# Patient Record
Sex: MALE | Race: ASIAN | NOT HISPANIC OR LATINO | URBAN - METROPOLITAN AREA
[De-identification: names, ages, dates, MRNs, and addresses within clinical notes are randomized per-mention and may not be internally consistent; named-entity substitution may affect disease eponyms.]

---

## 2018-09-12 ENCOUNTER — EMERGENCY (EMERGENCY)
Facility: HOSPITAL | Age: 83
LOS: 1 days | Discharge: HOME | End: 2018-09-12
Attending: EMERGENCY MEDICINE | Admitting: EMERGENCY MEDICINE
Payer: MEDICARE

## 2018-09-12 VITALS
DIASTOLIC BLOOD PRESSURE: 67 MMHG | HEART RATE: 70 BPM | RESPIRATION RATE: 17 BRPM | TEMPERATURE: 99 F | SYSTOLIC BLOOD PRESSURE: 123 MMHG | OXYGEN SATURATION: 99 %

## 2018-09-12 VITALS
RESPIRATION RATE: 16 BRPM | OXYGEN SATURATION: 97 % | WEIGHT: 169.98 LBS | HEART RATE: 76 BPM | DIASTOLIC BLOOD PRESSURE: 60 MMHG | SYSTOLIC BLOOD PRESSURE: 96 MMHG | HEIGHT: 68 IN | TEMPERATURE: 98 F

## 2018-09-12 LAB
ALBUMIN SERPL ELPH-MCNC: 3.1 G/DL — LOW (ref 3.3–5)
ALP SERPL-CCNC: 54 U/L — SIGNIFICANT CHANGE UP (ref 30–120)
ALT FLD-CCNC: 30 U/L DA — SIGNIFICANT CHANGE UP (ref 10–60)
ANION GAP SERPL CALC-SCNC: 6 MMOL/L — SIGNIFICANT CHANGE UP (ref 5–17)
APTT BLD: 43.6 SEC — HIGH (ref 27.5–37.4)
AST SERPL-CCNC: 33 U/L — SIGNIFICANT CHANGE UP (ref 10–40)
BASOPHILS # BLD AUTO: 0.02 K/UL — SIGNIFICANT CHANGE UP (ref 0–0.2)
BASOPHILS NFR BLD AUTO: 0.4 % — SIGNIFICANT CHANGE UP (ref 0–2)
BILIRUB SERPL-MCNC: 1.1 MG/DL — SIGNIFICANT CHANGE UP (ref 0.2–1.2)
BUN SERPL-MCNC: 30 MG/DL — HIGH (ref 7–23)
CALCIUM SERPL-MCNC: 8.8 MG/DL — SIGNIFICANT CHANGE UP (ref 8.4–10.5)
CHLORIDE SERPL-SCNC: 100 MMOL/L — SIGNIFICANT CHANGE UP (ref 96–108)
CO2 SERPL-SCNC: 25 MMOL/L — SIGNIFICANT CHANGE UP (ref 22–31)
CREAT SERPL-MCNC: 1.87 MG/DL — HIGH (ref 0.5–1.3)
EOSINOPHIL # BLD AUTO: 0.09 K/UL — SIGNIFICANT CHANGE UP (ref 0–0.5)
EOSINOPHIL NFR BLD AUTO: 1.9 % — SIGNIFICANT CHANGE UP (ref 0–6)
GLUCOSE SERPL-MCNC: 228 MG/DL — HIGH (ref 70–99)
HCT VFR BLD CALC: 32 % — LOW (ref 39–50)
HGB BLD-MCNC: 11.3 G/DL — LOW (ref 13–17)
IMM GRANULOCYTES NFR BLD AUTO: 0.2 % — SIGNIFICANT CHANGE UP (ref 0–1.5)
INR BLD: 1.31 RATIO — HIGH (ref 0.88–1.16)
LYMPHOCYTES # BLD AUTO: 1.16 K/UL — SIGNIFICANT CHANGE UP (ref 1–3.3)
LYMPHOCYTES # BLD AUTO: 23.9 % — SIGNIFICANT CHANGE UP (ref 13–44)
MCHC RBC-ENTMCNC: 34 PG — SIGNIFICANT CHANGE UP (ref 27–34)
MCHC RBC-ENTMCNC: 35.3 GM/DL — SIGNIFICANT CHANGE UP (ref 32–36)
MCV RBC AUTO: 96.4 FL — SIGNIFICANT CHANGE UP (ref 80–100)
MONOCYTES # BLD AUTO: 0.4 K/UL — SIGNIFICANT CHANGE UP (ref 0–0.9)
MONOCYTES NFR BLD AUTO: 8.2 % — SIGNIFICANT CHANGE UP (ref 2–14)
NEUTROPHILS # BLD AUTO: 3.18 K/UL — SIGNIFICANT CHANGE UP (ref 1.8–7.4)
NEUTROPHILS NFR BLD AUTO: 65.4 % — SIGNIFICANT CHANGE UP (ref 43–77)
PLATELET # BLD AUTO: 119 K/UL — LOW (ref 150–400)
POTASSIUM SERPL-MCNC: 5.3 MMOL/L — SIGNIFICANT CHANGE UP (ref 3.5–5.3)
POTASSIUM SERPL-SCNC: 5.3 MMOL/L — SIGNIFICANT CHANGE UP (ref 3.5–5.3)
PROT SERPL-MCNC: 6.2 G/DL — SIGNIFICANT CHANGE UP (ref 6–8.3)
PROTHROM AB SERPL-ACNC: 14.4 SEC — HIGH (ref 9.8–12.7)
RBC # BLD: 3.32 M/UL — LOW (ref 4.2–5.8)
RBC # FLD: 13.6 % — SIGNIFICANT CHANGE UP (ref 10.3–14.5)
SODIUM SERPL-SCNC: 131 MMOL/L — LOW (ref 135–145)
WBC # BLD: 4.86 K/UL — SIGNIFICANT CHANGE UP (ref 3.8–10.5)
WBC # FLD AUTO: 4.86 K/UL — SIGNIFICANT CHANGE UP (ref 3.8–10.5)

## 2018-09-12 PROCEDURE — 85027 COMPLETE CBC AUTOMATED: CPT

## 2018-09-12 PROCEDURE — 99284 EMERGENCY DEPT VISIT MOD MDM: CPT | Mod: 25

## 2018-09-12 PROCEDURE — 85730 THROMBOPLASTIN TIME PARTIAL: CPT

## 2018-09-12 PROCEDURE — 93005 ELECTROCARDIOGRAM TRACING: CPT

## 2018-09-12 PROCEDURE — 96374 THER/PROPH/DIAG INJ IV PUSH: CPT

## 2018-09-12 PROCEDURE — 99285 EMERGENCY DEPT VISIT HI MDM: CPT

## 2018-09-12 PROCEDURE — 36415 COLL VENOUS BLD VENIPUNCTURE: CPT

## 2018-09-12 PROCEDURE — 85610 PROTHROMBIN TIME: CPT

## 2018-09-12 PROCEDURE — 93010 ELECTROCARDIOGRAM REPORT: CPT

## 2018-09-12 PROCEDURE — 80053 COMPREHEN METABOLIC PANEL: CPT

## 2018-09-12 RX ORDER — SODIUM CHLORIDE 9 MG/ML
500 INJECTION INTRAMUSCULAR; INTRAVENOUS; SUBCUTANEOUS ONCE
Qty: 0 | Refills: 0 | Status: COMPLETED | OUTPATIENT
Start: 2018-09-12 | End: 2018-09-12

## 2018-09-12 RX ORDER — SODIUM CHLORIDE 9 MG/ML
3 INJECTION INTRAMUSCULAR; INTRAVENOUS; SUBCUTANEOUS ONCE
Qty: 0 | Refills: 0 | Status: COMPLETED | OUTPATIENT
Start: 2018-09-12 | End: 2018-09-12

## 2018-09-12 RX ORDER — SACUBITRIL AND VALSARTAN 24; 26 MG/1; MG/1
1 TABLET, FILM COATED ORAL
Qty: 0 | Refills: 0 | COMMUNITY

## 2018-09-12 RX ORDER — ONDANSETRON 8 MG/1
4 TABLET, FILM COATED ORAL ONCE
Qty: 0 | Refills: 0 | Status: COMPLETED | OUTPATIENT
Start: 2018-09-12 | End: 2018-09-12

## 2018-09-12 RX ADMIN — SODIUM CHLORIDE 3 MILLILITER(S): 9 INJECTION INTRAMUSCULAR; INTRAVENOUS; SUBCUTANEOUS at 11:27

## 2018-09-12 RX ADMIN — ONDANSETRON 4 MILLIGRAM(S): 8 TABLET, FILM COATED ORAL at 11:25

## 2018-09-12 RX ADMIN — SODIUM CHLORIDE 500 MILLILITER(S): 9 INJECTION INTRAMUSCULAR; INTRAVENOUS; SUBCUTANEOUS at 11:26

## 2018-09-12 NOTE — ED PROVIDER NOTE - EYES, MLM
Clear bilaterally, pupils equal, round and reactive to light. Clear bilaterally, pupils equal, round and reactive to light. EOMI, no nystagmus

## 2018-09-12 NOTE — ED PROVIDER NOTE - OBJECTIVE STATEMENT
81 y/o M with hx of CAD with stents on plavix, HTN, DM, BPH presents with c/o epistaxis x 2 hours. Pt is Mandarin speaking and translation via daughter, Cleopatra Styles as per pt request. As per daughter, pt started having spontaneous nose bleed from B nostrils around 9am this morning. States that pt has had mild dizziness and nausea since. Pt had mild headache earlier which has resolved. Pt has hx of nose bleeds, usually from L nostril almost every 3-5 days, last nose bleed was a week ago, usually resolves after taking a "herbal medication" which has helped control his nose bleed today as well as per family. Pt has seen ENT in the past, was told to have a cyst in his L nostril which causes the bleed, had cauterization by ENT 2 years ago. Denies numbness, vision changes, vomiting, CP, SOB, trauma, fever or other symptoms. States that the R nostril stopped bleeding PTA and they packed the L nostril with tissue which has controlled the bleeding at this time.   PMD: Dr. Ruel Lui  ENT:

## 2018-09-12 NOTE — ED PROVIDER NOTE - ATTENDING CONTRIBUTION TO CARE
Translation by eliana at bedside per pt request. Pt Mandarin speaking.   82y M hx of CAD with 2 stents, PPM, HTN, DM, nasal "cyst" here with epistaxis. Pt reports bleeding from BL nares for ~2 hours today, described as steady drip. STates that during episode he became nauseated and dizzy. NO vomiting. No CP, palp, sob, abd pain. States that dizziness is mostly positional, ie going from sit to stand. No dizziness with head mvmt. Pt is on Plavix.  Gen: WNWD NAD  HEENT: NCAT PERRL no nystagmus EOMI R nare with boggy turbinate L nare w septal excoriation no active bleeding normal pharynx  Neck: supple no jvd  CV: RRR, no murmur  Lung: CTA BL  Abd: +BS soft NTND  Ext: wwp, palp pulses, FROMx4, no cce  Neuro: Awake alert CN grossly intact, sensation intact, motor 5/5 throughout  AP: Translation by eliana at bedside per pt request. Pt Mandarin speaking.   82y M hx of CAD with 2 stents, PPM, HTN, DM, nasal "cyst" here with epistaxis. Pt reports bleeding from BL nares for ~2 hours today, described as steady drip. STates that during episode he became nauseated and dizzy. NO vomiting. No CP, palp, sob, abd pain. States that dizziness is mostly positional, ie going from sit to stand. No dizziness with head mvmt. Pt is on Plavix.  Gen: WNWD NAD  HEENT: NCAT PERRL no nystagmus EOMI R nare with boggy turbinate L nare w septal excoriation no active bleeding normal pharynx  Neck: supple no jvd  CV: RRR, no murmur  Lung: CTA BL  Abd: +BS soft NTND  Ext: wwp, palp pulses, FROMx4, no cce  Neuro: Awake alert CN grossly intact, sensation intact, motor 5/5 throughout  AP: 82y M hx of CAD with 2 stents, PPM, HTN, DM, nasal "cyst" here with epistaxis, nausea, dizziness. Hypotensive on arrival, likely volume depletion vs med related vs vasovagal. Will give gentle IV hydration, check orthostatics and re-eval. No active bleeding at present. Will check CBC to r/o anemia as cause.

## 2018-09-12 NOTE — ED PROVIDER NOTE - MEDICAL DECISION MAKING DETAILS
83 y/o M with hx of cad with plavix, htn, dm c/o recurrent epistaxis, today with epistaxis from B nostrils since 9am, mild dizziness and nausea since, no trauma, no cp; hx of cauterization 2 years ago by ent, no active bleeding at this time B, will get ekg, labs, IVF, zofran, re-assess

## 2018-09-12 NOTE — ED ADULT NURSE NOTE - NSIMPLEMENTINTERV_GEN_ALL_ED
Implemented All Universal Safety Interventions:  [place] to call system. Call bell, personal items and telephone within reach. Instruct patient to call for assistance. Room bathroom lighting operational. Non-slip footwear when patient is off stretcher. Physically safe environment: no spills, clutter or unnecessary equipment. Stretcher in lowest position, wheels locked, appropriate side rails in place.

## 2018-09-12 NOTE — ED PROVIDER NOTE - NOSE FINDINGS
large clot noted in L nostril - removed and no active bleeding noted in B nostrils at this time,/EPISTAXIS large clot noted in L nostril - removed and no active bleeding noted in B nostrils at this time, boggy turbinate R, +mild blood noted to anterior septum L, no active bleeding noted, no septal hematoma or cysts B noted/CLOTTED NASAL BLOOD/EPISTAXIS

## 2018-09-12 NOTE — ED PROVIDER NOTE - PROGRESS NOTE DETAILS
Adeola GONZALEZ for Dr. Yanez: 81 y/o male with PMHx of CAD with pacemaker s/p 3 stents on Plavix, HTN, DM, epistaxis due to cyst in left nostril presents to the ED c/o epistaxis from B/L nostril this AM at around 8:40 bleeding until 10:40. Pt states the epistaxis was a continuous drip that was also going into his throat. +Nausea but no emesis. Nausea has now relieved. Pt is also c/o dizziness that began after epistaxis today. Dizziness exacerbated by motion. +LE edema. Denies SOB, abd pain, or CP today. Dr. Yanez: Pt states feeling better. No longer dizzy or hypotensive after IV fluids. Suspect likely vasovagal vs volume depletion. Advised to FU with cardio and ENT as OP. Adeola GONZALEZ for Dr. Yanez: Spoke to Dr. Nunez cardio who stated pt has hx of ischemic cardiomyopathy with ICD, EKG unchanged from baseline. Hx of low BP in the past. The last time pt was seen Dr. Nunez discontinued digoxin and metoprolol was decreased to once daily. Advised to f/u with him or PCP for further meds management. Creatinine is at baseline. Reevaluated patient at bedside.  Patient feeling much improved.  Discussed the results of all diagnostic testing in ED and copies of all reports given.   An opportunity to ask questions was given.  Discussed the importance of prompt, close medical follow-up.  Patient will return with any changes, concerns or persistent / worsening symptoms.  Understanding of all instructions verbalized. Advised pt and family if nose bleed recurs and is uncontrollable to return to ED and if not f/u ent. Advised to f/u with his PMD/cardiolgist as well.

## 2018-12-12 ENCOUNTER — INPATIENT (INPATIENT)
Facility: HOSPITAL | Age: 83
LOS: 8 days | Discharge: NURSING, REHAB OR LONG-TERM CARE | DRG: 871 | End: 2018-12-21
Attending: FAMILY MEDICINE | Admitting: FAMILY MEDICINE
Payer: MEDICARE

## 2018-12-12 VITALS
RESPIRATION RATE: 18 BRPM | WEIGHT: 149.91 LBS | TEMPERATURE: 98 F | SYSTOLIC BLOOD PRESSURE: 102 MMHG | OXYGEN SATURATION: 96 % | HEIGHT: 67 IN | DIASTOLIC BLOOD PRESSURE: 61 MMHG | HEART RATE: 69 BPM

## 2018-12-12 DIAGNOSIS — Z95.810 PRESENCE OF AUTOMATIC (IMPLANTABLE) CARDIAC DEFIBRILLATOR: Chronic | ICD-10-CM

## 2018-12-12 DIAGNOSIS — Z95.0 PRESENCE OF CARDIAC PACEMAKER: Chronic | ICD-10-CM

## 2018-12-12 DIAGNOSIS — A41.9 SEPSIS, UNSPECIFIED ORGANISM: ICD-10-CM

## 2018-12-12 PROBLEM — E11.9 TYPE 2 DIABETES MELLITUS WITHOUT COMPLICATIONS: Chronic | Status: ACTIVE | Noted: 2018-09-12

## 2018-12-12 PROBLEM — I10 ESSENTIAL (PRIMARY) HYPERTENSION: Chronic | Status: ACTIVE | Noted: 2018-09-12

## 2018-12-12 PROBLEM — I25.10 ATHEROSCLEROTIC HEART DISEASE OF NATIVE CORONARY ARTERY WITHOUT ANGINA PECTORIS: Chronic | Status: ACTIVE | Noted: 2018-09-12

## 2018-12-12 LAB
ALBUMIN SERPL ELPH-MCNC: 2.5 G/DL — LOW (ref 3.3–5)
ALP SERPL-CCNC: 103 U/L — SIGNIFICANT CHANGE UP (ref 30–120)
ALT FLD-CCNC: 66 U/L DA — HIGH (ref 10–60)
AMMONIA BLD-MCNC: 39 UMOL/L — HIGH (ref 11–32)
ANION GAP SERPL CALC-SCNC: 10 MMOL/L — SIGNIFICANT CHANGE UP (ref 5–17)
ANION GAP SERPL CALC-SCNC: 10 MMOL/L — SIGNIFICANT CHANGE UP (ref 5–17)
ANION GAP SERPL CALC-SCNC: 9 MMOL/L — SIGNIFICANT CHANGE UP (ref 5–17)
APPEARANCE UR: CLEAR — SIGNIFICANT CHANGE UP
APTT BLD: 34.7 SEC — SIGNIFICANT CHANGE UP (ref 28.5–37)
AST SERPL-CCNC: 81 U/L — HIGH (ref 10–40)
BACTERIA # UR AUTO: ABNORMAL
BASE EXCESS BLDV CALC-SCNC: -6.3 MMOL/L — LOW (ref -2–2)
BASOPHILS # BLD AUTO: 0 K/UL — SIGNIFICANT CHANGE UP (ref 0–0.2)
BASOPHILS NFR BLD AUTO: 0 % — SIGNIFICANT CHANGE UP (ref 0–2)
BILIRUB SERPL-MCNC: 2.4 MG/DL — HIGH (ref 0.2–1.2)
BILIRUB UR-MCNC: NEGATIVE — SIGNIFICANT CHANGE UP
BUN SERPL-MCNC: 109 MG/DL — HIGH (ref 7–23)
BUN SERPL-MCNC: 111 MG/DL — HIGH (ref 7–23)
BUN SERPL-MCNC: 119 MG/DL — HIGH (ref 7–23)
CALCIUM SERPL-MCNC: 7.3 MG/DL — LOW (ref 8.4–10.5)
CALCIUM SERPL-MCNC: 7.5 MG/DL — LOW (ref 8.4–10.5)
CALCIUM SERPL-MCNC: 8 MG/DL — LOW (ref 8.4–10.5)
CHLORIDE SERPL-SCNC: 87 MMOL/L — LOW (ref 96–108)
CHLORIDE SERPL-SCNC: 92 MMOL/L — LOW (ref 96–108)
CHLORIDE SERPL-SCNC: 94 MMOL/L — LOW (ref 96–108)
CO2 SERPL-SCNC: 20 MMOL/L — LOW (ref 22–31)
CO2 SERPL-SCNC: 21 MMOL/L — LOW (ref 22–31)
CO2 SERPL-SCNC: 21 MMOL/L — LOW (ref 22–31)
COLOR SPEC: YELLOW — SIGNIFICANT CHANGE UP
CREAT SERPL-MCNC: 2.9 MG/DL — HIGH (ref 0.5–1.3)
CREAT SERPL-MCNC: 2.97 MG/DL — HIGH (ref 0.5–1.3)
CREAT SERPL-MCNC: 3.19 MG/DL — HIGH (ref 0.5–1.3)
CRP SERPL-MCNC: 5.67 MG/DL — HIGH (ref 0–0.4)
DIFF PNL FLD: ABNORMAL
EOSINOPHIL # BLD AUTO: 0 K/UL — SIGNIFICANT CHANGE UP (ref 0–0.5)
EOSINOPHIL NFR BLD AUTO: 0 % — SIGNIFICANT CHANGE UP (ref 0–6)
EPI CELLS # UR: SIGNIFICANT CHANGE UP
GAS PNL BLDV: SIGNIFICANT CHANGE UP
GLUCOSE BLDC GLUCOMTR-MCNC: 180 MG/DL — HIGH (ref 70–99)
GLUCOSE SERPL-MCNC: 145 MG/DL — HIGH (ref 70–99)
GLUCOSE SERPL-MCNC: 229 MG/DL — HIGH (ref 70–99)
GLUCOSE SERPL-MCNC: 93 MG/DL — SIGNIFICANT CHANGE UP (ref 70–99)
GLUCOSE UR QL: 50 MG/DL
HCO3 BLDV-SCNC: 19 MMOL/L — LOW (ref 21–29)
HCT VFR BLD CALC: 31.3 % — LOW (ref 39–50)
HGB BLD-MCNC: 11.6 G/DL — LOW (ref 13–17)
HOROWITZ INDEX BLDV+IHG-RTO: 21 — SIGNIFICANT CHANGE UP
INR BLD: 1.39 RATIO — HIGH (ref 0.88–1.16)
KETONES UR-MCNC: NEGATIVE — SIGNIFICANT CHANGE UP
LACTATE SERPL-SCNC: 1.4 MMOL/L — SIGNIFICANT CHANGE UP (ref 0.7–2)
LACTATE SERPL-SCNC: 2.4 MMOL/L — HIGH (ref 0.7–2)
LDH SERPL L TO P-CCNC: 506 U/L — HIGH (ref 50–242)
LEUKOCYTE ESTERASE UR-ACNC: NEGATIVE — SIGNIFICANT CHANGE UP
LYMPHOCYTES # BLD AUTO: 1.1 K/UL — SIGNIFICANT CHANGE UP (ref 1–3.3)
LYMPHOCYTES # BLD AUTO: 5 % — LOW (ref 13–44)
MANUAL SMEAR VERIFICATION: SIGNIFICANT CHANGE UP
MCHC RBC-ENTMCNC: 32.8 PG — SIGNIFICANT CHANGE UP (ref 27–34)
MCHC RBC-ENTMCNC: 37.1 GM/DL — HIGH (ref 32–36)
MCV RBC AUTO: 88.4 FL — SIGNIFICANT CHANGE UP (ref 80–100)
MONOCYTES # BLD AUTO: 0.88 K/UL — SIGNIFICANT CHANGE UP (ref 0–0.9)
MONOCYTES NFR BLD AUTO: 4 % — SIGNIFICANT CHANGE UP (ref 2–14)
NEUTROPHILS # BLD AUTO: 19.96 K/UL — HIGH (ref 1.8–7.4)
NEUTROPHILS NFR BLD AUTO: 85 % — HIGH (ref 43–77)
NEUTS BAND # BLD: 6 % — SIGNIFICANT CHANGE UP (ref 0–8)
NITRITE UR-MCNC: NEGATIVE — SIGNIFICANT CHANGE UP
NRBC # BLD: 0 — SIGNIFICANT CHANGE UP
NRBC # BLD: SIGNIFICANT CHANGE UP /100 WBCS (ref 0–0)
NT-PROBNP SERPL-SCNC: 425 PG/ML — SIGNIFICANT CHANGE UP (ref 0–450)
PCO2 BLDV: 32 MMHG — LOW (ref 35–50)
PH BLDV: 7.37 — SIGNIFICANT CHANGE UP (ref 7.35–7.45)
PH UR: 5 — SIGNIFICANT CHANGE UP (ref 5–8)
PLAT MORPH BLD: NORMAL — SIGNIFICANT CHANGE UP
PLATELET # BLD AUTO: 82 K/UL — LOW (ref 150–400)
PO2 BLDV: 38 MMHG — SIGNIFICANT CHANGE UP (ref 25–45)
POTASSIUM SERPL-MCNC: 4.2 MMOL/L — SIGNIFICANT CHANGE UP (ref 3.5–5.3)
POTASSIUM SERPL-MCNC: 4.5 MMOL/L — SIGNIFICANT CHANGE UP (ref 3.5–5.3)
POTASSIUM SERPL-MCNC: 4.7 MMOL/L — SIGNIFICANT CHANGE UP (ref 3.5–5.3)
POTASSIUM SERPL-SCNC: 4.2 MMOL/L — SIGNIFICANT CHANGE UP (ref 3.5–5.3)
POTASSIUM SERPL-SCNC: 4.5 MMOL/L — SIGNIFICANT CHANGE UP (ref 3.5–5.3)
POTASSIUM SERPL-SCNC: 4.7 MMOL/L — SIGNIFICANT CHANGE UP (ref 3.5–5.3)
PROT SERPL-MCNC: 6.2 G/DL — SIGNIFICANT CHANGE UP (ref 6–8.3)
PROT UR-MCNC: NEGATIVE MG/DL — SIGNIFICANT CHANGE UP
PROTHROM AB SERPL-ACNC: 15.3 SEC — HIGH (ref 10–12.9)
RAPID RVP RESULT: SIGNIFICANT CHANGE UP
RBC # BLD: 3.54 M/UL — LOW (ref 4.2–5.8)
RBC # FLD: 12.5 % — SIGNIFICANT CHANGE UP (ref 10.3–14.5)
RBC BLD AUTO: NORMAL — SIGNIFICANT CHANGE UP
RBC CASTS # UR COMP ASSIST: ABNORMAL /HPF (ref 0–4)
SAO2 % BLDV: 66 % — LOW (ref 67–88)
SODIUM SERPL-SCNC: 118 MMOL/L — CRITICAL LOW (ref 135–145)
SODIUM SERPL-SCNC: 122 MMOL/L — LOW (ref 135–145)
SODIUM SERPL-SCNC: 124 MMOL/L — LOW (ref 135–145)
SP GR SPEC: 1.01 — SIGNIFICANT CHANGE UP (ref 1.01–1.02)
TOXIC GRANULES BLD QL SMEAR: PRESENT — SIGNIFICANT CHANGE UP
UROBILINOGEN FLD QL: NEGATIVE MG/DL — SIGNIFICANT CHANGE UP
WBC # BLD: 21.93 K/UL — HIGH (ref 3.8–10.5)
WBC # FLD AUTO: 21.93 K/UL — HIGH (ref 3.8–10.5)
WBC UR QL: SIGNIFICANT CHANGE UP

## 2018-12-12 PROCEDURE — 71250 CT THORAX DX C-: CPT | Mod: 26

## 2018-12-12 PROCEDURE — 99223 1ST HOSP IP/OBS HIGH 75: CPT | Mod: AI

## 2018-12-12 PROCEDURE — 99285 EMERGENCY DEPT VISIT HI MDM: CPT

## 2018-12-12 PROCEDURE — 93010 ELECTROCARDIOGRAM REPORT: CPT

## 2018-12-12 PROCEDURE — 71046 X-RAY EXAM CHEST 2 VIEWS: CPT | Mod: 26

## 2018-12-12 PROCEDURE — 72110 X-RAY EXAM L-2 SPINE 4/>VWS: CPT | Mod: 26

## 2018-12-12 PROCEDURE — 76700 US EXAM ABDOM COMPLETE: CPT | Mod: 26

## 2018-12-12 PROCEDURE — 70450 CT HEAD/BRAIN W/O DYE: CPT | Mod: 26

## 2018-12-12 PROCEDURE — 74176 CT ABD & PELVIS W/O CONTRAST: CPT | Mod: 26

## 2018-12-12 RX ORDER — VANCOMYCIN HCL 1 G
1000 VIAL (EA) INTRAVENOUS ONCE
Qty: 0 | Refills: 0 | Status: COMPLETED | OUTPATIENT
Start: 2018-12-12 | End: 2018-12-12

## 2018-12-12 RX ORDER — LEVOTHYROXINE SODIUM 125 MCG
88 TABLET ORAL DAILY
Qty: 0 | Refills: 0 | Status: DISCONTINUED | OUTPATIENT
Start: 2018-12-12 | End: 2018-12-21

## 2018-12-12 RX ORDER — TAMSULOSIN HYDROCHLORIDE 0.4 MG/1
0.4 CAPSULE ORAL AT BEDTIME
Qty: 0 | Refills: 0 | Status: DISCONTINUED | OUTPATIENT
Start: 2018-12-12 | End: 2018-12-21

## 2018-12-12 RX ORDER — SODIUM CHLORIDE 9 MG/ML
1000 INJECTION INTRAMUSCULAR; INTRAVENOUS; SUBCUTANEOUS ONCE
Qty: 0 | Refills: 0 | Status: COMPLETED | OUTPATIENT
Start: 2018-12-12 | End: 2018-12-12

## 2018-12-12 RX ORDER — METOPROLOL TARTRATE 50 MG
25 TABLET ORAL DAILY
Qty: 0 | Refills: 0 | Status: DISCONTINUED | OUTPATIENT
Start: 2018-12-12 | End: 2018-12-21

## 2018-12-12 RX ORDER — INSULIN LISPRO 100/ML
VIAL (ML) SUBCUTANEOUS EVERY 6 HOURS
Qty: 0 | Refills: 0 | Status: DISCONTINUED | OUTPATIENT
Start: 2018-12-12 | End: 2018-12-14

## 2018-12-12 RX ORDER — LACTULOSE 10 G/15ML
10 SOLUTION ORAL EVERY 6 HOURS
Qty: 0 | Refills: 0 | Status: DISCONTINUED | OUTPATIENT
Start: 2018-12-12 | End: 2018-12-13

## 2018-12-12 RX ORDER — SODIUM CHLORIDE 9 MG/ML
1000 INJECTION, SOLUTION INTRAVENOUS
Qty: 0 | Refills: 0 | Status: DISCONTINUED | OUTPATIENT
Start: 2018-12-12 | End: 2018-12-21

## 2018-12-12 RX ORDER — SODIUM CHLORIDE 9 MG/ML
1000 INJECTION INTRAMUSCULAR; INTRAVENOUS; SUBCUTANEOUS
Qty: 0 | Refills: 0 | Status: DISCONTINUED | OUTPATIENT
Start: 2018-12-12 | End: 2018-12-15

## 2018-12-12 RX ORDER — DEXTROSE 50 % IN WATER 50 %
12.5 SYRINGE (ML) INTRAVENOUS ONCE
Qty: 0 | Refills: 0 | Status: DISCONTINUED | OUTPATIENT
Start: 2018-12-12 | End: 2018-12-21

## 2018-12-12 RX ORDER — PIPERACILLIN AND TAZOBACTAM 4; .5 G/20ML; G/20ML
3.38 INJECTION, POWDER, LYOPHILIZED, FOR SOLUTION INTRAVENOUS ONCE
Qty: 0 | Refills: 0 | Status: COMPLETED | OUTPATIENT
Start: 2018-12-12 | End: 2018-12-12

## 2018-12-12 RX ORDER — PIPERACILLIN AND TAZOBACTAM 4; .5 G/20ML; G/20ML
3.38 INJECTION, POWDER, LYOPHILIZED, FOR SOLUTION INTRAVENOUS EVERY 12 HOURS
Qty: 0 | Refills: 0 | Status: DISCONTINUED | OUTPATIENT
Start: 2018-12-13 | End: 2018-12-17

## 2018-12-12 RX ORDER — DEXTROSE 50 % IN WATER 50 %
15 SYRINGE (ML) INTRAVENOUS ONCE
Qty: 0 | Refills: 0 | Status: DISCONTINUED | OUTPATIENT
Start: 2018-12-12 | End: 2018-12-21

## 2018-12-12 RX ORDER — HEPARIN SODIUM 5000 [USP'U]/ML
5000 INJECTION INTRAVENOUS; SUBCUTANEOUS EVERY 12 HOURS
Qty: 0 | Refills: 0 | Status: DISCONTINUED | OUTPATIENT
Start: 2018-12-12 | End: 2018-12-12

## 2018-12-12 RX ORDER — CLOPIDOGREL BISULFATE 75 MG/1
75 TABLET, FILM COATED ORAL DAILY
Qty: 0 | Refills: 0 | Status: DISCONTINUED | OUTPATIENT
Start: 2018-12-12 | End: 2018-12-13

## 2018-12-12 RX ORDER — GLUCAGON INJECTION, SOLUTION 0.5 MG/.1ML
1 INJECTION, SOLUTION SUBCUTANEOUS ONCE
Qty: 0 | Refills: 0 | Status: DISCONTINUED | OUTPATIENT
Start: 2018-12-12 | End: 2018-12-21

## 2018-12-12 RX ADMIN — TAMSULOSIN HYDROCHLORIDE 0.4 MILLIGRAM(S): 0.4 CAPSULE ORAL at 22:04

## 2018-12-12 RX ADMIN — Medication 250 MILLIGRAM(S): at 16:22

## 2018-12-12 RX ADMIN — CLOPIDOGREL BISULFATE 75 MILLIGRAM(S): 75 TABLET, FILM COATED ORAL at 19:15

## 2018-12-12 RX ADMIN — SODIUM CHLORIDE 1000 MILLILITER(S): 9 INJECTION INTRAMUSCULAR; INTRAVENOUS; SUBCUTANEOUS at 14:02

## 2018-12-12 RX ADMIN — Medication 88 MICROGRAM(S): at 19:16

## 2018-12-12 RX ADMIN — LACTULOSE 10 GRAM(S): 10 SOLUTION ORAL at 19:55

## 2018-12-12 RX ADMIN — PIPERACILLIN AND TAZOBACTAM 3.38 GRAM(S): 4; .5 INJECTION, POWDER, LYOPHILIZED, FOR SOLUTION INTRAVENOUS at 16:15

## 2018-12-12 RX ADMIN — PIPERACILLIN AND TAZOBACTAM 200 GRAM(S): 4; .5 INJECTION, POWDER, LYOPHILIZED, FOR SOLUTION INTRAVENOUS at 15:46

## 2018-12-12 RX ADMIN — SODIUM CHLORIDE 1000 MILLILITER(S): 9 INJECTION INTRAMUSCULAR; INTRAVENOUS; SUBCUTANEOUS at 12:24

## 2018-12-12 RX ADMIN — Medication 2: at 19:11

## 2018-12-12 RX ADMIN — SODIUM CHLORIDE 1000 MILLILITER(S): 9 INJECTION INTRAMUSCULAR; INTRAVENOUS; SUBCUTANEOUS at 13:05

## 2018-12-12 RX ADMIN — SODIUM CHLORIDE 1000 MILLILITER(S): 9 INJECTION INTRAMUSCULAR; INTRAVENOUS; SUBCUTANEOUS at 13:00

## 2018-12-12 NOTE — H&P ADULT - GASTROINTESTINAL DETAILS
no guarding/no bruit/no rebound tenderness/no masses palpable/bowel sounds normal/+ distended diffuse tenderness

## 2018-12-12 NOTE — H&P ADULT - NEGATIVE NEUROLOGICAL SYMPTOMS
no vertigo/no generalized seizures/no loss of sensation/no tremors/no transient paralysis/no headache

## 2018-12-12 NOTE — ED ADULT NURSE NOTE - NSIMPLEMENTINTERV_GEN_ALL_ED
Implemented All Fall with Harm Risk Interventions:  [place] to call system. Call bell, personal items and telephone within reach. Instruct patient to call for assistance. Room bathroom lighting operational. Non-slip footwear when patient is off stretcher. Physically safe environment: no spills, clutter or unnecessary equipment. Stretcher in lowest position, wheels locked, appropriate side rails in place. Provide visual cue, wrist band, yellow gown, etc. Monitor gait and stability. Monitor for mental status changes and reorient to person, place, and time. Review medications for side effects contributing to fall risk. Reinforce activity limits and safety measures with patient and family. Provide visual clues: red socks.

## 2018-12-12 NOTE — ED ADULT NURSE REASSESSMENT NOTE - NS ED NURSE REASSESS COMMENT FT1
placed pt on hospital bed, safety/ comfort maintained. pt refused Lu insertion, [place] cath placed, will monitor.
pt refused Lu insertion.
Report received at change of shift, pt AAOx3, X ray and CT head done. pt evaluated by ICU PA at bedside. Family at bedside, aware and agreeable with plan of care.
12/12/18 1710- CT abdomen, chest done and resulted, with + findings pt to be evaluated by critical care MD for admission to ICU. family and pt updated in plan of care verbalizes understanding. MARILYN nicholson
12/12/18- Lab work done and resulted pt with elevated ammonia level will received lactulose po as ordered, IVF running kept NPO as ordered, pt  lethargic @ times easily to arouse wife @ bedside MARILYN nicholson
12/12/18 1553- Pt. started on IV abx. as ordered, will be admitted awaiting for admission orders, turned and positioned,  no distress  lyn, MARILYN
12/12/18 1356- Pt. lethargic arousal IVF bolus infusing as ordered, awaiting for cbc result no distress daughter and pt updated in plan of care. lyn RN

## 2018-12-12 NOTE — H&P ADULT - ASSESSMENT
Patient is 84 yo male with hx of CAD S/P stent, S/P PPM, HTN, BPH, CKD, Hepatitis, and Dm2 presenting with     1. Fever.  Unclear Etiology.  ??Abdm source.  abdm/Chest/pelvic CT scan noticed.  Blood culture pending.  Received one dose of vanco in the ED.  Continue with zosyn pending ID consult  -Questionable Gallbladder disease VS.  SBP.  Will obtain Abdm US, and GI consult  -Critical care to follow up       2.  Abnormal LFT.  Hx of Hepatitis.  Will obtain Hepatitis panel, and Abdm US  -Monitor LFT  -GI consult    3. ANDREEA on CKD with hyponatremia.  ??Baseline renal function.  Seems to be secondary dehydration.  Lu cath.  Continue with IVF, and Monitor Renal function  -Renal US to r/o acute process  -Nephrology consult    4. HTN/CAD.  Continue with home medications (Plavix, and metoprolol).      5.  AMS.  Seems to be secondary to metabolic encephalopathy due to infection, and Renal failure.  Will obtain Head CT scan to r/o acute process.  continue with neuro check.    -Obtain ammonia level    6.  HTN.  Continue with metoprolol with holding parameter.  Monitor BP      7.  DM2.  Hold lantus. ISS, and Monitor POC    8.  Diet.  NPO until further evaluation by speech therapy    9.  Back pain.  Will obtain X-ray of the lumbar spine.      Plan of care was discussed with patient, and family in great details, All questions were answered to their satisfaction  Seems to understand, and in agreement  Prognosis guarded

## 2018-12-12 NOTE — ED ADULT NURSE NOTE - OBJECTIVE STATEMENT
Pt complaining of neck mid and lower back pain foe the past 2 weeks Retorted and distention and fever on and off for the past 3 weeks. Daughter stated hasn't seen the MD due to being on vacation and pt was unable to get pout the bed. Has been taking motrin mobic and tylenol for pain pt afebrile at this time

## 2018-12-12 NOTE — ED PROVIDER NOTE - MEDICAL DECISION MAKING DETAILS
82 y/o M pt presents to the ED c/o fever of 102 F, generalized weakness, malaise, neck pain and back pain for 2 weeks. Will do labs to r/o signs of infection.

## 2018-12-12 NOTE — H&P ADULT - RS GEN PE MLT RESP DETAILS PC
rhonchi/breath sounds equal/respirations non-labored/normal/airway patent/clear to auscultation bilaterally/good air movement

## 2018-12-12 NOTE — CONSULT NOTE ADULT - PROBLEM SELECTOR RECOMMENDATION 9
sepsis, juan antonio, dehydration, juan antonio, ckd, heart disease - cad with stents, severe Arthritis as per family  cvs regimen, monitor HD, I and O, cvs regimen, has AICD  ct scan noted, discussed with Rads - report pending  poss abd process - eval acute danielle/ SBP, will check Hepatitis Panel, will check TFT, Lyme disease serology, Biomarkers for sepsis,   admit to SPCU, monitor vs and HD and Sat, emp ABX - Broad Spectr - Zosyn  will need rehydration - serial lytes, Na, would infuse Normal Saline gently - Na levels every 8 hours around the clock,   pain assessment and management  work up under way  VBG pending  discussed plan of care with family and patient at the bedside, prognosis guarded  will follow

## 2018-12-12 NOTE — H&P ADULT - HISTORY OF PRESENT ILLNESS
Patient is 84 yo male with hx of CAD S/P stent, S/P PPM, HTN, CKD, and Hepatitis presenting with fever that has been going on for the past several days, and progressively worsen.  + generalized weakness, cough, and decrease po intake X 2 weeks.  Daughter noticed abdominal distention with increase abdominal pain.  Denies any headache, dizziness, blurry vision, chest pain, SOB, N/V/D, numbness, or weakness.      + Back pain

## 2018-12-12 NOTE — H&P ADULT - PMH
CAD (coronary artery disease)    CKD (chronic kidney disease)    DM (diabetes mellitus)    Hepatitis    HTN (hypertension)

## 2018-12-12 NOTE — ED ADULT NURSE NOTE - ED STAT RN HANDOFF DETAILS 2
Patient admitted to telemetry A&OX 3 mandarin speaking assigned to room 106 on [address]. Report given to MARILYN Gallego, patient to be transported via bed with cardiac monitor by RN and PCa with wife at side. Patient stable in no acute distress safety maintained.

## 2018-12-12 NOTE — ED PROVIDER NOTE - SECONDARY DIAGNOSIS.
Hyponatremia Acute renal failure, unspecified acute renal failure type Pneumonia due to infectious organism, unspecified laterality, unspecified part of lung

## 2018-12-12 NOTE — ED PROVIDER NOTE - OBJECTIVE STATEMENT
84 y/o M pt with PMHx of DM, MI, CAD, cardiac stents x3 more than 10 years ago, pacemaker presents to the ED c/o fever of 102 F, generalized weakness, malaise, neck pain and back pain for 2 weeks. Per daughter, he has been in bed for 2 weeks, states the pt normally walks around. Confirms cough and decreased eating/drinking. He has been given Tylenol and Mobic, receives insulin injections for DM. No further complaints at this time.   PMD: Matthew Lui ([place])

## 2018-12-12 NOTE — ED PROVIDER NOTE - CARE PLAN
Principal Discharge DX:	Sepsis Principal Discharge DX:	Sepsis  Secondary Diagnosis:	Hyponatremia  Secondary Diagnosis:	Acute renal failure, unspecified acute renal failure type  Secondary Diagnosis:	Pneumonia due to infectious organism, unspecified laterality, unspecified part of lung

## 2018-12-13 ENCOUNTER — RESULT REVIEW (OUTPATIENT)
Age: 83
End: 2018-12-13

## 2018-12-13 LAB
ALBUMIN SERPL ELPH-MCNC: 2.2 G/DL — LOW (ref 3.3–5)
ALP SERPL-CCNC: 73 U/L — SIGNIFICANT CHANGE UP (ref 30–120)
ALT FLD-CCNC: 58 U/L DA — SIGNIFICANT CHANGE UP (ref 10–60)
AMMONIA BLD-MCNC: 56 UMOL/L — HIGH (ref 11–32)
ANION GAP SERPL CALC-SCNC: 13 MMOL/L — SIGNIFICANT CHANGE UP (ref 5–17)
AST SERPL-CCNC: 73 U/L — HIGH (ref 10–40)
B BURGDOR C6 AB SER-ACNC: POSITIVE
B BURGDOR IGG+IGM SER-ACNC: 3.36 INDEX — HIGH (ref 0.01–0.89)
BILIRUB SERPL-MCNC: 2.4 MG/DL — HIGH (ref 0.2–1.2)
BUN SERPL-MCNC: 101 MG/DL — HIGH (ref 7–23)
CALCIUM SERPL-MCNC: 7.5 MG/DL — LOW (ref 8.4–10.5)
CHLORIDE SERPL-SCNC: 95 MMOL/L — LOW (ref 96–108)
CO2 SERPL-SCNC: 18 MMOL/L — LOW (ref 22–31)
CREAT SERPL-MCNC: 2.72 MG/DL — HIGH (ref 0.5–1.3)
CULTURE RESULTS: NO GROWTH — SIGNIFICANT CHANGE UP
GLUCOSE SERPL-MCNC: 79 MG/DL — SIGNIFICANT CHANGE UP (ref 70–99)
HAV IGM SER-ACNC: SIGNIFICANT CHANGE UP
HBA1C BLD-MCNC: 8.4 % — HIGH (ref 4–5.6)
HBV CORE IGM SER-ACNC: SIGNIFICANT CHANGE UP
HBV SURFACE AG SER-ACNC: SIGNIFICANT CHANGE UP
HCT VFR BLD CALC: 28.7 % — LOW (ref 39–50)
HCV AB S/CO SERPL IA: 0.21 S/CO — SIGNIFICANT CHANGE UP
HCV AB SERPL-IMP: SIGNIFICANT CHANGE UP
HGB BLD-MCNC: 10.4 G/DL — LOW (ref 13–17)
LACTATE SERPL-SCNC: 1.2 MMOL/L — SIGNIFICANT CHANGE UP (ref 0.7–2)
MCHC RBC-ENTMCNC: 32.8 PG — SIGNIFICANT CHANGE UP (ref 27–34)
MCHC RBC-ENTMCNC: 36.2 GM/DL — HIGH (ref 32–36)
MCV RBC AUTO: 90.5 FL — SIGNIFICANT CHANGE UP (ref 80–100)
NRBC # BLD: 0 /100 WBCS — SIGNIFICANT CHANGE UP (ref 0–0)
PLATELET # BLD AUTO: 95 K/UL — LOW (ref 150–400)
POTASSIUM SERPL-MCNC: 4.4 MMOL/L — SIGNIFICANT CHANGE UP (ref 3.5–5.3)
POTASSIUM SERPL-SCNC: 4.4 MMOL/L — SIGNIFICANT CHANGE UP (ref 3.5–5.3)
PROCALCITONIN SERPL-MCNC: 1.87 NG/ML — HIGH (ref 0.02–0.1)
PROCALCITONIN SERPL-MCNC: 2.2 NG/ML — HIGH (ref 0.02–0.1)
PROT SERPL-MCNC: 5.6 G/DL — LOW (ref 6–8.3)
RBC # BLD: 3.17 M/UL — LOW (ref 4.2–5.8)
RBC # FLD: 12.6 % — SIGNIFICANT CHANGE UP (ref 10.3–14.5)
SODIUM SERPL-SCNC: 126 MMOL/L — LOW (ref 135–145)
SPECIMEN SOURCE: SIGNIFICANT CHANGE UP
T3 SERPL-MCNC: 62 NG/DL — LOW (ref 80–200)
T4 AB SER-ACNC: 4.7 UG/DL — SIGNIFICANT CHANGE UP (ref 4.6–12)
TSH SERPL-MCNC: 2.11 UIU/ML — SIGNIFICANT CHANGE UP (ref 0.27–4.2)
WBC # BLD: 18.75 K/UL — HIGH (ref 3.8–10.5)
WBC # FLD AUTO: 18.75 K/UL — HIGH (ref 3.8–10.5)

## 2018-12-13 PROCEDURE — 99233 SBSQ HOSP IP/OBS HIGH 50: CPT

## 2018-12-13 PROCEDURE — 49083 ABD PARACENTESIS W/IMAGING: CPT

## 2018-12-13 PROCEDURE — 88305 TISSUE EXAM BY PATHOLOGIST: CPT | Mod: 26

## 2018-12-13 PROCEDURE — 88108 CYTOPATH CONCENTRATE TECH: CPT | Mod: 26

## 2018-12-13 RX ORDER — LACTULOSE 10 G/15ML
10 SOLUTION ORAL
Qty: 0 | Refills: 0 | Status: COMPLETED | OUTPATIENT
Start: 2018-12-13 | End: 2018-12-13

## 2018-12-13 RX ADMIN — LACTULOSE 10 GRAM(S): 10 SOLUTION ORAL at 15:56

## 2018-12-13 RX ADMIN — PIPERACILLIN AND TAZOBACTAM 25 GRAM(S): 4; .5 INJECTION, POWDER, LYOPHILIZED, FOR SOLUTION INTRAVENOUS at 04:16

## 2018-12-13 RX ADMIN — LACTULOSE 10 GRAM(S): 10 SOLUTION ORAL at 06:08

## 2018-12-13 RX ADMIN — LACTULOSE 10 GRAM(S): 10 SOLUTION ORAL at 12:24

## 2018-12-13 RX ADMIN — LACTULOSE 10 GRAM(S): 10 SOLUTION ORAL at 14:15

## 2018-12-13 RX ADMIN — LACTULOSE 10 GRAM(S): 10 SOLUTION ORAL at 00:00

## 2018-12-13 RX ADMIN — LACTULOSE 10 GRAM(S): 10 SOLUTION ORAL at 22:51

## 2018-12-13 RX ADMIN — PIPERACILLIN AND TAZOBACTAM 25 GRAM(S): 4; .5 INJECTION, POWDER, LYOPHILIZED, FOR SOLUTION INTRAVENOUS at 15:50

## 2018-12-13 RX ADMIN — Medication 88 MICROGRAM(S): at 06:07

## 2018-12-13 RX ADMIN — SODIUM CHLORIDE 60 MILLILITER(S): 9 INJECTION INTRAMUSCULAR; INTRAVENOUS; SUBCUTANEOUS at 17:15

## 2018-12-13 RX ADMIN — LACTULOSE 10 GRAM(S): 10 SOLUTION ORAL at 10:49

## 2018-12-13 RX ADMIN — Medication 25 MILLIGRAM(S): at 06:07

## 2018-12-13 RX ADMIN — LACTULOSE 10 GRAM(S): 10 SOLUTION ORAL at 17:15

## 2018-12-13 RX ADMIN — Medication 1: at 18:06

## 2018-12-13 RX ADMIN — Medication 1: at 23:22

## 2018-12-13 RX ADMIN — TAMSULOSIN HYDROCHLORIDE 0.4 MILLIGRAM(S): 0.4 CAPSULE ORAL at 22:51

## 2018-12-13 NOTE — PROGRESS NOTE ADULT - SUBJECTIVE AND OBJECTIVE BOX
Date/Time Patient Seen:  		  Referring MD:   Data Reviewed	       Patient is a 83y old  Male who presents with a chief complaint of fever (12 Dec 2018 18:15)  vs and meds reviewed      Subjective/HPI     PAST MEDICAL & SURGICAL HISTORY:  CKD (chronic kidney disease)  Hepatitis  CAD (coronary artery disease)  HTN (hypertension)  DM (diabetes mellitus)  Artificial cardiac pacemaker  AICD (automatic cardioverter/defibrillator) present  No significant past surgical history        Medication list         MEDICATIONS  (STANDING):  clopidogrel Tablet 75 milliGRAM(s) Oral daily  dextrose 5%. 1000 milliLiter(s) (50 mL/Hr) IV Continuous <Continuous>  dextrose 50% Injectable 12.5 Gram(s) IV Push once  insulin lispro (HumaLOG) corrective regimen sliding scale   SubCutaneous every 6 hours  lactulose Syrup 10 Gram(s) Oral every 6 hours  levothyroxine 88 MICROGram(s) Oral daily  metoprolol succinate ER 25 milliGRAM(s) Oral daily  piperacillin/tazobactam IVPB. 3.375 Gram(s) IV Intermittent every 12 hours  sodium chloride 0.9%. 1000 milliLiter(s) (80 mL/Hr) IV Continuous <Continuous>  tamsulosin 0.4 milliGRAM(s) Oral at bedtime    MEDICATIONS  (PRN):  dextrose 40% Gel 15 Gram(s) Oral once PRN Blood Glucose LESS THAN 70 milliGRAM(s)/deciliter  glucagon  Injectable 1 milliGRAM(s) IntraMuscular once PRN Glucose LESS THAN 70 milligrams/deciliter         Vitals log        ICU Vital Signs Last 24 Hrs  T(C): 36.3 (13 Dec 2018 06:14), Max: 37.1 (12 Dec 2018 17:00)  T(F): 97.4 (13 Dec 2018 06:14), Max: 98.7 (12 Dec 2018 17:00)  HR: 68 (13 Dec 2018 06:14) (60 - 78)  BP: 102/62 (13 Dec 2018 06:14) (89/52 - 133/90)  BP(mean): 52 (13 Dec 2018 01:03) (52 - 87)  ABP: --  ABP(mean): --  RR: 15 (13 Dec 2018 06:14) (12 - 18)  SpO2: 97% (13 Dec 2018 06:14) (95% - 100%)           Input and Output:  I&O's Detail    12 Dec 2018 07:01  -  13 Dec 2018 07:00  --------------------------------------------------------  IN:    IV PiggyBack: 350 mL    Sodium Chloride 0.9% IV Bolus: 2000 mL  Total IN: 2350 mL    OUT:    Voided: 965 mL  Total OUT: 965 mL    Total NET: 1385 mL          Lab Data                        10.4   18.75 )-----------( 95       ( 13 Dec 2018 06:04 )             28.7     12-13    126<L>  |  95<L>  |  101<H>  ----------------------------<  79  4.4   |  18<L>  |  2.72<H>    Ca    7.5<L>      13 Dec 2018 06:04    TPro  5.6<L>  /  Alb  2.2<L>  /  TBili  2.4<H>  /  DBili  x   /  AST  73<H>  /  ALT  58  /  AlkPhos  73  12-13            Review of Systems	      Objective     Physical Examination    heart s1s2  lung dec BS  abd dec BS    Pertinent Lab findings & Imaging      Aly:  NO   Adequate UO     I&O's Detail    12 Dec 2018 07:01  -  13 Dec 2018 07:00  --------------------------------------------------------  IN:    IV PiggyBack: 350 mL    Sodium Chloride 0.9% IV Bolus: 2000 mL  Total IN: 2350 mL    OUT:    Voided: 965 mL  Total OUT: 965 mL    Total NET: 1385 mL               Discussed with:     Cultures:	        Radiology

## 2018-12-13 NOTE — PROGRESS NOTE ADULT - SUBJECTIVE AND OBJECTIVE BOX
CC.  Fever  HPI.  Patient reports abdm pain is improving.  afebrile overnight.  feels hungry.  Offers no other complaints                  Constitutional: No fever, fatigue or weight loss.  Skin: No rash.  Eyes: No recent vision problems or eye pain.  ENT: No congestion, ear pain, or sore throat.  Endocrine: No thyroid problems.  Cardiovascular: No chest pain or palpation.  Respiratory: No cough, shortness of breath, congestion, or wheezing.  Gastrointestinal: No abdominal pain, nausea, vomiting, or diarrhea.  Genitourinary: No dysuria.  Musculoskeletal: No joint swelling.  Neurologic: No headache.      Vital Signs Last 24 Hrs  T(C): 36.6 (13 Dec 2018 11:52), Max: 37.1 (12 Dec 2018 17:00)  T(F): 97.9 (13 Dec 2018 11:52), Max: 98.7 (12 Dec 2018 17:00)  HR: 61 (13 Dec 2018 11:52) (59 - 78)  BP: 110/53 (13 Dec 2018 11:52) (89/52 - 133/90)  BP(mean): 68 (13 Dec 2018 11:52) (52 - 87)  RR: 22 (13 Dec 2018 11:52) (12 - 22)  SpO2: 99% (13 Dec 2018 11:52) (95% - 100%)        PHYSICAL EXAM-  GENERAL: Chronic ill appearing male   HEAD:  Atraumatic, Normocephalic  EYES: EOMI, PERRLA, conjunctiva and sclera clear  NECK: Supple, No JVD, Normal thyroid  NERVOUS SYSTEM:  Alert & Oriented X3, Moving all extremities.  Generalized weakness  CHEST/LUNG: Min rhonchi sound with good air entry.  No retractions or accessory muscle   HEART: Regular rate and rhythm; No murmurs, rubs, or gallops  ABDOMEN: Soft, Nontender, Nondistended; Bowel sounds present  EXTREMITIES:  No clubbing, cyanosis, or edema  SKIN: No rashes or lesions                              10.4   18.75 )-----------( 95       ( 13 Dec 2018 06:04 )             28.7     12-13    126<L>  |  95<L>  |  101<H>  ----------------------------<  79  4.4   |  18<L>  |  2.72<H>    Ca    7.5<L>      13 Dec 2018 06:04    TPro  5.6<L>  /  Alb  2.2<L>  /  TBili  2.4<H>  /  DBili  x   /  AST  73<H>  /  ALT  58  /  AlkPhos  73  12-13          Urinalysis Basic - ( 12 Dec 2018 14:07 )    Color: Yellow / Appearance: Clear / S.015 / pH: x  Gluc: x / Ketone: Negative  / Bili: Negative / Urobili: Negative mg/dL   Blood: x / Protein: Negative mg/dL / Nitrite: Negative   Leuk Esterase: Negative / RBC: 3-5 /HPF / WBC 0-2   Sq Epi: x / Non Sq Epi: Few / Bacteria: Moderate      PT/INR - ( 12 Dec 2018 12:24 )   PT: 15.3 sec;   INR: 1.39 ratio         PTT - ( 12 Dec 2018 12:24 )  PTT:34.7 sec  MEDICATIONS  (STANDING):  dextrose 5%. 1000 milliLiter(s) (50 mL/Hr) IV Continuous <Continuous>  dextrose 50% Injectable 12.5 Gram(s) IV Push once  insulin lispro (HumaLOG) corrective regimen sliding scale   SubCutaneous every 6 hours  lactulose Syrup 10 Gram(s) Oral every 2 hours  levothyroxine 88 MICROGram(s) Oral daily  metoprolol succinate ER 25 milliGRAM(s) Oral daily  piperacillin/tazobactam IVPB. 3.375 Gram(s) IV Intermittent every 12 hours  sodium chloride 0.9%. 1000 milliLiter(s) (80 mL/Hr) IV Continuous <Continuous>  tamsulosin 0.4 milliGRAM(s) Oral at bedtime    MEDICATIONS  (PRN):  dextrose 40% Gel 15 Gram(s) Oral once PRN Blood Glucose LESS THAN 70 milliGRAM(s)/deciliter  glucagon  Injectable 1 milliGRAM(s) IntraMuscular once PRN Glucose LESS THAN 70 milligrams/deciliter        Imaging Personally Reviewed:     [x ] YES  [ ] NO    Consultant(s) Notes Reviewed:  [x ] YES  [ ] NO    Care Discussed with Consultants/Other Providers [x ] YES  [ ] No medical contraindication for discharge

## 2018-12-13 NOTE — BRIEF OPERATIVE NOTE - PROCEDURE
<<-----Click on this checkbox to enter Procedure Paracentesis with ultrasound guidance by physician  12/13/2018  diagnostic  Active  WFORMAN

## 2018-12-13 NOTE — CONSULT NOTE ADULT - ASSESSMENT
1.	ANDREEA: Prerenal azotemia, ATN ( creatinine 1.8 09/2018)  2.	Hyponatremia  3.	Ascites  4.	Diabetes  5.	Hypertension    Improving renal function trend on IV hydration. Maintain PO fluid restriction. Check urine sodium, urine osm and serum uric acid level.   Avoid hypotonic fluids. Monitor BP closely. Check thyroid function tests if not recently checked. Check serial sodium levels.   Check repeat labs and monitor renal function trend. Labs as ordered. Get renal sonogram. Avoid nephrotoxic meds as possible.   Avoid ACEI, ARB and NSAIDS. Monitor input and output. Monitor blood sugar levels. Insulin coverage as needed. Dietary restriction.   For diagnostic paracentesis. Monitor BP trend. Titrate BP meds as needed. D/w family at bedside.  Further recommendations pending clinical course. Thank you for the courtesy of this referral.

## 2018-12-13 NOTE — SWALLOW BEDSIDE ASSESSMENT ADULT - SWALLOW EVAL: RECOMMENDED FEEDING/EATING TECHNIQUES
crush medication (when feasible)/maintain upright posture during/after eating for 30 mins/position upright (90 degrees)

## 2018-12-13 NOTE — CONSULT NOTE ADULT - ASSESSMENT
83 m w/ above hx a/w fevers, back pain and distended abdomen  Hepatitis A history 50 yr ago.    lft elevated, ascites and abnormal appearing gb on ct/sono   HIDA scan today to evaluate further   hold plavix, may need paracentesis to evaluate further   empiric abx per medicine   followup blood cx   d/w family today 83 m w/ above hx a/w fevers, back pain and distended abdomen  Hepatitis A history 50 yr ago.    lft elevated, ascites and abnormal appearing gb on ct/sono   HIDA scan today to evaluate further   hold plavix and effient, may need paracentesis to evaluate further   empiric abx per medicine   followup blood cx   d/w family today

## 2018-12-13 NOTE — SWALLOW BEDSIDE ASSESSMENT ADULT - COMMENTS
The patient is an 83 year old male, A&Ox3, and currently on clear liquids. The patients wife expressed that he tolerates regular textures at home. The patient presents with adequate dentition. Lingual and labial ROM and strength are WFL. The patient trialed puree and mechanical soft textures with thin liquids, he presents with adequate oral prep and latent A-P transport with extended mastication of mechanical soft textures, timely swallow trigger and hyolaryngeal excursion are noted. +Throat clear post swallow with mech soft textures. No overt s/s penetration/aspiration noted with puree textures and thin liquids.

## 2018-12-13 NOTE — PROGRESS NOTE ADULT - ASSESSMENT
Patient is 84 yo male with hx of CAD S/P stent, S/P PPM, HTN, BPH, CKD, Hepatitis, and Dm2 presenting with     1. Fever.  Unclear Etiology.  ??Abdm source.  abdm/Chest/pelvic CT scan noticed.  Blood culture pending.  Received one dose of vanco in the ED.    -Continue with zosyn pending ID consult  -Questionable Gallbladder disease VS.  SBP.    -Abdm US shows Cholelithiasis with thickened gallbladder wall. See discussion above. No biliary dilatation. Echodense liver consistent with steatosis/hepatocellular disease.  Medical renal disease. Moderate ascites  -Discussed case with IR for diagnostic paracentesis  -HIDA scan to r/o gallbladder disease  -Monitor clinical Pic  -GI and Critical care to follow up       2.  Abnormal LFT.  Hx of Hepatitis.  Hepatitis panel negative.  Avoid nephrotoxin  -Monitor LFT  -GI to follow up     3. ANDREEA on CKD with hyponatremia.  Seems to be secondary dehydration.  Lu cath.  Continue with IVF, and Monitor Renal function  -Nephrology consult pending    4. HTN/CAD.  Continue with metoprolol.  Hold plavix in anticipation for paracentesis.       5.  AMS.  Seems to be secondary to metabolic encephalopathy due to infection, hepatic encephalopathy and Renal failure.  Head CT scan shows no acute process.   continue with neuro check.    -Ammonia level elevated.  Continue with lactulose, and monitor ammonia level    6.  HTN.  Continue with metoprolol with holding parameter.  Monitor BP      7.  DM2.  Hold Lantus ISS, and Monitor POC    8.  Diet.  NPO until further evaluation by speech therapy    9.  Back pain.  X-ray of the lumbar spine shows degenerative disease.  continue with pain management.  PT consult      Plan of care was discussed with patient, and family in great details, All questions were answered to their satisfaction  Seems to understand, and in agreement  Prognosis guarded

## 2018-12-14 ENCOUNTER — TRANSCRIPTION ENCOUNTER (OUTPATIENT)
Age: 83
End: 2018-12-14

## 2018-12-14 LAB
ALBUMIN FLD-MCNC: <0.2 G/DL — SIGNIFICANT CHANGE UP
ALBUMIN SERPL ELPH-MCNC: 2 G/DL — LOW (ref 3.3–5)
ALP SERPL-CCNC: 61 U/L — SIGNIFICANT CHANGE UP (ref 30–120)
ALT FLD-CCNC: 52 U/L DA — SIGNIFICANT CHANGE UP (ref 10–60)
AMMONIA BLD-MCNC: 21 UMOL/L — SIGNIFICANT CHANGE UP (ref 11–32)
ANION GAP SERPL CALC-SCNC: 10 MMOL/L — SIGNIFICANT CHANGE UP (ref 5–17)
AST SERPL-CCNC: 59 U/L — HIGH (ref 10–40)
B PERT IGG+IGM PNL SER: ABNORMAL
BILIRUB SERPL-MCNC: 2.3 MG/DL — HIGH (ref 0.2–1.2)
BUN SERPL-MCNC: 88 MG/DL — HIGH (ref 7–23)
CALCIUM SERPL-MCNC: 7.6 MG/DL — LOW (ref 8.4–10.5)
CHLORIDE SERPL-SCNC: 99 MMOL/L — SIGNIFICANT CHANGE UP (ref 96–108)
CO2 SERPL-SCNC: 18 MMOL/L — LOW (ref 22–31)
COLOR FLD: YELLOW — SIGNIFICANT CHANGE UP
CREAT SERPL-MCNC: 2.56 MG/DL — HIGH (ref 0.5–1.3)
FLUID INTAKE SUBSTANCE CLASS: SIGNIFICANT CHANGE UP
FLUID SEGMENTED GRANULOCYTES: 28 % — SIGNIFICANT CHANGE UP
GLUCOSE FLD-MCNC: 175 MG/DL — SIGNIFICANT CHANGE UP
GLUCOSE SERPL-MCNC: 138 MG/DL — HIGH (ref 70–99)
HCT VFR BLD CALC: 27.5 % — LOW (ref 39–50)
HGB BLD-MCNC: 9.9 G/DL — LOW (ref 13–17)
LDH SERPL L TO P-CCNC: 66 U/L — SIGNIFICANT CHANGE UP
LYMPHOCYTES # FLD: 16 % — SIGNIFICANT CHANGE UP
MCHC RBC-ENTMCNC: 32.8 PG — SIGNIFICANT CHANGE UP (ref 27–34)
MCHC RBC-ENTMCNC: 36 GM/DL — SIGNIFICANT CHANGE UP (ref 32–36)
MCV RBC AUTO: 91.1 FL — SIGNIFICANT CHANGE UP (ref 80–100)
MESOTHL CELL # FLD: 1 % — SIGNIFICANT CHANGE UP
MONOS+MACROS # FLD: 55 % — SIGNIFICANT CHANGE UP
NIGHT BLUE STAIN TISS: SIGNIFICANT CHANGE UP
NRBC # BLD: 0 /100 WBCS — SIGNIFICANT CHANGE UP (ref 0–0)
OSMOLALITY UR: 482 MOS/KG — SIGNIFICANT CHANGE UP (ref 50–1200)
PLATELET # BLD AUTO: 105 K/UL — LOW (ref 150–400)
POTASSIUM SERPL-MCNC: 4.6 MMOL/L — SIGNIFICANT CHANGE UP (ref 3.5–5.3)
POTASSIUM SERPL-SCNC: 4.6 MMOL/L — SIGNIFICANT CHANGE UP (ref 3.5–5.3)
PROT FLD-MCNC: <1 G/DL — SIGNIFICANT CHANGE UP
PROT SERPL-MCNC: 5.4 G/DL — LOW (ref 6–8.3)
RBC # BLD: 3.02 M/UL — LOW (ref 4.2–5.8)
RBC # FLD: 13.1 % — SIGNIFICANT CHANGE UP (ref 10.3–14.5)
RCV VOL RI: 480 /UL — HIGH (ref 0–5)
SODIUM SERPL-SCNC: 127 MMOL/L — LOW (ref 135–145)
SODIUM UR-SCNC: 20 MMOL/L — SIGNIFICANT CHANGE UP
SPECIMEN SOURCE: SIGNIFICANT CHANGE UP
TOTAL NUCLEATED CELL COUNT, BODY FLUID: 321 /UL — HIGH (ref 0–5)
TUBE TYPE: SIGNIFICANT CHANGE UP
URATE SERPL-MCNC: 3.4 MG/DL — SIGNIFICANT CHANGE UP (ref 3.4–8.8)
WBC # BLD: 11.94 K/UL — HIGH (ref 3.8–10.5)
WBC # FLD AUTO: 11.94 K/UL — HIGH (ref 3.8–10.5)

## 2018-12-14 PROCEDURE — 78226 HEPATOBILIARY SYSTEM IMAGING: CPT | Mod: 26

## 2018-12-14 PROCEDURE — 99233 SBSQ HOSP IP/OBS HIGH 50: CPT

## 2018-12-14 RX ORDER — ALBUTEROL 90 UG/1
2.5 AEROSOL, METERED ORAL EVERY 6 HOURS
Qty: 0 | Refills: 0 | Status: DISCONTINUED | OUTPATIENT
Start: 2018-12-14 | End: 2018-12-21

## 2018-12-14 RX ORDER — INSULIN LISPRO 100/ML
VIAL (ML) SUBCUTANEOUS
Qty: 0 | Refills: 0 | Status: DISCONTINUED | OUTPATIENT
Start: 2018-12-14 | End: 2018-12-17

## 2018-12-14 RX ADMIN — TAMSULOSIN HYDROCHLORIDE 0.4 MILLIGRAM(S): 0.4 CAPSULE ORAL at 21:21

## 2018-12-14 RX ADMIN — Medication 1: at 06:19

## 2018-12-14 RX ADMIN — Medication 4: at 16:38

## 2018-12-14 RX ADMIN — ALBUTEROL 2.5 MILLIGRAM(S): 90 AEROSOL, METERED ORAL at 19:49

## 2018-12-14 RX ADMIN — Medication 88 MICROGRAM(S): at 06:15

## 2018-12-14 RX ADMIN — PIPERACILLIN AND TAZOBACTAM 25 GRAM(S): 4; .5 INJECTION, POWDER, LYOPHILIZED, FOR SOLUTION INTRAVENOUS at 16:09

## 2018-12-14 RX ADMIN — SODIUM CHLORIDE 60 MILLILITER(S): 9 INJECTION INTRAMUSCULAR; INTRAVENOUS; SUBCUTANEOUS at 21:21

## 2018-12-14 RX ADMIN — SODIUM CHLORIDE 60 MILLILITER(S): 9 INJECTION INTRAMUSCULAR; INTRAVENOUS; SUBCUTANEOUS at 03:29

## 2018-12-14 RX ADMIN — PIPERACILLIN AND TAZOBACTAM 25 GRAM(S): 4; .5 INJECTION, POWDER, LYOPHILIZED, FOR SOLUTION INTRAVENOUS at 03:30

## 2018-12-14 NOTE — DISCHARGE NOTE ADULT - MEDICATION SUMMARY - MEDICATIONS TO TAKE
I will START or STAY ON the medications listed below when I get home from the hospital:    Aldactone 25 mg oral tablet  -- 1 tab(s) by mouth once a day  -- Indication: For Cirrhosis    tamsulosin 0.4 mg oral capsule  -- 1 cap(s) by mouth once a day  -- Indication: For bph    nitroglycerin 0.4 mg sublingual spray  -- 1 tab(s) under tongue , As Needed  -- Indication: For Cp    Uloric 40 mg oral tablet  -- 1 tab(s) by mouth once a day  -- Indication: For gout    clopidogrel 75 mg oral tablet  -- 1 tab(s) by mouth once a day  -- Indication: For Cad    prasugrel 10 mg oral tablet  -- 1 tab(s) by mouth once a day  -- Indication: For Cad    metoprolol succinate 25 mg oral tablet, extended release  -- 1 tab(s) by mouth once a day  -- Indication: For Cad    ketoconazole 2% topical cream  -- 1 application on skin once a day (at bedtime)  -- Indication: For rash    dicyclomine 10 mg oral capsule  -- 1 cap(s) by mouth 3 times a day (before meals)  -- Indication: For Abdomnial cramping    midodrine 5 mg oral tablet  -- 1 tab(s) by mouth every 8 hours  -- Indication: For Hypotension    levothyroxine 88 mcg (0.088 mg) oral tablet  -- 1 tab(s) by mouth once a day  -- Indication: For Hypothyroid I will START or STAY ON the medications listed below when I get home from the hospital:    spironolactone 25 mg oral tablet  -- 2 tab(s) by mouth once a day  -- Indication: For Cirrhosis    tamsulosin 0.4 mg oral capsule  -- 1 cap(s) by mouth once a day  -- Indication: For bph    nitroglycerin 0.4 mg sublingual spray  -- 1 tab(s) under tongue , As Needed  -- Indication: For Cp    insulin glargine  -- 15 unit(s) subcutaneous once a day (at bedtime)  -- Indication: For DM (diabetes mellitus)    insulin lispro  -- 5 unit(s) subcutaneous 3 times a day  -- Indication: For premeal insulin    Uloric 40 mg oral tablet  -- 1 tab(s) by mouth once a day  -- Indication: For gout    clopidogrel 75 mg oral tablet  -- 1 tab(s) by mouth once a day  -- Indication: For Cad    prasugrel 10 mg oral tablet  -- 1 tab(s) by mouth once a day  -- Indication: For Cad    metoprolol succinate 25 mg oral tablet, extended release  -- 1 tab(s) by mouth once a day  -- Indication: For Cad    ketoconazole 2% topical cream  -- 1 application on skin once a day (at bedtime)  -- Indication: For rash    dicyclomine 10 mg oral capsule  -- 1 cap(s) by mouth 3 times a day (before meals)  -- Indication: For Abdomnial cramping    midodrine 5 mg oral tablet  -- 1 tab(s) by mouth every 8 hours  -- Indication: For Hypotension    pantoprazole 40 mg oral delayed release tablet  -- 1 tab(s) by mouth once a day (before a meal)  -- Indication: For gi protection    levothyroxine 88 mcg (0.088 mg) oral tablet  -- 1 tab(s) by mouth once a day  -- Indication: For Hypothyroid

## 2018-12-14 NOTE — PROGRESS NOTE ADULT - SUBJECTIVE AND OBJECTIVE BOX
CC.  Fever  HPI.  Patient reports abdm pain is improving.  feels hungry  afebrile overnight.   Offers no other complaints                  Constitutional: No fever, fatigue or weight loss.  Skin: No rash.  Eyes: No recent vision problems or eye pain.  ENT: No congestion, ear pain, or sore throat.  Endocrine: No thyroid problems.  Cardiovascular: No chest pain or palpation.  Respiratory: No cough, shortness of breath, congestion, or wheezing.  Gastrointestinal: No abdominal pain, nausea, vomiting, or diarrhea.  Genitourinary: No dysuria.  Musculoskeletal: No joint swelling.  Neurologic: No headache.      Vital Signs Last 24 Hrs  T(C): 36.7 (14 Dec 2018 09:55), Max: 36.7 (14 Dec 2018 09:55)  T(F): 98 (14 Dec 2018 09:55), Max: 98 (14 Dec 2018 09:55)  HR: 76 (14 Dec 2018 09:55) (67 - 76)  BP: 103/59 (14 Dec 2018 09:55) (102/56 - 127/71)  BP(mean): --  RR: 20 (14 Dec 2018 09:55) (14 - 20)  SpO2: 99% (14 Dec 2018 09:55) (98% - 100%)        PHYSICAL EXAM-  GENERAL: Chronic ill appearing male   HEAD:  Atraumatic, Normocephalic  EYES: EOMI, PERRLA, conjunctiva and sclera clear  NECK: Supple, No JVD, Normal thyroid  NERVOUS SYSTEM:  Alert & Oriented X3, Moving all extremities.  Generalized weakness  CHEST/LUNG: Min rhonchi sound with good air entry.  No retractions or accessory muscle   HEART: Regular rate and rhythm; No murmurs, rubs, or gallops  ABDOMEN: Soft, Nontender, Nondistended; Bowel sounds present  EXTREMITIES:  No clubbing, cyanosis, or edema  SKIN: No rashes or lesions                            9.9    11.94 )-----------( 105      ( 14 Dec 2018 08:49 )             27.5     12-14    127<L>  |  99  |  88<H>  ----------------------------<  138<H>  4.6   |  18<L>  |  2.56<H>    Ca    7.6<L>      14 Dec 2018 08:49    TPro  5.4<L>  /  Alb  2.0<L>  /  TBili  2.3<H>  /  DBili  x   /  AST  59<H>  /  ALT  52  /  AlkPhos  61  12-14            MEDICATIONS  (STANDING):  dextrose 5%. 1000 milliLiter(s) (50 mL/Hr) IV Continuous <Continuous>  dextrose 50% Injectable 12.5 Gram(s) IV Push once  insulin lispro (HumaLOG) corrective regimen sliding scale   SubCutaneous every 6 hours  levothyroxine 88 MICROGram(s) Oral daily  metoprolol succinate ER 25 milliGRAM(s) Oral daily  piperacillin/tazobactam IVPB. 3.375 Gram(s) IV Intermittent every 12 hours  sodium chloride 0.9%. 1000 milliLiter(s) (60 mL/Hr) IV Continuous <Continuous>  tamsulosin 0.4 milliGRAM(s) Oral at bedtime    MEDICATIONS  (PRN):  dextrose 40% Gel 15 Gram(s) Oral once PRN Blood Glucose LESS THAN 70 milliGRAM(s)/deciliter  glucagon  Injectable 1 milliGRAM(s) IntraMuscular once PRN Glucose LESS THAN 70 milligrams/deciliter        Imaging Personally Reviewed:     [x ] YES  [ ] NO    Consultant(s) Notes Reviewed:  [x ] YES  [ ] NO    Care Discussed with Consultants/Other Providers [x ] YES  [ ] No medical contraindication for discharge

## 2018-12-14 NOTE — DISCHARGE NOTE ADULT - MEDICATION SUMMARY - MEDICATIONS TO STOP TAKING
I will STOP taking the medications listed below when I get home from the hospital:    Entresto 24 mg-26 mg oral tablet  -- 1 tab(s) by mouth once a day

## 2018-12-14 NOTE — DISCHARGE NOTE ADULT - CARE PROVIDER_API CALL
Perlman, Daryl A (DO), Internal Medicine  [facility] [place] [address]  Phone: [phone]  Fax: [phone]

## 2018-12-14 NOTE — PROGRESS NOTE ADULT - ASSESSMENT
· Assessment		  83 m w/ above hx a/w fevers, back pain and distended abdomen  Hepatitis A history 50 yr ago.    lft elevated, ascites and abnormal appearing gb on ct/sono  -s/p Paracentesis 60cc turbid fluid removed. low total protein c/w cirrhosis (and low plt). Does not meet criteria for SBP      would cont w/ abx      fibroscan as outpatient to evaluate fibrosis-scarring in liver.       Hep B/C serologies negative.    HIDA scan today to evaluate further    empiric abx per medicine   followup blood cx  iv zosyn

## 2018-12-14 NOTE — DISCHARGE NOTE ADULT - HOSPITAL COURSE
82 yo male with hx of CAD S/P stent, S/P PPM, HTN, BPH, CKD, Hepatitis, and Dm2 presenting with     1. Fever.  Unclear Etiology.  ??Abdm source.  abdm/Chest/pelvic CT scan noticed.   -UC and Blood culture are negative  -Ascite fluid culture negative    will d/c zosyn  -Abdm US shows Cholelithiasis with thickened gallbladder wall. See discussion above. No biliary dilatation. Echodense liver consistent with steatosis/hepatocellular disease.  Medical renal disease. Moderate ascites  -HIDA scan shows no acute process.   diagnostic paracenteses ruled out SPB  etiology of liver cirrhosis not clear  hepatitis panel negative  would need liver biospy as outpt  repeat ct abdominal, moderate ascites+, discussed with  IR , the asciting fluid is mild and cant be drained  will continue o nmedical managment   d/c planning would need outpt liver biospy or fibroscan  discussed with al[facility], pt will be d/c to [facility]     2.  Abnormal LFT.  Hx of Hepatitis.  Hepatitis panel negative.  Avoid nephrotoxin  -Monitor LFT  -GI to follow up     3. ANDREEA on CKD with hyponatremia.  Seems to be secondary dehydration.  Lu cath.  Continue with IVF, and Monitor Renal function  -Improving  -Nephrology to follow up     4. Hyperkalemia  likely secondary to medication induced  has resolved      5. HTN/CAD.  Continue with metoprolol.  resume plavix/pasgurel    6.  AMS.  Seems to be secondary to metabolic encephalopathy due to infection, hepatic encephalopathy and Renal failure.  Head CT scan shows no acute process.   continue with neuro check.    -Improving.  -Continue with lactulose, and Monitor ammonia level       7.  HTN.  Continue with metoprolol with holding parameter.  Monitor BP      8.  DM2.  Hold Lantus ISS, and Monitor POC    8.  Diet.  Continue with current diet as per speech therapy            PE  nad  chest s1, s2  lungs decrease air entry at the bases  adb:BS+, distended

## 2018-12-14 NOTE — PHYSICAL THERAPY INITIAL EVALUATION ADULT - ADDITIONAL COMMENTS
Lives in dtr's house with dtr and spouse. 5-6 stairs to bedroom.  Has aide 5 hours x 7 days.  Pt. uses cane to go up stairs but then stays upstairs.  Pt. also has motorized w/c and uses that mostly.  Pt. also owns walker.  Pt. speaks Mandarin

## 2018-12-14 NOTE — PROGRESS NOTE ADULT - SUBJECTIVE AND OBJECTIVE BOX
Subjective: no dyspnea.       MEDICATIONS  (STANDING):  dextrose 5%. 1000 milliLiter(s) (50 mL/Hr) IV Continuous <Continuous>  dextrose 50% Injectable 12.5 Gram(s) IV Push once  insulin lispro (HumaLOG) corrective regimen sliding scale   SubCutaneous every 6 hours  levothyroxine 88 MICROGram(s) Oral daily  metoprolol succinate ER 25 milliGRAM(s) Oral daily  piperacillin/tazobactam IVPB. 3.375 Gram(s) IV Intermittent every 12 hours  sodium chloride 0.9%. 1000 milliLiter(s) (60 mL/Hr) IV Continuous <Continuous>  tamsulosin 0.4 milliGRAM(s) Oral at bedtime    MEDICATIONS  (PRN):  dextrose 40% Gel 15 Gram(s) Oral once PRN Blood Glucose LESS THAN 70 milliGRAM(s)/deciliter  glucagon  Injectable 1 milliGRAM(s) IntraMuscular once PRN Glucose LESS THAN 70 milligrams/deciliter          T(C): 36.3 (18 @ 04:55), Max: 36.6 (18 @ 11:52)  HR: 71 (18 @ 04:55) (61 - 76)  BP: 102/56 (18 @ 04:55) (102/56 - 127/71)  RR: 15 (18 @ 04:55) (14 - 22)  SpO2: 100% (18 @ 04:55) (98% - 100%)  Wt(kg): --        I&O's Detail    13 Dec 2018 07:01  -  14 Dec 2018 07:00  --------------------------------------------------------  IN:    Oral Fluid: 480 mL    sodium chloride 0.9%.: 640 mL  Total IN: 1120 mL    OUT:    Indwelling Catheter - Urethral: 550 mL    Voided: 851 mL  Total OUT: 1401 mL    Total NET: -281 mL               PHYSICAL EXAM:    GENERAL: no distress.   EYES: EOMI, PERRLA, conjunctiva and sclera clear  NECK: Supple, no inc in JVP  CHEST/LUNG: Clear  HEART: S1S2  ABDOMEN: distended, mildly tender. No r/g  EXTREMITIES:  no ankle edema  NEURO: no asterixis      LABS:  CBC Full  -  ( 14 Dec 2018 08:49 )  WBC Count : 11.94 K/uL  Hemoglobin : 9.9 g/dL  Hematocrit : 27.5 %  Platelet Count - Automated : 105 K/uL  Mean Cell Volume : 91.1 fl  Mean Cell Hemoglobin : 32.8 pg  Mean Cell Hemoglobin Concentration : 36.0 gm/dL  Auto Neutrophil # : x  Auto Lymphocyte # : x  Auto Monocyte # : x  Auto Eosinophil # : x  Auto Basophil # : x  Auto Neutrophil % : x  Auto Lymphocyte % : x  Auto Monocyte % : x  Auto Eosinophil % : x  Auto Basophil % : x    12    127<L>  |  99  |  88<H>  ----------------------------<  138<H>  4.6   |  18<L>  |  2.56<H>    Ca    7.6<L>      14 Dec 2018 08:49    TPro  5.4<L>  /  Alb  2.0<L>  /  TBili  2.3<H>  /  DBili  x   /  AST  59<H>  /  ALT  52  /  AlkPhos  61  12-14    PT/INR - ( 12 Dec 2018 12:24 )   PT: 15.3 sec;   INR: 1.39 ratio         PTT - ( 12 Dec 2018 12:24 )  PTT:34.7 sec  Urinalysis Basic - ( 12 Dec 2018 14:07 )    Color: Yellow / Appearance: Clear / S.015 / pH: x  Gluc: x / Ketone: Negative  / Bili: Negative / Urobili: Negative mg/dL   Blood: x / Protein: Negative mg/dL / Nitrite: Negative   Leuk Esterase: Negative / RBC: 3-5 /HPF / WBC 0-2   Sq Epi: x / Non Sq Epi: Few / Bacteria: Moderate      Culture Results:   No growth ( @ 21:41)  Culture Results:   No growth to date. ( @ 15:32)  Culture Results:   No growth to date. ( @ 15:32)        Impression:  * ANDREEA -- pre-renal vs HRS. Resolving on isotonic saline  * HypoNa -- hypovolemic. Improving at an appropriate rate  * Transaminitis  * Ascites  * Fever    Recommendations:   * Trend Bun/Cr, serum Na on NS  * BMP daily

## 2018-12-14 NOTE — DISCHARGE NOTE ADULT - PATIENT PORTAL LINK FT
You can access the [email or web address] Patient Portal, offered by [facility], by registering with the following website: [email or web address]

## 2018-12-14 NOTE — PROGRESS NOTE ADULT - SUBJECTIVE AND OBJECTIVE BOX
Chief Complaint:        INTERVAL HPI/OVERNIGHT EVENTS:  no fever documented   pt c/o being hungry      MEDICATIONS  (STANDING):  dextrose 5%. 1000 milliLiter(s) (50 mL/Hr) IV Continuous <Continuous>  dextrose 50% Injectable 12.5 Gram(s) IV Push once  insulin lispro (HumaLOG) corrective regimen sliding scale   SubCutaneous every 6 hours  levothyroxine 88 MICROGram(s) Oral daily  metoprolol succinate ER 25 milliGRAM(s) Oral daily  piperacillin/tazobactam IVPB. 3.375 Gram(s) IV Intermittent every 12 hours  sodium chloride 0.9%. 1000 milliLiter(s) (60 mL/Hr) IV Continuous <Continuous>  tamsulosin 0.4 milliGRAM(s) Oral at bedtime    MEDICATIONS  (PRN):  dextrose 40% Gel 15 Gram(s) Oral once PRN Blood Glucose LESS THAN 70 milliGRAM(s)/deciliter  glucagon  Injectable 1 milliGRAM(s) IntraMuscular once PRN Glucose LESS THAN 70 milligrams/deciliter            Vital Signs Last 24 Hrs  T(C): 36.3 (14 Dec 2018 04:55), Max: 36.6 (13 Dec 2018 11:52)  T(F): 97.3 (14 Dec 2018 04:55), Max: 97.9 (13 Dec 2018 11:52)  HR: 71 (14 Dec 2018 04:55) (61 - 76)  BP: 102/56 (14 Dec 2018 04:55) (102/56 - 127/71)  BP(mean): 68 (13 Dec 2018 11:52) (68 - 68)  RR: 15 (14 Dec 2018 04:55) (14 - 22)  SpO2: 100% (14 Dec 2018 04:55) (98% - 100%)    Physical exam:    abd soft,mild tender ruq. no rebound  cv s1s2  chest air entry  ext no edema        LABS:                        9.9    11.94 )-----------( 105      ( 14 Dec 2018 08:49 )             27.5     12-14    127<L>  |  99  |  88<H>  ----------------------------<  138<H>  4.6   |  18<L>  |  2.56<H>    Ca    7.6<L>      14 Dec 2018 08:49    TPro  5.4<L>  /  Alb  2.0<L>  /  TBili  2.3<H>  /  DBili  x   /  AST  59<H>  /  ALT  52  /  AlkPhos  61  12-14    PT/INR - ( 12 Dec 2018 12:24 )   PT: 15.3 sec;   INR: 1.39 ratio         PTT - ( 12 Dec 2018 12:24 )  PTT:34.7 sec      RADIOLOGY & ADDITIONAL TESTS:

## 2018-12-14 NOTE — PROGRESS NOTE ADULT - SUBJECTIVE AND OBJECTIVE BOX
Date/Time Patient Seen:  		  Referring MD:   Data Reviewed	       Patient is a 83y old  Male who presents with a chief complaint of fever (13 Dec 2018 14:23)    vs and meds reviewed    Subjective/HPI     PAST MEDICAL & SURGICAL HISTORY:  CKD (chronic kidney disease)  Hepatitis  CAD (coronary artery disease)  HTN (hypertension)  DM (diabetes mellitus)  Artificial cardiac pacemaker  AICD (automatic cardioverter/defibrillator) present  No significant past surgical history        Medication list         MEDICATIONS  (STANDING):  dextrose 5%. 1000 milliLiter(s) (50 mL/Hr) IV Continuous <Continuous>  dextrose 50% Injectable 12.5 Gram(s) IV Push once  insulin lispro (HumaLOG) corrective regimen sliding scale   SubCutaneous every 6 hours  levothyroxine 88 MICROGram(s) Oral daily  metoprolol succinate ER 25 milliGRAM(s) Oral daily  piperacillin/tazobactam IVPB. 3.375 Gram(s) IV Intermittent every 12 hours  sodium chloride 0.9%. 1000 milliLiter(s) (60 mL/Hr) IV Continuous <Continuous>  tamsulosin 0.4 milliGRAM(s) Oral at bedtime    MEDICATIONS  (PRN):  dextrose 40% Gel 15 Gram(s) Oral once PRN Blood Glucose LESS THAN 70 milliGRAM(s)/deciliter  glucagon  Injectable 1 milliGRAM(s) IntraMuscular once PRN Glucose LESS THAN 70 milligrams/deciliter         Vitals log        ICU Vital Signs Last 24 Hrs  T(C): 36.3 (14 Dec 2018 04:55), Max: 36.6 (13 Dec 2018 11:52)  T(F): 97.3 (14 Dec 2018 04:55), Max: 97.9 (13 Dec 2018 11:52)  HR: 71 (14 Dec 2018 04:55) (59 - 76)  BP: 102/56 (14 Dec 2018 04:55) (102/56 - 127/71)  BP(mean): 68 (13 Dec 2018 11:52) (68 - 68)  ABP: --  ABP(mean): --  RR: 15 (14 Dec 2018 04:55) (14 - 22)  SpO2: 100% (14 Dec 2018 04:55) (98% - 100%)           Input and Output:  I&O's Detail    13 Dec 2018 07:01  -  14 Dec 2018 07:00  --------------------------------------------------------  IN:    Oral Fluid: 480 mL    sodium chloride 0.9%.: 640 mL  Total IN: 1120 mL    OUT:    Indwelling Catheter - Urethral: 550 mL    Voided: 851 mL  Total OUT: 1401 mL    Total NET: -281 mL          Lab Data                        10.4   18.75 )-----------( 95       ( 13 Dec 2018 06:04 )             28.7     12-13    126<L>  |  95<L>  |  101<H>  ----------------------------<  79  4.4   |  18<L>  |  2.72<H>    Ca    7.5<L>      13 Dec 2018 06:04    TPro  5.6<L>  /  Alb  2.2<L>  /  TBili  2.4<H>  /  DBili  x   /  AST  73<H>  /  ALT  58  /  AlkPhos  73  12-13            Review of Systems	      Objective     Physical Examination    heart s1s2  lung dec BS  abd dec BS      Pertinent Lab findings & Imaging      Aly:  NO   Adequate UO     I&O's Detail    13 Dec 2018 07:01  -  14 Dec 2018 07:00  --------------------------------------------------------  IN:    Oral Fluid: 480 mL    sodium chloride 0.9%.: 640 mL  Total IN: 1120 mL    OUT:    Indwelling Catheter - Urethral: 550 mL    Voided: 851 mL  Total OUT: 1401 mL    Total NET: -281 mL               Discussed with:     Cultures:	        Radiology

## 2018-12-14 NOTE — DISCHARGE NOTE ADULT - COMMUNITY RESOURCES
PCA services thru [facility] agency 5H X 7 days being managed by [facility]  Miss Bonilla [phone]. Community MD is DR Lui, Min @ [address] [place] [address] [address], [place] [address]; phone [phone]. [facility] on [facility], [place] [phone] PCA services thru [facility] agency 5H X 7 days being managed by [facility]  Miss Bonilla [phone]; fax [phone]. Community MD is DR Lui, Min @ [address] [place] [address] [address], [place] [address]; phone [phone]. [facility] on [facility], [place] [phone] [place]

## 2018-12-14 NOTE — PHYSICAL THERAPY INITIAL EVALUATION ADULT - PERTINENT HX OF CURRENT PROBLEM, REHAB EVAL
Pt. admitted with fever that has been going on for the past several days, and progressively worsen.  + generalized weakness, cough, and decrease po intake X 2 weeks.  Daughter noticed abdominal distention with increase abdominal pain. Pt. s/p paracentesis with 60mL fluid withdrawn.  Bladder scan negative

## 2018-12-14 NOTE — DISCHARGE NOTE ADULT - SECONDARY DIAGNOSIS.
Liver cirrhosis DM (diabetes mellitus) Hyponatremia Acute renal failure, unspecified acute renal failure type

## 2018-12-14 NOTE — DISCHARGE NOTE ADULT - PLAN OF CARE
etiology of it not known completed zosyn therapy hepatitis panel negative, no history of etoh, likely fatty liver would need outpt liver biospy  contiue on aldactone resume home meds secondary to liver cirrhosis, resolved on CKDIII, resolved .

## 2018-12-14 NOTE — PROGRESS NOTE ADULT - SUBJECTIVE AND OBJECTIVE BOX
SENAIT JHAVERI is a yMale , patient examined and chart reviewed.     INTERVAL HPI/ OVERNIGHT EVENTS:   Feeling better. No documented fevers in the hospital.  Sp paracentesis.    PAST MEDICAL & SURGICAL HISTORY:  CKD (chronic kidney disease)  Hepatitis  CAD (coronary artery disease)  HTN (hypertension)  DM (diabetes mellitus)  Artificial cardiac pacemaker  AICD (automatic cardioverter/defibrillator) present      For details regarding the patient's social history, family history, and other miscellaneous elements, please refer the initial infectious diseases consultation and/or the admitting history and physical examination for this admission.    ROS:  CONSTITUTIONAL:  Negative fever or chills  EYES:  Negative  blurry vision or double vision  CARDIOVASCULAR:  Negative for chest pain or palpitations  RESPIRATORY:  Negative for cough, wheezing, or SOB   GASTROINTESTINAL:  Negative for nausea, vomiting, diarrhea, constipation, or abdominal pain  GENITOURINARY:  Negative frequency, urgency or dysuria  NEUROLOGIC:  No headache, confusion, dizziness, lightheadedness  All other systems were reviewed and are negative       Current inpatient medications :    ANTIBIOTICS/RELEVANT:  piperacillin/tazobactam IVPB. 3.375 Gram(s) IV Intermittent every 12 hours      ALBUTerol    0.083% 2.5 milliGRAM(s) Nebulizer every 6 hours  dextrose 40% Gel 15 Gram(s) Oral once PRN  dextrose 5%. 1000 milliLiter(s) IV Continuous <Continuous>  dextrose 50% Injectable 12.5 Gram(s) IV Push once  glucagon  Injectable 1 milliGRAM(s) IntraMuscular once PRN  insulin lispro (HumaLOG) corrective regimen sliding scale   SubCutaneous three times a day before meals  levothyroxine 88 MICROGram(s) Oral daily  metoprolol succinate ER 25 milliGRAM(s) Oral daily  sodium chloride 0.9%. 1000 milliLiter(s) IV Continuous <Continuous>  tamsulosin 0.4 milliGRAM(s) Oral at bedtime      Objective:    12-13 @ 07:01 - 12-14 @ 07:00  --------------------------------------------------------  IN: 1120 mL / OUT: 1401 mL / NET: -281 mL    12-14 @ 07:01 - 12-14 @ 23:22  --------------------------------------------------------  IN: 490 mL / OUT: 600 mL / NET: -110 mL      T(C): 36.4 (12-14-18 @ 17:25), Max: 36.7 (12-14-18 @ 09:55)  HR: 75 (12-14-18 @ 19:59) (69 - 77)  BP: 110/65 (12-14-18 @ 17:25) (100/60 - 111/65)  RR: 28 (12-14-18 @ 17:25) (14 - 28)  SpO2: 100% (12-14-18 @ 19:59) (98% - 100%)  Wt(kg): --      Physical Exam:  General:  no acute distress  Eyes: sclera anicteric, pupils equal and reactive to light  ENMT: buccal mucosa moist, pharynx not injected  Neck: supple, trachea midline  Lungs: clear, no wheeze/rhonchi  Cardiovascular: regular rate and rhythm, S1 S2  Abdomen: soft, nontender, distended, bowel sounds normal  Neurological: alert and oriented x3, Cranial Nerves II-XII grossly intact  Skin: no increased ecchymosis/petechiae/purpura  Lymph Nodes: no palpable cervical/supraclavicular lymph nodes enlargements  Extremities: no cyanosis/clubbing/edema      LABS:                          9.9    11.94 )-----------( 105      ( 14 Dec 2018 08:49 )             27.5       12-14    127<L>  |  99  |  88<H>  ----------------------------<  138<H>  4.6   |  18<L>  |  2.56<H>    Ca    7.6<L>      14 Dec 2018 08:49    TPro  5.4<L>  /  Alb  2.0<L>  /  TBili  2.3<H>  /  DBili  x   /  AST  59<H>  /  ALT  52  /  AlkPhos  61  12-14      MICROBIOLOGY:    Culture - Acid Fast - Body Fluid w/Smear (collected 13 Dec 2018 21:14)  Source: Ascites Fl Ascites Fluid    Culture - Urine (collected 12 Dec 2018 21:41)  Source: .Urine Clean Catch (Midstream)  Final Report (13 Dec 2018 22:16):    No growth    Culture - Blood (collected 12 Dec 2018 15:32)  Source: .Blood Blood-Peripheral  Preliminary Report (13 Dec 2018 16:01):    No growth to date.    Culture - Blood (collected 12 Dec 2018 15:32)  Source: .Blood Blood-Peripheral  Preliminary Report (13 Dec 2018 16:01):    No growth to date.    RADIOLOGY & ADDITIONAL STUDIES:  EXAM:  US ABDOMEN COMPLETE                          PROCEDURE DATE:  12/12/2018      INTERPRETATION:  History: Abdominal pain acute renal failure.    Abdominal ultrasound.    Gallstones and gallbladder sludge noted. There is diffusely thickened   gallbladder wall. Wall thickening is nonspecific finding in and of itself   may be related to ascites/hypoalbuminemia or adjacent liver disease.   Cholecystitis not excluded in the appropriate clinical setting. If this   is of clinical concern then a HIDA scan can be obtained. No biliary   dilatation. Common duct 0.6 cm normal for age. Echodense liver consistent   with steatosis/hepatocellular disease. Spleen not enlarged. Pancreas   unremarkable.  Right kidney 10.2, the left 10.7 cm. Increased bilateral renal cortical   echogenicity consistent with chronic medical renal disease. No   hydronephrosis.  Moderate abdominal ascites.    Impression:  Cholelithiasis with thickened gallbladder wall. See discussion above. No   biliary dilatation.  Echodense liver consistent with steatosis/hepatocellular disease.  Medical renal disease.  Moderate ascites      EXAM:  CT CHEST                        PROCEDURE DATE:  12/12/2018          INTERPRETATION:  Clinical information: Cough, fever    No prior studies present for comparison.    Noncontrast exam, neither intravenous or oral contrast materialwas   administered limiting evaluation. Additionally the patient was unable to   cooperate, motion artifact present on multiple images.    Axial images obtained, coronal and sagittal images computer reformatted.        Chest: Cardiac device present subcutaneous soft tissues anterior left   upper thorax. No evidence of pericardial effusion or thoracic aortic   aneurysm. Coronary artery calcifications present.  A small left pleural effusion is identified with associated compressive   atelectasis. No mediastinal or hilar lesions identified, evaluation   limited due to lack of IV contrast. No pneumothorax or vascular   congestion.  No acute appearing osseous abnormalities. Central airway intact. Thyroid   gland not enlarged.      Abdomen-pelvis: Ascites present in the abdomen, adjacent to the liver,   and in the pelvis. Unenhanced hepatic parenchyma and splenic parenchyma   homogeneous. Unenhanced pancreatic parenchyma unremarkable. No adrenal   lesions evident. The gallbladder contains stones. No hydronephrotic   changes or renal masses identified. Hypodensity posterior mid polar   region right kidney likely a cyst. No aortic aneurysm or retroperitoneal   lymphadenopathy. No biliary ductal dilatation. No bowel obstruction.   Urinary bladder contains small quantity of urine, no stones evident.   Prostate is mildly enlarged. Inguinal regions intact. No acute appearing   osseous abnormalities in the abdomen or pelvis. Disc degenerative disease   and posterior spurring L5-S1.      IMPRESSION: See above report.    EXAM:  NM HEPATOBILIARY IMG                              PROCEDURE DATE:  12/14/2018          INTERPRETATION:  RADIOPHARMACEUTICAL: 3.0 mCi Tc-99m-Mebrofenin, I.V.    CLINICAL INFORMATION: 83 year old male with cholelithiasis, abdominal   pain,fever, elevated LFTs, and diffusely thickened gallbladder wall on   ultrasound; referred to evaluate for acute cholecystitis.     TECHNIQUE:  Static images of the abdomen were obtained at approximately 5   minutes and 1 hour after radiopharmaceuticaladministration.     COMPARISON: No previous hepatobiliary scans were available for comparison.    FINDINGS: There is homogeneous uptake of radiopharmaceutical by the   hepatocytes. Gallbladder and bowel are visualized on the 1 hour images.     IMPRESSION: Normal hepatobiliary scan. No radionuclide evidence of acute   cholecystitis.      Assessment :   82 yo Chinese male with hx of CAD S/P stent, S/P PPM, HTN, CKD, and Hepatitis A presented to   the hospital with CC of fever at home and abdominal distention found to have abn LFTs  No documented fever in the hospital  ANDREEA vs CKD  Liver cirrhosis  sp paracentesis  Doubt SBP  Thrombocytopenia sec liver cirrhosis  Of note Lymes titers positive likely false positive - symptoms and presentation not c/w lymes- unclear   why it was ordered in the first place    Plan :   Cont Zosyn  Fu cultures  Trend wbc and lfts  GI following  Stable from ID standpoint      Continue with present regiment.  Appropriate use of antibiotics and adverse effects reviewed.      I have discussed the above plan of care with patient/ family in detail. They expressed understanding of the  treatment plan . Risks, benefits and alternatives discussed in detail. I have asked if they have any questions or concerns and appropriately addressed them to the best of my ability .    > 35 minutes were spent in direct patient care reviewing notes, medications ,labs data/ imaging , discussion with multidisciplinary team.    Thank you for allowing me to participate in care of your patient .    Swetha Lagos MD  Infectious Disease  [phone]

## 2018-12-14 NOTE — PROGRESS NOTE ADULT - ASSESSMENT
Patient is 82 yo male with hx of CAD S/P stent, S/P PPM, HTN, BPH, CKD, Hepatitis, and Dm2 presenting with     1. Fever.  Unclear Etiology.  ??Abdm source.  abdm/Chest/pelvic CT scan noticed.   -UC and Blood culture are negative  -Ascite fluid culture pending.    -Continue with zosyn pending ID consult  -Abdm US shows Cholelithiasis with thickened gallbladder wall. See discussion above. No biliary dilatation. Echodense liver consistent with steatosis/hepatocellular disease.  Medical renal disease. Moderate ascites  -HIDA scan shows no acute process.   -S/P paracentesis.  Discussed case with IR for diagnostic paracentesis  -Monitor clinical Pic  -GI and Critical care to follow up       2.  Abnormal LFT.  Hx of Hepatitis.  Hepatitis panel negative.  Avoid nephrotoxin  -Monitor LFT  -GI to follow up     3. ANDREEA on CKD with hyponatremia.  Seems to be secondary dehydration.  Lu cath.  Continue with IVF, and Monitor Renal function  -Improving  -Nephrology to follow up     4. HTN/CAD.  Continue with metoprolol.  Hold plavix in anticipation for any procedure.       5.  AMS.  Seems to be secondary to metabolic encephalopathy due to infection, hepatic encephalopathy and Renal failure.  Head CT scan shows no acute process.   continue with neuro check.    -Improving.  -Continue with lactulose, and Monitor ammonia level       6.  HTN.  Continue with metoprolol with holding parameter.  Monitor BP      7.  DM2.  Hold Lantus ISS, and Monitor POC    8.  Diet.  Continue with current diet as per speech therapy    9.  Back pain.  X-ray of the lumbar spine shows degenerative disease.  continue with pain management.  PT consult      Plan of care was discussed with patient, and family in great details, All questions were answered to their satisfaction  Seems to understand, and in agreement  Prognosis guarded  DVT proph compression stocking in the setting of thrombocytopenia

## 2018-12-14 NOTE — DISCHARGE NOTE ADULT - CARE PLAN
Principal Discharge DX:	Sepsis  Goal:	etiology of it not known  Assessment and plan of treatment:	completed zosyn therapy  Secondary Diagnosis:	Liver cirrhosis  Goal:	hepatitis panel negative, no history of etoh, likely fatty liver  Assessment and plan of treatment:	would need outpt liver biospy  contiue on aldactone  Secondary Diagnosis:	DM (diabetes mellitus)  Goal:	resume home meds  Secondary Diagnosis:	Hyponatremia  Goal:	secondary to liver cirrhosis, resolved  Secondary Diagnosis:	Acute renal failure, unspecified acute renal failure type  Goal:	on CKDIII, resolved Principal Discharge DX:	Sepsis  Goal:	etiology of it not known  Assessment and plan of treatment:	completed zosyn therapy  Secondary Diagnosis:	Liver cirrhosis  Goal:	hepatitis panel negative, no history of etoh, likely fatty liver  Assessment and plan of treatment:	would need outpt liver biospy  contiue on aldactone  Secondary Diagnosis:	DM (diabetes mellitus)  Goal:	resume home meds  Assessment and plan of treatment:	.  Secondary Diagnosis:	Hyponatremia  Goal:	secondary to liver cirrhosis, resolved  Secondary Diagnosis:	Acute renal failure, unspecified acute renal failure type  Goal:	on CKDIII, resolved

## 2018-12-15 LAB
ALBUMIN SERPL ELPH-MCNC: 2 G/DL — LOW (ref 3.3–5)
ALP SERPL-CCNC: 64 U/L — SIGNIFICANT CHANGE UP (ref 30–120)
ALT FLD-CCNC: 51 U/L DA — SIGNIFICANT CHANGE UP (ref 10–60)
AMMONIA BLD-MCNC: 40 UMOL/L — HIGH (ref 11–32)
ANION GAP SERPL CALC-SCNC: 9 MMOL/L — SIGNIFICANT CHANGE UP (ref 5–17)
AST SERPL-CCNC: 55 U/L — HIGH (ref 10–40)
BILIRUB SERPL-MCNC: 2.2 MG/DL — HIGH (ref 0.2–1.2)
BUN SERPL-MCNC: 80 MG/DL — HIGH (ref 7–23)
CALCIUM SERPL-MCNC: 7.9 MG/DL — LOW (ref 8.4–10.5)
CHLORIDE SERPL-SCNC: 102 MMOL/L — SIGNIFICANT CHANGE UP (ref 96–108)
CO2 SERPL-SCNC: 19 MMOL/L — LOW (ref 22–31)
CREAT SERPL-MCNC: 2.42 MG/DL — HIGH (ref 0.5–1.3)
GLUCOSE SERPL-MCNC: 188 MG/DL — HIGH (ref 70–99)
HCT VFR BLD CALC: 27.1 % — LOW (ref 39–50)
HGB BLD-MCNC: 9.7 G/DL — LOW (ref 13–17)
LIDOCAIN IGE QN: 149 U/L — SIGNIFICANT CHANGE UP (ref 73–393)
MAGNESIUM SERPL-MCNC: 2.2 MG/DL — SIGNIFICANT CHANGE UP (ref 1.6–2.6)
MCHC RBC-ENTMCNC: 32.9 PG — SIGNIFICANT CHANGE UP (ref 27–34)
MCHC RBC-ENTMCNC: 35.8 GM/DL — SIGNIFICANT CHANGE UP (ref 32–36)
MCV RBC AUTO: 91.9 FL — SIGNIFICANT CHANGE UP (ref 80–100)
NRBC # BLD: 0 /100 WBCS — SIGNIFICANT CHANGE UP (ref 0–0)
PLATELET # BLD AUTO: 116 K/UL — LOW (ref 150–400)
POTASSIUM SERPL-MCNC: 5.2 MMOL/L — SIGNIFICANT CHANGE UP (ref 3.5–5.3)
POTASSIUM SERPL-SCNC: 5.2 MMOL/L — SIGNIFICANT CHANGE UP (ref 3.5–5.3)
PROT SERPL-MCNC: 5.5 G/DL — LOW (ref 6–8.3)
RBC # BLD: 2.95 M/UL — LOW (ref 4.2–5.8)
RBC # FLD: 13.3 % — SIGNIFICANT CHANGE UP (ref 10.3–14.5)
SODIUM SERPL-SCNC: 130 MMOL/L — LOW (ref 135–145)
WBC # BLD: 12.4 K/UL — HIGH (ref 3.8–10.5)
WBC # FLD AUTO: 12.4 K/UL — HIGH (ref 3.8–10.5)

## 2018-12-15 PROCEDURE — 12345: CPT | Mod: NC

## 2018-12-15 PROCEDURE — 99233 SBSQ HOSP IP/OBS HIGH 50: CPT

## 2018-12-15 RX ORDER — MORPHINE SULFATE 50 MG/1
0.5 CAPSULE, EXTENDED RELEASE ORAL ONCE
Qty: 0 | Refills: 0 | Status: DISCONTINUED | OUTPATIENT
Start: 2018-12-15 | End: 2018-12-15

## 2018-12-15 RX ADMIN — PIPERACILLIN AND TAZOBACTAM 25 GRAM(S): 4; .5 INJECTION, POWDER, LYOPHILIZED, FOR SOLUTION INTRAVENOUS at 16:45

## 2018-12-15 RX ADMIN — ALBUTEROL 2.5 MILLIGRAM(S): 90 AEROSOL, METERED ORAL at 07:27

## 2018-12-15 RX ADMIN — SODIUM CHLORIDE 60 MILLILITER(S): 9 INJECTION INTRAMUSCULAR; INTRAVENOUS; SUBCUTANEOUS at 08:19

## 2018-12-15 RX ADMIN — ALBUTEROL 2.5 MILLIGRAM(S): 90 AEROSOL, METERED ORAL at 01:12

## 2018-12-15 RX ADMIN — Medication 25 MILLIGRAM(S): at 05:36

## 2018-12-15 RX ADMIN — TAMSULOSIN HYDROCHLORIDE 0.4 MILLIGRAM(S): 0.4 CAPSULE ORAL at 22:18

## 2018-12-15 RX ADMIN — Medication 1: at 12:47

## 2018-12-15 RX ADMIN — MORPHINE SULFATE 0.5 MILLIGRAM(S): 50 CAPSULE, EXTENDED RELEASE ORAL at 03:49

## 2018-12-15 RX ADMIN — PIPERACILLIN AND TAZOBACTAM 25 GRAM(S): 4; .5 INJECTION, POWDER, LYOPHILIZED, FOR SOLUTION INTRAVENOUS at 04:36

## 2018-12-15 RX ADMIN — Medication 1: at 08:19

## 2018-12-15 RX ADMIN — ALBUTEROL 2.5 MILLIGRAM(S): 90 AEROSOL, METERED ORAL at 12:45

## 2018-12-15 RX ADMIN — Medication 2: at 16:48

## 2018-12-15 RX ADMIN — ALBUTEROL 2.5 MILLIGRAM(S): 90 AEROSOL, METERED ORAL at 18:21

## 2018-12-15 RX ADMIN — MORPHINE SULFATE 0.5 MILLIGRAM(S): 50 CAPSULE, EXTENDED RELEASE ORAL at 02:55

## 2018-12-15 RX ADMIN — Medication 88 MICROGRAM(S): at 05:36

## 2018-12-15 NOTE — PROGRESS NOTE ADULT - SUBJECTIVE AND OBJECTIVE BOX
Patient seen in follow up for ANDREEA on CKD. Remains on IVF. Oral intake is poor.    CKD (chronic kidney disease)  Hepatitis  CAD (coronary artery disease)  HTN (hypertension)  DM (diabetes mellitus)    MEDICATIONS  (STANDING):  ALBUTerol    0.083% 2.5 milliGRAM(s) Nebulizer every 6 hours  dextrose 5%. 1000 milliLiter(s) (50 mL/Hr) IV Continuous <Continuous>  dextrose 50% Injectable 12.5 Gram(s) IV Push once  insulin lispro (HumaLOG) corrective regimen sliding scale   SubCutaneous three times a day before meals  levothyroxine 88 MICROGram(s) Oral daily  metoprolol succinate ER 25 milliGRAM(s) Oral daily  piperacillin/tazobactam IVPB. 3.375 Gram(s) IV Intermittent every 12 hours  sodium chloride 0.9%. 1000 milliLiter(s) (60 mL/Hr) IV Continuous <Continuous>  tamsulosin 0.4 milliGRAM(s) Oral at bedtime    MEDICATIONS  (PRN):  dextrose 40% Gel 15 Gram(s) Oral once PRN Blood Glucose LESS THAN 70 milliGRAM(s)/deciliter  glucagon  Injectable 1 milliGRAM(s) IntraMuscular once PRN Glucose LESS THAN 70 milligrams/deciliter    T(C): 36.7 (12-15-18 @ 16:39), Max: 37.2 (12-15-18 @ 00:23)  HR: 71 (12-15-18 @ 16:39) (68 - 95)  BP: 126/70 (12-15-18 @ 16:39) (100/60 - 133/60)  RR: 20 (12-15-18 @ 16:39) (18 - 28)  SpO2: 99% (12-15-18 @ 16:39) (98% - 100%)  Wt(kg): --  I&O's Detail    14 Dec 2018 07:01  -  15 Dec 2018 07:00  --------------------------------------------------------  IN:    IV PiggyBack: 100 mL    Oral Fluid: 250 mL    sodium chloride 0.9%.: 660 mL  Total IN: 1010 mL    OUT:    Indwelling Catheter - Urethral: 1500 mL  Total OUT: 1500 mL    Total NET: -490 mL      15 Dec 2018 07:01  -  15 Dec 2018 18:33  --------------------------------------------------------  IN:    sodium chloride 0.9%.: 660 mL  Total IN: 660 mL    OUT:  Total OUT: 0 mL    Total NET: 660 mL      PHYSICAL EXAM:  General: NAD  Respiratory: b/l air entry, clear anteriorly  Cardiovascular: S1 S2 reg  Gastrointestinal: soft, large ascites  Extremities:  moderate anasarca    CBC Full  -  ( 15 Dec 2018 07:05 )  WBC Count : 12.40 K/uL  Hemoglobin : 9.7 g/dL  Hematocrit : 27.1 %  Platelet Count - Automated : 116 K/uL  Mean Cell Volume : 91.9 fl  Mean Cell Hemoglobin : 32.9 pg  Mean Cell Hemoglobin Concentration : 35.8 gm/dL  Auto Neutrophil # : x  Auto Lymphocyte # : x  Auto Monocyte # : x  Auto Eosinophil # : x  Auto Basophil # : x  Auto Neutrophil % : x  Auto Lymphocyte % : x  Auto Monocyte % : x  Auto Eosinophil % : x  Auto Basophil % : x    12-15    130<L>  |  102  |  80<H>  ----------------------------<  188<H>  5.2   |  19<L>  |  2.42<H>    Ca    7.9<L>      15 Dec 2018 07:03  Mg     2.2     12-15    TPro  5.5<L>  /  Alb  2.0<L>  /  TBili  2.2<H>  /  DBili  x   /  AST  55<H>  /  ALT  51  /  AlkPhos  64  12-15        Sodium, Serum: 130 (12-15 @ 07:03)  Sodium, Serum: 127 (12-14 @ 08:49)  Sodium, Serum: 126 (12-13 @ 06:04)  Sodium, Serum: 124 (12-12 @ 22:03)    Creatinine, Serum: 2.42 (12-15 @ 07:03)  Creatinine, Serum: 2.56 (12-14 @ 08:49)  Creatinine, Serum: 2.72 (12-13 @ 06:04)  Creatinine, Serum: 2.97 (12-12 @ 22:03)    Potassium, Serum: 5.2 (12-15 @ 07:03)  Potassium, Serum: 4.6 (12-14 @ 08:49)  Potassium, Serum: 4.4 (12-13 @ 06:04)  Potassium, Serum: 4.5 (12-12 @ 22:03)    Hemoglobin: 9.7 (12-15 @ 07:05)  Hemoglobin: 9.9 (12-14 @ 08:49)  Hemoglobin: 10.4 (12-13 @ 06:04)

## 2018-12-15 NOTE — PROGRESS NOTE ADULT - SUBJECTIVE AND OBJECTIVE BOX
Date/Time Patient Seen:  		  Referring MD:   Data Reviewed	       Patient is a 83y old  Male who presents with a chief complaint of ascites (15 Dec 2018 06:39)    vs and meds reviewed    Subjective/HPI     PAST MEDICAL & SURGICAL HISTORY:  CKD (chronic kidney disease)  Hepatitis  CAD (coronary artery disease)  HTN (hypertension)  DM (diabetes mellitus)  Artificial cardiac pacemaker  AICD (automatic cardioverter/defibrillator) present  No significant past surgical history        Medication list         MEDICATIONS  (STANDING):  ALBUTerol    0.083% 2.5 milliGRAM(s) Nebulizer every 6 hours  dextrose 5%. 1000 milliLiter(s) (50 mL/Hr) IV Continuous <Continuous>  dextrose 50% Injectable 12.5 Gram(s) IV Push once  insulin lispro (HumaLOG) corrective regimen sliding scale   SubCutaneous three times a day before meals  levothyroxine 88 MICROGram(s) Oral daily  metoprolol succinate ER 25 milliGRAM(s) Oral daily  piperacillin/tazobactam IVPB. 3.375 Gram(s) IV Intermittent every 12 hours  sodium chloride 0.9%. 1000 milliLiter(s) (60 mL/Hr) IV Continuous <Continuous>  tamsulosin 0.4 milliGRAM(s) Oral at bedtime    MEDICATIONS  (PRN):  dextrose 40% Gel 15 Gram(s) Oral once PRN Blood Glucose LESS THAN 70 milliGRAM(s)/deciliter  glucagon  Injectable 1 milliGRAM(s) IntraMuscular once PRN Glucose LESS THAN 70 milligrams/deciliter         Vitals log        ICU Vital Signs Last 24 Hrs  T(C): 36.9 (15 Dec 2018 05:00), Max: 37.2 (15 Dec 2018 00:23)  T(F): 98.4 (15 Dec 2018 05:00), Max: 99 (15 Dec 2018 00:23)  HR: 80 (15 Dec 2018 07:29) (70 - 95)  BP: 110/71 (15 Dec 2018 05:00) (100/60 - 133/60)  BP(mean): --  ABP: --  ABP(mean): --  RR: 18 (15 Dec 2018 05:00) (18 - 28)  SpO2: 98% (15 Dec 2018 07:29) (98% - 100%)           Input and Output:  I&O's Detail    14 Dec 2018 07:01  -  15 Dec 2018 07:00  --------------------------------------------------------  IN:    IV PiggyBack: 100 mL    Oral Fluid: 250 mL    sodium chloride 0.9%.: 660 mL  Total IN: 1010 mL    OUT:    Indwelling Catheter - Urethral: 1500 mL  Total OUT: 1500 mL    Total NET: -490 mL          Lab Data                        9.7    12.40 )-----------( 116      ( 15 Dec 2018 07:05 )             27.1     12-15    130<L>  |  102  |  80<H>  ----------------------------<  188<H>  5.2   |  19<L>  |  2.42<H>    Ca    7.9<L>      15 Dec 2018 07:03  Mg     2.2     12-15    TPro  5.5<L>  /  Alb  2.0<L>  /  TBili  2.2<H>  /  DBili  x   /  AST  55<H>  /  ALT  51  /  AlkPhos  64  12-15            Review of Systems	      Objective     Physical Examination    heart s1s2  lung dec BS  abd dec bS      Pertinent Lab findings & Imaging      Aly:  NO   Adequate UO     I&O's Detail    14 Dec 2018 07:01  -  15 Dec 2018 07:00  --------------------------------------------------------  IN:    IV PiggyBack: 100 mL    Oral Fluid: 250 mL    sodium chloride 0.9%.: 660 mL  Total IN: 1010 mL    OUT:    Indwelling Catheter - Urethral: 1500 mL  Total OUT: 1500 mL    Total NET: -490 mL               Discussed with:     Cultures:	        Radiology

## 2018-12-15 NOTE — PROGRESS NOTE ADULT - SUBJECTIVE AND OBJECTIVE BOX
SENAIT JHAVERI is a yMale , patient examined and chart reviewed.     INTERVAL HPI/ OVERNIGHT EVENTS:   Feeling better.    PAST MEDICAL & SURGICAL HISTORY:  CKD (chronic kidney disease)  Hepatitis  CAD (coronary artery disease)  HTN (hypertension)  DM (diabetes mellitus)  Artificial cardiac pacemaker  AICD (automatic cardioverter/defibrillator) present      For details regarding the patient's social history, family history, and other miscellaneous elements, please refer the initial infectious diseases consultation and/or the admitting history and physical examination for this admission.    ROS:  CONSTITUTIONAL:  Negative fever or chills  EYES:  Negative  blurry vision or double vision  CARDIOVASCULAR:  Negative for chest pain or palpitations  RESPIRATORY:  Negative for cough, wheezing, or SOB   GASTROINTESTINAL:  Negative for nausea, vomiting, diarrhea, constipation, or abdominal pain  GENITOURINARY:  Negative frequency, urgency or dysuria  NEUROLOGIC:  No headache, confusion, dizziness, lightheadedness  All other systems were reviewed and are negative       Current inpatient medications :    ANTIBIOTICS/RELEVANT:  piperacillin/tazobactam IVPB. 3.375 Gram(s) IV Intermittent every 12 hours      ALBUTerol    0.083% 2.5 milliGRAM(s) Nebulizer every 6 hours  dextrose 40% Gel 15 Gram(s) Oral once PRN  dextrose 5%. 1000 milliLiter(s) IV Continuous <Continuous>  dextrose 50% Injectable 12.5 Gram(s) IV Push once  glucagon  Injectable 1 milliGRAM(s) IntraMuscular once PRN  insulin lispro (HumaLOG) corrective regimen sliding scale   SubCutaneous three times a day before meals  levothyroxine 88 MICROGram(s) Oral daily  metoprolol succinate ER 25 milliGRAM(s) Oral daily  sodium chloride 0.9%. 1000 milliLiter(s) IV Continuous <Continuous>  tamsulosin 0.4 milliGRAM(s) Oral at bedtime      Objective:    12-13 @ 07:01 - 12-14 @ 07:00  --------------------------------------------------------  IN: 1120 mL / OUT: 1401 mL / NET: -281 mL    12-14 @ 07:01 - 12-14 @ 23:22  --------------------------------------------------------  IN: 490 mL / OUT: 600 mL / NET: -110 mL      T(C): 36.4 (12-14-18 @ 17:25), Max: 36.7 (12-14-18 @ 09:55)  HR: 75 (12-14-18 @ 19:59) (69 - 77)  BP: 110/65 (12-14-18 @ 17:25) (100/60 - 111/65)  RR: 28 (12-14-18 @ 17:25) (14 - 28)  SpO2: 100% (12-14-18 @ 19:59) (98% - 100%)  Wt(kg): --      Physical Exam:  General:  no acute distress  Eyes: sclera anicteric, pupils equal and reactive to light  ENMT: buccal mucosa moist, pharynx not injected  Neck: supple, trachea midline  Lungs: clear, no wheeze/rhonchi  Cardiovascular: regular rate and rhythm, S1 S2  Abdomen: soft, nontender, distended, bowel sounds normal  Neurological: alert and oriented x3, Cranial Nerves II-XII grossly intact  Skin: no increased ecchymosis/petechiae/purpura  Lymph Nodes: no palpable cervical/supraclavicular lymph nodes enlargements  Extremities: no cyanosis/clubbing/edema      LABS:                          9.9    11.94 )-----------( 105      ( 14 Dec 2018 08:49 )             27.5       12-14    127<L>  |  99  |  88<H>  ----------------------------<  138<H>  4.6   |  18<L>  |  2.56<H>    Ca    7.6<L>      14 Dec 2018 08:49    TPro  5.4<L>  /  Alb  2.0<L>  /  TBili  2.3<H>  /  DBili  x   /  AST  59<H>  /  ALT  52  /  AlkPhos  61  12-14      MICROBIOLOGY:    Culture - Acid Fast - Body Fluid w/Smear (collected 13 Dec 2018 21:14)  Source: Ascites Fl Ascites Fluid    Culture - Urine (collected 12 Dec 2018 21:41)  Source: .Urine Clean Catch (Midstream)  Final Report (13 Dec 2018 22:16):    No growth    Culture - Blood (collected 12 Dec 2018 15:32)  Source: .Blood Blood-Peripheral  Preliminary Report (13 Dec 2018 16:01):    No growth to date.    Culture - Blood (collected 12 Dec 2018 15:32)  Source: .Blood Blood-Peripheral  Preliminary Report (13 Dec 2018 16:01):    No growth to date.    RADIOLOGY & ADDITIONAL STUDIES:  EXAM:  US ABDOMEN COMPLETE                          PROCEDURE DATE:  12/12/2018      INTERPRETATION:  History: Abdominal pain acute renal failure.    Abdominal ultrasound.    Gallstones and gallbladder sludge noted. There is diffusely thickened   gallbladder wall. Wall thickening is nonspecific finding in and of itself   may be related to ascites/hypoalbuminemia or adjacent liver disease.   Cholecystitis not excluded in the appropriate clinical setting. If this   is of clinical concern then a HIDA scan can be obtained. No biliary   dilatation. Common duct 0.6 cm normal for age. Echodense liver consistent   with steatosis/hepatocellular disease. Spleen not enlarged. Pancreas   unremarkable.  Right kidney 10.2, the left 10.7 cm. Increased bilateral renal cortical   echogenicity consistent with chronic medical renal disease. No   hydronephrosis.  Moderate abdominal ascites.    Impression:  Cholelithiasis with thickened gallbladder wall. See discussion above. No   biliary dilatation.  Echodense liver consistent with steatosis/hepatocellular disease.  Medical renal disease.  Moderate ascites      EXAM:  CT CHEST                        PROCEDURE DATE:  12/12/2018          INTERPRETATION:  Clinical information: Cough, fever    No prior studies present for comparison.    Noncontrast exam, neither intravenous or oral contrast materialwas   administered limiting evaluation. Additionally the patient was unable to   cooperate, motion artifact present on multiple images.    Axial images obtained, coronal and sagittal images computer reformatted.        Chest: Cardiac device present subcutaneous soft tissues anterior left   upper thorax. No evidence of pericardial effusion or thoracic aortic   aneurysm. Coronary artery calcifications present.  A small left pleural effusion is identified with associated compressive   atelectasis. No mediastinal or hilar lesions identified, evaluation   limited due to lack of IV contrast. No pneumothorax or vascular   congestion.  No acute appearing osseous abnormalities. Central airway intact. Thyroid   gland not enlarged.      Abdomen-pelvis: Ascites present in the abdomen, adjacent to the liver,   and in the pelvis. Unenhanced hepatic parenchyma and splenic parenchyma   homogeneous. Unenhanced pancreatic parenchyma unremarkable. No adrenal   lesions evident. The gallbladder contains stones. No hydronephrotic   changes or renal masses identified. Hypodensity posterior mid polar   region right kidney likely a cyst. No aortic aneurysm or retroperitoneal   lymphadenopathy. No biliary ductal dilatation. No bowel obstruction.   Urinary bladder contains small quantity of urine, no stones evident.   Prostate is mildly enlarged. Inguinal regions intact. No acute appearing   osseous abnormalities in the abdomen or pelvis. Disc degenerative disease   and posterior spurring L5-S1.      IMPRESSION: See above report.    EXAM:  NM HEPATOBILIARY IMG                              PROCEDURE DATE:  12/14/2018          INTERPRETATION:  RADIOPHARMACEUTICAL: 3.0 mCi Tc-99m-Mebrofenin, I.V.    CLINICAL INFORMATION: 83 year old male with cholelithiasis, abdominal   pain,fever, elevated LFTs, and diffusely thickened gallbladder wall on   ultrasound; referred to evaluate for acute cholecystitis.     TECHNIQUE:  Static images of the abdomen were obtained at approximately 5   minutes and 1 hour after radiopharmaceuticaladministration.     COMPARISON: No previous hepatobiliary scans were available for comparison.    FINDINGS: There is homogeneous uptake of radiopharmaceutical by the   hepatocytes. Gallbladder and bowel are visualized on the 1 hour images.     IMPRESSION: Normal hepatobiliary scan. No radionuclide evidence of acute   cholecystitis.      Assessment :   82 yo Chinese male with hx of CAD S/P stent, S/P PPM, HTN, CKD, and Hepatitis A presented to   the hospital with CC of fever at home and abdominal distention found to have abn LFTs  No documented fever in the hospital  ANDREEA vs CKD  Liver cirrhosis  sp paracentesis unfortunately no cultures sent  However doubt SBP  Thrombocytopenia sec liver cirrhosis  Of note Lymes titers positive likely false positive - symptoms and presentation not c/w lymes- unclear   why it was ordered in the first place    Plan :   Cont Zosyn  Fu path of ascitic fluid  Trend wbc and lfts  Will dc Zosyn if remains stable as all cultures neg  GI following  Stable from ID standpoint      Continue with present regiment.  Appropriate use of antibiotics and adverse effects reviewed.      I have discussed the above plan of care with patient/ family in detail. They expressed understanding of the  treatment plan . Risks, benefits and alternatives discussed in detail. I have asked if they have any questions or concerns and appropriately addressed them to the best of my ability .    > 35 minutes were spent in direct patient care reviewing notes, medications ,labs data/ imaging , discussion with multidisciplinary team.    Thank you for allowing me to participate in care of your patient .    Swetha Lagos MD  Infectious Disease  [phone]

## 2018-12-15 NOTE — PROGRESS NOTE ADULT - SUBJECTIVE AND OBJECTIVE BOX
Patient is a 83y old  Male who presents with a chief complaint of fever (15 Dec 2018 08:57)        HPI:  Patient is 82 yo male with hx of CAD S/P stent, S/P PPM, HTN, CKD, and Hepatitis presenting with fever that has been going on for the past several days, and progressively worsen.  + generalized weakness, cough, and decrease po intake X 2 weeks.  Daughter noticed abdominal distention with increase abdominal pain.  Denies any headache, dizziness, blurry vision, chest pain, SOB, N/V/D, numbness, or weakness.      + Back pain (12 Dec 2018 18:15)      SUBJECTIVE & OBJECTIVE: Pt seen and examined at bedside, increased abdominal girth    PHYSICAL EXAM:  T(C): 36.6 (12-15-18 @ 09:17), Max: 37.2 (12-15-18 @ 00:23)  HR: 68 (12-15-18 @ 09:17) (68 - 95)  BP: 114/73 (12-15-18 @ 09:17) (100/60 - 133/60)  RR: 20 (12-15-18 @ 09:17) (18 - 28)  SpO2: 100% (12-15-18 @ 09:17) (98% - 100%)  Wt(kg): -- Height (cm): 170 (12-15 @ 02:46)  GENERAL: NAD, well-groomed, well-developed  HEAD:  Atraumatic, Normocephalic  NECK: Supple, No JVD  NERVOUS SYSTEM:  Alert & Oriented X3,  CHEST/LUNG: decrease air entry at the bases   HEART: Regular rate and rhythm; No murmurs, rubs, or gallops  ABDOMEN: increased in abdominal girth  EXTREMITIES:  2+ Peripheral Pulses, No clubbing, cyanosis, or edema        MEDICATIONS  (STANDING):  ALBUTerol    0.083% 2.5 milliGRAM(s) Nebulizer every 6 hours  dextrose 5%. 1000 milliLiter(s) (50 mL/Hr) IV Continuous <Continuous>  dextrose 50% Injectable 12.5 Gram(s) IV Push once  insulin lispro (HumaLOG) corrective regimen sliding scale   SubCutaneous three times a day before meals  levothyroxine 88 MICROGram(s) Oral daily  metoprolol succinate ER 25 milliGRAM(s) Oral daily  piperacillin/tazobactam IVPB. 3.375 Gram(s) IV Intermittent every 12 hours  sodium chloride 0.9%. 1000 milliLiter(s) (60 mL/Hr) IV Continuous <Continuous>  tamsulosin 0.4 milliGRAM(s) Oral at bedtime    MEDICATIONS  (PRN):  dextrose 40% Gel 15 Gram(s) Oral once PRN Blood Glucose LESS THAN 70 milliGRAM(s)/deciliter  glucagon  Injectable 1 milliGRAM(s) IntraMuscular once PRN Glucose LESS THAN 70 milligrams/deciliter      LABS:                        9.7    12.40 )-----------( 116      ( 15 Dec 2018 07:05 )             27.1     12-15    130<L>  |  102  |  80<H>  ----------------------------<  188<H>  5.2   |  19<L>  |  2.42<H>    Ca    7.9<L>      15 Dec 2018 07:03  Mg     2.2     12-15    TPro  5.5<L>  /  Alb  2.0<L>  /  TBili  2.2<H>  /  DBili  x   /  AST  55<H>  /  ALT  51  /  AlkPhos  64  12-15        Magnesium, Serum: 2.2 mg/dL (12-15 @ 07:03)    CAPILLARY BLOOD GLUCOSE      POCT Blood Glucose.: 184 mg/dL (15 Dec 2018 11:58)  POCT Blood Glucose.: 172 mg/dL (15 Dec 2018 07:40)  POCT Blood Glucose.: 255 mg/dL (14 Dec 2018 22:45)  POCT Blood Glucose.: 310 mg/dL (14 Dec 2018 16:14)      CAPILLARY BLOOD GLUCOSE      POCT Blood Glucose.: 184 mg/dL (15 Dec 2018 11:58)  POCT Blood Glucose.: 172 mg/dL (15 Dec 2018 07:40)  POCT Blood Glucose.: 255 mg/dL (14 Dec 2018 22:45)  POCT Blood Glucose.: 310 mg/dL (14 Dec 2018 16:14)    CAPILLARY BLOOD GLUCOSE      POCT Blood Glucose.: 184 mg/dL (15 Dec 2018 11:58)            RECENT CULTURES:      RADIOLOGY & ADDITIONAL TESTS:                        DVT/GI ppx  Discussed with pt @ bedside

## 2018-12-15 NOTE — PROGRESS NOTE ADULT - SUBJECTIVE AND OBJECTIVE BOX
INTERVAL HPI/OVERNIGHT EVENTS:  no fever documented  complains of stomach pain but found sleeping  No acute events over night      MEDICATIONS  (STANDING):  dextrose 5%. 1000 milliLiter(s) (50 mL/Hr) IV Continuous <Continuous>  dextrose 50% Injectable 12.5 Gram(s) IV Push once  insulin lispro (HumaLOG) corrective regimen sliding scale   SubCutaneous every 6 hours  levothyroxine 88 MICROGram(s) Oral daily  metoprolol succinate ER 25 milliGRAM(s) Oral daily  piperacillin/tazobactam IVPB. 3.375 Gram(s) IV Intermittent every 12 hours  sodium chloride 0.9%. 1000 milliLiter(s) (60 mL/Hr) IV Continuous <Continuous>  tamsulosin 0.4 milliGRAM(s) Oral at bedtime    MEDICATIONS  (PRN):  dextrose 40% Gel 15 Gram(s) Oral once PRN Blood Glucose LESS THAN 70 milliGRAM(s)/deciliter  glucagon  Injectable 1 milliGRAM(s) IntraMuscular once PRN Glucose LESS THAN 70 milligrams/deciliter            T, P, R, SpO2, BP    Temperature  Temp (F): 98.4 Degrees F  Temp (C) Temp (C): 36.9 Degrees C  Temp site Temp Site: oral    Heart Rate  Heart Rate Heart Rate (beats/min): 78 /min  Heart Rate Method: noninvasive blood pressure monitor    Noninvasive Blood Pressure  BP Systolic Systolic: 110 mm Hg  BP Diastolic Diastolic (mm Hg): 71 mm Hg  Blood Pressure - Site Site: left upper arm  Blood Pressure - Method Method: electronic    Respiratory/Pulse Oximetry  Respiration Rate (breaths/min) Respiration Rate (breaths/min): 18 /min  SpO2 (%) SpO2 (%): 100 %  O2 delivery Patient On: supplemental O2        Physical exam:  HEENT awake alert NAD pos icteric   abd softly distended gen mild tender ruq. no rebound no guarding   cv s1s2 rrr  chest CTA b/l   ext no edema        LABS: pending 12/15                        9.9    11.94 )-----------( 105      ( 14 Dec 2018 08:49 )             27.5     12-14    127<L>  |  99  |  88<H>  ----------------------------<  138<H>  4.6   |  18<L>  |  2.56<H>    Ca    7.6<L>      14 Dec 2018 08:49    TPro  5.4<L>  /  Alb  2.0<L>  /  TBili  2.3<H>  /  DBili  x   /  AST  59<H>  /  ALT  52  /  AlkPhos  61  12-14    PT/INR - ( 12 Dec 2018 12:24 )   PT: 15.3 sec;   INR: 1.39 ratio         PTT - ( 12 Dec 2018 12:24 )  PTT:34.7 sec      RADIOLOGY & ADDITIONAL TESTS:      Assessment and Plan:   · Assessment		  		  83 m w/ above hx a/w fevers, back pain and distended abdomen  Hepatitis A history 50 yr ago.    lft elevated, ascites and abnormal appearing gb on ct/sono  -s/p Paracentesis 60cc turbid fluid removed. low total protein c/w cirrhosis (and low plt). Does not meet criteria for SBP   consider therapeutic paracentesis unless cleared by renal to start diuretics    consider liver biopsy vs fibroscan as outpatient to evaluate fibrosis-scarring in liver.     Hep B/C serologies negative.    HIDA scan negative   complete chronic liver disease work up   check afp   2 gram sodium diet

## 2018-12-15 NOTE — PROGRESS NOTE ADULT - ASSESSMENT
Patient is 82 yo male with hx of CAD S/P stent, S/P PPM, HTN, BPH, CKD, Hepatitis, and Dm2 presenting with     1. Fever.  Unclear Etiology.  ??Abdm source.  abdm/Chest/pelvic CT scan noticed.   -UC and Blood culture are negative  -Ascite fluid culture pending.    -Continue with zosyn pending ID consult  -Abdm US shows Cholelithiasis with thickened gallbladder wall. See discussion above. No biliary dilatation. Echodense liver consistent with steatosis/hepatocellular disease.  Medical renal disease. Moderate ascites  -HIDA scan shows no acute process.   diagnostic paracenteses ruled out SPB  etiology of liver cirrhosis not clear  hepatitis panel negative  would need liver biospy  pt has increased abdomial girth, would need therautpic paracenties        2.  Abnormal LFT.  Hx of Hepatitis.  Hepatitis panel negative.  Avoid nephrotoxin  -Monitor LFT  -GI to follow up     3. ANDREEA on CKD with hyponatremia.  Seems to be secondary dehydration.  Lu cath.  Continue with IVF, and Monitor Renal function  -Improving  -Nephrology to follow up     4. HTN/CAD.  Continue with metoprolol.  Hold plavix in anticipation for any procedure.       5.  AMS.  Seems to be secondary to metabolic encephalopathy due to infection, hepatic encephalopathy and Renal failure.  Head CT scan shows no acute process.   continue with neuro check.    -Improving.  -Continue with lactulose, and Monitor ammonia level       6.  HTN.  Continue with metoprolol with holding parameter.  Monitor BP      7.  DM2.  Hold Lantus ISS, and Monitor POC    8.  Diet.  Continue with current diet as per speech therapy    9.  Back pain.  X-ray of the lumbar spine shows degenerative disease.  continue with pain management.  PT consult      Plan of care was discussed with patient, and family in great details, All questions were answered to their satisfaction  Seems to understand, and in agreement  Prognosis guarded  DVT proph compression stocking in the setting of thrombocytopenia

## 2018-12-15 NOTE — PROGRESS NOTE ADULT - ASSESSMENT
CKD, resolving ANDREEA.  Cirrhosis, ascites, edema.  Improved cirrhotic hyponatremia.  Will d/c IVF.  Suggest large volume paracentesis with SPA 12.5 gram for each litter drained.

## 2018-12-16 LAB
ALBUMIN SERPL ELPH-MCNC: 1.7 G/DL — LOW (ref 3.3–5)
ALP SERPL-CCNC: 63 U/L — SIGNIFICANT CHANGE UP (ref 30–120)
ALT FLD-CCNC: 46 U/L DA — SIGNIFICANT CHANGE UP (ref 10–60)
ANION GAP SERPL CALC-SCNC: 10 MMOL/L — SIGNIFICANT CHANGE UP (ref 5–17)
AST SERPL-CCNC: 60 U/L — HIGH (ref 10–40)
BILIRUB SERPL-MCNC: 2.5 MG/DL — HIGH (ref 0.2–1.2)
BUN SERPL-MCNC: 67 MG/DL — HIGH (ref 7–23)
CALCIUM SERPL-MCNC: 8.3 MG/DL — LOW (ref 8.4–10.5)
CHLORIDE SERPL-SCNC: 105 MMOL/L — SIGNIFICANT CHANGE UP (ref 96–108)
CO2 SERPL-SCNC: 15 MMOL/L — LOW (ref 22–31)
CREAT SERPL-MCNC: 1.91 MG/DL — HIGH (ref 0.5–1.3)
GLUCOSE SERPL-MCNC: 185 MG/DL — HIGH (ref 70–99)
HCT VFR BLD CALC: 27.9 % — LOW (ref 39–50)
HGB BLD-MCNC: 9.7 G/DL — LOW (ref 13–17)
MCHC RBC-ENTMCNC: 32.4 PG — SIGNIFICANT CHANGE UP (ref 27–34)
MCHC RBC-ENTMCNC: 34.8 GM/DL — SIGNIFICANT CHANGE UP (ref 32–36)
MCV RBC AUTO: 93.3 FL — SIGNIFICANT CHANGE UP (ref 80–100)
NRBC # BLD: 0 /100 WBCS — SIGNIFICANT CHANGE UP (ref 0–0)
PLATELET # BLD AUTO: 125 K/UL — LOW (ref 150–400)
POTASSIUM SERPL-MCNC: 5.8 MMOL/L — HIGH (ref 3.5–5.3)
POTASSIUM SERPL-MCNC: 6.1 MMOL/L — HIGH (ref 3.5–5.3)
POTASSIUM SERPL-MCNC: 6.4 MMOL/L — CRITICAL HIGH (ref 3.5–5.3)
POTASSIUM SERPL-SCNC: 5.8 MMOL/L — HIGH (ref 3.5–5.3)
POTASSIUM SERPL-SCNC: 6.1 MMOL/L — HIGH (ref 3.5–5.3)
POTASSIUM SERPL-SCNC: 6.4 MMOL/L — CRITICAL HIGH (ref 3.5–5.3)
PROT SERPL-MCNC: 5.5 G/DL — LOW (ref 6–8.3)
RBC # BLD: 2.99 M/UL — LOW (ref 4.2–5.8)
RBC # FLD: 13.5 % — SIGNIFICANT CHANGE UP (ref 10.3–14.5)
SODIUM SERPL-SCNC: 130 MMOL/L — LOW (ref 135–145)
WBC # BLD: 12.51 K/UL — HIGH (ref 3.8–10.5)
WBC # FLD AUTO: 12.51 K/UL — HIGH (ref 3.8–10.5)

## 2018-12-16 PROCEDURE — 99232 SBSQ HOSP IP/OBS MODERATE 35: CPT

## 2018-12-16 PROCEDURE — 12345: CPT | Mod: NC

## 2018-12-16 RX ORDER — SODIUM POLYSTYRENE SULFONATE 4.1 MEQ/G
30 POWDER, FOR SUSPENSION ORAL ONCE
Qty: 0 | Refills: 0 | Status: COMPLETED | OUTPATIENT
Start: 2018-12-16 | End: 2018-12-16

## 2018-12-16 RX ORDER — KETOCONAZOLE 20 MG/G
1 AEROSOL, FOAM TOPICAL AT BEDTIME
Qty: 0 | Refills: 0 | Status: DISCONTINUED | OUTPATIENT
Start: 2018-12-16 | End: 2018-12-21

## 2018-12-16 RX ORDER — ALBUMIN HUMAN 25 %
50 VIAL (ML) INTRAVENOUS EVERY 6 HOURS
Qty: 0 | Refills: 0 | Status: COMPLETED | OUTPATIENT
Start: 2018-12-16 | End: 2018-12-19

## 2018-12-16 RX ORDER — INSULIN LISPRO 100/ML
2 VIAL (ML) SUBCUTANEOUS ONCE
Qty: 0 | Refills: 0 | Status: COMPLETED | OUTPATIENT
Start: 2018-12-16 | End: 2018-12-16

## 2018-12-16 RX ORDER — MIDODRINE HYDROCHLORIDE 2.5 MG/1
5 TABLET ORAL EVERY 8 HOURS
Qty: 0 | Refills: 0 | Status: DISCONTINUED | OUTPATIENT
Start: 2018-12-16 | End: 2018-12-21

## 2018-12-16 RX ADMIN — ALBUTEROL 2.5 MILLIGRAM(S): 90 AEROSOL, METERED ORAL at 01:28

## 2018-12-16 RX ADMIN — Medication 4: at 12:16

## 2018-12-16 RX ADMIN — PIPERACILLIN AND TAZOBACTAM 25 GRAM(S): 4; .5 INJECTION, POWDER, LYOPHILIZED, FOR SOLUTION INTRAVENOUS at 15:13

## 2018-12-16 RX ADMIN — Medication 2 UNIT(S): at 21:59

## 2018-12-16 RX ADMIN — Medication 1: at 08:10

## 2018-12-16 RX ADMIN — ALBUTEROL 2.5 MILLIGRAM(S): 90 AEROSOL, METERED ORAL at 18:49

## 2018-12-16 RX ADMIN — Medication 50 MILLILITER(S): at 16:26

## 2018-12-16 RX ADMIN — Medication 88 MICROGRAM(S): at 05:48

## 2018-12-16 RX ADMIN — MIDODRINE HYDROCHLORIDE 5 MILLIGRAM(S): 2.5 TABLET ORAL at 21:41

## 2018-12-16 RX ADMIN — Medication 25 MILLIGRAM(S): at 05:48

## 2018-12-16 RX ADMIN — ALBUTEROL 2.5 MILLIGRAM(S): 90 AEROSOL, METERED ORAL at 07:09

## 2018-12-16 RX ADMIN — TAMSULOSIN HYDROCHLORIDE 0.4 MILLIGRAM(S): 0.4 CAPSULE ORAL at 21:41

## 2018-12-16 RX ADMIN — Medication 50 MILLILITER(S): at 23:21

## 2018-12-16 RX ADMIN — KETOCONAZOLE 1 APPLICATION(S): 20 AEROSOL, FOAM TOPICAL at 21:41

## 2018-12-16 RX ADMIN — Medication 4: at 17:00

## 2018-12-16 RX ADMIN — SODIUM POLYSTYRENE SULFONATE 30 GRAM(S): 4.1 POWDER, FOR SUSPENSION ORAL at 16:21

## 2018-12-16 RX ADMIN — ALBUTEROL 2.5 MILLIGRAM(S): 90 AEROSOL, METERED ORAL at 12:30

## 2018-12-16 RX ADMIN — PIPERACILLIN AND TAZOBACTAM 25 GRAM(S): 4; .5 INJECTION, POWDER, LYOPHILIZED, FOR SOLUTION INTRAVENOUS at 03:26

## 2018-12-16 NOTE — PROGRESS NOTE ADULT - ASSESSMENT
CKD, nonproteinuric, ANDREEA better but urine output is down off IVF. Marginal BP's.  Cirrhosis (no ETOH, Hepatitis panel negative).  Portal HTN, ascites, edema.  At risk for ATN and HRS.  Will add SPA's 12.5 gram every 6 hours 12 doses and Midodrine 5 mg T.I.D.  Repeat FeNa, repeat Potassium (was hemolyzed this AM), serum Uric acid.  Monitor urine output and labs closely. Check Ammonia level.  Would not recommend large volume paracentesis unless urine output improves and renal function is stable as it will place the patient at risk for hypovolemia and further renal insult.  I explained to the family what is known at this time and the seriousness of liver cirrhosis diagnosis.  They need to speak with HOUSTNO.

## 2018-12-16 NOTE — PROGRESS NOTE ADULT - SUBJECTIVE AND OBJECTIVE BOX
Patient is a 83y old  Male who presents with a chief complaint of fever (16 Dec 2018 08:22)        HPI:  Patient is 84 yo male with hx of CAD S/P stent, S/P PPM, HTN, CKD, and Hepatitis presenting with fever that has been going on for the past several days, and progressively worsen.  + generalized weakness, cough, and decrease po intake X 2 weeks.  Daughter noticed abdominal distention with increase abdominal pain.  Denies any headache, dizziness, blurry vision, chest pain, SOB, N/V/D, numbness, or weakness.      + Back pain (12 Dec 2018 18:15)      SUBJECTIVE & OBJECTIVE: Pt seen and examined at bedside, resting comfortable     PHYSICAL EXAM:  T(C): 36.6 (12-16-18 @ 09:15), Max: 37 (12-15-18 @ 13:02)  HR: 65 (12-16-18 @ 09:15) (65 - 80)  BP: 96/57 (12-16-18 @ 09:15) (96/57 - 129/69)  RR: 18 (12-16-18 @ 09:15) (18 - 20)  SpO2: 100% (12-16-18 @ 09:15) (98% - 100%)  Wt(kg): --   GENERAL: NAD, well-groomed, well-developed  HEAD:  Atraumatic, Normocephalic  EYES: EOMI, PERRLA, conjunctiva and sclera clear  ENMT: Moist mucous membranes  NECK: Supple, No JVD  NERVOUS SYSTEM:  Alert & Oriented X3,   CHEST/LUNG: Clear to auscultation bilaterally; No rales, rhonchi, wheezing, or rubs  HEART: Regular rate and rhythm; No murmurs, rubs, or gallops  ABDOMEN: Sincreased in abdominal gitrh  EXTREMITIES:  2+ Peripheral Pulses, No clubbing, cyanosis, or edema        MEDICATIONS  (STANDING):  ALBUTerol    0.083% 2.5 milliGRAM(s) Nebulizer every 6 hours  dextrose 5%. 1000 milliLiter(s) (50 mL/Hr) IV Continuous <Continuous>  dextrose 50% Injectable 12.5 Gram(s) IV Push once  insulin lispro (HumaLOG) corrective regimen sliding scale   SubCutaneous three times a day before meals  levothyroxine 88 MICROGram(s) Oral daily  metoprolol succinate ER 25 milliGRAM(s) Oral daily  piperacillin/tazobactam IVPB. 3.375 Gram(s) IV Intermittent every 12 hours  tamsulosin 0.4 milliGRAM(s) Oral at bedtime    MEDICATIONS  (PRN):  dextrose 40% Gel 15 Gram(s) Oral once PRN Blood Glucose LESS THAN 70 milliGRAM(s)/deciliter  glucagon  Injectable 1 milliGRAM(s) IntraMuscular once PRN Glucose LESS THAN 70 milligrams/deciliter      LABS:                        9.7    12.51 )-----------( 125      ( 16 Dec 2018 07:54 )             27.9     12-16    130<L>  |  105  |  67<H>  ----------------------------<  185<H>  6.4<HH>   |  15<L>  |  1.91<H>    Ca    8.3<L>      16 Dec 2018 07:54  Mg     2.2     12-15    TPro  5.5<L>  /  Alb  1.7<L>  /  TBili  2.5<H>  /  DBili  x   /  AST  60<H>  /  ALT  46  /  AlkPhos  63  12-16          CAPILLARY BLOOD GLUCOSE      POCT Blood Glucose.: 186 mg/dL (16 Dec 2018 07:51)  POCT Blood Glucose.: 209 mg/dL (15 Dec 2018 21:22)  POCT Blood Glucose.: 205 mg/dL (15 Dec 2018 16:36)  POCT Blood Glucose.: 184 mg/dL (15 Dec 2018 11:58)      CAPILLARY BLOOD GLUCOSE      POCT Blood Glucose.: 186 mg/dL (16 Dec 2018 07:51)  POCT Blood Glucose.: 209 mg/dL (15 Dec 2018 21:22)  POCT Blood Glucose.: 205 mg/dL (15 Dec 2018 16:36)  POCT Blood Glucose.: 184 mg/dL (15 Dec 2018 11:58)    CAPILLARY BLOOD GLUCOSE      POCT Blood Glucose.: 186 mg/dL (16 Dec 2018 07:51)            RECENT CULTURES:      RADIOLOGY & ADDITIONAL TESTS:                        DVT/GI ppx  Discussed with pt @ bedside

## 2018-12-16 NOTE — CHART NOTE - NSCHARTNOTEFT_GEN_A_CORE
Upon Nutritional Assessment by the Registered Dietitian your patient was determined to meet criteria / has evidence of the following diagnosis/diagnoses:          [ ]  Mild Protein Calorie Malnutrition        [x ]  Moderate Protein Calorie Malnutrition        [ ] Severe Protein Calorie Malnutrition        [ ] Unspecified Protein Calorie Malnutrition        [ ] Underweight / BMI <19        [ ] Morbid Obesity / BMI > 40      Findings as based on:  •  Comprehensive nutrition assessment and consultation  •  Calorie counts (nutrient intake analysis)  •  Food acceptance and intake status from observations by staff  •  Follow up  •  Patient education  •  Intervention secondary to interdisciplinary rounds  •   concerns    Low Na, consistent Carbohydrate puree withj nepro BID  Treatment:    The following diet has been recommended:       PROVIDER Section:     By signing this assessment you are acknowledging and agree with the diagnosis/diagnoses assigned by the Registered Dietitian    Comments:          Pt assessed for HgbA1C >7.5: 82 yo male with hx of CAD S/P stent, S/P PPM, HTN, BPH, CKD, Hepatitis, and Dm2 presenting with fever, lft elevated, ascites and abnormal appearing gb on ct/sono. Pt found to have  Cholelithiasis with thickened gallbladder wall.  Pt s/p Paracentesis  Per GI MD, consider liver biopsy vs fibroscan as outpatient to evaluate fibrosis-scarring in liver. Admit weight 149# However, pt c  generalized edema/ascites.  Spouse reports pt ate today without problem. RN confirms this. Noted elevated BG: Per RN pt family bringing him food/drink from home. Advised spouse to not give any outside food especially juice as it contain concentrated sugar. Also noted: elevated K: Spouse apparently gave pt orange juice. Called  dtr Cleopatra (who speaks/understand English better as per RN) who stated that pt lost 20# over 2 months and reports pt only ate a few bites at meals 2 weeks PTA.  Pt having generalized edema, weight loss and <75% nutrition needs consumed  for > 7 days meets criteria for moderate malnutrition in context of acute illness/injury. Exact weight loss difficult to ascertain 2/2 fluid . PO intake appears to have improved from PTA. Reinforced therapeutic diet with dtr. Pt may benefit from No added na diet with Nepro carb with carb steady BID given that he likes to drink juice which is not a good option at this time given hyperglycemia.

## 2018-12-16 NOTE — PROGRESS NOTE ADULT - ASSESSMENT
Patient is 84 yo male with hx of CAD S/P stent, S/P PPM, HTN, BPH, CKD, Hepatitis, and Dm2 presenting with     1. Fever.  Unclear Etiology.  ??Abdm source.  abdm/Chest/pelvic CT scan noticed.   -UC and Blood culture are negative  -Ascite fluid culture negative    will d/c zosyn  -Abdm US shows Cholelithiasis with thickened gallbladder wall. See discussion above. No biliary dilatation. Echodense liver consistent with steatosis/hepatocellular disease.  Medical renal disease. Moderate ascites  -HIDA scan shows no acute process.   diagnostic paracenteses ruled out SPB  etiology of liver cirrhosis not clear  hepatitis panel negative  would need liver biospy  pt has increased abdomial girth, would need therautpic paracenties        2.  Abnormal LFT.  Hx of Hepatitis.  Hepatitis panel negative.  Avoid nephrotoxin  -Monitor LFT  -GI to follow up     3. ANDREEA on CKD with hyponatremia.  Seems to be secondary dehydration.  Lu cath.  Continue with IVF, and Monitor Renal function  -Improving  -Nephrology to follow up     4. HTN/CAD.  Continue with metoprolol.  Hold plavix in anticipation for any procedure.       5.  AMS.  Seems to be secondary to metabolic encephalopathy due to infection, hepatic encephalopathy and Renal failure.  Head CT scan shows no acute process.   continue with neuro check.    -Improving.  -Continue with lactulose, and Monitor ammonia level       6.  HTN.  Continue with metoprolol with holding parameter.  Monitor BP      7.  DM2.  Hold Lantus ISS, and Monitor POC    8.  Diet.  Continue with current diet as per speech therapy    9.  Back pain.  X-ray of the lumbar spine shows degenerative disease.  continue with pain management.  PT consult      Plan of care was discussed with patient, and family in great details, All questions were answered to their satisfaction  Seems to understand, and in agreement  Prognosis guarded  DVT proph compression stocking in the setting of thrombocytopenia

## 2018-12-16 NOTE — DIETITIAN INITIAL EVALUATION ADULT. - SIGNS/SYMPTOMS
as evidenced by abnormal SLP eval as evidenced by abnormal HgbA1C as evidenced by generalized edema and <75% of nutrition needs consumed x >7 days

## 2018-12-16 NOTE — PROGRESS NOTE ADULT - SUBJECTIVE AND OBJECTIVE BOX
Patient seen in follow up for ANDREEA/CKD. Urine output is less off IVF. BP's are marginal.    CKD (chronic kidney disease)  Hepatitis  CAD (coronary artery disease)  HTN (hypertension)  DM (diabetes mellitus)    MEDICATIONS  (STANDING):  albumin human 25% IVPB 50 milliLiter(s) IV Intermittent every 6 hours  ALBUTerol    0.083% 2.5 milliGRAM(s) Nebulizer every 6 hours  dextrose 5%. 1000 milliLiter(s) (50 mL/Hr) IV Continuous <Continuous>  dextrose 50% Injectable 12.5 Gram(s) IV Push once  insulin lispro (HumaLOG) corrective regimen sliding scale   SubCutaneous three times a day before meals  ketoconazole 2% Cream 1 Application(s) Topical at bedtime  levothyroxine 88 MICROGram(s) Oral daily  metoprolol succinate ER 25 milliGRAM(s) Oral daily  midodrine 5 milliGRAM(s) Oral every 8 hours  piperacillin/tazobactam IVPB. 3.375 Gram(s) IV Intermittent every 12 hours  tamsulosin 0.4 milliGRAM(s) Oral at bedtime    MEDICATIONS  (PRN):  dextrose 40% Gel 15 Gram(s) Oral once PRN Blood Glucose LESS THAN 70 milliGRAM(s)/deciliter  glucagon  Injectable 1 milliGRAM(s) IntraMuscular once PRN Glucose LESS THAN 70 milligrams/deciliter    T(C): 36.6 (12-16-18 @ 14:30), Max: 37 (12-15-18 @ 13:02)  HR: 70 (12-16-18 @ 14:30) (65 - 82)  BP: 95/58 (12-16-18 @ 14:30) (95/58 - 129/69)  RR: 18 (12-16-18 @ 14:30) (18 - 20)  SpO2: 100% (12-16-18 @ 14:30) (98% - 100%)  Wt(kg): --  I&O's Detail    15 Dec 2018 07:01  -  16 Dec 2018 07:00  --------------------------------------------------------  IN:    sodium chloride 0.9%: 660 mL  Total IN: 660 mL    OUT:    Indwelling Catheter - Urethral: 1900 mL  Total OUT: 1900 mL    Total NET: -1240 mL      16 Dec 2018 07:01  -  16 Dec 2018 15:24  --------------------------------------------------------  IN:    Oral Fluid: 240 mL  Total IN: 240 mL    OUT:  Total OUT: 0 mL    Total NET: 240 mL      PHYSICAL EXAM:  General: NAD, somnolent, supine  No JVD  Respiratory: b/l air entry, clear anteriorly  Cardiovascular: S1 S2 reg  Gastrointestinal: soft, large, distended, tense ascites  Extremities:  moderate legs edema  Tinea left axilla    CBC Full  -  ( 16 Dec 2018 07:54 )  WBC Count : 12.51 K/uL  Hemoglobin : 9.7 g/dL  Hematocrit : 27.9 %  Platelet Count - Automated : 125 K/uL  Mean Cell Volume : 93.3 fl  Mean Cell Hemoglobin : 32.4 pg  Mean Cell Hemoglobin Concentration : 34.8 gm/dL  Auto Neutrophil # : x  Auto Lymphocyte # : x  Auto Monocyte # : x  Auto Eosinophil # : x  Auto Basophil # : x  Auto Neutrophil % : x  Auto Lymphocyte % : x  Auto Monocyte % : x  Auto Eosinophil % : x  Auto Basophil % : x    12-16    130<L>  |  105  |  67<H>  ----------------------------<  185<H>  6.4<HH>   |  15<L>  |  1.91<H>    Ca    8.3<L>      16 Dec 2018 07:54  Mg     2.2     12-15    TPro  5.5<L>  /  Alb  1.7<L>  /  TBili  2.5<H>  /  DBili  x   /  AST  60<H>  /  ALT  46  /  AlkPhos  63  12-16        Sodium, Serum: 130 (12-16 @ 07:54)  Sodium, Serum: 130 (12-15 @ 07:03)  Sodium, Serum: 127 (12-14 @ 08:49)    Creatinine, Serum: 1.91 (12-16 @ 07:54)  Creatinine, Serum: 2.42 (12-15 @ 07:03)  Creatinine, Serum: 2.56 (12-14 @ 08:49)    Potassium, Serum: 6.4 (12-16 @ 07:54)  Potassium, Serum: 5.2 (12-15 @ 07:03)  Potassium, Serum: 4.6 (12-14 @ 08:49)    Hemoglobin: 9.7 (12-16 @ 07:54)  Hemoglobin: 9.7 (12-15 @ 07:05)  Hemoglobin: 9.9 (12-14 @ 08:49)

## 2018-12-16 NOTE — DIETITIAN INITIAL EVALUATION ADULT. - OTHER INFO
Pt assessed for HgbA1C >7.5: 84 yo male with hx of CAD S/P stent, S/P PPM, HTN, BPH, CKD, Hepatitis, and Dm2 presenting with fever, lft elevated, ascites and abnormal appearing gb on ct/sono. Pt found to have  Cholelithiasis with thickened gallbladder wall.  Pt s/p Paracentesis  Per GI MD, consider liver biopsy vs fibroscan as outpatient to evaluate fibrosis-scarring in liver. Pt assessed for HgbA1C >7.5: 84 yo male with hx of CAD S/P stent, S/P PPM, HTN, BPH, CKD, Hepatitis, and Dm2 presenting with fever, lft elevated, ascites and abnormal appearing gb on ct/sono. Pt found to have  Cholelithiasis with thickened gallbladder wall.  Pt s/p Paracentesis  Per GI MD, consider liver biopsy vs fibroscan as outpatient to evaluate fibrosis-scarring in liver. Admit weight 149# However, pt c  generalized edema/ascites.  Spouse reports pt ate today without problem. RN confirms this. Noted elevated BG: Per RN pt family bringing him food/drink from home. Advised spouse to not give any outside food especially juice as it contain concentrated sugar. Also noted: elevated K: Spouse apparently gave pt orange juice. Will try and reach dtr (who speaks/understand English better as per RN) and reinforce therapeutic diet. Pt assessed for HgbA1C >7.5: 82 yo male with hx of CAD S/P stent, S/P PPM, HTN, BPH, CKD, Hepatitis, and Dm2 presenting with fever, lft elevated, ascites and abnormal appearing gb on ct/sono. Pt found to have  Cholelithiasis with thickened gallbladder wall.  Pt s/p Paracentesis  Per GI MD, consider liver biopsy vs fibroscan as outpatient to evaluate fibrosis-scarring in liver. Admit weight 149# However, pt c  generalized edema/ascites.  Spouse reports pt ate today without problem. RN confirms this. Noted elevated BG: Per RN pt family bringing him food/drink from home. Advised spouse to not give any outside food especially juice as it contain concentrated sugar. Also noted: elevated K: Spouse apparently gave pt orange juice. Called  dtr Cleopatra (who speaks/understand English better as per RN) who stated that pt lost 20# over 2 months and reports pt only ate a few bites at meals 2 weeks PTA.  Pt having generalized edema, weight loss and <75% nutrition needs consumed  for > 7 days meets criteria for moderate malnutrition in context of acute illness/injury. Exact weight loss difficult to ascertain 2/2 fluid . PO intake appears to have improved from PTA. Reinforced therapeutic diet with dtr. Pt may benefit from No added na diet with Nepro carb with carb steady BID given that he likes to drink juice which is not a good option at this time given hyperglycemia. Pt assessed for HgbA1C >7.5: 84 yo male with hx of CAD S/P stent, S/P PPM, HTN, BPH, CKD, Hepatitis, and Dm2 presenting with fever, lft elevated, ascites and abnormal appearing gb on ct/sono. Pt found to have  Cholelithiasis with thickened gallbladder wall.  Pt s/p Paracentesis  Per GI MD, consider liver biopsy vs fibroscan as outpatient to evaluate fibrosis-scarring in liver. Admit weight 149# However, pt c  generalized edema/ascites.  Spouse reports pt ate today without problem. RN confirms this. Noted elevated BG: Per RN pt family bringing him food/drink from home. Advised spouse to not give any outside food especially juice as it contain concentrated sugar. Also noted: elevated K: Spouse  gave pt juice but as per RN lab value hemolyzed.  Called  dtr Cleopatra (who speaks/understand English better as per RN) who stated that pt lost 20# over 2 months and reports pt only ate a few bites at meals 2 weeks PTA.  Pt having generalized edema, weight loss and <75% nutrition needs consumed  for > 7 days meets criteria for moderate malnutrition in context of acute illness/injury. Exact weight loss difficult to ascertain 2/2 fluid . PO intake appears to have improved from PTA. Reinforced therapeutic diet with dtr. Pt may benefit from No added na diet with Nepro carb with carb steady BID given that he likes to drink juice which is not a good option at this time given hyperglycemia. Pt assessed for HgbA1C >7.5: 84 yo male with hx of CAD S/P stent, S/P PPM, HTN, BPH, CKD, Hepatitis, and Dm2 presenting with fever, lft elevated, ascites and abnormal appearing gb on ct/sono. Pt found to have  Cholelithiasis with thickened gallbladder wall.  Pt s/p Paracentesis  Per GI MD, consider liver biopsy vs fibroscan as outpatient to evaluate fibrosis-scarring in liver. Admit weight 149# However, pt c  generalized edema/ascites.  Spouse reports pt ate today without problem. RN confirms this. Noted elevated BG: Per RN pt family bringing him food/drink from home. Advised spouse to not give any outside food especially juice as it contain concentrated sugar. Also noted: elevated K:  D/W Nephrologist who is  aware and will add no concentrated K to diet.  Called  dtr Cleopatra (who speaks/understand English better as per RN) who stated that pt lost 20# over 2 months and reports pt only ate a few bites at meals 2 weeks PTA.  Pt having generalized edema, weight loss and <75% nutrition needs consumed  for > 7 days meets criteria for moderate malnutrition in context of acute illness/injury. Exact weight loss difficult to ascertain 2/2 fluid . PO intake appears to have improved from PTA. Reinforced therapeutic diet with dtr. Pt may benefit from No added na diet with Nepro carb with carb steady BID given that he likes to drink juice which is not a good option at this time given hyperglycemia.

## 2018-12-16 NOTE — PROGRESS NOTE ADULT - SUBJECTIVE AND OBJECTIVE BOX
No events. Family at bedside.    MEDICATIONS  (STANDING):  ALBUTerol    0.083% 2.5 milliGRAM(s) Nebulizer every 6 hours  dextrose 5%. 1000 milliLiter(s) (50 mL/Hr) IV Continuous <Continuous>  dextrose 50% Injectable 12.5 Gram(s) IV Push once  insulin lispro (HumaLOG) corrective regimen sliding scale   SubCutaneous three times a day before meals  levothyroxine 88 MICROGram(s) Oral daily  metoprolol succinate ER 25 milliGRAM(s) Oral daily  piperacillin/tazobactam IVPB. 3.375 Gram(s) IV Intermittent every 12 hours  tamsulosin 0.4 milliGRAM(s) Oral at bedtime    MEDICATIONS  (PRN):  dextrose 40% Gel 15 Gram(s) Oral once PRN Blood Glucose LESS THAN 70 milliGRAM(s)/deciliter  glucagon  Injectable 1 milliGRAM(s) IntraMuscular once PRN Glucose LESS THAN 70 milligrams/deciliter    Vital Signs Last 24 Hrs  T(C): 36.6 (16 Dec 2018 09:15), Max: 36.8 (15 Dec 2018 21:26)  T(F): 97.9 (16 Dec 2018 09:15), Max: 98.3 (15 Dec 2018 21:26)  HR: 68 (16 Dec 2018 12:56) (65 - 80)  BP: 96/57 (16 Dec 2018 09:15) (96/57 - 126/70)  BP(mean): --  RR: 18 (16 Dec 2018 09:15) (18 - 20)  SpO2: 98% (16 Dec 2018 12:56) (98% - 100%)    GENERAL: Comfortable, NAD  HEAD:  Atraumatic, Normocephalic  EYES: Anicteric sclera  ENMT: Moist mucous membranes, Good dentition, No thrush  ABDOMEN: Soft, Non-tender, Non-distended; Normoactive bowel sounds  EXTREMITIES:  No edema  SKIN: No jaundice  NERVOUS SYSTEM:  Alert, No asterixis    LABS:                        9.7    12.51 )-----------( 125      ( 16 Dec 2018 07:54 )             27.9     12-16    130<L>  |  105  |  67<H>  ----------------------------<  185<H>  6.4<HH>   |  15<L>  |  1.91<H>    Ca    8.3<L>      16 Dec 2018 07:54  Mg     2.2     12-15    TPro  5.5<L>  /  Alb  1.7<L>  /  TBili  2.5<H>  /  DBili  x   /  AST  60<H>  /  ALT  46  /  AlkPhos  63  12-16    Impressions:  Cirrhosis with ascites  s/p paracentesis  HIDA negative    Recommendations:  Chronic liver disease work-up pending  Diuretics  Low Na diet  Needs vaccination for HAV/HBV  EGD for variceal screening as outpatient

## 2018-12-16 NOTE — PROGRESS NOTE ADULT - SUBJECTIVE AND OBJECTIVE BOX
Date/Time Patient Seen:  		  Referring MD:   Data Reviewed	       Patient is a 83y old  Male who presents with a chief complaint of fever (15 Dec 2018 18:32)    vs and meds reviewed  on ABX  on NEBS as per PMD    Subjective/HPI     PAST MEDICAL & SURGICAL HISTORY:  CKD (chronic kidney disease)  Hepatitis  CAD (coronary artery disease)  HTN (hypertension)  DM (diabetes mellitus)  Artificial cardiac pacemaker  AICD (automatic cardioverter/defibrillator) present  No significant past surgical history        Medication list         MEDICATIONS  (STANDING):  ALBUTerol    0.083% 2.5 milliGRAM(s) Nebulizer every 6 hours  dextrose 5%. 1000 milliLiter(s) (50 mL/Hr) IV Continuous <Continuous>  dextrose 50% Injectable 12.5 Gram(s) IV Push once  insulin lispro (HumaLOG) corrective regimen sliding scale   SubCutaneous three times a day before meals  levothyroxine 88 MICROGram(s) Oral daily  metoprolol succinate ER 25 milliGRAM(s) Oral daily  piperacillin/tazobactam IVPB. 3.375 Gram(s) IV Intermittent every 12 hours  tamsulosin 0.4 milliGRAM(s) Oral at bedtime    MEDICATIONS  (PRN):  dextrose 40% Gel 15 Gram(s) Oral once PRN Blood Glucose LESS THAN 70 milliGRAM(s)/deciliter  glucagon  Injectable 1 milliGRAM(s) IntraMuscular once PRN Glucose LESS THAN 70 milligrams/deciliter         Vitals log        ICU Vital Signs Last 24 Hrs  T(C): 36.8 (16 Dec 2018 05:05), Max: 37 (15 Dec 2018 13:02)  T(F): 98.3 (16 Dec 2018 05:05), Max: 98.6 (15 Dec 2018 13:02)  HR: 74 (16 Dec 2018 07:11) (68 - 80)  BP: 110/62 (16 Dec 2018 05:05) (110/62 - 129/69)  BP(mean): --  ABP: --  ABP(mean): --  RR: 18 (16 Dec 2018 05:05) (18 - 20)  SpO2: 98% (16 Dec 2018 07:11) (98% - 100%)           Input and Output:  I&O's Detail    15 Dec 2018 07:01  -  16 Dec 2018 07:00  --------------------------------------------------------  IN:    sodium chloride 0.9%: 660 mL  Total IN: 660 mL    OUT:    Indwelling Catheter - Urethral: 1900 mL  Total OUT: 1900 mL    Total NET: -1240 mL          Lab Data                        9.7    12.51 )-----------( 125      ( 16 Dec 2018 07:54 )             27.9     12-15    130<L>  |  102  |  80<H>  ----------------------------<  188<H>  5.2   |  19<L>  |  2.42<H>    Ca    7.9<L>      15 Dec 2018 07:03  Mg     2.2     12-15    TPro  5.5<L>  /  Alb  2.0<L>  /  TBili  2.2<H>  /  DBili  x   /  AST  55<H>  /  ALT  51  /  AlkPhos  64  12-15            Review of Systems	      Objective     Physical Examination    heart s1s2  lung dec BS  abd soft      Pertinent Lab findings & Imaging      Aly:  NO   Adequate UO     I&O's Detail    15 Dec 2018 07:01  -  16 Dec 2018 07:00  --------------------------------------------------------  IN:    sodium chloride 0.9%: 660 mL  Total IN: 660 mL    OUT:    Indwelling Catheter - Urethral: 1900 mL  Total OUT: 1900 mL    Total NET: -1240 mL               Discussed with:     Cultures:	        Radiology

## 2018-12-17 LAB
ALBUMIN SERPL ELPH-MCNC: 2.5 G/DL — LOW (ref 3.3–5)
ALP SERPL-CCNC: 58 U/L — SIGNIFICANT CHANGE UP (ref 30–120)
ALT FLD-CCNC: 39 U/L DA — SIGNIFICANT CHANGE UP (ref 10–60)
AMMONIA BLD-MCNC: 50 UMOL/L — HIGH (ref 11–32)
ANION GAP SERPL CALC-SCNC: 10 MMOL/L — SIGNIFICANT CHANGE UP (ref 5–17)
AST SERPL-CCNC: 38 U/L — SIGNIFICANT CHANGE UP (ref 10–40)
BILIRUB SERPL-MCNC: 2.4 MG/DL — HIGH (ref 0.2–1.2)
BUN SERPL-MCNC: 63 MG/DL — HIGH (ref 7–23)
CALCIUM SERPL-MCNC: 8.9 MG/DL — SIGNIFICANT CHANGE UP (ref 8.4–10.5)
CHLORIDE SERPL-SCNC: 106 MMOL/L — SIGNIFICANT CHANGE UP (ref 96–108)
CO2 SERPL-SCNC: 20 MMOL/L — LOW (ref 22–31)
CREAT ?TM UR-MCNC: 67 MG/DL — SIGNIFICANT CHANGE UP
CREAT SERPL-MCNC: 1.9 MG/DL — HIGH (ref 0.5–1.3)
CULTURE RESULTS: SIGNIFICANT CHANGE UP
CULTURE RESULTS: SIGNIFICANT CHANGE UP
GLUCOSE SERPL-MCNC: 271 MG/DL — HIGH (ref 70–99)
HCT VFR BLD CALC: 25.9 % — LOW (ref 39–50)
HGB BLD-MCNC: 9.1 G/DL — LOW (ref 13–17)
MAGNESIUM SERPL-MCNC: 2.3 MG/DL — SIGNIFICANT CHANGE UP (ref 1.6–2.6)
MCHC RBC-ENTMCNC: 33.3 PG — SIGNIFICANT CHANGE UP (ref 27–34)
MCHC RBC-ENTMCNC: 35.1 GM/DL — SIGNIFICANT CHANGE UP (ref 32–36)
MCV RBC AUTO: 94.9 FL — SIGNIFICANT CHANGE UP (ref 80–100)
NRBC # BLD: 0 /100 WBCS — SIGNIFICANT CHANGE UP (ref 0–0)
PHOSPHATE SERPL-MCNC: 3.4 MG/DL — SIGNIFICANT CHANGE UP (ref 2.5–4.5)
PLATELET # BLD AUTO: 131 K/UL — LOW (ref 150–400)
POTASSIUM SERPL-MCNC: 5.4 MMOL/L — HIGH (ref 3.5–5.3)
POTASSIUM SERPL-SCNC: 5.4 MMOL/L — HIGH (ref 3.5–5.3)
PROT SERPL-MCNC: 5.9 G/DL — LOW (ref 6–8.3)
RBC # BLD: 2.73 M/UL — LOW (ref 4.2–5.8)
RBC # FLD: 13.6 % — SIGNIFICANT CHANGE UP (ref 10.3–14.5)
SODIUM SERPL-SCNC: 136 MMOL/L — SIGNIFICANT CHANGE UP (ref 135–145)
SODIUM UR-SCNC: 25 MMOL/L — SIGNIFICANT CHANGE UP
SPECIMEN SOURCE: SIGNIFICANT CHANGE UP
SPECIMEN SOURCE: SIGNIFICANT CHANGE UP
URATE SERPL-MCNC: 4.5 MG/DL — SIGNIFICANT CHANGE UP (ref 3.4–8.8)
WBC # BLD: 10.74 K/UL — HIGH (ref 3.8–10.5)
WBC # FLD AUTO: 10.74 K/UL — HIGH (ref 3.8–10.5)

## 2018-12-17 PROCEDURE — 74176 CT ABD & PELVIS W/O CONTRAST: CPT | Mod: 26

## 2018-12-17 PROCEDURE — 99232 SBSQ HOSP IP/OBS MODERATE 35: CPT

## 2018-12-17 PROCEDURE — 12345: CPT | Mod: NC

## 2018-12-17 RX ORDER — INSULIN GLARGINE 100 [IU]/ML
10 INJECTION, SOLUTION SUBCUTANEOUS AT BEDTIME
Qty: 0 | Refills: 0 | Status: DISCONTINUED | OUTPATIENT
Start: 2018-12-17 | End: 2018-12-21

## 2018-12-17 RX ORDER — INSULIN LISPRO 100/ML
VIAL (ML) SUBCUTANEOUS
Qty: 0 | Refills: 0 | Status: DISCONTINUED | OUTPATIENT
Start: 2018-12-17 | End: 2018-12-21

## 2018-12-17 RX ORDER — SPIRONOLACTONE 25 MG/1
25 TABLET, FILM COATED ORAL DAILY
Qty: 0 | Refills: 0 | Status: DISCONTINUED | OUTPATIENT
Start: 2018-12-17 | End: 2018-12-20

## 2018-12-17 RX ADMIN — ALBUTEROL 2.5 MILLIGRAM(S): 90 AEROSOL, METERED ORAL at 01:12

## 2018-12-17 RX ADMIN — Medication 2: at 12:54

## 2018-12-17 RX ADMIN — Medication 88 MICROGRAM(S): at 05:51

## 2018-12-17 RX ADMIN — ALBUTEROL 2.5 MILLIGRAM(S): 90 AEROSOL, METERED ORAL at 19:08

## 2018-12-17 RX ADMIN — Medication 50 MILLILITER(S): at 23:22

## 2018-12-17 RX ADMIN — INSULIN GLARGINE 10 UNIT(S): 100 INJECTION, SOLUTION SUBCUTANEOUS at 22:01

## 2018-12-17 RX ADMIN — PIPERACILLIN AND TAZOBACTAM 25 GRAM(S): 4; .5 INJECTION, POWDER, LYOPHILIZED, FOR SOLUTION INTRAVENOUS at 04:15

## 2018-12-17 RX ADMIN — Medication 4: at 17:19

## 2018-12-17 RX ADMIN — Medication 50 MILLILITER(S): at 05:51

## 2018-12-17 RX ADMIN — Medication 50 MILLILITER(S): at 12:59

## 2018-12-17 RX ADMIN — Medication 25 MILLIGRAM(S): at 05:51

## 2018-12-17 RX ADMIN — KETOCONAZOLE 1 APPLICATION(S): 20 AEROSOL, FOAM TOPICAL at 21:33

## 2018-12-17 RX ADMIN — MIDODRINE HYDROCHLORIDE 5 MILLIGRAM(S): 2.5 TABLET ORAL at 05:52

## 2018-12-17 RX ADMIN — ALBUTEROL 2.5 MILLIGRAM(S): 90 AEROSOL, METERED ORAL at 07:48

## 2018-12-17 RX ADMIN — Medication 50 MILLILITER(S): at 18:39

## 2018-12-17 RX ADMIN — MIDODRINE HYDROCHLORIDE 5 MILLIGRAM(S): 2.5 TABLET ORAL at 21:32

## 2018-12-17 RX ADMIN — TAMSULOSIN HYDROCHLORIDE 0.4 MILLIGRAM(S): 0.4 CAPSULE ORAL at 21:32

## 2018-12-17 RX ADMIN — Medication 3: at 08:35

## 2018-12-17 RX ADMIN — MIDODRINE HYDROCHLORIDE 5 MILLIGRAM(S): 2.5 TABLET ORAL at 13:03

## 2018-12-17 RX ADMIN — ALBUTEROL 2.5 MILLIGRAM(S): 90 AEROSOL, METERED ORAL at 14:12

## 2018-12-17 NOTE — PROGRESS NOTE ADULT - SUBJECTIVE AND OBJECTIVE BOX
Patient is a 83y old  Male who presents with a chief complaint of fever (17 Dec 2018 13:19)        HPI:  Patient is 82 yo male with hx of CAD S/P stent, S/P PPM, HTN, CKD, and Hepatitis presenting with fever that has been going on for the past several days, and progressively worsen.  + generalized weakness, cough, and decrease po intake X 2 weeks.  Daughter noticed abdominal distention with increase abdominal pain.  Denies any headache, dizziness, blurry vision, chest pain, SOB, N/V/D, numbness, or weakness.      + Back pain (12 Dec 2018 18:15)      SUBJECTIVE & OBJECTIVE: Pt seen and examined at bedside,      PHYSICAL EXAM:  T(C): 36.9 (12-17-18 @ 09:50), Max: 37.1 (12-17-18 @ 00:06)  HR: 94 (12-17-18 @ 09:50) (70 - 94)  BP: 132/68 (12-17-18 @ 09:50) (95/58 - 134/75)  RR: 18 (12-17-18 @ 09:50) (17 - 18)  SpO2: 100% (12-17-18 @ 09:50) (99% - 100%)  Wt(kg): --   GENERAL: NAD, well-groomed, well-developed  HEAD:  Atraumatic, Normocephalic  EYES: EOMI, PERRLA, conjunctiva and sclera clear  ENMT: Moist mucous membranes  NECK: Supple, No JVD  NERVOUS SYSTEM:  Alert & Oriented X3, Motor Strength 5/5 B/L upper and lower extremities; DTRs 2+ intact and symmetric  CHEST/LUNG: Clear to auscultation bilaterally; No rales, rhonchi, wheezing, or rubs  HEART: Regular rate and rhythm; No murmurs, rubs, or gallops  ABDOMEN: Soft, Nontender, Nondistended; Bowel sounds present  EXTREMITIES:  2+ Peripheral Pulses, No clubbing, cyanosis, or edema        MEDICATIONS  (STANDING):  albumin human 25% IVPB 50 milliLiter(s) IV Intermittent every 6 hours  ALBUTerol    0.083% 2.5 milliGRAM(s) Nebulizer every 6 hours  dextrose 5%. 1000 milliLiter(s) (50 mL/Hr) IV Continuous <Continuous>  dextrose 50% Injectable 12.5 Gram(s) IV Push once  insulin glargine Injectable (LANTUS) 10 Unit(s) SubCutaneous at bedtime  insulin lispro (HumaLOG) corrective regimen sliding scale   SubCutaneous three times a day before meals  ketoconazole 2% Cream 1 Application(s) Topical at bedtime  levothyroxine 88 MICROGram(s) Oral daily  metoprolol succinate ER 25 milliGRAM(s) Oral daily  midodrine 5 milliGRAM(s) Oral every 8 hours  spironolactone 25 milliGRAM(s) Oral daily  tamsulosin 0.4 milliGRAM(s) Oral at bedtime    MEDICATIONS  (PRN):  dextrose 40% Gel 15 Gram(s) Oral once PRN Blood Glucose LESS THAN 70 milliGRAM(s)/deciliter  glucagon  Injectable 1 milliGRAM(s) IntraMuscular once PRN Glucose LESS THAN 70 milligrams/deciliter      LABS:                        9.1    10.74 )-----------( 131      ( 17 Dec 2018 07:22 )             25.9     12-17    136  |  106  |  63<H>  ----------------------------<  271<H>  5.4<H>   |  20<L>  |  1.90<H>    Ca    8.9      17 Dec 2018 07:22  Phos  3.4     12-17  Mg     2.3     12-17    TPro  5.9<L>  /  Alb  2.5<L>  /  TBili  2.4<H>  /  DBili  x   /  AST  38  /  ALT  39  /  AlkPhos  58  12-17        Magnesium, Serum: 2.3 mg/dL (12-17 @ 07:22)    CAPILLARY BLOOD GLUCOSE      POCT Blood Glucose.: 244 mg/dL (17 Dec 2018 12:25)  POCT Blood Glucose.: 267 mg/dL (17 Dec 2018 08:26)  POCT Blood Glucose.: 303 mg/dL (16 Dec 2018 21:37)  POCT Blood Glucose.: 303 mg/dL (16 Dec 2018 16:56)      CAPILLARY BLOOD GLUCOSE      POCT Blood Glucose.: 244 mg/dL (17 Dec 2018 12:25)  POCT Blood Glucose.: 267 mg/dL (17 Dec 2018 08:26)  POCT Blood Glucose.: 303 mg/dL (16 Dec 2018 21:37)  POCT Blood Glucose.: 303 mg/dL (16 Dec 2018 16:56)    CAPILLARY BLOOD GLUCOSE      POCT Blood Glucose.: 244 mg/dL (17 Dec 2018 12:25)            RECENT CULTURES:      RADIOLOGY & ADDITIONAL TESTS:                        DVT/GI ppx  Discussed with pt @ bedside Patient is a 83y old  Male who presents with a chief complaint of fever (17 Dec 2018 13:19)        HPI:  Patient is 84 yo male with hx of CAD S/P stent, S/P PPM, HTN, CKD, and Hepatitis presenting with fever that has been going on for the past several days, and progressively worsen.  + generalized weakness, cough, and decrease po intake X 2 weeks.  Daughter noticed abdominal distention with increase abdominal pain.  Denies any headache, dizziness, blurry vision, chest pain, SOB, N/V/D, numbness, or weakness.      + Back pain (12 Dec 2018 18:15)      SUBJECTIVE & OBJECTIVE: Pt seen and examined at bedside, no acute complaints      PHYSICAL EXAM:  T(C): 36.9 (12-17-18 @ 09:50), Max: 37.1 (12-17-18 @ 00:06)  HR: 94 (12-17-18 @ 09:50) (70 - 94)  BP: 132/68 (12-17-18 @ 09:50) (95/58 - 134/75)  RR: 18 (12-17-18 @ 09:50) (17 - 18)  SpO2: 100% (12-17-18 @ 09:50) (99% - 100%)  Wt(kg): --   GENERAL: NAD, well-groomed, well-developed  HEAD:  Atraumatic, Normocephalic  EYES: EOMI, PERRLA, conjunctiva and sclera clear  ENMT: Moist mucous membranes  NECK: Supple, No JVD  NERVOUS SYSTEM:  Alert & Oriented X3, Motor Strength 5/5 B/L upper and lower extremities; DTRs 2+ intact and symmetric  CHEST/LUNG: Clear to auscultation bilaterally; No rales, rhonchi, wheezing, or rubs  HEART: Regular rate and rhythm; No murmurs, rubs, or gallops  ABDOMEN: distneded, non-tender   EXTREMITIES:  2+ Peripheral Pulses, No clubbing, cyanosis, or edema        MEDICATIONS  (STANDING):  albumin human 25% IVPB 50 milliLiter(s) IV Intermittent every 6 hours  ALBUTerol    0.083% 2.5 milliGRAM(s) Nebulizer every 6 hours  dextrose 5%. 1000 milliLiter(s) (50 mL/Hr) IV Continuous <Continuous>  dextrose 50% Injectable 12.5 Gram(s) IV Push once  insulin glargine Injectable (LANTUS) 10 Unit(s) SubCutaneous at bedtime  insulin lispro (HumaLOG) corrective regimen sliding scale   SubCutaneous three times a day before meals  ketoconazole 2% Cream 1 Application(s) Topical at bedtime  levothyroxine 88 MICROGram(s) Oral daily  metoprolol succinate ER 25 milliGRAM(s) Oral daily  midodrine 5 milliGRAM(s) Oral every 8 hours  spironolactone 25 milliGRAM(s) Oral daily  tamsulosin 0.4 milliGRAM(s) Oral at bedtime    MEDICATIONS  (PRN):  dextrose 40% Gel 15 Gram(s) Oral once PRN Blood Glucose LESS THAN 70 milliGRAM(s)/deciliter  glucagon  Injectable 1 milliGRAM(s) IntraMuscular once PRN Glucose LESS THAN 70 milligrams/deciliter      LABS:                        9.1    10.74 )-----------( 131      ( 17 Dec 2018 07:22 )             25.9     12-17    136  |  106  |  63<H>  ----------------------------<  271<H>  5.4<H>   |  20<L>  |  1.90<H>    Ca    8.9      17 Dec 2018 07:22  Phos  3.4     12-17  Mg     2.3     12-17    TPro  5.9<L>  /  Alb  2.5<L>  /  TBili  2.4<H>  /  DBili  x   /  AST  38  /  ALT  39  /  AlkPhos  58  12-17        Magnesium, Serum: 2.3 mg/dL (12-17 @ 07:22)    CAPILLARY BLOOD GLUCOSE      POCT Blood Glucose.: 244 mg/dL (17 Dec 2018 12:25)  POCT Blood Glucose.: 267 mg/dL (17 Dec 2018 08:26)  POCT Blood Glucose.: 303 mg/dL (16 Dec 2018 21:37)  POCT Blood Glucose.: 303 mg/dL (16 Dec 2018 16:56)      CAPILLARY BLOOD GLUCOSE      POCT Blood Glucose.: 244 mg/dL (17 Dec 2018 12:25)  POCT Blood Glucose.: 267 mg/dL (17 Dec 2018 08:26)  POCT Blood Glucose.: 303 mg/dL (16 Dec 2018 21:37)  POCT Blood Glucose.: 303 mg/dL (16 Dec 2018 16:56)    CAPILLARY BLOOD GLUCOSE      POCT Blood Glucose.: 244 mg/dL (17 Dec 2018 12:25)            RECENT CULTURES:      RADIOLOGY & ADDITIONAL TESTS:                        DVT/GI ppx  Discussed with pt @ bedside

## 2018-12-17 NOTE — ADVANCED PRACTICE NURSE CONSULT - REASON FOR CONSULT
HB A1c8.4%,  history of hypo /hyperglycemia self administer insulin and  self monitor  blood glucose at home

## 2018-12-17 NOTE — PROGRESS NOTE ADULT - SUBJECTIVE AND OBJECTIVE BOX
Date/Time Patient Seen:  		  Referring MD:   Data Reviewed	       Patient is a 83y old  Male who presents with a chief complaint of fever (17 Dec 2018 08:51)  vs and meds reviewed  on ABX  renal follow up noted        Subjective/HPI     PAST MEDICAL & SURGICAL HISTORY:  CKD (chronic kidney disease)  Hepatitis  CAD (coronary artery disease)  HTN (hypertension)  DM (diabetes mellitus)  Artificial cardiac pacemaker  AICD (automatic cardioverter/defibrillator) present  No significant past surgical history        Medication list         MEDICATIONS  (STANDING):  albumin human 25% IVPB 50 milliLiter(s) IV Intermittent every 6 hours  ALBUTerol    0.083% 2.5 milliGRAM(s) Nebulizer every 6 hours  dextrose 5%. 1000 milliLiter(s) (50 mL/Hr) IV Continuous <Continuous>  dextrose 50% Injectable 12.5 Gram(s) IV Push once  insulin lispro (HumaLOG) corrective regimen sliding scale   SubCutaneous three times a day before meals  ketoconazole 2% Cream 1 Application(s) Topical at bedtime  levothyroxine 88 MICROGram(s) Oral daily  metoprolol succinate ER 25 milliGRAM(s) Oral daily  midodrine 5 milliGRAM(s) Oral every 8 hours  piperacillin/tazobactam IVPB. 3.375 Gram(s) IV Intermittent every 12 hours  spironolactone 25 milliGRAM(s) Oral daily  tamsulosin 0.4 milliGRAM(s) Oral at bedtime    MEDICATIONS  (PRN):  dextrose 40% Gel 15 Gram(s) Oral once PRN Blood Glucose LESS THAN 70 milliGRAM(s)/deciliter  glucagon  Injectable 1 milliGRAM(s) IntraMuscular once PRN Glucose LESS THAN 70 milligrams/deciliter         Vitals log        ICU Vital Signs Last 24 Hrs  T(C): 36.9 (17 Dec 2018 04:53), Max: 37.1 (17 Dec 2018 00:06)  T(F): 98.5 (17 Dec 2018 04:53), Max: 98.8 (17 Dec 2018 00:06)  HR: 74 (17 Dec 2018 07:48) (65 - 91)  BP: 134/75 (17 Dec 2018 04:53) (95/58 - 134/75)  BP(mean): --  ABP: --  ABP(mean): --  RR: 17 (17 Dec 2018 04:53) (17 - 18)  SpO2: 100% (17 Dec 2018 07:48) (98% - 100%)           Input and Output:  I&O's Detail    16 Dec 2018 07:01  -  17 Dec 2018 07:00  --------------------------------------------------------  IN:    IV PiggyBack: 125 mL    Oral Fluid: 660 mL    Solution: 50 mL  Total IN: 835 mL    OUT:    Indwelling Catheter - Urethral: 1780 mL  Total OUT: 1780 mL    Total NET: -945 mL          Lab Data                        9.1    10.74 )-----------( 131      ( 17 Dec 2018 07:22 )             25.9     12-17    136  |  106  |  63<H>  ----------------------------<  271<H>  5.4<H>   |  20<L>  |  1.90<H>    Ca    8.9      17 Dec 2018 07:22  Phos  3.4     12-17  Mg     2.3     12-17    TPro  5.9<L>  /  Alb  2.5<L>  /  TBili  2.4<H>  /  DBili  x   /  AST  38  /  ALT  39  /  AlkPhos  58  12-17            Review of Systems	      Objective     Physical Examination    heart s1s2  lung dec BS  abd soft  cn grossly int      Pertinent Lab findings & Imaging      Aly:  NO   Adequate UO     I&O's Detail    16 Dec 2018 07:01  -  17 Dec 2018 07:00  --------------------------------------------------------  IN:    IV PiggyBack: 125 mL    Oral Fluid: 660 mL    Solution: 50 mL  Total IN: 835 mL    OUT:    Indwelling Catheter - Urethral: 1780 mL  Total OUT: 1780 mL    Total NET: -945 mL               Discussed with:     Cultures:	        Radiology

## 2018-12-17 NOTE — PROGRESS NOTE ADULT - ASSESSMENT
Impressions:  Cirrhosis with ascites  s/p paracentesis  HIDA negative  abdominal discomfort    add aldactone 50mg po daily   low sodium diet  outpt EGD to be arranged   acid suppression

## 2018-12-17 NOTE — PROGRESS NOTE ADULT - SUBJECTIVE AND OBJECTIVE BOX
Chief Complaint:        INTERVAL HPI/OVERNIGHT EVENTS:    no significant events overnight reported      MEDICATIONS  (STANDING):  albumin human 25% IVPB 50 milliLiter(s) IV Intermittent every 6 hours  ALBUTerol    0.083% 2.5 milliGRAM(s) Nebulizer every 6 hours  dextrose 5%. 1000 milliLiter(s) (50 mL/Hr) IV Continuous <Continuous>  dextrose 50% Injectable 12.5 Gram(s) IV Push once  insulin lispro (HumaLOG) corrective regimen sliding scale   SubCutaneous three times a day before meals  ketoconazole 2% Cream 1 Application(s) Topical at bedtime  levothyroxine 88 MICROGram(s) Oral daily  metoprolol succinate ER 25 milliGRAM(s) Oral daily  midodrine 5 milliGRAM(s) Oral every 8 hours  piperacillin/tazobactam IVPB. 3.375 Gram(s) IV Intermittent every 12 hours  tamsulosin 0.4 milliGRAM(s) Oral at bedtime    MEDICATIONS  (PRN):  dextrose 40% Gel 15 Gram(s) Oral once PRN Blood Glucose LESS THAN 70 milliGRAM(s)/deciliter  glucagon  Injectable 1 milliGRAM(s) IntraMuscular once PRN Glucose LESS THAN 70 milligrams/deciliter            Vital Signs Last 24 Hrs  T(C): 36.9 (17 Dec 2018 04:53), Max: 37.1 (17 Dec 2018 00:06)  T(F): 98.5 (17 Dec 2018 04:53), Max: 98.8 (17 Dec 2018 00:06)  HR: 74 (17 Dec 2018 07:48) (65 - 91)  BP: 134/75 (17 Dec 2018 04:53) (95/58 - 134/75)  BP(mean): --  RR: 17 (17 Dec 2018 04:53) (17 - 18)  SpO2: 100% (17 Dec 2018 07:48) (98% - 100%)    Physical exam:    abd soft, non tender.   cv s1s2  chest air entry        LABS:                        9.1    10.74 )-----------( 131      ( 17 Dec 2018 07:22 )             25.9     12-17    136  |  106  |  63<H>  ----------------------------<  271<H>  5.4<H>   |  20<L>  |  1.90<H>    Ca    8.9      17 Dec 2018 07:22  Phos  3.4     12-17  Mg     2.3     12-17    TPro  5.9<L>  /  Alb  2.5<L>  /  TBili  2.4<H>  /  DBili  x   /  AST  38  /  ALT  39  /  AlkPhos  58  12-17          RADIOLOGY & ADDITIONAL TESTS:

## 2018-12-17 NOTE — PROGRESS NOTE ADULT - SUBJECTIVE AND OBJECTIVE BOX
SENAIT JHAVERI is a yMale , patient examined and chart reviewed.     INTERVAL HPI/ OVERNIGHT EVENTS:   Afebrile. No events.    PAST MEDICAL & SURGICAL HISTORY:  CKD (chronic kidney disease)  Hepatitis  CAD (coronary artery disease)  HTN (hypertension)  DM (diabetes mellitus)  Artificial cardiac pacemaker  AICD (automatic cardioverter/defibrillator) present      For details regarding the patient's social history, family history, and other miscellaneous elements, please refer the initial infectious diseases consultation and/or the admitting history and physical examination for this admission.    ROS:  CONSTITUTIONAL:  Negative fever or chills  EYES:  Negative  blurry vision or double vision  CARDIOVASCULAR:  Negative for chest pain or palpitations  RESPIRATORY:  Negative for cough, wheezing, or SOB   GASTROINTESTINAL:  Negative for nausea, vomiting, diarrhea, constipation, or abdominal pain  GENITOURINARY:  Negative frequency, urgency or dysuria  NEUROLOGIC:  No headache, confusion, dizziness, lightheadedness  All other systems were reviewed and are negative       Current inpatient medications :    ANTIBIOTICS/RELEVANT:  piperacillin/tazobactam IVPB. 3.375 Gram(s) IV Intermittent every 12 hours    MEDICATIONS  (STANDING):  albumin human 25% IVPB 50 milliLiter(s) IV Intermittent every 6 hours  ALBUTerol    0.083% 2.5 milliGRAM(s) Nebulizer every 6 hours  dextrose 5%. 1000 milliLiter(s) (50 mL/Hr) IV Continuous <Continuous>  dextrose 50% Injectable 12.5 Gram(s) IV Push once  insulin lispro (HumaLOG) corrective regimen sliding scale   SubCutaneous three times a day before meals  ketoconazole 2% Cream 1 Application(s) Topical at bedtime  levothyroxine 88 MICROGram(s) Oral daily  metoprolol succinate ER 25 milliGRAM(s) Oral daily  midodrine 5 milliGRAM(s) Oral every 8 hours    spironolactone 25 milliGRAM(s) Oral daily  tamsulosin 0.4 milliGRAM(s) Oral at bedtime    MEDICATIONS  (PRN):  dextrose 40% Gel 15 Gram(s) Oral once PRN Blood Glucose LESS THAN 70 milliGRAM(s)/deciliter  glucagon  Injectable 1 milliGRAM(s) IntraMuscular once PRN Glucose LESS THAN 70 milligrams/deciliter      Objective:  Vital Signs Last 24 Hrs  T(C): 36.9 (17 Dec 2018 09:50), Max: 37.1 (17 Dec 2018 00:06)  T(F): 98.4 (17 Dec 2018 09:50), Max: 98.8 (17 Dec 2018 00:06)  HR: 94 (17 Dec 2018 09:50) (70 - 94)  BP: 132/68 (17 Dec 2018 09:50) (95/58 - 134/75)  RR: 18 (17 Dec 2018 09:50) (17 - 18)  SpO2: 100% (17 Dec 2018 09:50) (99% - 100%)    Physical Exam:  General:  no acute distress  Eyes: sclera anicteric, pupils equal and reactive to light  ENMT: buccal mucosa moist, pharynx not injected  Neck: supple, trachea midline  Lungs: clear, no wheeze/rhonchi  Cardiovascular: regular rate and rhythm, S1 S2  Abdomen: soft, nontender, distended, bowel sounds normal  Neurological: alert and oriented x3, Cranial Nerves II-XII grossly intact  Skin: no increased ecchymosis/petechiae/purpura  Lymph Nodes: no palpable cervical/supraclavicular lymph nodes enlargements  Extremities: no cyanosis/clubbing/edema      LABS:                        9.1    10.74 )-----------( 131      ( 17 Dec 2018 07:22 )             25.9   12-17    136  |  106  |  63<H>  ----------------------------<  271<H>  5.4<H>   |  20<L>  |  1.90<H>    Ca    8.9      17 Dec 2018 07:22  Phos  3.4     12-17  Mg     2.3     12-17    TPro  5.9<L>  /  Alb  2.5<L>  /  TBili  2.4<H>  /  DBili  x   /  AST  38  /  ALT  39  /  AlkPhos  58  12-17      MICROBIOLOGY:    Culture - Acid Fast - Body Fluid w/Smear (collected 13 Dec 2018 21:14)  Source: Ascites Fl Ascites Fluid    Culture - Urine (collected 12 Dec 2018 21:41)  Source: .Urine Clean Catch (Midstream)  Final Report (13 Dec 2018 22:16):    No growth    Culture - Blood (collected 12 Dec 2018 15:32)  Source: .Blood Blood-Peripheral  Preliminary Report (13 Dec 2018 16:01):    No growth to date.    Culture - Blood (collected 12 Dec 2018 15:32)  Source: .Blood Blood-Peripheral  Preliminary Report (13 Dec 2018 16:01):    No growth to date.    RADIOLOGY & ADDITIONAL STUDIES:  EXAM:  US ABDOMEN COMPLETE                          PROCEDURE DATE:  12/12/2018      INTERPRETATION:  History: Abdominal pain acute renal failure.    Abdominal ultrasound.    Gallstones and gallbladder sludge noted. There is diffusely thickened   gallbladder wall. Wall thickening is nonspecific finding in and of itself   may be related to ascites/hypoalbuminemia or adjacent liver disease.   Cholecystitis not excluded in the appropriate clinical setting. If this   is of clinical concern then a HIDA scan can be obtained. No biliary   dilatation. Common duct 0.6 cm normal for age. Echodense liver consistent   with steatosis/hepatocellular disease. Spleen not enlarged. Pancreas   unremarkable.  Right kidney 10.2, the left 10.7 cm. Increased bilateral renal cortical   echogenicity consistent with chronic medical renal disease. No   hydronephrosis.  Moderate abdominal ascites.    Impression:  Cholelithiasis with thickened gallbladder wall. See discussion above. No   biliary dilatation.  Echodense liver consistent with steatosis/hepatocellular disease.  Medical renal disease.  Moderate ascites      EXAM:  CT CHEST                        PROCEDURE DATE:  12/12/2018          INTERPRETATION:  Clinical information: Cough, fever    No prior studies present for comparison.    Noncontrast exam, neither intravenous or oral contrast materialwas   administered limiting evaluation. Additionally the patient was unable to   cooperate, motion artifact present on multiple images.    Axial images obtained, coronal and sagittal images computer reformatted.        Chest: Cardiac device present subcutaneous soft tissues anterior left   upper thorax. No evidence of pericardial effusion or thoracic aortic   aneurysm. Coronary artery calcifications present.  A small left pleural effusion is identified with associated compressive   atelectasis. No mediastinal or hilar lesions identified, evaluation   limited due to lack of IV contrast. No pneumothorax or vascular   congestion.  No acute appearing osseous abnormalities. Central airway intact. Thyroid   gland not enlarged.      Abdomen-pelvis: Ascites present in the abdomen, adjacent to the liver,   and in the pelvis. Unenhanced hepatic parenchyma and splenic parenchyma   homogeneous. Unenhanced pancreatic parenchyma unremarkable. No adrenal   lesions evident. The gallbladder contains stones. No hydronephrotic   changes or renal masses identified. Hypodensity posterior mid polar   region right kidney likely a cyst. No aortic aneurysm or retroperitoneal   lymphadenopathy. No biliary ductal dilatation. No bowel obstruction.   Urinary bladder contains small quantity of urine, no stones evident.   Prostate is mildly enlarged. Inguinal regions intact. No acute appearing   osseous abnormalities in the abdomen or pelvis. Disc degenerative disease   and posterior spurring L5-S1.      IMPRESSION: See above report.    EXAM:  NM HEPATOBILIARY IMG                              PROCEDURE DATE:  12/14/2018          INTERPRETATION:  RADIOPHARMACEUTICAL: 3.0 mCi Tc-99m-Mebrofenin, I.V.    CLINICAL INFORMATION: 83 year old male with cholelithiasis, abdominal   pain,fever, elevated LFTs, and diffusely thickened gallbladder wall on   ultrasound; referred to evaluate for acute cholecystitis.     TECHNIQUE:  Static images of the abdomen were obtained at approximately 5   minutes and 1 hour after radiopharmaceuticaladministration.     COMPARISON: No previous hepatobiliary scans were available for comparison.    FINDINGS: There is homogeneous uptake of radiopharmaceutical by the   hepatocytes. Gallbladder and bowel are visualized on the 1 hour images.     IMPRESSION: Normal hepatobiliary scan. No radionuclide evidence of acute   cholecystitis.      Assessment :   84 yo Chinese male with hx of CAD S/P stent, S/P PPM, HTN, CKD, and Hepatitis A presented to   the hospital with CC of fever at home and abdominal distention found to have abn LFTs  No documented fever in the hospital  ANDREEA vs CKD  Liver cirrhosis  sp paracentesis unfortunately no cultures sent  However doubt SBP  Thrombocytopenia sec liver cirrhosis  Of note Lymes titers positive likely false positive - symptoms and presentation not c/w lymes- unclear   why it was ordered in the first place  All cultures neg  Overall stable    Plan :   DC Zosyn  Trend wbc and lfts  GI following  Stable from ID standpoint    D/w DR Mason    Continue with present regiment.  Appropriate use of antibiotics and adverse effects reviewed.      I have discussed the above plan of care with patient/ family in detail. They expressed understanding of the  treatment plan . Risks, benefits and alternatives discussed in detail. I have asked if they have any questions or concerns and appropriately addressed them to the best of my ability .    > 35 minutes were spent in direct patient care reviewing notes, medications ,labs data/ imaging , discussion with multidisciplinary team.    Thank you for allowing me to participate in care of your patient .    Swetha Lagos MD  Infectious Disease  [phone]

## 2018-12-17 NOTE — PROGRESS NOTE ADULT - SUBJECTIVE AND OBJECTIVE BOX
Subjective: persistent abd pain.       MEDICATIONS  (STANDING):  albumin human 25% IVPB 50 milliLiter(s) IV Intermittent every 6 hours  ALBUTerol    0.083% 2.5 milliGRAM(s) Nebulizer every 6 hours  dextrose 5%. 1000 milliLiter(s) (50 mL/Hr) IV Continuous <Continuous>  dextrose 50% Injectable 12.5 Gram(s) IV Push once  insulin lispro (HumaLOG) corrective regimen sliding scale   SubCutaneous three times a day before meals  ketoconazole 2% Cream 1 Application(s) Topical at bedtime  levothyroxine 88 MICROGram(s) Oral daily  metoprolol succinate ER 25 milliGRAM(s) Oral daily  midodrine 5 milliGRAM(s) Oral every 8 hours  piperacillin/tazobactam IVPB. 3.375 Gram(s) IV Intermittent every 12 hours  tamsulosin 0.4 milliGRAM(s) Oral at bedtime    MEDICATIONS  (PRN):  dextrose 40% Gel 15 Gram(s) Oral once PRN Blood Glucose LESS THAN 70 milliGRAM(s)/deciliter  glucagon  Injectable 1 milliGRAM(s) IntraMuscular once PRN Glucose LESS THAN 70 milligrams/deciliter          T(C): 36.9 (12-17-18 @ 04:53), Max: 37.1 (12-17-18 @ 00:06)  HR: 74 (12-17-18 @ 07:48) (65 - 91)  BP: 134/75 (12-17-18 @ 04:53) (95/58 - 134/75)  RR: 17 (12-17-18 @ 04:53) (17 - 18)  SpO2: 100% (12-17-18 @ 07:48) (98% - 100%)  Wt(kg): --        I&O's Detail    16 Dec 2018 07:01  -  17 Dec 2018 07:00  --------------------------------------------------------  IN:    IV PiggyBack: 125 mL    Oral Fluid: 660 mL    Solution: 50 mL  Total IN: 835 mL    OUT:    Indwelling Catheter - Urethral: 1780 mL  Total OUT: 1780 mL    Total NET: -945 mL               PHYSICAL EXAM:    GENERAL: no distress.   EYES: EOMI, PERRLA, conjunctiva and sclera clear  NECK: Supple, no inc in JVP  CHEST/LUNG: Clear  HEART: S1S2  ABDOMEN: distended, mildly tender. No r/g  EXTREMITIES:  no ankle edema  NEURO: no asterixis        LABS:  CBC Full  -  ( 17 Dec 2018 07:22 )  WBC Count : 10.74 K/uL  Hemoglobin : 9.1 g/dL  Hematocrit : 25.9 %  Platelet Count - Automated : 131 K/uL  Mean Cell Volume : 94.9 fl  Mean Cell Hemoglobin : 33.3 pg  Mean Cell Hemoglobin Concentration : 35.1 gm/dL  Auto Neutrophil # : x  Auto Lymphocyte # : x  Auto Monocyte # : x  Auto Eosinophil # : x  Auto Basophil # : x  Auto Neutrophil % : x  Auto Lymphocyte % : x  Auto Monocyte % : x  Auto Eosinophil % : x  Auto Basophil % : x    12-17    136  |  106  |  63<H>  ----------------------------<  271<H>  5.4<H>   |  20<L>  |  1.90<H>    Ca    8.9      17 Dec 2018 07:22  Phos  3.4     12-17  Mg     2.3     12-17    TPro  5.9<L>  /  Alb  2.5<L>  /  TBili  2.4<H>  /  DBili  x   /  AST  38  /  ALT  39  /  AlkPhos  58  12-17          Impression:  * ANDREEA -- pre-renal vs HRS. Improved on IVFs  * CKD -- Cr 1.9 at baseline  * HypoNa -- hypovolemic. Resolved  * Cirrhosis  * Ascites  * Fever  * HyperK -- better    Recommendations:   * Trend Bun/Cr, serum Na on NS  * Low K diet if not NPO  * BMP daily

## 2018-12-17 NOTE — PROGRESS NOTE ADULT - ASSESSMENT
Patient is 82 yo male with hx of CAD S/P stent, S/P PPM, HTN, BPH, CKD, Hepatitis, and Dm2 presenting with     1. Fever.  Unclear Etiology.  ??Abdm source.  abdm/Chest/pelvic CT scan noticed.   -UC and Blood culture are negative  -Ascite fluid culture negative    will d/c zosyn  -Abdm US shows Cholelithiasis with thickened gallbladder wall. See discussion above. No biliary dilatation. Echodense liver consistent with steatosis/hepatocellular disease.  Medical renal disease. Moderate ascites  -HIDA scan shows no acute process.   diagnostic paracenteses ruled out SPB  etiology of liver cirrhosis not clear  hepatitis panel negative  would need liver biospy as outpt  repeat ct abdominal, moderate ascites+, discussed with Dr.Forman [id number] , the asciting fluid is mild and cant be drained  will continue o nmedical managment   d/c planning     2.  Abnormal LFT.  Hx of Hepatitis.  Hepatitis panel negative.  Avoid nephrotoxin  -Monitor LFT  -GI to follow up     3. ANDREEA on CKD with hyponatremia.  Seems to be secondary dehydration.  Lu cath.  Continue with IVF, and Monitor Renal function  -Improving  -Nephrology to follow up     4. HTN/CAD.  Continue with metoprolol.  Hold plavix in anticipation for any procedure.       5.  AMS.  Seems to be secondary to metabolic encephalopathy due to infection, hepatic encephalopathy and Renal failure.  Head CT scan shows no acute process.   continue with neuro check.    -Improving.  -Continue with lactulose, and Monitor ammonia level       6.  HTN.  Continue with metoprolol with holding parameter.  Monitor BP      7.  DM2.  Hold Lantus ISS, and Monitor POC    8.  Diet.  Continue with current diet as per speech therapy    9.  Back pain.  X-ray of the lumbar spine shows degenerative disease.  continue with pain management.  PT consult      Plan of care was discussed with patient, and family in great details, All questions were answered to their satisfaction  Seems to understand, and in agreement  Prognosis guarded  DVT proph compression stocking in the setting of thrombocytopenia Patient is 82 yo male with hx of CAD S/P stent, S/P PPM, HTN, BPH, CKD, Hepatitis, and Dm2 presenting with     1. Fever.  Unclear Etiology.  ??Abdm source.  abdm/Chest/pelvic CT scan noticed.   -UC and Blood culture are negative  -Ascite fluid culture negative    will d/c zosyn  -Abdm US shows Cholelithiasis with thickened gallbladder wall. See discussion above. No biliary dilatation. Echodense liver consistent with steatosis/hepatocellular disease.  Medical renal disease. Moderate ascites  -HIDA scan shows no acute process.   diagnostic paracenteses ruled out SPB  etiology of liver cirrhosis not clear  hepatitis panel negative  would need liver biospy as outpt  repeat ct abdominal, moderate ascites+, discussed with  IR , the asciting fluid is mild and cant be drained  will continue o nmedical managment   d/c planning     2.  Abnormal LFT.  Hx of Hepatitis.  Hepatitis panel negative.  Avoid nephrotoxin  -Monitor LFT  -GI to follow up     3. ANDREEA on CKD with hyponatremia.  Seems to be secondary dehydration.  Lu cath.  Continue with IVF, and Monitor Renal function  -Improving  -Nephrology to follow up     4. Hyperkalemia  likely secondary to medication induced  has resolved  K 5.4    5. HTN/CAD.  Continue with metoprolol.  Hold plavix in anticipation for any procedure.       6.  AMS.  Seems to be secondary to metabolic encephalopathy due to infection, hepatic encephalopathy and Renal failure.  Head CT scan shows no acute process.   continue with neuro check.    -Improving.  -Continue with lactulose, and Monitor ammonia level       7.  HTN.  Continue with metoprolol with holding parameter.  Monitor BP      8.  DM2.  Hold Lantus ISS, and Monitor POC    8.  Diet.  Continue with current diet as per speech therapy    9.  Back pain.  X-ray of the lumbar spine shows degenerative disease.  continue with pain management.  PT consult      Plan of care was discussed with patient, and family in great details, All questions were answered to their satisfaction  Seems to understand, and in agreement  Prognosis guarded  DVT proph compression stocking in the setting of thrombocytopenia

## 2018-12-17 NOTE — ADVANCED PRACTICE NURSE CONSULT - ASSESSMENT
Patient is a 83y old  Male who presents with a chief complaint of fever (17 Dec 2018 09:01)      ROS:  Cardiovascular: No chest Pain, palpitations  Respiratory No SOB, No cough  GI: No nausea,Vomiting,abdominal pain  Endocrine: no polyuria,polydipsia    Daily     Daily Weight in k.1 (16 Dec 2018 12:20)    Vital Signs Last 24 Hrs  T(C): 36.9 (17 Dec 2018 09:50), Max: 37.1 (17 Dec 2018 00:06)  T(F): 98.4 (17 Dec 2018 09:50), Max: 98.8 (17 Dec 2018 00:06)  HR: 94 (17 Dec 2018 09:50) (68 - 94)  BP: 132/68 (17 Dec 2018 09:50) (95/58 - 134/75)  BP(mean): --  RR: 18 (17 Dec 2018 09:50) (17 - 18)  SpO2: 100% (17 Dec 2018 09:50) (98% - 100%)      Physical Exam:  General: NAD, weak frail  HEENT: normocephalic,   Cardiovascular Regular rate and rhythm  Psych: Alert and oriented x3 normal affect normal mood    PAST MEDICAL & SURGICAL HISTORY:  CKD (chronic kidney disease)  Hepatitis  CAD (coronary artery disease)  HTN (hypertension)  DM Type 2 (diabetes mellitus)  Artificial cardiac pacemaker  AICD (automatic cardioverter/defibrillator) present    No Known Allergies      DM MEDICATIONS  (STANDING):  dextrose 5%. 1000 milliLiter(s) (50 mL/Hr) IV Continuous <Continuous>  dextrose 50% Injectable 12.5 Gram(s) IV Push once  insulin lispro (HumaLOG) corrective regimen sliding scale   SubCutaneous three times a day before meals      DM Home Medications:  Toujeo SoloStar 300 units/mL subcutaneous solution: 100 -120 unit(s) subcutaneous daily  as per Dr Lui office  Humalog 8 units sq ac breakfast  4 units ac lunch    Diet: Dysphagia                                        9.1    10.74 )-----------( 131      ( 17 Dec 2018 07:22 )             25.9       12-17    136  |  106  |  63<H>  ----------------------------<  271<H>  5.4<H>   |  20<L>  |  1.90<H>    POCT Blood Glucose.: 267 mg/dL (17 Dec 2018 08:26)  POCT Blood Glucose.: 303 mg/dL (16 Dec 2018 21:37)  POCT Blood Glucose.: 303 mg/dL (16 Dec 2018 16:56)  POCT Blood Glucose.: 304 mg/dL (16 Dec 2018 12:10)  POCT Blood Glucose.: 186 mg/dL (16 Dec 2018 07:51)  POCT Blood Glucose.: 209 mg/dL (15 Dec 2018 21:22)  POCT Blood Glucose.: 205 mg/dL (15 Dec 2018 16:36)  POCT Blood Glucose.: 184 mg/dL (15 Dec 2018 11:58)

## 2018-12-18 DIAGNOSIS — N17.9 ACUTE KIDNEY FAILURE, UNSPECIFIED: ICD-10-CM

## 2018-12-18 LAB
ANION GAP SERPL CALC-SCNC: 9 MMOL/L — SIGNIFICANT CHANGE UP (ref 5–17)
BUN SERPL-MCNC: 49 MG/DL — HIGH (ref 7–23)
CALCIUM SERPL-MCNC: 9.1 MG/DL — SIGNIFICANT CHANGE UP (ref 8.4–10.5)
CHLORIDE SERPL-SCNC: 109 MMOL/L — HIGH (ref 96–108)
CO2 SERPL-SCNC: 21 MMOL/L — LOW (ref 22–31)
CREAT SERPL-MCNC: 1.5 MG/DL — HIGH (ref 0.5–1.3)
GLUCOSE SERPL-MCNC: 257 MG/DL — HIGH (ref 70–99)
POTASSIUM SERPL-MCNC: 5.3 MMOL/L — SIGNIFICANT CHANGE UP (ref 3.5–5.3)
POTASSIUM SERPL-SCNC: 5.3 MMOL/L — SIGNIFICANT CHANGE UP (ref 3.5–5.3)
SODIUM SERPL-SCNC: 139 MMOL/L — SIGNIFICANT CHANGE UP (ref 135–145)

## 2018-12-18 PROCEDURE — 99232 SBSQ HOSP IP/OBS MODERATE 35: CPT

## 2018-12-18 RX ADMIN — INSULIN GLARGINE 10 UNIT(S): 100 INJECTION, SOLUTION SUBCUTANEOUS at 21:51

## 2018-12-18 RX ADMIN — ALBUTEROL 2.5 MILLIGRAM(S): 90 AEROSOL, METERED ORAL at 07:46

## 2018-12-18 RX ADMIN — Medication 4: at 08:28

## 2018-12-18 RX ADMIN — Medication 88 MICROGRAM(S): at 06:15

## 2018-12-18 RX ADMIN — ALBUTEROL 2.5 MILLIGRAM(S): 90 AEROSOL, METERED ORAL at 19:24

## 2018-12-18 RX ADMIN — Medication 10: at 17:15

## 2018-12-18 RX ADMIN — Medication 50 MILLILITER(S): at 05:16

## 2018-12-18 RX ADMIN — MIDODRINE HYDROCHLORIDE 5 MILLIGRAM(S): 2.5 TABLET ORAL at 21:51

## 2018-12-18 RX ADMIN — MIDODRINE HYDROCHLORIDE 5 MILLIGRAM(S): 2.5 TABLET ORAL at 13:39

## 2018-12-18 RX ADMIN — Medication 50 MILLILITER(S): at 17:15

## 2018-12-18 RX ADMIN — MIDODRINE HYDROCHLORIDE 5 MILLIGRAM(S): 2.5 TABLET ORAL at 06:15

## 2018-12-18 RX ADMIN — Medication 25 MILLIGRAM(S): at 06:15

## 2018-12-18 RX ADMIN — Medication 8: at 12:30

## 2018-12-18 RX ADMIN — TAMSULOSIN HYDROCHLORIDE 0.4 MILLIGRAM(S): 0.4 CAPSULE ORAL at 21:51

## 2018-12-18 RX ADMIN — KETOCONAZOLE 1 APPLICATION(S): 20 AEROSOL, FOAM TOPICAL at 21:50

## 2018-12-18 RX ADMIN — ALBUTEROL 2.5 MILLIGRAM(S): 90 AEROSOL, METERED ORAL at 13:50

## 2018-12-18 RX ADMIN — ALBUTEROL 2.5 MILLIGRAM(S): 90 AEROSOL, METERED ORAL at 00:34

## 2018-12-18 RX ADMIN — SPIRONOLACTONE 25 MILLIGRAM(S): 25 TABLET, FILM COATED ORAL at 06:15

## 2018-12-18 RX ADMIN — Medication 50 MILLILITER(S): at 12:33

## 2018-12-18 NOTE — PROGRESS NOTE ADULT - SUBJECTIVE AND OBJECTIVE BOX
Patient is a 83y old  Male who presents with a chief complaint of fever (18 Dec 2018 08:36)      Patient seen in follow up for ANDREEA.     PAST MEDICAL HISTORY:  CKD (chronic kidney disease)  Hepatitis  CAD (coronary artery disease)  HTN (hypertension)  DM (diabetes mellitus)    MEDICATIONS  (STANDING):  albumin human 25% IVPB 50 milliLiter(s) IV Intermittent every 6 hours  ALBUTerol    0.083% 2.5 milliGRAM(s) Nebulizer every 6 hours  dextrose 5%. 1000 milliLiter(s) (50 mL/Hr) IV Continuous <Continuous>  dextrose 50% Injectable 12.5 Gram(s) IV Push once  insulin glargine Injectable (LANTUS) 10 Unit(s) SubCutaneous at bedtime  insulin lispro (HumaLOG) corrective regimen sliding scale   SubCutaneous three times a day before meals  ketoconazole 2% Cream 1 Application(s) Topical at bedtime  levothyroxine 88 MICROGram(s) Oral daily  metoprolol succinate ER 25 milliGRAM(s) Oral daily  midodrine 5 milliGRAM(s) Oral every 8 hours  spironolactone 25 milliGRAM(s) Oral daily  tamsulosin 0.4 milliGRAM(s) Oral at bedtime    MEDICATIONS  (PRN):  dextrose 40% Gel 15 Gram(s) Oral once PRN Blood Glucose LESS THAN 70 milliGRAM(s)/deciliter  glucagon  Injectable 1 milliGRAM(s) IntraMuscular once PRN Glucose LESS THAN 70 milligrams/deciliter    T(C): 37.1 (12-18-18 @ 09:05), Max: 37.1 (12-17-18 @ 00:06)  HR: 74 (12-18-18 @ 09:05) (72 - 94)  BP: 105/62 (12-18-18 @ 09:05) (105/62 - 145/62)  RR: 20 (12-18-18 @ 09:05) (17 - 20)  SpO2: 98% (12-18-18 @ 09:05) (95% - 100%)  Wt(kg): --  I&O's Detail    17 Dec 2018 07:01  -  18 Dec 2018 07:00  --------------------------------------------------------  IN:  Total IN: 0 mL    OUT:    Indwelling Catheter - Urethral: 1700 mL  Total OUT: 1700 mL    Total NET: -1700 mL      18 Dec 2018 07:01  -  18 Dec 2018 10:17  --------------------------------------------------------  IN:    Oral Fluid: 350 mL  Total IN: 350 mL    OUT:  Total OUT: 0 mL    Total NET: 350 mL          PHYSICAL EXAM:  General: NAD  Respiratory: b/l air entry  Cardiovascular: S1 S2  Gastrointestinal: distended  Extremities:  no edema                          9.1    10.74 )-----------( 131      ( 17 Dec 2018 07:22 )             25.9     12-18    139  |  109<H>  |  49<H>  ----------------------------<  257<H>  5.3   |  21<L>  |  1.50<H>    Ca    9.1      18 Dec 2018 08:41  Phos  3.4     12-17  Mg     2.3     12-17    TPro  5.9<L>  /  Alb  2.5<L>  /  TBili  2.4<H>  /  DBili  x   /  AST  38  /  ALT  39  /  AlkPhos  58  12-17        LIVER FUNCTIONS - ( 17 Dec 2018 07:22 )  Alb: 2.5 g/dL / Pro: 5.9 g/dL / ALK PHOS: 58 U/L / ALT: 39 U/L DA / AST: 38 U/L / GGT: x               Sodium, Serum: 139 (12-18 @ 08:41)  Sodium, Serum: 136 (12-17 @ 07:22)  Sodium, Serum: 130 (12-16 @ 07:54)  Sodium, Serum: 130 (12-15 @ 07:03)    Creatinine, Serum: 1.50 (12-18 @ 08:41)  Creatinine, Serum: 1.90 (12-17 @ 07:22)  Creatinine, Serum: 1.91 (12-16 @ 07:54)  Creatinine, Serum: 2.42 (12-15 @ 07:03)    Potassium, Serum: 5.3 (12-18 @ 08:41)  Potassium, Serum: 5.4 (12-17 @ 07:22)  Potassium, Serum: 5.8 (12-16 @ 20:42)  Potassium, Serum: 6.1 (12-16 @ 15:30)    Hemoglobin: 9.1 (12-17 @ 07:22)  Hemoglobin: 9.7 (12-16 @ 07:54)  Hemoglobin: 9.7 (12-15 @ 07:05)

## 2018-12-18 NOTE — PROGRESS NOTE ADULT - ASSESSMENT
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 cont w/ medical diuresis.   fibroscan as outpt.  d/w family today

## 2018-12-18 NOTE — PROGRESS NOTE ADULT - SUBJECTIVE AND OBJECTIVE BOX
Date/Time Patient Seen:  		  Referring MD:   Data Reviewed	       Patient is a 83y old  Male who presents with a chief complaint of fever (17 Dec 2018 14:05)  vs and meds reviewed        Subjective/HPI     PAST MEDICAL & SURGICAL HISTORY:  CKD (chronic kidney disease)  Hepatitis  CAD (coronary artery disease)  HTN (hypertension)  DM (diabetes mellitus)  Artificial cardiac pacemaker  AICD (automatic cardioverter/defibrillator) present  No significant past surgical history        Medication list         MEDICATIONS  (STANDING):  albumin human 25% IVPB 50 milliLiter(s) IV Intermittent every 6 hours  ALBUTerol    0.083% 2.5 milliGRAM(s) Nebulizer every 6 hours  dextrose 5%. 1000 milliLiter(s) (50 mL/Hr) IV Continuous <Continuous>  dextrose 50% Injectable 12.5 Gram(s) IV Push once  insulin glargine Injectable (LANTUS) 10 Unit(s) SubCutaneous at bedtime  insulin lispro (HumaLOG) corrective regimen sliding scale   SubCutaneous three times a day before meals  ketoconazole 2% Cream 1 Application(s) Topical at bedtime  levothyroxine 88 MICROGram(s) Oral daily  metoprolol succinate ER 25 milliGRAM(s) Oral daily  midodrine 5 milliGRAM(s) Oral every 8 hours  spironolactone 25 milliGRAM(s) Oral daily  tamsulosin 0.4 milliGRAM(s) Oral at bedtime    MEDICATIONS  (PRN):  dextrose 40% Gel 15 Gram(s) Oral once PRN Blood Glucose LESS THAN 70 milliGRAM(s)/deciliter  glucagon  Injectable 1 milliGRAM(s) IntraMuscular once PRN Glucose LESS THAN 70 milligrams/deciliter         Vitals log        ICU Vital Signs Last 24 Hrs  T(C): 36.7 (18 Dec 2018 05:05), Max: 36.9 (17 Dec 2018 09:50)  T(F): 98.1 (18 Dec 2018 05:05), Max: 98.5 (17 Dec 2018 17:21)  HR: 77 (18 Dec 2018 07:46) (72 - 94)  BP: 111/60 (18 Dec 2018 05:05) (107/58 - 145/62)  BP(mean): --  ABP: --  ABP(mean): --  RR: 18 (18 Dec 2018 05:05) (17 - 18)  SpO2: 98% (18 Dec 2018 07:46) (95% - 100%)           Input and Output:  I&O's Detail    17 Dec 2018 07:01  -  18 Dec 2018 07:00  --------------------------------------------------------  IN:  Total IN: 0 mL    OUT:    Indwelling Catheter - Urethral: 1700 mL  Total OUT: 1700 mL    Total NET: -1700 mL          Lab Data                        9.1    10.74 )-----------( 131      ( 17 Dec 2018 07:22 )             25.9     12-17    136  |  106  |  63<H>  ----------------------------<  271<H>  5.4<H>   |  20<L>  |  1.90<H>    Ca    8.9      17 Dec 2018 07:22  Phos  3.4     12-17  Mg     2.3     12-17    TPro  5.9<L>  /  Alb  2.5<L>  /  TBili  2.4<H>  /  DBili  x   /  AST  38  /  ALT  39  /  AlkPhos  58  12-17            Review of Systems	      Objective     Physical Examination    heart s1s2  lung dec BS      Pertinent Lab findings & Imaging      Aly:  NO   Adequate UO     I&O's Detail    17 Dec 2018 07:01  -  18 Dec 2018 07:00  --------------------------------------------------------  IN:  Total IN: 0 mL    OUT:    Indwelling Catheter - Urethral: 1700 mL  Total OUT: 1700 mL    Total NET: -1700 mL               Discussed with:     Cultures:	        Radiology

## 2018-12-18 NOTE — PROGRESS NOTE ADULT - SUBJECTIVE AND OBJECTIVE BOX
SENAIT JHAVERI is a yMale , patient examined and chart reviewed.     INTERVAL HPI/ OVERNIGHT EVENTS:   Afebrile. No events.    PAST MEDICAL & SURGICAL HISTORY:  CKD (chronic kidney disease)  Hepatitis  CAD (coronary artery disease)  HTN (hypertension)  DM (diabetes mellitus)  Artificial cardiac pacemaker  AICD (automatic cardioverter/defibrillator) present      For details regarding the patient's social history, family history, and other miscellaneous elements, please refer the initial infectious diseases consultation and/or the admitting history and physical examination for this admission.    ROS:  CONSTITUTIONAL:  Negative fever or chills  EYES:  Negative  blurry vision or double vision  CARDIOVASCULAR:  Negative for chest pain or palpitations  RESPIRATORY:  Negative for cough, wheezing, or SOB   GASTROINTESTINAL:  Negative for nausea, vomiting, diarrhea, constipation, or abdominal pain  GENITOURINARY:  Negative frequency, urgency or dysuria  NEUROLOGIC:  No headache, confusion, dizziness, lightheadedness  All other systems were reviewed and are negative       Current inpatient medications :    ANTIBIOTICS/RELEVANT:    MEDICATIONS  (STANDING):  albumin human 25% IVPB 50 milliLiter(s) IV Intermittent every 6 hours  ALBUTerol    0.083% 2.5 milliGRAM(s) Nebulizer every 6 hours  dextrose 5%. 1000 milliLiter(s) (50 mL/Hr) IV Continuous <Continuous>  dextrose 50% Injectable 12.5 Gram(s) IV Push once  insulin glargine Injectable (LANTUS) 10 Unit(s) SubCutaneous at bedtime  insulin lispro (HumaLOG) corrective regimen sliding scale   SubCutaneous three times a day before meals  ketoconazole 2% Cream 1 Application(s) Topical at bedtime  levothyroxine 88 MICROGram(s) Oral daily  metoprolol succinate ER 25 milliGRAM(s) Oral daily  midodrine 5 milliGRAM(s) Oral every 8 hours  spironolactone 25 milliGRAM(s) Oral daily  tamsulosin 0.4 milliGRAM(s) Oral at bedtime    MEDICATIONS  (PRN):  dextrose 40% Gel 15 Gram(s) Oral once PRN Blood Glucose LESS THAN 70 milliGRAM(s)/deciliter  glucagon  Injectable 1 milliGRAM(s) IntraMuscular once PRN Glucose LESS THAN 70 milligrams/deciliter      Objective:  Vital Signs Last 24 Hrs  T(C): 36.8 (18 Dec 2018 18:06), Max: 37.1 (18 Dec 2018 09:05)  T(F): 98.2 (18 Dec 2018 18:06), Max: 98.7 (18 Dec 2018 09:05)  HR: 72 (18 Dec 2018 19:29) (71 - 83)  BP: 106/58 (18 Dec 2018 18:06) (105/62 - 145/62)  RR: 19 (18 Dec 2018 18:06) (17 - 20)  SpO2: 98% (18 Dec 2018 19:29) (95% - 100%)    Physical Exam:  General:  no acute distress  Eyes: sclera anicteric, pupils equal and reactive to light  ENMT: buccal mucosa moist, pharynx not injected  Neck: supple, trachea midline  Lungs: clear, no wheeze/rhonchi  Cardiovascular: regular rate and rhythm, S1 S2  Abdomen: soft, nontender, distended, bowel sounds normal  Neurological: alert and oriented x3, Cranial Nerves II-XII grossly intact  Skin: no increased ecchymosis/petechiae/purpura  Lymph Nodes: no palpable cervical/supraclavicular lymph nodes enlargements  Extremities: no cyanosis/clubbing/edema      LABS:                                   9.1    10.74 )-----------( 131      ( 17 Dec 2018 07:22 )             25.9   12-18    139  |  109<H>  |  49<H>  ----------------------------<  257<H>  5.3   |  21<L>  |  1.50<H>    Ca    9.1      18 Dec 2018 08:41  Phos  3.4     12-17  Mg     2.3     12-17    TPro  5.9<L>  /  Alb  2.5<L>  /  TBili  2.4<H>  /  DBili  x   /  AST  38  /  ALT  39  /  AlkPhos  58  12-17      MICROBIOLOGY:    Culture - Acid Fast - Body Fluid w/Smear (collected 13 Dec 2018 21:14)  Source: Ascites Fl Ascites Fluid    Culture - Urine (collected 12 Dec 2018 21:41)  Source: .Urine Clean Catch (Midstream)  Final Report (13 Dec 2018 22:16):    No growth    Culture - Blood (collected 12 Dec 2018 15:32)  Source: .Blood Blood-Peripheral  Preliminary Report (13 Dec 2018 16:01):    No growth to date.    Culture - Blood (collected 12 Dec 2018 15:32)  Source: .Blood Blood-Peripheral  Preliminary Report (13 Dec 2018 16:01):    No growth to date.    RADIOLOGY & ADDITIONAL STUDIES:  EXAM:  US ABDOMEN COMPLETE                          PROCEDURE DATE:  12/12/2018      INTERPRETATION:  History: Abdominal pain acute renal failure.    Abdominal ultrasound.    Gallstones and gallbladder sludge noted. There is diffusely thickened   gallbladder wall. Wall thickening is nonspecific finding in and of itself   may be related to ascites/hypoalbuminemia or adjacent liver disease.   Cholecystitis not excluded in the appropriate clinical setting. If this   is of clinical concern then a HIDA scan can be obtained. No biliary   dilatation. Common duct 0.6 cm normal for age. Echodense liver consistent   with steatosis/hepatocellular disease. Spleen not enlarged. Pancreas   unremarkable.  Right kidney 10.2, the left 10.7 cm. Increased bilateral renal cortical   echogenicity consistent with chronic medical renal disease. No   hydronephrosis.  Moderate abdominal ascites.    Impression:  Cholelithiasis with thickened gallbladder wall. See discussion above. No   biliary dilatation.  Echodense liver consistent with steatosis/hepatocellular disease.  Medical renal disease.  Moderate ascites      EXAM:  CT CHEST                        PROCEDURE DATE:  12/12/2018          INTERPRETATION:  Clinical information: Cough, fever    No prior studies present for comparison.    Noncontrast exam, neither intravenous or oral contrast materialwas   administered limiting evaluation. Additionally the patient was unable to   cooperate, motion artifact present on multiple images.    Axial images obtained, coronal and sagittal images computer reformatted.        Chest: Cardiac device present subcutaneous soft tissues anterior left   upper thorax. No evidence of pericardial effusion or thoracic aortic   aneurysm. Coronary artery calcifications present.  A small left pleural effusion is identified with associated compressive   atelectasis. No mediastinal or hilar lesions identified, evaluation   limited due to lack of IV contrast. No pneumothorax or vascular   congestion.  No acute appearing osseous abnormalities. Central airway intact. Thyroid   gland not enlarged.      Abdomen-pelvis: Ascites present in the abdomen, adjacent to the liver,   and in the pelvis. Unenhanced hepatic parenchyma and splenic parenchyma   homogeneous. Unenhanced pancreatic parenchyma unremarkable. No adrenal   lesions evident. The gallbladder contains stones. No hydronephrotic   changes or renal masses identified. Hypodensity posterior mid polar   region right kidney likely a cyst. No aortic aneurysm or retroperitoneal   lymphadenopathy. No biliary ductal dilatation. No bowel obstruction.   Urinary bladder contains small quantity of urine, no stones evident.   Prostate is mildly enlarged. Inguinal regions intact. No acute appearing   osseous abnormalities in the abdomen or pelvis. Disc degenerative disease   and posterior spurring L5-S1.      IMPRESSION: See above report.    EXAM:  NM HEPATOBILIARY IMG                              PROCEDURE DATE:  12/14/2018          INTERPRETATION:  RADIOPHARMACEUTICAL: 3.0 mCi Tc-99m-Mebrofenin, I.V.    CLINICAL INFORMATION: 83 year old male with cholelithiasis, abdominal   pain,fever, elevated LFTs, and diffusely thickened gallbladder wall on   ultrasound; referred to evaluate for acute cholecystitis.     TECHNIQUE:  Static images of the abdomen were obtained at approximately 5   minutes and 1 hour after radiopharmaceuticaladministration.     COMPARISON: No previous hepatobiliary scans were available for comparison.    FINDINGS: There is homogeneous uptake of radiopharmaceutical by the   hepatocytes. Gallbladder and bowel are visualized on the 1 hour images.     IMPRESSION: Normal hepatobiliary scan. No radionuclide evidence of acute   cholecystitis.      Assessment :   84 yo [place] male with hx of CAD S/P stent, S/P PPM, HTN, CKD, and Hepatitis A presented to   the hospital with CC of fever at home and abdominal distention found to have abn LFTs  No documented fever in the hospital  ANDREEA vs CKD  Liver cirrhosis poss BLANTON  sp paracentesis unfortunately no cultures sent  However doubt SBP  Thrombocytopenia sec liver cirrhosis  Of note Lymes titers positive likely false positive - symptoms and presentation not c/w lymes- unclear   why it was ordered in the first place  All cultures neg  Overall stable    Plan :   Monktor off antibiotics  Trend wbc and lfts  GI following  Stable from ID standpoint    D/w DR Mason    Continue with present regiment.  Appropriate use of antibiotics and adverse effects reviewed.      I have discussed the above plan of care with patient/ family in detail. They expressed understanding of the  treatment plan . Risks, benefits and alternatives discussed in detail. I have asked if they have any questions or concerns and appropriately addressed them to the best of my ability .    > 25 minutes were spent in direct patient care reviewing notes, medications ,labs data/ imaging , discussion with multidisciplinary team.    Thank you for allowing me to participate in care of your patient .    Swetha Lagos MD  Infectious Disease  [phone]

## 2018-12-18 NOTE — PROGRESS NOTE ADULT - SUBJECTIVE AND OBJECTIVE BOX
Chief Complaint:        INTERVAL HPI/OVERNIGHT EVENTS:  no significant events overnight reported        MEDICATIONS  (STANDING):  albumin human 25% IVPB 50 milliLiter(s) IV Intermittent every 6 hours  ALBUTerol    0.083% 2.5 milliGRAM(s) Nebulizer every 6 hours  dextrose 5%. 1000 milliLiter(s) (50 mL/Hr) IV Continuous <Continuous>  dextrose 50% Injectable 12.5 Gram(s) IV Push once  insulin glargine Injectable (LANTUS) 10 Unit(s) SubCutaneous at bedtime  insulin lispro (HumaLOG) corrective regimen sliding scale   SubCutaneous three times a day before meals  ketoconazole 2% Cream 1 Application(s) Topical at bedtime  levothyroxine 88 MICROGram(s) Oral daily  metoprolol succinate ER 25 milliGRAM(s) Oral daily  midodrine 5 milliGRAM(s) Oral every 8 hours  spironolactone 25 milliGRAM(s) Oral daily  tamsulosin 0.4 milliGRAM(s) Oral at bedtime    MEDICATIONS  (PRN):  dextrose 40% Gel 15 Gram(s) Oral once PRN Blood Glucose LESS THAN 70 milliGRAM(s)/deciliter  glucagon  Injectable 1 milliGRAM(s) IntraMuscular once PRN Glucose LESS THAN 70 milligrams/deciliter            Vital Signs Last 24 Hrs  T(C): 36.7 (18 Dec 2018 14:37), Max: 37.1 (18 Dec 2018 09:05)  T(F): 98 (18 Dec 2018 14:37), Max: 98.7 (18 Dec 2018 09:05)  HR: 78 (18 Dec 2018 14:37) (72 - 83)  BP: 109/74 (18 Dec 2018 14:37) (105/62 - 145/62)  BP(mean): --  RR: 18 (18 Dec 2018 14:37) (17 - 20)  SpO2: 97% (18 Dec 2018 14:37) (95% - 100%)    Physical exam:  abd soft, non tender    mild distension  cv s1s2  chest air entry          LABS:                        9.1    10.74 )-----------( 131      ( 17 Dec 2018 07:22 )             25.9     12-18    139  |  109<H>  |  49<H>  ----------------------------<  257<H>  5.3   |  21<L>  |  1.50<H>    Ca    9.1      18 Dec 2018 08:41  Phos  3.4     12-17  Mg     2.3     12-17    TPro  5.9<L>  /  Alb  2.5<L>  /  TBili  2.4<H>  /  DBili  x   /  AST  38  /  ALT  39  /  AlkPhos  58  12-17          RADIOLOGY & ADDITIONAL TESTS:

## 2018-12-18 NOTE — PROGRESS NOTE ADULT - ASSESSMENT
Patient is 84 yo male with hx of CAD S/P stent, S/P PPM, HTN, BPH, CKD, Hepatitis, and Dm2 presenting with     1. Fever.  Unclear Etiology.  ??Abdm source.  abdm/Chest/pelvic CT scan noticed.   -UC and Blood culture are negative  -Ascite fluid culture negative    will d/c zosyn  -Abdm US shows Cholelithiasis with thickened gallbladder wall. See discussion above. No biliary dilatation. Echodense liver consistent with steatosis/hepatocellular disease.  Medical renal disease. Moderate ascites  -HIDA scan shows no acute process.   diagnostic paracenteses ruled out SPB  etiology of liver cirrhosis not clear  hepatitis panel negative  would need liver biospy as outpt  repeat ct abdominal, moderate ascites+, discussed with Dr.Forman [id number] , the asciting fluid is mild and cant be drained  will continue o nmedical managment   d/c planning, discussd with damien bolanos to take him home and wants discuss gi    2.  Abnormal LFT.  Hx of Hepatitis.  Hepatitis panel negative.  Avoid nephrotoxin  -Monitor LFT  -GI to follow up     3. ANDREEA on CKD with hyponatremia.  Seems to be secondary dehydration.  Lu cath.  Continue with IVF, and Monitor Renal function  -Improving  -Nephrology to follow up     4. Hyperkalemia  likely secondary to medication induced  has resolved      5. HTN/CAD.  Continue with metoprolol.  Hold plavix in anticipation for any procedure.       6.  AMS.  Seems to be secondary to metabolic encephalopathy due to infection, hepatic encephalopathy and Renal failure.  Head CT scan shows no acute process.   continue with neuro check.    -Improving.  -Continue with lactulose, and Monitor ammonia level       7.  HTN.  Continue with metoprolol with holding parameter.  Monitor BP      8.  DM2.  Hold Lantus ISS, and Monitor POC    8.  Diet.  Continue with current diet as per speech therapy    9.  Back pain.  X-ray of the lumbar spine shows degenerative disease.  continue with pain management.  PT consult      Plan of care was discussed with patient, and family in great details, All questions were answered to their satisfaction  Seems to understand, and in agreement  Prognosis guarded  DVT proph compression stocking in the setting of thrombocytopenia

## 2018-12-18 NOTE — PROGRESS NOTE ADULT - SUBJECTIVE AND OBJECTIVE BOX
Patient is a 83y old  Male who presents with a chief complaint of fever (18 Dec 2018 10:17)        HPI:  Patient is 84 yo male with hx of CAD S/P stent, S/P PPM, HTN, CKD, and Hepatitis presenting with fever that has been going on for the past several days, and progressively worsen.  + generalized weakness, cough, and decrease po intake X 2 weeks.  Daughter noticed abdominal distention with increase abdominal pain.  Denies any headache, dizziness, blurry vision, chest pain, SOB, N/V/D, numbness, or weakness.      + Back pain (12 Dec 2018 18:15)      SUBJECTIVE & OBJECTIVE: Pt seen and examined at bedside, no acute complaints      PHYSICAL EXAM:  T(C): 37.1 (12-18-18 @ 09:05), Max: 37.1 (12-18-18 @ 09:05)  HR: 74 (12-18-18 @ 13:50) (72 - 83)  BP: 105/62 (12-18-18 @ 09:05) (105/62 - 145/62)  RR: 20 (12-18-18 @ 09:05) (17 - 20)  SpO2: 98% (12-18-18 @ 09:05) (95% - 100%)  Wt(kg): --   GENERAL: NAD, well-groomed, well-developed  HEAD:  Atraumatic, Normocephalic  EYES: EOMI, PERRLA, conjunctiva and sclera clear  ENMT: Moist mucous membranes  NECK: Supple, No JVD  NERVOUS SYSTEM:  Alert & Oriented X3, Motor Strength 5/5 B/L upper and lower extremities; DTRs 2+ intact and symmetric  CHEST/LUNG: Clear to auscultation bilaterally; No rales, rhonchi, wheezing, or rubs  HEART: Regular rate and rhythm; No murmurs, rubs, or gallops  ABDOMEN: Soft, Nontender, Nondistended; Bowel sounds present  EXTREMITIES:  2+ Peripheral Pulses, No clubbing, cyanosis, or edema        MEDICATIONS  (STANDING):  albumin human 25% IVPB 50 milliLiter(s) IV Intermittent every 6 hours  ALBUTerol    0.083% 2.5 milliGRAM(s) Nebulizer every 6 hours  dextrose 5%. 1000 milliLiter(s) (50 mL/Hr) IV Continuous <Continuous>  dextrose 50% Injectable 12.5 Gram(s) IV Push once  insulin glargine Injectable (LANTUS) 10 Unit(s) SubCutaneous at bedtime  insulin lispro (HumaLOG) corrective regimen sliding scale   SubCutaneous three times a day before meals  ketoconazole 2% Cream 1 Application(s) Topical at bedtime  levothyroxine 88 MICROGram(s) Oral daily  metoprolol succinate ER 25 milliGRAM(s) Oral daily  midodrine 5 milliGRAM(s) Oral every 8 hours  spironolactone 25 milliGRAM(s) Oral daily  tamsulosin 0.4 milliGRAM(s) Oral at bedtime    MEDICATIONS  (PRN):  dextrose 40% Gel 15 Gram(s) Oral once PRN Blood Glucose LESS THAN 70 milliGRAM(s)/deciliter  glucagon  Injectable 1 milliGRAM(s) IntraMuscular once PRN Glucose LESS THAN 70 milligrams/deciliter      LABS:                        9.1    10.74 )-----------( 131      ( 17 Dec 2018 07:22 )             25.9     12-18    139  |  109<H>  |  49<H>  ----------------------------<  257<H>  5.3   |  21<L>  |  1.50<H>    Ca    9.1      18 Dec 2018 08:41  Phos  3.4     12-17  Mg     2.3     12-17    TPro  5.9<L>  /  Alb  2.5<L>  /  TBili  2.4<H>  /  DBili  x   /  AST  38  /  ALT  39  /  AlkPhos  58  12-17          CAPILLARY BLOOD GLUCOSE      POCT Blood Glucose.: 335 mg/dL (18 Dec 2018 12:02)  POCT Blood Glucose.: 233 mg/dL (18 Dec 2018 08:14)  POCT Blood Glucose.: 263 mg/dL (17 Dec 2018 21:57)  POCT Blood Glucose.: 228 mg/dL (17 Dec 2018 16:55)      CAPILLARY BLOOD GLUCOSE      POCT Blood Glucose.: 335 mg/dL (18 Dec 2018 12:02)  POCT Blood Glucose.: 233 mg/dL (18 Dec 2018 08:14)  POCT Blood Glucose.: 263 mg/dL (17 Dec 2018 21:57)  POCT Blood Glucose.: 228 mg/dL (17 Dec 2018 16:55)    CAPILLARY BLOOD GLUCOSE      POCT Blood Glucose.: 335 mg/dL (18 Dec 2018 12:02)            RECENT CULTURES:      RADIOLOGY & ADDITIONAL TESTS:                        DVT/GI ppx  Discussed with pt @ bedside

## 2018-12-18 NOTE — PROGRESS NOTE ADULT - ASSESSMENT
1.	ANDREEA: Prerenal azotemia, ATN ( creatinine 1.8 09/2018)  2.	Hyponatremia  3.	Cirrhosis, Ascites  4.	Diabetes  5.	Hypertension    Improved renal function. Will d/c reyes catheter and monitor for voiding. GI follow up.   Monitor blood sugar levels. Insulin coverage as needed. Dietary restriction. Sodium levels better.   Low potassium diet. On aldactone. D/w family at bedside.

## 2018-12-19 LAB
ANION GAP SERPL CALC-SCNC: 6 MMOL/L — SIGNIFICANT CHANGE UP (ref 5–17)
ANION GAP SERPL CALC-SCNC: 8 MMOL/L — SIGNIFICANT CHANGE UP (ref 5–17)
BUN SERPL-MCNC: 52 MG/DL — HIGH (ref 7–23)
BUN SERPL-MCNC: 54 MG/DL — HIGH (ref 7–23)
CALCIUM SERPL-MCNC: 8.8 MG/DL — SIGNIFICANT CHANGE UP (ref 8.4–10.5)
CALCIUM SERPL-MCNC: 9 MG/DL — SIGNIFICANT CHANGE UP (ref 8.4–10.5)
CHLORIDE SERPL-SCNC: 105 MMOL/L — SIGNIFICANT CHANGE UP (ref 96–108)
CHLORIDE SERPL-SCNC: 105 MMOL/L — SIGNIFICANT CHANGE UP (ref 96–108)
CO2 SERPL-SCNC: 20 MMOL/L — LOW (ref 22–31)
CO2 SERPL-SCNC: 23 MMOL/L — SIGNIFICANT CHANGE UP (ref 22–31)
CREAT SERPL-MCNC: 1.56 MG/DL — HIGH (ref 0.5–1.3)
CREAT SERPL-MCNC: 1.57 MG/DL — HIGH (ref 0.5–1.3)
GLUCOSE SERPL-MCNC: 286 MG/DL — HIGH (ref 70–99)
GLUCOSE SERPL-MCNC: 300 MG/DL — HIGH (ref 70–99)
HCT VFR BLD CALC: 21.1 % — LOW (ref 39–50)
HCT VFR BLD CALC: 21.9 % — LOW (ref 39–50)
HGB BLD-MCNC: 7.4 G/DL — LOW (ref 13–17)
HGB BLD-MCNC: 7.5 G/DL — LOW (ref 13–17)
MCHC RBC-ENTMCNC: 33.3 PG — SIGNIFICANT CHANGE UP (ref 27–34)
MCHC RBC-ENTMCNC: 33.8 PG — SIGNIFICANT CHANGE UP (ref 27–34)
MCHC RBC-ENTMCNC: 34.2 GM/DL — SIGNIFICANT CHANGE UP (ref 32–36)
MCHC RBC-ENTMCNC: 35.1 GM/DL — SIGNIFICANT CHANGE UP (ref 32–36)
MCV RBC AUTO: 96.3 FL — SIGNIFICANT CHANGE UP (ref 80–100)
MCV RBC AUTO: 97.3 FL — SIGNIFICANT CHANGE UP (ref 80–100)
NRBC # BLD: 0 /100 WBCS — SIGNIFICANT CHANGE UP (ref 0–0)
NRBC # BLD: 0 /100 WBCS — SIGNIFICANT CHANGE UP (ref 0–0)
PLATELET # BLD AUTO: 106 K/UL — LOW (ref 150–400)
PLATELET # BLD AUTO: 96 K/UL — LOW (ref 150–400)
POTASSIUM SERPL-MCNC: 5.6 MMOL/L — HIGH (ref 3.5–5.3)
POTASSIUM SERPL-MCNC: 5.6 MMOL/L — HIGH (ref 3.5–5.3)
POTASSIUM SERPL-SCNC: 5.6 MMOL/L — HIGH (ref 3.5–5.3)
POTASSIUM SERPL-SCNC: 5.6 MMOL/L — HIGH (ref 3.5–5.3)
RBC # BLD: 2.19 M/UL — LOW (ref 4.2–5.8)
RBC # BLD: 2.25 M/UL — LOW (ref 4.2–5.8)
RBC # FLD: 13.6 % — SIGNIFICANT CHANGE UP (ref 10.3–14.5)
RBC # FLD: 13.7 % — SIGNIFICANT CHANGE UP (ref 10.3–14.5)
SODIUM SERPL-SCNC: 133 MMOL/L — LOW (ref 135–145)
SODIUM SERPL-SCNC: 134 MMOL/L — LOW (ref 135–145)
WBC # BLD: 7.84 K/UL — SIGNIFICANT CHANGE UP (ref 3.8–10.5)
WBC # BLD: 7.87 K/UL — SIGNIFICANT CHANGE UP (ref 3.8–10.5)
WBC # FLD AUTO: 7.84 K/UL — SIGNIFICANT CHANGE UP (ref 3.8–10.5)
WBC # FLD AUTO: 7.87 K/UL — SIGNIFICANT CHANGE UP (ref 3.8–10.5)

## 2018-12-19 PROCEDURE — 71045 X-RAY EXAM CHEST 1 VIEW: CPT | Mod: 26

## 2018-12-19 PROCEDURE — 12345: CPT | Mod: NC

## 2018-12-19 PROCEDURE — 99232 SBSQ HOSP IP/OBS MODERATE 35: CPT

## 2018-12-19 RX ORDER — KETOCONAZOLE 20 MG/G
1 AEROSOL, FOAM TOPICAL
Qty: 0 | Refills: 0 | COMMUNITY
Start: 2018-12-19

## 2018-12-19 RX ORDER — SPIRONOLACTONE 25 MG/1
1 TABLET, FILM COATED ORAL
Qty: 0 | Refills: 0 | COMMUNITY
Start: 2018-12-19

## 2018-12-19 RX ORDER — MIDODRINE HYDROCHLORIDE 2.5 MG/1
1 TABLET ORAL
Qty: 0 | Refills: 0 | COMMUNITY
Start: 2018-12-19

## 2018-12-19 RX ORDER — MORPHINE SULFATE 50 MG/1
1 CAPSULE, EXTENDED RELEASE ORAL ONCE
Qty: 0 | Refills: 0 | Status: DISCONTINUED | OUTPATIENT
Start: 2018-12-19 | End: 2018-12-19

## 2018-12-19 RX ORDER — CLOPIDOGREL BISULFATE 75 MG/1
75 TABLET, FILM COATED ORAL ONCE
Qty: 0 | Refills: 0 | Status: DISCONTINUED | OUTPATIENT
Start: 2018-12-19 | End: 2018-12-19

## 2018-12-19 RX ORDER — PANTOPRAZOLE SODIUM 20 MG/1
40 TABLET, DELAYED RELEASE ORAL
Qty: 0 | Refills: 0 | Status: DISCONTINUED | OUTPATIENT
Start: 2018-12-19 | End: 2018-12-21

## 2018-12-19 RX ORDER — PRASUGREL 5 MG/1
10 TABLET, FILM COATED ORAL ONCE
Qty: 0 | Refills: 0 | Status: DISCONTINUED | OUTPATIENT
Start: 2018-12-19 | End: 2018-12-19

## 2018-12-19 RX ORDER — CLOPIDOGREL BISULFATE 75 MG/1
75 TABLET, FILM COATED ORAL DAILY
Qty: 0 | Refills: 0 | Status: DISCONTINUED | OUTPATIENT
Start: 2018-12-19 | End: 2018-12-21

## 2018-12-19 RX ORDER — INSULIN GLARGINE 100 [IU]/ML
10 INJECTION, SOLUTION SUBCUTANEOUS
Qty: 0 | Refills: 0 | COMMUNITY

## 2018-12-19 RX ORDER — PRASUGREL 5 MG/1
10 TABLET, FILM COATED ORAL DAILY
Qty: 0 | Refills: 0 | Status: DISCONTINUED | OUTPATIENT
Start: 2018-12-19 | End: 2018-12-19

## 2018-12-19 RX ORDER — SACUBITRIL AND VALSARTAN 24; 26 MG/1; MG/1
1 TABLET, FILM COATED ORAL
Qty: 0 | Refills: 0 | COMMUNITY

## 2018-12-19 RX ADMIN — Medication 4: at 08:19

## 2018-12-19 RX ADMIN — CLOPIDOGREL BISULFATE 75 MILLIGRAM(S): 75 TABLET, FILM COATED ORAL at 11:51

## 2018-12-19 RX ADMIN — Medication 50 MILLILITER(S): at 11:50

## 2018-12-19 RX ADMIN — Medication 25 MILLIGRAM(S): at 06:36

## 2018-12-19 RX ADMIN — ALBUTEROL 2.5 MILLIGRAM(S): 90 AEROSOL, METERED ORAL at 12:49

## 2018-12-19 RX ADMIN — MORPHINE SULFATE 1 MILLIGRAM(S): 50 CAPSULE, EXTENDED RELEASE ORAL at 23:48

## 2018-12-19 RX ADMIN — Medication 6: at 17:04

## 2018-12-19 RX ADMIN — Medication 88 MICROGRAM(S): at 06:36

## 2018-12-19 RX ADMIN — Medication 10 MILLIGRAM(S): at 17:05

## 2018-12-19 RX ADMIN — Medication 50 MILLILITER(S): at 00:02

## 2018-12-19 RX ADMIN — ALBUTEROL 2.5 MILLIGRAM(S): 90 AEROSOL, METERED ORAL at 01:47

## 2018-12-19 RX ADMIN — ALBUTEROL 2.5 MILLIGRAM(S): 90 AEROSOL, METERED ORAL at 07:13

## 2018-12-19 RX ADMIN — Medication 10 MILLIGRAM(S): at 12:35

## 2018-12-19 RX ADMIN — Medication 8: at 12:36

## 2018-12-19 RX ADMIN — MIDODRINE HYDROCHLORIDE 5 MILLIGRAM(S): 2.5 TABLET ORAL at 21:49

## 2018-12-19 RX ADMIN — PANTOPRAZOLE SODIUM 40 MILLIGRAM(S): 20 TABLET, DELAYED RELEASE ORAL at 17:05

## 2018-12-19 RX ADMIN — MIDODRINE HYDROCHLORIDE 5 MILLIGRAM(S): 2.5 TABLET ORAL at 13:33

## 2018-12-19 RX ADMIN — SPIRONOLACTONE 25 MILLIGRAM(S): 25 TABLET, FILM COATED ORAL at 06:36

## 2018-12-19 RX ADMIN — Medication 50 MILLILITER(S): at 06:34

## 2018-12-19 RX ADMIN — KETOCONAZOLE 1 APPLICATION(S): 20 AEROSOL, FOAM TOPICAL at 21:50

## 2018-12-19 RX ADMIN — INSULIN GLARGINE 10 UNIT(S): 100 INJECTION, SOLUTION SUBCUTANEOUS at 21:55

## 2018-12-19 RX ADMIN — ALBUTEROL 2.5 MILLIGRAM(S): 90 AEROSOL, METERED ORAL at 19:24

## 2018-12-19 RX ADMIN — TAMSULOSIN HYDROCHLORIDE 0.4 MILLIGRAM(S): 0.4 CAPSULE ORAL at 21:50

## 2018-12-19 RX ADMIN — MIDODRINE HYDROCHLORIDE 5 MILLIGRAM(S): 2.5 TABLET ORAL at 06:36

## 2018-12-19 RX ADMIN — MORPHINE SULFATE 1 MILLIGRAM(S): 50 CAPSULE, EXTENDED RELEASE ORAL at 02:42

## 2018-12-19 NOTE — PROGRESS NOTE ADULT - ASSESSMENT
1.	ANDREEA: Prerenal azotemia, ATN ( creatinine 1.8 09/2018)  2.	Hyponatremia  3.	Cirrhosis, Ascites  4.	Diabetes  5.	Hypertension    Stable renal function. GI follow up. Monitor blood sugar levels. Insulin coverage as needed. Dietary restriction. Sodium levels better.   Low potassium diet. On aldactone. D/w family at bedside.

## 2018-12-19 NOTE — PROGRESS NOTE ADULT - SUBJECTIVE AND OBJECTIVE BOX
Date/Time Patient Seen:  		  Referring MD:   Data Reviewed	       Patient is a 83y old  Male who presents with a chief complaint of fever (19 Dec 2018 08:13)  vs and meds reviewed        Subjective/HPI     PAST MEDICAL & SURGICAL HISTORY:  CKD (chronic kidney disease)  Hepatitis  CAD (coronary artery disease)  HTN (hypertension)  DM (diabetes mellitus)  Artificial cardiac pacemaker  AICD (automatic cardioverter/defibrillator) present  No significant past surgical history        Medication list         MEDICATIONS  (STANDING):  albumin human 25% IVPB 50 milliLiter(s) IV Intermittent every 6 hours  ALBUTerol    0.083% 2.5 milliGRAM(s) Nebulizer every 6 hours  dextrose 5%. 1000 milliLiter(s) (50 mL/Hr) IV Continuous <Continuous>  dextrose 50% Injectable 12.5 Gram(s) IV Push once  dicyclomine 10 milliGRAM(s) Oral three times a day before meals  insulin glargine Injectable (LANTUS) 10 Unit(s) SubCutaneous at bedtime  insulin lispro (HumaLOG) corrective regimen sliding scale   SubCutaneous three times a day before meals  ketoconazole 2% Cream 1 Application(s) Topical at bedtime  levothyroxine 88 MICROGram(s) Oral daily  metoprolol succinate ER 25 milliGRAM(s) Oral daily  midodrine 5 milliGRAM(s) Oral every 8 hours  spironolactone 25 milliGRAM(s) Oral daily  tamsulosin 0.4 milliGRAM(s) Oral at bedtime    MEDICATIONS  (PRN):  dextrose 40% Gel 15 Gram(s) Oral once PRN Blood Glucose LESS THAN 70 milliGRAM(s)/deciliter  glucagon  Injectable 1 milliGRAM(s) IntraMuscular once PRN Glucose LESS THAN 70 milligrams/deciliter         Vitals log        ICU Vital Signs Last 24 Hrs  T(C): 36.4 (19 Dec 2018 09:06), Max: 37.6 (18 Dec 2018 21:11)  T(F): 97.5 (19 Dec 2018 09:06), Max: 99.6 (18 Dec 2018 21:11)  HR: 75 (19 Dec 2018 09:06) (71 - 86)  BP: 105/51 (19 Dec 2018 09:06) (105/51 - 138/70)  BP(mean): --  ABP: --  ABP(mean): --  RR: 18 (19 Dec 2018 09:06) (18 - 19)  SpO2: 95% (19 Dec 2018 09:06) (95% - 100%)           Input and Output:  I&O's Detail    18 Dec 2018 07:01  -  19 Dec 2018 07:00  --------------------------------------------------------  IN:    Oral Fluid: 350 mL  Total IN: 350 mL    OUT:    Indwelling Catheter - Urethral: 400 mL    Voided: 250 mL  Total OUT: 650 mL    Total NET: -300 mL          Lab Data    12-18    139  |  109<H>  |  49<H>  ----------------------------<  257<H>  5.3   |  21<L>  |  1.50<H>    Ca    9.1      18 Dec 2018 08:41              Review of Systems	      Objective     Physical Examination    heart s1s2  lung dec BS  abd soft      Pertinent Lab findings & Imaging      Aly:  NO   Adequate UO     I&O's Detail    18 Dec 2018 07:01  -  19 Dec 2018 07:00  --------------------------------------------------------  IN:    Oral Fluid: 350 mL  Total IN: 350 mL    OUT:    Indwelling Catheter - Urethral: 400 mL    Voided: 250 mL  Total OUT: 650 mL    Total NET: -300 mL               Discussed with:     Cultures:	        Radiology

## 2018-12-19 NOTE — PROGRESS NOTE ADULT - SUBJECTIVE AND OBJECTIVE BOX
SENAIT JHAVERI is a yMale , patient examined and chart reviewed.     INTERVAL HPI/ OVERNIGHT EVENTS:   Afebrile. No events.  Some abd discomfort.   Denies pain currently.    PAST MEDICAL & SURGICAL HISTORY:  CKD (chronic kidney disease)  Hepatitis  CAD (coronary artery disease)  HTN (hypertension)  DM (diabetes mellitus)  Artificial cardiac pacemaker  AICD (automatic cardioverter/defibrillator) present      For details regarding the patient's social history, family history, and other miscellaneous elements, please refer the initial infectious diseases consultation and/or the admitting history and physical examination for this admission.    ROS:  CONSTITUTIONAL:  Negative fever or chills  EYES:  Negative  blurry vision or double vision  CARDIOVASCULAR:  Negative for chest pain or palpitations  RESPIRATORY:  Negative for cough, wheezing, or SOB   GASTROINTESTINAL:  Negative for nausea, vomiting, diarrhea, constipation, or abdominal pain  GENITOURINARY:  Negative frequency, urgency or dysuria  NEUROLOGIC:  No headache, confusion, dizziness, lightheadedness  All other systems were reviewed and are negative       Current inpatient medications :    ANTIBIOTICS/RELEVANT:    MEDICATIONS  (STANDING):  albumin human 25% IVPB 50 milliLiter(s) IV Intermittent every 6 hours  ALBUTerol    0.083% 2.5 milliGRAM(s) Nebulizer every 6 hours  dextrose 5%. 1000 milliLiter(s) (50 mL/Hr) IV Continuous <Continuous>  dextrose 50% Injectable 12.5 Gram(s) IV Push once  insulin glargine Injectable (LANTUS) 10 Unit(s) SubCutaneous at bedtime  insulin lispro (HumaLOG) corrective regimen sliding scale   SubCutaneous three times a day before meals  ketoconazole 2% Cream 1 Application(s) Topical at bedtime  levothyroxine 88 MICROGram(s) Oral daily  metoprolol succinate ER 25 milliGRAM(s) Oral daily  midodrine 5 milliGRAM(s) Oral every 8 hours  spironolactone 25 milliGRAM(s) Oral daily  tamsulosin 0.4 milliGRAM(s) Oral at bedtime    MEDICATIONS  (PRN):  dextrose 40% Gel 15 Gram(s) Oral once PRN Blood Glucose LESS THAN 70 milliGRAM(s)/deciliter  glucagon  Injectable 1 milliGRAM(s) IntraMuscular once PRN Glucose LESS THAN 70 milligrams/deciliter      Objective:  Vital Signs Last 24 Hrs  T(C): 36.4 (19 Dec 2018 09:06), Max: 37.6 (18 Dec 2018 21:11)  T(F): 97.5 (19 Dec 2018 09:06), Max: 99.6 (18 Dec 2018 21:11)  HR: 75 (19 Dec 2018 09:06) (71 - 86)  BP: 105/51 (19 Dec 2018 09:06) (105/51 - 138/70)  RR: 18 (19 Dec 2018 09:06) (18 - 19)  SpO2: 95% (19 Dec 2018 09:06) (95% - 100%)    Physical Exam:  General:  no acute distress  Eyes: sclera anicteric, pupils equal and reactive to light  ENMT: buccal mucosa moist, pharynx not injected  Neck: supple, trachea midline  Lungs: clear, no wheeze/rhonchi  Cardiovascular: regular rate and rhythm, S1 S2  Abdomen: soft, nontender, distended, bowel sounds normal  Neurological: alert and oriented x3, Cranial Nerves II-XII grossly intact  Skin: no increased ecchymosis/petechiae/purpura  Lymph Nodes: no palpable cervical/supraclavicular lymph nodes enlargements  Extremities: no cyanosis/clubbing/edema      LABS:                                   9.1    10.74 )-----------( 131      ( 17 Dec 2018 07:22 )             25.9   12-18    139  |  109<H>  |  49<H>  ----------------------------<  257<H>  5.3   |  21<L>  |  1.50<H>    Ca    9.1      18 Dec 2018 08:41  Phos  3.4     12-17  Mg     2.3     12-17    TPro  5.9<L>  /  Alb  2.5<L>  /  TBili  2.4<H>  /  DBili  x   /  AST  38  /  ALT  39  /  AlkPhos  58  12-17      MICROBIOLOGY:    Culture - Acid Fast - Body Fluid w/Smear (collected 13 Dec 2018 21:14)  Source: Ascites Fl Ascites Fluid    Culture - Urine (collected 12 Dec 2018 21:41)  Source: .Urine Clean Catch (Midstream)  Final Report (13 Dec 2018 22:16):    No growth    Culture - Blood (collected 12 Dec 2018 15:32)  Source: .Blood Blood-Peripheral  Preliminary Report (13 Dec 2018 16:01):    No growth to date.    Culture - Blood (collected 12 Dec 2018 15:32)  Source: .Blood Blood-Peripheral  Preliminary Report (13 Dec 2018 16:01):    No growth to date.    Assessment :   84 yo Chinese male with hx of CAD S/P stent, S/P PPM, HTN, CKD, and Hepatitis A presented to   the hospital with CC of fever at home and abdominal distention found to have abn LFTs  No documented fever in the hospital  ANDREEA vs CKD  Liver cirrhosis poss BLANTON  sp paracentesis unfortunately no cultures sent  However doubt SBP  Thrombocytopenia sec liver cirrhosis  Of note Lymes titers positive likely false positive - symptoms and presentation not c/w lymes- unclear   why it was ordered in the first place  All cultures neg  Overall stable    Plan :   Monktor off antibiotics  Trend wbc and lfts  GI following  Stable from ID standpoint  Will sign off   Ppls reconsult if needed    Continue with present regiment.  Appropriate use of antibiotics and adverse effects reviewed.      I have discussed the above plan of care with patient/ family in detail. They expressed understanding of the  treatment plan . Risks, benefits and alternatives discussed in detail. I have asked if they have any questions or concerns and appropriately addressed them to the best of my ability .    > 25 minutes were spent in direct patient care reviewing notes, medications ,labs data/ imaging , discussion with multidisciplinary team.    Thank you for allowing me to participate in care of your patient .    Swetha Lagos MD  Infectious Disease  [phone]

## 2018-12-19 NOTE — PROGRESS NOTE ADULT - SUBJECTIVE AND OBJECTIVE BOX
Patient is a 83y old  Male who presents with a chief complaint of fever (20 Dec 2018 15:32)        HPI:  Patient is 82 yo male with hx of CAD S/P stent, S/P PPM, HTN, CKD, and Hepatitis presenting with fever that has been going on for the past several days, and progressively worsen.  + generalized weakness, cough, and decrease po intake X 2 weeks.  Daughter noticed abdominal distention with increase abdominal pain.  Denies any headache, dizziness, blurry vision, chest pain, SOB, N/V/D, numbness, or weakness.      + Back pain (12 Dec 2018 18:15)      SUBJECTIVE & OBJECTIVE: Pt seen and examined at bedside. no acutecomplaitns     PHYSICAL EXAM:  T(C): 36.8 (12-20-18 @ 13:50), Max: 37 (12-20-18 @ 09:25)  HR: 80 (12-20-18 @ 14:02) (72 - 83)  BP: 117/62 (12-20-18 @ 13:50) (103/66 - 136/70)  RR: 18 (12-20-18 @ 13:50) (18 - 20)  SpO2: 98% (12-20-18 @ 13:50) (97% - 100%)  Wt(kg): --   GENERAL: NAD, well-groomed, well-developed  HEAD:  Atraumatic, Normocephalic  EYES: EOMI, PERRLA, conjunctiva and sclera clear  ENMT: Moist mucous membranes  NECK: Supple, No JVD  NERVOUS SYSTEM:  Alert & Oriented X3, Motor Strength 5/5 B/L upper and lower extremities; DTRs 2+ intact and symmetric  CHEST/LUNG: Clear to auscultation bilaterally; No rales, rhonchi, wheezing, or rubs  HEART: Regular rate and rhythm; No murmurs, rubs, or gallops  ABDOMEN: Soft, Nontender, Nondistended; Bowel sounds present  EXTREMITIES:  2+ Peripheral Pulses, No clubbing, cyanosis, or edema        MEDICATIONS  (STANDING):  ALBUTerol    0.083% 2.5 milliGRAM(s) Nebulizer every 6 hours  clopidogrel Tablet 75 milliGRAM(s) Oral daily  dextrose 5%. 1000 milliLiter(s) (50 mL/Hr) IV Continuous <Continuous>  dextrose 50% Injectable 12.5 Gram(s) IV Push once  dicyclomine 10 milliGRAM(s) Oral three times a day before meals  furosemide   Injectable 60 milliGRAM(s) IV Push once  insulin glargine Injectable (LANTUS) 10 Unit(s) SubCutaneous at bedtime  insulin lispro (HumaLOG) corrective regimen sliding scale   SubCutaneous three times a day before meals  ketoconazole 2% Cream 1 Application(s) Topical at bedtime  levothyroxine 88 MICROGram(s) Oral daily  metoprolol succinate ER 25 milliGRAM(s) Oral daily  midodrine 5 milliGRAM(s) Oral every 8 hours  pantoprazole    Tablet 40 milliGRAM(s) Oral before breakfast  spironolactone 50 milliGRAM(s) Oral daily  tamsulosin 0.4 milliGRAM(s) Oral at bedtime    MEDICATIONS  (PRN):  dextrose 40% Gel 15 Gram(s) Oral once PRN Blood Glucose LESS THAN 70 milliGRAM(s)/deciliter  glucagon  Injectable 1 milliGRAM(s) IntraMuscular once PRN Glucose LESS THAN 70 milligrams/deciliter      LABS:                        7.4    8.11  )-----------( 111      ( 20 Dec 2018 08:24 )             21.4     12-20    140  |  108  |  49<H>  ----------------------------<  214<H>  5.6<H>   |  26  |  1.43<H>    Ca    9.4      20 Dec 2018 08:24    TPro  5.6<L>  /  Alb  2.9<L>  /  TBili  3.3<H>  /  DBili  x   /  AST  26  /  ALT  26  /  AlkPhos  41  12-20          CAPILLARY BLOOD GLUCOSE      POCT Blood Glucose.: 330 mg/dL (20 Dec 2018 12:17)  POCT Blood Glucose.: 219 mg/dL (20 Dec 2018 08:04)  POCT Blood Glucose.: 290 mg/dL (19 Dec 2018 21:53)  POCT Blood Glucose.: 270 mg/dL (19 Dec 2018 16:42)      CAPILLARY BLOOD GLUCOSE      POCT Blood Glucose.: 330 mg/dL (20 Dec 2018 12:17)  POCT Blood Glucose.: 219 mg/dL (20 Dec 2018 08:04)  POCT Blood Glucose.: 290 mg/dL (19 Dec 2018 21:53)  POCT Blood Glucose.: 270 mg/dL (19 Dec 2018 16:42)    CAPILLARY BLOOD GLUCOSE      POCT Blood Glucose.: 330 mg/dL (20 Dec 2018 12:17)            RECENT CULTURES:      RADIOLOGY & ADDITIONAL TESTS:                        DVT/GI ppx  Discussed with pt @ bedside

## 2018-12-19 NOTE — CONSULT NOTE ADULT - ASSESSMENT
The patient is an 83 year old male with a history of HTN, DM, CAD s/p PCI 10 years ago, PPM, cirrhosis who was admitted with abdominal pain.    Plan:  - Continue clopidogrel 75 mg daily  - Discontinue prasugrel  - On spironolactone for ascites  - Continue metoprolol succinate 25 mg daily  - Ok for discharge from a cardiac perspective

## 2018-12-19 NOTE — PROGRESS NOTE ADULT - SUBJECTIVE AND OBJECTIVE BOX
Patient is a 83y old  Male who presents with a chief complaint of fever (18 Dec 2018 08:36)      Patient seen in follow up for ANDREEA. Family at bedside.     PAST MEDICAL HISTORY:  CKD (chronic kidney disease)  Hepatitis  CAD (coronary artery disease)  HTN (hypertension)  DM (diabetes mellitus)    MEDICATIONS  (STANDING):  albumin human 25% IVPB 50 milliLiter(s) IV Intermittent every 6 hours  ALBUTerol    0.083% 2.5 milliGRAM(s) Nebulizer every 6 hours  clopidogrel Tablet 75 milliGRAM(s) Oral daily  clopidogrel Tablet 75 milliGRAM(s) Oral once  dextrose 5%. 1000 milliLiter(s) (50 mL/Hr) IV Continuous <Continuous>  dextrose 50% Injectable 12.5 Gram(s) IV Push once  dicyclomine 10 milliGRAM(s) Oral three times a day before meals  insulin glargine Injectable (LANTUS) 10 Unit(s) SubCutaneous at bedtime  insulin lispro (HumaLOG) corrective regimen sliding scale   SubCutaneous three times a day before meals  ketoconazole 2% Cream 1 Application(s) Topical at bedtime  levothyroxine 88 MICROGram(s) Oral daily  metoprolol succinate ER 25 milliGRAM(s) Oral daily  midodrine 5 milliGRAM(s) Oral every 8 hours  prasugrel 10 milliGRAM(s) Oral once  prasugrel 10 milliGRAM(s) Oral daily  spironolactone 25 milliGRAM(s) Oral daily  tamsulosin 0.4 milliGRAM(s) Oral at bedtime    MEDICATIONS  (PRN):  dextrose 40% Gel 15 Gram(s) Oral once PRN Blood Glucose LESS THAN 70 milliGRAM(s)/deciliter  glucagon  Injectable 1 milliGRAM(s) IntraMuscular once PRN Glucose LESS THAN 70 milligrams/deciliter    T(C): 36.4 (12-19-18 @ 09:06), Max: 37.6 (12-18-18 @ 21:11)  HR: 75 (12-19-18 @ 09:06) (71 - 86)  BP: 105/51 (12-19-18 @ 09:06) (105/51 - 145/62)  RR: 18 (12-19-18 @ 09:06)  SpO2: 95% (12-19-18 @ 09:06)  Wt(kg): --  I&O's Detail    18 Dec 2018 07:01  -  19 Dec 2018 07:00  --------------------------------------------------------  IN:    Oral Fluid: 350 mL  Total IN: 350 mL    OUT:    Indwelling Catheter - Urethral: 400 mL    Voided: 250 mL  Total OUT: 650 mL    Total NET: -300 mL              PHYSICAL EXAM:  General: NAD  Respiratory: b/l air entry  Cardiovascular: S1 S2  Gastrointestinal: distended  Extremities:  no edema                      LABORATORY:    12-18    139  |  109<H>  |  49<H>  ----------------------------<  257<H>  5.3   |  21<L>  |  1.50<H>    Ca    9.1      18 Dec 2018 08:41      Sodium, Serum: 139 mmol/L (12-18 @ 08:41)    Potassium, Serum: 5.3 mmol/L (12-18 @ 08:41)    Hemoglobin: 9.1 g/dL (12-17 @ 07:22)    Creatinine, Serum 1.50 (12-18 @ 08:41)  Creatinine, Serum 1.90 (12-17 @ 07:22)

## 2018-12-19 NOTE — PROGRESS NOTE ADULT - SUBJECTIVE AND OBJECTIVE BOX
· Subjective and Objective: 	  INTERVAL HPI/OVERNIGHT EVENTS:  no fever documented  complains of stomach pain but found sleeping  No acute events over night      MEDICATIONS  (STANDING):  albumin human 25% IVPB 50 milliLiter(s) IV Intermittent every 6 hours  ALBUTerol    0.083% 2.5 milliGRAM(s) Nebulizer every 6 hours  dextrose 5%. 1000 milliLiter(s) (50 mL/Hr) IV Continuous <Continuous>  insulin glargine Injectable (LANTUS) 10 Unit(s) SubCutaneous at bedtime  insulin lispro (HumaLOG) corrective regimen sliding scale   SubCutaneous three times a day before meals  ketoconazole 2% Cream 1 Application(s) Topical at bedtime  levothyroxine 88 MICROGram(s) Oral daily  metoprolol succinate ER 25 milliGRAM(s) Oral daily  midodrine 5 milliGRAM(s) Oral every 8 hours  spironolactone 25 milliGRAM(s) Oral daily  tamsulosin 0.4 milliGRAM(s) Oral at bedtime    T, P, R, SpO2, BP      T, P, R, SpO2, BP    Temperature  Temp (F): 99.4 Degrees F  Temp (C) Temp (C): 37.4 Degrees C  Temp site Temp Site: oral    Heart Rate  Heart Rate Heart Rate (beats/min): 86 /min  Heart Rate Method: noninvasive blood pressure monitor    Noninvasive Blood Pressure  BP Systolic Systolic: 128 mm Hg  BP Diastolic Diastolic (mm Hg): 68 mm Hg  Blood Pressure - Site Site: left upper arm  Blood Pressure - Method Method: electronic    Respiratory/Pulse Oximetry  Respiration Rate (breaths/min) Respiration Rate (breaths/min): 18 /min  SpO2 (%) SpO2 (%): 98 %          Physical exam:  HEENT awake alert NAD pos icteric   abd softly distended gen mild tender ruq. no rebound no guarding   cv s1s2 rrr  chest CTA b/l   ext no edema    LABS: pending                         9.1    10.74 )-----------( 131      ( 17 Dec 2018 07:22 )             25.9     12-18    139  |  109<H>  |  49<H>  ----------------------------<  257<H>  5.3   |  21<L>  |  1.50<H>    Ca    9.1      18 Dec 2018 08:41  Phos  3.4     12-17  Mg     2.3     12-17    TPro  5.9<L>  /  Alb  2.5<L>  /  TBili  2.4<H>  /  DBili  x   /  AST  38  /  ALT  39  /  AlkPhos  58  12-17          RADIOLOGY & ADDITIONAL TESTS:      Assessment and Plan:   · Assessment		  		  83 m w/ above hx a/w fevers, back pain and distended abdomen  crypotgenic cirrhosis   lft elevated, ascites and abnormal appearing gb on ct/sono  -s/p Paracentesis 60cc turbid fluid removed. low total protein c/w cirrhosis (and low plt). Does not meet criteria for SBP    Hep B/C serologies negative.   increase aldactone to 100 mg   needs egd   2 gram sodium diet  check afp

## 2018-12-19 NOTE — PROGRESS NOTE ADULT - ASSESSMENT
Patient is 84 yo male with hx of CAD S/P stent, S/P PPM, HTN, BPH, CKD, Hepatitis, and Dm2 presenting with     1. Fever.  Unclear Etiology.  ??Abdm source.  abdm/Chest/pelvic CT scan noticed.   -UC and Blood culture are negative  -Ascite fluid culture negative    will d/c zosyn  -Abdm US shows Cholelithiasis with thickened gallbladder wall. See discussion above. No biliary dilatation. Echodense liver consistent with steatosis/hepatocellular disease.  Medical renal disease. Moderate ascites  -HIDA scan shows no acute process.   diagnostic paracenteses ruled out SPB  etiology of liver cirrhosis not clear  hepatitis panel negative  would need liver biospy as outpt  repeat ct abdominal, moderate ascites+, discussed with  IR , the asciting fluid is mild and cant be drained  will continue o nmedical managment   d/c planning, discussd with damien bolanos to take him home and wants discuss gi    2. anemia, etiology not clear  iron studies/ferritin  gi f/u  started on PPI    2.  Abnormal LFT.  Hx of Hepatitis.  Hepatitis panel negative.  Avoid nephrotoxin  -Monitor LFT  -GI to follow up     3. ANDREEA on CKD with hyponatremia.  Seems to be secondary dehydration.  Lu cath.  Continue with IVF, and Monitor Renal function  -Improving  -Nephrology to follow up     4. Hyperkalemia  likely secondary to medication induced  has resolved      5. HTN/CAD.  Continue with metoprolol.  Hold plavix in anticipation for any procedure.       6.  AMS.  Seems to be secondary to metabolic encephalopathy due to infection, hepatic encephalopathy and Renal failure.  Head CT scan shows no acute process.   continue with neuro check.    -Improving.  -Continue with lactulose, and Monitor ammonia level       7.  HTN.  Continue with metoprolol with holding parameter.  Monitor BP      8.  DM2.  Hold Lantus ISS, and Monitor POC    8.  Diet.  Continue with current diet as per speech therapy    9.  Back pain.  X-ray of the lumbar spine shows degenerative disease.  continue with pain management.  PT consult      Plan of care was discussed with patient, and family in great details, All questions were answered to their satisfaction  Seems to understand, and in agreement  Prognosis guarded  DVT proph compression stocking in the setting of thrombocytopenia

## 2018-12-20 LAB
AFP-TM SERPL-MCNC: 1.7 NG/ML — SIGNIFICANT CHANGE UP
ALBUMIN SERPL ELPH-MCNC: 2.9 G/DL — LOW (ref 3.3–5)
ALP SERPL-CCNC: 41 U/L — SIGNIFICANT CHANGE UP (ref 30–120)
ALT FLD-CCNC: 26 U/L DA — SIGNIFICANT CHANGE UP (ref 10–60)
ANION GAP SERPL CALC-SCNC: 6 MMOL/L — SIGNIFICANT CHANGE UP (ref 5–17)
AST SERPL-CCNC: 26 U/L — SIGNIFICANT CHANGE UP (ref 10–40)
BILIRUB SERPL-MCNC: 3.3 MG/DL — HIGH (ref 0.2–1.2)
BLD GP AB SCN SERPL QL: SIGNIFICANT CHANGE UP
BUN SERPL-MCNC: 49 MG/DL — HIGH (ref 7–23)
CALCIUM SERPL-MCNC: 9.4 MG/DL — SIGNIFICANT CHANGE UP (ref 8.4–10.5)
CHLORIDE SERPL-SCNC: 108 MMOL/L — SIGNIFICANT CHANGE UP (ref 96–108)
CO2 SERPL-SCNC: 26 MMOL/L — SIGNIFICANT CHANGE UP (ref 22–31)
CREAT SERPL-MCNC: 1.43 MG/DL — HIGH (ref 0.5–1.3)
FERRITIN SERPL-MCNC: 352 NG/ML — SIGNIFICANT CHANGE UP (ref 30–400)
GLUCOSE SERPL-MCNC: 214 MG/DL — HIGH (ref 70–99)
HCT VFR BLD CALC: 21.4 % — LOW (ref 39–50)
HGB BLD-MCNC: 7.4 G/DL — LOW (ref 13–17)
MCHC RBC-ENTMCNC: 33.3 PG — SIGNIFICANT CHANGE UP (ref 27–34)
MCHC RBC-ENTMCNC: 34.6 GM/DL — SIGNIFICANT CHANGE UP (ref 32–36)
MCV RBC AUTO: 96.4 FL — SIGNIFICANT CHANGE UP (ref 80–100)
NRBC # BLD: 0 /100 WBCS — SIGNIFICANT CHANGE UP (ref 0–0)
PLATELET # BLD AUTO: 111 K/UL — LOW (ref 150–400)
POTASSIUM SERPL-MCNC: 5.6 MMOL/L — HIGH (ref 3.5–5.3)
POTASSIUM SERPL-SCNC: 5.6 MMOL/L — HIGH (ref 3.5–5.3)
PROT SERPL-MCNC: 5.6 G/DL — LOW (ref 6–8.3)
RBC # BLD: 2.22 M/UL — LOW (ref 4.2–5.8)
RBC # FLD: 13.6 % — SIGNIFICANT CHANGE UP (ref 10.3–14.5)
SODIUM SERPL-SCNC: 140 MMOL/L — SIGNIFICANT CHANGE UP (ref 135–145)
WBC # BLD: 8.11 K/UL — SIGNIFICANT CHANGE UP (ref 3.8–10.5)
WBC # FLD AUTO: 8.11 K/UL — SIGNIFICANT CHANGE UP (ref 3.8–10.5)

## 2018-12-20 PROCEDURE — 99232 SBSQ HOSP IP/OBS MODERATE 35: CPT

## 2018-12-20 RX ORDER — FUROSEMIDE 40 MG
60 TABLET ORAL ONCE
Qty: 0 | Refills: 0 | Status: COMPLETED | OUTPATIENT
Start: 2018-12-20 | End: 2018-12-20

## 2018-12-20 RX ORDER — SPIRONOLACTONE 25 MG/1
50 TABLET, FILM COATED ORAL DAILY
Qty: 0 | Refills: 0 | Status: DISCONTINUED | OUTPATIENT
Start: 2018-12-20 | End: 2018-12-21

## 2018-12-20 RX ADMIN — SPIRONOLACTONE 25 MILLIGRAM(S): 25 TABLET, FILM COATED ORAL at 06:52

## 2018-12-20 RX ADMIN — TAMSULOSIN HYDROCHLORIDE 0.4 MILLIGRAM(S): 0.4 CAPSULE ORAL at 21:25

## 2018-12-20 RX ADMIN — MIDODRINE HYDROCHLORIDE 5 MILLIGRAM(S): 2.5 TABLET ORAL at 06:52

## 2018-12-20 RX ADMIN — Medication 4: at 08:17

## 2018-12-20 RX ADMIN — Medication 8: at 12:29

## 2018-12-20 RX ADMIN — Medication 8: at 16:41

## 2018-12-20 RX ADMIN — ALBUTEROL 2.5 MILLIGRAM(S): 90 AEROSOL, METERED ORAL at 19:09

## 2018-12-20 RX ADMIN — ALBUTEROL 2.5 MILLIGRAM(S): 90 AEROSOL, METERED ORAL at 07:40

## 2018-12-20 RX ADMIN — CLOPIDOGREL BISULFATE 75 MILLIGRAM(S): 75 TABLET, FILM COATED ORAL at 12:26

## 2018-12-20 RX ADMIN — PANTOPRAZOLE SODIUM 40 MILLIGRAM(S): 20 TABLET, DELAYED RELEASE ORAL at 06:53

## 2018-12-20 RX ADMIN — ALBUTEROL 2.5 MILLIGRAM(S): 90 AEROSOL, METERED ORAL at 01:06

## 2018-12-20 RX ADMIN — MIDODRINE HYDROCHLORIDE 5 MILLIGRAM(S): 2.5 TABLET ORAL at 13:03

## 2018-12-20 RX ADMIN — SPIRONOLACTONE 50 MILLIGRAM(S): 25 TABLET, FILM COATED ORAL at 12:47

## 2018-12-20 RX ADMIN — KETOCONAZOLE 1 APPLICATION(S): 20 AEROSOL, FOAM TOPICAL at 21:24

## 2018-12-20 RX ADMIN — Medication 88 MICROGRAM(S): at 06:52

## 2018-12-20 RX ADMIN — INSULIN GLARGINE 10 UNIT(S): 100 INJECTION, SOLUTION SUBCUTANEOUS at 21:25

## 2018-12-20 RX ADMIN — ALBUTEROL 2.5 MILLIGRAM(S): 90 AEROSOL, METERED ORAL at 14:02

## 2018-12-20 RX ADMIN — Medication 60 MILLIGRAM(S): at 17:17

## 2018-12-20 RX ADMIN — MIDODRINE HYDROCHLORIDE 5 MILLIGRAM(S): 2.5 TABLET ORAL at 21:31

## 2018-12-20 RX ADMIN — Medication 10 MILLIGRAM(S): at 16:42

## 2018-12-20 RX ADMIN — Medication 10 MILLIGRAM(S): at 06:53

## 2018-12-20 RX ADMIN — Medication 25 MILLIGRAM(S): at 06:53

## 2018-12-20 RX ADMIN — MORPHINE SULFATE 1 MILLIGRAM(S): 50 CAPSULE, EXTENDED RELEASE ORAL at 00:18

## 2018-12-20 RX ADMIN — Medication 10 MILLIGRAM(S): at 12:26

## 2018-12-20 NOTE — PROGRESS NOTE ADULT - SUBJECTIVE AND OBJECTIVE BOX
Chief Complaint: Abdominal pain    Interval Events: No events overnight.    Review of Systems:  General: No fevers, chills, weight loss or gain  Skin: No rashes, color changes  Cardiovascular: No chest pain, orthopnea  Respiratory: No shortness of breath, cough  Gastrointestinal: No nausea, abdominal pain  Genitourinary: No incontinence, pain with urination  Musculoskeletal: No pain, swelling, decreased range of motion  Neurological: No headache, weakness  Psychiatric: No depression, anxiety  Endocrine: No weight loss or gain, increased thirst  All other systems are comprehensively negative.    Physical Exam:  Vitals:        Vital Signs Last 24 Hrs  T(C): 37 (20 Dec 2018 09:25), Max: 37 (20 Dec 2018 09:25)  T(F): 98.6 (20 Dec 2018 09:25), Max: 98.6 (20 Dec 2018 09:25)  HR: 82 (20 Dec 2018 09:25) (76 - 83)  BP: 125/62 (20 Dec 2018 09:25) (103/66 - 136/70)  BP(mean): --  RR: 18 (20 Dec 2018 09:25) (18 - 20)  SpO2: 98% (20 Dec 2018 09:25) (96% - 100%)  General: NAD  HEENT: MMM  Neck: No JVD, no carotid bruit  Lungs: CTAB  CV: RRR, nl S1/S2, no M/R/G  Abdomen: S/NT/ND, +BS  Extremities: No LE edema, no cyanosis  Neuro: AAOx3, non-focal  Skin: No rash    Labs:                        7.4    8.11  )-----------( 111      ( 20 Dec 2018 08:24 )             21.4     12-20    140  |  108  |  49<H>  ----------------------------<  214<H>  5.6<H>   |  26  |  1.43<H>    Ca    9.4      20 Dec 2018 08:24    TPro  5.6<L>  /  Alb  2.9<L>  /  TBili  3.3<H>  /  DBili  x   /  AST  26  /  ALT  26  /  AlkPhos  41  12-20            Telemetry: Sinus rhythm

## 2018-12-20 NOTE — CONSULT NOTE ADULT - ASSESSMENT
Cirrhosis with ascites-etiology suspected to be Non alcoholic steato-hepatitis (fatty liver)  s/p paracentesis  HIDA negative  abdominal discomfort  anemia  normal AFP      hgb lower but stable, no overt gi bleeding   aldactone to 50mg po daily-Low Na diet  outpt EGD to be arranged   acid suppression  repeat CT a/p n oted- ascites not amenable to paracentesis. cont w/ medical diuresis.   outpatient fibroscan - bx as indicated   d/c planning

## 2018-12-20 NOTE — PROGRESS NOTE ADULT - SUBJECTIVE AND OBJECTIVE BOX
Patient is a 83y old  Male who presents with a chief complaint of fever (18 Dec 2018 08:36)      Patient seen in follow up for ANDREEA. Family at bedside. Low h/h.     PAST MEDICAL HISTORY:  CKD (chronic kidney disease)  Hepatitis  CAD (coronary artery disease)  HTN (hypertension)  DM (diabetes mellitus)    MEDICATIONS  (STANDING):  ALBUTerol    0.083% 2.5 milliGRAM(s) Nebulizer every 6 hours  clopidogrel Tablet 75 milliGRAM(s) Oral daily  dextrose 5%. 1000 milliLiter(s) (50 mL/Hr) IV Continuous <Continuous>  dextrose 50% Injectable 12.5 Gram(s) IV Push once  dicyclomine 10 milliGRAM(s) Oral three times a day before meals  insulin glargine Injectable (LANTUS) 10 Unit(s) SubCutaneous at bedtime  insulin lispro (HumaLOG) corrective regimen sliding scale   SubCutaneous three times a day before meals  ketoconazole 2% Cream 1 Application(s) Topical at bedtime  levothyroxine 88 MICROGram(s) Oral daily  metoprolol succinate ER 25 milliGRAM(s) Oral daily  midodrine 5 milliGRAM(s) Oral every 8 hours  pantoprazole    Tablet 40 milliGRAM(s) Oral before breakfast  spironolactone 50 milliGRAM(s) Oral daily  tamsulosin 0.4 milliGRAM(s) Oral at bedtime    MEDICATIONS  (PRN):  dextrose 40% Gel 15 Gram(s) Oral once PRN Blood Glucose LESS THAN 70 milliGRAM(s)/deciliter  glucagon  Injectable 1 milliGRAM(s) IntraMuscular once PRN Glucose LESS THAN 70 milligrams/deciliter    T(C): 37 (12-20-18 @ 09:25), Max: 37.6 (12-18-18 @ 21:11)  HR: 82 (12-20-18 @ 09:25) (71 - 86)  BP: 125/62 (12-20-18 @ 09:25) (103/66 - 138/70)  RR: 18 (12-20-18 @ 09:25)  SpO2: 98% (12-20-18 @ 09:25)  Wt(kg): --  I&O's Detail    19 Dec 2018 07:01  -  20 Dec 2018 07:00  --------------------------------------------------------  IN:    Oral Fluid: 750 mL  Total IN: 750 mL    OUT:    Voided: 300 mL  Total OUT: 300 mL    Total NET: 450 mL                PHYSICAL EXAM:  General: NAD  Respiratory: b/l air entry  Cardiovascular: S1 S2  Gastrointestinal: distended  Extremities:  no edema                     LABORATORY:                        7.4    8.11  )-----------( 111      ( 20 Dec 2018 08:24 )             21.4     12-20    140  |  108  |  49<H>  ----------------------------<  214<H>  5.6<H>   |  26  |  1.43<H>    Ca    9.4      20 Dec 2018 08:24    TPro  5.6<L>  /  Alb  2.9<L>  /  TBili  3.3<H>  /  DBili  x   /  AST  26  /  ALT  26  /  AlkPhos  41  12-20    Sodium, Serum: 140 mmol/L (12-20 @ 08:24)  Sodium, Serum: 133 mmol/L (12-19 @ 14:42)  Sodium, Serum: 134 mmol/L (12-19 @ 12:55)    Potassium, Serum: 5.6 mmol/L (12-20 @ 08:24)  Potassium, Serum: 5.6 mmol/L (12-19 @ 14:42)  Potassium, Serum: 5.6 mmol/L (12-19 @ 12:55)    Hemoglobin: 7.4 g/dL (12-20 @ 08:24)  Hemoglobin: 7.4 g/dL (12-19 @ 14:42)  Hemoglobin: 7.5 g/dL (12-19 @ 12:55)    Creatinine, Serum 1.43 (12-20 @ 08:24)  Creatinine, Serum 1.57 (12-19 @ 14:42)  Creatinine, Serum 1.56 (12-19 @ 12:55)  Creatinine, Serum 1.50 (12-18 @ 08:41)        LIVER FUNCTIONS - ( 20 Dec 2018 08:24 )  Alb: 2.9 g/dL / Pro: 5.6 g/dL / ALK PHOS: 41 U/L / ALT: 26 U/L DA / AST: 26 U/L / GGT: x

## 2018-12-20 NOTE — PROGRESS NOTE ADULT - ASSESSMENT
Patient is 82 yo male with hx of CAD S/P stent, S/P PPM, HTN, BPH, CKD, Hepatitis, and Dm2 presenting with     1. Fever.  Unclear Etiology.  ??Abdm source.  abdm/Chest/pelvic CT scan noticed.   -UC and Blood culture are negative  -Ascite fluid culture negative    will d/c zosyn  -Abdm US shows Cholelithiasis with thickened gallbladder wall. See discussion above. No biliary dilatation. Echodense liver consistent with steatosis/hepatocellular disease.  Medical renal disease. Moderate ascites  -HIDA scan shows no acute process.   diagnostic paracenteses ruled out SPB  etiology of liver cirrhosis not clear  hepatitis panel negative  would need liver biospy as outpt  repeat ct abdominal, moderate ascites+, discussed with  IR , the asciting fluid is mild and cant be drained  will continue o nmedical managment   d/c planning, discussd with damien bolanos to take him home and wants discuss gi    2. anemia   etiology not clear, on repaet HH 7.4  would transfuse 1 unit prbc  post prbc one time lasix  monitor I/O    2.  Abnormal LFT.  Hx of Hepatitis.  Hepatitis panel negative.  Avoid nephrotoxin  -Monitor LFT  -GI to follow up     3. ANDREEA on CKD with hyponatremia.  Seems to be secondary dehydration.  Lu cath.  Continue with IVF, and Monitor Renal function  -Improving  -Nephrology to follow up     4. Hyperkalemia  likely secondary to medication induced  has resolved      5. HTN/CAD.  Continue with metoprolol.  Hold plavix in anticipation for any procedure.       6.  AMS.  Seems to be secondary to metabolic encephalopathy due to infection, hepatic encephalopathy and Renal failure.  Head CT scan shows no acute process.   continue with neuro check.    -Improving.  -Continue with lactulose, and Monitor ammonia level       7.  HTN.  Continue with metoprolol with holding parameter.  Monitor BP      8.  DM2.  Hold Lantus ISS, and Monitor POC    8.  Diet.  Continue with current diet as per speech therapy    9.  Back pain.  X-ray of the lumbar spine shows degenerative disease.  continue with pain management.  PT consult      Plan of care was discussed with patient, and family in great details, All questions were answered to their satisfaction  Seems to understand, and in agreement  Prognosis guarded  DVT proph compression stocking in the setting of thrombocytopenia

## 2018-12-20 NOTE — PROGRESS NOTE ADULT - ASSESSMENT
1.	ANDREEA: Prerenal azotemia, ATN ( creatinine 1.8 09/2018)  2.	Hyponatremia  3.	Cirrhosis, Ascites  4.	Diabetes  5.	Hypertension  6.	Anemia    Stable renal function. PRBC transfusion. GI follow up. Monitor blood sugar levels. Insulin coverage as needed. Dietary restriction.   Sodium levels better. Low potassium diet. On aldactone. May need to lower dose if K remains high. D/w family at bedside.

## 2018-12-20 NOTE — PROGRESS NOTE ADULT - ASSESSMENT
The patient is an 83 year old male with a history of HTN, DM, CAD s/p PCI 10 years ago, PPM, cirrhosis who was admitted with abdominal pain.    Plan:  - Continue clopidogrel 75 mg daily  - Discontinue prasugrel  - On spironolactone for ascites  - Continue metoprolol succinate 25 mg daily  - Hemoglobin in 7s. GI follow-up.

## 2018-12-20 NOTE — PROGRESS NOTE ADULT - SUBJECTIVE AND OBJECTIVE BOX
Patient is a 83y old  Male who presents with a chief complaint of fever (20 Dec 2018 12:39)        HPI:  Patient is 84 yo male with hx of CAD S/P stent, S/P PPM, HTN, CKD, and Hepatitis presenting with fever that has been going on for the past several days, and progressively worsen.  + generalized weakness, cough, and decrease po intake X 2 weeks.  Daughter noticed abdominal distention with increase abdominal pain.  Denies any headache, dizziness, blurry vision, chest pain, SOB, N/V/D, numbness, or weakness.      + Back pain (12 Dec 2018 18:15)      SUBJECTIVE & OBJECTIVE: Pt seen and examined at bedside, anemic    PHYSICAL EXAM:  T(C): 36.8 (12-20-18 @ 13:50), Max: 37 (12-20-18 @ 09:25)  HR: 80 (12-20-18 @ 14:02) (72 - 83)  BP: 117/62 (12-20-18 @ 13:50) (103/66 - 136/70)  RR: 18 (12-20-18 @ 13:50) (18 - 20)  SpO2: 98% (12-20-18 @ 13:50) (97% - 100%)  Wt(kg): --   GENERAL: NAD, well-groomed, well-developed  HEAD:  Atraumatic, Normocephalic  EYES: EOMI, PERRLA, conjunctiva and sclera clear  ENMT: Moist mucous membranes  NECK: Supple, No JVD  NERVOUS SYSTEM:  Alert & Oriented X3, Motor Strength 5/5 B/L upper and lower extremities; DTRs 2+ intact and symmetric  CHEST/LUNG: Clear to auscultation bilaterally; No rales, rhonchi, wheezing, or rubs  HEART: Regular rate and rhythm; No murmurs, rubs, or gallops  ABDOMEN: distneded, ascites+  EXTREMITIES:  2+ Peripheral Pulses, No clubbing, cyanosis, or edema        MEDICATIONS  (STANDING):  ALBUTerol    0.083% 2.5 milliGRAM(s) Nebulizer every 6 hours  clopidogrel Tablet 75 milliGRAM(s) Oral daily  dextrose 5%. 1000 milliLiter(s) (50 mL/Hr) IV Continuous <Continuous>  dextrose 50% Injectable 12.5 Gram(s) IV Push once  dicyclomine 10 milliGRAM(s) Oral three times a day before meals  furosemide   Injectable 60 milliGRAM(s) IV Push once  insulin glargine Injectable (LANTUS) 10 Unit(s) SubCutaneous at bedtime  insulin lispro (HumaLOG) corrective regimen sliding scale   SubCutaneous three times a day before meals  ketoconazole 2% Cream 1 Application(s) Topical at bedtime  levothyroxine 88 MICROGram(s) Oral daily  metoprolol succinate ER 25 milliGRAM(s) Oral daily  midodrine 5 milliGRAM(s) Oral every 8 hours  pantoprazole    Tablet 40 milliGRAM(s) Oral before breakfast  spironolactone 50 milliGRAM(s) Oral daily  tamsulosin 0.4 milliGRAM(s) Oral at bedtime    MEDICATIONS  (PRN):  dextrose 40% Gel 15 Gram(s) Oral once PRN Blood Glucose LESS THAN 70 milliGRAM(s)/deciliter  glucagon  Injectable 1 milliGRAM(s) IntraMuscular once PRN Glucose LESS THAN 70 milligrams/deciliter      LABS:                        7.4    8.11  )-----------( 111      ( 20 Dec 2018 08:24 )             21.4     12-20    140  |  108  |  49<H>  ----------------------------<  214<H>  5.6<H>   |  26  |  1.43<H>    Ca    9.4      20 Dec 2018 08:24    TPro  5.6<L>  /  Alb  2.9<L>  /  TBili  3.3<H>  /  DBili  x   /  AST  26  /  ALT  26  /  AlkPhos  41  12-20          CAPILLARY BLOOD GLUCOSE      POCT Blood Glucose.: 330 mg/dL (20 Dec 2018 12:17)  POCT Blood Glucose.: 219 mg/dL (20 Dec 2018 08:04)  POCT Blood Glucose.: 290 mg/dL (19 Dec 2018 21:53)  POCT Blood Glucose.: 270 mg/dL (19 Dec 2018 16:42)      CAPILLARY BLOOD GLUCOSE      POCT Blood Glucose.: 330 mg/dL (20 Dec 2018 12:17)  POCT Blood Glucose.: 219 mg/dL (20 Dec 2018 08:04)  POCT Blood Glucose.: 290 mg/dL (19 Dec 2018 21:53)  POCT Blood Glucose.: 270 mg/dL (19 Dec 2018 16:42)    CAPILLARY BLOOD GLUCOSE      POCT Blood Glucose.: 330 mg/dL (20 Dec 2018 12:17)            RECENT CULTURES:      RADIOLOGY & ADDITIONAL TESTS:                        DVT/GI ppx  Discussed with pt @ bedside

## 2018-12-20 NOTE — PROGRESS NOTE ADULT - SUBJECTIVE AND OBJECTIVE BOX
Date/Time Patient Seen:  		  Referring MD:   Data Reviewed	       Patient is a 83y old  Male who presents with a chief complaint of fever (19 Dec 2018 12:34)  vs and meds reviewed      Subjective/HPI     PAST MEDICAL & SURGICAL HISTORY:  CKD (chronic kidney disease)  Hepatitis  CAD (coronary artery disease)  HTN (hypertension)  DM (diabetes mellitus)  Artificial cardiac pacemaker  AICD (automatic cardioverter/defibrillator) present  No significant past surgical history        Medication list         MEDICATIONS  (STANDING):  ALBUTerol    0.083% 2.5 milliGRAM(s) Nebulizer every 6 hours  clopidogrel Tablet 75 milliGRAM(s) Oral daily  dextrose 5%. 1000 milliLiter(s) (50 mL/Hr) IV Continuous <Continuous>  dextrose 50% Injectable 12.5 Gram(s) IV Push once  dicyclomine 10 milliGRAM(s) Oral three times a day before meals  insulin glargine Injectable (LANTUS) 10 Unit(s) SubCutaneous at bedtime  insulin lispro (HumaLOG) corrective regimen sliding scale   SubCutaneous three times a day before meals  ketoconazole 2% Cream 1 Application(s) Topical at bedtime  levothyroxine 88 MICROGram(s) Oral daily  metoprolol succinate ER 25 milliGRAM(s) Oral daily  midodrine 5 milliGRAM(s) Oral every 8 hours  pantoprazole    Tablet 40 milliGRAM(s) Oral before breakfast  spironolactone 25 milliGRAM(s) Oral daily  tamsulosin 0.4 milliGRAM(s) Oral at bedtime    MEDICATIONS  (PRN):  dextrose 40% Gel 15 Gram(s) Oral once PRN Blood Glucose LESS THAN 70 milliGRAM(s)/deciliter  glucagon  Injectable 1 milliGRAM(s) IntraMuscular once PRN Glucose LESS THAN 70 milligrams/deciliter         Vitals log        ICU Vital Signs Last 24 Hrs  T(C): 36.8 (20 Dec 2018 05:35), Max: 36.9 (19 Dec 2018 17:17)  T(F): 98.3 (20 Dec 2018 05:35), Max: 98.4 (19 Dec 2018 17:17)  HR: 79 (20 Dec 2018 07:40) (76 - 83)  BP: 119/63 (20 Dec 2018 05:35) (103/66 - 136/70)  BP(mean): --  ABP: --  ABP(mean): --  RR: 20 (20 Dec 2018 05:35) (18 - 20)  SpO2: 98% (20 Dec 2018 07:40) (96% - 100%)           Input and Output:  I&O's Detail    19 Dec 2018 07:01  -  20 Dec 2018 07:00  --------------------------------------------------------  IN:    Oral Fluid: 750 mL  Total IN: 750 mL    OUT:    Voided: 300 mL  Total OUT: 300 mL    Total NET: 450 mL          Lab Data                        7.4    8.11  )-----------( 111      ( 20 Dec 2018 08:24 )             21.4     12-20    140  |  108  |  49<H>  ----------------------------<  214<H>  5.6<H>   |  26  |  1.43<H>    Ca    9.4      20 Dec 2018 08:24    TPro  5.6<L>  /  Alb  2.9<L>  /  TBili  3.3<H>  /  DBili  x   /  AST  26  /  ALT  26  /  AlkPhos  41  12-20            Review of Systems	      Objective     Physical Examination    heart s1s2  lung dec BS  abd soft      Pertinent Lab findings & Imaging      Aly:  NO   Adequate UO     I&O's Detail    19 Dec 2018 07:01  -  20 Dec 2018 07:00  --------------------------------------------------------  IN:    Oral Fluid: 750 mL  Total IN: 750 mL    OUT:    Voided: 300 mL  Total OUT: 300 mL    Total NET: 450 mL               Discussed with:     Cultures:	        Radiology

## 2018-12-20 NOTE — CONSULT NOTE ADULT - SUBJECTIVE AND OBJECTIVE BOX
Chief Complaint:  Patient is a 83y old  Male who presents with a chief complaint of fever (13 Dec 2018 08:27)    CKD (chronic kidney disease)  Hepatitis  CAD (coronary artery disease)  HTN (hypertension)  DM (diabetes mellitus)  Artificial cardiac pacemaker  AICD (automatic cardioverter/defibrillator) present  No significant past surgical history     HPI:  Patient is 84 yo male with hx of CAD S/P stent, S/P PPM, HTN, CKD, and Hepatitis A (50 yr ago)presenting with fever that has been going on for the past several days, and progressively worsen.  + generalized weakness, cough, and decrease po intake X 2 weeks.  Daughter noticed abdominal distention with increase abdominal pain.  Denies any headache, dizziness, blurry vision, chest pain, SOB, N/V/D, numbness, or weakness.    daughter at bedside to help w/ interview  noted Ct/sono w/ thickened gb wall, gallstones and ascites      No Known Allergies      clopidogrel Tablet 75 milliGRAM(s) Oral daily  dextrose 40% Gel 15 Gram(s) Oral once PRN  dextrose 5%. 1000 milliLiter(s) IV Continuous <Continuous>  dextrose 50% Injectable 12.5 Gram(s) IV Push once  glucagon  Injectable 1 milliGRAM(s) IntraMuscular once PRN  insulin lispro (HumaLOG) corrective regimen sliding scale   SubCutaneous every 6 hours  lactulose Syrup 10 Gram(s) Oral every 2 hours  levothyroxine 88 MICROGram(s) Oral daily  metoprolol succinate ER 25 milliGRAM(s) Oral daily  piperacillin/tazobactam IVPB. 3.375 Gram(s) IV Intermittent every 12 hours  sodium chloride 0.9%. 1000 milliLiter(s) IV Continuous <Continuous>  tamsulosin 0.4 milliGRAM(s) Oral at bedtime        FAMILY HISTORY:  No pertinent family history in first degree relatives        Review of Systems:    General:  No wt loss, fevers, chills, night sweats,fatigue,   Eyes:  Good vision, no reported pain  ENT:  No sore throat, pain, runny nose, dysphagia  CV:  No pain, palpitatioins, hypo/hypertension  Resp:  No dyspnea, cough, tachypnea, wheezing  GI:  No pain, No nausea, No vomiting, No diarrhea, No constipatiion, No weight loss, No fever, No pruritis, No rectal bleeding, No tarry stools, No dysphagia,  :  No pain, bleeding, incontinence, nocturia  Muscle:  No pain, weakness  Breast:  No pain, abscess, mass, discharge  Neuro:  No weakness, tingling, memory problems  Psych:  No fatigue, insomnia, mood problems, depression  Endocrine:  No polyuria, polydypsia, cold/heat intolerance  Heme:  No petechiae, ecchymosis, easy bruisability  Skin:  No rash, tattoos, scars, edema    Relevant Family History:       Relevant Social History:       Physical Exam:    Vital Signs:  Vital Signs Last 24 Hrs  T(C): 36.6 (13 Dec 2018 11:52), Max: 37.1 (12 Dec 2018 17:00)  T(F): 97.9 (13 Dec 2018 11:52), Max: 98.7 (12 Dec 2018 17:00)  HR: 61 (13 Dec 2018 11:52) (59 - 78)  BP: 110/53 (13 Dec 2018 11:52) (89/52 - 133/90)  BP(mean): 68 (13 Dec 2018 11:52) (52 - 87)  RR: 22 (13 Dec 2018 11:52) (12 - 22)  SpO2: 99% (13 Dec 2018 11:52) (95% - 100%)  Daily     Daily     General:  Appears stated age, well-groomed, well-nourished, no distress  HEENT:  NC/AT,  conjunctivae clear and pink, no thyromegaly, nodules, adenopathy, no JVD  Chest:  Full & symmetric excursion, no increased effort, breath sounds clear  Cardiovascular:  Regular rhythm, S1, S2, no murmur/rub/S3/S4, no abdominal bruit, no edema  Abdomen:  Soft, non-tender, non-distended, normoactive bowel sounds,  no masses ,no hepatosplenomeagaly, no signs of chronic liver disease  Extremities:  no cyanosis,clubbing or edema  Skin:  No rash/erythema/ecchymoses/petechiae/wounds/abscess/warm/dry  Neuro/Psych:  Alert, oriented, no asterixis, no tremor, no encephalopathy    Laboratory:                            10.4   18.75 )-----------( 95       ( 13 Dec 2018 06:04 )             28.7     12    126<L>  |  95<L>  |  101<H>  ----------------------------<  79  4.4   |  18<L>  |  2.72<H>    Ca    7.5<L>      13 Dec 2018 06:04    TPro  5.6<L>  /  Alb  2.2<L>  /  TBili  2.4<H>  /  DBili  x   /  AST  73<H>  /  ALT  58  /  AlkPhos  73  12-13    LIVER FUNCTIONS - ( 13 Dec 2018 06:04 )  Alb: 2.2 g/dL / Pro: 5.6 g/dL / ALK PHOS: 73 U/L / ALT: 58 U/L DA / AST: 73 U/L / GGT: x           PT/INR - ( 12 Dec 2018 12:24 )   PT: 15.3 sec;   INR: 1.39 ratio         PTT - ( 12 Dec 2018 12:24 )  PTT:34.7 sec  Urinalysis Basic - ( 12 Dec 2018 14:07 )    Color: Yellow / Appearance: Clear / S.015 / pH: x  Gluc: x / Ketone: Negative  / Bili: Negative / Urobili: Negative mg/dL   Blood: x / Protein: Negative mg/dL / Nitrite: Negative   Leuk Esterase: Negative / RBC: 3-5 /HPF / WBC 0-2   Sq Epi: x / Non Sq Epi: Few / Bacteria: Moderate      Amylase Serum--      Lipase serum--       [id number]  Amylase Serum--      Lipase serum--       [id number]    Imaging:  < from: CT Chest No Cont (18 @ 16:23) >  Chest: Cardiac device present subcutaneous soft tissues anterior left   upper thorax. No evidence of pericardial effusion or thoracic aortic   aneurysm. Coronary artery calcifications present.  A small left pleural effusion is identified with associated compressive   atelectasis. No mediastinal or hilar lesions identified, evaluation   limited due to lack of IV contrast. No pneumothorax or vascular   congestion.  No acute appearing osseous abnormalities. Central airway intact. Thyroid   gland not enlarged.        Abdomen-pelvis: Ascites present in the abdomen, adjacent to the liver,   and in the pelvis. Unenhanced hepatic parenchyma and splenic parenchyma   homogeneous. Unenhanced pancreatic parenchyma unremarkable. No adrenal   lesions evident. The gallbladder contains stones. No hydronephrotic   changes or renal masses identified. Hypodensity posterior mid polar   region right kidney likely a cyst. No aortic aneurysm or retroperitoneal   lymphadenopathy. No biliary ductal dilatation. No bowel obstruction.   Urinary bladder contains small quantity of urine, no stones evident.   Prostate is mildly enlarged. Inguinal regions intact. No acute appearing   osseous abnormalities in the abdomen or pelvis. Disc degenerative disease   and posterior spurring L5-S1.    < end of copied text >      Assessment:      Plan:
Chief Complaint:        INTERVAL HPI/OVERNIGHT EVENTS:    no significant events overnight reported      MEDICATIONS  (STANDING):  ALBUTerol    0.083% 2.5 milliGRAM(s) Nebulizer every 6 hours  clopidogrel Tablet 75 milliGRAM(s) Oral daily  dextrose 5%. 1000 milliLiter(s) (50 mL/Hr) IV Continuous <Continuous>  dextrose 50% Injectable 12.5 Gram(s) IV Push once  dicyclomine 10 milliGRAM(s) Oral three times a day before meals  insulin glargine Injectable (LANTUS) 10 Unit(s) SubCutaneous at bedtime  insulin lispro (HumaLOG) corrective regimen sliding scale   SubCutaneous three times a day before meals  ketoconazole 2% Cream 1 Application(s) Topical at bedtime  levothyroxine 88 MICROGram(s) Oral daily  metoprolol succinate ER 25 milliGRAM(s) Oral daily  midodrine 5 milliGRAM(s) Oral every 8 hours  pantoprazole    Tablet 40 milliGRAM(s) Oral before breakfast  spironolactone 50 milliGRAM(s) Oral daily  tamsulosin 0.4 milliGRAM(s) Oral at bedtime    MEDICATIONS  (PRN):  dextrose 40% Gel 15 Gram(s) Oral once PRN Blood Glucose LESS THAN 70 milliGRAM(s)/deciliter  glucagon  Injectable 1 milliGRAM(s) IntraMuscular once PRN Glucose LESS THAN 70 milligrams/deciliter            Vital Signs Last 24 Hrs  T(C): 37 (20 Dec 2018 09:25), Max: 37 (20 Dec 2018 09:25)  T(F): 98.6 (20 Dec 2018 09:25), Max: 98.6 (20 Dec 2018 09:25)  HR: 82 (20 Dec 2018 09:25) (76 - 83)  BP: 125/62 (20 Dec 2018 09:25) (103/66 - 136/70)  BP(mean): --  RR: 18 (20 Dec 2018 09:25) (18 - 20)  SpO2: 98% (20 Dec 2018 09:25) (96% - 100%)    Physical exam:    abd soft, non tender  cv s1s2  chest air entry  ext no edema,         LABS:                        7.4    8.11  )-----------( 111      ( 20 Dec 2018 08:24 )             21.4     12-20    140  |  108  |  49<H>  ----------------------------<  214<H>  5.6<H>   |  26  |  1.43<H>    Ca    9.4      20 Dec 2018 08:24    TPro  5.6<L>  /  Alb  2.9<L>  /  TBili  3.3<H>  /  DBili  x   /  AST  26  /  ALT  26  /  AlkPhos  41  12-20          RADIOLOGY & ADDITIONAL TESTS:
Date/Time Patient Seen:  		  Referring MD:   Data Reviewed	       Patient is a 83y old  Male who presents with a chief complaint of     Subjective/HPI    in bed  seen and examined  vs and meds reviewed  ER provider note reviewed  spoke with family   2 weeks weight loss, anorexia, abd pain, discomfort, nausea, and significant arthritis    daughter and family at the bedside  ct and labs reviewed     ED Provider Note [Charted Location: [facility]] [Authored: 12-Dec-2018 11:46]- for Visit: [id number], Incomplete, Revised, Signed in Full, General    HISTORY OF PRESENT ILLNESS:    High Risk Travel:  International Travel? No(1).    · Chief Complaint: The patient is a 83y Male complaining of fever.  · HPI Objective Statement: 82 y/o M pt with PMHx of DM, MI, CAD, cardiac stents x3 more than 10 years ago, pacemaker presents to the ED c/o fever of 102 F, generalized weakness, malaise, neck pain and back pain for 2 weeks. Per daughter, he has been in bed for 2 weeks, states the pt normally walks around. Confirms cough and decreased eating/drinking. He has been given Tylenol and Mobic, receives insulin injections for DM. No further complaints at this time.   PMD: Min Hu ([place])  · Presenting Symptoms: COUGH, DECREASED EATING/DRINKING, FEVER, PAIN, weakness, malaise  · Highest Temperature: fahrenheit  102  · Timing: sudden onset  · Duration: week(s)  2  · Context: unknown  · Relieving Factors: none       PAST MEDICAL & SURGICAL HISTORY:  CAD (coronary artery disease)  HTN (hypertension)  DM (diabetes mellitus)  Artificial cardiac pacemaker  AICD (automatic cardioverter/defibrillator) present  No significant past surgical history        Medication list         MEDICATIONS  (STANDING):    MEDICATIONS  (PRN):         Vitals log        ICU Vital Signs Last 24 Hrs  T(C): 36.7 (12 Dec 2018 16:26), Max: 36.7 (12 Dec 2018 16:26)  T(F): 98 (12 Dec 2018 16:26), Max: 98 (12 Dec 2018 16:26)  HR: 71 (12 Dec 2018 16:26) (69 - 78)  BP: 101/57 (12 Dec 2018 16:26) (95/64 - 114/77)  BP(mean): --  ABP: --  ABP(mean): --  RR: 15 (12 Dec 2018 13:15) (15 - 18)  SpO2: 100% (12 Dec 2018 13:15) (96% - 100%)           Input and Output:  I&O's Detail    12 Dec 2018 07:01  -  12 Dec 2018 16:31  --------------------------------------------------------  IN:    IV PiggyBack: 350 mL    Sodium Chloride 0.9% IV Bolus: 2000 mL  Total IN: 2350 mL    OUT:    Voided: 205 mL  Total OUT: 205 mL    Total NET: 2145 mL          Lab Data                        11.6   21.93 )-----------( 82       ( 12 Dec 2018 12:24 )             31.3     12-12    122<L>  |  92<L>  |  109<H>  ----------------------------<  229<H>  4.7   |  20<L>  |  2.90<H>    Ca    7.3<L>      12 Dec 2018 15:30    TPro  6.2  /  Alb  2.5<L>  /  TBili  2.4<H>  /  DBili  x   /  AST  81<H>  /  ALT  66<H>  /  AlkPhos  103  12-12            Review of Systems	      Objective     Physical Examination    heart s1s2  lung dec BS  abd dist. tender  cn grossly int      Pertinent Lab findings & Imaging      Aly:  NO   Adequate UO     I&O's Detail    12 Dec 2018 07:01  -  12 Dec 2018 16:31  --------------------------------------------------------  IN:    IV PiggyBack: 350 mL    Sodium Chloride 0.9% IV Bolus: 2000 mL  Total IN: 2350 mL    OUT:    Voided: 205 mL  Total OUT: 205 mL    Total NET: 2145 mL               Discussed with:     Cultures:	        Radiology
HPI:  82 yo Chinese male with hx of CAD S/P stent, S/P PPM, HTN, CKD, and Hepatitis A presented to   the hospital with CC of fever and abdominal distention.  + generalized weakness, cough, and   decrease po intake X 2 weeks.  Daughter noticed abdominal distention with increase abdominal   pain.  Denies any headache, dizziness, blurry vision, chest pain, SOB, N/V/D. NO recent travel or  sick contacts. In ED pt was afebrile WBC 21K with abn LFTs. Also with ANDREEA. CT AP showed ascites  and gallstones.    Infectious Disease consult was called to evaluate pt for antibiotic management.    Past Medical & Surgical Hx:  PAST MEDICAL & SURGICAL HISTORY:  CKD (chronic kidney disease)  Hepatitis  CAD (coronary artery disease)  HTN (hypertension)  DM (diabetes mellitus)  Artificial cardiac pacemaker  AICD (automatic cardioverter/defibrillator) present      Social History--  EtOH: denies   Tobacco: denies   Drug Use: denies   Lives with family    FAMILY HISTORY:  No pertinent family history in first degree relatives    Allergies  No Known Allergies    Home Medications:  clopidogrel 75 mg oral tablet: 1 tab(s) orally once a day (12 Dec 2018 11:48)  Entresto 24 mg-26 mg oral tablet: 1 tab(s) orally once a day (12 Dec 2018 11:48)  levothyroxine 88 mcg (0.088 mg) oral tablet: 1 tab(s) orally once a day (12 Dec 2018 11:48)  metoprolol succinate 25 mg oral tablet, extended release: 1 tab(s) orally once a day (12 Dec 2018 11:48)  nitroglycerin 0.4 mg sublingual spray: 1 tab(s) sublingual , As Needed (12 Dec 2018 11:48)  prasugrel 10 mg oral tablet: 1 tab(s) orally once a day (12 Dec 2018 11:48)  tamsulosin 0.4 mg oral capsule: 1 cap(s) orally once a day (12 Dec 2018 11:48)  Toujeo SoloStar 300 units/mL subcutaneous solution: 10 unit(s) subcutaneous 2 times a day (12 Dec 2018 11:48)  Uloric 40 mg oral tablet: 1 tab(s) orally once a day (12 Dec 2018 11:48)      Current Inpatient Medications :    ANTIBIOTICS:   piperacillin/tazobactam IVPB. 3.375 Gram(s) IV Intermittent every 12 hours    OTHER RELEVANT MEDICATIONS :  dextrose 40% Gel 15 Gram(s) Oral once PRN  dextrose 5%. 1000 milliLiter(s) IV Continuous <Continuous>  dextrose 50% Injectable 12.5 Gram(s) IV Push once  glucagon  Injectable 1 milliGRAM(s) IntraMuscular once PRN  insulin lispro (HumaLOG) corrective regimen sliding scale   SubCutaneous every 6 hours  lactulose Syrup 10 Gram(s) Oral every 2 hours  levothyroxine 88 MICROGram(s) Oral daily  metoprolol succinate ER 25 milliGRAM(s) Oral daily  sodium chloride 0.9%. 1000 milliLiter(s) IV Continuous <Continuous>  tamsulosin 0.4 milliGRAM(s) Oral at bedtime    ROS:  CONSTITUTIONAL:  + fever or chills, feels weak  EYES:  Negative  blurry vision or double vision  CARDIOVASCULAR:  Negative for chest pain or palpitations  RESPIRATORY:  Negative for cough, wheezing, or SOB   GASTROINTESTINAL:  Negative for nausea, vomiting, diarrhea, constipation, +abdominal pain  GENITOURINARY:  Negative frequency, urgency , dysuria or hematuria   NEUROLOGIC:  No headache, confusion, dizziness, lightheadedness  All other systems were reviewed and are negative      I&O's Detail    12 Dec 2018 07:01  -  13 Dec 2018 07:00  --------------------------------------------------------  IN:    IV PiggyBack: 350 mL    Sodium Chloride 0.9% IV Bolus: 2000 mL  Total IN: 2350 mL    OUT:    Voided: 965 mL  Total OUT: 965 mL    Total NET: 1385 mL      13 Dec 2018 07:01  -  13 Dec 2018 13:47  --------------------------------------------------------  IN:  Total IN: 0 mL    OUT:    Indwelling Catheter - Urethral: 250 mL  Total OUT: 250 mL    Total NET: -250 mL          Physical Exam:  Vital Signs Last 24 Hrs  T(C): 36.6 (13 Dec 2018 11:52), Max: 37.1 (12 Dec 2018 17:00)  T(F): 97.9 (13 Dec 2018 11:52), Max: 98.7 (12 Dec 2018 17:00)  HR: 61 (13 Dec 2018 11:52) (59 - 78)  BP: 110/53 (13 Dec 2018 11:52) (89/52 - 133/90)  BP(mean): 68 (13 Dec 2018 11:52) (52 - 87)  RR: 22 (13 Dec 2018 11:52) (12 - 22)  SpO2: 99% (13 Dec 2018 11:52) (95% - 100%)    General: no acute distress  Eyes: sclera anicteric, pupils equal and reactive to light  ENMT: buccal mucosa moist, pharynx not injected  Neck: supple, trachea midline  Lungs: + wheeze/rhonchi  Cardiovascular: regular rate and rhythm, S1 S2  Abdomen: soft, nontender, distended bowel sounds normal  Neurological:  alert and oriented x3, Cranial Nerves II-XII grossly intact  Skin:no increased ecchymosis/petechiae/purpura  Lymph Nodes: no palpable cervical/supraclavicular lymph nodes enlargements  Extremities: Trace edema    Labs:  Complete Blood Count + Automated Diff (12.12.18 @ 12:24)    WBC Count: 21.93 K/uL    RBC Count: 3.54 M/uL    Hemoglobin: 11.6 g/dL    Hematocrit: 31.3 %    Mean Cell Volume: 88.4 fl    Mean Cell Hemoglobin: 32.8 pg    Mean Cell Hemoglobin Conc: 37.1 gm/dL    Red Cell Distrib Width: 12.5 %    Platelet Count - Automated: 82: Verified by smear review. K/uL    Auto Neutrophil #: 19.96 K/uL    Auto Lymphocyte #: 1.10 K/uL    Auto Monocyte #: 0.88 K/uL    Auto Eosinophil #: 0.00 K/uL    Auto Basophil #: 0.00 K/uL    Auto Neutrophil %: 85.0: Differential percentages must be correlated with absolute numbers for  clinical significance. %    Auto Lymphocyte %: 5.0 %    Auto Monocyte %: 4.0 %    Auto Eosinophil %: 0.0 %    Auto Basophil %: 0.0 %                             10.4   18.75 )-----------( 95       ( 13 Dec 2018 06:04 )             28.7   12-13    126<L>  |  95<L>  |  101<H>  ----------------------------<  79  4.4   |  18<L>  |  2.72<H>    Ca    7.5<L>      13 Dec 2018 06:04    TPro  5.6<L>  /  Alb  2.2<L>  /  TBili  2.4<H>  /  DBili  x   /  AST  73<H>  /  ALT  58  /  AlkPhos  73  12-13      RECENT CULTURES:  PENDING    RADIOLOGY & ADDITIONAL STUDIES:    EXAM:  US ABDOMEN COMPLETE                          PROCEDURE DATE:  12/12/2018      INTERPRETATION:  History: Abdominal pain acute renal failure.    Abdominal ultrasound.    Gallstones and gallbladder sludge noted. There is diffusely thickened   gallbladder wall. Wall thickening is nonspecific finding in and of itself   may be related to ascites/hypoalbuminemia or adjacent liver disease.   Cholecystitis not excluded in the appropriate clinical setting. If this   is of clinical concern then a HIDA scan can be obtained. No biliary   dilatation. Common duct 0.6 cm normal for age. Echodense liver consistent   with steatosis/hepatocellular disease. Spleen not enlarged. Pancreas   unremarkable.  Right kidney 10.2, the left 10.7 cm. Increased bilateral renal cortical   echogenicity consistent with chronic medical renal disease. No   hydronephrosis.  Moderate abdominal ascites.    Impression:  Cholelithiasis with thickened gallbladder wall. See discussion above. No   biliary dilatation.  Echodense liver consistent with steatosis/hepatocellular disease.  Medical renal disease.  Moderate ascites      EXAM:  CT CHEST                        PROCEDURE DATE:  12/12/2018          INTERPRETATION:  Clinical information: Cough, fever    No prior studies present for comparison.    Noncontrast exam, neither intravenous or oral contrast materialwas   administered limiting evaluation. Additionally the patient was unable to   cooperate, motion artifact present on multiple images.    Axial images obtained, coronal and sagittal images computer reformatted.        Chest: Cardiac device present subcutaneous soft tissues anterior left   upper thorax. No evidence of pericardial effusion or thoracic aortic   aneurysm. Coronary artery calcifications present.  A small left pleural effusion is identified with associated compressive   atelectasis. No mediastinal or hilar lesions identified, evaluation   limited due to lack of IV contrast. No pneumothorax or vascular   congestion.  No acute appearing osseous abnormalities. Central airway intact. Thyroid   gland not enlarged.      Abdomen-pelvis: Ascites present in the abdomen, adjacent to the liver,   and in the pelvis. Unenhanced hepatic parenchyma and splenic parenchyma   homogeneous. Unenhanced pancreatic parenchyma unremarkable. No adrenal   lesions evident. The gallbladder contains stones. No hydronephrotic   changes or renal masses identified. Hypodensity posterior mid polar   region right kidney likely a cyst. No aortic aneurysm or retroperitoneal   lymphadenopathy. No biliary ductal dilatation. No bowel obstruction.   Urinary bladder contains small quantity of urine, no stones evident.   Prostate is mildly enlarged. Inguinal regions intact. No acute appearing   osseous abnormalities in the abdomen or pelvis. Disc degenerative disease   and posterior spurring L5-S1.      IMPRESSION: See above report.      Assessment :   82 yo Chinese male with hx of CAD S/P stent, S/P PPM, HTN, CKD, and Hepatitis A presented to   the hospital with CC of fever and abdominal distention found to have abn LFTs leukocytosis and   ANDREEA vs CKD  rule out Biliary dz  Rule out SBP  Etiology of ascites ?malignancy      Plan :   Cont Zosyn  Fu cultures  Trend wbc and lfts  FU HIDA  May need paracentesis  GI following    Continue with present regiment .  Approptiate use of antibiotics and adverse effects reviewed.      I have discussed the above plan of care with patient/family in detail. They expressed understanding of the treatment plan . Risks, benefits and alternatives discussed in detail. I have asked if they have any questions or concerns and appropriately addressed them to the best of my ability .      > 45 minutes spent in direct patient care reviewing  the notes, lab data/ imaging , discussion with multidisciplinary team. All questions were addressed and answered to the best of my capacity .    Thank you for allowing me to participate in the care of your patient .      Swetha Lagos MD  Infectious Disease  [phone]
History of Present Illness: The patient is an 83 year old male with a history of HTN, DM, CAD s/p PCI 10 years ago, PPM, cirrhosis who was admitted with abdominal pain. He underwent paracentesis. His antiplatelet agents were held for the procedure. Today he has some abdominal discomfort but no other complaints.    Past Medical/Surgical History:  HTN, DM, CAD s/p PCI 10 years ago, PPM, cirrhosis    Medications:  MEDICATIONS  (STANDING):  albumin human 25% IVPB 50 milliLiter(s) IV Intermittent every 6 hours  ALBUTerol    0.083% 2.5 milliGRAM(s) Nebulizer every 6 hours  clopidogrel Tablet 75 milliGRAM(s) Oral daily  dextrose 5%. 1000 milliLiter(s) (50 mL/Hr) IV Continuous <Continuous>  dextrose 50% Injectable 12.5 Gram(s) IV Push once  dicyclomine 10 milliGRAM(s) Oral three times a day before meals  insulin glargine Injectable (LANTUS) 10 Unit(s) SubCutaneous at bedtime  insulin lispro (HumaLOG) corrective regimen sliding scale   SubCutaneous three times a day before meals  ketoconazole 2% Cream 1 Application(s) Topical at bedtime  levothyroxine 88 MICROGram(s) Oral daily  metoprolol succinate ER 25 milliGRAM(s) Oral daily  midodrine 5 milliGRAM(s) Oral every 8 hours  spironolactone 25 milliGRAM(s) Oral daily  tamsulosin 0.4 milliGRAM(s) Oral at bedtime    MEDICATIONS  (PRN):  dextrose 40% Gel 15 Gram(s) Oral once PRN Blood Glucose LESS THAN 70 milliGRAM(s)/deciliter  glucagon  Injectable 1 milliGRAM(s) IntraMuscular once PRN Glucose LESS THAN 70 milligrams/deciliter      Family History: Non-contributory family history of premature cardiovascular atherosclerotic disease    Social History: No tobacco, alcohol or drug use    Review of Systems:  General: No fevers, chills, weight loss or gain  Skin: No rashes, color changes  Cardiovascular: No chest pain, orthopnea  Respiratory: No shortness of breath, cough  Gastrointestinal: No nausea, abdominal pain  Genitourinary: No incontinence, pain with urination  Musculoskeletal: No pain, swelling, decreased range of motion  Neurological: No headache, weakness  Psychiatric: No depression, anxiety  Endocrine: No weight loss or gain, increased thirst  All other systems are comprehensively negative.    Physical Exam:  Vitals:        Vital Signs Last 24 Hrs  T(C): 36.4 (19 Dec 2018 09:06), Max: 37.6 (18 Dec 2018 21:11)  T(F): 97.5 (19 Dec 2018 09:06), Max: 99.6 (18 Dec 2018 21:11)  HR: 75 (19 Dec 2018 09:06) (71 - 86)  BP: 105/51 (19 Dec 2018 09:06) (105/51 - 138/70)  BP(mean): --  RR: 18 (19 Dec 2018 09:06) (18 - 19)  SpO2: 95% (19 Dec 2018 09:06) (95% - 100%)  General: NAD  HEENT: MMM  Neck: No JVD, no carotid bruit  Lungs: CTAB  CV: RRR, nl S1/S2, no M/R/G  Abdomen: S/NT/ND, +BS  Extremities: No LE edema, no cyanosis  Neuro: AAOx3, non-focal  Skin: No rash    Labs:    12-18    139  |  109<H>  |  49<H>  ----------------------------<  257<H>  5.3   |  21<L>  |  1.50<H>    Ca    9.1      18 Dec 2018 08:41              ECG: NSR, old anteroseptal infarct
Patient is a 83y old  Male who presents with a chief complaint of fever (13 Dec 2018 13:47)    HPI:  Patient is 82 yo male with hx of CAD S/P stent, S/P PPM, HTN, CKD 3, and Hepatitis presenting with fever that has been going on for the past several days, and progressively worsen.  + generalized weakness, cough, and decrease po intake X 2 weeks.  Daughter noticed abdominal distention with increase abdominal pain.  Denies any headache, dizziness, blurry vision, chest pain, SOB, N/V/D, numbness, or weakness.      + Back pain (12 Dec 2018 18:15)    Renal consult called for ANDREEA, Hyponatremia. History obtained from chart. Family at bedside.       PAST MEDICAL HISTORY:  CKD (chronic kidney disease) 3  Hepatitis  CAD (coronary artery disease)  HTN (hypertension)  DM (diabetes mellitus)      PAST SURGICAL HISTORY:  Artificial cardiac pacemaker  AICD (automatic cardioverter/defibrillator) present  No significant past surgical history      FAMILY HISTORY:  No pertinent family history in first degree relatives      SOCIAL HISTORY: No smoking or alcohol use     Allergies    No Known Allergies    Intolerances      Home Medications:  clopidogrel 75 mg oral tablet: 1 tab(s) orally once a day (12 Dec 2018 11:48)  Entresto 24 mg-26 mg oral tablet: 1 tab(s) orally once a day (12 Dec 2018 11:48)  levothyroxine 88 mcg (0.088 mg) oral tablet: 1 tab(s) orally once a day (12 Dec 2018 11:48)  metoprolol succinate 25 mg oral tablet, extended release: 1 tab(s) orally once a day (12 Dec 2018 11:48)  nitroglycerin 0.4 mg sublingual spray: 1 tab(s) sublingual , As Needed (12 Dec 2018 11:48)  prasugrel 10 mg oral tablet: 1 tab(s) orally once a day (12 Dec 2018 11:48)  tamsulosin 0.4 mg oral capsule: 1 cap(s) orally once a day (12 Dec 2018 11:48)  Toujeo SoloStar 300 units/mL subcutaneous solution: 10 unit(s) subcutaneous 2 times a day (12 Dec 2018 11:48)  Uloric 40 mg oral tablet: 1 tab(s) orally once a day (12 Dec 2018 11:48)    MEDICATIONS  (STANDING):  dextrose 5%. 1000 milliLiter(s) (50 mL/Hr) IV Continuous <Continuous>  dextrose 50% Injectable 12.5 Gram(s) IV Push once  insulin lispro (HumaLOG) corrective regimen sliding scale   SubCutaneous every 6 hours  lactulose Syrup 10 Gram(s) Oral every 2 hours  levothyroxine 88 MICROGram(s) Oral daily  metoprolol succinate ER 25 milliGRAM(s) Oral daily  piperacillin/tazobactam IVPB. 3.375 Gram(s) IV Intermittent every 12 hours  sodium chloride 0.9%. 1000 milliLiter(s) (80 mL/Hr) IV Continuous <Continuous>  tamsulosin 0.4 milliGRAM(s) Oral at bedtime    MEDICATIONS  (PRN):  dextrose 40% Gel 15 Gram(s) Oral once PRN Blood Glucose LESS THAN 70 milliGRAM(s)/deciliter  glucagon  Injectable 1 milliGRAM(s) IntraMuscular once PRN Glucose LESS THAN 70 milligrams/deciliter      REVIEW OF SYSTEMS:  General: NAD  Respiratory: No cough, SOB  Cardiovascular: No CP or Palpitations	  Gastrointestinal: No nausea, Vomiting. No diarrhea  Genitourinary: No urinary complaints	  Musculoskeletal: No new rash or lesions	  all other systems negative    T(F): 97.9 (18 @ 11:52), Max: 98.7 (18 @ 17:00)  HR: 61 (18 @ 11:52) (59 - 78)  BP: 110/53 (18 @ 11:52) (89/52 - 133/90)  RR: 22 (18 @ 11:52) (12 - 22)  SpO2: 99% (18 @ 11:52) (96% - 100%)  Wt(kg): --    PHYSICAL EXAM:  General: NAD  Respiratory: b/l air entry  Cardiovascular: S1 S2  Gastrointestinal: soft, distended  Extremities: no edema            126<L>  |  95<L>  |  101<H>  ----------------------------<  79  4.4   |  18<L>  |  2.72<H>    Ca    7.5<L>      13 Dec 2018 06:04    TPro  5.6<L>  /  Alb  2.2<L>  /  TBili  2.4<H>  /  DBili  x   /  AST  73<H>  /  ALT  58  /  AlkPhos  73                            10.4   18.75 )-----------( 95       ( 13 Dec 2018 06:04 )             28.7       Hematocrit: 28.7 % ( @ 06:04)  Hemoglobin: 10.4 g/dL ( @ 06:04)  Blood Urea Nitrogen, Serum: 101 mg/dL ( @ 06:04)  Calcium, Total Serum: 7.5 mg/dL ( @ 06:04)      Creatinine, Serum: 2.72 ( @ 06:04)  Creatinine, Serum: 2.97 ( @ 22:03)  Creatinine, Serum: 2.90 ( @ 15:30)  Creatinine, Serum: 3.19 ( @ 12:24)      Urinalysis Basic - ( 12 Dec 2018 14:07 )    Color: Yellow / Appearance: Clear / S.015 / pH: x  Gluc: x / Ketone: Negative  / Bili: Negative / Urobili: Negative mg/dL   Blood: x / Protein: Negative mg/dL / Nitrite: Negative   Leuk Esterase: Negative / RBC: 3-5 /HPF / WBC 0-2   Sq Epi: x / Non Sq Epi: Few / Bacteria: Moderate      LIVER FUNCTIONS - ( 13 Dec 2018 06:04 )  Alb: 2.2 g/dL / Pro: 5.6 g/dL / ALK PHOS: 73 U/L / ALT: 58 U/L DA / AST: 73 U/L / GGT: x             I&O's Detail    12 Dec 2018 07:01  -  13 Dec 2018 07:00  --------------------------------------------------------  IN:    IV PiggyBack: 350 mL    Sodium Chloride 0.9% IV Bolus: 2000 mL  Total IN: 2350 mL    OUT:    Voided: 965 mL  Total OUT: 965 mL    Total NET: 1385 mL      13 Dec 2018 07:01  -  13 Dec 2018 14:23  --------------------------------------------------------  IN:  Total IN: 0 mL    OUT:    Indwelling Catheter - Urethral: 250 mL  Total OUT: 250 mL    Total NET: -250 mL    < from: Xray Chest 2 Views PA/Lat (18 @ 12:52) >  EXAM:  XR CHEST PA LAT 2V                                  PROCEDURE DATE:  2018          INTERPRETATION:  No prior studies present for comparison    Clinical information: Shortness of breath    2 views, frontal and lateral.    Cardiac deviceoverlies the left axilla. Mildly elevated diaphragm,   shallow inspiration. No sign of lobar consolidation vascular congestion   or effusion. Heart size within normal limits. Hilar regions, mediastinal   contours intact. Aorta shows atherosclerotic changes. No acute osseous   abnormalities.    IMPRESSION:    See above report    < end of copied text >    < from: CT Chest No Cont (18 @ 16:23) >  EXAM:  CT CHEST                                  PROCEDURE DATE:  2018          INTERPRETATION:  Clinical information: Cough, fever    No prior studies present for comparison.    Noncontrast exam, neither intravenous or oral contrast materialwas   administered limiting evaluation. Additionally the patient was unable to   cooperate, motion artifact present on multiple images.    Axial images obtained, coronal and sagittal images computer reformatted.        Chest: Cardiac device present subcutaneous soft tissues anterior left   upper thorax. No evidence of pericardial effusion or thoracic aortic   aneurysm. Coronary artery calcifications present.  A small left pleural effusion is identified with associated compressive   atelectasis. No mediastinal or hilar lesions identified, evaluation   limited due to lack of IV contrast. No pneumothorax or vascular   congestion.  No acute appearing osseous abnormalities. Central airway intact. Thyroid   gland not enlarged.        Abdomen-pelvis: Ascites present in the abdomen, adjacent to the liver,   and in the pelvis. Unenhanced hepatic parenchyma and splenic parenchyma   homogeneous. Unenhanced pancreatic parenchyma unremarkable. No adrenal   lesions evident. The gallbladder contains stones. No hydronephrotic   changes or renal masses identified. Hypodensity posterior mid polar   region right kidney likely a cyst. No aortic aneurysm or retroperitoneal   lymphadenopathy. No biliary ductal dilatation. No bowel obstruction.   Urinary bladder contains small quantity of urine, no stones evident.   Prostate is mildly enlarged. Inguinal regions intact. No acute appearing   osseous abnormalities in the abdomen or pelvis. Disc degenerative disease   and posterior spurring L5-S1.        IMPRESSION: See above report.    < end of copied text >

## 2018-12-21 VITALS — OXYGEN SATURATION: 98 %

## 2018-12-21 DIAGNOSIS — E11.65 TYPE 2 DIABETES MELLITUS WITH HYPERGLYCEMIA: ICD-10-CM

## 2018-12-21 DIAGNOSIS — K74.60 UNSPECIFIED CIRRHOSIS OF LIVER: ICD-10-CM

## 2018-12-21 LAB
ANION GAP SERPL CALC-SCNC: 6 MMOL/L — SIGNIFICANT CHANGE UP (ref 5–17)
BUN SERPL-MCNC: 49 MG/DL — HIGH (ref 7–23)
CALCIUM SERPL-MCNC: 9.2 MG/DL — SIGNIFICANT CHANGE UP (ref 8.4–10.5)
CHLORIDE SERPL-SCNC: 104 MMOL/L — SIGNIFICANT CHANGE UP (ref 96–108)
CO2 SERPL-SCNC: 26 MMOL/L — SIGNIFICANT CHANGE UP (ref 22–31)
CREAT SERPL-MCNC: 1.62 MG/DL — HIGH (ref 0.5–1.3)
GLUCOSE SERPL-MCNC: 307 MG/DL — HIGH (ref 70–99)
HCT VFR BLD CALC: 24.4 % — LOW (ref 39–50)
HGB BLD-MCNC: 8.4 G/DL — LOW (ref 13–17)
MCHC RBC-ENTMCNC: 32.8 PG — SIGNIFICANT CHANGE UP (ref 27–34)
MCHC RBC-ENTMCNC: 34.4 GM/DL — SIGNIFICANT CHANGE UP (ref 32–36)
MCV RBC AUTO: 95.3 FL — SIGNIFICANT CHANGE UP (ref 80–100)
NRBC # BLD: 0 /100 WBCS — SIGNIFICANT CHANGE UP (ref 0–0)
PLATELET # BLD AUTO: 107 K/UL — LOW (ref 150–400)
POTASSIUM SERPL-MCNC: 5.4 MMOL/L — HIGH (ref 3.5–5.3)
POTASSIUM SERPL-SCNC: 5.4 MMOL/L — HIGH (ref 3.5–5.3)
RBC # BLD: 2.56 M/UL — LOW (ref 4.2–5.8)
RBC # FLD: 13.4 % — SIGNIFICANT CHANGE UP (ref 10.3–14.5)
SODIUM SERPL-SCNC: 136 MMOL/L — SIGNIFICANT CHANGE UP (ref 135–145)
WBC # BLD: 6.62 K/UL — SIGNIFICANT CHANGE UP (ref 3.8–10.5)
WBC # FLD AUTO: 6.62 K/UL — SIGNIFICANT CHANGE UP (ref 3.8–10.5)

## 2018-12-21 PROCEDURE — 96374 THER/PROPH/DIAG INJ IV PUSH: CPT

## 2018-12-21 PROCEDURE — 74176 CT ABD & PELVIS W/O CONTRAST: CPT

## 2018-12-21 PROCEDURE — 49083 ABD PARACENTESIS W/IMAGING: CPT

## 2018-12-21 PROCEDURE — 84436 ASSAY OF TOTAL THYROXINE: CPT

## 2018-12-21 PROCEDURE — 72110 X-RAY EXAM L-2 SPINE 4/>VWS: CPT

## 2018-12-21 PROCEDURE — 80074 ACUTE HEPATITIS PANEL: CPT

## 2018-12-21 PROCEDURE — 94760 N-INVAS EAR/PLS OXIMETRY 1: CPT

## 2018-12-21 PROCEDURE — 87581 M.PNEUMON DNA AMP PROBE: CPT

## 2018-12-21 PROCEDURE — 82140 ASSAY OF AMMONIA: CPT

## 2018-12-21 PROCEDURE — 83735 ASSAY OF MAGNESIUM: CPT

## 2018-12-21 PROCEDURE — 83036 HEMOGLOBIN GLYCOSYLATED A1C: CPT

## 2018-12-21 PROCEDURE — 80053 COMPREHEN METABOLIC PANEL: CPT

## 2018-12-21 PROCEDURE — 85027 COMPLETE CBC AUTOMATED: CPT

## 2018-12-21 PROCEDURE — 88108 CYTOPATH CONCENTRATE TECH: CPT

## 2018-12-21 PROCEDURE — 84100 ASSAY OF PHOSPHORUS: CPT

## 2018-12-21 PROCEDURE — 97116 GAIT TRAINING THERAPY: CPT

## 2018-12-21 PROCEDURE — 86900 BLOOD TYPING SEROLOGIC ABO: CPT

## 2018-12-21 PROCEDURE — 83605 ASSAY OF LACTIC ACID: CPT

## 2018-12-21 PROCEDURE — 82945 GLUCOSE OTHER FLUID: CPT

## 2018-12-21 PROCEDURE — 83935 ASSAY OF URINE OSMOLALITY: CPT

## 2018-12-21 PROCEDURE — 96361 HYDRATE IV INFUSION ADD-ON: CPT

## 2018-12-21 PROCEDURE — 86618 LYME DISEASE ANTIBODY: CPT

## 2018-12-21 PROCEDURE — 84157 ASSAY OF PROTEIN OTHER: CPT

## 2018-12-21 PROCEDURE — 82962 GLUCOSE BLOOD TEST: CPT

## 2018-12-21 PROCEDURE — 84443 ASSAY THYROID STIM HORMONE: CPT

## 2018-12-21 PROCEDURE — P9047: CPT

## 2018-12-21 PROCEDURE — 87086 URINE CULTURE/COLONY COUNT: CPT

## 2018-12-21 PROCEDURE — 96375 TX/PRO/DX INJ NEW DRUG ADDON: CPT

## 2018-12-21 PROCEDURE — 87015 SPECIMEN INFECT AGNT CONCNTJ: CPT

## 2018-12-21 PROCEDURE — 85730 THROMBOPLASTIN TIME PARTIAL: CPT

## 2018-12-21 PROCEDURE — 82728 ASSAY OF FERRITIN: CPT

## 2018-12-21 PROCEDURE — 83880 ASSAY OF NATRIURETIC PEPTIDE: CPT

## 2018-12-21 PROCEDURE — 99239 HOSP IP/OBS DSCHRG MGMT >30: CPT

## 2018-12-21 PROCEDURE — P9016: CPT

## 2018-12-21 PROCEDURE — 78226 HEPATOBILIARY SYSTEM IMAGING: CPT

## 2018-12-21 PROCEDURE — 86923 COMPATIBILITY TEST ELECTRIC: CPT

## 2018-12-21 PROCEDURE — 84300 ASSAY OF URINE SODIUM: CPT

## 2018-12-21 PROCEDURE — 82105 ALPHA-FETOPROTEIN SERUM: CPT

## 2018-12-21 PROCEDURE — 84145 PROCALCITONIN (PCT): CPT

## 2018-12-21 PROCEDURE — 71046 X-RAY EXAM CHEST 2 VIEWS: CPT

## 2018-12-21 PROCEDURE — 36430 TRANSFUSION BLD/BLD COMPNT: CPT

## 2018-12-21 PROCEDURE — 87486 CHLMYD PNEUM DNA AMP PROBE: CPT

## 2018-12-21 PROCEDURE — 80048 BASIC METABOLIC PNL TOTAL CA: CPT

## 2018-12-21 PROCEDURE — 99285 EMERGENCY DEPT VISIT HI MDM: CPT | Mod: 25

## 2018-12-21 PROCEDURE — 97110 THERAPEUTIC EXERCISES: CPT

## 2018-12-21 PROCEDURE — 87206 SMEAR FLUORESCENT/ACID STAI: CPT

## 2018-12-21 PROCEDURE — 70450 CT HEAD/BRAIN W/O DYE: CPT

## 2018-12-21 PROCEDURE — 87798 DETECT AGENT NOS DNA AMP: CPT

## 2018-12-21 PROCEDURE — 83690 ASSAY OF LIPASE: CPT

## 2018-12-21 PROCEDURE — 87116 MYCOBACTERIA CULTURE: CPT

## 2018-12-21 PROCEDURE — 86850 RBC ANTIBODY SCREEN: CPT

## 2018-12-21 PROCEDURE — 84550 ASSAY OF BLOOD/URIC ACID: CPT

## 2018-12-21 PROCEDURE — 87040 BLOOD CULTURE FOR BACTERIA: CPT

## 2018-12-21 PROCEDURE — 36415 COLL VENOUS BLD VENIPUNCTURE: CPT

## 2018-12-21 PROCEDURE — 82042 OTHER SOURCE ALBUMIN QUAN EA: CPT

## 2018-12-21 PROCEDURE — 88305 TISSUE EXAM BY PATHOLOGIST: CPT

## 2018-12-21 PROCEDURE — 87633 RESP VIRUS 12-25 TARGETS: CPT

## 2018-12-21 PROCEDURE — 81001 URINALYSIS AUTO W/SCOPE: CPT

## 2018-12-21 PROCEDURE — 83615 LACTATE (LD) (LDH) ENZYME: CPT

## 2018-12-21 PROCEDURE — 86901 BLOOD TYPING SEROLOGIC RH(D): CPT

## 2018-12-21 PROCEDURE — 94640 AIRWAY INHALATION TREATMENT: CPT

## 2018-12-21 PROCEDURE — 97161 PT EVAL LOW COMPLEX 20 MIN: CPT

## 2018-12-21 PROCEDURE — A9537: CPT

## 2018-12-21 PROCEDURE — 93005 ELECTROCARDIOGRAM TRACING: CPT

## 2018-12-21 PROCEDURE — 76700 US EXAM ABDOM COMPLETE: CPT

## 2018-12-21 PROCEDURE — 84480 ASSAY TRIIODOTHYRONINE (T3): CPT

## 2018-12-21 PROCEDURE — 89051 BODY FLUID CELL COUNT: CPT

## 2018-12-21 PROCEDURE — 82570 ASSAY OF URINE CREATININE: CPT

## 2018-12-21 PROCEDURE — 82803 BLOOD GASES ANY COMBINATION: CPT

## 2018-12-21 PROCEDURE — 71045 X-RAY EXAM CHEST 1 VIEW: CPT

## 2018-12-21 PROCEDURE — 86140 C-REACTIVE PROTEIN: CPT

## 2018-12-21 PROCEDURE — 84132 ASSAY OF SERUM POTASSIUM: CPT

## 2018-12-21 PROCEDURE — 85610 PROTHROMBIN TIME: CPT

## 2018-12-21 PROCEDURE — 71250 CT THORAX DX C-: CPT

## 2018-12-21 RX ORDER — PANTOPRAZOLE SODIUM 20 MG/1
1 TABLET, DELAYED RELEASE ORAL
Qty: 0 | Refills: 0 | DISCHARGE
Start: 2018-12-21

## 2018-12-21 RX ORDER — SPIRONOLACTONE 25 MG/1
2 TABLET, FILM COATED ORAL
Qty: 0 | Refills: 0 | COMMUNITY
Start: 2018-12-21

## 2018-12-21 RX ORDER — INSULIN GLARGINE 100 [IU]/ML
15 INJECTION, SOLUTION SUBCUTANEOUS
Qty: 0 | Refills: 0 | DISCHARGE
Start: 2018-12-21

## 2018-12-21 RX ORDER — INSULIN LISPRO 100/ML
5 VIAL (ML) SUBCUTANEOUS
Qty: 0 | Refills: 0 | COMMUNITY
Start: 2018-12-21

## 2018-12-21 RX ORDER — INSULIN LISPRO 100/ML
5 VIAL (ML) SUBCUTANEOUS
Qty: 0 | Refills: 0 | Status: DISCONTINUED | OUTPATIENT
Start: 2018-12-21 | End: 2018-12-21

## 2018-12-21 RX ORDER — INSULIN GLARGINE 100 [IU]/ML
15 INJECTION, SOLUTION SUBCUTANEOUS AT BEDTIME
Qty: 0 | Refills: 0 | Status: DISCONTINUED | OUTPATIENT
Start: 2018-12-21 | End: 2018-12-21

## 2018-12-21 RX ADMIN — Medication 10 MILLIGRAM(S): at 12:17

## 2018-12-21 RX ADMIN — MIDODRINE HYDROCHLORIDE 5 MILLIGRAM(S): 2.5 TABLET ORAL at 06:08

## 2018-12-21 RX ADMIN — Medication 25 MILLIGRAM(S): at 06:09

## 2018-12-21 RX ADMIN — PANTOPRAZOLE SODIUM 40 MILLIGRAM(S): 20 TABLET, DELAYED RELEASE ORAL at 06:08

## 2018-12-21 RX ADMIN — Medication 10 MILLIGRAM(S): at 06:08

## 2018-12-21 RX ADMIN — ALBUTEROL 2.5 MILLIGRAM(S): 90 AEROSOL, METERED ORAL at 07:14

## 2018-12-21 RX ADMIN — SPIRONOLACTONE 50 MILLIGRAM(S): 25 TABLET, FILM COATED ORAL at 06:08

## 2018-12-21 RX ADMIN — ALBUTEROL 2.5 MILLIGRAM(S): 90 AEROSOL, METERED ORAL at 00:03

## 2018-12-21 RX ADMIN — CLOPIDOGREL BISULFATE 75 MILLIGRAM(S): 75 TABLET, FILM COATED ORAL at 12:17

## 2018-12-21 RX ADMIN — Medication 88 MICROGRAM(S): at 06:08

## 2018-12-21 RX ADMIN — Medication 6: at 08:32

## 2018-12-21 RX ADMIN — ALBUTEROL 2.5 MILLIGRAM(S): 90 AEROSOL, METERED ORAL at 13:23

## 2018-12-21 NOTE — PROGRESS NOTE ADULT - ASSESSMENT
The patient is an 83 year old male with a history of HTN, DM, CAD s/p PCI 10 years ago, PPM, cirrhosis who was admitted with abdominal pain.    Plan:  - Continue clopidogrel 75 mg daily  - Discontinue prasugrel  - On spironolactone for ascites  - Continue metoprolol succinate 25 mg daily  - GI follow-up  - Discharge planning

## 2018-12-21 NOTE — PROGRESS NOTE ADULT - REASON FOR ADMISSION
ascites
fever

## 2018-12-21 NOTE — PROGRESS NOTE ADULT - PROVIDER SPECIALTY LIST ADULT
Cardiology
Cardiology
Diabetes
Gastroenterology
Hospitalist
Infectious Disease
Nephrology
Pulmonology

## 2018-12-21 NOTE — PROGRESS NOTE ADULT - PROBLEM SELECTOR PLAN 1
sepsis eval  ID following  on Zosyn  unlikely SBP  supportive medical regimen  NEBS  ABX  Cvs regimen  thyroid Rx regimen  GI follow up noted  serial labs, serial PE, cx data reviewed  s/p diagnostic Paracentesis  will follow and monitor  discussed with family
ID follow up noted  cirrhosis management   cvs regimen  tolerating room air  BP control  cvs regimen  diet  pain assessment  planned for DC - [facility]  will follow  will need GI follow up as outpatient
acute infection  ascites  cirrhosis  frail and weak  I and O  BP control  oral hygiene  skin hygiene  renal follow up  GI follow up  s/p paracentesis  ID follow up and emp ABX  serial labs, serial cx, labs and cx noted and reviewed  monitor vs and HD and Sat, keep sat > 88 pct  will follow and monitor
acute infection, ascites, ANDREEA, HTN, Heart disease, atelectasis  supportive medical regimen and care  emp ABX  s/p Paracentesis diagnostic - 60 cc drained  serial labs, serial PE, renal eval noted, GI follow up  out of bed as tolerated, prognosis remains guarded   will follow and monitor  pain regimen
gi recs noted  dc planning  nebs - sob and or wheezing  keep sat > 88 pct  dc plan to [facility]  may need EGD as outpatient  sp Diagnostic Paracentesis  off ABX  ID follow up noted  will follow
monitored off ABX  cirrhosis - GI following  ID following  serial labs  serial PE  on NEBS prn  keep sat > 88 pct  oral hygiene  skin care  ascitic fluid neg for path / malignancy  cr better  PT assessment  will follow
multiple medical issues, acute and chronic  cirrhosis  ascites  ct abd repeat reviewed  on nebs - monitor vs and HD and Sat  keep sat > 88 pct  monitored off ABX - planned for [facility]
sepsis  abd process  eval acute danielle  ascites  hepatitis profile - neg  GI eval pending  on broad spectrum ABX - empiric  oral and skin care  IVF for ANDREEA / monitor Na, monitor labs and serial PE  I and O  monitor HD  keep MAP > 60  pain regimen  VBG noted  TFT noted  will follow and monitor  card monitoring, pulse ox monitoring
sepsis work up  ID following  paracentesis suggested  GI follow up noted  IVF dc'd as per Renal  I and O  on NEBS as per PMD  o2 support  I and O  monitor vs and HD and Sat  prognosis guarded  am labs pending
increased Lantus to 15 units sq hs  Added nutritional insulin 5 units sq  ac  Goal range 140- to 180mg/dl  Monitor glucose trends  adjust insulin as indicated  follow prn

## 2018-12-21 NOTE — PROGRESS NOTE ADULT - PROBLEM SELECTOR PROBLEM 1
Sepsis
Acute renal failure, unspecified acute renal failure type
Liver cirrhosis
Sepsis
Hyperglycemia due to type 2 diabetes mellitus

## 2018-12-21 NOTE — PROGRESS NOTE ADULT - SUBJECTIVE AND OBJECTIVE BOX
Subjective: no change in status.       MEDICATIONS  (STANDING):  ALBUTerol    0.083% 2.5 milliGRAM(s) Nebulizer every 6 hours  clopidogrel Tablet 75 milliGRAM(s) Oral daily  dextrose 5%. 1000 milliLiter(s) (50 mL/Hr) IV Continuous <Continuous>  dextrose 50% Injectable 12.5 Gram(s) IV Push once  dicyclomine 10 milliGRAM(s) Oral three times a day before meals  insulin glargine Injectable (LANTUS) 10 Unit(s) SubCutaneous at bedtime  insulin lispro (HumaLOG) corrective regimen sliding scale   SubCutaneous three times a day before meals  ketoconazole 2% Cream 1 Application(s) Topical at bedtime  levothyroxine 88 MICROGram(s) Oral daily  metoprolol succinate ER 25 milliGRAM(s) Oral daily  midodrine 5 milliGRAM(s) Oral every 8 hours  pantoprazole    Tablet 40 milliGRAM(s) Oral before breakfast  spironolactone 50 milliGRAM(s) Oral daily  tamsulosin 0.4 milliGRAM(s) Oral at bedtime    MEDICATIONS  (PRN):  dextrose 40% Gel 15 Gram(s) Oral once PRN Blood Glucose LESS THAN 70 milliGRAM(s)/deciliter  glucagon  Injectable 1 milliGRAM(s) IntraMuscular once PRN Glucose LESS THAN 70 milligrams/deciliter          T(C): 37.1 (12-21-18 @ 05:06), Max: 37.4 (12-21-18 @ 00:10)  HR: 72 (12-21-18 @ 07:16) (71 - 80)  BP: 136/65 (12-21-18 @ 05:06) (117/62 - 136/65)  RR: 18 (12-21-18 @ 05:06) (17 - 18)  SpO2: 98% (12-21-18 @ 07:16) (95% - 100%)  Wt(kg): --        I&O's Detail    20 Dec 2018 07:01  -  21 Dec 2018 07:00  --------------------------------------------------------  IN:    Oral Fluid: 240 mL    Packed Red Blood Cells: 284 mL  Total IN: 524 mL    OUT:    Voided: 1680 mL  Total OUT: 1680 mL    Total NET: -1156 mL      21 Dec 2018 07:01  -  21 Dec 2018 09:25  --------------------------------------------------------  IN:    Oral Fluid: 350 mL  Total IN: 350 mL    OUT:    Voided: 500 mL  Total OUT: 500 mL    Total NET: -150 mL               PHYSICAL EXAM:    GENERAL: no distress.   EYES: EOMI, PERRLA, conjunctiva and sclera clear  NECK: Supple, no inc in JVP  CHEST/LUNG: Clear  HEART: S1S2  ABDOMEN: distended, mildly tender. No r/g  EXTREMITIES:  no ankle edema  NEURO: no asterixis        LABS:  CBC Full  -  ( 21 Dec 2018 06:53 )  WBC Count : 6.62 K/uL  Hemoglobin : 8.4 g/dL  Hematocrit : 24.4 %  Platelet Count - Automated : 107 K/uL  Mean Cell Volume : 95.3 fl  Mean Cell Hemoglobin : 32.8 pg  Mean Cell Hemoglobin Concentration : 34.4 gm/dL  Auto Neutrophil # : x  Auto Lymphocyte # : x  Auto Monocyte # : x  Auto Eosinophil # : x  Auto Basophil # : x  Auto Neutrophil % : x  Auto Lymphocyte % : x  Auto Monocyte % : x  Auto Eosinophil % : x  Auto Basophil % : x    12-21    136  |  104  |  49<H>  ----------------------------<  307<H>  5.4<H>   |  26  |  1.62<H>    Ca    9.2      21 Dec 2018 06:53    TPro  5.6<L>  /  Alb  2.9<L>  /  TBili  3.3<H>  /  DBili  x   /  AST  26  /  ALT  26  /  AlkPhos  41  12-20        Impression:  * ANDREEA -- pre-renal vs HRS. Improved on IVFs  * CKD -- Cr 1.9 at baseline  * HypoNa -- hypovolemic. Resolved  * Cirrhosis  * Ascites  * Fever  * HyperK -- better    Recommendations:   * Monitor K on Aldactone  * No objection to maintenance oral loop diuretic to control ascites, hyperK.   * Low K diet if not NPO  * BMP daily

## 2018-12-21 NOTE — PROGRESS NOTE ADULT - SUBJECTIVE AND OBJECTIVE BOX
Date/Time Patient Seen:  		  Referring MD:   Data Reviewed	       Patient is a 83y old  Male who presents with a chief complaint of fever (21 Dec 2018 07:59)    vs and meds reviewed    Subjective/HPI     PAST MEDICAL & SURGICAL HISTORY:  CKD (chronic kidney disease)  Hepatitis  CAD (coronary artery disease)  HTN (hypertension)  DM (diabetes mellitus)  Artificial cardiac pacemaker  AICD (automatic cardioverter/defibrillator) present  No significant past surgical history        Medication list         MEDICATIONS  (STANDING):  ALBUTerol    0.083% 2.5 milliGRAM(s) Nebulizer every 6 hours  clopidogrel Tablet 75 milliGRAM(s) Oral daily  dextrose 5%. 1000 milliLiter(s) (50 mL/Hr) IV Continuous <Continuous>  dextrose 50% Injectable 12.5 Gram(s) IV Push once  dicyclomine 10 milliGRAM(s) Oral three times a day before meals  insulin glargine Injectable (LANTUS) 10 Unit(s) SubCutaneous at bedtime  insulin lispro (HumaLOG) corrective regimen sliding scale   SubCutaneous three times a day before meals  ketoconazole 2% Cream 1 Application(s) Topical at bedtime  levothyroxine 88 MICROGram(s) Oral daily  metoprolol succinate ER 25 milliGRAM(s) Oral daily  midodrine 5 milliGRAM(s) Oral every 8 hours  pantoprazole    Tablet 40 milliGRAM(s) Oral before breakfast  spironolactone 50 milliGRAM(s) Oral daily  tamsulosin 0.4 milliGRAM(s) Oral at bedtime    MEDICATIONS  (PRN):  dextrose 40% Gel 15 Gram(s) Oral once PRN Blood Glucose LESS THAN 70 milliGRAM(s)/deciliter  glucagon  Injectable 1 milliGRAM(s) IntraMuscular once PRN Glucose LESS THAN 70 milligrams/deciliter         Vitals log        ICU Vital Signs Last 24 Hrs  T(C): 37.1 (21 Dec 2018 05:06), Max: 37.4 (21 Dec 2018 00:10)  T(F): 98.7 (21 Dec 2018 05:06), Max: 99.3 (21 Dec 2018 00:10)  HR: 72 (21 Dec 2018 07:16) (71 - 82)  BP: 136/65 (21 Dec 2018 05:06) (117/62 - 136/65)  BP(mean): --  ABP: --  ABP(mean): --  RR: 18 (21 Dec 2018 05:06) (17 - 18)  SpO2: 98% (21 Dec 2018 07:16) (95% - 100%)           Input and Output:  I&O's Detail    20 Dec 2018 07:01  -  21 Dec 2018 07:00  --------------------------------------------------------  IN:    Oral Fluid: 240 mL    Packed Red Blood Cells: 284 mL  Total IN: 524 mL    OUT:    Voided: 1680 mL  Total OUT: 1680 mL    Total NET: -1156 mL          Lab Data                        8.4    6.62  )-----------( 107      ( 21 Dec 2018 06:53 )             24.4     12-21    136  |  104  |  49<H>  ----------------------------<  307<H>  5.4<H>   |  26  |  1.62<H>    Ca    9.2      21 Dec 2018 06:53    TPro  5.6<L>  /  Alb  2.9<L>  /  TBili  3.3<H>  /  DBili  x   /  AST  26  /  ALT  26  /  AlkPhos  41  12-20            Review of Systems	      Objective     Physical Examination      heart s1s2  lung dec BS  abd soft    Pertinent Lab findings & Imaging      Aly:  NO   Adequate UO     I&O's Detail    20 Dec 2018 07:01  -  21 Dec 2018 07:00  --------------------------------------------------------  IN:    Oral Fluid: 240 mL    Packed Red Blood Cells: 284 mL  Total IN: 524 mL    OUT:    Voided: 1680 mL  Total OUT: 1680 mL    Total NET: -1156 mL               Discussed with:     Cultures:	        Radiology

## 2018-12-21 NOTE — PROGRESS NOTE ADULT - ASSESSMENT
83 year old male with persistent hyperglycemia with present regime, tolerating diet patient  independently manages his diabetes at home.

## 2018-12-21 NOTE — PROGRESS NOTE ADULT - SUBJECTIVE AND OBJECTIVE BOX
Chief Complaint: Abdominal pain    Interval Events: No events overnight.    Review of Systems:  General: No fevers, chills, weight loss or gain  Skin: No rashes, color changes  Cardiovascular: No chest pain, orthopnea  Respiratory: No shortness of breath, cough  Gastrointestinal: No nausea, abdominal pain  Genitourinary: No incontinence, pain with urination  Musculoskeletal: No pain, swelling, decreased range of motion  Neurological: No headache, weakness  Psychiatric: No depression, anxiety  Endocrine: No weight loss or gain, increased thirst  All other systems are comprehensively negative.    Physical Exam:  Vital Signs Last 24 Hrs  T(C): 36.3 (21 Dec 2018 09:38), Max: 37.4 (21 Dec 2018 00:10)  T(F): 97.3 (21 Dec 2018 09:38), Max: 99.3 (21 Dec 2018 00:10)  HR: 78 (21 Dec 2018 09:38) (71 - 80)  BP: 112/68 (21 Dec 2018 09:38) (112/68 - 136/65)  BP(mean): --  RR: 18 (21 Dec 2018 09:38) (17 - 18)  SpO2: 100% (21 Dec 2018 09:38) (95% - 100%)  General: NAD  HEENT: MMM  Neck: No JVD, no carotid bruit  Lungs: CTAB  CV: RRR, nl S1/S2, no M/R/G  Abdomen: S/NT/ND, +BS  Extremities: No LE edema, no cyanosis  Neuro: AAOx3, non-focal  Skin: No rash    Labs:    12-21    136  |  104  |  49<H>  ----------------------------<  307<H>  5.4<H>   |  26  |  1.62<H>    Ca    9.2      21 Dec 2018 06:53    TPro  5.6<L>  /  Alb  2.9<L>  /  TBili  3.3<H>  /  DBili  x   /  AST  26  /  ALT  26  /  AlkPhos  41  12-20                        8.4    6.62  )-----------( 107      ( 21 Dec 2018 06:53 )             24.4       Telemetry: Sinus rhythm

## 2018-12-21 NOTE — PROGRESS NOTE ADULT - SUBJECTIVE AND OBJECTIVE BOX
Chief Complaint: HYperglycemia    History: Type 2 DM    MEDICATIONS  (STANDING):  ALBUTerol    0.083% 2.5 milliGRAM(s) Nebulizer every 6 hours  clopidogrel Tablet 75 milliGRAM(s) Oral daily  dextrose 5%. 1000 milliLiter(s) (50 mL/Hr) IV Continuous <Continuous>  dextrose 50% Injectable 12.5 Gram(s) IV Push once  dicyclomine 10 milliGRAM(s) Oral three times a day before meals  insulin glargine Injectable (LANTUS) 15 Unit(s) SubCutaneous at bedtime  insulin lispro (HumaLOG) corrective regimen sliding scale   SubCutaneous three times a day before meals  insulin lispro Injectable (HumaLOG) 5 Unit(s) SubCutaneous three times a day before meals  ketoconazole 2% Cream 1 Application(s) Topical at bedtime  levothyroxine 88 MICROGram(s) Oral daily  metoprolol succinate ER 25 milliGRAM(s) Oral daily  midodrine 5 milliGRAM(s) Oral every 8 hours  pantoprazole    Tablet 40 milliGRAM(s) Oral before breakfast  spironolactone 50 milliGRAM(s) Oral daily  tamsulosin 0.4 milliGRAM(s) Oral at bedtime    MEDICATIONS  (PRN):  dextrose 40% Gel 15 Gram(s) Oral once PRN Blood Glucose LESS THAN 70 milliGRAM(s)/deciliter  glucagon  Injectable 1 milliGRAM(s) IntraMuscular once PRN Glucose LESS THAN 70 milligrams/deciliter      Allergies    No Known Allergies    Intolerances      Constitutional: No fever  HEENT: No pain  Cardiovascular: No chest pain, palpitation  Respiratory: No SOB, no cough  GI: No nausea, vomiting, abdominal pain  Endocrine: no polyuria, polydipsia    PHYSICAL EXAM:  VITALS: T(C): 36.3 (12-21-18 @ 09:38)  T(F): 97.3 (12-21-18 @ 09:38), Max: 99.3 (12-21-18 @ 00:10)  HR: 78 (12-21-18 @ 09:38) (71 - 80)  BP: 112/68 (12-21-18 @ 09:38) (112/68 - 136/65)  RR:  (17 - 18)  SpO2:  (95% - 100%)  Wt(kg): --  GENERAL: NAD, well-groomed, well-developed  HEENT:  Atraumatic, Normocephalic, moist mucous membranes  RESPIRATORY: Clear to auscultation bilaterally; No rales, rhonchi, wheezing, or rubs  CARDIOVASCULAR: Regular rate and rhythm; No murmurs; no peripheral edema  PSYCH: Alert and oriented x 3, normal affect, normal mood      POCT Blood Glucose.: 293 mg/dL (12-21-18 @ 07:36)  POCT Blood Glucose.: 332 mg/dL (12-20-18 @ 21:17)  POCT Blood Glucose.: 332 mg/dL (12-20-18 @ 16:37)  POCT Blood Glucose.: 330 mg/dL (12-20-18 @ 12:17)  POCT Blood Glucose.: 219 mg/dL (12-20-18 @ 08:04)  POCT Blood Glucose.: 290 mg/dL (12-19-18 @ 21:53)  POCT Blood Glucose.: 270 mg/dL (12-19-18 @ 16:42)  POCT Blood Glucose.: 304 mg/dL (12-19-18 @ 12:28)  POCT Blood Glucose.: 225 mg/dL (12-19-18 @ 08:08)  POCT Blood Glucose.: 239 mg/dL (12-18-18 @ 21:17)  POCT Blood Glucose.: 357 mg/dL (12-18-18 @ 16:43)  POCT Blood Glucose.: 335 mg/dL (12-18-18 @ 12:02)      12-21    136  |  104  |  49<H>  ----------------------------<  307<H>  5.4<H>   |  26  |  1.62<H>    EGFR if : 45<L>  EGFR if non : 39<L>    Ca    9.2      12-21    TPro  5.6<L>  /  Alb  2.9<L>  /  TBili  3.3<H>  /  DBili  x   /  AST  26  /  ALT  26  /  AlkPhos  41  12-20      Hemoglobin A1C, Whole Blood: 8.4 % <H> [4.0 - 5.6] (12-13-18 @ 10:33)

## 2018-12-21 NOTE — PROGRESS NOTE ADULT - SUBJECTIVE AND OBJECTIVE BOX
INTERVAL HPI/OVERNIGHT EVENTS:  s/p one unit of prbc  hgb was stabled but one given   No acute events over night      MEDICATIONS  (STANDING):  albumin human 25% IVPB 50 milliLiter(s) IV Intermittent every 6 hours  ALBUTerol    0.083% 2.5 milliGRAM(s) Nebulizer every 6 hours  dextrose 5%. 1000 milliLiter(s) (50 mL/Hr) IV Continuous <Continuous>  insulin glargine Injectable (LANTUS) 10 Unit(s) SubCutaneous at bedtime  insulin lispro (HumaLOG) corrective regimen sliding scale   SubCutaneous three times a day before meals  ketoconazole 2% Cream 1 Application(s) Topical at bedtime  levothyroxine 88 MICROGram(s) Oral daily  metoprolol succinate ER 25 milliGRAM(s) Oral daily  midodrine 5 milliGRAM(s) Oral every 8 hours  spironolactone 25 milliGRAM(s) Oral daily  tamsulosin 0.4 milliGRAM(s) Oral at bedtime    T, P, R, SpO2, BP    Temperature  Temp (F): 98.3 Degrees F  Temp (C) Temp (C): 36.8 Degrees C  Temp site Temp Site: oral    Heart Rate  Heart Rate Heart Rate (beats/min): 83 /min  Heart Rate Method: noninvasive blood pressure monitor    Noninvasive Blood Pressure  BP Systolic Systolic: 103 mm Hg  BP Diastolic Diastolic (mm Hg): 66 mm Hg  Blood Pressure - Site Site: left upper arm  Blood Pressure - Method Method: electronic    Respiratory/Pulse Oximetry  Respiration Rate (breaths/min) Respiration Rate (breaths/min): 19 /min  SpO2 (%) SpO2 (%): 97 %  O2 delivery Patient On: supplemental O2      Physical exam:  HEENT awake alert NAD pos icteric   abd softly distended gen mild tender ruq. no rebound no guarding   cv s1s2 rrr  chest CTA b/l   ext no edema    LABS:   hgb 8.4      RADIOLOGY & ADDITIONAL TESTS:      Assessment and Plan:   · Assessment		  		  83 m w/ above hx a/w fevers, back pain and distended abdomen  crypotgenic cirrhosis   lft elevated, ascites and abnormal appearing gb on ct/sono  -s/p Paracentesis 60cc turbid fluid removed. low total protein c/w cirrhosis (and low plt). Does not meet criteria for SBP    Hep B/C serologies negative. , afp normal   increase aldactone to 100 mg   needs egd as an outpatient   2 gram sodium diet  change ppi to pepcid

## 2019-01-11 ENCOUNTER — INPATIENT (INPATIENT)
Facility: HOSPITAL | Age: 84
LOS: 7 days | Discharge: HOME | DRG: 314 | End: 2019-01-19
Attending: INTERNAL MEDICINE | Admitting: INTERNAL MEDICINE
Payer: MEDICARE

## 2019-01-11 VITALS
DIASTOLIC BLOOD PRESSURE: 71 MMHG | RESPIRATION RATE: 20 BRPM | WEIGHT: 169.98 LBS | HEART RATE: 73 BPM | SYSTOLIC BLOOD PRESSURE: 163 MMHG | TEMPERATURE: 98 F | OXYGEN SATURATION: 98 %

## 2019-01-11 DIAGNOSIS — Z95.0 PRESENCE OF CARDIAC PACEMAKER: Chronic | ICD-10-CM

## 2019-01-11 DIAGNOSIS — N18.9 CHRONIC KIDNEY DISEASE, UNSPECIFIED: ICD-10-CM

## 2019-01-11 DIAGNOSIS — Z95.810 PRESENCE OF AUTOMATIC (IMPLANTABLE) CARDIAC DEFIBRILLATOR: ICD-10-CM

## 2019-01-11 DIAGNOSIS — T82.110A BREAKDOWN (MECHANICAL) OF CARDIAC ELECTRODE, INITIAL ENCOUNTER: ICD-10-CM

## 2019-01-11 DIAGNOSIS — E87.1 HYPO-OSMOLALITY AND HYPONATREMIA: ICD-10-CM

## 2019-01-11 DIAGNOSIS — Z95.810 PRESENCE OF AUTOMATIC (IMPLANTABLE) CARDIAC DEFIBRILLATOR: Chronic | ICD-10-CM

## 2019-01-11 DIAGNOSIS — E11.9 TYPE 2 DIABETES MELLITUS WITHOUT COMPLICATIONS: ICD-10-CM

## 2019-01-11 DIAGNOSIS — I25.10 ATHEROSCLEROTIC HEART DISEASE OF NATIVE CORONARY ARTERY WITHOUT ANGINA PECTORIS: ICD-10-CM

## 2019-01-11 PROBLEM — K75.9 INFLAMMATORY LIVER DISEASE, UNSPECIFIED: Chronic | Status: ACTIVE | Noted: 2018-12-12

## 2019-01-11 LAB
ALBUMIN SERPL ELPH-MCNC: 3.1 G/DL — LOW (ref 3.3–5)
ALP SERPL-CCNC: 83 U/L — SIGNIFICANT CHANGE UP (ref 40–120)
ALT FLD-CCNC: 21 U/L — SIGNIFICANT CHANGE UP (ref 10–45)
ANION GAP SERPL CALC-SCNC: 12 MMOL/L — SIGNIFICANT CHANGE UP (ref 5–17)
AST SERPL-CCNC: 72 U/L — HIGH (ref 10–40)
BASE EXCESS BLDV CALC-SCNC: 0 MMOL/L — SIGNIFICANT CHANGE UP (ref -2–2)
BASOPHILS # BLD AUTO: 0 K/UL — SIGNIFICANT CHANGE UP (ref 0–0.2)
BASOPHILS NFR BLD AUTO: 0.2 % — SIGNIFICANT CHANGE UP (ref 0–2)
BILIRUB SERPL-MCNC: 1.1 MG/DL — SIGNIFICANT CHANGE UP (ref 0.2–1.2)
BUN SERPL-MCNC: 21 MG/DL — SIGNIFICANT CHANGE UP (ref 7–23)
CA-I SERPL-SCNC: 1.12 MMOL/L — SIGNIFICANT CHANGE UP (ref 1.12–1.3)
CALCIUM SERPL-MCNC: 8.1 MG/DL — LOW (ref 8.4–10.5)
CHLORIDE BLDV-SCNC: 98 MMOL/L — SIGNIFICANT CHANGE UP (ref 96–108)
CHLORIDE SERPL-SCNC: 94 MMOL/L — LOW (ref 96–108)
CO2 BLDV-SCNC: 26 MMOL/L — SIGNIFICANT CHANGE UP (ref 22–30)
CO2 SERPL-SCNC: 19 MMOL/L — LOW (ref 22–31)
CREAT SERPL-MCNC: 1.44 MG/DL — HIGH (ref 0.5–1.3)
EOSINOPHIL # BLD AUTO: 0.1 K/UL — SIGNIFICANT CHANGE UP (ref 0–0.5)
EOSINOPHIL NFR BLD AUTO: 1.2 % — SIGNIFICANT CHANGE UP (ref 0–6)
GAS PNL BLDV: 126 MMOL/L — LOW (ref 136–145)
GAS PNL BLDV: SIGNIFICANT CHANGE UP
GAS PNL BLDV: SIGNIFICANT CHANGE UP
GLUCOSE BLDV-MCNC: 201 MG/DL — HIGH (ref 70–99)
GLUCOSE SERPL-MCNC: 208 MG/DL — HIGH (ref 70–99)
HCO3 BLDV-SCNC: 25 MMOL/L — SIGNIFICANT CHANGE UP (ref 21–29)
HCT VFR BLD CALC: 28.7 % — LOW (ref 39–50)
HCT VFR BLDA CALC: 30 % — LOW (ref 39–50)
HGB BLD CALC-MCNC: 9.7 G/DL — LOW (ref 13–17)
HGB BLD-MCNC: 9.6 G/DL — LOW (ref 13–17)
LACTATE BLDV-MCNC: 2.6 MMOL/L — HIGH (ref 0.7–2)
LYMPHOCYTES # BLD AUTO: 0.9 K/UL — LOW (ref 1–3.3)
LYMPHOCYTES # BLD AUTO: 12.4 % — LOW (ref 13–44)
MCHC RBC-ENTMCNC: 32.9 PG — SIGNIFICANT CHANGE UP (ref 27–34)
MCHC RBC-ENTMCNC: 33.5 GM/DL — SIGNIFICANT CHANGE UP (ref 32–36)
MCV RBC AUTO: 98.1 FL — SIGNIFICANT CHANGE UP (ref 80–100)
MONOCYTES # BLD AUTO: 0.8 K/UL — SIGNIFICANT CHANGE UP (ref 0–0.9)
MONOCYTES NFR BLD AUTO: 10.5 % — SIGNIFICANT CHANGE UP (ref 2–14)
NEUTROPHILS # BLD AUTO: 5.7 K/UL — SIGNIFICANT CHANGE UP (ref 1.8–7.4)
NEUTROPHILS NFR BLD AUTO: 75.8 % — SIGNIFICANT CHANGE UP (ref 43–77)
PCO2 BLDV: 45 MMHG — SIGNIFICANT CHANGE UP (ref 35–50)
PH BLDV: 7.36 — SIGNIFICANT CHANGE UP (ref 7.35–7.45)
PLATELET # BLD AUTO: 145 K/UL — LOW (ref 150–400)
PO2 BLDV: 26 MMHG — SIGNIFICANT CHANGE UP (ref 25–45)
POTASSIUM BLDV-SCNC: 5.7 MMOL/L — HIGH (ref 3.5–5.3)
POTASSIUM SERPL-MCNC: 6.1 MMOL/L — HIGH (ref 3.5–5.3)
POTASSIUM SERPL-SCNC: 6.1 MMOL/L — HIGH (ref 3.5–5.3)
PROT SERPL-MCNC: 6.8 G/DL — SIGNIFICANT CHANGE UP (ref 6–8.3)
RBC # BLD: 2.93 M/UL — LOW (ref 4.2–5.8)
RBC # FLD: 14.4 % — SIGNIFICANT CHANGE UP (ref 10.3–14.5)
SAO2 % BLDV: 37 % — LOW (ref 67–88)
SODIUM SERPL-SCNC: 125 MMOL/L — LOW (ref 135–145)
TROPONIN T, HIGH SENSITIVITY RESULT: 60 NG/L — HIGH (ref 0–51)
WBC # BLD: 7.5 K/UL — SIGNIFICANT CHANGE UP (ref 3.8–10.5)
WBC # FLD AUTO: 7.5 K/UL — SIGNIFICANT CHANGE UP (ref 3.8–10.5)

## 2019-01-11 PROCEDURE — 93010 ELECTROCARDIOGRAM REPORT: CPT

## 2019-01-11 PROCEDURE — 99285 EMERGENCY DEPT VISIT HI MDM: CPT | Mod: 25

## 2019-01-11 PROCEDURE — 71045 X-RAY EXAM CHEST 1 VIEW: CPT | Mod: 26

## 2019-01-11 RX ORDER — PANTOPRAZOLE SODIUM 20 MG/1
40 TABLET, DELAYED RELEASE ORAL
Qty: 0 | Refills: 0 | Status: DISCONTINUED | OUTPATIENT
Start: 2019-01-11 | End: 2019-01-19

## 2019-01-11 RX ORDER — LEVOTHYROXINE SODIUM 125 MCG
88 TABLET ORAL DAILY
Qty: 0 | Refills: 0 | Status: DISCONTINUED | OUTPATIENT
Start: 2019-01-11 | End: 2019-01-19

## 2019-01-11 RX ORDER — INSULIN GLARGINE 100 [IU]/ML
15 INJECTION, SOLUTION SUBCUTANEOUS AT BEDTIME
Qty: 0 | Refills: 0 | Status: DISCONTINUED | OUTPATIENT
Start: 2019-01-11 | End: 2019-01-19

## 2019-01-11 RX ORDER — METOPROLOL TARTRATE 50 MG
25 TABLET ORAL DAILY
Qty: 0 | Refills: 0 | Status: DISCONTINUED | OUTPATIENT
Start: 2019-01-11 | End: 2019-01-19

## 2019-01-11 RX ORDER — HEPARIN SODIUM 5000 [USP'U]/ML
5000 INJECTION INTRAVENOUS; SUBCUTANEOUS EVERY 12 HOURS
Qty: 0 | Refills: 0 | Status: DISCONTINUED | OUTPATIENT
Start: 2019-01-11 | End: 2019-01-19

## 2019-01-11 RX ORDER — TAMSULOSIN HYDROCHLORIDE 0.4 MG/1
0.4 CAPSULE ORAL AT BEDTIME
Qty: 0 | Refills: 0 | Status: DISCONTINUED | OUTPATIENT
Start: 2019-01-11 | End: 2019-01-19

## 2019-01-11 NOTE — CONSULT NOTE ADULT - ASSESSMENT
83 year old Mandarin speaking male with PMH CAD S/P stent x 3 times, s/p single chamber ICD (MDT, [employer] Quattro Secure lead [id number]) 2007 with generator change 2/22/16 likely for primary prevention for ischemic CMP as pt denies hx syncope or ICD shock, HTN, CKD, and Hepatitis, recently admitted to [facility] with fever/generalized weakness/abdominal distention, now presents from [facility] with ICD beeping. ICD interrogation revealed low RV impedence and elevated RV pacing thresholds, not pacemaker dependent ( < 0.1%).

## 2019-01-11 NOTE — ED ADULT NURSE REASSESSMENT NOTE - NS ED NURSE REASSESS COMMENT FT1
Pt seen by cardiology. Resting comfortably in stretcher, unlabored spontaneous respirations, NAD. VSS. Awaiting admission at this time.

## 2019-01-11 NOTE — H&P ADULT - HISTORY OF PRESENT ILLNESS
82 yo male with hx of CAD S/P stent, S/P PPM, HTN, BPH, CKD, Hepatitis, and Dm2 recently admitted for fever of unknown origin, discharge to [facility] p/w AICD firing. patient has document which shows that his Medtronic AICD fired for VF and VTach, total of 3 times. Brought here for further eval. Patient Mandarian speaking and [place]. Requested his daughter to translate who was obtained over the telephone. States patient currently c/o some fatigue and lower back pain. No f/c, N/V/d, abd pain, chest pain.  pt. was seen by EP in ED and having AICD lead malfunction. advised for TTE and possible lead replacement

## 2019-01-11 NOTE — ED PROVIDER NOTE - MEDICAL DECISION MAKING DETAILS
ATTG: : concern for AICD firing, will check labs, interrogation to be reviewed with EP team. re eval for dispo.

## 2019-01-11 NOTE — ED PROVIDER NOTE - PROGRESS NOTE DETAILS
ATTG: : patient's daughter came to bedside and states he has been experiencing shocks for at least the last 3 days and is concerned about the device firing for more than just today.

## 2019-01-11 NOTE — CONSULT NOTE ADULT - PROBLEM SELECTOR RECOMMENDATION 9
-ICD interrogation revealed low RV impedence and elevated RV pacing thresholds -Not pacemaker dependent ( < 0.1%)  -Admit to telemetry under medicine service  -TTE to evaluate EF   -The need for lead revision pending TTE and clinical course  -Plan discussed with ED team    YAMILEX Raphael, ELSA  [phone]

## 2019-01-11 NOTE — ED PROVIDER NOTE - CARE PLAN
Principal Discharge DX:	AICD (automatic cardioverter/defibrillator) present Principal Discharge DX:	AICD (automatic cardioverter/defibrillator) present  Secondary Diagnosis:	Hyponatremia

## 2019-01-11 NOTE — ED PROVIDER NOTE - ATTENDING CONTRIBUTION TO CARE
82 yo male with hx of CAD S/P stent, S/P PPM, HTN, CKD, and Hepatitis presenting for AICD device firing. patient states he felt it once. arrives by EMS with papers from NH where he resides at [facility]. Patient is mandarin speaking, fam at bedside to assist. currently denies pain or shortness of breath.   Gen.  no acute distress  HEENT:  perrl eomi  Lungs:  b/l bs  CVS: S1S2   Abd;  soft non tender.   Ext: no pedal edema  Neuro: awake, alert, oriented to person place and time  MSK: moving all ext

## 2019-01-11 NOTE — ED PROVIDER NOTE - OBJECTIVE STATEMENT
84 yo male with hx of CAD S/P stent, S/P PPM, HTN, BPH, CKD, Hepatitis, and Dm2 recently admitted for fever of unknown origin, discharge to [facility] p/w AICD firing. patient has document which shows that his Medtronic AICD fired for VF and VTach, total of 3 times. Brought here for further eval. Patient Mandarian speaking and [place]. Requested his daughter to translate who was obtained over the telephone. States patient currently c/o some fatigue and lower back pain. No f/c, N/V/d, abd pain, chest pain.

## 2019-01-11 NOTE — ED ADULT NURSE NOTE - NSIMPLEMENTINTERV_GEN_ALL_ED
Implemented All Fall Risk Interventions:  [place] to call system. Call bell, personal items and telephone within reach. Instruct patient to call for assistance. Room bathroom lighting operational. Non-slip footwear when patient is off stretcher. Physically safe environment: no spills, clutter or unnecessary equipment. Stretcher in lowest position, wheels locked, appropriate side rails in place. Provide visual cue, wrist band, yellow gown, etc. Monitor gait and stability. Monitor for mental status changes and reorient to person, place, and time. Review medications for side effects contributing to fall risk. Reinforce activity limits and safety measures with patient and family.

## 2019-01-11 NOTE — ED ADULT NURSE NOTE - OBJECTIVE STATEMENT
84 y/o male PMH HTN, CAD and stent reports to ED from nursing home r/t AICD firing this AM per EMS. Pt also reports lower back pain and fatigue. Pt is Mandarin speaking, request to use daughter as . Lung sounds decreased bilaterally, NAD, O2 sat 100% RA. Abdomen soft, non-tender, non-distended, hypoactive bowel sounds in all 4 quadrants. Cardiac monitor in place, NSR, HR 72. Heplock placed, labs sent. Awaiting CXR and cardiology consultation at this time. Wife at bedside. 82 y/o male PMH HTN, CAD and stent reports to ED from nursing home r/t AICD firing this AM per EMS. Pt also reports lower back pain and fatigue. Pt is Mandarin speaking, request to use daughter as . Lung sounds decreased bilaterally, NAD, O2 sat 100% RA. Abdomen soft, non-tender, distended, hypoactive bowel sounds in all 4 quadrants. Cardiac monitor in place, NSR, HR 72. Heplock placed, labs sent. Awaiting CXR and cardiology consultation at this time. Wife at bedside.

## 2019-01-11 NOTE — CONSULT NOTE ADULT - SUBJECTIVE AND OBJECTIVE BOX
CHIEF COMPLAINT: ICD beeping    HISTORY OF PRESENT ILLNESS:  Patient is 82 yo male with hx of CAD S/P stent x 3 times, s/p single chamber ICD (MDT, Anders Eldero Secure lead [id number]) 2007 with generator change 2/22/16, HTN, CKD, and Hepatitis, recently admitted to [facility] with fever/generalized weakness/abdominal distention, [facility].     Allergies: No Known Allergies    Home Medications:    PAST MEDICAL & SURGICAL HISTORY:  CKD (chronic kidney disease)  Hepatitis  CAD (coronary artery disease)  HTN (hypertension)  DM (diabetes mellitus)  Artificial cardiac pacemaker  AICD (automatic cardioverter/defibrillator) present    FAMILY HISTORY:  No pertinent family history in first degree relatives    REVIEW OF SYSTEMS:  See HPI. Otherwise, 10 point ROS done and otherwise negative.    PHYSICAL EXAM:  T(C): 36.7 (01-11-19 @ 13:33), Max: 36.7 (01-11-19 @ 13:33)  HR: 73 (01-11-19 @ 13:33) (73 - 73)  BP: 163/71 (01-11-19 @ 13:33) (163/71 - 163/71)  RR: 20 (01-11-19 @ 13:33) (20 - 20)  SpO2: 98% (01-11-19 @ 13:33) (98% - 98%)  I&O's Summary    Appearance: Normal	  HEENT:   Normal oral mucosa, PERRL, EOMI	  Lymphatic: No lymphadenopathy  Cardiovascular: Normal S1 S2, No JVD, No murmurs, B/L LE 1+ edema  Respiratory: Lungs clear to auscultation	  Psychiatry: A & O x 3, Mood & affect appropriate  Gastrointestinal: Distended, Non-tender, + BS	  Skin: No rashes, No ecchymoses, No cyanosis	  Neurologic: Non-focal  Extremities: Normal range of motion, No clubbing, or cyanosis  Vascular: Peripheral pulses palpable 2+ bilaterally      LABS:	 	    CBC Full  -  ( 11 Jan 2019 14:19 )  WBC Count : 7.5 K/uL  Hemoglobin : 9.6 g/dL  Hematocrit : 28.7 %  Platelet Count - Automated : 145 K/uL  Mean Cell Volume : 98.1 fl  Mean Cell Hemoglobin : 32.9 pg  Mean Cell Hemoglobin Concentration : 33.5 gm/dL  Auto Neutrophil # : 5.7 K/uL  Auto Lymphocyte # : 0.9 K/uL  Auto Monocyte # : 0.8 K/uL  Auto Eosinophil # : 0.1 K/uL  Auto Basophil # : 0.0 K/uL  Auto Neutrophil % : 75.8 %  Auto Lymphocyte % : 12.4 %  Auto Monocyte % : 10.5 %  Auto Eosinophil % : 1.2 %  Auto Basophil % : 0.2 %    01-11    125<L>  |  94<L>  |  21  ----------------------------<  208<H>  6.1<H>   |  19<L>  |  1.44<H>    Ca    8.1<L>      11 Jan 2019 14:19    TPro  6.8  /  Alb  3.1<L>  /  TBili  1.1  /  DBili  x   /  AST  72<H>  /  ALT  21  /  AlkPhos  83  01-11      TELEMETRY: Sinus rhythm at 60's    	    ECG: Personally reviewed CHIEF COMPLAINT: ICD beeping    HISTORY OF PRESENT ILLNESS:  Patient is 84 yo male with hx of CAD S/P stent x 3 times, s/p single chamber ICD (MDT, Anders Rodriguez Secure lead [id number]) 2007 with generator change 2/22/16, HTN, CKD, and Hepatitis, recently admitted to [facility] with fever/generalized weakness/abdominal distention, [facility].     Allergies: No Known Allergies    Home Medications:   * Patient Currently Takes Medications as of 21-Dec-2018 12:51 documented in Structured Notes  · 	pantoprazole 40 mg oral delayed release tablet: 1 tab(s) orally once a day (before a meal)  · 	spironolactone 25 mg oral tablet: 2 tab(s) orally once a day  · 	insulin lispro: 5 unit(s) subcutaneous 3 times a day  · 	insulin glargine: 15 unit(s) subcutaneous once a day (at bedtime)  · 	midodrine 5 mg oral tablet: 1 tab(s) orally every 8 hours  · 	ketoconazole 2% topical cream: 1 application topically once a day (at bedtime)  · 	dicyclomine 10 mg oral capsule: 1 cap(s) orally 3 times a day (before meals)  · 	nitroglycerin 0.4 mg sublingual spray: 1 tab(s) sublingual , As Needed  · 	clopidogrel 75 mg oral tablet: 1 tab(s) orally once a day  · 	levothyroxine 88 mcg (0.088 mg) oral tablet: 1 tab(s) orally once a day  · 	metoprolol succinate 25 mg oral tablet, extended release: 1 tab(s) orally once a day  · 	Uloric 40 mg oral tablet: 1 tab(s) orally once a day  · 	tamsulosin 0.4 mg oral capsule: 1 cap(s) orally once a day  · 	prasugrel 10 mg oral tablet: 1 tab(s) orally once a day    PAST MEDICAL & SURGICAL HISTORY:  CKD (chronic kidney disease)  Hepatitis  CAD (coronary artery disease)  HTN (hypertension)  DM (diabetes mellitus)  Artificial cardiac pacemaker  AICD (automatic cardioverter/defibrillator) present    FAMILY HISTORY:  No pertinent family history in first degree relatives    REVIEW OF SYSTEMS:  See HPI. Otherwise, 10 point ROS done and otherwise negative.    PHYSICAL EXAM:  T(C): 36.7 (01-11-19 @ 13:33), Max: 36.7 (01-11-19 @ 13:33)  HR: 73 (01-11-19 @ 13:33) (73 - 73)  BP: 163/71 (01-11-19 @ 13:33) (163/71 - 163/71)  RR: 20 (01-11-19 @ 13:33) (20 - 20)  SpO2: 98% (01-11-19 @ 13:33) (98% - 98%)  I&O's Summary    Appearance: Normal	  HEENT:   Normal oral mucosa, PERRL, EOMI	  Lymphatic: No lymphadenopathy  Cardiovascular: Normal S1 S2, No JVD, No murmurs, B/L LE 1+ edema  Respiratory: Lungs clear to auscultation	  Psychiatry: A & O x 3, Mood & affect appropriate  Gastrointestinal: Distended, Non-tender, + BS	  Skin: No rashes, No ecchymoses, No cyanosis	  Neurologic: Non-focal  Extremities: Normal range of motion, No clubbing, or cyanosis  Vascular: Peripheral pulses palpable 2+ bilaterally      LABS:	 	    CBC Full  -  ( 11 Jan 2019 14:19 )  WBC Count : 7.5 K/uL  Hemoglobin : 9.6 g/dL  Hematocrit : 28.7 %  Platelet Count - Automated : 145 K/uL  Mean Cell Volume : 98.1 fl  Mean Cell Hemoglobin : 32.9 pg  Mean Cell Hemoglobin Concentration : 33.5 gm/dL  Auto Neutrophil # : 5.7 K/uL  Auto Lymphocyte # : 0.9 K/uL  Auto Monocyte # : 0.8 K/uL  Auto Eosinophil # : 0.1 K/uL  Auto Basophil # : 0.0 K/uL  Auto Neutrophil % : 75.8 %  Auto Lymphocyte % : 12.4 %  Auto Monocyte % : 10.5 %  Auto Eosinophil % : 1.2 %  Auto Basophil % : 0.2 %    01-11    125<L>  |  94<L>  |  21  ----------------------------<  208<H>  6.1<H>   |  19<L>  |  1.44<H>    Ca    8.1<L>      11 Jan 2019 14:19    TPro  6.8  /  Alb  3.1<L>  /  TBili  1.1  /  DBili  x   /  AST  72<H>  /  ALT  21  /  AlkPhos  83  01-11      TELEMETRY: Sinus rhythm at 60's    	    ECG: Personally reviewed

## 2019-01-11 NOTE — H&P ADULT - NSHPPHYSICALEXAM_GEN_ALL_CORE
Vital Signs Last 24 Hrs  T(C): 36.7 (11 Jan 2019 20:00), Max: 36.7 (11 Jan 2019 13:33)  T(F): 98 (11 Jan 2019 20:00), Max: 98.1 (11 Jan 2019 13:33)  HR: 74 (11 Jan 2019 20:00) (72 - 74)  BP: 133/65 (11 Jan 2019 20:00) (133/65 - 163/71)  BP(mean): --  RR: 18 (11 Jan 2019 20:00) (16 - 20)  SpO2: 100% (11 Jan 2019 20:00) (98% - 100%) pt. seen and examined, NAD    Vital Signs Last 24 Hrs  T(C): 36.7 (11 Jan 2019 20:00), Max: 36.7 (11 Jan 2019 13:33)  T(F): 98 (11 Jan 2019 20:00), Max: 98.1 (11 Jan 2019 13:33)  HR: 74 (11 Jan 2019 20:00) (72 - 74)  BP: 133/65 (11 Jan 2019 20:00) (133/65 - 163/71)  BP(mean): --  RR: 18 (11 Jan 2019 20:00) (16 - 20)  SpO2: 100% (11 Jan 2019 20:00) (98% - 100%)    heent: nc/at  neck: supple, no JVD  lungs: B/L fair A/E, no w/r/r  heart: s1s2 nml  abd: soft, NABS, NT/ND  ext: no e/c/c  neuro: aaox3

## 2019-01-11 NOTE — H&P ADULT - NSHPLABSRESULTS_GEN_ALL_CORE
9.6    7.5   )-----------( 145      ( 11 Jan 2019 14:19 )             28.7   01-11    125<L>  |  94<L>  |  21  ----------------------------<  208<H>  6.1<H>   |  19<L>  |  1.44<H>    Ca    8.1<L>      11 Jan 2019 14:19    TPro  6.8  /  Alb  3.1<L>  /  TBili  1.1  /  DBili  x   /  AST  72<H>  /  ALT  21  /  AlkPhos  83  01-11

## 2019-01-12 LAB
ALBUMIN SERPL ELPH-MCNC: 2.8 G/DL — LOW (ref 3.3–5)
ALP SERPL-CCNC: 77 U/L — SIGNIFICANT CHANGE UP (ref 40–120)
ALT FLD-CCNC: 17 U/L — SIGNIFICANT CHANGE UP (ref 10–45)
ANION GAP SERPL CALC-SCNC: 11 MMOL/L — SIGNIFICANT CHANGE UP (ref 5–17)
ANION GAP SERPL CALC-SCNC: 9 MMOL/L — SIGNIFICANT CHANGE UP (ref 5–17)
AST SERPL-CCNC: 27 U/L — SIGNIFICANT CHANGE UP (ref 10–40)
BILIRUB SERPL-MCNC: 1.4 MG/DL — HIGH (ref 0.2–1.2)
BUN SERPL-MCNC: 20 MG/DL — SIGNIFICANT CHANGE UP (ref 7–23)
BUN SERPL-MCNC: 21 MG/DL — SIGNIFICANT CHANGE UP (ref 7–23)
CALCIUM SERPL-MCNC: 7.8 MG/DL — LOW (ref 8.4–10.5)
CALCIUM SERPL-MCNC: 8.1 MG/DL — LOW (ref 8.4–10.5)
CHLORIDE SERPL-SCNC: 99 MMOL/L — SIGNIFICANT CHANGE UP (ref 96–108)
CHLORIDE SERPL-SCNC: 99 MMOL/L — SIGNIFICANT CHANGE UP (ref 96–108)
CO2 SERPL-SCNC: 20 MMOL/L — LOW (ref 22–31)
CO2 SERPL-SCNC: 22 MMOL/L — SIGNIFICANT CHANGE UP (ref 22–31)
CREAT SERPL-MCNC: 1.33 MG/DL — HIGH (ref 0.5–1.3)
CREAT SERPL-MCNC: 1.39 MG/DL — HIGH (ref 0.5–1.3)
GLUCOSE BLDC GLUCOMTR-MCNC: 155 MG/DL — HIGH (ref 70–99)
GLUCOSE BLDC GLUCOMTR-MCNC: 169 MG/DL — HIGH (ref 70–99)
GLUCOSE BLDC GLUCOMTR-MCNC: 223 MG/DL — HIGH (ref 70–99)
GLUCOSE BLDC GLUCOMTR-MCNC: 251 MG/DL — HIGH (ref 70–99)
GLUCOSE BLDC GLUCOMTR-MCNC: 265 MG/DL — HIGH (ref 70–99)
GLUCOSE SERPL-MCNC: 160 MG/DL — HIGH (ref 70–99)
GLUCOSE SERPL-MCNC: 202 MG/DL — HIGH (ref 70–99)
HBA1C BLD-MCNC: 7.1 % — HIGH (ref 4–5.6)
HCT VFR BLD CALC: 27.2 % — LOW (ref 39–50)
HGB BLD-MCNC: 9.1 G/DL — LOW (ref 13–17)
MCHC RBC-ENTMCNC: 31.7 PG — SIGNIFICANT CHANGE UP (ref 27–34)
MCHC RBC-ENTMCNC: 33.5 GM/DL — SIGNIFICANT CHANGE UP (ref 32–36)
MCV RBC AUTO: 94.8 FL — SIGNIFICANT CHANGE UP (ref 80–100)
PLATELET # BLD AUTO: 140 K/UL — LOW (ref 150–400)
POTASSIUM SERPL-MCNC: 4.8 MMOL/L — SIGNIFICANT CHANGE UP (ref 3.5–5.3)
POTASSIUM SERPL-MCNC: 4.9 MMOL/L — SIGNIFICANT CHANGE UP (ref 3.5–5.3)
POTASSIUM SERPL-SCNC: 4.8 MMOL/L — SIGNIFICANT CHANGE UP (ref 3.5–5.3)
POTASSIUM SERPL-SCNC: 4.9 MMOL/L — SIGNIFICANT CHANGE UP (ref 3.5–5.3)
PROT SERPL-MCNC: 6.1 G/DL — SIGNIFICANT CHANGE UP (ref 6–8.3)
RBC # BLD: 2.87 M/UL — LOW (ref 4.2–5.8)
RBC # FLD: 15.7 % — HIGH (ref 10.3–14.5)
SODIUM SERPL-SCNC: 130 MMOL/L — LOW (ref 135–145)
SODIUM SERPL-SCNC: 130 MMOL/L — LOW (ref 135–145)
WBC # BLD: 5.35 K/UL — SIGNIFICANT CHANGE UP (ref 3.8–10.5)
WBC # FLD AUTO: 5.35 K/UL — SIGNIFICANT CHANGE UP (ref 3.8–10.5)

## 2019-01-12 RX ORDER — INSULIN LISPRO 100/ML
VIAL (ML) SUBCUTANEOUS
Qty: 0 | Refills: 0 | Status: DISCONTINUED | OUTPATIENT
Start: 2019-01-12 | End: 2019-01-19

## 2019-01-12 RX ORDER — DEXTROSE 50 % IN WATER 50 %
12.5 SYRINGE (ML) INTRAVENOUS ONCE
Qty: 0 | Refills: 0 | Status: DISCONTINUED | OUTPATIENT
Start: 2019-01-12 | End: 2019-01-19

## 2019-01-12 RX ORDER — DEXTROSE 50 % IN WATER 50 %
25 SYRINGE (ML) INTRAVENOUS ONCE
Qty: 0 | Refills: 0 | Status: DISCONTINUED | OUTPATIENT
Start: 2019-01-12 | End: 2019-01-19

## 2019-01-12 RX ORDER — DEXTROSE 50 % IN WATER 50 %
15 SYRINGE (ML) INTRAVENOUS ONCE
Qty: 0 | Refills: 0 | Status: DISCONTINUED | OUTPATIENT
Start: 2019-01-12 | End: 2019-01-19

## 2019-01-12 RX ORDER — GLUCAGON INJECTION, SOLUTION 0.5 MG/.1ML
1 INJECTION, SOLUTION SUBCUTANEOUS ONCE
Qty: 0 | Refills: 0 | Status: DISCONTINUED | OUTPATIENT
Start: 2019-01-12 | End: 2019-01-19

## 2019-01-12 RX ORDER — INSULIN LISPRO 100/ML
VIAL (ML) SUBCUTANEOUS AT BEDTIME
Qty: 0 | Refills: 0 | Status: DISCONTINUED | OUTPATIENT
Start: 2019-01-12 | End: 2019-01-19

## 2019-01-12 RX ORDER — ASPIRIN/CALCIUM CARB/MAGNESIUM 324 MG
81 TABLET ORAL DAILY
Qty: 0 | Refills: 0 | Status: DISCONTINUED | OUTPATIENT
Start: 2019-01-12 | End: 2019-01-19

## 2019-01-12 RX ORDER — SODIUM CHLORIDE 9 MG/ML
1000 INJECTION, SOLUTION INTRAVENOUS
Qty: 0 | Refills: 0 | Status: DISCONTINUED | OUTPATIENT
Start: 2019-01-12 | End: 2019-01-19

## 2019-01-12 RX ORDER — FUROSEMIDE 40 MG
40 TABLET ORAL DAILY
Qty: 0 | Refills: 0 | Status: DISCONTINUED | OUTPATIENT
Start: 2019-01-13 | End: 2019-01-14

## 2019-01-12 RX ORDER — CLOPIDOGREL BISULFATE 75 MG/1
75 TABLET, FILM COATED ORAL DAILY
Qty: 0 | Refills: 0 | Status: DISCONTINUED | OUTPATIENT
Start: 2019-01-12 | End: 2019-01-19

## 2019-01-12 RX ADMIN — Medication 2: at 17:18

## 2019-01-12 RX ADMIN — Medication 3: at 12:08

## 2019-01-12 RX ADMIN — Medication 1: at 22:05

## 2019-01-12 RX ADMIN — Medication 88 MICROGRAM(S): at 05:34

## 2019-01-12 RX ADMIN — HEPARIN SODIUM 5000 UNIT(S): 5000 INJECTION INTRAVENOUS; SUBCUTANEOUS at 05:34

## 2019-01-12 RX ADMIN — HEPARIN SODIUM 5000 UNIT(S): 5000 INJECTION INTRAVENOUS; SUBCUTANEOUS at 17:57

## 2019-01-12 RX ADMIN — Medication 25 MILLIGRAM(S): at 05:34

## 2019-01-12 RX ADMIN — TAMSULOSIN HYDROCHLORIDE 0.4 MILLIGRAM(S): 0.4 CAPSULE ORAL at 22:05

## 2019-01-12 RX ADMIN — Medication 1: at 08:15

## 2019-01-12 RX ADMIN — INSULIN GLARGINE 15 UNIT(S): 100 INJECTION, SOLUTION SUBCUTANEOUS at 22:04

## 2019-01-12 RX ADMIN — PANTOPRAZOLE SODIUM 40 MILLIGRAM(S): 20 TABLET, DELAYED RELEASE ORAL at 06:51

## 2019-01-12 NOTE — CONSULT NOTE ADULT - ASSESSMENT
#stable non-proteinuric CKD III. Admitted with AICD firing. no further work-up will be pursued  # hyponatremia and hypekalemia in seeting of aldactone. s/p medical management of hyperkalemia- improved. SNA improved withnholding aldactoe.  # HTN controlled  # AICD firing- per cardiology #stable non-proteinuric CKD III. Admitted with AICD firing. no further work-up will be pursued  # hyponatremia and hypekalemia in setting of aldactone. s/p medical management of hyperkalemia- improved. SNA improved with holding aldactoe.  # HTN controlled  # AICD firing- per cardiology

## 2019-01-12 NOTE — CONSULT NOTE ADULT - SUBJECTIVE AND OBJECTIVE BOX
CHIEF COMPLAINT:Patient is a 83y old  Male who presents with a chief complaint of AICD firing (12 Jan 2019 20:41)      HISTORY OF PRESENT ILLNESS:HPI:  82 yo male with hx of CAD S/P stent, S/P PPM, HTN, BPH, CKD, Hepatitis, and Dm2 recently admitted for fever of unknown origin, discharge to [facility] p/w AICD firing. patient has document which shows that his Medtronic AICD fired for VF and VTach, total of 3 times. Brought here for further eval. Patient Mandarian speaking and [place].  States patient currently c/o SOB, some fatigue and lower back pain. No f/c, N/V/d, abd pain, chest pain.  pt. was seen by EP in ED and having AICD lead malfunction. advised for TTE and possible lead replacement (11 Jan 2019 23:23)      PAST MEDICAL & SURGICAL HISTORY:  CKD (chronic kidney disease)  Hepatitis  CAD (coronary artery disease)  HTN (hypertension)  DM (diabetes mellitus)  Artificial cardiac pacemaker  AICD (automatic cardioverter/defibrillator) present          MEDICATIONS:  heparin  Injectable 5000 Unit(s) SubCutaneous every 12 hours  metoprolol succinate ER 25 milliGRAM(s) Oral daily  tamsulosin 0.4 milliGRAM(s) Oral at bedtime          pantoprazole    Tablet 40 milliGRAM(s) Oral before breakfast    dextrose 40% Gel 15 Gram(s) Oral once PRN  dextrose 50% Injectable 12.5 Gram(s) IV Push once  dextrose 50% Injectable 25 Gram(s) IV Push once  dextrose 50% Injectable 25 Gram(s) IV Push once  glucagon  Injectable 1 milliGRAM(s) IntraMuscular once PRN  insulin glargine Injectable (LANTUS) 15 Unit(s) SubCutaneous at bedtime  insulin lispro (HumaLOG) corrective regimen sliding scale   SubCutaneous three times a day before meals  insulin lispro (HumaLOG) corrective regimen sliding scale   SubCutaneous at bedtime  levothyroxine 88 MICROGram(s) Oral daily    dextrose 5%. 1000 milliLiter(s) IV Continuous <Continuous>      FAMILY HISTORY:  No pertinent family history in first degree relatives      Non-contributory    SOCIAL HISTORY:    not a smoker    Allergies    No Known Allergies    Intolerances    	    REVIEW OF SYSTEMS:  CONSTITUTIONAL: No fever  EYES: No eye pain, visual disturbances, or discharge  ENMT:  No difficulty hearing, tinnitus  NECK: No pain or stiffness  RESPIRATORY: No cough, wheezing, +SOB  CARDIOVASCULAR: No chest pain, passing out, dizziness, + leg swelling  GASTROINTESTINAL:  +abdominal distension .  GENITOURINARY: No dysuria, hematuria  NEUROLOGICAL: No stroke like symptoms  SKIN: No burning or lesions   LYMPH Nodes: No enlarged glands  ENDOCRINE: No heat or cold intolerance  MUSCULOSKELETAL: No joint pain or swelling  PSYCHIATRIC: No  anxiety, mood swings  HEME/LYMPH: No bleeding gums  ALLERY AND IMMUNOLOGIC: No hives or eczema	    All other ROS negative    PHYSICAL EXAM:  T(C): 36.7 (01-12-19 @ 20:34), Max: 36.8 (01-12-19 @ 04:12)  HR: 66 (01-12-19 @ 20:34) (65 - 68)  BP: 133/72 (01-12-19 @ 20:34) (131/55 - 147/67)  RR: 18 (01-12-19 @ 20:34) (18 - 18)  SpO2: 98% (01-12-19 @ 20:34) (98% - 100%)  Wt(kg): --  I&O's Summary    11 Jan 2019 07:01  -  12 Jan 2019 07:00  --------------------------------------------------------  IN: 240 mL / OUT: 600 mL / NET: -360 mL    12 Jan 2019 07:01  -  12 Jan 2019 22:33  --------------------------------------------------------  IN: 790 mL / OUT: 1000 mL / NET: -210 mL        Appearance: Normal	  HEENT:   Normal oral mucosa, EOMI	  Cardiovascular: Normal S1 S2, + JVD, No murmurs  Respiratory: Lungs clear to auscultation	  Psychiatry: Alert, Mood & affect appropriate  Gastrointestinal:  Distended, Non-tender,   Skin: No rashes, No ecchymoses, No cyanosis	  Neurologic: Non-focal  Extremities:  No clubbing, + b/l LE edema  Vascular: Peripheral pulses palpable  bilaterally  	    	  	  CARDIAC MARKERS:                                  9.1    5.35  )-----------( 140      ( 12 Jan 2019 07:22 )             27.2     01-12    130<L>  |  99  |  20  ----------------------------<  160<H>  4.9   |  22  |  1.39<H>    Ca    8.1<L>      12 Jan 2019 06:12    TPro  6.1  /  Alb  2.8<L>  /  TBili  1.4<H>  /  DBili  x   /  AST  27  /  ALT  17  /  AlkPhos  77  01-12          EKG: NSR @72  Radiology: IMPRESSION:  Retrocardiac opacity compatible with atelectasis or pneumonia.      Assessment and Plan:    Problem/Plan - 1:  ·  Problem: Failure of implantable cardioverter-defibrillator (ICD) lead, initial encounter.  Plan: seen by EP, plan for lead revision  -TTE       Problem/Plan - 2:  ·  Problem: Acute decompensated (likely Systolic) Heart Failure.  Plan: SOB, abdominal distension, JVD, LE edema and hyponatremia  - initiate Lasix 40mg IV daily   - metoprolol  - ACE/ARB pending TTE to assess LV function     Problem/Plan - 3:  ·  Problem: CKD (chronic kidney disease).  Plan: likely cardiorenal, hyponatremia likely hypervolemic hyponatremia from HF     Problem/Plan - 4:  ·  Problem: CAD (coronary artery disease).  Plan: Resume home ASA and Plavix. Metoprolol          Edwin Rodriguez DO [facility]  [phone]

## 2019-01-12 NOTE — CONSULT NOTE ADULT - SUBJECTIVE AND OBJECTIVE BOX
[facility] : Progress Note :: [phone], (office [phone]),   Dr Cali / Dr Shoemaker / Dr Jones / Dr Chen / Dr Katarzyna MORRIS / Dr Jasso / Dr Ohara / Dr Jaspreet feng  _____________________________________________________________________________________________  Patient is a 83y Male with history of non-proteinuric CKD III baseline  SCR 1.4. presents with AICD firing. Denies chest pain or shortness of breath. Noted to have a SCR of 1.39 prompting a renal consult.    PAST MEDICAL & SURGICAL HISTORY:  CKD (chronic kidney disease)  Hepatitis  CAD (coronary artery disease)  HTN (hypertension)  DM (diabetes mellitus)  Artificial cardiac pacemaker  AICD (automatic cardioverter/defibrillator) present    No Known Allergies    Home Medications Reviewed  Hospital Medications:   MEDICATIONS  (STANDING):  dextrose 5%. 1000 milliLiter(s) (50 mL/Hr) IV Continuous <Continuous>  dextrose 50% Injectable 12.5 Gram(s) IV Push once  dextrose 50% Injectable 25 Gram(s) IV Push once  dextrose 50% Injectable 25 Gram(s) IV Push once  heparin  Injectable 5000 Unit(s) SubCutaneous every 12 hours  insulin glargine Injectable (LANTUS) 15 Unit(s) SubCutaneous at bedtime  insulin lispro (HumaLOG) corrective regimen sliding scale   SubCutaneous three times a day before meals  insulin lispro (HumaLOG) corrective regimen sliding scale   SubCutaneous at bedtime  levothyroxine 88 MICROGram(s) Oral daily  metoprolol succinate ER 25 milliGRAM(s) Oral daily  pantoprazole    Tablet 40 milliGRAM(s) Oral before breakfast  tamsulosin 0.4 milliGRAM(s) Oral at bedtime    SOCIAL HISTORY:  Denies ETOh,Smoking,   FAMILY HISTORY:  No pertinent family history in first degree relatives      VITALS:  T(F): 97.2 (01-12-19 @ 11:52), Max: 98.2 (01-12-19 @ 04:12)  HR: 65 (01-12-19 @ 11:52)  BP: 131/55 (01-12-19 @ 11:52)  RR: 18 (01-12-19 @ 11:52)  SpO2: 100% (01-12-19 @ 11:52)  Wt(kg): --    01-11 @ 07:01  -  01-12 @ 07:00  --------------------------------------------------------  IN: 240 mL / OUT: 600 mL / NET: -360 mL    01-12 @ 07:01  -  01-12 @ 18:14  --------------------------------------------------------  IN: 0 mL / OUT: 800 mL / NET: -800 mL      Height (cm): 175 (01-11 @ 20:00)  Weight (kg): 81.1 (01-11 @ 20:00)  BMI (kg/m2): 26.5 (01-11 @ 20:00)  BSA (m2): 1.97 (01-11 @ 20:00)  PHYSICAL EXAM:  Constitutional: NAD  HEENT: anicteric sclera, oropharynx clear.  Neck: No JVD  Respiratory: CTAB, no wheezes, rales or rhonchi  Cardiovascular: S1, S2, RRR  Gastrointestinal: BS+, soft, NT/ND  Extremities:  No peripheral edema  Neurological: A/O x 3, no focal deficits  : No CVA tenderness. No reyes.       LABS:  01-12    130<L>  |  99  |  20  ----------------------------<  160<H>  4.9   |  22  |  1.39<H>    Ca    8.1<L>      12 Jan 2019 06:12    TPro  6.1  /  Alb  2.8<L>  /  TBili  1.4<H>  /  DBili      /  AST  27  /  ALT  17  /  AlkPhos  77  01-12    Creatinine Trend: 1.39 <--, 1.33 <--, 1.44 <--                        9.1    5.35  )-----------( 140      ( 12 Jan 2019 07:22 )             27.2     Urine Studies:      RADIOLOGY & ADDITIONAL STUDIES:

## 2019-01-12 NOTE — PROGRESS NOTE ADULT - SUBJECTIVE AND OBJECTIVE BOX
Patient is a 83y old  Male who presents with a chief complaint of AICD firing (12 Jan 2019 18:12)      INTERVAL HPI/OVERNIGHT EVENTS:  T(C): 36.7 (01-12-19 @ 20:34), Max: 36.8 (01-12-19 @ 04:12)  HR: 66 (01-12-19 @ 20:34) (65 - 68)  BP: 133/72 (01-12-19 @ 20:34) (131/55 - 147/67)  RR: 18 (01-12-19 @ 20:34) (18 - 18)  SpO2: 98% (01-12-19 @ 20:34) (98% - 100%)  Wt(kg): --  I&O's Summary    11 Jan 2019 07:01  -  12 Jan 2019 07:00  --------------------------------------------------------  IN: 240 mL / OUT: 600 mL / NET: -360 mL    12 Jan 2019 07:01  -  12 Jan 2019 20:41  --------------------------------------------------------  IN: 790 mL / OUT: 1000 mL / NET: -210 mL        PAST MEDICAL & SURGICAL HISTORY:  CKD (chronic kidney disease)  Hepatitis  CAD (coronary artery disease)  HTN (hypertension)  DM (diabetes mellitus)  Artificial cardiac pacemaker  AICD (automatic cardioverter/defibrillator) present      SOCIAL HISTORY  Alcohol:  Tobacco:  Illicit substance use:    FAMILY HISTORY:    REVIEW OF SYSTEMS:  CONSTITUTIONAL: No fever, weight loss, or fatigue  EYES: No eye pain, visual disturbances, or discharge  ENMT:  No difficulty hearing, tinnitus, vertigo; No sinus or throat pain  NECK: No pain or stiffness  RESPIRATORY: No cough, wheezing, chills or hemoptysis; No shortness of breath  CARDIOVASCULAR: No chest pain, palpitations, dizziness, or leg swelling  GASTROINTESTINAL: No abdominal or epigastric pain. No nausea, vomiting, or hematemesis; No diarrhea or constipation. No melena or hematochezia.  GENITOURINARY: No dysuria, frequency, hematuria, or incontinence  NEUROLOGICAL: No headaches, memory loss, loss of strength, numbness, or tremors  SKIN: No itching, burning, rashes, or lesions   LYMPH NODES: No enlarged glands  ENDOCRINE: No heat or cold intolerance; No hair loss  MUSCULOSKELETAL: No joint pain or swelling; No muscle, back, or extremity pain  PSYCHIATRIC: No depression, anxiety, mood swings, or difficulty sleeping  HEME/LYMPH: No easy bruising, or bleeding gums  ALLERY AND IMMUNOLOGIC: No hives or eczema    RADIOLOGY & ADDITIONAL TESTS:    Imaging Personally Reviewed:  [ ] YES  [ ] NO    Consultant(s) Notes Reviewed:  [ ] YES  [ ] NO    PHYSICAL EXAM:  GENERAL: NAD, well-groomed, well-developed  HEAD:  Atraumatic, Normocephalic  EYES: EOMI, PERRLA, conjunctiva and sclera clear  ENMT: No tonsillar erythema, exudates, or enlargement; Moist mucous membranes, Good dentition, No lesions  NECK: Supple, No JVD, Normal thyroid  NERVOUS SYSTEM:  Alert & Oriented X3, Good concentration; Motor Strength 5/5 B/L upper and lower extremities; DTRs 2+ intact and symmetric  CHEST/LUNG: Clear to percussion bilaterally; No rales, rhonchi, wheezing, or rubs  HEART: Regular rate and rhythm; No murmurs, rubs, or gallops  ABDOMEN: Soft, Nontender, Nondistended; Bowel sounds present  EXTREMITIES:  2+ Peripheral Pulses, No clubbing, cyanosis, or edema  LYMPH: No lymphadenopathy noted  SKIN: No rashes or lesions    LABS:                        9.1    5.35  )-----------( 140      ( 12 Jan 2019 07:22 )             27.2     01-12    130<L>  |  99  |  20  ----------------------------<  160<H>  4.9   |  22  |  1.39<H>    Ca    8.1<L>      12 Jan 2019 06:12    TPro  6.1  /  Alb  2.8<L>  /  TBili  1.4<H>  /  DBili  x   /  AST  27  /  ALT  17  /  AlkPhos  77  01-12        CAPILLARY BLOOD GLUCOSE      POCT Blood Glucose.: 223 mg/dL (12 Jan 2019 16:33)  POCT Blood Glucose.: 251 mg/dL (12 Jan 2019 11:54)  POCT Blood Glucose.: 155 mg/dL (12 Jan 2019 08:03)            MEDICATIONS  (STANDING):  dextrose 5%. 1000 milliLiter(s) (50 mL/Hr) IV Continuous <Continuous>  dextrose 50% Injectable 12.5 Gram(s) IV Push once  dextrose 50% Injectable 25 Gram(s) IV Push once  dextrose 50% Injectable 25 Gram(s) IV Push once  heparin  Injectable 5000 Unit(s) SubCutaneous every 12 hours  insulin glargine Injectable (LANTUS) 15 Unit(s) SubCutaneous at bedtime  insulin lispro (HumaLOG) corrective regimen sliding scale   SubCutaneous three times a day before meals  insulin lispro (HumaLOG) corrective regimen sliding scale   SubCutaneous at bedtime  levothyroxine 88 MICROGram(s) Oral daily  metoprolol succinate ER 25 milliGRAM(s) Oral daily  pantoprazole    Tablet 40 milliGRAM(s) Oral before breakfast  tamsulosin 0.4 milliGRAM(s) Oral at bedtime    MEDICATIONS  (PRN):  dextrose 40% Gel 15 Gram(s) Oral once PRN Blood Glucose LESS THAN 70 milliGRAM(s)/deciliter  glucagon  Injectable 1 milliGRAM(s) IntraMuscular once PRN Glucose LESS THAN 70 milligrams/deciliter      Care Discussed with Consultants/Other Providers [ ] YES  [ ] NO

## 2019-01-13 LAB
ANION GAP SERPL CALC-SCNC: 10 MMOL/L — SIGNIFICANT CHANGE UP (ref 5–17)
BUN SERPL-MCNC: 19 MG/DL — SIGNIFICANT CHANGE UP (ref 7–23)
CALCIUM SERPL-MCNC: 8.2 MG/DL — LOW (ref 8.4–10.5)
CHLORIDE SERPL-SCNC: 101 MMOL/L — SIGNIFICANT CHANGE UP (ref 96–108)
CO2 SERPL-SCNC: 21 MMOL/L — LOW (ref 22–31)
CREAT SERPL-MCNC: 1.26 MG/DL — SIGNIFICANT CHANGE UP (ref 0.5–1.3)
GLUCOSE BLDC GLUCOMTR-MCNC: 146 MG/DL — HIGH (ref 70–99)
GLUCOSE BLDC GLUCOMTR-MCNC: 205 MG/DL — HIGH (ref 70–99)
GLUCOSE BLDC GLUCOMTR-MCNC: 238 MG/DL — HIGH (ref 70–99)
GLUCOSE BLDC GLUCOMTR-MCNC: 239 MG/DL — HIGH (ref 70–99)
GLUCOSE SERPL-MCNC: 142 MG/DL — HIGH (ref 70–99)
HCT VFR BLD CALC: 27.7 % — LOW (ref 39–50)
HGB BLD-MCNC: 9.2 G/DL — LOW (ref 13–17)
MCHC RBC-ENTMCNC: 31.8 PG — SIGNIFICANT CHANGE UP (ref 27–34)
MCHC RBC-ENTMCNC: 33.2 GM/DL — SIGNIFICANT CHANGE UP (ref 32–36)
MCV RBC AUTO: 95.8 FL — SIGNIFICANT CHANGE UP (ref 80–100)
PLATELET # BLD AUTO: 127 K/UL — LOW (ref 150–400)
POTASSIUM SERPL-MCNC: 4.9 MMOL/L — SIGNIFICANT CHANGE UP (ref 3.5–5.3)
POTASSIUM SERPL-SCNC: 4.9 MMOL/L — SIGNIFICANT CHANGE UP (ref 3.5–5.3)
RBC # BLD: 2.89 M/UL — LOW (ref 4.2–5.8)
RBC # FLD: 15.5 % — HIGH (ref 10.3–14.5)
SODIUM SERPL-SCNC: 132 MMOL/L — LOW (ref 135–145)
WBC # BLD: 5.03 K/UL — SIGNIFICANT CHANGE UP (ref 3.8–10.5)
WBC # FLD AUTO: 5.03 K/UL — SIGNIFICANT CHANGE UP (ref 3.8–10.5)

## 2019-01-13 RX ORDER — SODIUM CHLORIDE 0.65 %
1 AEROSOL, SPRAY (ML) NASAL
Qty: 0 | Refills: 0 | Status: DISCONTINUED | OUTPATIENT
Start: 2019-01-13 | End: 2019-01-19

## 2019-01-13 RX ADMIN — Medication 2: at 17:15

## 2019-01-13 RX ADMIN — PANTOPRAZOLE SODIUM 40 MILLIGRAM(S): 20 TABLET, DELAYED RELEASE ORAL at 09:23

## 2019-01-13 RX ADMIN — TAMSULOSIN HYDROCHLORIDE 0.4 MILLIGRAM(S): 0.4 CAPSULE ORAL at 21:34

## 2019-01-13 RX ADMIN — INSULIN GLARGINE 15 UNIT(S): 100 INJECTION, SOLUTION SUBCUTANEOUS at 21:34

## 2019-01-13 RX ADMIN — Medication 40 MILLIGRAM(S): at 05:22

## 2019-01-13 RX ADMIN — HEPARIN SODIUM 5000 UNIT(S): 5000 INJECTION INTRAVENOUS; SUBCUTANEOUS at 17:53

## 2019-01-13 RX ADMIN — HEPARIN SODIUM 5000 UNIT(S): 5000 INJECTION INTRAVENOUS; SUBCUTANEOUS at 05:22

## 2019-01-13 RX ADMIN — Medication 2: at 12:29

## 2019-01-13 RX ADMIN — Medication 81 MILLIGRAM(S): at 12:29

## 2019-01-13 RX ADMIN — Medication 88 MICROGRAM(S): at 05:22

## 2019-01-13 RX ADMIN — Medication 25 MILLIGRAM(S): at 05:22

## 2019-01-13 RX ADMIN — CLOPIDOGREL BISULFATE 75 MILLIGRAM(S): 75 TABLET, FILM COATED ORAL at 12:29

## 2019-01-13 NOTE — PROVIDER CONTACT NOTE (OTHER) - ASSESSMENT
Pt axox3,vs, speaks mostly mandarin. Left  Nare packed with gauze  X3 .3rd gauze no obvious oozing noted.  Pt had this episode at home. Family at bedside

## 2019-01-13 NOTE — PROGRESS NOTE ADULT - SUBJECTIVE AND OBJECTIVE BOX
Patient is a 83y old  Male who presents with a chief complaint of AICD firing (13 Jan 2019 12:43)    pt. seen and examined, NAD     INTERVAL HPI/OVERNIGHT EVENTS:  T(C): 36.9 (01-13-19 @ 21:06), Max: 37.1 (01-13-19 @ 14:07)  HR: 72 (01-13-19 @ 21:06) (64 - 72)  BP: 154/68 (01-13-19 @ 21:06) (112/67 - 154/68)  RR: 18 (01-13-19 @ 21:06) (18 - 18)  SpO2: 97% (01-13-19 @ 21:06) (97% - 100%)  Wt(kg): --  I&O's Summary    12 Jan 2019 07:01  -  13 Jan 2019 07:00  --------------------------------------------------------  IN: 790 mL / OUT: 1000 mL / NET: -210 mL    13 Jan 2019 07:01  -  14 Jan 2019 02:34  --------------------------------------------------------  IN: 590 mL / OUT: 300 mL / NET: 290 mL        PAST MEDICAL & SURGICAL HISTORY:  CKD (chronic kidney disease)  Hepatitis  CAD (coronary artery disease)  HTN (hypertension)  DM (diabetes mellitus)  Artificial cardiac pacemaker  AICD (automatic cardioverter/defibrillator) present      SOCIAL HISTORY  Alcohol:  Tobacco:  Illicit substance use:    FAMILY HISTORY:    REVIEW OF SYSTEMS:  CONSTITUTIONAL: No fever, weight loss, or fatigue  EYES: No eye pain, visual disturbances, or discharge  ENMT:  No difficulty hearing, tinnitus, vertigo; No sinus or throat pain  NECK: No pain or stiffness  RESPIRATORY: No cough, wheezing, chills or hemoptysis; No shortness of breath  CARDIOVASCULAR: No chest pain, palpitations, dizziness, or leg swelling  GASTROINTESTINAL: No abdominal or epigastric pain. No nausea, vomiting, or hematemesis; No diarrhea or constipation. No melena or hematochezia.  GENITOURINARY: No dysuria, frequency, hematuria, or incontinence  NEUROLOGICAL: No headaches, memory loss, loss of strength, numbness, or tremors  SKIN: No itching, burning, rashes, or lesions   LYMPH NODES: No enlarged glands  ENDOCRINE: No heat or cold intolerance; No hair loss  MUSCULOSKELETAL: No joint pain or swelling; No muscle, back, or extremity pain  PSYCHIATRIC: No depression, anxiety, mood swings, or difficulty sleeping  HEME/LYMPH: No easy bruising, or bleeding gums  ALLERY AND IMMUNOLOGIC: No hives or eczema    RADIOLOGY & ADDITIONAL TESTS:    Imaging Personally Reviewed:  [ ] YES  [ ] NO    Consultant(s) Notes Reviewed:  [ ] YES  [ ] NO    PHYSICAL EXAM:  GENERAL: NAD, well-groomed, well-developed  HEAD:  Atraumatic, Normocephalic  EYES: EOMI, PERRLA, conjunctiva and sclera clear  ENMT: No tonsillar erythema, exudates, or enlargement; Moist mucous membranes, Good dentition, No lesions  NECK: Supple, No JVD, Normal thyroid  NERVOUS SYSTEM:  Alert & Oriented X3, Good concentration; Motor Strength 5/5 B/L upper and lower extremities; DTRs 2+ intact and symmetric  CHEST/LUNG: Clear to percussion bilaterally; No rales, rhonchi, wheezing, or rubs  HEART: Regular rate and rhythm; No murmurs, rubs, or gallops  ABDOMEN: Soft, Nontender, Nondistended; Bowel sounds present  EXTREMITIES:  2+ Peripheral Pulses, No clubbing, cyanosis, or edema  LYMPH: No lymphadenopathy noted  SKIN: No rashes or lesions    LABS:                        9.2    5.03  )-----------( 127      ( 13 Jan 2019 08:34 )             27.7     01-13    132<L>  |  101  |  19  ----------------------------<  142<H>  4.9   |  21<L>  |  1.26    Ca    8.2<L>      13 Jan 2019 05:52    TPro  6.1  /  Alb  2.8<L>  /  TBili  1.4<H>  /  DBili  x   /  AST  27  /  ALT  17  /  AlkPhos  77  01-12        CAPILLARY BLOOD GLUCOSE      POCT Blood Glucose.: 238 mg/dL (13 Jan 2019 21:20)  POCT Blood Glucose.: 205 mg/dL (13 Jan 2019 16:37)  POCT Blood Glucose.: 239 mg/dL (13 Jan 2019 12:13)  POCT Blood Glucose.: 146 mg/dL (13 Jan 2019 08:05)            MEDICATIONS  (STANDING):  aspirin  chewable 81 milliGRAM(s) Oral daily  clopidogrel Tablet 75 milliGRAM(s) Oral daily  dextrose 5%. 1000 milliLiter(s) (50 mL/Hr) IV Continuous <Continuous>  dextrose 50% Injectable 12.5 Gram(s) IV Push once  dextrose 50% Injectable 25 Gram(s) IV Push once  dextrose 50% Injectable 25 Gram(s) IV Push once  furosemide   Injectable 40 milliGRAM(s) IV Push daily  heparin  Injectable 5000 Unit(s) SubCutaneous every 12 hours  insulin glargine Injectable (LANTUS) 15 Unit(s) SubCutaneous at bedtime  insulin lispro (HumaLOG) corrective regimen sliding scale   SubCutaneous three times a day before meals  insulin lispro (HumaLOG) corrective regimen sliding scale   SubCutaneous at bedtime  levothyroxine 88 MICROGram(s) Oral daily  metoprolol succinate ER 25 milliGRAM(s) Oral daily  pantoprazole    Tablet 40 milliGRAM(s) Oral before breakfast  tamsulosin 0.4 milliGRAM(s) Oral at bedtime    MEDICATIONS  (PRN):  dextrose 40% Gel 15 Gram(s) Oral once PRN Blood Glucose LESS THAN 70 milliGRAM(s)/deciliter  glucagon  Injectable 1 milliGRAM(s) IntraMuscular once PRN Glucose LESS THAN 70 milligrams/deciliter  sodium chloride 0.65% Nasal 1 Spray(s) Both Nostrils four times a day PRN Nasal Congestion      Care Discussed with Consultants/Other Providers [ ] YES  [ ] NO

## 2019-01-13 NOTE — PROVIDER CONTACT NOTE (OTHER) - ACTION/TREATMENT ORDERED:
AS per NP no nose blowing, manipulating the nasal packing,  Nose spray ordered.  Will continue to monitor pt.

## 2019-01-13 NOTE — CHART NOTE - NSCHARTNOTEFT_GEN_A_CORE
Called by RN for patient with bleeding for the left nostril.  Nare packed with gauze by RN.  Patient evaluated - nare still packed - no obvious oozing noted.  Per family at bedside, patient has had similar episodes at home.  Will continue to monitor.  May need ENT eval if bleeding recurs.    Radha Cadena NP  [phone] Called by RN for patient with bleeding for the left nostril.  Nare packed with gauze by RN.  Patient evaluated - nare still packed - no obvious oozing noted.  Per family at bedside, patient has had similar episodes at home.  Will order saline nasal spray and continue to monitor.  May need ENT eval if bleeding recurs.    Radha Cadena NP  [phone] Called by RN for patient with bleeding for the left nostril.  Nare packed with gauze by RN.  Patient evaluated - nare still packed - no obvious oozing noted.  Per family at bedside, patient has had similar episodes at home.  Will order saline nasal spray and continue to monitor.  May need ENT eval if bleeding recurs.    Radha Cadena NP  [phone]

## 2019-01-13 NOTE — PROVIDER CONTACT NOTE (OTHER) - SITUATION
Noted patient with bleeding form  the left nostril.  Nare packed with gauze  X3 .3rd gauze no obvious oozing noted.

## 2019-01-13 NOTE — PROGRESS NOTE ADULT - SUBJECTIVE AND OBJECTIVE BOX
Patient is a 83y old  Male who presents with a chief complaint of AICD firing (12 Jan 2019 22:33)      INTERVAL HISTORY: feels ok    	  MEDICATIONS:  furosemide   Injectable 40 milliGRAM(s) IV Push daily  metoprolol succinate ER 25 milliGRAM(s) Oral daily  tamsulosin 0.4 milliGRAM(s) Oral at bedtime        PHYSICAL EXAM:  T(C): 36.6 (01-13-19 @ 04:04), Max: 36.7 (01-12-19 @ 20:34)  HR: 64 (01-13-19 @ 04:04) (64 - 66)  BP: 119/61 (01-13-19 @ 04:04) (119/61 - 133/72)  RR: 18 (01-13-19 @ 04:04) (18 - 18)  SpO2: 100% (01-13-19 @ 04:04) (98% - 100%)  Wt(kg): --  I&O's Summary    12 Jan 2019 07:01  -  13 Jan 2019 07:00  --------------------------------------------------------  IN: 790 mL / OUT: 1000 mL / NET: -210 mL    13 Jan 2019 07:01  -  13 Jan 2019 12:44  --------------------------------------------------------  IN: 354 mL / OUT: 0 mL / NET: 354 mL          Appearance: Normal	  HEENT:    PERRL, EOMI	  Cardiovascular:  S1 S2, No JVD  Respiratory: Lungs clear to auscultation	  Psychiatry: Alert  Gastrointestinal:  Soft, Non-tender, + BS	  Skin: No rashes, No cyanosis  Extremities:  No edema of LE                                9.2    5.03  )-----------( 127      ( 13 Jan 2019 08:34 )             27.7     01-13    132<L>  |  101  |  19  ----------------------------<  142<H>  4.9   |  21<L>  |  1.26    Ca    8.2<L>      13 Jan 2019 05:52    TPro  6.1  /  Alb  2.8<L>  /  TBili  1.4<H>  /  DBili  x   /  AST  27  /  ALT  17  /  AlkPhos  77  01-12        Labs personally reviewed      Assessment and Plan:    Problem/Plan - 1:  ·  Problem: Failure of implantable cardioverter-defibrillator (ICD) lead, initial encounter.  Plan: seen by EP, plan for lead revision  -TTE       Problem/Plan - 2:  ·  Problem: Acute decompensated (likely Systolic) Heart Failure.  Plan: SOB, abdominal distension, JVD, LE edema and hyponatremia  - c/w Lasix 40mg IV daily   - metoprolol  - ACE/ARB pending TTE to assess LV function     Problem/Plan - 3:  ·  Problem: CKD (chronic kidney disease).  Plan: likely cardiorenal, hyponatremia likely hypervolemic hyponatremia from HF     Problem/Plan - 4:  ·  Problem: CAD (coronary artery disease).  Plan: Resume home ASA and Plavix. Metoprolol        Edwin Rodriguez DO [facility]  Cardiovascular Medicine  [phone]

## 2019-01-14 ENCOUNTER — TRANSCRIPTION ENCOUNTER (OUTPATIENT)
Age: 84
End: 2019-01-14

## 2019-01-14 DIAGNOSIS — I10 ESSENTIAL (PRIMARY) HYPERTENSION: ICD-10-CM

## 2019-01-14 DIAGNOSIS — Z95.810 PRESENCE OF AUTOMATIC (IMPLANTABLE) CARDIAC DEFIBRILLATOR: ICD-10-CM

## 2019-01-14 LAB
ANION GAP SERPL CALC-SCNC: 8 MMOL/L — SIGNIFICANT CHANGE UP (ref 5–17)
APPEARANCE UR: CLEAR — SIGNIFICANT CHANGE UP
BILIRUB UR-MCNC: NEGATIVE — SIGNIFICANT CHANGE UP
BUN SERPL-MCNC: 20 MG/DL — SIGNIFICANT CHANGE UP (ref 7–23)
CALCIUM SERPL-MCNC: 8.5 MG/DL — SIGNIFICANT CHANGE UP (ref 8.4–10.5)
CHLORIDE SERPL-SCNC: 100 MMOL/L — SIGNIFICANT CHANGE UP (ref 96–108)
CO2 SERPL-SCNC: 22 MMOL/L — SIGNIFICANT CHANGE UP (ref 22–31)
COLOR SPEC: SIGNIFICANT CHANGE UP
CREAT ?TM UR-MCNC: 38 MG/DL — SIGNIFICANT CHANGE UP
CREAT SERPL-MCNC: 1.37 MG/DL — HIGH (ref 0.5–1.3)
DIFF PNL FLD: ABNORMAL
GLUCOSE BLDC GLUCOMTR-MCNC: 153 MG/DL — HIGH (ref 70–99)
GLUCOSE BLDC GLUCOMTR-MCNC: 194 MG/DL — HIGH (ref 70–99)
GLUCOSE BLDC GLUCOMTR-MCNC: 264 MG/DL — HIGH (ref 70–99)
GLUCOSE BLDC GLUCOMTR-MCNC: 269 MG/DL — HIGH (ref 70–99)
GLUCOSE SERPL-MCNC: 151 MG/DL — HIGH (ref 70–99)
GLUCOSE UR QL: ABNORMAL
KETONES UR-MCNC: NEGATIVE — SIGNIFICANT CHANGE UP
LEUKOCYTE ESTERASE UR-ACNC: NEGATIVE — SIGNIFICANT CHANGE UP
NITRITE UR-MCNC: NEGATIVE — SIGNIFICANT CHANGE UP
OSMOLALITY SERPL: 292 MOS/KG — SIGNIFICANT CHANGE UP (ref 275–300)
OSMOLALITY UR: 324 MOS/KG — SIGNIFICANT CHANGE UP (ref 300–900)
PH UR: 6.5 — SIGNIFICANT CHANGE UP (ref 5–8)
POTASSIUM SERPL-MCNC: 4.6 MMOL/L — SIGNIFICANT CHANGE UP (ref 3.5–5.3)
POTASSIUM SERPL-SCNC: 4.6 MMOL/L — SIGNIFICANT CHANGE UP (ref 3.5–5.3)
PROT ?TM UR-MCNC: 9 MG/DL — SIGNIFICANT CHANGE UP (ref 0–12)
PROT UR-MCNC: NEGATIVE — SIGNIFICANT CHANGE UP
SODIUM SERPL-SCNC: 130 MMOL/L — LOW (ref 135–145)
SODIUM UR-SCNC: 97 MMOL/L — SIGNIFICANT CHANGE UP
SP GR SPEC: 1.01 — LOW (ref 1.01–1.02)
UROBILINOGEN FLD QL: NEGATIVE — SIGNIFICANT CHANGE UP
UUN UR-MCNC: 217 MG/DL — SIGNIFICANT CHANGE UP

## 2019-01-14 PROCEDURE — 93306 TTE W/DOPPLER COMPLETE: CPT | Mod: 26

## 2019-01-14 RX ORDER — FUROSEMIDE 40 MG
40 TABLET ORAL
Qty: 0 | Refills: 0 | Status: DISCONTINUED | OUTPATIENT
Start: 2019-01-14 | End: 2019-01-16

## 2019-01-14 RX ADMIN — Medication 40 MILLIGRAM(S): at 05:35

## 2019-01-14 RX ADMIN — Medication 40 MILLIGRAM(S): at 17:05

## 2019-01-14 RX ADMIN — CLOPIDOGREL BISULFATE 75 MILLIGRAM(S): 75 TABLET, FILM COATED ORAL at 11:45

## 2019-01-14 RX ADMIN — Medication 25 MILLIGRAM(S): at 05:35

## 2019-01-14 RX ADMIN — TAMSULOSIN HYDROCHLORIDE 0.4 MILLIGRAM(S): 0.4 CAPSULE ORAL at 22:13

## 2019-01-14 RX ADMIN — Medication 1: at 22:12

## 2019-01-14 RX ADMIN — Medication 1: at 17:05

## 2019-01-14 RX ADMIN — PANTOPRAZOLE SODIUM 40 MILLIGRAM(S): 20 TABLET, DELAYED RELEASE ORAL at 06:56

## 2019-01-14 RX ADMIN — HEPARIN SODIUM 5000 UNIT(S): 5000 INJECTION INTRAVENOUS; SUBCUTANEOUS at 17:05

## 2019-01-14 RX ADMIN — Medication 81 MILLIGRAM(S): at 11:45

## 2019-01-14 RX ADMIN — INSULIN GLARGINE 15 UNIT(S): 100 INJECTION, SOLUTION SUBCUTANEOUS at 22:12

## 2019-01-14 RX ADMIN — HEPARIN SODIUM 5000 UNIT(S): 5000 INJECTION INTRAVENOUS; SUBCUTANEOUS at 05:35

## 2019-01-14 RX ADMIN — Medication 88 MICROGRAM(S): at 05:35

## 2019-01-14 RX ADMIN — Medication 3: at 12:21

## 2019-01-14 NOTE — PHYSICAL THERAPY INITIAL EVALUATION ADULT - PERTINENT HX OF CURRENT PROBLEM, REHAB EVAL
82 yo male with hx of CAD S/P stent, S/P PPM, HTN, BPH, CKD, Hepatitis, and Dm2 recently admitted for fever of unknown origin, discharge to [facility] p/w AICD firing. pt has document showing that his AICD fired for VF and VTach, total of 3 times. pt c/o some fatigue and lower back pain. pt was seen by EP in ED and having AICD lead malfunction. advised for TTE and possible lead replacement.

## 2019-01-14 NOTE — PROGRESS NOTE ADULT - SUBJECTIVE AND OBJECTIVE BOX
[facility] - YAMILEX Ohara / YAMILEX Chen / SARAH Jasso/ YAMILEX Colón/ YAMILEX Jones/ ISAURO Shoemaker / EVARISTO Cali / MARYANN Diana  ---------------------------------------------------------------------------------------------------------------  seen and examined today for CKD 3 and hyponatremia  Interval : Sodium improved, serum creatinine stable  VITALS:  T(F): 98.5 (01-14-19 @ 11:53), Max: 98.8 (01-13-19 @ 14:07)  HR: 87 (01-14-19 @ 11:53)  BP: 120/66 (01-14-19 @ 11:53)  RR: 18 (01-14-19 @ 11:53)  SpO2: 98% (01-14-19 @ 11:53)  Wt(kg): --    01-13 @ 07:01 - 01-14 @ 07:00  --------------------------------------------------------  IN: 590 mL / OUT: 1250 mL / NET: -660 mL    01-14 @ 07:01  -  01-14 @ 12:15  --------------------------------------------------------  IN: 0 mL / OUT: 650 mL / NET: -650 mL      Physical Exam :-  Constitutional: NAD  Neck: Supple.  Respiratory: Bilateral equal breath sounds,  Cardiovascular: S1, S2 normal,  Gastrointestinal: Bowel Sounds present, soft, non tender.  Extremities: Edema 2+  Neurological: Alert and Oriented x 3, no focal deficits  Psychiatric: Normal mood, normal affect  Data:-  Allergies :   No Known Allergies    Hospital Medications:   MEDICATIONS  (STANDING):  aspirin  chewable 81 milliGRAM(s) Oral daily  clopidogrel Tablet 75 milliGRAM(s) Oral daily  dextrose 5%. 1000 milliLiter(s) (50 mL/Hr) IV Continuous <Continuous>  dextrose 50% Injectable 12.5 Gram(s) IV Push once  dextrose 50% Injectable 25 Gram(s) IV Push once  dextrose 50% Injectable 25 Gram(s) IV Push once  furosemide   Injectable 40 milliGRAM(s) IV Push daily  heparin  Injectable 5000 Unit(s) SubCutaneous every 12 hours  insulin glargine Injectable (LANTUS) 15 Unit(s) SubCutaneous at bedtime  insulin lispro (HumaLOG) corrective regimen sliding scale   SubCutaneous three times a day before meals  insulin lispro (HumaLOG) corrective regimen sliding scale   SubCutaneous at bedtime  levothyroxine 88 MICROGram(s) Oral daily  metoprolol succinate ER 25 milliGRAM(s) Oral daily  pantoprazole    Tablet 40 milliGRAM(s) Oral before breakfast  tamsulosin 0.4 milliGRAM(s) Oral at bedtime    01-14    130<L>  |  100  |  20  ----------------------------<  151<H>  4.6   |  22  |  1.37<H>    Ca    8.5      14 Jan 2019 06:20      Creatinine Trend: 1.37 <--, 1.26 <--, 1.39 <--, 1.33 <--, 1.44 <--                        9.2    5.03  )-----------( 127      ( 13 Jan 2019 08:34 )             27.7

## 2019-01-14 NOTE — DISCHARGE NOTE ADULT - PLAN OF CARE
no sequela Your AICD is TURNED OFF - If you experience any light headiness or fainting of palpitations - contact your physician immediately resolved Make appointments to follow up with your physicians Avoid taking (NSAIDs) - (ex: Ibuprofen, Advil, Celebrex, Naprosyn)  Avoid taking any nephrotoxic agents (can harm kidneys) - Intravenous contrast for diagnostic testing, combination cold medications.  Have all medications adjusted for your renal function by your Health Care Provider.  Blood pressure control is important.  Take all medication as prescribed. Sodium level within acceptable parameters Make appointments to follow up with your physicians - you will need follow up blood work to monitor your levels

## 2019-01-14 NOTE — PROGRESS NOTE ADULT - SUBJECTIVE AND OBJECTIVE BOX
Patient is a 83y old  Male who presents with a chief complaint of AICD firing (2019 23:07)    pt. seen and examined, denies any c/o , s/p change of AICD lead   INTERVAL HPI/OVERNIGHT EVENTS:  T(C): 37.2 (01-15-19 @ 00:44), Max: 37.2 (01-15-19 @ 00:44)  HR: 67 (01-15-19 @ 00:44) (63 - 87)  BP: 147/69 (01-15-19 @ 00:44) (120/66 - 147/69)  RR: 18 (01-15-19 @ 00:44) (18 - 18)  SpO2: 98% (01-15-19 @ 00:44) (96% - 98%)  Wt(kg): --  I&O's Summary    2019 07:01  -  2019 07:00  --------------------------------------------------------  IN: 590 mL / OUT: 1250 mL / NET: -660 mL    2019 07:01  -  15 Presley 2019 02:12  --------------------------------------------------------  IN: 360 mL / OUT: 900 mL / NET: -540 mL        PAST MEDICAL & SURGICAL HISTORY:  CKD (chronic kidney disease)  Hepatitis  CAD (coronary artery disease)  HTN (hypertension)  DM (diabetes mellitus)  Artificial cardiac pacemaker  AICD (automatic cardioverter/defibrillator) present      SOCIAL HISTORY  Alcohol:  Tobacco:  Illicit substance use:    FAMILY HISTORY:    REVIEW OF SYSTEMS:  CONSTITUTIONAL: No fever, weight loss, or fatigue  EYES: No eye pain, visual disturbances, or discharge  ENMT:  No difficulty hearing, tinnitus, vertigo; No sinus or throat pain  NECK: No pain or stiffness  RESPIRATORY: No cough, wheezing, chills or hemoptysis; No shortness of breath  CARDIOVASCULAR: No chest pain, palpitations, dizziness, or leg swelling  GASTROINTESTINAL: No abdominal or epigastric pain. No nausea, vomiting, or hematemesis; No diarrhea or constipation. No melena or hematochezia.  GENITOURINARY: No dysuria, frequency, hematuria, or incontinence  NEUROLOGICAL: No headaches, memory loss, loss of strength, numbness, or tremors  SKIN: No itching, burning, rashes, or lesions   LYMPH NODES: No enlarged glands  ENDOCRINE: No heat or cold intolerance; No hair loss  MUSCULOSKELETAL: No joint pain or swelling; No muscle, back, or extremity pain  PSYCHIATRIC: No depression, anxiety, mood swings, or difficulty sleeping  HEME/LYMPH: No easy bruising, or bleeding gums  ALLERY AND IMMUNOLOGIC: No hives or eczema    RADIOLOGY & ADDITIONAL TESTS:    Imaging Personally Reviewed:  [ ] YES  [ ] NO    Consultant(s) Notes Reviewed:  [ ] YES  [ ] NO    PHYSICAL EXAM:  GENERAL: NAD, well-groomed, well-developed  HEAD:  Atraumatic, Normocephalic  EYES: EOMI, PERRLA, conjunctiva and sclera clear  ENMT: No tonsillar erythema, exudates, or enlargement; Moist mucous membranes, Good dentition, No lesions  NECK: Supple, No JVD, Normal thyroid  NERVOUS SYSTEM:  Alert & Oriented X3, Good concentration; Motor Strength 5/5 B/L upper and lower extremities; DTRs 2+ intact and symmetric  CHEST/LUNG: Clear to percussion bilaterally; No rales, rhonchi, wheezing, or rubs  HEART: Regular rate and rhythm; No murmurs, rubs, or gallops  ABDOMEN: Soft, Nontender, Nondistended; Bowel sounds present  EXTREMITIES:  2+ Peripheral Pulses, No clubbing, cyanosis, or edema  LYMPH: No lymphadenopathy noted  SKIN: No rashes or lesions    LABS:                        9.2    5.03  )-----------( 127      ( 2019 08:34 )             27.7     01-14    130<L>  |  100  |  20  ----------------------------<  151<H>  4.6   |  22  |  1.37<H>    Ca    8.5      2019 06:20        Urinalysis Basic - ( 2019 14:23 )    Color: Light Yellow / Appearance: Clear / S.008 / pH: x  Gluc: x / Ketone: Negative  / Bili: Negative / Urobili: Negative   Blood: x / Protein: Negative / Nitrite: Negative   Leuk Esterase: Negative / RBC: 24 /hpf / WBC 0 /HPF   Sq Epi: x / Non Sq Epi: 0 /hpf / Bacteria: Negative      CAPILLARY BLOOD GLUCOSE      POCT Blood Glucose.: 269 mg/dL (2019 21:29)  POCT Blood Glucose.: 194 mg/dL (2019 16:42)  POCT Blood Glucose.: 264 mg/dL (2019 11:55)  POCT Blood Glucose.: 153 mg/dL (2019 07:54)        Urinalysis Basic - ( 2019 14:23 )    Color: Light Yellow / Appearance: Clear / S.008 / pH: x  Gluc: x / Ketone: Negative  / Bili: Negative / Urobili: Negative   Blood: x / Protein: Negative / Nitrite: Negative   Leuk Esterase: Negative / RBC: 24 /hpf / WBC 0 /HPF   Sq Epi: x / Non Sq Epi: 0 /hpf / Bacteria: Negative        MEDICATIONS  (STANDING):  aspirin  chewable 81 milliGRAM(s) Oral daily  clopidogrel Tablet 75 milliGRAM(s) Oral daily  dextrose 5%. 1000 milliLiter(s) (50 mL/Hr) IV Continuous <Continuous>  dextrose 50% Injectable 12.5 Gram(s) IV Push once  dextrose 50% Injectable 25 Gram(s) IV Push once  dextrose 50% Injectable 25 Gram(s) IV Push once  furosemide   Injectable 40 milliGRAM(s) IV Push two times a day  heparin  Injectable 5000 Unit(s) SubCutaneous every 12 hours  insulin glargine Injectable (LANTUS) 15 Unit(s) SubCutaneous at bedtime  insulin lispro (HumaLOG) corrective regimen sliding scale   SubCutaneous three times a day before meals  insulin lispro (HumaLOG) corrective regimen sliding scale   SubCutaneous at bedtime  levothyroxine 88 MICROGram(s) Oral daily  metoprolol succinate ER 25 milliGRAM(s) Oral daily  pantoprazole    Tablet 40 milliGRAM(s) Oral before breakfast  tamsulosin 0.4 milliGRAM(s) Oral at bedtime    MEDICATIONS  (PRN):  dextrose 40% Gel 15 Gram(s) Oral once PRN Blood Glucose LESS THAN 70 milliGRAM(s)/deciliter  glucagon  Injectable 1 milliGRAM(s) IntraMuscular once PRN Glucose LESS THAN 70 milligrams/deciliter  sodium chloride 0.65% Nasal 1 Spray(s) Both Nostrils four times a day PRN Nasal Congestion      Care Discussed with Consultants/Other Providers [ ] YES  [ ] NO

## 2019-01-14 NOTE — DISCHARGE NOTE ADULT - PATIENT PORTAL LINK FT
You can access the [facility] Patient Portal, offered by [facility], by registering with the following website: [email or web address]

## 2019-01-14 NOTE — DISCHARGE NOTE ADULT - MEDICATION SUMMARY - MEDICATIONS TO TAKE
I will START or STAY ON the medications listed below when I get home from the hospital:    aspirin 81 mg oral tablet, chewable  -- 1 tab(s) by mouth once a day  -- Indication: For CAD (coronary artery disease)    tamsulosin 0.4 mg oral capsule  -- 1 cap(s) by mouth once a day  -- Indication: For BPH    insulin glargine  -- 15 unit(s) subcutaneous once a day (at bedtime)  -- Indication: For DM (diabetes mellitus)    insulin lispro  -- 5 unit(s) subcutaneous 3 times a day  -- Indication: For DM (diabetes mellitus)    clopidogrel 75 mg oral tablet  -- 1 tab(s) by mouth once a day  -- Indication: For CAD (coronary artery disease)    metoprolol succinate 25 mg oral tablet, extended release  -- 1 tab(s) by mouth once a day  -- Indication: For HTN (hypertension)    pantoprazole 40 mg oral delayed release tablet  -- 1 tab(s) by mouth once a day (before a meal)  -- Indication: For Acid reflux    levothyroxine 88 mcg (0.088 mg) oral tablet  -- 1 tab(s) by mouth once a day  -- Indication: For Hypothyroidism I will START or STAY ON the medications listed below when I get home from the hospital:    aspirin 81 mg oral tablet, chewable  -- 1 tab(s) by mouth once a day  -- Indication: For CAD (coronary artery disease)    tamsulosin 0.4 mg oral capsule  -- 1 cap(s) by mouth once a day  -- Indication: For BPH    Toujeo SoloStar 300 units/mL subcutaneous solution  -- 120 unit(s) subcutaneous once a day  -- Indication: For Diabetes     clopidogrel 75 mg oral tablet  -- 1 tab(s) by mouth once a day  -- Indication: For CAD (coronary artery disease)    metoprolol succinate 25 mg oral tablet, extended release  -- 1 tab(s) by mouth once a day  -- Indication: For HTN (hypertension)    furosemide 40 mg oral tablet  -- 1 tab(s) by mouth once a day  -- Indication: For Hf    pantoprazole 40 mg oral delayed release tablet  -- 1 tab(s) by mouth once a day (before a meal)  -- Indication: For Acid reflux    levothyroxine 88 mcg (0.088 mg) oral tablet  -- 1 tab(s) by mouth once a day  -- Indication: For Hypothyroidism

## 2019-01-14 NOTE — DISCHARGE NOTE ADULT - CARE PLAN
Principal Discharge DX:	AICD (automatic cardioverter/defibrillator) present  Goal:	no sequela  Assessment and plan of treatment:	Your AICD is TURNED OFF - If you experience any light headiness or fainting of palpitations - contact your physician immediately  Secondary Diagnosis:	Abdominal distension  Goal:	resolved  Assessment and plan of treatment:	Make appointments to follow up with your physicians  Secondary Diagnosis:	CKD (chronic kidney disease)  Assessment and plan of treatment:	Avoid taking (NSAIDs) - (ex: Ibuprofen, Advil, Celebrex, Naprosyn)  Avoid taking any nephrotoxic agents (can harm kidneys) - Intravenous contrast for diagnostic testing, combination cold medications.  Have all medications adjusted for your renal function by your Health Care Provider.  Blood pressure control is important.  Take all medication as prescribed.  Secondary Diagnosis:	Hyponatremia  Goal:	Sodium level within acceptable parameters  Assessment and plan of treatment:	Make appointments to follow up with your physicians - you will need follow up blood work to monitor your levels

## 2019-01-14 NOTE — PROGRESS NOTE ADULT - PROBLEM SELECTOR PLAN 1
-ICD interrogation revealed evidence of lead malfunction, No ICD shock noted.  -f/u TTE  -Possible ICD lead revision pending TTE result     [id number] -ICD interrogation revealed evidence of lead malfunction, No ICD shock noted.  -f/u TTE  -Possible ICD lead revision pending TTE result     ADDENDUM (1/14/19 AT 7:30PM): Given patient's age, no history of syncope or therapies from ICD with mildly LV dysfunction, recommend deactivating ICD. Options discussed with patient and his wife at bedside, including risks and benefits of not revising ICD. Patient and his wife opted for ICD deactivation. EPS will sign off, recall as needed.     [id number]

## 2019-01-14 NOTE — DISCHARGE NOTE ADULT - MEDICATION SUMMARY - MEDICATIONS TO STOP TAKING
I will STOP taking the medications listed below when I get home from the hospital:  None I will STOP taking the medications listed below when I get home from the hospital:    insulin glargine  -- 15 unit(s) subcutaneous once a day (at bedtime)    insulin lispro  -- 5 unit(s) subcutaneous 3 times a day

## 2019-01-14 NOTE — DISCHARGE NOTE ADULT - HOSPITAL COURSE
82 yo male with hx of CAD S/P stent, S/P PPM, HTN, BPH, CKD, apparent cirrhosis, anemia, and Dm2   AICD firing  no history of syncope or therapies from ICD with mildly LV dysfunction, recommend deactivating ICD. Options discussed with patient and his wife at bedside, including risks and benefits of not revising ICD. Patient and his wife opted for ICD deactivation  Noted to have new cortical lucency on either side of intervertebral T10/11 disc space.  He no longer has leukocytosis. Echo does not reveal vegetations.  Decreased inflammatory markers and PCT compared with 12/18 makes infection less likely  +BC for Coag Neg Staph likely procurement contaminant  On physical exam, there are no stigmata of endocarditis.  Etiology of cirrhosis unclear - BLANTON appears most likely  unable to undergo MRI with AICD    infectious osteomyelitis/discitis appears unlikely

## 2019-01-14 NOTE — PROGRESS NOTE ADULT - SUBJECTIVE AND OBJECTIVE BOX
24H hour events: No acute events overnight, pt without complaints     MEDICATIONS:  aspirin  chewable 81 milliGRAM(s) Oral daily  clopidogrel Tablet 75 milliGRAM(s) Oral daily  furosemide   Injectable 40 milliGRAM(s) IV Push daily  heparin  Injectable 5000 Unit(s) SubCutaneous every 12 hours  metoprolol succinate ER 25 milliGRAM(s) Oral daily  tamsulosin 0.4 milliGRAM(s) Oral at bedtime    pantoprazole    Tablet 40 milliGRAM(s) Oral before breakfast    dextrose 40% Gel 15 Gram(s) Oral once PRN  dextrose 50% Injectable 12.5 Gram(s) IV Push once  dextrose 50% Injectable 25 Gram(s) IV Push once  dextrose 50% Injectable 25 Gram(s) IV Push once  glucagon  Injectable 1 milliGRAM(s) IntraMuscular once PRN  insulin glargine Injectable (LANTUS) 15 Unit(s) SubCutaneous at bedtime  insulin lispro (HumaLOG) corrective regimen sliding scale   SubCutaneous three times a day before meals  insulin lispro (HumaLOG) corrective regimen sliding scale   SubCutaneous at bedtime  levothyroxine 88 MICROGram(s) Oral daily    dextrose 5%. 1000 milliLiter(s) IV Continuous <Continuous>  sodium chloride 0.65% Nasal 1 Spray(s) Both Nostrils four times a day PRN    REVIEW OF SYSTEMS:  Complete 10point ROS negative.    PHYSICAL EXAM:  T(C): 36.7 (01-14-19 @ 04:05), Max: 37.1 (01-13-19 @ 14:07)  HR: 63 (01-14-19 @ 04:05) (63 - 72)  BP: 137/71 (01-14-19 @ 04:05) (115/67 - 154/68)  RR: 18 (01-14-19 @ 04:05) (18 - 18)  SpO2: 98% (01-14-19 @ 04:05) (97% - 98%)  I&O's Summary    13 Jan 2019 07:01  -  14 Jan 2019 07:00  --------------------------------------------------------  IN: 590 mL / OUT: 1250 mL / NET: -660 mL    14 Jan 2019 07:01 - 14 Jan 2019 11:45  --------------------------------------------------------  IN: 0 mL / OUT: 650 mL / NET: -650 mL      Appearance: NAD	  HEENT:   Normal oral mucosa, PERRL, EOMI	  Lymphatic: No lymphadenopathy  Cardiovascular: Normal S1 S2, No JVD, No murmurs, B/L LE trace edema   Respiratory: Lungs clear to auscultation	  Psychiatry: A & O x 3, Mood & affect appropriate  Gastrointestinal:  Soft, Non-tender, + BS	  Skin: No rashes, No ecchymoses, No cyanosis	  Neurologic: Non-focal  Extremities: Normal range of motion, No clubbing, or cyanosis   Vascular: Peripheral pulses palpable 2+ bilaterally      LABS:	 	    CBC Full  -  ( 13 Jan 2019 08:34 )  WBC Count : 5.03 K/uL  Hemoglobin : 9.2 g/dL  Hematocrit : 27.7 %  Platelet Count - Automated : 127 K/uL  Mean Cell Volume : 95.8 fl  Mean Cell Hemoglobin : 31.8 pg  Mean Cell Hemoglobin Concentration : 33.2 gm/dL      01-14    130<L>  |  100  |  20  ----------------------------<  151<H>  4.6   |  22  |  1.37<H>  01-13    132<L>  |  101  |  19  ----------------------------<  142<H>  4.9   |  21<L>  |  1.26    Ca    8.5      14 Jan 2019 06:20  Ca    8.2<L>      13 Jan 2019 05:52      TELEMETRY: Sinus rhythm rate 60-70's 24H hour events: No acute events overnight, pt without complaints     MEDICATIONS:  aspirin  chewable 81 milliGRAM(s) Oral daily  clopidogrel Tablet 75 milliGRAM(s) Oral daily  furosemide   Injectable 40 milliGRAM(s) IV Push daily  heparin  Injectable 5000 Unit(s) SubCutaneous every 12 hours  metoprolol succinate ER 25 milliGRAM(s) Oral daily  tamsulosin 0.4 milliGRAM(s) Oral at bedtime    pantoprazole    Tablet 40 milliGRAM(s) Oral before breakfast    dextrose 40% Gel 15 Gram(s) Oral once PRN  dextrose 50% Injectable 12.5 Gram(s) IV Push once  dextrose 50% Injectable 25 Gram(s) IV Push once  dextrose 50% Injectable 25 Gram(s) IV Push once  glucagon  Injectable 1 milliGRAM(s) IntraMuscular once PRN  insulin glargine Injectable (LANTUS) 15 Unit(s) SubCutaneous at bedtime  insulin lispro (HumaLOG) corrective regimen sliding scale   SubCutaneous three times a day before meals  insulin lispro (HumaLOG) corrective regimen sliding scale   SubCutaneous at bedtime  levothyroxine 88 MICROGram(s) Oral daily    dextrose 5%. 1000 milliLiter(s) IV Continuous <Continuous>  sodium chloride 0.65% Nasal 1 Spray(s) Both Nostrils four times a day PRN    REVIEW OF SYSTEMS:  Complete 10point ROS negative.    PHYSICAL EXAM:  T(C): 36.7 (01-14-19 @ 04:05), Max: 37.1 (01-13-19 @ 14:07)  HR: 63 (01-14-19 @ 04:05) (63 - 72)  BP: 137/71 (01-14-19 @ 04:05) (115/67 - 154/68)  RR: 18 (01-14-19 @ 04:05) (18 - 18)  SpO2: 98% (01-14-19 @ 04:05) (97% - 98%)  I&O's Summary    13 Jan 2019 07:01  -  14 Jan 2019 07:00  --------------------------------------------------------  IN: 590 mL / OUT: 1250 mL / NET: -660 mL    14 Jan 2019 07:01 - 14 Jan 2019 11:45  --------------------------------------------------------  IN: 0 mL / OUT: 650 mL / NET: -650 mL      Appearance: NAD	  HEENT:   Normal oral mucosa, PERRL, EOMI	  Lymphatic: No lymphadenopathy  Cardiovascular: Normal S1 S2, No JVD, No murmurs, B/L LE trace edema   Respiratory: Lungs clear to auscultation	  Psychiatry: A & O x 3, Mood & affect appropriate  Gastrointestinal:  Soft, Non-tender, + BS	  Skin: No rashes, No ecchymoses, No cyanosis	  Neurologic: Non-focal  Extremities: Normal range of motion, No clubbing, or cyanosis   Vascular: Peripheral pulses palpable 2+ bilaterally      LABS:	 	    CBC Full  -  ( 13 Jan 2019 08:34 )  WBC Count : 5.03 K/uL  Hemoglobin : 9.2 g/dL  Hematocrit : 27.7 %  Platelet Count - Automated : 127 K/uL  Mean Cell Volume : 95.8 fl  Mean Cell Hemoglobin : 31.8 pg  Mean Cell Hemoglobin Concentration : 33.2 gm/dL      01-14    130<L>  |  100  |  20  ----------------------------<  151<H>  4.6   |  22  |  1.37<H>  01-13    132<L>  |  101  |  19  ----------------------------<  142<H>  4.9   |  21<L>  |  1.26    Ca    8.5      14 Jan 2019 06:20  Ca    8.2<L>      13 Jan 2019 05:52      TELEMETRY: Sinus rhythm rate 60-70's    < from: TTE with Doppler (w/Cont) (01.14.19 @ 12:02) >  PROCEDURE: Transthoracic echocardiogram with 2-D, M-Mode  and complete spectral and color flow Doppler. Verbal  consent was obtained for injection of  Ultrasonic Enhancing  Agent following a discussion of risks and benefits.  Following intravenous injection of Ultrasonic Enhancing  Agent , harmonic imaging was performed.  INDICATION: Atherosclerotic heart disease of native  coronary artery without angina pectoris (I25.10)  ------------------------------------------------------------------------  Dimensions:    Normal Values:  LA:     3.5    2.0 - 4.0 cm  Ao:     3.3    2.0 - 3.8 cm  SEPTUM:        0.6 - 1.2 cm  PWT:       0.6 - 1.1 cm  LVIDd:         3.0 - 5.6 cm  LVIDs:         1.8 - 4.0 cm  Doppler Peak Velocity (m/sec): AoV=1.4  ------------------------------------------------------------------------  Observations:  Mitral Valve: Tethered mitral valve leaflets with normal  opening. Mild mitral regurgitation.  Aortic Valve/Aorta: Aortic valve not well visualized;  appears calcified. Peak transaortic valve gradient equals 8  mm Hg, aortic valve velocity time integral equals 29 cm.  Minimal aortic regurgitation.  Peak left ventricular  outflow tract gradient equals 6 mm Hg, LVOT velocity time  integral equals 28 cm.  Aortic Root: 3.3 cm.  Left Atrium: Normal left atrium.  LA volume index = 32  cc/m2.  Left Ventricle: Endocardium not well visualized; mild  segmental left ventricular dysfunction.  Apical akineisis.   Endocardial visualization enhanced with intravenous  injection of Ultrasonic Enhancing Agent (Definity). No left  ventricular thrombus. Left ventricular enlargement.  Right Heart: Normal right atrium. Normal right ventricular  size and systolic function.   A device wire is noted in the  right heart. Normal tricuspid valve. Minimal tricuspid  regurgitation.  Pericardium/Pleura: Normal pericardium with no pericardial  effusion.Hemodynamic: Estimated right atrial pressure is 8 mm Hg.  ------------------------------------------------------------------------  Conclusions:  1. Left ventricular enlargement.  2. Endocardium not well visualized; mild segmental left  ventricular dysfunction.  Apical akineisis.   Endocardial  visualization enhanced with intravenous injection of  Ultrasonic Enhancing Agent (Definity). No left ventricular  thrombus.

## 2019-01-14 NOTE — PHYSICAL THERAPY INITIAL EVALUATION ADULT - ADDITIONAL COMMENTS
Lives in dtr's house with dtr and spouse. 5-6 stairs to bedroom.  Has aide 5 hours x 7 days. prior to admission he was amb Independently with a RW.

## 2019-01-14 NOTE — DISCHARGE NOTE ADULT - CARE PROVIDERS DIRECT ADDRESSES
[email or web address]_Unknown

## 2019-01-14 NOTE — PROGRESS NOTE ADULT - SUBJECTIVE AND OBJECTIVE BOX
Patient is a 83y old  Male who presents with a chief complaint of AICD beeping (14 Jan 2019 12:59)      INTERVAL HISTORY: feels much better    	  MEDICATIONS:  furosemide   Injectable 40 milliGRAM(s) IV Push two times a day  metoprolol succinate ER 25 milliGRAM(s) Oral daily  tamsulosin 0.4 milliGRAM(s) Oral at bedtime        PHYSICAL EXAM:  T(C): 36.8 (01-14-19 @ 21:22), Max: 36.9 (01-14-19 @ 11:53)  HR: 71 (01-14-19 @ 21:22) (63 - 87)  BP: 123/74 (01-14-19 @ 21:22) (120/66 - 137/71)  RR: 18 (01-14-19 @ 21:22) (18 - 18)  SpO2: 96% (01-14-19 @ 21:22) (96% - 98%)  Wt(kg): --  I&O's Summary    13 Jan 2019 07:01  -  14 Jan 2019 07:00  --------------------------------------------------------  IN: 590 mL / OUT: 1250 mL / NET: -660 mL    14 Jan 2019 07:01  -  14 Jan 2019 23:07  --------------------------------------------------------  IN: 360 mL / OUT: 900 mL / NET: -540 mL          Appearance: Normal	  HEENT:    PERRL, EOMI	  Cardiovascular:  S1 S2, No JVD  Respiratory: Lungs clear to auscultation	  Psychiatry: Alert  Gastrointestinal:  Soft, Non-tender, + BS	  Skin: No rashes, No cyanosis  Extremities:  No edema of LE                                9.2    5.03  )-----------( 127      ( 13 Jan 2019 08:34 )             27.7     01-14    130<L>  |  100  |  20  ----------------------------<  151<H>  4.6   |  22  |  1.37<H>    Ca    8.5      14 Jan 2019 06:20          Labs personally reviewed      Assessment and Plan:    Problem/Plan - 1:  ·  Problem: Failure of implantable cardioverter-defibrillator (ICD) lead, initial encounter.  Plan: seen by EP,  no plan for lead revision as only mild LV dysfunction and no shock administered      Problem/Plan - 2:  ·  Problem: Acute decompensated (likely Systolic) Heart Failure.  Plan: SOB, abdominal distension, JVD, LE edema and hyponatremia  -  Lasix 40mg IV BID per renal    - metoprolol  - Add ACE/ARB once diuresed given mild LV dysfunction     Problem/Plan - 3:  ·  Problem: CKD (chronic kidney disease).  Plan: likely cardiorenal, hyponatremia likely hypervolemic hyponatremia from HF  -  Lasix 40mg IV BID per renal       Problem/Plan - 4:  ·  Problem: CAD (coronary artery disease).  Plan: Home ASA and Plavix. Metoprolol          Edwin Rodriguez DO [facility]  Cardiovascular Medicine  [phone]

## 2019-01-14 NOTE — PROCEDURE NOTE - ADDITIONAL PROCEDURE DETAILS
Indication for interrogation: To turn off ICD  -Evidence of lead malfunction, tachy-therapy turned off and pacing mode changed to OVO after discussing with EP attending (Dr. Huizar).    [id number]
-Indication for interrogation: ICD beeping   -Presenting rhythm: NSR at 60s  -Pt is not pacemaker dependent, V pace < 0.1%  -Stored data revealed no events for review  -OptiVol fluid index above thresholds since 12/18/2018 and is ongoing, this is indicative of fluid accumulation  -ICD beeping is due to alert noted for low RVtip to RVcoil lead impedance warning on 1/2/19 and high RV threshold  -Changes made: none  -See EP consult note for further recommendation

## 2019-01-14 NOTE — DISCHARGE NOTE ADULT - ADDITIONAL INSTRUCTIONS
Make appointments to follow up with your physicians - bring all discharge paperwork including discharge medication list to follow up appointments  You will need follow up blood work for monitoring

## 2019-01-14 NOTE — DISCHARGE NOTE ADULT - CARE PROVIDER_API CALL
Obey Schofield), Medicine  [address] [place] [address]  Phone: [phone]  Fax: [phone]    May Huizar (MD), Cardiac Electrophysiology; Cardiovascular Disease; Internal Medicine  [address] [place] [address]  Phone: [phone]  Fax: [phone]    Antonia Colón), Internal Medicine  [address] [place] [address]  Phone: [phone]  Fax: [phone]

## 2019-01-15 LAB
ANION GAP SERPL CALC-SCNC: 10 MMOL/L — SIGNIFICANT CHANGE UP (ref 5–17)
BUN SERPL-MCNC: 23 MG/DL — SIGNIFICANT CHANGE UP (ref 7–23)
CALCIUM SERPL-MCNC: 8.4 MG/DL — SIGNIFICANT CHANGE UP (ref 8.4–10.5)
CHLORIDE SERPL-SCNC: 97 MMOL/L — SIGNIFICANT CHANGE UP (ref 96–108)
CO2 SERPL-SCNC: 25 MMOL/L — SIGNIFICANT CHANGE UP (ref 22–31)
CREAT SERPL-MCNC: 1.42 MG/DL — HIGH (ref 0.5–1.3)
GLUCOSE BLDC GLUCOMTR-MCNC: 160 MG/DL — HIGH (ref 70–99)
GLUCOSE BLDC GLUCOMTR-MCNC: 214 MG/DL — HIGH (ref 70–99)
GLUCOSE BLDC GLUCOMTR-MCNC: 236 MG/DL — HIGH (ref 70–99)
GLUCOSE BLDC GLUCOMTR-MCNC: 294 MG/DL — HIGH (ref 70–99)
GLUCOSE SERPL-MCNC: 158 MG/DL — HIGH (ref 70–99)
HCT VFR BLD CALC: 27.1 % — LOW (ref 39–50)
HGB BLD-MCNC: 9 G/DL — LOW (ref 13–17)
MAGNESIUM SERPL-MCNC: 1.3 MG/DL — LOW (ref 1.6–2.6)
MCHC RBC-ENTMCNC: 31.6 PG — SIGNIFICANT CHANGE UP (ref 27–34)
MCHC RBC-ENTMCNC: 33.2 GM/DL — SIGNIFICANT CHANGE UP (ref 32–36)
MCV RBC AUTO: 95.1 FL — SIGNIFICANT CHANGE UP (ref 80–100)
PHOSPHATE SERPL-MCNC: 2.9 MG/DL — SIGNIFICANT CHANGE UP (ref 2.5–4.5)
PLATELET # BLD AUTO: 118 K/UL — LOW (ref 150–400)
POTASSIUM SERPL-MCNC: 4.1 MMOL/L — SIGNIFICANT CHANGE UP (ref 3.5–5.3)
POTASSIUM SERPL-SCNC: 4.1 MMOL/L — SIGNIFICANT CHANGE UP (ref 3.5–5.3)
RBC # BLD: 2.85 M/UL — LOW (ref 4.2–5.8)
RBC # FLD: 15.6 % — HIGH (ref 10.3–14.5)
SODIUM SERPL-SCNC: 132 MMOL/L — LOW (ref 135–145)
WBC # BLD: 4.33 K/UL — SIGNIFICANT CHANGE UP (ref 3.8–10.5)
WBC # FLD AUTO: 4.33 K/UL — SIGNIFICANT CHANGE UP (ref 3.8–10.5)

## 2019-01-15 PROCEDURE — 74176 CT ABD & PELVIS W/O CONTRAST: CPT | Mod: 26

## 2019-01-15 RX ORDER — ACETAMINOPHEN 500 MG
650 TABLET ORAL ONCE
Qty: 0 | Refills: 0 | Status: COMPLETED | OUTPATIENT
Start: 2019-01-15 | End: 2019-01-15

## 2019-01-15 RX ORDER — LISINOPRIL 2.5 MG/1
2.5 TABLET ORAL DAILY
Qty: 0 | Refills: 0 | Status: DISCONTINUED | OUTPATIENT
Start: 2019-01-15 | End: 2019-01-18

## 2019-01-15 RX ORDER — SENNA PLUS 8.6 MG/1
2 TABLET ORAL AT BEDTIME
Qty: 0 | Refills: 0 | Status: DISCONTINUED | OUTPATIENT
Start: 2019-01-15 | End: 2019-01-19

## 2019-01-15 RX ORDER — DOCUSATE SODIUM 100 MG
100 CAPSULE ORAL
Qty: 0 | Refills: 0 | Status: DISCONTINUED | OUTPATIENT
Start: 2019-01-15 | End: 2019-01-19

## 2019-01-15 RX ORDER — POLYETHYLENE GLYCOL 3350 17 G/17G
17 POWDER, FOR SOLUTION ORAL
Qty: 0 | Refills: 0 | Status: DISCONTINUED | OUTPATIENT
Start: 2019-01-15 | End: 2019-01-19

## 2019-01-15 RX ADMIN — HEPARIN SODIUM 5000 UNIT(S): 5000 INJECTION INTRAVENOUS; SUBCUTANEOUS at 05:28

## 2019-01-15 RX ADMIN — Medication 100 MILLIGRAM(S): at 18:57

## 2019-01-15 RX ADMIN — PANTOPRAZOLE SODIUM 40 MILLIGRAM(S): 20 TABLET, DELAYED RELEASE ORAL at 07:50

## 2019-01-15 RX ADMIN — Medication 650 MILLIGRAM(S): at 23:12

## 2019-01-15 RX ADMIN — Medication 88 MICROGRAM(S): at 05:27

## 2019-01-15 RX ADMIN — HEPARIN SODIUM 5000 UNIT(S): 5000 INJECTION INTRAVENOUS; SUBCUTANEOUS at 17:24

## 2019-01-15 RX ADMIN — LISINOPRIL 2.5 MILLIGRAM(S): 2.5 TABLET ORAL at 11:04

## 2019-01-15 RX ADMIN — SENNA PLUS 2 TABLET(S): 8.6 TABLET ORAL at 21:14

## 2019-01-15 RX ADMIN — Medication 2: at 17:10

## 2019-01-15 RX ADMIN — Medication 3: at 12:34

## 2019-01-15 RX ADMIN — INSULIN GLARGINE 15 UNIT(S): 100 INJECTION, SOLUTION SUBCUTANEOUS at 22:46

## 2019-01-15 RX ADMIN — CLOPIDOGREL BISULFATE 75 MILLIGRAM(S): 75 TABLET, FILM COATED ORAL at 11:04

## 2019-01-15 RX ADMIN — Medication 40 MILLIGRAM(S): at 17:24

## 2019-01-15 RX ADMIN — Medication 1: at 08:04

## 2019-01-15 RX ADMIN — Medication 25 MILLIGRAM(S): at 05:27

## 2019-01-15 RX ADMIN — TAMSULOSIN HYDROCHLORIDE 0.4 MILLIGRAM(S): 0.4 CAPSULE ORAL at 21:14

## 2019-01-15 RX ADMIN — Medication 40 MILLIGRAM(S): at 05:28

## 2019-01-15 RX ADMIN — Medication 81 MILLIGRAM(S): at 11:04

## 2019-01-15 NOTE — PROGRESS NOTE ADULT - SUBJECTIVE AND OBJECTIVE BOX
Patient is a 83y old  Male who presents with a chief complaint of AICD firing (14 Jan 2019 23:12)      INTERVAL HISTORY: feels better     	  MEDICATIONS:  furosemide   Injectable 40 milliGRAM(s) IV Push two times a day  metoprolol succinate ER 25 milliGRAM(s) Oral daily  tamsulosin 0.4 milliGRAM(s) Oral at bedtime        PHYSICAL EXAM:  T(C): 37.3 (01-15-19 @ 05:25), Max: 37.8 (01-15-19 @ 04:23)  HR: 68 (01-15-19 @ 05:25) (67 - 87)  BP: 107/50 (01-15-19 @ 05:25) (102/58 - 147/69)  RR: 17 (01-15-19 @ 05:25) (17 - 18)  SpO2: 98% (01-15-19 @ 05:25) (96% - 98%)  Wt(kg): --  I&O's Summary    14 Jan 2019 07:01  -  15 Presley 2019 07:00  --------------------------------------------------------  IN: 360 mL / OUT: 1450 mL / NET: -1090 mL    15 Presley 2019 07:01  -  15 Presley 2019 10:18  --------------------------------------------------------  IN: 0 mL / OUT: 600 mL / NET: -600 mL          Appearance: Normal	  HEENT:    PERRL, EOMI	  Cardiovascular:  S1 S2, No JVD  Respiratory: Lungs clear to auscultation	  Psychiatry: Alert  Gastrointestinal:  Soft, Non-tender, + BS	  Skin: No rashes, No cyanosis  Extremities:  No edema of LE                                9.0    4.33  )-----------( 118      ( 15 Presley 2019 08:18 )             27.1     01-15    132<L>  |  97  |  23  ----------------------------<  158<H>  4.1   |  25  |  1.42<H>    Ca    8.4      15 Presley 2019 05:52  Phos  2.9     01-15  Mg     1.3     01-15          Labs personally reviewed      Assessment and Plan:    Problem/Plan - 1:  ·  Problem: Failure of implantable cardioverter-defibrillator (ICD) lead, initial encounter.  Plan: seen by EP,  no plan for lead revision as only mild LV dysfunction and no shock administered      Problem/Plan - 2:  ·  Problem: Acute decompensated (likely Systolic) Heart Failure.  Plan: SOB, abdominal distension, JVD, LE edema and hyponatremia  -  Lasix 40mg IV BID per renal, Na improving slowly. Appears more euvolemic   - metoprolol  - Add lisinopril 2.5mg PO daily given mild LV dysfunction     Problem/Plan - 3:  ·  Problem: CKD (chronic kidney disease).  Plan: likely cardiorenal, hyponatremia likely hypervolemic hyponatremia from HF  -  Lasix 40mg IV BID per renal       Problem/Plan - 4:  ·  Problem: CAD (coronary artery disease).  Plan: Home ASA and Plavix. Metoprolol    Will need to be discharged on 40mg PO lasix daily with very close outpt follow up with his PCP/Cardiologist      Edwin Rodriguez DO [facility]  Cardiovascular Medicine  [phone]

## 2019-01-15 NOTE — PROVIDER CONTACT NOTE (OTHER) - ACTION/TREATMENT ORDERED:
PA made aware, will continue to monitor pt. PA made aware, will continue to monitor pt. Will re-check temp in 1 hour.

## 2019-01-15 NOTE — PROGRESS NOTE ADULT - PROBLEM SELECTOR PLAN 6
c/o abd distension and pain   -hx of recent diagnostic paracentesis ? results not available   -will start some laxative and get abd CT with oral contrast only

## 2019-01-15 NOTE — PROGRESS NOTE ADULT - SUBJECTIVE AND OBJECTIVE BOX
Patient is a 83y old  Male who presents with a chief complaint of AICD firing (15 Presley 2019 13:52)    pt. seen and examined, c/o abd distension and pain , some Hx of ascitis ? and had diagnostic tap at [facility] , daughter not aware of any result   denies any N/V, c/o constipation     INTERVAL HPI/OVERNIGHT EVENTS:  T(C): 37 (01-15-19 @ 20:30), Max: 37.8 (01-15-19 @ 04:23)  HR: 69 (01-15-19 @ 20:30) (67 - 71)  BP: 112/57 (01-15-19 @ 20:30) (102/58 - 147/69)  RR: 18 (01-15-19 @ 20:30) (17 - 18)  SpO2: 96% (01-15-19 @ 20:30) (96% - 99%)  Wt(kg): --  I&O's Summary    2019 07:  -  15 Presley 2019 07:00  --------------------------------------------------------  IN: 360 mL / OUT: 1450 mL / NET: -1090 mL    15 Presley 2019 07:  -  2019 00:27  --------------------------------------------------------  IN: 610 mL / OUT: 1150 mL / NET: -540 mL        PAST MEDICAL & SURGICAL HISTORY:  CKD (chronic kidney disease)  Hepatitis  CAD (coronary artery disease)  HTN (hypertension)  DM (diabetes mellitus)  Artificial cardiac pacemaker  AICD (automatic cardioverter/defibrillator) present      SOCIAL HISTORY  Alcohol:  Tobacco:  Illicit substance use:    FAMILY HISTORY:    REVIEW OF SYSTEMS:  CONSTITUTIONAL: No fever, weight loss, or fatigue  EYES: No eye pain, visual disturbances, or discharge  ENMT:  No difficulty hearing, tinnitus, vertigo; No sinus or throat pain  NECK: No pain or stiffness  RESPIRATORY: No cough, wheezing, chills or hemoptysis; No shortness of breath  CARDIOVASCULAR: No chest pain, palpitations, dizziness, or leg swelling  GASTROINTESTINAL: No abdominal or epigastric pain. No nausea, vomiting, or hematemesis; No diarrhea or constipation. No melena or hematochezia.  GENITOURINARY: No dysuria, frequency, hematuria, or incontinence  NEUROLOGICAL: No headaches, memory loss, loss of strength, numbness, or tremors  SKIN: No itching, burning, rashes, or lesions   LYMPH NODES: No enlarged glands  ENDOCRINE: No heat or cold intolerance; No hair loss  MUSCULOSKELETAL: No joint pain or swelling; No muscle, back, or extremity pain  PSYCHIATRIC: No depression, anxiety, mood swings, or difficulty sleeping  HEME/LYMPH: No easy bruising, or bleeding gums  ALLERY AND IMMUNOLOGIC: No hives or eczema    RADIOLOGY & ADDITIONAL TESTS:    Imaging Personally Reviewed:  [ ] YES  [ ] NO    Consultant(s) Notes Reviewed:  [ ] YES  [ ] NO    PHYSICAL EXAM:  GENERAL: NAD, well-groomed, well-developed  HEAD:  Atraumatic, Normocephalic  EYES: EOMI, PERRLA, conjunctiva and sclera clear  ENMT: No tonsillar erythema, exudates, or enlargement; Moist mucous membranes, Good dentition, No lesions  NECK: Supple, No JVD, Normal thyroid  NERVOUS SYSTEM:  Alert & Oriented X3, Good concentration; Motor Strength 5/5 B/L upper and lower extremities; DTRs 2+ intact and symmetric  CHEST/LUNG: Clear to percussion bilaterally; No rales, rhonchi, wheezing, or rubs  HEART: Regular rate and rhythm; No murmurs, rubs, or gallops  ABDOMEN: Soft, Nontender, Nondistended; Bowel sounds present  EXTREMITIES:  2+ Peripheral Pulses, No clubbing, cyanosis, or edema  LYMPH: No lymphadenopathy noted  SKIN: No rashes or lesions    LABS:                        9.0    4.33  )-----------( 118      ( 15 Presley 2019 08:18 )             27.1     01-15    132<L>  |  97  |  23  ----------------------------<  158<H>  4.1   |  25  |  1.42<H>    Ca    8.4      15 Presley 2019 05:52  Phos  2.9     01-15  Mg     1.3     01-15        Urinalysis Basic - ( 2019 14:23 )    Color: Light Yellow / Appearance: Clear / S.008 / pH: x  Gluc: x / Ketone: Negative  / Bili: Negative / Urobili: Negative   Blood: x / Protein: Negative / Nitrite: Negative   Leuk Esterase: Negative / RBC: 24 /hpf / WBC 0 /HPF   Sq Epi: x / Non Sq Epi: 0 /hpf / Bacteria: Negative      CAPILLARY BLOOD GLUCOSE      POCT Blood Glucose.: 214 mg/dL (15 Presley 2019 21:11)  POCT Blood Glucose.: 236 mg/dL (15 Presley 2019 16:46)  POCT Blood Glucose.: 294 mg/dL (15 Presley 2019 11:51)  POCT Blood Glucose.: 160 mg/dL (15 Rpesley 2019 07:51)        Urinalysis Basic - ( 2019 14:23 )    Color: Light Yellow / Appearance: Clear / S.008 / pH: x  Gluc: x / Ketone: Negative  / Bili: Negative / Urobili: Negative   Blood: x / Protein: Negative / Nitrite: Negative   Leuk Esterase: Negative / RBC: 24 /hpf / WBC 0 /HPF   Sq Epi: x / Non Sq Epi: 0 /hpf / Bacteria: Negative        MEDICATIONS  (STANDING):  aspirin  chewable 81 milliGRAM(s) Oral daily  clopidogrel Tablet 75 milliGRAM(s) Oral daily  dextrose 5%. 1000 milliLiter(s) (50 mL/Hr) IV Continuous <Continuous>  dextrose 50% Injectable 12.5 Gram(s) IV Push once  dextrose 50% Injectable 25 Gram(s) IV Push once  dextrose 50% Injectable 25 Gram(s) IV Push once  docusate sodium 100 milliGRAM(s) Oral two times a day  furosemide   Injectable 40 milliGRAM(s) IV Push two times a day  heparin  Injectable 5000 Unit(s) SubCutaneous every 12 hours  insulin glargine Injectable (LANTUS) 15 Unit(s) SubCutaneous at bedtime  insulin lispro (HumaLOG) corrective regimen sliding scale   SubCutaneous three times a day before meals  insulin lispro (HumaLOG) corrective regimen sliding scale   SubCutaneous at bedtime  levothyroxine 88 MICROGram(s) Oral daily  lisinopril 2.5 milliGRAM(s) Oral daily  metoprolol succinate ER 25 milliGRAM(s) Oral daily  pantoprazole    Tablet 40 milliGRAM(s) Oral before breakfast  senna 2 Tablet(s) Oral at bedtime  tamsulosin 0.4 milliGRAM(s) Oral at bedtime    MEDICATIONS  (PRN):  dextrose 40% Gel 15 Gram(s) Oral once PRN Blood Glucose LESS THAN 70 milliGRAM(s)/deciliter  glucagon  Injectable 1 milliGRAM(s) IntraMuscular once PRN Glucose LESS THAN 70 milligrams/deciliter  polyethylene glycol 3350 17 Gram(s) Oral two times a day PRN Constipation  sodium chloride 0.65% Nasal 1 Spray(s) Both Nostrils four times a day PRN Nasal Congestion      Care Discussed with Consultants/Other Providers [ ] YES  [ ] NO

## 2019-01-16 DIAGNOSIS — M46.44 DISCITIS, UNSPECIFIED, THORACIC REGION: ICD-10-CM

## 2019-01-16 DIAGNOSIS — R14.0 ABDOMINAL DISTENSION (GASEOUS): ICD-10-CM

## 2019-01-16 LAB
ANION GAP SERPL CALC-SCNC: 9 MMOL/L — SIGNIFICANT CHANGE UP (ref 5–17)
BUN SERPL-MCNC: 26 MG/DL — HIGH (ref 7–23)
CALCIUM SERPL-MCNC: 8.5 MG/DL — SIGNIFICANT CHANGE UP (ref 8.4–10.5)
CHLORIDE SERPL-SCNC: 97 MMOL/L — SIGNIFICANT CHANGE UP (ref 96–108)
CO2 SERPL-SCNC: 25 MMOL/L — SIGNIFICANT CHANGE UP (ref 22–31)
CREAT SERPL-MCNC: 1.49 MG/DL — HIGH (ref 0.5–1.3)
CRP SERPL-MCNC: 2.57 MG/DL — HIGH (ref 0–0.4)
GLUCOSE BLDC GLUCOMTR-MCNC: 165 MG/DL — HIGH (ref 70–99)
GLUCOSE BLDC GLUCOMTR-MCNC: 225 MG/DL — HIGH (ref 70–99)
GLUCOSE BLDC GLUCOMTR-MCNC: 265 MG/DL — HIGH (ref 70–99)
GLUCOSE BLDC GLUCOMTR-MCNC: 277 MG/DL — HIGH (ref 70–99)
GLUCOSE BLDC GLUCOMTR-MCNC: 311 MG/DL — HIGH (ref 70–99)
GLUCOSE SERPL-MCNC: 153 MG/DL — HIGH (ref 70–99)
HCT VFR BLD CALC: 27 % — LOW (ref 39–50)
HGB BLD-MCNC: 8.9 G/DL — LOW (ref 13–17)
MAGNESIUM SERPL-MCNC: 1.2 MG/DL — LOW (ref 1.6–2.6)
MCHC RBC-ENTMCNC: 30.9 PG — SIGNIFICANT CHANGE UP (ref 27–34)
MCHC RBC-ENTMCNC: 33 GM/DL — SIGNIFICANT CHANGE UP (ref 32–36)
MCV RBC AUTO: 93.8 FL — SIGNIFICANT CHANGE UP (ref 80–100)
OSMOLALITY UR: 312 MOS/KG — SIGNIFICANT CHANGE UP (ref 300–900)
PHOSPHATE SERPL-MCNC: 3.3 MG/DL — SIGNIFICANT CHANGE UP (ref 2.5–4.5)
PLATELET # BLD AUTO: 127 K/UL — LOW (ref 150–400)
POTASSIUM SERPL-MCNC: 4.1 MMOL/L — SIGNIFICANT CHANGE UP (ref 3.5–5.3)
POTASSIUM SERPL-SCNC: 4.1 MMOL/L — SIGNIFICANT CHANGE UP (ref 3.5–5.3)
RBC # BLD: 2.88 M/UL — LOW (ref 4.2–5.8)
RBC # FLD: 15.4 % — HIGH (ref 10.3–14.5)
SODIUM SERPL-SCNC: 131 MMOL/L — LOW (ref 135–145)
WBC # BLD: 4.78 K/UL — SIGNIFICANT CHANGE UP (ref 3.8–10.5)
WBC # FLD AUTO: 4.78 K/UL — SIGNIFICANT CHANGE UP (ref 3.8–10.5)

## 2019-01-16 PROCEDURE — 99222 1ST HOSP IP/OBS MODERATE 55: CPT

## 2019-01-16 PROCEDURE — ZZZZZ: CPT

## 2019-01-16 RX ORDER — FUROSEMIDE 40 MG
40 TABLET ORAL
Qty: 0 | Refills: 0 | Status: DISCONTINUED | OUTPATIENT
Start: 2019-01-16 | End: 2019-01-17

## 2019-01-16 RX ORDER — MAGNESIUM SULFATE 500 MG/ML
2 VIAL (ML) INJECTION ONCE
Qty: 0 | Refills: 0 | Status: COMPLETED | OUTPATIENT
Start: 2019-01-16 | End: 2019-01-16

## 2019-01-16 RX ADMIN — PANTOPRAZOLE SODIUM 40 MILLIGRAM(S): 20 TABLET, DELAYED RELEASE ORAL at 06:35

## 2019-01-16 RX ADMIN — Medication 40 MILLIGRAM(S): at 05:51

## 2019-01-16 RX ADMIN — Medication 88 MICROGRAM(S): at 05:51

## 2019-01-16 RX ADMIN — CLOPIDOGREL BISULFATE 75 MILLIGRAM(S): 75 TABLET, FILM COATED ORAL at 11:52

## 2019-01-16 RX ADMIN — Medication 81 MILLIGRAM(S): at 11:52

## 2019-01-16 RX ADMIN — Medication 100 MILLIGRAM(S): at 05:50

## 2019-01-16 RX ADMIN — SENNA PLUS 2 TABLET(S): 8.6 TABLET ORAL at 21:28

## 2019-01-16 RX ADMIN — TAMSULOSIN HYDROCHLORIDE 0.4 MILLIGRAM(S): 0.4 CAPSULE ORAL at 21:28

## 2019-01-16 RX ADMIN — Medication 650 MILLIGRAM(S): at 01:14

## 2019-01-16 RX ADMIN — Medication 40 MILLIGRAM(S): at 19:15

## 2019-01-16 RX ADMIN — HEPARIN SODIUM 5000 UNIT(S): 5000 INJECTION INTRAVENOUS; SUBCUTANEOUS at 19:15

## 2019-01-16 RX ADMIN — HEPARIN SODIUM 5000 UNIT(S): 5000 INJECTION INTRAVENOUS; SUBCUTANEOUS at 05:50

## 2019-01-16 RX ADMIN — Medication 4: at 18:19

## 2019-01-16 RX ADMIN — Medication 3: at 11:52

## 2019-01-16 RX ADMIN — Medication 50 GRAM(S): at 11:52

## 2019-01-16 RX ADMIN — Medication 1: at 08:08

## 2019-01-16 RX ADMIN — LISINOPRIL 2.5 MILLIGRAM(S): 2.5 TABLET ORAL at 05:50

## 2019-01-16 RX ADMIN — Medication 100 MILLIGRAM(S): at 19:15

## 2019-01-16 RX ADMIN — INSULIN GLARGINE 15 UNIT(S): 100 INJECTION, SOLUTION SUBCUTANEOUS at 21:28

## 2019-01-16 RX ADMIN — Medication 25 MILLIGRAM(S): at 05:51

## 2019-01-16 NOTE — PROGRESS NOTE ADULT - PROBLEM SELECTOR PLAN 7
Incidental finding on CT abd/pelvis T10-11 discitis   -afebrile and nml wbc   -pt. has ch back pain x many years  -however he was in [facility] last month for fever of unknow origing and B/L LE weakness to point , he was send to rehab   -explained daughter bedside in detail : that this is incidental finding , and MRI cannot be done 2/2 AICD. if he spikes fever or Blood c/s pos : then he will need IR guided biopsy of the area for c/s   -seen by ID  -hold off on Abx

## 2019-01-16 NOTE — CONSULT NOTE ADULT - SUBJECTIVE AND OBJECTIVE BOX
Patient is a 83y old  Male who presents with a chief complaint of AICD firing (16 Jan 2019 14:41)    HPI:  82 yo male with hx of CAD S/P stent, S/P PPM, HTN, BPH, CKD, Hepatitis, and Dm2 recently admitted for fever of unknown origin, discharge to [facility] p/w AICD firing. patient has document which shows that his [employer] AICD fired for VF and VTach, total of 3 times. Brought here for further eval. Patient Mand[place] speaking and [place]. Requested his daughter to translate who was obtained over the telephone. States patient currently c/o some fatigue and lower back pain. No f/c, N/V/d, abd pain, chest pain.  pt. was seen by EP in ED and having AICD lead malfunction. advised for TTE and possible lead replacement (11 Jan 2019 23:23)    Mr Styles notes lower back pain for just over 1 month.  He was hospitalized at [facility] 12/12 --> 21/18 with fever, leukocytosis. Imaging indicated nodular liver with gallstones and ascites. HIDA scan was normal. PCT was elevated at 1.87.  Paracentesis was done and was inconsistent with SBP. All cultures were negative. He responded to empiric Zosyn.  Imaging has revealed incidental T10/11 intervetebral discitis      PAST MEDICAL & SURGICAL HISTORY:  CKD (chronic kidney disease)  Hepatitis  CAD (coronary artery disease)  HTN (hypertension)  DM (diabetes mellitus)  Artificial cardiac pacemaker  AICD (automatic cardioverter/defibrillator) present      Social history: lives, with family, Mandarin speaking, no tob/EtOH    FAMILY HISTORY:  No pertinent family history in first degree relatives    REVIEW OF SYSTEMS:  CONSTITUTIONAL: No weakness, fevers or chills  EYES/ENT: No visual changes;  No vertigo or throat pain   NECK: No pain or stiffness  RESPIRATORY: No cough, wheezing, hemoptysis; No shortness of breath  CARDIOVASCULAR: No chest pain or palpitations  GASTROINTESTINAL: No abdominal or epigastric pain. No nausea, vomiting, or hematemesis; No diarrhea or constipation. No melena or hematochezia.  GENITOURINARY: No dysuria, frequency or hematuria  NEUROLOGICAL: No numbness or weakness  SKIN: No itching, burning, rashes, or lesions   All other review of systems is negative unless indicated above    Allergies  No Known Allergies    Antimicrobials:      Vital Signs Last 24 Hrs  T(C): 36.7 (16 Jan 2019 11:19), Max: 37 (15 Presley 2019 20:30)  T(F): 98.1 (16 Jan 2019 11:19), Max: 98.6 (15 Presley 2019 20:30)  HR: 71 (16 Jan 2019 11:19) (69 - 72)  BP: 112/58 (16 Jan 2019 11:19) (108/61 - 112/58)  BP(mean): --  RR: 17 (16 Jan 2019 11:19) (17 - 18)  SpO2: 98% (16 Jan 2019 11:19) (94% - 98%)    PHYSICAL EXAM:  General: WN/WD NAD, Non-toxic  Neurology: A&Ox3, nonfocal  Respiratory: Clear to auscultation bilaterally  CV: RRR, S1S2, no murmurs, rubs or gallops  Abdominal: Soft, Non-tender, non-distended, normal bowel sounds  Extremities: No edema,  deformities of distal PIP joints on fingers  Line Sites: Clear  Skin: No rash                        8.9    4.78  )-----------( 127      ( 16 Jan 2019 08:30 )             27.0  WBC Count: 4.78 (01-16 @ 08:30)  WBC Count: 4.33 (01-15 @ 08:18)  WBC Count: 5.03 (01-13 @ 08:34)  WBC Count: 5.35 (01-12 @ 07:22)      01-16    131<L>  |  97  |  26<H>  ----------------------------<  153<H>  4.1   |  25  |  1.49<H>    Ca    8.5      16 Jan 2019 05:37  Phos  3.3     01-16  Mg     1.2     01-16    Acute Hepatitis Panel (12.12.18 @ 22:26)    Hepatitis C Virus Interpretation: Nonreact    Hepatitis C Virus S/CO Ratio: 0.21 S/CO    Hepatitis B Core IgM Antibody: Nonreact    Hepatitis B Surface Antigen: Nonreact    Hepatitis A IgM Antibody: Nonreact    C-Reactive Protein, Serum (12.12.18 @ 22:26)    C-Reactive Protein, Serum: 5.67 mg/dL    Procalcitonin, Serum (12.13.18 @ 12:20)    Procalcitonin, Serum: 1.87:     MICROBIOLOGY:  previous cultures negative    < from: TTE with Doppler (w/Cont) (01.14.19 @ 12:02) >  Conclusions:  1. Left ventricular enlargement.  2. Endocardium not well visualized; mild segmental left  ventricular dysfunction.  Apical akineisis.   Endocardial  visualization enhanced with intravenous injection of  Ultrasonic Enhancing Agent (Definity). No left ventricular  thrombus.  *** No previous Echo exam.    < end of copied text >    Radiology:  < from: CT Abdomen and Pelvis w/ Oral Cont (01.15.19 @ 22:26) >  LOWER CHEST: Small bilateral pleural effusions, left greater than right.   The distal portion of an AICD lead is present in the right ventricle.   Bilateral gynecomastia.    LIVER: Nodular liver contour.  BILE DUCTS: Normal caliber.  GALLBLADDER: Cholelithiasis with nonspecific thickening of the   gallbladder wall.  SPLEEN: Within normal limits.  PANCREAS: Within normal limits.  ADRENALS: Within normal limits.  KIDNEYS/URETERS: Exophytic right renal cyst. Otherwise within normal   limits.    BLADDER: Within normal limits.  REPRODUCTIVE ORGANS: Prostate within normal limits.    BOWEL: No bowel obstruction. Appendix is not visualized.  PERITONEUM: Trace ascites.  VESSELS:  Atherosclerotic calcifications.  RETROPERITONEUM: No lymphadenopathy.    ABDOMINAL WALL: Within normal limits.  BONES: Degenerative changes of the spine. Cortical lucency involving both   sides of the T10/11 intervertebral disc space, new compared to prior   examination 12/17/2018.    IMPRESSION:   Cortical lucency involving both sides of the T10/11 intervertebral disc   space, new since prior examination 12/17/2018. Osteomy/discitis is a   consideration. Correlate with MRI.    Small bilateral pleural effusions and trace abdominal and pelvic ascites.    Cholelithiasis.    < end of copied text >      Bayron Talley MD; Division of Infectious Disease; Pager: [phone]; nights and weekends: [phone]

## 2019-01-16 NOTE — CONSULT NOTE ADULT - SUBJECTIVE AND OBJECTIVE BOX
p (1480)     HPI: 83 yr old M h/o CAD S/P stent, S/P PPM, HTN, BPH, CKD, Hepatitis, and Dm2 recently admitted for fever of unknown origin, discharge to [facility] p/w AICD firing. Patient was complaining of bloating and got CT abdomen for ascites which showed incidental ostemyelitis/discitis. No back pain, fever, or other neurological complaints.     PAST MEDICAL HISTORY   CKD (chronic kidney disease)  Hepatitis  CAD (coronary artery disease)  HTN (hypertension)  DM (diabetes mellitus)    PAST SURGICAL HISTORY   Artificial cardiac pacemaker  AICD (automatic cardioverter/defibrillator) present  No significant past surgical history        MEDICATIONS:  Antibiotics:    Neuro:    Anticoagulation:  aspirin  chewable 81 milliGRAM(s) Oral daily  clopidogrel Tablet 75 milliGRAM(s) Oral daily  heparin  Injectable 5000 Unit(s) SubCutaneous every 12 hours    Other:  dextrose 40% Gel 15 Gram(s) Oral once PRN  dextrose 5%. 1000 milliLiter(s) IV Continuous <Continuous>  dextrose 50% Injectable 12.5 Gram(s) IV Push once  dextrose 50% Injectable 25 Gram(s) IV Push once  dextrose 50% Injectable 25 Gram(s) IV Push once  docusate sodium 100 milliGRAM(s) Oral two times a day  furosemide    Tablet 40 milliGRAM(s) Oral two times a day  glucagon  Injectable 1 milliGRAM(s) IntraMuscular once PRN  insulin glargine Injectable (LANTUS) 15 Unit(s) SubCutaneous at bedtime  insulin lispro (HumaLOG) corrective regimen sliding scale   SubCutaneous three times a day before meals  insulin lispro (HumaLOG) corrective regimen sliding scale   SubCutaneous at bedtime  levothyroxine 88 MICROGram(s) Oral daily  lisinopril 2.5 milliGRAM(s) Oral daily  metoprolol succinate ER 25 milliGRAM(s) Oral daily  pantoprazole    Tablet 40 milliGRAM(s) Oral before breakfast  polyethylene glycol 3350 17 Gram(s) Oral two times a day PRN  senna 2 Tablet(s) Oral at bedtime  tamsulosin 0.4 milliGRAM(s) Oral at bedtime      SOCIAL HISTORY:   Occupation:   Marital Status:     FAMILY HISTORY:  No pertinent family history in first degree relatives      PHYSICAL EXAMINATION:   T(C): 36.7 (01-16-19 @ 11:19), Max: 37 (01-15-19 @ 20:30)  HR: 71 (01-16-19 @ 11:19) (69 - 72)  BP: 112/58 (01-16-19 @ 11:19) (108/61 - 112/58)  RR: 17 (01-16-19 @ 11:19) (17 - 18)  SpO2: 98% (01-16-19 @ 11:19) (94% - 98%)  Wt(kg): --    General Examination:     Neurologic Examination:           AOx3, FC, PERRL, EOMI, V1-3 intact, no facial, palate brennan symmetric, tongue midline, shrug 5/5  5/5 throughout, no drift  SILT  No clonus or babinski      LABS:                        8.9    4.78  )-----------( 127      ( 16 Jan 2019 08:30 )             27.0     01-16    131<L>  |  97  |  26<H>  ----------------------------<  153<H>  4.1   |  25  |  1.49<H>    Ca    8.5      16 Jan 2019 05:37  Phos  3.3     01-16  Mg     1.2     01-16            RADIOLOGY & ADDITIONAL STUDIES:

## 2019-01-16 NOTE — PROGRESS NOTE ADULT - SUBJECTIVE AND OBJECTIVE BOX
Patient seen and examined  no complaints    No Known Allergies    Hospital Medications:   MEDICATIONS  (STANDING):  aspirin  chewable 81 milliGRAM(s) Oral daily  clopidogrel Tablet 75 milliGRAM(s) Oral daily  dextrose 5%. 1000 milliLiter(s) (50 mL/Hr) IV Continuous <Continuous>  dextrose 50% Injectable 12.5 Gram(s) IV Push once  dextrose 50% Injectable 25 Gram(s) IV Push once  dextrose 50% Injectable 25 Gram(s) IV Push once  docusate sodium 100 milliGRAM(s) Oral two times a day  furosemide   Injectable 40 milliGRAM(s) IV Push two times a day  heparin  Injectable 5000 Unit(s) SubCutaneous every 12 hours  insulin glargine Injectable (LANTUS) 15 Unit(s) SubCutaneous at bedtime  insulin lispro (HumaLOG) corrective regimen sliding scale   SubCutaneous three times a day before meals  insulin lispro (HumaLOG) corrective regimen sliding scale   SubCutaneous at bedtime  levothyroxine 88 MICROGram(s) Oral daily  lisinopril 2.5 milliGRAM(s) Oral daily  metoprolol succinate ER 25 milliGRAM(s) Oral daily  pantoprazole    Tablet 40 milliGRAM(s) Oral before breakfast  senna 2 Tablet(s) Oral at bedtime  tamsulosin 0.4 milliGRAM(s) Oral at bedtime      VITALS:  T(F): 98.1 (19 @ 11:19), Max: 99.1 (01-15-19 @ 14:06)  HR: 71 (19 @ 11:19)  BP: 112/58 (19 @ 11:19)  RR: 17 (19 @ 11:19)  SpO2: 98% (19 @ 11:19)  Wt(kg): --    01-15 @ 07:01  -   @ 07:00  --------------------------------------------------------  IN: 610 mL / OUT: 1350 mL / NET: -740 mL        PHYSICAL EXAM:  Constitutional: NAD  HEENT: anicteric sclera, oropharynx clear, MMM  Neck: No JVD  Respiratory: CTAB, no wheezes, rales or rhonchi  Cardiovascular: S1, S2, RRR  Gastrointestinal: BS+, soft, NT/ND  Extremities: No cyanosis or clubbing. No peripheral edema      LABS:      131<L>  |  97  |  26<H>  ----------------------------<  153<H>  4.1   |  25  |  1.49<H>    Ca    8.5      2019 05:37  Phos  3.3       Mg     1.2           Creatinine Trend: 1.49 <--, 1.42 <--, 1.37 <--, 1.26 <--, 1.39 <--, 1.33 <--, 1.44 <--                        8.9    4.78  )-----------( 127      ( 2019 08:30 )             27.0     Urine Studies:  Urinalysis Basic - ( 2019 14:23 )    Color: Light Yellow / Appearance: Clear / S.008 / pH:   Gluc:  / Ketone: Negative  / Bili: Negative / Urobili: Negative   Blood:  / Protein: Negative / Nitrite: Negative   Leuk Esterase: Negative / RBC: 24 /hpf / WBC 0 /HPF   Sq Epi:  / Non Sq Epi: 0 /hpf / Bacteria: Negative      Creatinine, Random Urine: 38 mg/dL ( @ 14:23)  Sodium, Random Urine: 97 mmol/L ( @ 14:23)  Osmolality, Random Urine: 324 mos/kg ( @ 14:23)    RADIOLOGY & ADDITIONAL STUDIES:

## 2019-01-16 NOTE — PROGRESS NOTE ADULT - SUBJECTIVE AND OBJECTIVE BOX
Patient is a 83y old  Male who presents with a chief complaint of AICD firing (16 Jan 2019 17:57)    pt. seen and examined, c/o back pain , no fever/ chills, no wbc   INTERVAL HPI/OVERNIGHT EVENTS:  T(C): 36.7 (01-16-19 @ 11:19), Max: 37 (01-15-19 @ 20:30)  HR: 71 (01-16-19 @ 11:19) (69 - 72)  BP: 112/58 (01-16-19 @ 11:19) (108/61 - 112/58)  RR: 17 (01-16-19 @ 11:19) (17 - 18)  SpO2: 98% (01-16-19 @ 11:19) (94% - 98%)  Wt(kg): --  I&O's Summary    15 Presley 2019 07:01  -  16 Jan 2019 07:00  --------------------------------------------------------  IN: 610 mL / OUT: 1350 mL / NET: -740 mL    16 Jan 2019 07:01  -  16 Jan 2019 19:40  --------------------------------------------------------  IN: 1140 mL / OUT: 0 mL / NET: 1140 mL        PAST MEDICAL & SURGICAL HISTORY:  CKD (chronic kidney disease)  Hepatitis  CAD (coronary artery disease)  HTN (hypertension)  DM (diabetes mellitus)  Artificial cardiac pacemaker  AICD (automatic cardioverter/defibrillator) present      SOCIAL HISTORY  Alcohol:  Tobacco:  Illicit substance use:    FAMILY HISTORY:    REVIEW OF SYSTEMS:  CONSTITUTIONAL: No fever, weight loss, or fatigue  EYES: No eye pain, visual disturbances, or discharge  ENMT:  No difficulty hearing, tinnitus, vertigo; No sinus or throat pain  NECK: No pain or stiffness  RESPIRATORY: No cough, wheezing, chills or hemoptysis; No shortness of breath  CARDIOVASCULAR: No chest pain, palpitations, dizziness, or leg swelling  GASTROINTESTINAL: No abdominal or epigastric pain. No nausea, vomiting, or hematemesis; No diarrhea or constipation. No melena or hematochezia.  GENITOURINARY: No dysuria, frequency, hematuria, or incontinence  NEUROLOGICAL: No headaches, memory loss, loss of strength, numbness, or tremors  SKIN: No itching, burning, rashes, or lesions   LYMPH NODES: No enlarged glands  ENDOCRINE: No heat or cold intolerance; No hair loss  MUSCULOSKELETAL: No joint pain or swelling; No muscle, back, or extremity pain  PSYCHIATRIC: No depression, anxiety, mood swings, or difficulty sleeping  HEME/LYMPH: No easy bruising, or bleeding gums  ALLERY AND IMMUNOLOGIC: No hives or eczema    RADIOLOGY & ADDITIONAL TESTS:    Imaging Personally Reviewed:  [ ] YES  [ ] NO    Consultant(s) Notes Reviewed:  [ ] YES  [ ] NO    PHYSICAL EXAM:  GENERAL: NAD, well-groomed, well-developed  HEAD:  Atraumatic, Normocephalic  EYES: EOMI, PERRLA, conjunctiva and sclera clear  ENMT: No tonsillar erythema, exudates, or enlargement; Moist mucous membranes, Good dentition, No lesions  NECK: Supple, No JVD, Normal thyroid  NERVOUS SYSTEM:  Alert & Oriented X3, Good concentration; Motor Strength 5/5 B/L upper and lower extremities; DTRs 2+ intact and symmetric  CHEST/LUNG: Clear to percussion bilaterally; No rales, rhonchi, wheezing, or rubs  HEART: Regular rate and rhythm; No murmurs, rubs, or gallops  ABDOMEN: Soft, Nontender, Nondistended; Bowel sounds present  EXTREMITIES:  2+ Peripheral Pulses, No clubbing, cyanosis, or edema  LYMPH: No lymphadenopathy noted  SKIN: No rashes or lesions    LABS:                        8.9    4.78  )-----------( 127      ( 16 Jan 2019 08:30 )             27.0     01-16    131<L>  |  97  |  26<H>  ----------------------------<  153<H>  4.1   |  25  |  1.49<H>    Ca    8.5      16 Jan 2019 05:37  Phos  3.3     01-16  Mg     1.2     01-16          CAPILLARY BLOOD GLUCOSE      POCT Blood Glucose.: 311 mg/dL (16 Jan 2019 18:09)  POCT Blood Glucose.: 265 mg/dL (16 Jan 2019 16:34)  POCT Blood Glucose.: 277 mg/dL (16 Jan 2019 11:39)  POCT Blood Glucose.: 165 mg/dL (16 Jan 2019 07:46)  POCT Blood Glucose.: 214 mg/dL (15 Presley 2019 21:11)            MEDICATIONS  (STANDING):  aspirin  chewable 81 milliGRAM(s) Oral daily  clopidogrel Tablet 75 milliGRAM(s) Oral daily  dextrose 5%. 1000 milliLiter(s) (50 mL/Hr) IV Continuous <Continuous>  dextrose 50% Injectable 12.5 Gram(s) IV Push once  dextrose 50% Injectable 25 Gram(s) IV Push once  dextrose 50% Injectable 25 Gram(s) IV Push once  docusate sodium 100 milliGRAM(s) Oral two times a day  furosemide    Tablet 40 milliGRAM(s) Oral two times a day  heparin  Injectable 5000 Unit(s) SubCutaneous every 12 hours  insulin glargine Injectable (LANTUS) 15 Unit(s) SubCutaneous at bedtime  insulin lispro (HumaLOG) corrective regimen sliding scale   SubCutaneous three times a day before meals  insulin lispro (HumaLOG) corrective regimen sliding scale   SubCutaneous at bedtime  levothyroxine 88 MICROGram(s) Oral daily  lisinopril 2.5 milliGRAM(s) Oral daily  metoprolol succinate ER 25 milliGRAM(s) Oral daily  pantoprazole    Tablet 40 milliGRAM(s) Oral before breakfast  senna 2 Tablet(s) Oral at bedtime  tamsulosin 0.4 milliGRAM(s) Oral at bedtime    MEDICATIONS  (PRN):  dextrose 40% Gel 15 Gram(s) Oral once PRN Blood Glucose LESS THAN 70 milliGRAM(s)/deciliter  glucagon  Injectable 1 milliGRAM(s) IntraMuscular once PRN Glucose LESS THAN 70 milligrams/deciliter  polyethylene glycol 3350 17 Gram(s) Oral two times a day PRN Constipation  sodium chloride 0.65% Nasal 1 Spray(s) Both Nostrils four times a day PRN Nasal Congestion      Care Discussed with Consultants/Other Providers [ ] YES  [ ] NO

## 2019-01-16 NOTE — PROGRESS NOTE ADULT - SUBJECTIVE AND OBJECTIVE BOX
Patient is a 83y old  Male who presents with a chief complaint of AICD firing (16 Jan 2019 19:39)      INTERVAL HISTORY: feels ok  	  MEDICATIONS:  furosemide    Tablet 40 milliGRAM(s) Oral two times a day  lisinopril 2.5 milliGRAM(s) Oral daily  metoprolol succinate ER 25 milliGRAM(s) Oral daily  tamsulosin 0.4 milliGRAM(s) Oral at bedtime        PHYSICAL EXAM:  T(C): 37.1 (01-16-19 @ 20:38), Max: 37.1 (01-16-19 @ 20:38)  HR: 78 (01-16-19 @ 20:38) (71 - 78)  BP: 124/71 (01-16-19 @ 20:38) (108/61 - 124/71)  RR: 18 (01-16-19 @ 20:38) (17 - 18)  SpO2: 97% (01-16-19 @ 20:38) (94% - 98%)  Wt(kg): --  I&O's Summary    15 Presley 2019 07:01  -  16 Jan 2019 07:00  --------------------------------------------------------  IN: 610 mL / OUT: 1350 mL / NET: -740 mL    16 Jan 2019 07:01  -  16 Jan 2019 22:09  --------------------------------------------------------  IN: 1140 mL / OUT: 0 mL / NET: 1140 mL          Appearance: Normal	  HEENT:    PERRL, EOMI	  Cardiovascular:  S1 S2, No JVD  Respiratory: Lungs clear to auscultation	  Psychiatry: Alert  Gastrointestinal:  Soft, Non-tender, + BS	  Skin: No rashes, No cyanosis  Extremities:  No edema of LE                                8.9    4.78  )-----------( 127      ( 16 Jan 2019 08:30 )             27.0     01-16    131<L>  |  97  |  26<H>  ----------------------------<  153<H>  4.1   |  25  |  1.49<H>    Ca    8.5      16 Jan 2019 05:37  Phos  3.3     01-16  Mg     1.2     01-16          Labs personally reviewed      Assessment and Plan:    Problem/Plan - 1:  ·  Problem: Failure of implantable cardioverter-defibrillator (ICD) lead, initial encounter.  Plan: seen by EP,  no plan for lead revision as only mild LV dysfunction and no shock administered      Problem/Plan - 2:  ·  Problem: Acute decompensated (likely Systolic) Heart Failure.  Plan: SOB, abdominal distension, JVD, LE edema and hyponatremia  -  Would decrease Lasix to 40mg PO daily   - metoprolol  - Add lisinopril 2.5mg PO daily given mild LV dysfunction     Problem/Plan - 3:  ·  Problem: CKD (chronic kidney disease).  Plan: likely cardiorenal, hyponatremia likely hypervolemic hyponatremia from HF  -  Would decrease Lasix to 40mg PO daily       Problem/Plan - 4:  ·  Problem: CAD (coronary artery disease).  Plan: Home ASA and Plavix. Metoprolol    Will need to be discharged on 40mg PO lasix daily with very close outpt follow up with his PCP/Cardiologist          Edwin Rodriguez DO [facility]  Cardiovascular Medicine  [phone]

## 2019-01-16 NOTE — CONSULT NOTE ADULT - ASSESSMENT
84 yo male with hx of CAD S/P stent, S/P PPM, HTN, BPH, CKD, apparent cirrhosis, anemia, and Dm2   AICD firing  Noted to have new cortical lucency on either side of intervetrebal T10/11 disc space.  He no longer has leukocytosis. Echo does not reveal vegetations.  On physical exam, there are no stigmata of endocarditis.  Etiology of cirrhosis unclear - BLANTON appears most likely    Suggest  Monitor off antibiotics  MRI of T spine with contrast  Blood cultures x 2  Procalcitonin, ESR. CRP  Hep C RNA    will review with NeuroRadiology feasibility of IR guided aspirate

## 2019-01-16 NOTE — CONSULT NOTE ADULT - ASSESSMENT
83 yr old M h/o CAD S/P stent, S/P PPM, HTN, BPH, CKD, Hepatitis, and Dm2 recently admitted for fever of unknown origin, discharge to [facility] p/w AICD firing. Patient was complaining of bloating and got CT abdomen for ascites which showed incidental ostemyelitis/discitis. No back pain, fever, or other neurological complaints.     Plan:  - No acute neurosurgical intervention  - ESR / CRP, BCx2  - MRI L spine w/wo contrast  - Pain control  - Medicine eval for infectious workup  - Abx per ID  - CT guided biopsy with IR prn  - Neurochecks  - Reconsult neurosurgery as needed

## 2019-01-16 NOTE — PROGRESS NOTE ADULT - PROBLEM SELECTOR PLAN 6
c/o abd distension and pain   -CT abd/pelvis : shows minimal ascitis and B/L pl. effusion   -T10/T11 discitis?

## 2019-01-17 LAB
ANION GAP SERPL CALC-SCNC: 10 MMOL/L — SIGNIFICANT CHANGE UP (ref 5–17)
BUN SERPL-MCNC: 27 MG/DL — HIGH (ref 7–23)
CALCIUM SERPL-MCNC: 8.5 MG/DL — SIGNIFICANT CHANGE UP (ref 8.4–10.5)
CHLORIDE SERPL-SCNC: 96 MMOL/L — SIGNIFICANT CHANGE UP (ref 96–108)
CO2 SERPL-SCNC: 25 MMOL/L — SIGNIFICANT CHANGE UP (ref 22–31)
CREAT SERPL-MCNC: 1.58 MG/DL — HIGH (ref 0.5–1.3)
ERYTHROCYTE [SEDIMENTATION RATE] IN BLOOD: 25 MM/HR — HIGH (ref 0–20)
GLUCOSE BLDC GLUCOMTR-MCNC: 170 MG/DL — HIGH (ref 70–99)
GLUCOSE BLDC GLUCOMTR-MCNC: 255 MG/DL — HIGH (ref 70–99)
GLUCOSE BLDC GLUCOMTR-MCNC: 303 MG/DL — HIGH (ref 70–99)
GLUCOSE BLDC GLUCOMTR-MCNC: 319 MG/DL — HIGH (ref 70–99)
GLUCOSE SERPL-MCNC: 171 MG/DL — HIGH (ref 70–99)
HCT VFR BLD CALC: 27.9 % — LOW (ref 39–50)
HGB BLD-MCNC: 9.3 G/DL — LOW (ref 13–17)
MAGNESIUM SERPL-MCNC: 1.3 MG/DL — LOW (ref 1.6–2.6)
MCHC RBC-ENTMCNC: 31.1 PG — SIGNIFICANT CHANGE UP (ref 27–34)
MCHC RBC-ENTMCNC: 33.3 GM/DL — SIGNIFICANT CHANGE UP (ref 32–36)
MCV RBC AUTO: 93.3 FL — SIGNIFICANT CHANGE UP (ref 80–100)
PLATELET # BLD AUTO: 118 K/UL — LOW (ref 150–400)
POTASSIUM SERPL-MCNC: 3.8 MMOL/L — SIGNIFICANT CHANGE UP (ref 3.5–5.3)
POTASSIUM SERPL-SCNC: 3.8 MMOL/L — SIGNIFICANT CHANGE UP (ref 3.5–5.3)
PROCALCITONIN SERPL-MCNC: 0.13 NG/ML — HIGH (ref 0.02–0.1)
RBC # BLD: 2.99 M/UL — LOW (ref 4.2–5.8)
RBC # FLD: 15.3 % — HIGH (ref 10.3–14.5)
SODIUM SERPL-SCNC: 131 MMOL/L — LOW (ref 135–145)
WBC # BLD: 5.01 K/UL — SIGNIFICANT CHANGE UP (ref 3.8–10.5)
WBC # FLD AUTO: 5.01 K/UL — SIGNIFICANT CHANGE UP (ref 3.8–10.5)

## 2019-01-17 PROCEDURE — 99232 SBSQ HOSP IP/OBS MODERATE 35: CPT

## 2019-01-17 RX ORDER — ACETAMINOPHEN 500 MG
650 TABLET ORAL EVERY 6 HOURS
Qty: 0 | Refills: 0 | Status: DISCONTINUED | OUTPATIENT
Start: 2019-01-17 | End: 2019-01-19

## 2019-01-17 RX ORDER — MAGNESIUM SULFATE 500 MG/ML
2 VIAL (ML) INJECTION ONCE
Qty: 0 | Refills: 0 | Status: COMPLETED | OUTPATIENT
Start: 2019-01-17 | End: 2019-01-17

## 2019-01-17 RX ADMIN — Medication 50 GRAM(S): at 11:22

## 2019-01-17 RX ADMIN — Medication 4: at 17:03

## 2019-01-17 RX ADMIN — Medication 100 MILLIGRAM(S): at 05:18

## 2019-01-17 RX ADMIN — Medication 1: at 08:26

## 2019-01-17 RX ADMIN — Medication 88 MICROGRAM(S): at 05:18

## 2019-01-17 RX ADMIN — Medication 25 MILLIGRAM(S): at 05:18

## 2019-01-17 RX ADMIN — SENNA PLUS 2 TABLET(S): 8.6 TABLET ORAL at 21:46

## 2019-01-17 RX ADMIN — HEPARIN SODIUM 5000 UNIT(S): 5000 INJECTION INTRAVENOUS; SUBCUTANEOUS at 05:18

## 2019-01-17 RX ADMIN — PANTOPRAZOLE SODIUM 40 MILLIGRAM(S): 20 TABLET, DELAYED RELEASE ORAL at 05:18

## 2019-01-17 RX ADMIN — Medication 40 MILLIGRAM(S): at 05:18

## 2019-01-17 RX ADMIN — Medication 81 MILLIGRAM(S): at 11:22

## 2019-01-17 RX ADMIN — TAMSULOSIN HYDROCHLORIDE 0.4 MILLIGRAM(S): 0.4 CAPSULE ORAL at 21:46

## 2019-01-17 RX ADMIN — Medication 1: at 21:47

## 2019-01-17 RX ADMIN — CLOPIDOGREL BISULFATE 75 MILLIGRAM(S): 75 TABLET, FILM COATED ORAL at 11:22

## 2019-01-17 RX ADMIN — HEPARIN SODIUM 5000 UNIT(S): 5000 INJECTION INTRAVENOUS; SUBCUTANEOUS at 17:22

## 2019-01-17 RX ADMIN — Medication 40 MILLIGRAM(S): at 17:22

## 2019-01-17 RX ADMIN — Medication 100 MILLIGRAM(S): at 17:22

## 2019-01-17 RX ADMIN — INSULIN GLARGINE 15 UNIT(S): 100 INJECTION, SOLUTION SUBCUTANEOUS at 21:46

## 2019-01-17 RX ADMIN — Medication 650 MILLIGRAM(S): at 18:36

## 2019-01-17 RX ADMIN — Medication 4: at 12:14

## 2019-01-17 RX ADMIN — LISINOPRIL 2.5 MILLIGRAM(S): 2.5 TABLET ORAL at 05:18

## 2019-01-17 NOTE — DIETITIAN INITIAL EVALUATION ADULT. - NS AS NUTRI INTERV MEALS SNACK
General/healthful diet/Continue current diet as tolerated. Monitor need for softer diet consistency, pt currently tolerating diet well. Encourage PO intake of protein-rich, nutrient dense foods.

## 2019-01-17 NOTE — DIETITIAN INITIAL EVALUATION ADULT. - NS AS NUTRI INTERV ED CONTENT
Briefly reviewed with daughter the importance of continuing to limit concentrated sweets in the diet and the importance of reducing salt in the diet.

## 2019-01-17 NOTE — PROGRESS NOTE ADULT - SUBJECTIVE AND OBJECTIVE BOX
Follow Up:  possible discitis    Interval History/ROS:  was asleep when seen this am    Allergies  No Known Allergies    ANTIMICROBIALS:      OTHER MEDS:  MEDICATIONS  (STANDING):  acetaminophen   Tablet .. 650 every 6 hours PRN  aspirin  chewable 81 daily  clopidogrel Tablet 75 daily  dextrose 40% Gel 15 once PRN  dextrose 50% Injectable 12.5 once  dextrose 50% Injectable 25 once  dextrose 50% Injectable 25 once  docusate sodium 100 two times a day  furosemide    Tablet 40 two times a day  glucagon  Injectable 1 once PRN  heparin  Injectable 5000 every 12 hours  insulin glargine Injectable (LANTUS) 15 at bedtime  insulin lispro (HumaLOG) corrective regimen sliding scale  three times a day before meals  insulin lispro (HumaLOG) corrective regimen sliding scale  at bedtime  levothyroxine 88 daily  lisinopril 2.5 daily  metoprolol succinate ER 25 daily  pantoprazole    Tablet 40 before breakfast  polyethylene glycol 3350 17 two times a day PRN  senna 2 at bedtime  tamsulosin 0.4 at bedtime      Vital Signs Last 24 Hrs  T(C): 36.7 (17 Jan 2019 11:11), Max: 37.8 (17 Jan 2019 04:01)  T(F): 98.1 (17 Jan 2019 11:11), Max: 100 (17 Jan 2019 04:01)  HR: 83 (17 Jan 2019 17:20) (68 - 83)  BP: 104/50 (17 Jan 2019 17:20) (104/50 - 128/69)  BP(mean): --  RR: 16 (17 Jan 2019 11:11) (16 - 18)  SpO2: 98% (17 Jan 2019 11:11) (95% - 98%)    PHYSICAL EXAM:  General: WN/WD NAD, Non-toxic  Respiratory: no distress  Extremities: No edema, + peripheral pulses  Line Sites: Clear  Skin: No rash                          9.3    5.01  )-----------( 118      ( 17 Jan 2019 07:00 )             27.9       01-17    131<L>  |  96  |  27<H>  ----------------------------<  171<H>  3.8   |  25  |  1.58<H>    Ca    8.5      17 Jan 2019 06:13  Phos  3.3     01-16  Mg     1.3     01-17    Sedimentation Rate, Erythrocyte (01.16.19 @ 22:31)    Sedimentation Rate, Erythrocyte: 25 mm/hr    C-Reactive Protein, Serum (01.16.19 @ 22:31)    C-Reactive Protein, Serum: 2.57 mg/dL    Procalcitonin, Serum (01.17.19 @ 06:13)    Procalcitonin, Serum: 0.13:    RADIOLOGY:    Bayron Talley MD; Division of Infectious Disease; Pager: [phone]; nights and weekends: [phone]

## 2019-01-17 NOTE — DIETITIAN INITIAL EVALUATION ADULT. - ENERGY NEEDS
Ht: ~69inches, Wt: 166.6lbs, BMI: 24.7kg/m2, IBW: 160lbs +/- 10%, %IBW: 104%  Edema: no edema noted, Skin: free of pressure injuries per nursing flow sheets   Pertinent Information:  This is a 84 yo male with hx of CAD S/P stent, S/P PPM, HTN, BPH, CKD, Hepatitis, and Dm2 recently admitted for fever of unknown origin was discharged to [facility], now presents with AICD firing.

## 2019-01-17 NOTE — DIETITIAN INITIAL EVALUATION ADULT. - SOURCE
patient/family/significant other/Pt and wife Mandarin Speaking, spoke with daughter over the deedee per family preference

## 2019-01-17 NOTE — DIETITIAN INITIAL EVALUATION ADULT. - PERTINENT LABORATORY DATA
HbA1c: 7.1% (1/12) POCT glucose: 170-319mg/dL, 1/16: 165-311mg/dL, 1/15: 160-294mg/dL, BUN 27, Creatinine: 1.58

## 2019-01-17 NOTE — DIETITIAN INITIAL EVALUATION ADULT. - ORAL INTAKE PTA
Daughter reports pt with decreased appetite for the last year however notes PO intake had decreased signficiantly for 2 weeks in december, during which pt was completing ~50% of meals.  Pt typically consumes 3 meals per day + snacks but has been eating smaller portions. Confirms NKFA, takes multivitamin. Daughter reports pt prefers softer foods. Pt denies, nausea, vomiting, diarrhea, constipation PTA./fair

## 2019-01-17 NOTE — DIETITIAN INITIAL EVALUATION ADULT. - PROBLEM SELECTOR PLAN 2
monitor sod level   -gentle hydration Implemented All Universal Safety Interventions:  Pillager to call system. Call bell, personal items and telephone within reach. Instruct patient to call for assistance. Room bathroom lighting operational. Non-slip footwear when patient is off stretcher. Physically safe environment: no spills, clutter or unnecessary equipment. Stretcher in lowest position, wheels locked, appropriate side rails in place.

## 2019-01-17 NOTE — DIETITIAN INITIAL EVALUATION ADULT. - OTHER INFO
Patient seen for Length Of Stay on 4MON. Pt reports UBW as ~186lbs x 1 year ago, family endorses some weight loss. Per previous RD note pt noted with weight of 149lbs (pt noted with edema during this time). Current dosing wt noted as 178lbs, standing weight today 166.6lbs (no edema noted), weight change likely related to fluid shifts. Daughter reports recently pt has been eating better, pt stated his is "hungry now". Per nursing flow-sheet pt noted with good PO intake, completing 100% of meals, tolerating current diet consistency well. Will monitor need for softer diet. Denies any acute GI distress. Last BM today (1/17).

## 2019-01-17 NOTE — PROGRESS NOTE ADULT - SUBJECTIVE AND OBJECTIVE BOX
Patient is a 83y old  Male who presents with a chief complaint of AICD firing (17 Jan 2019 22:49)    pt. seen and examined, doing fair,     INTERVAL HPI/OVERNIGHT EVENTS:  T(C): 37 (01-17-19 @ 20:55), Max: 37.8 (01-17-19 @ 04:01)  HR: 71 (01-17-19 @ 20:55) (68 - 83)  BP: 115/57 (01-17-19 @ 20:55) (104/50 - 128/69)  RR: 17 (01-17-19 @ 20:55) (16 - 18)  SpO2: 98% (01-17-19 @ 20:55) (95% - 98%)  Wt(kg): --  I&O's Summary    16 Jan 2019 07:01  -  17 Jan 2019 07:00  --------------------------------------------------------  IN: 1380 mL / OUT: 985 mL / NET: 395 mL    17 Jan 2019 07:01  -  18 Jan 2019 01:23  --------------------------------------------------------  IN: 1240 mL / OUT: 850 mL / NET: 390 mL        PAST MEDICAL & SURGICAL HISTORY:  CKD (chronic kidney disease)  Hepatitis  CAD (coronary artery disease)  HTN (hypertension)  DM (diabetes mellitus)  Artificial cardiac pacemaker  AICD (automatic cardioverter/defibrillator) present      SOCIAL HISTORY  Alcohol:  Tobacco:  Illicit substance use:    FAMILY HISTORY:    REVIEW OF SYSTEMS:  CONSTITUTIONAL: No fever, weight loss, or fatigue  EYES: No eye pain, visual disturbances, or discharge  ENMT:  No difficulty hearing, tinnitus, vertigo; No sinus or throat pain  NECK: No pain or stiffness  RESPIRATORY: No cough, wheezing, chills or hemoptysis; No shortness of breath  CARDIOVASCULAR: No chest pain, palpitations, dizziness, or leg swelling  GASTROINTESTINAL: No abdominal or epigastric pain. No nausea, vomiting, or hematemesis; No diarrhea or constipation. No melena or hematochezia.  GENITOURINARY: No dysuria, frequency, hematuria, or incontinence  NEUROLOGICAL: No headaches, memory loss, loss of strength, numbness, or tremors  SKIN: No itching, burning, rashes, or lesions   LYMPH NODES: No enlarged glands  ENDOCRINE: No heat or cold intolerance; No hair loss  MUSCULOSKELETAL: No joint pain or swelling; No muscle, back, or extremity pain  PSYCHIATRIC: No depression, anxiety, mood swings, or difficulty sleeping  HEME/LYMPH: No easy bruising, or bleeding gums  ALLERY AND IMMUNOLOGIC: No hives or eczema    RADIOLOGY & ADDITIONAL TESTS:    Imaging Personally Reviewed:  [ ] YES  [ ] NO    Consultant(s) Notes Reviewed:  [ ] YES  [ ] NO    PHYSICAL EXAM:  GENERAL: NAD, well-groomed, well-developed  HEAD:  Atraumatic, Normocephalic  EYES: EOMI, PERRLA, conjunctiva and sclera clear  ENMT: No tonsillar erythema, exudates, or enlargement; Moist mucous membranes, Good dentition, No lesions  NECK: Supple, No JVD, Normal thyroid  NERVOUS SYSTEM:  Alert & Oriented X3, Good concentration; Motor Strength 5/5 B/L upper and lower extremities; DTRs 2+ intact and symmetric  CHEST/LUNG: Clear to percussion bilaterally; No rales, rhonchi, wheezing, or rubs  HEART: Regular rate and rhythm; No murmurs, rubs, or gallops  ABDOMEN: Soft, Nontender, Nondistended; Bowel sounds present  EXTREMITIES:  2+ Peripheral Pulses, No clubbing, cyanosis, or edema  LYMPH: No lymphadenopathy noted  SKIN: No rashes or lesions    LABS:                        9.3    5.01  )-----------( 118      ( 17 Jan 2019 07:00 )             27.9     01-17    131<L>  |  96  |  27<H>  ----------------------------<  171<H>  3.8   |  25  |  1.58<H>    Ca    8.5      17 Jan 2019 06:13  Phos  3.3     01-16  Mg     1.3     01-17          CAPILLARY BLOOD GLUCOSE      POCT Blood Glucose.: 255 mg/dL (17 Jan 2019 21:37)  POCT Blood Glucose.: 303 mg/dL (17 Jan 2019 16:54)  POCT Blood Glucose.: 319 mg/dL (17 Jan 2019 11:41)  POCT Blood Glucose.: 170 mg/dL (17 Jan 2019 07:46)            MEDICATIONS  (STANDING):  aspirin  chewable 81 milliGRAM(s) Oral daily  clopidogrel Tablet 75 milliGRAM(s) Oral daily  dextrose 5%. 1000 milliLiter(s) (50 mL/Hr) IV Continuous <Continuous>  dextrose 50% Injectable 12.5 Gram(s) IV Push once  dextrose 50% Injectable 25 Gram(s) IV Push once  dextrose 50% Injectable 25 Gram(s) IV Push once  docusate sodium 100 milliGRAM(s) Oral two times a day  heparin  Injectable 5000 Unit(s) SubCutaneous every 12 hours  insulin glargine Injectable (LANTUS) 15 Unit(s) SubCutaneous at bedtime  insulin lispro (HumaLOG) corrective regimen sliding scale   SubCutaneous three times a day before meals  insulin lispro (HumaLOG) corrective regimen sliding scale   SubCutaneous at bedtime  levothyroxine 88 MICROGram(s) Oral daily  lisinopril 2.5 milliGRAM(s) Oral daily  metoprolol succinate ER 25 milliGRAM(s) Oral daily  pantoprazole    Tablet 40 milliGRAM(s) Oral before breakfast  senna 2 Tablet(s) Oral at bedtime  tamsulosin 0.4 milliGRAM(s) Oral at bedtime    MEDICATIONS  (PRN):  acetaminophen   Tablet .. 650 milliGRAM(s) Oral every 6 hours PRN Moderate Pain (4 - 6)  dextrose 40% Gel 15 Gram(s) Oral once PRN Blood Glucose LESS THAN 70 milliGRAM(s)/deciliter  glucagon  Injectable 1 milliGRAM(s) IntraMuscular once PRN Glucose LESS THAN 70 milligrams/deciliter  polyethylene glycol 3350 17 Gram(s) Oral two times a day PRN Constipation  sodium chloride 0.65% Nasal 1 Spray(s) Both Nostrils four times a day PRN Nasal Congestion      Care Discussed with Consultants/Other Providers [ ] YES  [ ] NO

## 2019-01-17 NOTE — PROGRESS NOTE ADULT - SUBJECTIVE AND OBJECTIVE BOX
Patient seen and examined  no complaints    No Known Allergies    Hospital Medications:   MEDICATIONS  (STANDING):  aspirin  chewable 81 milliGRAM(s) Oral daily  clopidogrel Tablet 75 milliGRAM(s) Oral daily  dextrose 5%. 1000 milliLiter(s) (50 mL/Hr) IV Continuous <Continuous>  dextrose 50% Injectable 12.5 Gram(s) IV Push once  dextrose 50% Injectable 25 Gram(s) IV Push once  dextrose 50% Injectable 25 Gram(s) IV Push once  docusate sodium 100 milliGRAM(s) Oral two times a day  furosemide    Tablet 40 milliGRAM(s) Oral two times a day  heparin  Injectable 5000 Unit(s) SubCutaneous every 12 hours  insulin glargine Injectable (LANTUS) 15 Unit(s) SubCutaneous at bedtime  insulin lispro (HumaLOG) corrective regimen sliding scale   SubCutaneous three times a day before meals  insulin lispro (HumaLOG) corrective regimen sliding scale   SubCutaneous at bedtime  levothyroxine 88 MICROGram(s) Oral daily  lisinopril 2.5 milliGRAM(s) Oral daily  metoprolol succinate ER 25 milliGRAM(s) Oral daily  pantoprazole    Tablet 40 milliGRAM(s) Oral before breakfast  senna 2 Tablet(s) Oral at bedtime  tamsulosin 0.4 milliGRAM(s) Oral at bedtime        VITALS:  T(F): 98.1 (19 @ 11:11), Max: 100 (19 @ 04:01)  HR: 75 (19 @ 11:11)  BP: 128/69 (19 @ 11:11)  RR: 16 (19 @ 11:11)  SpO2: 98% (19 @ 11:11)  Wt(kg): --     @ 07:  -   @ 07:00  --------------------------------------------------------  IN: 1380 mL / OUT: 985 mL / NET: 395 mL     @ 07: @ 14:24  --------------------------------------------------------  IN: 760 mL / OUT: 700 mL / NET: 60 mL        PHYSICAL EXAM:  Constitutional: NAD  HEENT: anicteric sclera, oropharynx clear, MMM  Neck: No JVD  Respiratory: CTAB, no wheezes, rales or rhonchi  Cardiovascular: S1, S2, RRR  Gastrointestinal: BS+, soft, NT/ND  Extremities: No cyanosis or clubbing. No peripheral edema    LABS:      131<L>  |  96  |  27<H>  ----------------------------<  171<H>  3.8   |  25  |  1.58<H>    Ca    8.5      2019 06:13  Phos  3.3     -16  Mg     1.3           Creatinine Trend: 1.58 <--, 1.49 <--, 1.42 <--, 1.37 <--, 1.26 <--, 1.39 <--, 1.33 <--, 1.44 <--                        9.3    5.01  )-----------( 118      ( 2019 07:00 )             27.9     Urine Studies:  Urinalysis Basic - ( 2019 14:23 )    Color: Light Yellow / Appearance: Clear / S.008 / pH:   Gluc:  / Ketone: Negative  / Bili: Negative / Urobili: Negative   Blood:  / Protein: Negative / Nitrite: Negative   Leuk Esterase: Negative / RBC: 24 /hpf / WBC 0 /HPF   Sq Epi:  / Non Sq Epi: 0 /hpf / Bacteria: Negative      Osmolality, Random Urine: 312 mos/kg ( @ 17:08)  Creatinine, Random Urine: 38 mg/dL ( @ 14:23)  Sodium, Random Urine: 97 mmol/L ( @ 14:23)  Osmolality, Random Urine: 324 mos/kg ( @ 14:23)    RADIOLOGY & ADDITIONAL STUDIES:

## 2019-01-17 NOTE — PROGRESS NOTE ADULT - SUBJECTIVE AND OBJECTIVE BOX
Patient is a 83y old  Male who presents with a chief complaint of AICD firing (17 Jan 2019 18:26)      INTERVAL HISTORY: feels well      MEDICATIONS:  lisinopril 2.5 milliGRAM(s) Oral daily  metoprolol succinate ER 25 milliGRAM(s) Oral daily  tamsulosin 0.4 milliGRAM(s) Oral at bedtime        PHYSICAL EXAM:  T(C): 37 (01-17-19 @ 20:55), Max: 37.8 (01-17-19 @ 04:01)  HR: 71 (01-17-19 @ 20:55) (68 - 83)  BP: 115/57 (01-17-19 @ 20:55) (104/50 - 128/69)  RR: 17 (01-17-19 @ 20:55) (16 - 18)  SpO2: 98% (01-17-19 @ 20:55) (95% - 98%)  Wt(kg): --  I&O's Summary    16 Jan 2019 07:01  -  17 Jan 2019 07:00  --------------------------------------------------------  IN: 1380 mL / OUT: 985 mL / NET: 395 mL    17 Jan 2019 07:01  -  17 Jan 2019 22:49  --------------------------------------------------------  IN: 1240 mL / OUT: 850 mL / NET: 390 mL          Appearance: Normal	  HEENT:    PERRL, EOMI	  Cardiovascular:  S1 S2, No JVD  Respiratory: Lungs clear to auscultation	  Psychiatry: Alert  Gastrointestinal:  Soft, Non-tender, + BS	  Skin: No rashes, No cyanosis  Extremities:  No edema of LE                                9.3    5.01  )-----------( 118      ( 17 Jan 2019 07:00 )             27.9     01-17    131<L>  |  96  |  27<H>  ----------------------------<  171<H>  3.8   |  25  |  1.58<H>    Ca    8.5      17 Jan 2019 06:13  Phos  3.3     01-16  Mg     1.3     01-17          Labs personally reviewed      Assessment and Plan:    Problem/Plan - 1:  ·  Problem: Failure of implantable cardioverter-defibrillator (ICD) lead, initial encounter.  Plan: seen by EP,  no plan for lead revision as only mild LV dysfunction and no shock administered      Problem/Plan - 2:  ·  Problem: Acute decompensated (likely Systolic) Heart Failure.  Plan: SOB, abdominal distension, JVD, LE edema -- all resolved   -  Would decrease Lasix to 40mg PO daily if Cr stable, hold for now  - metoprolol  - Add lisinopril 2.5mg PO daily given mild LV dysfunction     Problem/Plan - 3:  ·  Problem: CKD (chronic kidney disease).  Plan: likely cardiorenal, hyponatremia likely hypervolemic hyponatremia from HF  -  Would decrease Lasix to 40mg PO daily       Problem/Plan - 4:  ·  Problem: CAD (coronary artery disease).  Plan: Home ASA and Plavix. Metoprolol    Will need to be discharged on 40mg PO lasix daily with very close outpt follow up with his PCP/Cardiologist          Edwin Rodriguez DO [facility]  Cardiovascular Medicine  [phone]

## 2019-01-17 NOTE — DIETITIAN INITIAL EVALUATION ADULT. - ADHERENCE
Pt noted with T2DM, ckecks BG 3x per day, takes insulin. HbA1c: 7.1% (1/12)  suggesting good glycemic control for age. Per previous RD note (12/2018) pt was on altered consistency diet. Per daughter pt consumes regular diet at home however prefers softer foods.

## 2019-01-17 NOTE — PROGRESS NOTE ADULT - PROBLEM SELECTOR PLAN 7
Incidental finding on CT abd/pelvis T10-11 discitis   -afebrile and nml wbc   -pt. has ch back pain x many years  -however he was in [facility] last month for fever of unknow origing and B/L LE weakness to point , he was send to rehab   -explained daughter bedside in detail : that this is incidental finding , and MRI cannot be done 2/2 AICD. if he spikes fever or Blood c/s pos : then he will need IR guided biopsy of the area for c/s   -seen by ID  -hold off on Abx

## 2019-01-18 LAB
-  COAGULASE NEGATIVE STAPHYLOCOCCUS: SIGNIFICANT CHANGE UP
ANION GAP SERPL CALC-SCNC: 12 MMOL/L — SIGNIFICANT CHANGE UP (ref 5–17)
BUN SERPL-MCNC: 32 MG/DL — HIGH (ref 7–23)
CALCIUM SERPL-MCNC: 8.3 MG/DL — LOW (ref 8.4–10.5)
CHLORIDE SERPL-SCNC: 95 MMOL/L — LOW (ref 96–108)
CO2 SERPL-SCNC: 23 MMOL/L — SIGNIFICANT CHANGE UP (ref 22–31)
CREAT SERPL-MCNC: 1.77 MG/DL — HIGH (ref 0.5–1.3)
CULTURE RESULTS: SIGNIFICANT CHANGE UP
GLUCOSE BLDC GLUCOMTR-MCNC: 168 MG/DL — HIGH (ref 70–99)
GLUCOSE BLDC GLUCOMTR-MCNC: 263 MG/DL — HIGH (ref 70–99)
GLUCOSE BLDC GLUCOMTR-MCNC: 290 MG/DL — HIGH (ref 70–99)
GLUCOSE BLDC GLUCOMTR-MCNC: 327 MG/DL — HIGH (ref 70–99)
GLUCOSE SERPL-MCNC: 167 MG/DL — HIGH (ref 70–99)
GRAM STN FLD: SIGNIFICANT CHANGE UP
HCT VFR BLD CALC: 26.8 % — LOW (ref 39–50)
HCV RNA FLD QL NAA+PROBE: SIGNIFICANT CHANGE UP
HCV RNA SPEC QL PROBE+SIG AMP: SIGNIFICANT CHANGE UP
HGB BLD-MCNC: 9 G/DL — LOW (ref 13–17)
MCHC RBC-ENTMCNC: 30.9 PG — SIGNIFICANT CHANGE UP (ref 27–34)
MCHC RBC-ENTMCNC: 33.6 GM/DL — SIGNIFICANT CHANGE UP (ref 32–36)
MCV RBC AUTO: 92.1 FL — SIGNIFICANT CHANGE UP (ref 80–100)
METHOD TYPE: SIGNIFICANT CHANGE UP
ORGANISM # SPEC MICROSCOPIC CNT: SIGNIFICANT CHANGE UP
ORGANISM # SPEC MICROSCOPIC CNT: SIGNIFICANT CHANGE UP
PLATELET # BLD AUTO: 118 K/UL — LOW (ref 150–400)
POTASSIUM SERPL-MCNC: 3.9 MMOL/L — SIGNIFICANT CHANGE UP (ref 3.5–5.3)
POTASSIUM SERPL-SCNC: 3.9 MMOL/L — SIGNIFICANT CHANGE UP (ref 3.5–5.3)
RBC # BLD: 2.91 M/UL — LOW (ref 4.2–5.8)
RBC # FLD: 15.5 % — HIGH (ref 10.3–14.5)
SODIUM SERPL-SCNC: 130 MMOL/L — LOW (ref 135–145)
SPECIMEN SOURCE: SIGNIFICANT CHANGE UP
WBC # BLD: 4.36 K/UL — SIGNIFICANT CHANGE UP (ref 3.8–10.5)
WBC # FLD AUTO: 4.36 K/UL — SIGNIFICANT CHANGE UP (ref 3.8–10.5)

## 2019-01-18 PROCEDURE — 99232 SBSQ HOSP IP/OBS MODERATE 35: CPT

## 2019-01-18 RX ORDER — FUROSEMIDE 40 MG
40 TABLET ORAL DAILY
Qty: 0 | Refills: 0 | Status: DISCONTINUED | OUTPATIENT
Start: 2019-01-18 | End: 2019-01-19

## 2019-01-18 RX ADMIN — Medication 25 MILLIGRAM(S): at 05:31

## 2019-01-18 RX ADMIN — Medication 3: at 16:59

## 2019-01-18 RX ADMIN — CLOPIDOGREL BISULFATE 75 MILLIGRAM(S): 75 TABLET, FILM COATED ORAL at 11:52

## 2019-01-18 RX ADMIN — INSULIN GLARGINE 15 UNIT(S): 100 INJECTION, SOLUTION SUBCUTANEOUS at 22:25

## 2019-01-18 RX ADMIN — Medication 88 MICROGRAM(S): at 05:31

## 2019-01-18 RX ADMIN — TAMSULOSIN HYDROCHLORIDE 0.4 MILLIGRAM(S): 0.4 CAPSULE ORAL at 22:25

## 2019-01-18 RX ADMIN — Medication 1: at 08:14

## 2019-01-18 RX ADMIN — SENNA PLUS 2 TABLET(S): 8.6 TABLET ORAL at 22:25

## 2019-01-18 RX ADMIN — Medication 4: at 12:22

## 2019-01-18 RX ADMIN — PANTOPRAZOLE SODIUM 40 MILLIGRAM(S): 20 TABLET, DELAYED RELEASE ORAL at 07:18

## 2019-01-18 RX ADMIN — Medication 100 MILLIGRAM(S): at 17:00

## 2019-01-18 RX ADMIN — HEPARIN SODIUM 5000 UNIT(S): 5000 INJECTION INTRAVENOUS; SUBCUTANEOUS at 05:32

## 2019-01-18 RX ADMIN — Medication 100 MILLIGRAM(S): at 05:31

## 2019-01-18 RX ADMIN — Medication 1: at 22:24

## 2019-01-18 RX ADMIN — Medication 81 MILLIGRAM(S): at 11:52

## 2019-01-18 RX ADMIN — LISINOPRIL 2.5 MILLIGRAM(S): 2.5 TABLET ORAL at 05:31

## 2019-01-18 RX ADMIN — HEPARIN SODIUM 5000 UNIT(S): 5000 INJECTION INTRAVENOUS; SUBCUTANEOUS at 17:00

## 2019-01-18 NOTE — PROGRESS NOTE ADULT - SUBJECTIVE AND OBJECTIVE BOX
Patient is a 83y old  Male who presents with a chief complaint of AICD firing (18 Jan 2019 14:59)    pt. seen and examined, NAD     INTERVAL HPI/OVERNIGHT EVENTS:  T(C): 37 (01-18-19 @ 20:51), Max: 37 (01-18-19 @ 20:51)  HR: 78 (01-18-19 @ 20:51) (75 - 83)  BP: 112/58 (01-18-19 @ 20:51) (100/61 - 117/63)  RR: 18 (01-18-19 @ 20:51) (17 - 18)  SpO2: 96% (01-18-19 @ 20:51) (95% - 98%)  Wt(kg): --  I&O's Summary    17 Jan 2019 07:01  -  18 Jan 2019 07:00  --------------------------------------------------------  IN: 1360 mL / OUT: 1800 mL / NET: -440 mL    18 Jan 2019 07:01  -  18 Jan 2019 22:18  --------------------------------------------------------  IN: 840 mL / OUT: 500 mL / NET: 340 mL        PAST MEDICAL & SURGICAL HISTORY:  CKD (chronic kidney disease)  Hepatitis  CAD (coronary artery disease)  HTN (hypertension)  DM (diabetes mellitus)  Artificial cardiac pacemaker  AICD (automatic cardioverter/defibrillator) present      SOCIAL HISTORY  Alcohol:  Tobacco:  Illicit substance use:    FAMILY HISTORY:    REVIEW OF SYSTEMS:  CONSTITUTIONAL: No fever, weight loss, or fatigue  EYES: No eye pain, visual disturbances, or discharge  ENMT:  No difficulty hearing, tinnitus, vertigo; No sinus or throat pain  NECK: No pain or stiffness  RESPIRATORY: No cough, wheezing, chills or hemoptysis; No shortness of breath  CARDIOVASCULAR: No chest pain, palpitations, dizziness, or leg swelling  GASTROINTESTINAL: No abdominal or epigastric pain. No nausea, vomiting, or hematemesis; No diarrhea or constipation. No melena or hematochezia.  GENITOURINARY: No dysuria, frequency, hematuria, or incontinence  NEUROLOGICAL: No headaches, memory loss, loss of strength, numbness, or tremors  SKIN: No itching, burning, rashes, or lesions   LYMPH NODES: No enlarged glands  ENDOCRINE: No heat or cold intolerance; No hair loss  MUSCULOSKELETAL: No joint pain or swelling; No muscle, back, or extremity pain  PSYCHIATRIC: No depression, anxiety, mood swings, or difficulty sleeping  HEME/LYMPH: No easy bruising, or bleeding gums  ALLERY AND IMMUNOLOGIC: No hives or eczema    RADIOLOGY & ADDITIONAL TESTS:    Imaging Personally Reviewed:  [ ] YES  [ ] NO    Consultant(s) Notes Reviewed:  [ ] YES  [ ] NO    PHYSICAL EXAM:  GENERAL: NAD, well-groomed, well-developed  HEAD:  Atraumatic, Normocephalic  EYES: EOMI, PERRLA, conjunctiva and sclera clear  ENMT: No tonsillar erythema, exudates, or enlargement; Moist mucous membranes, Good dentition, No lesions  NECK: Supple, No JVD, Normal thyroid  NERVOUS SYSTEM:  Alert & Oriented X3, Good concentration; Motor Strength 5/5 B/L upper and lower extremities; DTRs 2+ intact and symmetric  CHEST/LUNG: Clear to percussion bilaterally; No rales, rhonchi, wheezing, or rubs  HEART: Regular rate and rhythm; No murmurs, rubs, or gallops  ABDOMEN: Soft, Nontender, Nondistended; Bowel sounds present  EXTREMITIES:  2+ Peripheral Pulses, No clubbing, cyanosis, or edema  LYMPH: No lymphadenopathy noted  SKIN: No rashes or lesions    LABS:                        9.0    4.36  )-----------( 118      ( 18 Jan 2019 07:59 )             26.8     01-18    130<L>  |  95<L>  |  32<H>  ----------------------------<  167<H>  3.9   |  23  |  1.77<H>    Ca    8.3<L>      18 Jan 2019 06:42  Mg     1.3     01-17          CAPILLARY BLOOD GLUCOSE      POCT Blood Glucose.: 263 mg/dL (18 Jan 2019 21:47)  POCT Blood Glucose.: 290 mg/dL (18 Jan 2019 16:38)  POCT Blood Glucose.: 327 mg/dL (18 Jan 2019 11:46)  POCT Blood Glucose.: 168 mg/dL (18 Jan 2019 07:46)            MEDICATIONS  (STANDING):  aspirin  chewable 81 milliGRAM(s) Oral daily  clopidogrel Tablet 75 milliGRAM(s) Oral daily  dextrose 5%. 1000 milliLiter(s) (50 mL/Hr) IV Continuous <Continuous>  dextrose 50% Injectable 12.5 Gram(s) IV Push once  dextrose 50% Injectable 25 Gram(s) IV Push once  dextrose 50% Injectable 25 Gram(s) IV Push once  docusate sodium 100 milliGRAM(s) Oral two times a day  furosemide    Tablet 40 milliGRAM(s) Oral daily  heparin  Injectable 5000 Unit(s) SubCutaneous every 12 hours  insulin glargine Injectable (LANTUS) 15 Unit(s) SubCutaneous at bedtime  insulin lispro (HumaLOG) corrective regimen sliding scale   SubCutaneous three times a day before meals  insulin lispro (HumaLOG) corrective regimen sliding scale   SubCutaneous at bedtime  levothyroxine 88 MICROGram(s) Oral daily  metoprolol succinate ER 25 milliGRAM(s) Oral daily  pantoprazole    Tablet 40 milliGRAM(s) Oral before breakfast  senna 2 Tablet(s) Oral at bedtime  tamsulosin 0.4 milliGRAM(s) Oral at bedtime    MEDICATIONS  (PRN):  acetaminophen   Tablet .. 650 milliGRAM(s) Oral every 6 hours PRN Moderate Pain (4 - 6)  dextrose 40% Gel 15 Gram(s) Oral once PRN Blood Glucose LESS THAN 70 milliGRAM(s)/deciliter  glucagon  Injectable 1 milliGRAM(s) IntraMuscular once PRN Glucose LESS THAN 70 milligrams/deciliter  polyethylene glycol 3350 17 Gram(s) Oral two times a day PRN Constipation  sodium chloride 0.65% Nasal 1 Spray(s) Both Nostrils four times a day PRN Nasal Congestion      Care Discussed with Consultants/Other Providers [ ] YES  [ ] NO

## 2019-01-18 NOTE — CHART NOTE - NSCHARTNOTEFT_GEN_A_CORE
Notified by RN; prelim blood cultures returned positive in 1 bottle for gram positive cocci in clusters. Pt. afebrile w/ no white count and is currently being r/o for OM vs discitis found on CT scan. Will hold abx for now. F/u ID reccs in AM. Continue to monitor pt. throughout the night on telemetry. F/u w/ primary team in AM.    -Kevin Delacruz PA-C. #[id number].

## 2019-01-18 NOTE — PROGRESS NOTE ADULT - SUBJECTIVE AND OBJECTIVE BOX
Patient is a 83y old  Male who presents with a chief complaint of AICD firing (18 Jan 2019 12:38)      INTERVAL HISTORY: feels ok    	  MEDICATIONS:  lisinopril 2.5 milliGRAM(s) Oral daily  metoprolol succinate ER 25 milliGRAM(s) Oral daily  tamsulosin 0.4 milliGRAM(s) Oral at bedtime        PHYSICAL EXAM:  T(C): 36.8 (01-18-19 @ 11:19), Max: 37 (01-17-19 @ 20:55)  HR: 83 (01-18-19 @ 11:19) (71 - 83)  BP: 100/61 (01-18-19 @ 11:19) (100/61 - 117/63)  RR: 17 (01-18-19 @ 11:19) (17 - 17)  SpO2: 95% (01-18-19 @ 11:19) (95% - 98%)  Wt(kg): --  I&O's Summary    17 Jan 2019 07:01  -  18 Jan 2019 07:00  --------------------------------------------------------  IN: 1360 mL / OUT: 1800 mL / NET: -440 mL          Appearance: Normal	  HEENT:    PERRL, EOMI	  Cardiovascular:  S1 S2, No JVD  Respiratory: Lungs clear to auscultation	  Psychiatry: Alert  Gastrointestinal:  Soft, Non-tender, + BS	  Skin: No rashes, No cyanosis  Extremities:  No edema of LE                                9.0    4.36  )-----------( 118      ( 18 Jan 2019 07:59 )             26.8     01-18    130<L>  |  95<L>  |  32<H>  ----------------------------<  167<H>  3.9   |  23  |  1.77<H>    Ca    8.3<L>      18 Jan 2019 06:42  Mg     1.3     01-17          Labs personally reviewed      Assessment and Plan:    Problem/Plan - 1:  ·  Problem: Failure of implantable cardioverter-defibrillator (ICD) lead, initial encounter.  Plan: seen by EP,  no plan for lead revision as only mild LV dysfunction and no shock administered      Problem/Plan - 2:  ·  Problem: Acute decompensated (likely Systolic) Heart Failure.  Plan: SOB, abdominal distension, JVD, LE edema -- all resolved   -  Would decrease Lasix to 40mg PO daily if Cr stable, hold for now  - metoprolol  - Add lisinopril 2.5mg PO daily given mild LV dysfunction     Problem/Plan - 3:  ·  Problem: CKD (chronic kidney disease).  Plan: likely cardiorenal, hyponatremia likely hypervolemic hyponatremia from HF  -  Would decrease Lasix to 40mg PO daily       Problem/Plan - 4:  ·  Problem: CAD (coronary artery disease).  Plan: Home ASA and Plavix. Metoprolol    Will need to be discharged on 40mg PO lasix daily with very close outpt follow up with his PCP/Cardiologist        Edwin Rodriguez DO [facility]  Cardiovascular Medicine  [phone] Patient is a 83y old  Male who presents with a chief complaint of AICD firing (18 Jan 2019 12:38)      INTERVAL HISTORY: feels ok    	  MEDICATIONS:  lisinopril 2.5 milliGRAM(s) Oral daily  metoprolol succinate ER 25 milliGRAM(s) Oral daily  tamsulosin 0.4 milliGRAM(s) Oral at bedtime        PHYSICAL EXAM:  T(C): 36.8 (01-18-19 @ 11:19), Max: 37 (01-17-19 @ 20:55)  HR: 83 (01-18-19 @ 11:19) (71 - 83)  BP: 100/61 (01-18-19 @ 11:19) (100/61 - 117/63)  RR: 17 (01-18-19 @ 11:19) (17 - 17)  SpO2: 95% (01-18-19 @ 11:19) (95% - 98%)  Wt(kg): --  I&O's Summary    17 Jan 2019 07:01  -  18 Jan 2019 07:00  --------------------------------------------------------  IN: 1360 mL / OUT: 1800 mL / NET: -440 mL          Appearance: Normal	  HEENT:    PERRL, EOMI	  Cardiovascular:  S1 S2, No JVD  Respiratory: Lungs clear to auscultation	  Psychiatry: Alert  Gastrointestinal:  Soft, Non-tender, + BS	  Skin: No rashes, No cyanosis  Extremities:  No edema of LE                                9.0    4.36  )-----------( 118      ( 18 Jan 2019 07:59 )             26.8     01-18    130<L>  |  95<L>  |  32<H>  ----------------------------<  167<H>  3.9   |  23  |  1.77<H>    Ca    8.3<L>      18 Jan 2019 06:42  Mg     1.3     01-17          Labs personally reviewed      Assessment and Plan:    Problem/Plan - 1:  ·  Problem: Failure of implantable cardioverter-defibrillator (ICD) lead, initial encounter.  Plan: seen by EP,  no plan for lead revision as only mild LV dysfunction and no shock administered      Problem/Plan - 2:  ·  Problem: Acute decompensated (likely Systolic) Heart Failure.  Plan: SOB, abdominal distension, JVD, LE edema -- all resolved   -  Would decrease Lasix to 40mg PO daily if Cr stable, hold for now  - metoprolol  - hold lisinopril 2.5mg PO daily given ANDREEA     Problem/Plan - 3:  ·  Problem: CKD (chronic kidney disease).  Plan: likely cardiorenal, hyponatremia likely hypervolemic hyponatremia from HF  -  Would decrease Lasix to 40mg PO daily       Problem/Plan - 4:  ·  Problem: CAD (coronary artery disease).  Plan: Home ASA and Plavix. Metoprolol    Will need to be discharged on 40mg PO lasix daily with very close outpt follow up with his PCP/Cardiologist        Edwin Rodriguez DO [facility]  Cardiovascular Medicine  [phone]

## 2019-01-18 NOTE — PROGRESS NOTE ADULT - PROBLEM SELECTOR PLAN 7
Incidental finding on CT abd/pelvis T10-11 discitis   -afebrile and nml wbc   -pt. has ch back pain x many years  -however he was in [facility] last month for fever of unknow origing and B/L LE weakness to point , he was send to rehab   -explained daughter bedside in detail : that this is incidental finding , and MRI cannot be done 2/2 AICD. if he spikes fever or Blood c/s pos : then he will need IR guided biopsy of the area for c/s   -seen by ID  -hold off on Abx

## 2019-01-18 NOTE — PROGRESS NOTE ADULT - SUBJECTIVE AND OBJECTIVE BOX
Follow Up:  T    Interval History/ROS:  back pain improved over last few days - he indicates pain at about T11 and second area of discomfort at around L5    Allergies  No Known Allergies      ANTIMICROBIALS:      OTHER MEDS:  MEDICATIONS  (STANDING):  acetaminophen   Tablet .. 650 every 6 hours PRN  aspirin  chewable 81 daily  clopidogrel Tablet 75 daily  dextrose 40% Gel 15 once PRN  dextrose 50% Injectable 12.5 once  dextrose 50% Injectable 25 once  dextrose 50% Injectable 25 once  docusate sodium 100 two times a day  glucagon  Injectable 1 once PRN  heparin  Injectable 5000 every 12 hours  insulin glargine Injectable (LANTUS) 15 at bedtime  insulin lispro (HumaLOG) corrective regimen sliding scale  three times a day before meals  insulin lispro (HumaLOG) corrective regimen sliding scale  at bedtime  levothyroxine 88 daily  metoprolol succinate ER 25 daily  pantoprazole    Tablet 40 before breakfast  polyethylene glycol 3350 17 two times a day PRN  senna 2 at bedtime  tamsulosin 0.4 at bedtime    Vital Signs Last 24 Hrs  T(C): 36.8 (18 Jan 2019 11:19), Max: 37 (17 Jan 2019 20:55)  T(F): 98.3 (18 Jan 2019 11:19), Max: 98.6 (17 Jan 2019 20:55)  HR: 83 (18 Jan 2019 11:19) (71 - 83)  BP: 100/61 (18 Jan 2019 11:19) (100/61 - 117/63)  BP(mean): --  RR: 17 (18 Jan 2019 11:19) (17 - 17)  SpO2: 95% (18 Jan 2019 11:19) (95% - 98%)    PHYSICAL EXAM:  General: WN/WD NAD, Non-toxic  Neurology: A&Ox3, nonfocal  Respiratory: Clear to auscultation bilaterally  CV: RRR, S1S2, no murmurs, rubs or gallops  Abdominal: Soft, Non-tender, non-distended  Extremities: No edema  Line Sites: Clear  Skin: No rash                        9.0    4.36  )-----------( 118      ( 18 Jan 2019 07:59 )             26.8   WBC Count: 4.36 (01-18 @ 07:59)  WBC Count: 5.01 (01-17 @ 07:00)  WBC Count: 4.78 (01-16 @ 08:30)  WBC Count: 4.33 (01-15 @ 08:18)    01-18    130<L>  |  95<L>  |  32<H>  ----------------------------<  167<H>  3.9   |  23  |  1.77<H>  Creatinine: 1.77 (01-18 @ 06:42)    Creatinine: 1.58 (01-17 @ 06:13)    Creatinine: 1.49 (01-16 @ 05:37)    Creatinine: 1.42 (01-15 @ 05:52)    Creatinine: 1.37 (01-14 @ 06:20)      Ca    8.3<L>      18 Jan 2019 06:42  Mg     1.3     01-17    Sedimentation Rate, Erythrocyte (01.16.19 @ 22:31)    Sedimentation Rate, Erythrocyte: 25 mm/hr  C-Reactive Protein, Serum (01.16.19 @ 22:31)    C-Reactive Protein, Serum: 2.57 mg/dL  Procalcitonin, Serum (01.17.19 @ 06:13)    Procalcitonin, Serum: 0.13    MICROBIOLOGY:  .Blood Blood  01-16-19   Growth in aerobic bottle: Gram Positive Cocci in Clusters   = Coag Neg Staph    RADIOLOGY:  < from: CT Abdomen and Pelvis w/ Oral Cont (01.15.19 @ 22:26) >  Cortical lucency involving both sides of the T10/11 intervertebral disc   space, new since prior examination 12/17/2018. Osteomy/discitis is a   consideration. Correlate with MRI.    Small bilateral pleural effusions and trace abdominal and pelvic ascites.    Cholelithiasis.    < end of copied text >      Bayron Talley MD; Division of Infectious Disease; Pager: [phone]; nights and weekends: [phone]

## 2019-01-18 NOTE — PROGRESS NOTE ADULT - SUBJECTIVE AND OBJECTIVE BOX
Patient seen and examined  no complaints    No Known Allergies    Hospital Medications:   MEDICATIONS  (STANDING):  aspirin  chewable 81 milliGRAM(s) Oral daily  clopidogrel Tablet 75 milliGRAM(s) Oral daily  dextrose 5%. 1000 milliLiter(s) (50 mL/Hr) IV Continuous <Continuous>  dextrose 50% Injectable 12.5 Gram(s) IV Push once  dextrose 50% Injectable 25 Gram(s) IV Push once  dextrose 50% Injectable 25 Gram(s) IV Push once  docusate sodium 100 milliGRAM(s) Oral two times a day  heparin  Injectable 5000 Unit(s) SubCutaneous every 12 hours  insulin glargine Injectable (LANTUS) 15 Unit(s) SubCutaneous at bedtime  insulin lispro (HumaLOG) corrective regimen sliding scale   SubCutaneous three times a day before meals  insulin lispro (HumaLOG) corrective regimen sliding scale   SubCutaneous at bedtime  levothyroxine 88 MICROGram(s) Oral daily  lisinopril 2.5 milliGRAM(s) Oral daily  metoprolol succinate ER 25 milliGRAM(s) Oral daily  pantoprazole    Tablet 40 milliGRAM(s) Oral before breakfast  senna 2 Tablet(s) Oral at bedtime  tamsulosin 0.4 milliGRAM(s) Oral at bedtime        VITALS:  T(F): 98.3 (19 @ 11:19), Max: 98.6 (19 @ 20:55)  HR: 83 (19 @ 11:19)  BP: 100/61 (19 @ 11:19)  RR: 17 (19 @ 11:19)  SpO2: 95% (19 @ 11:19)  Wt(kg): --     @ 07:01  -   @ 07:00  --------------------------------------------------------  IN: 1360 mL / OUT: 1800 mL / NET: -440 mL        PHYSICAL EXAM:  Constitutional: NAD  HEENT: anicteric sclera, oropharynx clear, MMM  Neck: No JVD  Respiratory: CTAB, no wheezes, rales or rhonchi  Cardiovascular: S1, S2, RRR  Gastrointestinal: BS+, soft, NT/ND  Extremities: No cyanosis or clubbing. No peripheral edema      LABS:      130<L>  |  95<L>  |  32<H>  ----------------------------<  167<H>  3.9   |  23  |  1.77<H>    Ca    8.3<L>      2019 06:42  Mg     1.3           Creatinine Trend: 1.77 <--, 1.58 <--, 1.49 <--, 1.42 <--, 1.37 <--, 1.26 <--, 1.39 <--, 1.33 <--, 1.44 <--                        9.0    4.36  )-----------( 118      ( 2019 07:59 )             26.8     Urine Studies:  Urinalysis Basic - ( 2019 14:23 )    Color: Light Yellow / Appearance: Clear / S.008 / pH:   Gluc:  / Ketone: Negative  / Bili: Negative / Urobili: Negative   Blood:  / Protein: Negative / Nitrite: Negative   Leuk Esterase: Negative / RBC: 24 /hpf / WBC 0 /HPF   Sq Epi:  / Non Sq Epi: 0 /hpf / Bacteria: Negative      Osmolality, Random Urine: 312 mos/kg ( @ 17:08)  Creatinine, Random Urine: 38 mg/dL ( @ 14:23)  Sodium, Random Urine: 97 mmol/L ( @ 14:23)  Osmolality, Random Urine: 324 mos/kg ( @ 14:23)    RADIOLOGY & ADDITIONAL STUDIES:

## 2019-01-19 VITALS — WEIGHT: 164.46 LBS

## 2019-01-19 LAB
ANION GAP SERPL CALC-SCNC: 11 MMOL/L — SIGNIFICANT CHANGE UP (ref 5–17)
BUN SERPL-MCNC: 35 MG/DL — HIGH (ref 7–23)
CALCIUM SERPL-MCNC: 8.3 MG/DL — LOW (ref 8.4–10.5)
CHLORIDE SERPL-SCNC: 98 MMOL/L — SIGNIFICANT CHANGE UP (ref 96–108)
CO2 SERPL-SCNC: 22 MMOL/L — SIGNIFICANT CHANGE UP (ref 22–31)
CREAT SERPL-MCNC: 1.56 MG/DL — HIGH (ref 0.5–1.3)
GLUCOSE BLDC GLUCOMTR-MCNC: 223 MG/DL — HIGH (ref 70–99)
GLUCOSE BLDC GLUCOMTR-MCNC: 335 MG/DL — HIGH (ref 70–99)
GLUCOSE BLDC GLUCOMTR-MCNC: 348 MG/DL — HIGH (ref 70–99)
GLUCOSE SERPL-MCNC: 206 MG/DL — HIGH (ref 70–99)
HCT VFR BLD CALC: 26.5 % — LOW (ref 39–50)
HGB BLD-MCNC: 8.9 G/DL — LOW (ref 13–17)
MCHC RBC-ENTMCNC: 31.2 PG — SIGNIFICANT CHANGE UP (ref 27–34)
MCHC RBC-ENTMCNC: 33.6 GM/DL — SIGNIFICANT CHANGE UP (ref 32–36)
MCV RBC AUTO: 93 FL — SIGNIFICANT CHANGE UP (ref 80–100)
PLATELET # BLD AUTO: 125 K/UL — LOW (ref 150–400)
POTASSIUM SERPL-MCNC: 4.1 MMOL/L — SIGNIFICANT CHANGE UP (ref 3.5–5.3)
POTASSIUM SERPL-SCNC: 4.1 MMOL/L — SIGNIFICANT CHANGE UP (ref 3.5–5.3)
RBC # BLD: 2.85 M/UL — LOW (ref 4.2–5.8)
RBC # FLD: 15 % — HIGH (ref 10.3–14.5)
SODIUM SERPL-SCNC: 131 MMOL/L — LOW (ref 135–145)
WBC # BLD: 4.89 K/UL — SIGNIFICANT CHANGE UP (ref 3.8–10.5)
WBC # FLD AUTO: 4.89 K/UL — SIGNIFICANT CHANGE UP (ref 3.8–10.5)

## 2019-01-19 PROCEDURE — 87040 BLOOD CULTURE FOR BACTERIA: CPT

## 2019-01-19 PROCEDURE — 84156 ASSAY OF PROTEIN URINE: CPT

## 2019-01-19 PROCEDURE — 83036 HEMOGLOBIN GLYCOSYLATED A1C: CPT

## 2019-01-19 PROCEDURE — 86140 C-REACTIVE PROTEIN: CPT

## 2019-01-19 PROCEDURE — 84484 ASSAY OF TROPONIN QUANT: CPT

## 2019-01-19 PROCEDURE — 83930 ASSAY OF BLOOD OSMOLALITY: CPT

## 2019-01-19 PROCEDURE — 82962 GLUCOSE BLOOD TEST: CPT

## 2019-01-19 PROCEDURE — 83605 ASSAY OF LACTIC ACID: CPT

## 2019-01-19 PROCEDURE — 87150 DNA/RNA AMPLIFIED PROBE: CPT

## 2019-01-19 PROCEDURE — 85652 RBC SED RATE AUTOMATED: CPT

## 2019-01-19 PROCEDURE — 83935 ASSAY OF URINE OSMOLALITY: CPT

## 2019-01-19 PROCEDURE — 84300 ASSAY OF URINE SODIUM: CPT

## 2019-01-19 PROCEDURE — 84145 PROCALCITONIN (PCT): CPT

## 2019-01-19 PROCEDURE — 99285 EMERGENCY DEPT VISIT HI MDM: CPT | Mod: 25

## 2019-01-19 PROCEDURE — 71045 X-RAY EXAM CHEST 1 VIEW: CPT

## 2019-01-19 PROCEDURE — 84132 ASSAY OF SERUM POTASSIUM: CPT

## 2019-01-19 PROCEDURE — 84540 ASSAY OF URINE/UREA-N: CPT

## 2019-01-19 PROCEDURE — 84100 ASSAY OF PHOSPHORUS: CPT

## 2019-01-19 PROCEDURE — 86480 TB TEST CELL IMMUN MEASURE: CPT

## 2019-01-19 PROCEDURE — 80053 COMPREHEN METABOLIC PANEL: CPT

## 2019-01-19 PROCEDURE — C8929: CPT

## 2019-01-19 PROCEDURE — 84295 ASSAY OF SERUM SODIUM: CPT

## 2019-01-19 PROCEDURE — 81001 URINALYSIS AUTO W/SCOPE: CPT

## 2019-01-19 PROCEDURE — 36415 COLL VENOUS BLD VENIPUNCTURE: CPT

## 2019-01-19 PROCEDURE — 82330 ASSAY OF CALCIUM: CPT

## 2019-01-19 PROCEDURE — 87521 HEPATITIS C PROBE&RVRS TRNSC: CPT

## 2019-01-19 PROCEDURE — 74176 CT ABD & PELVIS W/O CONTRAST: CPT

## 2019-01-19 PROCEDURE — 85027 COMPLETE CBC AUTOMATED: CPT

## 2019-01-19 PROCEDURE — 93005 ELECTROCARDIOGRAM TRACING: CPT

## 2019-01-19 PROCEDURE — 82947 ASSAY GLUCOSE BLOOD QUANT: CPT

## 2019-01-19 PROCEDURE — 97161 PT EVAL LOW COMPLEX 20 MIN: CPT

## 2019-01-19 PROCEDURE — 80048 BASIC METABOLIC PNL TOTAL CA: CPT

## 2019-01-19 PROCEDURE — 82435 ASSAY OF BLOOD CHLORIDE: CPT

## 2019-01-19 PROCEDURE — 85014 HEMATOCRIT: CPT

## 2019-01-19 PROCEDURE — 82570 ASSAY OF URINE CREATININE: CPT

## 2019-01-19 PROCEDURE — 82803 BLOOD GASES ANY COMBINATION: CPT

## 2019-01-19 PROCEDURE — 83735 ASSAY OF MAGNESIUM: CPT

## 2019-01-19 RX ORDER — FEBUXOSTAT 40 MG/1
1 TABLET ORAL
Qty: 0 | Refills: 0 | COMMUNITY

## 2019-01-19 RX ORDER — PRASUGREL 5 MG/1
1 TABLET, FILM COATED ORAL
Qty: 0 | Refills: 0 | COMMUNITY

## 2019-01-19 RX ORDER — FUROSEMIDE 40 MG
1 TABLET ORAL
Qty: 30 | Refills: 0
Start: 2019-01-19 | End: 2019-02-17

## 2019-01-19 RX ORDER — NITROGLYCERIN 6.5 MG
1 CAPSULE, EXTENDED RELEASE ORAL
Qty: 0 | Refills: 0 | COMMUNITY

## 2019-01-19 RX ORDER — ASPIRIN/CALCIUM CARB/MAGNESIUM 324 MG
1 TABLET ORAL
Qty: 0 | Refills: 0 | DISCHARGE
Start: 2019-01-19

## 2019-01-19 RX ADMIN — Medication 100 MILLIGRAM(S): at 05:10

## 2019-01-19 RX ADMIN — CLOPIDOGREL BISULFATE 75 MILLIGRAM(S): 75 TABLET, FILM COATED ORAL at 12:14

## 2019-01-19 RX ADMIN — Medication 2: at 08:04

## 2019-01-19 RX ADMIN — Medication 25 MILLIGRAM(S): at 05:11

## 2019-01-19 RX ADMIN — PANTOPRAZOLE SODIUM 40 MILLIGRAM(S): 20 TABLET, DELAYED RELEASE ORAL at 05:11

## 2019-01-19 RX ADMIN — HEPARIN SODIUM 5000 UNIT(S): 5000 INJECTION INTRAVENOUS; SUBCUTANEOUS at 05:10

## 2019-01-19 RX ADMIN — Medication 88 MICROGRAM(S): at 05:10

## 2019-01-19 RX ADMIN — Medication 81 MILLIGRAM(S): at 12:14

## 2019-01-19 RX ADMIN — Medication 40 MILLIGRAM(S): at 05:11

## 2019-01-19 RX ADMIN — Medication 4: at 12:13

## 2019-01-19 RX ADMIN — Medication 4: at 16:48

## 2019-01-19 NOTE — PROGRESS NOTE ADULT - ASSESSMENT
Patient is a 83y Male with history of non-proteinuric CKD III baseline  SCR 1.4. presents with AICD firing
83 year old Mandarin speaking male with PMH CAD S/P stent x 3 times, s/p single chamber ICD (MDT, [employer] Quattro Secure lead [id number]) 2007 with generator change 2/22/16 likely for primary prevention for ischemic CMP as pt denies hx syncope or ICD shock, HTN, CKD, and Hepatitis, recently admitted to [facility] with fever/generalized weakness/abdominal distention, presents from [facility] with ICD beeping. ICD interrogation revealed low RV impedence and elevated RV pacing thresholds, not pacemaker dependent ( < 0.1%), no ICD shock noted.
84 yo male with hx of CAD S/P stent, S/P PPM, HTN, BPH, CKD, apparent cirrhosis, anemia, and Dm2   AICD firing  Noted to have new cortical lucency on either side of intervetrebal T10/11 disc space.  He no longer has leukocytosis. Echo does not reveal vegetations.  Decreased inflammatory markers and PCT compared with 12/18 makes infection less likely  +BC for Coag Neg Staph likely procurement contaminant  On physical exam, there are no stigmata of endocarditis.  Etiology of cirrhosis unclear - BLANTON appears most likely  unable to undergo MRI with AICD    infectious osteomyelitis/discitis appears unlikely    Suggest  Monitor off antibiotics  no objection to discharge    if pain increases would perform T-spine CT scan
84 yo male with hx of CAD S/P stent, S/P PPM, HTN, BPH, CKD, apparent cirrhosis, anemia, and Dm2   AICD firing  Noted to have new cortical lucency on either side of intervetrebal T10/11 disc space.  He no longer has leukocytosis. Echo does not reveal vegetations.  Decreased inflammatory markers and PCT compared with 12/18 makes infection less likely  On physical exam, there are no stigmata of endocarditis.  Etiology of cirrhosis unclear - BLANTON appears most likely    Suggest  Monitor off antibiotics  MRI of T spine with contrast
Patient is a 83y Male with history of non-proteinuric CKD III baseline  SCR 1.4. presents with AICD firing
Patient is a 83y Male with history of non-proteinuric CKD III baseline  SCR 1.4. presents with AICD firing. Denies chest pain or shortness of breath. Noted to have a SCR of 1.39 prompting a renal consult.
Patient is a 83y Male with history of non-proteinuric CKD III baseline  SCR 1.4. presents with AICD firing. Denies chest pain or shortness of breath. Noted to have a SCR of 1.39 prompting a renal consult.    # hyponatremia and hypekalemia in setting of aldactone. s/p medical management of hyperkalemia- improved. SNA improved with holding aldactoe.  # HTN controlled  # AICD firing- per cardiology

## 2019-01-19 NOTE — CHART NOTE - NSCHARTNOTEFT_GEN_A_CORE
Discharge Note:    Requested by Dr. Schofield to facilitate d/c home with services. V/s, labs, diagnostics reviewed, pt stable to d/c now. Medication reconciliation reviewed, Revised, and resolved with Dr. Schofield who medically cleared w/ f/u as directed. Please refer to d/c note for hospital details.    Clarence Montague  Spectra-link [id number]

## 2019-01-19 NOTE — PROGRESS NOTE ADULT - PROBLEM SELECTOR PROBLEM 1
AICD (automatic cardioverter/defibrillator) present
Failure of implantable cardioverter-defibrillator (ICD) lead, initial encounter
AICD (automatic cardioverter/defibrillator) present
Failure of implantable cardioverter-defibrillator (ICD) lead, initial encounter

## 2019-01-19 NOTE — PROGRESS NOTE ADULT - PROBLEM SELECTOR PROBLEM 4
CAD (coronary artery disease)
HTN (hypertension)

## 2019-01-19 NOTE — PROGRESS NOTE ADULT - PROBLEM SELECTOR PLAN 4
Controlled  continue current care
c/w home meds
Controlled  continue current care
bp is low  d/c lisinopril  monitor bp
bp is low  d/c lisinopril  monitor bp

## 2019-01-19 NOTE — PROGRESS NOTE ADULT - PROBLEM SELECTOR PLAN 3
Serum creatinine at baseline  Urine studies reviewed, no proteinuria, likely CKD from HTN  Renal function likely at baseline  Daily BMP
renal consult Dr. cooper in    -[facility] renal fx  holding aldactone
renal following  -moniotr renal fx  holding aldactone
Serum creatinine at baseline  Urien studies as above  LE edema noted  Please increase Lasix to 40mg IV BID
Serum creatinine at baseline  Urine studies reviewed, no proteinuria, likely CKD from HTN  Renal function likely at baseline  Daily BMP
Serum creatinine at baseline  Urine studies reviewed, no proteinuria, likely CKD from HTN  Renal function likely at baseline  Daily BMP
Serum creatinine at baseline  Urine studies reviewed, no proteinuria, likely CKD from HTN  Renal function relatively stable- cr fluctuation expected  enrique d/c lisinopril for the time being
Serum creatinine at baseline  Urine studies reviewed, no proteinuria, likely CKD from HTN  Renal function relatively stable- cr fluctuation expected  enrique d/c lisinopril for the time being

## 2019-01-19 NOTE — PROGRESS NOTE ADULT - SUBJECTIVE AND OBJECTIVE BOX
Patient is a 83y old  Male who presents with a chief complaint of AICD beeping (19 Jan 2019 14:54)    pt. seen and examined in am, ok to be d/c home today     INTERVAL HPI/OVERNIGHT EVENTS:  T(C): 36.8 (01-19-19 @ 11:41), Max: 37 (01-18-19 @ 20:51)  HR: 70 (01-19-19 @ 11:41) (70 - 78)  BP: 119/71 (01-19-19 @ 11:41) (109/58 - 119/71)  RR: 18 (01-19-19 @ 11:41) (18 - 18)  SpO2: 98% (01-19-19 @ 11:41) (96% - 98%)  Wt(kg): --  I&O's Summary    18 Jan 2019 07:01  -  19 Jan 2019 07:00  --------------------------------------------------------  IN: 1200 mL / OUT: 500 mL / NET: 700 mL    19 Jan 2019 07:01  -  19 Jan 2019 17:14  --------------------------------------------------------  IN: 300 mL / OUT: 300 mL / NET: 0 mL        PAST MEDICAL & SURGICAL HISTORY:  CKD (chronic kidney disease)  Hepatitis  CAD (coronary artery disease)  HTN (hypertension)  DM (diabetes mellitus)  Artificial cardiac pacemaker  AICD (automatic cardioverter/defibrillator) present      SOCIAL HISTORY  Alcohol:  Tobacco:  Illicit substance use:    FAMILY HISTORY:    REVIEW OF SYSTEMS:  CONSTITUTIONAL: No fever, weight loss, or fatigue  EYES: No eye pain, visual disturbances, or discharge  ENMT:  No difficulty hearing, tinnitus, vertigo; No sinus or throat pain  NECK: No pain or stiffness  RESPIRATORY: No cough, wheezing, chills or hemoptysis; No shortness of breath  CARDIOVASCULAR: No chest pain, palpitations, dizziness, or leg swelling  GASTROINTESTINAL: No abdominal or epigastric pain. No nausea, vomiting, or hematemesis; No diarrhea or constipation. No melena or hematochezia.  GENITOURINARY: No dysuria, frequency, hematuria, or incontinence  NEUROLOGICAL: No headaches, memory loss, loss of strength, numbness, or tremors  SKIN: No itching, burning, rashes, or lesions   LYMPH NODES: No enlarged glands  ENDOCRINE: No heat or cold intolerance; No hair loss  MUSCULOSKELETAL: No joint pain or swelling; No muscle, back, or extremity pain  PSYCHIATRIC: No depression, anxiety, mood swings, or difficulty sleeping  HEME/LYMPH: No easy bruising, or bleeding gums  ALLERY AND IMMUNOLOGIC: No hives or eczema    RADIOLOGY & ADDITIONAL TESTS:    Imaging Personally Reviewed:  [ ] YES  [ ] NO    Consultant(s) Notes Reviewed:  [ ] YES  [ ] NO    PHYSICAL EXAM:  GENERAL: NAD, well-groomed, well-developed  HEAD:  Atraumatic, Normocephalic  EYES: EOMI, PERRLA, conjunctiva and sclera clear  ENMT: No tonsillar erythema, exudates, or enlargement; Moist mucous membranes, Good dentition, No lesions  NECK: Supple, No JVD, Normal thyroid  NERVOUS SYSTEM:  Alert & Oriented X3, Good concentration; Motor Strength 5/5 B/L upper and lower extremities; DTRs 2+ intact and symmetric  CHEST/LUNG: Clear to percussion bilaterally; No rales, rhonchi, wheezing, or rubs  HEART: Regular rate and rhythm; No murmurs, rubs, or gallops  ABDOMEN: Soft, Nontender, Nondistended; Bowel sounds present  EXTREMITIES:  2+ Peripheral Pulses, No clubbing, cyanosis, or edema  LYMPH: No lymphadenopathy noted  SKIN: No rashes or lesions    LABS:                        8.9    4.89  )-----------( 125      ( 19 Jan 2019 07:39 )             26.5     01-19    131<L>  |  98  |  35<H>  ----------------------------<  206<H>  4.1   |  22  |  1.56<H>    Ca    8.3<L>      19 Jan 2019 06:54          CAPILLARY BLOOD GLUCOSE      POCT Blood Glucose.: 335 mg/dL (19 Jan 2019 16:41)  POCT Blood Glucose.: 348 mg/dL (19 Jan 2019 12:00)  POCT Blood Glucose.: 223 mg/dL (19 Jan 2019 07:57)  POCT Blood Glucose.: 263 mg/dL (18 Jan 2019 21:47)            MEDICATIONS  (STANDING):  aspirin  chewable 81 milliGRAM(s) Oral daily  clopidogrel Tablet 75 milliGRAM(s) Oral daily  dextrose 5%. 1000 milliLiter(s) (50 mL/Hr) IV Continuous <Continuous>  dextrose 50% Injectable 12.5 Gram(s) IV Push once  dextrose 50% Injectable 25 Gram(s) IV Push once  dextrose 50% Injectable 25 Gram(s) IV Push once  docusate sodium 100 milliGRAM(s) Oral two times a day  furosemide    Tablet 40 milliGRAM(s) Oral daily  heparin  Injectable 5000 Unit(s) SubCutaneous every 12 hours  insulin glargine Injectable (LANTUS) 15 Unit(s) SubCutaneous at bedtime  insulin lispro (HumaLOG) corrective regimen sliding scale   SubCutaneous three times a day before meals  insulin lispro (HumaLOG) corrective regimen sliding scale   SubCutaneous at bedtime  levothyroxine 88 MICROGram(s) Oral daily  metoprolol succinate ER 25 milliGRAM(s) Oral daily  pantoprazole    Tablet 40 milliGRAM(s) Oral before breakfast  senna 2 Tablet(s) Oral at bedtime  tamsulosin 0.4 milliGRAM(s) Oral at bedtime    MEDICATIONS  (PRN):  acetaminophen   Tablet .. 650 milliGRAM(s) Oral every 6 hours PRN Moderate Pain (4 - 6)  dextrose 40% Gel 15 Gram(s) Oral once PRN Blood Glucose LESS THAN 70 milliGRAM(s)/deciliter  glucagon  Injectable 1 milliGRAM(s) IntraMuscular once PRN Glucose LESS THAN 70 milligrams/deciliter  polyethylene glycol 3350 17 Gram(s) Oral two times a day PRN Constipation  sodium chloride 0.65% Nasal 1 Spray(s) Both Nostrils four times a day PRN Nasal Congestion      Care Discussed with Consultants/Other Providers [ ] YES  [ ] NO

## 2019-01-19 NOTE — PROGRESS NOTE ADULT - PROVIDER SPECIALTY LIST ADULT
Cardiology
Electrophysiology
Infectious Disease
Infectious Disease
Internal Medicine
Nephrology

## 2019-01-19 NOTE — PROGRESS NOTE ADULT - ATTENDING COMMENTS
For any question, call:  Cell # [phone]  Pager # [phone]  Callback # [phone]    Upon DC, patient should follow Nephro outpatient in the next 2 weeks
For any question, call:  Cell # [phone]  Pager # [phone]  Callback # [phone]
For any question, call:  Cell # [phone]  Pager # [phone]  Callback # [phone]
f/u with EP regarding AICD lead revision , f/u TTE result
improved, plan for d/c home in am
f/u CT abd /pelvis
d/w patient and daughter : if Blood c/s is neg , will plan for d/c to rehab
d/w patient and daughter : if Blood c/s is neg , will plan for d/c to rehab
ok to be d/c home with home care, cleared by ID off abx and can be d/c
ok to be d/c home with home care, cleared by ID off abx and can be d/c

## 2019-01-19 NOTE — PROGRESS NOTE ADULT - PROBLEM SELECTOR PLAN 2
Serum osmolarity WNL, persistently hyperglycemic  replete for Hypomagnesemia- agree with mgs04 2grams iv x 1; trend mag  Can switch from IV lasix to 40mg PO BID, continue to hold aldactone  check urine osm  trend na  free h20 restriction to 800cc
improving sod level  seen by renal  -holding aldacton
resolved
Improving  LE edema +  Please send Urine studies (UA, UCreat, Uprot, UUrea, Tolu, UOsm)  Please increase Lasix to 40mg IV BID
Serum osmolarity WNL, persistently hyperglycemic  A degree of hyponatremia is likely due to hyperglycemia  patient appears euvolemic at this time  Can switch from IV lasix to 40mg PO BID, continue to hold aldactone
multifactorial  has hyperglycemia as well- control glucose  Being on ACE and low bp can further dec na  will d/c lisinopril  lasix 40mg po daily upon DC  monitor na
multifactorial  has hyperglycemia as well- control glucose  Being on ACE and low bp can further dec na  will d/c lisinopril  was on lasix 40mg po bid- but now off- can start lasix 40mg po qd  monitor na
multifactorial  na low but stable  on lasix 40mg po bid  restrict free h20 intake  monitor na

## 2019-01-19 NOTE — PROGRESS NOTE ADULT - PROBLEM SELECTOR PROBLEM 3
CKD (chronic kidney disease)

## 2019-01-19 NOTE — PROGRESS NOTE ADULT - PROBLEM SELECTOR PLAN 7
Incidental finding on CT abd/pelvis T10-11 discitis   -afebrile and nml wbc   -pt. has ch back pain x many years  -however he was in [facility] last month for fever of unknow origing and B/L LE weakness to point , he was send to rehab   -explained daughter bedside in detail : that this is incidental finding , and MRI cannot be done 2/2 AICD. if he spikes fever or Blood c/s pos : then he will need IR guided biopsy of the area for c/s   -seen by ID  -hold off on Abx

## 2019-01-19 NOTE — PROGRESS NOTE ADULT - SUBJECTIVE AND OBJECTIVE BOX
[facility] - YAMILEX Ohara / YAMILEX Chen / SARAH Jasso/ YAMILEX Colón/ YAMILEX Jones/ ISAURO Shoemaker / EVARISTO Cali / MARYANN Diana  ---------------------------------------------------------------------------------------------------------------  seen and examined today for Hyponatremia and CKD  Interval : Sodium level in acceptable range  VITALS:  T(F): 98.3 (01-19-19 @ 11:41), Max: 98.6 (01-18-19 @ 20:51)  HR: 70 (01-19-19 @ 11:41)  BP: 119/71 (01-19-19 @ 11:41)  RR: 18 (01-19-19 @ 11:41)  SpO2: 98% (01-19-19 @ 11:41)  Wt(kg): --    01-18 @ 07:01  -  01-19 @ 07:00  --------------------------------------------------------  IN: 1200 mL / OUT: 500 mL / NET: 700 mL    01-19 @ 07:01  -  01-19 @ 14:54  --------------------------------------------------------  IN: 0 mL / OUT: 300 mL / NET: -300 mL      Physical Exam :-  Constitutional: NAD  Neck: Supple.  Respiratory: Bilateral equal breath sounds,  Cardiovascular: S1, S2 normal,  Gastrointestinal: Bowel Sounds present, soft, non tender.  Extremities: No edema  Neurological: Alert and Oriented x 3, no focal deficits  Psychiatric: Normal mood, normal affect  Data:-  Allergies :   No Known Allergies    Hospital Medications:   MEDICATIONS  (STANDING):  aspirin  chewable 81 milliGRAM(s) Oral daily  clopidogrel Tablet 75 milliGRAM(s) Oral daily  dextrose 5%. 1000 milliLiter(s) (50 mL/Hr) IV Continuous <Continuous>  dextrose 50% Injectable 12.5 Gram(s) IV Push once  dextrose 50% Injectable 25 Gram(s) IV Push once  dextrose 50% Injectable 25 Gram(s) IV Push once  docusate sodium 100 milliGRAM(s) Oral two times a day  furosemide    Tablet 40 milliGRAM(s) Oral daily  heparin  Injectable 5000 Unit(s) SubCutaneous every 12 hours  insulin glargine Injectable (LANTUS) 15 Unit(s) SubCutaneous at bedtime  insulin lispro (HumaLOG) corrective regimen sliding scale   SubCutaneous three times a day before meals  insulin lispro (HumaLOG) corrective regimen sliding scale   SubCutaneous at bedtime  levothyroxine 88 MICROGram(s) Oral daily  metoprolol succinate ER 25 milliGRAM(s) Oral daily  pantoprazole    Tablet 40 milliGRAM(s) Oral before breakfast  senna 2 Tablet(s) Oral at bedtime  tamsulosin 0.4 milliGRAM(s) Oral at bedtime    01-19    131<L>  |  98  |  35<H>  ----------------------------<  206<H>  4.1   |  22  |  1.56<H>    Ca    8.3<L>      19 Jan 2019 06:54      Creatinine Trend: 1.56 <--, 1.77 <--, 1.58 <--, 1.49 <--, 1.42 <--, 1.37 <--, 1.26 <--                        8.9    4.89  )-----------( 125      ( 19 Jan 2019 07:39 )             26.5

## 2019-01-19 NOTE — PROGRESS NOTE ADULT - PROBLEM SELECTOR PROBLEM 2
Hyponatremia

## 2019-01-20 LAB
CULTURE RESULTS: SIGNIFICANT CHANGE UP
GAMMA INTERFERON BACKGROUND BLD IA-ACNC: 0.01 IU/ML — SIGNIFICANT CHANGE UP
GRAM STN FLD: SIGNIFICANT CHANGE UP
M TB IFN-G BLD-IMP: NEGATIVE — SIGNIFICANT CHANGE UP
M TB IFN-G CD4+ BCKGRND COR BLD-ACNC: 0.16 IU/ML — SIGNIFICANT CHANGE UP
M TB IFN-G CD4+CD8+ BCKGRND COR BLD-ACNC: 0.16 IU/ML — SIGNIFICANT CHANGE UP
ORGANISM # SPEC MICROSCOPIC CNT: SIGNIFICANT CHANGE UP
QUANT TB PLUS MITOGEN MINUS NIL: 8.08 IU/ML — SIGNIFICANT CHANGE UP

## 2019-01-21 LAB
CULTURE RESULTS: SIGNIFICANT CHANGE UP
CULTURE RESULTS: SIGNIFICANT CHANGE UP
SPECIMEN SOURCE: SIGNIFICANT CHANGE UP

## 2019-01-22 LAB
CULTURE RESULTS: SIGNIFICANT CHANGE UP
CULTURE RESULTS: SIGNIFICANT CHANGE UP
SPECIMEN SOURCE: SIGNIFICANT CHANGE UP
SPECIMEN SOURCE: SIGNIFICANT CHANGE UP

## 2019-01-29 NOTE — ED ADULT NURSE NOTE - BRADEN SCORE (IF 18 OR LESS ACTIVATE SKIN INJURY RISK INCREASED GUIDELINE), MLM
REASON FOR VISIT:    Chief Complaint   Patient presents with   â¢ Gastro-intestinal Problem     Bloating - Still very bloating. A lot of gas. Was dignose with diverticulitis in early December. â¢ Gastro-intestinal Problem     Nausea - Better with zofran. â¢ Gastro-intestinal Problem     RUQ pain        LAST VISIT WITH ME:  12/10/2018 with ALEX Damian    HISTORY OF PRESENT ILLNESS:    The patient is a pleasant 61year old female who is being followed by GI and presenting for follow-up regarding chronic GERD without esophagitis, history of diverticulitis and epiploic appendagitis of distal descending colon, increased intestinal gas, abdominal bloating, and nausea. On 11/29/2018, patient underwent CT abdomen/pelvis which was diagnostic for mild diverticulitis, was started on PO antibiotics Cipro and Flagyl. Patient experienced worsening of symptoms of bilateral lower abdominal pain with associated diarrhea and nausea, and was re-evaluated in ED on 12/06/2019. Repeat CT abdomen/pelvis demonstrated resolved diverticulitis but new distal descending colon small fatty nodule measuring 1.2 cm with mild surrounding colonic inflammation indicative of epiploic appendagitis. Her antibiotic course was discontinued and she was started on Norco and Zofran for symptomatic treatment. Since last office visit, PO antibiotics were resumed and has completed full course of Cipro and Flagyl and underwent repeat CT abdomen/pelvis 12/18/2018 due to ongoing persistent RUQ and bilateral lower quadrant pain revealing diverticulosis of sigmoid colon, no further evidence of diverticulitis or epiploic appendagitis, no sign of acute intraabdominal etiology. Today she is seen in office and tells me she continues to experience right upper quadrant and bilateral lower quadrant abdominal pain, abdominal bloating, increased intestinal gas, nausea, dyspepsia and altered bowel habits.  Reports abdominal bloating has been ongoing for "approximately one year duration, bloating is worse postprandially. Also with abdominal distention and sensation of abdominal fullness as if she is ""pregnant\"" per patient account. Endorses increased levels of emotional stress due to her brothers diagnosis of cancer around time of onset; denies any dietary changes. She tells me she went on a low-carbohydrate diet for approximately 6-8 weeks around August/September 2018, but did not notice any improvement in abdominal bloating with these changes. Also with associated increased intestinal gas with reports of increased flatulence and occasional associated fecal smearing. At last office visit reported her acid reflux was well-controlled, however today she tells me she had uncontrolled daily nausea and acid regurgitation with occasional foamy emesis. The Zofran helps alleviate the nausea, but reports she was previously prescribed 10 tablets without refills and so she is taking less than once daily if nausea is severe in order to try to make these last. Also admits to not taking her Prilosec as prescribed, but rather taking it \""like candy,\"" typically three times daily. Symptoms of nausea and dyspepsia often awaken her from her sleep at night. Denies early satiety, hematemesis, heartburn, nocturnal cough, hoarseness, globus sensation, dysphagia, odynophagia, hematochezia. In regards to her bowel habits, reports passing 5-6 bowel movements daily described as Type 5-7 on the [facility] stool scale. Reports stools are small and she experiences sensation of incomplete evacuation and dyschezia, although she denies straining. Also reports rectal urgency, tenesmus, and nocturnal symptoms which are new for her as of the last 2 months. With history of IBS-C, is currently taking MiraLAX 1 scoop daily and Colace 2 tablets nightly, but reports she has not taking Colace for 5-6 days now.  She is still taking 1 scoop MiraLAX daily, occasionally takes 2 scoops of MiraLAX and may not " produce a bowel movement until the next day at times. Reports previous treatment with Linzess, but this was poorly tolerated as she felt it increased abdominal cramping, so was discontinued. Denies hematochezia or full fecal incontinence. However, she tells me she regularly passes melanotic stools, and did not know this was an abnormal finding. She also tells me she continues to experience to different types of abdominal pain including sharp intermittent right upper quadrant pain, which occurs sporadically and not on a daily basis. No identified precipitating factors. Nothing makes right upper quadrant pain better, is self-limiting lasting 10 minutes to 2 hours. Also with bilateral lower quadrant abdominal pain described as an intermittent burning sensation. There are no known aggravating factors, does not worsen postprandially. Sometimes improves post-defecation. Rates lower abdominal pain 5-6/10 in severity at its worst, and 0/10 at its best. No constitutional symptoms of fatigue, fever or chills, anorexia or unintentional weight loss. I personally reviewed results of most recent laboratory findings with patient including normal BMP as of 11/20/2018, normal CBC, normal CMP with exception of isolated marginally elevated AlkP 122, negative H. pylori antibody IgG, normal CRP and ESR, and normal TSH as of 10/18/2018. Underwent EGD and colonoscopy 07/25/2016, with EGD revealing small 1 cm hiatal hernia, mild antral gastritis, biopsy positive for reactive gastropathy but negative for H. Pylori; colonoscopy demonstrated uncomplicated sigmoid diverticulosis, but otherwise normal colonic mucosa from cecum to rectum. I have reviewed the patient's medications and allergies, past medical, surgical, social and family history, updating these as appropriate. See Histories section of the electronic medical record for a display of this information.     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    Hyperlipidemia LDL goal < 100                                 Carotid artery plaque                                         Costochondral pain                                            Hand numbness                                                 Rebound headache                                              Family history of early CAD                                   Familial hypercholesteremia                                   History of cholecystectomy                                    Esophageal spasm                                              Female cystocele                                              Hypothyroidism                                                Stress reaction                                               Night sweats                                                  Sphincter of Oddi dysfunction                                 Choking sensation                                             Hoarseness or changing voice                                  Colon polyps                                                  Depression with anxiety                                       Anxiety                                                       Bronchitis                                                    Hashimoto's thyroiditis                                       Hyperthyroidism                                               Hyperparathyroidism (CMS/HCC) Herniated lumbar disc without myelopathy        1987            Comment: L4 or L5    RLS (restless legs syndrome)                                  Closed mallet fracture of distal phalanx of li* 10/09/14        Comment: right    PAST SURGICAL HISTORY:    HYSTERECTOMY                                                    Comment: Retains Ovaries    CHOLECYSTECTOMY                                               HERNIA REPAIR                                                   Comment: incisional    BREAST BIOPSY                                   01/01/2008      Comment: left breast, normal biopsy    COLONOSCOPY                                     10/2008         Comment: Done at [facility]                10/01/13      CT NECK SOFT TISSUE                                           BACK SURGERY                                                    Comment: herniated disk L4, L5    ROTATOR CUFF REPAIR                                             Comment: Right shoulder    CARPAL TUNNEL RELEASE                                           Comment: carpal tunnel surgery both wrists    TOE SURGERY                                                     Comment: right toe fracture    REMOVAL OF TONSILS,12+ Y/O                                    ESOPHAGOGASTRODUODENOSCOPY TRANSORAL FLEX W/BX* 06/13/2012      Comment: EGD with Bx    COLONOSCOPY                                     2/1/11        ESOPHAGOGASTRODUODENOSCOPY                      07/25/2016    COLONOSCOPY                                     07/25/2016    Current Outpatient Medications   Medication Sig   â¢ ondansetron (ZOFRAN ODT) 4 MG disintegrating tablet Place 1 tablet onto the tongue every 8 hours as needed for Nausea. â¢ levothyroxine (SYNTHROID, LEVOTHROID) 75 MCG tablet Take 1 tablet by mouth daily. â¢ Probiotic Product (FLORAJEN3) capsule    â¢ meloxicam (MOBIC) 15 MG tablet Take 1 tablet by mouth daily.    â¢ Omeprazole-Sodium Bicarbonate (ZEGERID)  MG Cap    â¢ docusate sodium (COLACE) 100 MG capsule Take 2 capsules daily at bedtime. â¢ acetaminophen (TYLENOL) 500 MG tablet Take 500 mg by mouth every 6 hours as needed. â¢ loratadine (CLARITIN) 10 MG tablet Take 10 mg by mouth daily. â¢ Cholecalciferol (VITAMIN D3) 5000 UNITS CAPS Take 2 capsules by mouth daily. â¢ aspirin 81 MG tablet Take 81 mg by mouth daily. â¢ polyethylene glycol (GLYCOLAX, MIRALAX) packet Take 17 g by mouth daily. â¢ pantoprazole (PROTONIX) 40 MG tablet Take 1 tablet by mouth daily. Take 30 minutes before dinner. â¢ fluticasone-salmeterol (AIRDUO RESPICLICK) 21-70 MCG/ACT inhaler Inhale 1 puff into the lungs 2 times daily. â¢ montelukast (SINGULAIR) 10 MG tablet Take 1 tablet by mouth nightly. â¢ sumatriptan (IMITREX) 50 MG tablet Take 1 tablet by mouth at onset of migraine. May repeat after 2 hours if needed. No current facility-administered medications for this visit.         ALLERGIES:   Allergen Reactions   â¢ Erythromycin Base RASH   â¢ Lovastatin    â¢ Pravastatin VOMITING and NAUSEA   â¢ Prednisone HIVES     Took Benadryl   â¢ Statins Other (See Comments)       Family History   Problem Relation Age of Onset   â¢ Hypertension Father    â¢ Heart Father         Heart/Vascular, MI age of 68, CABG 48   â¢ Stroke Father    â¢ High cholesterol Father    â¢ Cancer Mother         Cancer at age 36 then in remission   â¢ Arthritis Mother    â¢ Thyroid Mother    â¢ Heart Sister         Stent places   â¢ Myocardial Infarction Sister    â¢ Stroke Sister    â¢ Myocardial Infarction Brother    â¢ Cancer Brother    â¢ Cancer, Rectal Brother    â¢ Thyroid Other         [place]   â¢ Diabetes Other         Grandparent       Social History     Socioeconomic History   â¢ Marital status: /Civil Union     Spouse name: Wendy Jorgensen Number of children: 1   â¢ Years of education: Not on file   â¢ Highest education level: Not on file   Social Needs   â¢ Financial resource strain: Not on "file   â¢ Food insecurity - worry: Not on file   â¢ Food insecurity - inability: Not on file   â¢ Transportation needs - medical: Not on file   â¢ Transportation needs - non-medical: Not on file   Occupational History   â¢ Occupation: [address] [employer]     Employer: [employer]   Tobacco Use   â¢ Smoking status: Former Smoker     Years: 4.00     Types: Cigarettes     Last attempt to quit: 1989     Years since quittin.6   â¢ Smokeless tobacco: Never Used   â¢ Tobacco comment: Casual smoker in the past   Substance and Sexual Activity   â¢ Alcohol use: Yes     Alcohol/week: 3.0 oz     Types: 6 Standard drinks or equivalent per week     Comment: 0-4 drinks per week   â¢ Drug use: No   â¢ Sexual activity: Yes     Partners: Male   Other Topics Concern   â¢ Not on file   Social History Narrative    The patient lives in [facility]. She works as a . She consumes 1 cup of half caffeinated coffee per day. REVIEW OF SYSTEMS:  A complete review of systems was performed and found to be negative except for what was stated in the History of Present Illness. PHYSICAL EXAM:  Vitals:    Visit Vitals  /82 ([place] [facility] [place] Sitting, Cuff Size: Regular)   Pulse 88   Resp 16   Ht 5' 2.5"" (1.588 m)   Wt 77.1 kg   BMI 30.60 kg/mÂ²       Constitutional:  Patient is alert and oriented x 3, pleasant and cooperative, in no acute distress. Well-developed, well-nourished. Abdomen:  +Distended, mild tenderness bilateral lower quadrants, soft, normal bowel sounds X 4 quadrants. No hepatosplenomegaly or palpable abdominal masses. Negative Tooth Bank. Tympany upon percussion throughout. Psychiatric: Normal mood and affect. Appropriate eye contact. Physical examination otherwise deferred. ASSESSMENT/PLAN:  1. Melena - Chronic, ongoing, uncontrolled, appears clinically stable.  Discussed possible etiologies before simply including vascular lesions, esophagitis, peptic ulcer disease, gastritis, " duodenitis, esophagogastric varices, inflammatory bowel disease, celiac sprue, or iatrogenic. May be gastritis/duodenitis or peptic ulcer disease with reports of uncontrolled acid regurgitation and nausea. Recommend further evaluation with OP EGD and colonoscopy with monitored anesthesia care; patient agrees. Reviewed procedure, preparation, day of procedure process, risks and benefits; consent to be signed day of procedure by performing provider. Pending results of endoscopy, consider capsule endoscopy to evaluate for small bowel bleeding. Recommend discontinue omeprazole, since she appears to have treatment failure. Start pantoprazole 40 mg once daily 30 mins AC. Reviewed medication mechanism of action, usual dosing, administration timing, side effects, expected benefits. New prescription sent to patient's pharmacy of choice. Advised to avoid NSAIDs. 2. Irritable bowel syndrome with constipation - Chronic, ongoing, partially controlled, appears clinically stable. Reviewed pathophysiology including this is a functional disorder. Discussed possible etiologies including altered gastrointestinal motility, visceral hypersensitivity, psychosocial factors, intestinal inflammation, alterations in benjamin, bacterial overgrowth, and possible food sensitivity. Discussed associated conditions, common clinical manifestations, and disease course including how this is a chronic condition and severity varies over time. Recommend further evaluation of stool burden with abdominal X-ray, order entered; patient agrees. Discussed treatment options including pharmacologic and nonpharmacologic management. Recommend high-fiber diet, adequate fluid intake, and increased physical activity. Recommend low FODMAP/ SIBO diet, provided with patient handouts, patient agreeable. Continue miralax 1 scoop daily pending further recommendation after interpretation of abdominal X-ray results.  Discussed possibly completing modified bowel prep pending X-ray results. Consider trial of Amitiza, since patient declines treatment with Linzess due to past adverse reaction. Discussed this may help her abdominal bloating as well as constipation. 3.  Abdominal pain, bilateral lower quadrant - Chronic, persistent, ongoing, uncontrolled, appears clinically stable. Discussed possible etiologies. May be intestinal pain since often experiences improvement post-defecation. However, in setting of positive alarm symptoms including new positive family history (brother newly diagnosed with rectal cancer April 2018), tenesmus, nocturnal symptoms, melena, recommend further evaluation with OP colonoscopy/ EGD with monitored anesthesia care; patient agrees. 4. Abdominal bloating - Chronic, ongoing, poorly controlled, appears clinically stable. Discussed possible etiologies of intestinal gas and bloating including small intestinal bacterial overgrowth, IBS, malabsorption disorders, psychological stress, and dietary. Discussed brain- gut connection. Could be component of IBS as patient does have a positive history. Cannot exclude SIBO. Discussed how highly fermentable foods can induce excess gas production and how dietary modifications can lead to symptom improvement. Advised patient to adhere to a low FODMAP/ SIBO diet. Recommend lactulose breath test; patient agrees. This will need to be completed at least 4 weeks after OP colonoscopy due to bowel prep disruption of intestinal bacterial composition. Will schedule pending results of endoscopies; patient agreeable with this plan. May benefit from IBS treatment agent such as Amitiza, will consider if persistent or worsening in symptoms. 5. Gastroesophageal reflux disease without esophagitis - Chronic, ongoing, poorly controlled, appears clinically stable. Reviewed etiology of gastroesophageal reflux and encouraged to continue with antireflux measures. Avoid known reflux triggers.  Pantoprazole 40 mg once daily 30 mins AC, Zegerid 20 mg at bedtime or 8-12H PRN, and Gaviscon 2 chewable tablets after meals followed by 3-4 sips of water up to four times daily as needed. OP EGD with monitored anesthesia cares. 6.  Nausea - Chronic, ongoing, poorly controlled, appears clinically stable. Discussed possible etiologies including uncontrolled GERD, gastroparesis, gastric outlet obstruction, and functional nausea and vomiting disorders. Discussed how nausea and vomiting could be a tag-along symptom to GERD. Discussed treatment options. Continue zofran 4 mg SL every 8 hours PRN. Refill sent to patient's pharmacy of choice. Trial pantoprazole 40 mg QD 30 mins AC. OP EGD with monitored anesthesia cares. Orders Placed This Encounter   â¢ XR Abdomen 2 View   â¢ Case request operating room: EGD BRAVO and colonoscopy, COLONOSCOPY   â¢ Case request operating room: Patient return of BRAVO equipment   â¢ ondansetron (ZOFRAN ODT) 4 MG disintegrating tablet   â¢ pantoprazole (PROTONIX) 40 MG tablet         FOLLOW-UP:  Return in about 6 weeks (around 3/12/2019), or if symptoms worsen or fail to improve, for IBS-Constipation, nausea, abdominal bloating, melena, reflux. 14

## 2019-02-02 LAB
CULTURE RESULTS: SIGNIFICANT CHANGE UP
SPECIMEN SOURCE: SIGNIFICANT CHANGE UP

## 2019-08-26 NOTE — SWALLOW BEDSIDE ASSESSMENT ADULT - CONSISTENCIES ADMINISTERED
Spoke to Silvia from [facility] and was told that this procedure needed an auth, looked at CPT code and was found to be wrong code. Checked fee schedule and new cpt code 04468 does need a auth, was done with Emily at  approved auth# [id number] good for 3 day stay, [place] [facility] [place] and send clinical info to . Resent over surgery schedule form to hospital and called Silvia back to give her auth info.    mech soft/thin liquid/puree

## 2020-03-19 NOTE — PATIENT PROFILE ADULT - NSPROMEDSPUMP_GEN_A_NUR
PT HERE with chest pain, when attempting to have pt move from W/c to bed pt started having tonic-clinic seizure lasting about 20 seconds. Pt minimally post ictal after. Pt had seizure PTA and has had chest pain since first seizure.   Pt did not hit head during witnessed seizure
none

## 2021-01-01 ENCOUNTER — INPATIENT (INPATIENT)
Facility: HOSPITAL | Age: 86
LOS: 31 days | DRG: 432 | End: 2021-06-17
Attending: INTERNAL MEDICINE | Admitting: HOSPITALIST
Payer: MEDICARE

## 2021-01-01 ENCOUNTER — RESULT REVIEW (OUTPATIENT)
Age: 86
End: 2021-01-01

## 2021-01-01 VITALS — RESPIRATION RATE: 27 BRPM | HEART RATE: 115 BPM | OXYGEN SATURATION: 97 %

## 2021-01-01 VITALS
TEMPERATURE: 98 F | WEIGHT: 139.99 LBS | DIASTOLIC BLOOD PRESSURE: 56 MMHG | HEART RATE: 56 BPM | HEIGHT: 67 IN | OXYGEN SATURATION: 98 % | SYSTOLIC BLOOD PRESSURE: 96 MMHG | RESPIRATION RATE: 22 BRPM

## 2021-01-01 DIAGNOSIS — G93.41 METABOLIC ENCEPHALOPATHY: ICD-10-CM

## 2021-01-01 DIAGNOSIS — Z95.810 PRESENCE OF AUTOMATIC (IMPLANTABLE) CARDIAC DEFIBRILLATOR: Chronic | ICD-10-CM

## 2021-01-01 DIAGNOSIS — Z95.0 PRESENCE OF CARDIAC PACEMAKER: Chronic | ICD-10-CM

## 2021-01-01 DIAGNOSIS — R53.81 OTHER MALAISE: ICD-10-CM

## 2021-01-01 DIAGNOSIS — I25.10 ATHEROSCLEROTIC HEART DISEASE OF NATIVE CORONARY ARTERY WITHOUT ANGINA PECTORIS: ICD-10-CM

## 2021-01-01 DIAGNOSIS — R52 PAIN, UNSPECIFIED: ICD-10-CM

## 2021-01-01 DIAGNOSIS — R53.2 FUNCTIONAL QUADRIPLEGIA: ICD-10-CM

## 2021-01-01 DIAGNOSIS — D33.3 BENIGN NEOPLASM OF CRANIAL NERVES: ICD-10-CM

## 2021-01-01 DIAGNOSIS — Z29.9 ENCOUNTER FOR PROPHYLACTIC MEASURES, UNSPECIFIED: ICD-10-CM

## 2021-01-01 DIAGNOSIS — N17.9 ACUTE KIDNEY FAILURE, UNSPECIFIED: ICD-10-CM

## 2021-01-01 DIAGNOSIS — I50.21 ACUTE SYSTOLIC (CONGESTIVE) HEART FAILURE: ICD-10-CM

## 2021-01-01 DIAGNOSIS — K72.90 HEPATIC FAILURE, UNSPECIFIED WITHOUT COMA: ICD-10-CM

## 2021-01-01 DIAGNOSIS — E87.1 HYPO-OSMOLALITY AND HYPONATREMIA: ICD-10-CM

## 2021-01-01 DIAGNOSIS — R50.9 FEVER, UNSPECIFIED: ICD-10-CM

## 2021-01-01 DIAGNOSIS — D62 ACUTE POSTHEMORRHAGIC ANEMIA: ICD-10-CM

## 2021-01-01 DIAGNOSIS — Z51.5 ENCOUNTER FOR PALLIATIVE CARE: ICD-10-CM

## 2021-01-01 DIAGNOSIS — E11.9 TYPE 2 DIABETES MELLITUS WITHOUT COMPLICATIONS: ICD-10-CM

## 2021-01-01 DIAGNOSIS — D49.9 NEOPLASM OF UNSPECIFIED BEHAVIOR OF UNSPECIFIED SITE: ICD-10-CM

## 2021-01-01 DIAGNOSIS — I50.9 HEART FAILURE, UNSPECIFIED: ICD-10-CM

## 2021-01-01 DIAGNOSIS — E11.22 TYPE 2 DIABETES MELLITUS WITH DIABETIC CHRONIC KIDNEY DISEASE: ICD-10-CM

## 2021-01-01 DIAGNOSIS — R41.82 ALTERED MENTAL STATUS, UNSPECIFIED: ICD-10-CM

## 2021-01-01 DIAGNOSIS — Z71.89 OTHER SPECIFIED COUNSELING: ICD-10-CM

## 2021-01-01 LAB
A1C WITH ESTIMATED AVERAGE GLUCOSE RESULT: 5.5 % — SIGNIFICANT CHANGE UP (ref 4–5.6)
ALBUMIN FLD-MCNC: 0.3 G/DL — SIGNIFICANT CHANGE UP
ALBUMIN FLD-MCNC: 0.7 G/DL — SIGNIFICANT CHANGE UP
ALBUMIN FLD-MCNC: 1 G/DL — SIGNIFICANT CHANGE UP
ALBUMIN FLD-MCNC: 1.1 G/DL — SIGNIFICANT CHANGE UP
ALBUMIN FLD-MCNC: 1.2 G/DL — SIGNIFICANT CHANGE UP
ALBUMIN FLD-MCNC: 1.7 G/DL — SIGNIFICANT CHANGE UP
ALBUMIN SERPL ELPH-MCNC: 2.7 G/DL — LOW (ref 3.3–5)
ALBUMIN SERPL ELPH-MCNC: 2.7 G/DL — LOW (ref 3.3–5)
ALBUMIN SERPL ELPH-MCNC: 2.9 G/DL — LOW (ref 3.3–5)
ALBUMIN SERPL ELPH-MCNC: 3 G/DL — LOW (ref 3.3–5)
ALBUMIN SERPL ELPH-MCNC: 3.1 G/DL — LOW (ref 3.3–5)
ALBUMIN SERPL ELPH-MCNC: 3.2 G/DL — LOW (ref 3.3–5)
ALBUMIN SERPL ELPH-MCNC: 3.2 G/DL — LOW (ref 3.3–5)
ALBUMIN SERPL ELPH-MCNC: 3.3 G/DL — SIGNIFICANT CHANGE UP (ref 3.3–5)
ALBUMIN SERPL ELPH-MCNC: 3.4 G/DL — SIGNIFICANT CHANGE UP (ref 3.3–5)
ALBUMIN SERPL ELPH-MCNC: 3.5 G/DL — SIGNIFICANT CHANGE UP (ref 3.3–5)
ALBUMIN SERPL ELPH-MCNC: 3.6 G/DL — SIGNIFICANT CHANGE UP (ref 3.3–5)
ALBUMIN SERPL ELPH-MCNC: 3.7 G/DL — SIGNIFICANT CHANGE UP (ref 3.3–5)
ALBUMIN SERPL ELPH-MCNC: 3.8 G/DL — SIGNIFICANT CHANGE UP (ref 3.3–5)
ALBUMIN SERPL ELPH-MCNC: 3.9 G/DL — SIGNIFICANT CHANGE UP (ref 3.3–5)
ALBUMIN SERPL ELPH-MCNC: 4 G/DL — SIGNIFICANT CHANGE UP (ref 3.3–5)
ALBUMIN SERPL ELPH-MCNC: 4.1 G/DL — SIGNIFICANT CHANGE UP (ref 3.3–5)
ALBUMIN SERPL ELPH-MCNC: 4.3 G/DL — SIGNIFICANT CHANGE UP (ref 3.3–5)
ALP SERPL-CCNC: 28 U/L — LOW (ref 40–120)
ALP SERPL-CCNC: 29 U/L — LOW (ref 40–120)
ALP SERPL-CCNC: 30 U/L — LOW (ref 40–120)
ALP SERPL-CCNC: 31 U/L — LOW (ref 40–120)
ALP SERPL-CCNC: 31 U/L — LOW (ref 40–120)
ALP SERPL-CCNC: 35 U/L — LOW (ref 40–120)
ALP SERPL-CCNC: 38 U/L — LOW (ref 40–120)
ALP SERPL-CCNC: 38 U/L — LOW (ref 40–120)
ALP SERPL-CCNC: 39 U/L — LOW (ref 40–120)
ALP SERPL-CCNC: 40 U/L — SIGNIFICANT CHANGE UP (ref 40–120)
ALP SERPL-CCNC: 41 U/L — SIGNIFICANT CHANGE UP (ref 40–120)
ALP SERPL-CCNC: 42 U/L — SIGNIFICANT CHANGE UP (ref 40–120)
ALP SERPL-CCNC: 42 U/L — SIGNIFICANT CHANGE UP (ref 40–120)
ALP SERPL-CCNC: 43 U/L — SIGNIFICANT CHANGE UP (ref 40–120)
ALP SERPL-CCNC: 44 U/L — SIGNIFICANT CHANGE UP (ref 40–120)
ALP SERPL-CCNC: 45 U/L — SIGNIFICANT CHANGE UP (ref 40–120)
ALP SERPL-CCNC: 45 U/L — SIGNIFICANT CHANGE UP (ref 40–120)
ALP SERPL-CCNC: 46 U/L — SIGNIFICANT CHANGE UP (ref 40–120)
ALP SERPL-CCNC: 47 U/L — SIGNIFICANT CHANGE UP (ref 40–120)
ALP SERPL-CCNC: 47 U/L — SIGNIFICANT CHANGE UP (ref 40–120)
ALP SERPL-CCNC: 48 U/L — SIGNIFICANT CHANGE UP (ref 40–120)
ALP SERPL-CCNC: 49 U/L — SIGNIFICANT CHANGE UP (ref 40–120)
ALP SERPL-CCNC: 49 U/L — SIGNIFICANT CHANGE UP (ref 40–120)
ALP SERPL-CCNC: 51 U/L — SIGNIFICANT CHANGE UP (ref 40–120)
ALP SERPL-CCNC: 51 U/L — SIGNIFICANT CHANGE UP (ref 40–120)
ALP SERPL-CCNC: 52 U/L — SIGNIFICANT CHANGE UP (ref 40–120)
ALP SERPL-CCNC: 53 U/L — SIGNIFICANT CHANGE UP (ref 40–120)
ALP SERPL-CCNC: 54 U/L — SIGNIFICANT CHANGE UP (ref 40–120)
ALP SERPL-CCNC: 54 U/L — SIGNIFICANT CHANGE UP (ref 40–120)
ALP SERPL-CCNC: 55 U/L — SIGNIFICANT CHANGE UP (ref 40–120)
ALP SERPL-CCNC: 56 U/L — SIGNIFICANT CHANGE UP (ref 40–120)
ALP SERPL-CCNC: 57 U/L — SIGNIFICANT CHANGE UP (ref 40–120)
ALP SERPL-CCNC: 59 U/L — SIGNIFICANT CHANGE UP (ref 40–120)
ALP SERPL-CCNC: 59 U/L — SIGNIFICANT CHANGE UP (ref 40–120)
ALT FLD-CCNC: 13 U/L — SIGNIFICANT CHANGE UP (ref 10–45)
ALT FLD-CCNC: 13 U/L — SIGNIFICANT CHANGE UP (ref 10–45)
ALT FLD-CCNC: 14 U/L — SIGNIFICANT CHANGE UP (ref 10–45)
ALT FLD-CCNC: 15 U/L — SIGNIFICANT CHANGE UP (ref 10–45)
ALT FLD-CCNC: 16 U/L — SIGNIFICANT CHANGE UP (ref 10–45)
ALT FLD-CCNC: 17 U/L — SIGNIFICANT CHANGE UP (ref 10–45)
ALT FLD-CCNC: 18 U/L — SIGNIFICANT CHANGE UP (ref 10–45)
ALT FLD-CCNC: 19 U/L — SIGNIFICANT CHANGE UP (ref 10–45)
ALT FLD-CCNC: 20 U/L — SIGNIFICANT CHANGE UP (ref 10–45)
ALT FLD-CCNC: 21 U/L — SIGNIFICANT CHANGE UP (ref 10–45)
ALT FLD-CCNC: 22 U/L — SIGNIFICANT CHANGE UP (ref 10–45)
ALT FLD-CCNC: 23 U/L — SIGNIFICANT CHANGE UP (ref 10–45)
ALT FLD-CCNC: 24 U/L — SIGNIFICANT CHANGE UP (ref 10–45)
ALT FLD-CCNC: 24 U/L — SIGNIFICANT CHANGE UP (ref 10–45)
ALT FLD-CCNC: 26 U/L — SIGNIFICANT CHANGE UP (ref 10–45)
ALT FLD-CCNC: 37 U/L — SIGNIFICANT CHANGE UP (ref 10–45)
AMMONIA BLD-MCNC: 101 UMOL/L — HIGH (ref 11–55)
AMMONIA BLD-MCNC: 17 UMOL/L — SIGNIFICANT CHANGE UP (ref 11–55)
AMMONIA BLD-MCNC: 24 UMOL/L — SIGNIFICANT CHANGE UP (ref 11–55)
AMMONIA BLD-MCNC: 27 UMOL/L — SIGNIFICANT CHANGE UP (ref 11–55)
ANION GAP SERPL CALC-SCNC: 11 MMOL/L — SIGNIFICANT CHANGE UP (ref 5–17)
ANION GAP SERPL CALC-SCNC: 12 MMOL/L — SIGNIFICANT CHANGE UP (ref 5–17)
ANION GAP SERPL CALC-SCNC: 13 MMOL/L — SIGNIFICANT CHANGE UP (ref 5–17)
ANION GAP SERPL CALC-SCNC: 14 MMOL/L — SIGNIFICANT CHANGE UP (ref 5–17)
ANION GAP SERPL CALC-SCNC: 15 MMOL/L — SIGNIFICANT CHANGE UP (ref 5–17)
ANION GAP SERPL CALC-SCNC: 16 MMOL/L — SIGNIFICANT CHANGE UP (ref 5–17)
ANION GAP SERPL CALC-SCNC: 16 MMOL/L — SIGNIFICANT CHANGE UP (ref 5–17)
ANION GAP SERPL CALC-SCNC: 17 MMOL/L — SIGNIFICANT CHANGE UP (ref 5–17)
ANION GAP SERPL CALC-SCNC: 18 MMOL/L — HIGH (ref 5–17)
ANION GAP SERPL CALC-SCNC: 21 MMOL/L — HIGH (ref 5–17)
ANION GAP SERPL CALC-SCNC: 22 MMOL/L — HIGH (ref 5–17)
ANION GAP SERPL CALC-SCNC: 22 MMOL/L — HIGH (ref 5–17)
ANION GAP SERPL CALC-SCNC: 26 MMOL/L — HIGH (ref 5–17)
ANION GAP SERPL CALC-SCNC: 28 MMOL/L — HIGH (ref 5–17)
ANION GAP SERPL CALC-SCNC: 29 MMOL/L — HIGH (ref 5–17)
ANION GAP SERPL CALC-SCNC: 30 MMOL/L — HIGH (ref 5–17)
ANION GAP SERPL CALC-SCNC: 34 MMOL/L — HIGH (ref 5–17)
ANION GAP SERPL CALC-SCNC: 34 MMOL/L — HIGH (ref 5–17)
ANION GAP SERPL CALC-SCNC: 35 MMOL/L — HIGH (ref 5–17)
ANION GAP SERPL CALC-SCNC: 35 MMOL/L — HIGH (ref 5–17)
ANION GAP SERPL CALC-SCNC: 36 MMOL/L — HIGH (ref 5–17)
ANION GAP SERPL CALC-SCNC: 36 MMOL/L — HIGH (ref 5–17)
ANISOCYTOSIS BLD QL: SIGNIFICANT CHANGE UP
APPEARANCE UR: ABNORMAL
APPEARANCE UR: ABNORMAL
APPEARANCE UR: CLEAR — SIGNIFICANT CHANGE UP
APTT BLD: 28.4 SEC — SIGNIFICANT CHANGE UP (ref 27.5–35.5)
APTT BLD: 30.6 SEC — SIGNIFICANT CHANGE UP (ref 27.5–35.5)
APTT BLD: 33.5 SEC — SIGNIFICANT CHANGE UP (ref 27.5–35.5)
APTT BLD: 37 SEC — HIGH (ref 27.5–35.5)
APTT BLD: 38.3 SEC — HIGH (ref 27.5–35.5)
APTT BLD: 38.7 SEC — HIGH (ref 27.5–35.5)
APTT BLD: 39.4 SEC — HIGH (ref 27.5–35.5)
APTT BLD: 39.8 SEC — HIGH (ref 27.5–35.5)
APTT BLD: 41.1 SEC — HIGH (ref 27.5–35.5)
APTT BLD: 43.7 SEC — HIGH (ref 27.5–35.5)
APTT BLD: 44.1 SEC — HIGH (ref 27.5–35.5)
APTT BLD: 46.1 SEC — HIGH (ref 27.5–35.5)
APTT BLD: 46.8 SEC — HIGH (ref 27.5–35.5)
APTT BLD: 46.9 SEC — HIGH (ref 27.5–35.5)
APTT BLD: 49.6 SEC — HIGH (ref 27.5–35.5)
APTT BLD: 49.7 SEC — HIGH (ref 27.5–35.5)
APTT BLD: 50.5 SEC — HIGH (ref 27.5–35.5)
APTT BLD: 50.8 SEC — HIGH (ref 27.5–35.5)
AST SERPL-CCNC: 17 U/L — SIGNIFICANT CHANGE UP (ref 10–40)
AST SERPL-CCNC: 18 U/L — SIGNIFICANT CHANGE UP (ref 10–40)
AST SERPL-CCNC: 19 U/L — SIGNIFICANT CHANGE UP (ref 10–40)
AST SERPL-CCNC: 20 U/L — SIGNIFICANT CHANGE UP (ref 10–40)
AST SERPL-CCNC: 21 U/L — SIGNIFICANT CHANGE UP (ref 10–40)
AST SERPL-CCNC: 21 U/L — SIGNIFICANT CHANGE UP (ref 10–40)
AST SERPL-CCNC: 22 U/L — SIGNIFICANT CHANGE UP (ref 10–40)
AST SERPL-CCNC: 23 U/L — SIGNIFICANT CHANGE UP (ref 10–40)
AST SERPL-CCNC: 24 U/L — SIGNIFICANT CHANGE UP (ref 10–40)
AST SERPL-CCNC: 25 U/L — SIGNIFICANT CHANGE UP (ref 10–40)
AST SERPL-CCNC: 26 U/L — SIGNIFICANT CHANGE UP (ref 10–40)
AST SERPL-CCNC: 27 U/L — SIGNIFICANT CHANGE UP (ref 10–40)
AST SERPL-CCNC: 27 U/L — SIGNIFICANT CHANGE UP (ref 10–40)
AST SERPL-CCNC: 29 U/L — SIGNIFICANT CHANGE UP (ref 10–40)
AST SERPL-CCNC: 30 U/L — SIGNIFICANT CHANGE UP (ref 10–40)
AST SERPL-CCNC: 31 U/L — SIGNIFICANT CHANGE UP (ref 10–40)
AST SERPL-CCNC: 31 U/L — SIGNIFICANT CHANGE UP (ref 10–40)
AST SERPL-CCNC: 32 U/L — SIGNIFICANT CHANGE UP (ref 10–40)
AST SERPL-CCNC: 36 U/L — SIGNIFICANT CHANGE UP (ref 10–40)
AST SERPL-CCNC: 37 U/L — SIGNIFICANT CHANGE UP (ref 10–40)
AST SERPL-CCNC: 39 U/L — SIGNIFICANT CHANGE UP (ref 10–40)
AST SERPL-CCNC: 39 U/L — SIGNIFICANT CHANGE UP (ref 10–40)
AST SERPL-CCNC: 45 U/L — HIGH (ref 10–40)
AST SERPL-CCNC: 46 U/L — HIGH (ref 10–40)
AST SERPL-CCNC: 73 U/L — HIGH (ref 10–40)
B PERT IGG+IGM PNL SER: ABNORMAL
BACTERIA # UR AUTO: ABNORMAL
BACTERIA # UR AUTO: NEGATIVE — SIGNIFICANT CHANGE UP
BASE EXCESS BLDA CALC-SCNC: -0.5 MMOL/L — SIGNIFICANT CHANGE UP (ref -2–2)
BASE EXCESS BLDA CALC-SCNC: 1.9 MMOL/L — SIGNIFICANT CHANGE UP (ref -2–2)
BASE EXCESS BLDA CALC-SCNC: 3 MMOL/L — HIGH (ref -2–2)
BASE EXCESS BLDA CALC-SCNC: 3.2 MMOL/L — HIGH (ref -2–2)
BASE EXCESS BLDV CALC-SCNC: -11.9 MMOL/L — LOW (ref -2–2)
BASE EXCESS BLDV CALC-SCNC: -13.3 MMOL/L — LOW (ref -2–2)
BASE EXCESS BLDV CALC-SCNC: -3.8 MMOL/L — LOW (ref -2–2)
BASOPHILS # BLD AUTO: 0 K/UL — SIGNIFICANT CHANGE UP (ref 0–0.2)
BASOPHILS # BLD AUTO: 0.01 K/UL — SIGNIFICANT CHANGE UP (ref 0–0.2)
BASOPHILS # BLD AUTO: 0.02 K/UL — SIGNIFICANT CHANGE UP (ref 0–0.2)
BASOPHILS # BLD AUTO: 0.03 K/UL — SIGNIFICANT CHANGE UP (ref 0–0.2)
BASOPHILS # BLD AUTO: 0.04 K/UL — SIGNIFICANT CHANGE UP (ref 0–0.2)
BASOPHILS NFR BLD AUTO: 0 % — SIGNIFICANT CHANGE UP (ref 0–2)
BASOPHILS NFR BLD AUTO: 0.1 % — SIGNIFICANT CHANGE UP (ref 0–2)
BASOPHILS NFR BLD AUTO: 0.2 % — SIGNIFICANT CHANGE UP (ref 0–2)
BASOPHILS NFR BLD AUTO: 0.3 % — SIGNIFICANT CHANGE UP (ref 0–2)
BASOPHILS NFR BLD AUTO: 0.4 % — SIGNIFICANT CHANGE UP (ref 0–2)
BASOPHILS NFR BLD AUTO: 0.5 % — SIGNIFICANT CHANGE UP (ref 0–2)
BASOPHILS NFR BLD AUTO: 0.6 % — SIGNIFICANT CHANGE UP (ref 0–2)
BASOPHILS NFR BLD AUTO: 0.6 % — SIGNIFICANT CHANGE UP (ref 0–2)
BILIRUB DIRECT SERPL-MCNC: 0.5 MG/DL — HIGH (ref 0–0.2)
BILIRUB INDIRECT FLD-MCNC: 0.7 MG/DL — SIGNIFICANT CHANGE UP (ref 0.2–1)
BILIRUB SERPL-MCNC: 1.1 MG/DL — SIGNIFICANT CHANGE UP (ref 0.2–1.2)
BILIRUB SERPL-MCNC: 1.1 MG/DL — SIGNIFICANT CHANGE UP (ref 0.2–1.2)
BILIRUB SERPL-MCNC: 1.2 MG/DL — SIGNIFICANT CHANGE UP (ref 0.2–1.2)
BILIRUB SERPL-MCNC: 1.3 MG/DL — HIGH (ref 0.2–1.2)
BILIRUB SERPL-MCNC: 1.4 MG/DL — HIGH (ref 0.2–1.2)
BILIRUB SERPL-MCNC: 1.5 MG/DL — HIGH (ref 0.2–1.2)
BILIRUB SERPL-MCNC: 1.6 MG/DL — HIGH (ref 0.2–1.2)
BILIRUB SERPL-MCNC: 1.7 MG/DL — HIGH (ref 0.2–1.2)
BILIRUB SERPL-MCNC: 1.8 MG/DL — HIGH (ref 0.2–1.2)
BILIRUB SERPL-MCNC: 1.8 MG/DL — HIGH (ref 0.2–1.2)
BILIRUB SERPL-MCNC: 1.9 MG/DL — HIGH (ref 0.2–1.2)
BILIRUB SERPL-MCNC: 2 MG/DL — HIGH (ref 0.2–1.2)
BILIRUB SERPL-MCNC: 2.1 MG/DL — HIGH (ref 0.2–1.2)
BILIRUB SERPL-MCNC: 2.2 MG/DL — HIGH (ref 0.2–1.2)
BILIRUB SERPL-MCNC: 2.3 MG/DL — HIGH (ref 0.2–1.2)
BILIRUB SERPL-MCNC: 2.6 MG/DL — HIGH (ref 0.2–1.2)
BILIRUB UR-MCNC: NEGATIVE — SIGNIFICANT CHANGE UP
BLD GP AB SCN SERPL QL: NEGATIVE — SIGNIFICANT CHANGE UP
BUN SERPL-MCNC: 101 MG/DL — HIGH (ref 7–23)
BUN SERPL-MCNC: 103 MG/DL — HIGH (ref 7–23)
BUN SERPL-MCNC: 112 MG/DL — HIGH (ref 7–23)
BUN SERPL-MCNC: 128 MG/DL — HIGH (ref 7–23)
BUN SERPL-MCNC: 129 MG/DL — HIGH (ref 7–23)
BUN SERPL-MCNC: 146 MG/DL — HIGH (ref 7–23)
BUN SERPL-MCNC: 151 MG/DL — HIGH (ref 7–23)
BUN SERPL-MCNC: 154 MG/DL — HIGH (ref 7–23)
BUN SERPL-MCNC: 156 MG/DL — HIGH (ref 7–23)
BUN SERPL-MCNC: 157 MG/DL — HIGH (ref 7–23)
BUN SERPL-MCNC: 158 MG/DL — HIGH (ref 7–23)
BUN SERPL-MCNC: 38 MG/DL — HIGH (ref 7–23)
BUN SERPL-MCNC: 40 MG/DL — HIGH (ref 7–23)
BUN SERPL-MCNC: 40 MG/DL — HIGH (ref 7–23)
BUN SERPL-MCNC: 41 MG/DL — HIGH (ref 7–23)
BUN SERPL-MCNC: 42 MG/DL — HIGH (ref 7–23)
BUN SERPL-MCNC: 43 MG/DL — HIGH (ref 7–23)
BUN SERPL-MCNC: 45 MG/DL — HIGH (ref 7–23)
BUN SERPL-MCNC: 46 MG/DL — HIGH (ref 7–23)
BUN SERPL-MCNC: 50 MG/DL — HIGH (ref 7–23)
BUN SERPL-MCNC: 54 MG/DL — HIGH (ref 7–23)
BUN SERPL-MCNC: 56 MG/DL — HIGH (ref 7–23)
BUN SERPL-MCNC: 57 MG/DL — HIGH (ref 7–23)
BUN SERPL-MCNC: 64 MG/DL — HIGH (ref 7–23)
BUN SERPL-MCNC: 67 MG/DL — HIGH (ref 7–23)
BUN SERPL-MCNC: 74 MG/DL — HIGH (ref 7–23)
BUN SERPL-MCNC: 74 MG/DL — HIGH (ref 7–23)
BUN SERPL-MCNC: 75 MG/DL — HIGH (ref 7–23)
BUN SERPL-MCNC: 76 MG/DL — HIGH (ref 7–23)
BUN SERPL-MCNC: 80 MG/DL — HIGH (ref 7–23)
BUN SERPL-MCNC: 84 MG/DL — HIGH (ref 7–23)
BUN SERPL-MCNC: 84 MG/DL — HIGH (ref 7–23)
BUN SERPL-MCNC: 85 MG/DL — HIGH (ref 7–23)
BUN SERPL-MCNC: 92 MG/DL — HIGH (ref 7–23)
BUN SERPL-MCNC: 96 MG/DL — HIGH (ref 7–23)
BUN SERPL-MCNC: 96 MG/DL — HIGH (ref 7–23)
BUN SERPL-MCNC: 97 MG/DL — HIGH (ref 7–23)
BUN SERPL-MCNC: 99 MG/DL — HIGH (ref 7–23)
CA-I SERPL-SCNC: 1.11 MMOL/L — LOW (ref 1.12–1.3)
CA-I SERPL-SCNC: 1.11 MMOL/L — LOW (ref 1.12–1.3)
CA-I SERPL-SCNC: 1.23 MMOL/L — SIGNIFICANT CHANGE UP (ref 1.12–1.3)
CALCIUM SERPL-MCNC: 10 MG/DL — SIGNIFICANT CHANGE UP (ref 8.4–10.5)
CALCIUM SERPL-MCNC: 10.2 MG/DL — SIGNIFICANT CHANGE UP (ref 8.4–10.5)
CALCIUM SERPL-MCNC: 8.4 MG/DL — SIGNIFICANT CHANGE UP (ref 8.4–10.5)
CALCIUM SERPL-MCNC: 8.5 MG/DL — SIGNIFICANT CHANGE UP (ref 8.4–10.5)
CALCIUM SERPL-MCNC: 8.7 MG/DL — SIGNIFICANT CHANGE UP (ref 8.4–10.5)
CALCIUM SERPL-MCNC: 8.8 MG/DL — SIGNIFICANT CHANGE UP (ref 8.4–10.5)
CALCIUM SERPL-MCNC: 8.8 MG/DL — SIGNIFICANT CHANGE UP (ref 8.4–10.5)
CALCIUM SERPL-MCNC: 9 MG/DL — SIGNIFICANT CHANGE UP (ref 8.4–10.5)
CALCIUM SERPL-MCNC: 9.1 MG/DL — SIGNIFICANT CHANGE UP (ref 8.4–10.5)
CALCIUM SERPL-MCNC: 9.2 MG/DL — SIGNIFICANT CHANGE UP (ref 8.4–10.5)
CALCIUM SERPL-MCNC: 9.2 MG/DL — SIGNIFICANT CHANGE UP (ref 8.4–10.5)
CALCIUM SERPL-MCNC: 9.3 MG/DL — SIGNIFICANT CHANGE UP (ref 8.4–10.5)
CALCIUM SERPL-MCNC: 9.4 MG/DL — SIGNIFICANT CHANGE UP (ref 8.4–10.5)
CALCIUM SERPL-MCNC: 9.5 MG/DL — SIGNIFICANT CHANGE UP (ref 8.4–10.5)
CALCIUM SERPL-MCNC: 9.6 MG/DL — SIGNIFICANT CHANGE UP (ref 8.4–10.5)
CALCIUM SERPL-MCNC: 9.7 MG/DL — SIGNIFICANT CHANGE UP (ref 8.4–10.5)
CALCIUM SERPL-MCNC: 9.8 MG/DL — SIGNIFICANT CHANGE UP (ref 8.4–10.5)
CALCIUM SERPL-MCNC: 9.9 MG/DL — SIGNIFICANT CHANGE UP (ref 8.4–10.5)
CHLORIDE BLDV-SCNC: 109 MMOL/L — HIGH (ref 96–108)
CHLORIDE BLDV-SCNC: 113 MMOL/L — HIGH (ref 96–108)
CHLORIDE BLDV-SCNC: 115 MMOL/L — HIGH (ref 96–108)
CHLORIDE SERPL-SCNC: 101 MMOL/L — SIGNIFICANT CHANGE UP (ref 96–108)
CHLORIDE SERPL-SCNC: 102 MMOL/L — SIGNIFICANT CHANGE UP (ref 96–108)
CHLORIDE SERPL-SCNC: 103 MMOL/L — SIGNIFICANT CHANGE UP (ref 96–108)
CHLORIDE SERPL-SCNC: 104 MMOL/L — SIGNIFICANT CHANGE UP (ref 96–108)
CHLORIDE SERPL-SCNC: 105 MMOL/L — SIGNIFICANT CHANGE UP (ref 96–108)
CHLORIDE SERPL-SCNC: 106 MMOL/L — SIGNIFICANT CHANGE UP (ref 96–108)
CHLORIDE SERPL-SCNC: 106 MMOL/L — SIGNIFICANT CHANGE UP (ref 96–108)
CHLORIDE SERPL-SCNC: 107 MMOL/L — SIGNIFICANT CHANGE UP (ref 96–108)
CHLORIDE SERPL-SCNC: 108 MMOL/L — SIGNIFICANT CHANGE UP (ref 96–108)
CHLORIDE SERPL-SCNC: 109 MMOL/L — HIGH (ref 96–108)
CHLORIDE SERPL-SCNC: 110 MMOL/L — HIGH (ref 96–108)
CHLORIDE SERPL-SCNC: 111 MMOL/L — HIGH (ref 96–108)
CHLORIDE SERPL-SCNC: 111 MMOL/L — HIGH (ref 96–108)
CHLORIDE SERPL-SCNC: 112 MMOL/L — HIGH (ref 96–108)
CHLORIDE SERPL-SCNC: 113 MMOL/L — HIGH (ref 96–108)
CHLORIDE SERPL-SCNC: 113 MMOL/L — HIGH (ref 96–108)
CHLORIDE SERPL-SCNC: 115 MMOL/L — HIGH (ref 96–108)
CHLORIDE SERPL-SCNC: 86 MMOL/L — LOW (ref 96–108)
CHLORIDE SERPL-SCNC: 88 MMOL/L — LOW (ref 96–108)
CHLORIDE SERPL-SCNC: 88 MMOL/L — LOW (ref 96–108)
CHLORIDE SERPL-SCNC: 89 MMOL/L — LOW (ref 96–108)
CHLORIDE SERPL-SCNC: 89 MMOL/L — LOW (ref 96–108)
CHLORIDE SERPL-SCNC: 91 MMOL/L — LOW (ref 96–108)
CHLORIDE SERPL-SCNC: 91 MMOL/L — LOW (ref 96–108)
CHLORIDE SERPL-SCNC: 96 MMOL/L — SIGNIFICANT CHANGE UP (ref 96–108)
CK MB BLD-MCNC: 7.3 % — HIGH (ref 0–3.5)
CK MB CFR SERPL CALC: 8.3 NG/ML — HIGH (ref 0–6.7)
CK SERPL-CCNC: 113 U/L — SIGNIFICANT CHANGE UP (ref 30–200)
CO2 BLDA-SCNC: 25 MMOL/L — SIGNIFICANT CHANGE UP (ref 22–30)
CO2 BLDA-SCNC: 26 MMOL/L — SIGNIFICANT CHANGE UP (ref 22–30)
CO2 BLDA-SCNC: 27 MMOL/L — SIGNIFICANT CHANGE UP (ref 22–30)
CO2 BLDA-SCNC: 29 MMOL/L — SIGNIFICANT CHANGE UP (ref 22–30)
CO2 BLDV-SCNC: 13 MMOL/L — LOW (ref 22–30)
CO2 BLDV-SCNC: 15 MMOL/L — LOW (ref 22–30)
CO2 BLDV-SCNC: 22 MMOL/L — SIGNIFICANT CHANGE UP (ref 22–30)
CO2 SERPL-SCNC: 10 MMOL/L — CRITICAL LOW (ref 22–31)
CO2 SERPL-SCNC: 11 MMOL/L — LOW (ref 22–31)
CO2 SERPL-SCNC: 12 MMOL/L — LOW (ref 22–31)
CO2 SERPL-SCNC: 13 MMOL/L — LOW (ref 22–31)
CO2 SERPL-SCNC: 13 MMOL/L — LOW (ref 22–31)
CO2 SERPL-SCNC: 17 MMOL/L — LOW (ref 22–31)
CO2 SERPL-SCNC: 18 MMOL/L — LOW (ref 22–31)
CO2 SERPL-SCNC: 19 MMOL/L — LOW (ref 22–31)
CO2 SERPL-SCNC: 20 MMOL/L — LOW (ref 22–31)
CO2 SERPL-SCNC: 21 MMOL/L — LOW (ref 22–31)
CO2 SERPL-SCNC: 22 MMOL/L — SIGNIFICANT CHANGE UP (ref 22–31)
CO2 SERPL-SCNC: 22 MMOL/L — SIGNIFICANT CHANGE UP (ref 22–31)
CO2 SERPL-SCNC: 23 MMOL/L — SIGNIFICANT CHANGE UP (ref 22–31)
CO2 SERPL-SCNC: <10 MMOL/L — CRITICAL LOW (ref 22–31)
COLLECT DURATION TIME UR: 24 HR — SIGNIFICANT CHANGE UP
COLOR FLD: SIGNIFICANT CHANGE UP
COLOR FLD: YELLOW — SIGNIFICANT CHANGE UP
COLOR FLD: YELLOW — SIGNIFICANT CHANGE UP
COLOR SPEC: SIGNIFICANT CHANGE UP
COLOR SPEC: YELLOW — SIGNIFICANT CHANGE UP
COLOR SPEC: YELLOW — SIGNIFICANT CHANGE UP
COMMENT - FLUIDS: SIGNIFICANT CHANGE UP
CORTIS AM PEAK SERPL-MCNC: 15.5 UG/DL — SIGNIFICANT CHANGE UP (ref 6–18.4)
COVID-19 SPIKE DOMAIN AB INTERP: NEGATIVE — SIGNIFICANT CHANGE UP
COVID-19 SPIKE DOMAIN ANTIBODY RESULT: 0.4 U/ML — SIGNIFICANT CHANGE UP
CREAT ?TM UR-MCNC: 106 MG/DL — SIGNIFICANT CHANGE UP
CREAT SERPL-MCNC: 1.2 MG/DL — SIGNIFICANT CHANGE UP (ref 0.5–1.3)
CREAT SERPL-MCNC: 1.29 MG/DL — SIGNIFICANT CHANGE UP (ref 0.5–1.3)
CREAT SERPL-MCNC: 1.39 MG/DL — HIGH (ref 0.5–1.3)
CREAT SERPL-MCNC: 1.44 MG/DL — HIGH (ref 0.5–1.3)
CREAT SERPL-MCNC: 1.49 MG/DL — HIGH (ref 0.5–1.3)
CREAT SERPL-MCNC: 1.51 MG/DL — HIGH (ref 0.5–1.3)
CREAT SERPL-MCNC: 1.52 MG/DL — HIGH (ref 0.5–1.3)
CREAT SERPL-MCNC: 1.56 MG/DL — HIGH (ref 0.5–1.3)
CREAT SERPL-MCNC: 1.56 MG/DL — HIGH (ref 0.5–1.3)
CREAT SERPL-MCNC: 1.57 MG/DL — HIGH (ref 0.5–1.3)
CREAT SERPL-MCNC: 1.59 MG/DL — HIGH (ref 0.5–1.3)
CREAT SERPL-MCNC: 1.62 MG/DL — HIGH (ref 0.5–1.3)
CREAT SERPL-MCNC: 1.64 MG/DL — HIGH (ref 0.5–1.3)
CREAT SERPL-MCNC: 1.64 MG/DL — HIGH (ref 0.5–1.3)
CREAT SERPL-MCNC: 1.67 MG/DL — HIGH (ref 0.5–1.3)
CREAT SERPL-MCNC: 1.69 MG/DL — HIGH (ref 0.5–1.3)
CREAT SERPL-MCNC: 1.82 MG/DL — HIGH (ref 0.5–1.3)
CREAT SERPL-MCNC: 1.83 MG/DL — HIGH (ref 0.5–1.3)
CREAT SERPL-MCNC: 1.88 MG/DL — HIGH (ref 0.5–1.3)
CREAT SERPL-MCNC: 1.88 MG/DL — HIGH (ref 0.5–1.3)
CREAT SERPL-MCNC: 10.33 MG/DL — HIGH (ref 0.5–1.3)
CREAT SERPL-MCNC: 11.23 MG/DL — HIGH (ref 0.5–1.3)
CREAT SERPL-MCNC: 11.98 MG/DL — HIGH (ref 0.5–1.3)
CREAT SERPL-MCNC: 12.24 MG/DL — HIGH (ref 0.5–1.3)
CREAT SERPL-MCNC: 12.29 MG/DL — HIGH (ref 0.5–1.3)
CREAT SERPL-MCNC: 12.31 MG/DL — HIGH (ref 0.5–1.3)
CREAT SERPL-MCNC: 12.38 MG/DL — HIGH (ref 0.5–1.3)
CREAT SERPL-MCNC: 2.02 MG/DL — HIGH (ref 0.5–1.3)
CREAT SERPL-MCNC: 2.16 MG/DL — HIGH (ref 0.5–1.3)
CREAT SERPL-MCNC: 2.16 MG/DL — HIGH (ref 0.5–1.3)
CREAT SERPL-MCNC: 2.44 MG/DL — HIGH (ref 0.5–1.3)
CREAT SERPL-MCNC: 2.48 MG/DL — HIGH (ref 0.5–1.3)
CREAT SERPL-MCNC: 2.6 MG/DL — HIGH (ref 0.5–1.3)
CREAT SERPL-MCNC: 2.72 MG/DL — HIGH (ref 0.5–1.3)
CREAT SERPL-MCNC: 2.74 MG/DL — HIGH (ref 0.5–1.3)
CREAT SERPL-MCNC: 3.47 MG/DL — HIGH (ref 0.5–1.3)
CREAT SERPL-MCNC: 4.2 MG/DL — HIGH (ref 0.5–1.3)
CREAT SERPL-MCNC: 4.61 MG/DL — HIGH (ref 0.5–1.3)
CREAT SERPL-MCNC: 4.79 MG/DL — HIGH (ref 0.5–1.3)
CREAT SERPL-MCNC: 5.42 MG/DL — HIGH (ref 0.5–1.3)
CREAT SERPL-MCNC: 6.47 MG/DL — HIGH (ref 0.5–1.3)
CREAT SERPL-MCNC: 7.49 MG/DL — HIGH (ref 0.5–1.3)
CREAT SERPL-MCNC: 8.6 MG/DL — HIGH (ref 0.5–1.3)
CREAT SERPL-MCNC: 9.61 MG/DL — HIGH (ref 0.5–1.3)
CULTURE RESULTS: NO GROWTH — SIGNIFICANT CHANGE UP
CULTURE RESULTS: NO GROWTH — SIGNIFICANT CHANGE UP
CULTURE RESULTS: SIGNIFICANT CHANGE UP
DIFF PNL FLD: ABNORMAL
DIFF PNL FLD: ABNORMAL
DIFF PNL FLD: NEGATIVE — SIGNIFICANT CHANGE UP
ELLIPTOCYTES BLD QL SMEAR: SLIGHT — SIGNIFICANT CHANGE UP
EOSINOPHIL # BLD AUTO: 0 K/UL — SIGNIFICANT CHANGE UP (ref 0–0.5)
EOSINOPHIL # BLD AUTO: 0 K/UL — SIGNIFICANT CHANGE UP (ref 0–0.5)
EOSINOPHIL # BLD AUTO: 0.02 K/UL — SIGNIFICANT CHANGE UP (ref 0–0.5)
EOSINOPHIL # BLD AUTO: 0.02 K/UL — SIGNIFICANT CHANGE UP (ref 0–0.5)
EOSINOPHIL # BLD AUTO: 0.03 K/UL — SIGNIFICANT CHANGE UP (ref 0–0.5)
EOSINOPHIL # BLD AUTO: 0.06 K/UL — SIGNIFICANT CHANGE UP (ref 0–0.5)
EOSINOPHIL # BLD AUTO: 0.08 K/UL — SIGNIFICANT CHANGE UP (ref 0–0.5)
EOSINOPHIL # BLD AUTO: 0.08 K/UL — SIGNIFICANT CHANGE UP (ref 0–0.5)
EOSINOPHIL # BLD AUTO: 0.09 K/UL — SIGNIFICANT CHANGE UP (ref 0–0.5)
EOSINOPHIL # BLD AUTO: 0.1 K/UL — SIGNIFICANT CHANGE UP (ref 0–0.5)
EOSINOPHIL # BLD AUTO: 0.11 K/UL — SIGNIFICANT CHANGE UP (ref 0–0.5)
EOSINOPHIL # BLD AUTO: 0.12 K/UL — SIGNIFICANT CHANGE UP (ref 0–0.5)
EOSINOPHIL # BLD AUTO: 0.12 K/UL — SIGNIFICANT CHANGE UP (ref 0–0.5)
EOSINOPHIL # BLD AUTO: 0.13 K/UL — SIGNIFICANT CHANGE UP (ref 0–0.5)
EOSINOPHIL # BLD AUTO: 0.16 K/UL — SIGNIFICANT CHANGE UP (ref 0–0.5)
EOSINOPHIL # BLD AUTO: 0.17 K/UL — SIGNIFICANT CHANGE UP (ref 0–0.5)
EOSINOPHIL # BLD AUTO: 0.18 K/UL — SIGNIFICANT CHANGE UP (ref 0–0.5)
EOSINOPHIL # BLD AUTO: 0.24 K/UL — SIGNIFICANT CHANGE UP (ref 0–0.5)
EOSINOPHIL NFR BLD AUTO: 0 % — SIGNIFICANT CHANGE UP (ref 0–6)
EOSINOPHIL NFR BLD AUTO: 0 % — SIGNIFICANT CHANGE UP (ref 0–6)
EOSINOPHIL NFR BLD AUTO: 0.1 % — SIGNIFICANT CHANGE UP (ref 0–6)
EOSINOPHIL NFR BLD AUTO: 0.2 % — SIGNIFICANT CHANGE UP (ref 0–6)
EOSINOPHIL NFR BLD AUTO: 0.5 % — SIGNIFICANT CHANGE UP (ref 0–6)
EOSINOPHIL NFR BLD AUTO: 0.7 % — SIGNIFICANT CHANGE UP (ref 0–6)
EOSINOPHIL NFR BLD AUTO: 0.8 % — SIGNIFICANT CHANGE UP (ref 0–6)
EOSINOPHIL NFR BLD AUTO: 1.1 % — SIGNIFICANT CHANGE UP (ref 0–6)
EOSINOPHIL NFR BLD AUTO: 1.2 % — SIGNIFICANT CHANGE UP (ref 0–6)
EOSINOPHIL NFR BLD AUTO: 1.5 % — SIGNIFICANT CHANGE UP (ref 0–6)
EOSINOPHIL NFR BLD AUTO: 1.6 % — SIGNIFICANT CHANGE UP (ref 0–6)
EOSINOPHIL NFR BLD AUTO: 1.6 % — SIGNIFICANT CHANGE UP (ref 0–6)
EOSINOPHIL NFR BLD AUTO: 1.7 % — SIGNIFICANT CHANGE UP (ref 0–6)
EOSINOPHIL NFR BLD AUTO: 1.9 % — SIGNIFICANT CHANGE UP (ref 0–6)
EOSINOPHIL NFR BLD AUTO: 2.1 % — SIGNIFICANT CHANGE UP (ref 0–6)
EOSINOPHIL NFR BLD AUTO: 2.3 % — SIGNIFICANT CHANGE UP (ref 0–6)
EOSINOPHIL NFR BLD AUTO: 2.7 % — SIGNIFICANT CHANGE UP (ref 0–6)
EOSINOPHIL NFR BLD AUTO: 3 % — SIGNIFICANT CHANGE UP (ref 0–6)
EOSINOPHIL NFR BLD AUTO: 3.1 % — SIGNIFICANT CHANGE UP (ref 0–6)
EOSINOPHIL NFR BLD AUTO: 3.2 % — SIGNIFICANT CHANGE UP (ref 0–6)
EOSINOPHIL NFR BLD AUTO: 3.3 % — SIGNIFICANT CHANGE UP (ref 0–6)
EOSINOPHIL NFR BLD AUTO: 3.5 % — SIGNIFICANT CHANGE UP (ref 0–6)
EOSINOPHIL NFR BLD AUTO: 4.4 % — SIGNIFICANT CHANGE UP (ref 0–6)
EPI CELLS # UR: 0 /HPF — SIGNIFICANT CHANGE UP
EPI CELLS # UR: 0 /HPF — SIGNIFICANT CHANGE UP
EPI CELLS # UR: 3 /HPF — SIGNIFICANT CHANGE UP
EPI CELLS # UR: SIGNIFICANT CHANGE UP
ESTIMATED AVERAGE GLUCOSE: 111 MG/DL — SIGNIFICANT CHANGE UP (ref 68–114)
FLUID INTAKE SUBSTANCE CLASS: SIGNIFICANT CHANGE UP
FLUID SEGMENTED GRANULOCYTES: 11 % — SIGNIFICANT CHANGE UP
FLUID SEGMENTED GRANULOCYTES: 27 % — SIGNIFICANT CHANGE UP
FLUID SEGMENTED GRANULOCYTES: 29 % — SIGNIFICANT CHANGE UP
FLUID SEGMENTED GRANULOCYTES: 41 % — SIGNIFICANT CHANGE UP
FLUID SEGMENTED GRANULOCYTES: 5 % — SIGNIFICANT CHANGE UP
FLUID SEGMENTED GRANULOCYTES: 56 % — SIGNIFICANT CHANGE UP
FOLATE+VIT B12 SERBLD-IMP: 2 % — SIGNIFICANT CHANGE UP
FOLATE+VIT B12 SERBLD-IMP: 8 % — SIGNIFICANT CHANGE UP
GAS PNL BLDA: SIGNIFICANT CHANGE UP
GAS PNL BLDV: 142 MMOL/L — SIGNIFICANT CHANGE UP (ref 135–145)
GAS PNL BLDV: 144 MMOL/L — SIGNIFICANT CHANGE UP (ref 135–145)
GAS PNL BLDV: 146 MMOL/L — HIGH (ref 135–145)
GAS PNL BLDV: SIGNIFICANT CHANGE UP
GLUCOSE BLDC GLUCOMTR-MCNC: 103 MG/DL — HIGH (ref 70–99)
GLUCOSE BLDC GLUCOMTR-MCNC: 107 MG/DL — HIGH (ref 70–99)
GLUCOSE BLDC GLUCOMTR-MCNC: 114 MG/DL — HIGH (ref 70–99)
GLUCOSE BLDC GLUCOMTR-MCNC: 122 MG/DL — HIGH (ref 70–99)
GLUCOSE BLDC GLUCOMTR-MCNC: 132 MG/DL — HIGH (ref 70–99)
GLUCOSE BLDC GLUCOMTR-MCNC: 133 MG/DL — HIGH (ref 70–99)
GLUCOSE BLDC GLUCOMTR-MCNC: 134 MG/DL — HIGH (ref 70–99)
GLUCOSE BLDC GLUCOMTR-MCNC: 137 MG/DL — HIGH (ref 70–99)
GLUCOSE BLDC GLUCOMTR-MCNC: 138 MG/DL — HIGH (ref 70–99)
GLUCOSE BLDC GLUCOMTR-MCNC: 141 MG/DL — HIGH (ref 70–99)
GLUCOSE BLDC GLUCOMTR-MCNC: 143 MG/DL — HIGH (ref 70–99)
GLUCOSE BLDC GLUCOMTR-MCNC: 144 MG/DL — HIGH (ref 70–99)
GLUCOSE BLDC GLUCOMTR-MCNC: 145 MG/DL — HIGH (ref 70–99)
GLUCOSE BLDC GLUCOMTR-MCNC: 149 MG/DL — HIGH (ref 70–99)
GLUCOSE BLDC GLUCOMTR-MCNC: 151 MG/DL — HIGH (ref 70–99)
GLUCOSE BLDC GLUCOMTR-MCNC: 151 MG/DL — HIGH (ref 70–99)
GLUCOSE BLDC GLUCOMTR-MCNC: 153 MG/DL — HIGH (ref 70–99)
GLUCOSE BLDC GLUCOMTR-MCNC: 154 MG/DL — HIGH (ref 70–99)
GLUCOSE BLDC GLUCOMTR-MCNC: 155 MG/DL — HIGH (ref 70–99)
GLUCOSE BLDC GLUCOMTR-MCNC: 155 MG/DL — HIGH (ref 70–99)
GLUCOSE BLDC GLUCOMTR-MCNC: 156 MG/DL — HIGH (ref 70–99)
GLUCOSE BLDC GLUCOMTR-MCNC: 159 MG/DL — HIGH (ref 70–99)
GLUCOSE BLDC GLUCOMTR-MCNC: 160 MG/DL — HIGH (ref 70–99)
GLUCOSE BLDC GLUCOMTR-MCNC: 160 MG/DL — HIGH (ref 70–99)
GLUCOSE BLDC GLUCOMTR-MCNC: 161 MG/DL — HIGH (ref 70–99)
GLUCOSE BLDC GLUCOMTR-MCNC: 162 MG/DL — HIGH (ref 70–99)
GLUCOSE BLDC GLUCOMTR-MCNC: 162 MG/DL — HIGH (ref 70–99)
GLUCOSE BLDC GLUCOMTR-MCNC: 163 MG/DL — HIGH (ref 70–99)
GLUCOSE BLDC GLUCOMTR-MCNC: 164 MG/DL — HIGH (ref 70–99)
GLUCOSE BLDC GLUCOMTR-MCNC: 171 MG/DL — HIGH (ref 70–99)
GLUCOSE BLDC GLUCOMTR-MCNC: 172 MG/DL — HIGH (ref 70–99)
GLUCOSE BLDC GLUCOMTR-MCNC: 172 MG/DL — HIGH (ref 70–99)
GLUCOSE BLDC GLUCOMTR-MCNC: 173 MG/DL — HIGH (ref 70–99)
GLUCOSE BLDC GLUCOMTR-MCNC: 173 MG/DL — HIGH (ref 70–99)
GLUCOSE BLDC GLUCOMTR-MCNC: 176 MG/DL — HIGH (ref 70–99)
GLUCOSE BLDC GLUCOMTR-MCNC: 180 MG/DL — HIGH (ref 70–99)
GLUCOSE BLDC GLUCOMTR-MCNC: 181 MG/DL — HIGH (ref 70–99)
GLUCOSE BLDC GLUCOMTR-MCNC: 185 MG/DL — HIGH (ref 70–99)
GLUCOSE BLDC GLUCOMTR-MCNC: 185 MG/DL — HIGH (ref 70–99)
GLUCOSE BLDC GLUCOMTR-MCNC: 186 MG/DL — HIGH (ref 70–99)
GLUCOSE BLDC GLUCOMTR-MCNC: 186 MG/DL — HIGH (ref 70–99)
GLUCOSE BLDC GLUCOMTR-MCNC: 188 MG/DL — HIGH (ref 70–99)
GLUCOSE BLDC GLUCOMTR-MCNC: 190 MG/DL — HIGH (ref 70–99)
GLUCOSE BLDC GLUCOMTR-MCNC: 190 MG/DL — HIGH (ref 70–99)
GLUCOSE BLDC GLUCOMTR-MCNC: 192 MG/DL — HIGH (ref 70–99)
GLUCOSE BLDC GLUCOMTR-MCNC: 193 MG/DL — HIGH (ref 70–99)
GLUCOSE BLDC GLUCOMTR-MCNC: 194 MG/DL — HIGH (ref 70–99)
GLUCOSE BLDC GLUCOMTR-MCNC: 196 MG/DL — HIGH (ref 70–99)
GLUCOSE BLDC GLUCOMTR-MCNC: 196 MG/DL — HIGH (ref 70–99)
GLUCOSE BLDC GLUCOMTR-MCNC: 197 MG/DL — HIGH (ref 70–99)
GLUCOSE BLDC GLUCOMTR-MCNC: 199 MG/DL — HIGH (ref 70–99)
GLUCOSE BLDC GLUCOMTR-MCNC: 200 MG/DL — HIGH (ref 70–99)
GLUCOSE BLDC GLUCOMTR-MCNC: 201 MG/DL — HIGH (ref 70–99)
GLUCOSE BLDC GLUCOMTR-MCNC: 201 MG/DL — HIGH (ref 70–99)
GLUCOSE BLDC GLUCOMTR-MCNC: 202 MG/DL — HIGH (ref 70–99)
GLUCOSE BLDC GLUCOMTR-MCNC: 204 MG/DL — HIGH (ref 70–99)
GLUCOSE BLDC GLUCOMTR-MCNC: 207 MG/DL — HIGH (ref 70–99)
GLUCOSE BLDC GLUCOMTR-MCNC: 207 MG/DL — HIGH (ref 70–99)
GLUCOSE BLDC GLUCOMTR-MCNC: 209 MG/DL — HIGH (ref 70–99)
GLUCOSE BLDC GLUCOMTR-MCNC: 209 MG/DL — HIGH (ref 70–99)
GLUCOSE BLDC GLUCOMTR-MCNC: 210 MG/DL — HIGH (ref 70–99)
GLUCOSE BLDC GLUCOMTR-MCNC: 211 MG/DL — HIGH (ref 70–99)
GLUCOSE BLDC GLUCOMTR-MCNC: 212 MG/DL — HIGH (ref 70–99)
GLUCOSE BLDC GLUCOMTR-MCNC: 212 MG/DL — HIGH (ref 70–99)
GLUCOSE BLDC GLUCOMTR-MCNC: 214 MG/DL — HIGH (ref 70–99)
GLUCOSE BLDC GLUCOMTR-MCNC: 217 MG/DL — HIGH (ref 70–99)
GLUCOSE BLDC GLUCOMTR-MCNC: 220 MG/DL — HIGH (ref 70–99)
GLUCOSE BLDC GLUCOMTR-MCNC: 220 MG/DL — HIGH (ref 70–99)
GLUCOSE BLDC GLUCOMTR-MCNC: 221 MG/DL — HIGH (ref 70–99)
GLUCOSE BLDC GLUCOMTR-MCNC: 222 MG/DL — HIGH (ref 70–99)
GLUCOSE BLDC GLUCOMTR-MCNC: 222 MG/DL — HIGH (ref 70–99)
GLUCOSE BLDC GLUCOMTR-MCNC: 224 MG/DL — HIGH (ref 70–99)
GLUCOSE BLDC GLUCOMTR-MCNC: 225 MG/DL — HIGH (ref 70–99)
GLUCOSE BLDC GLUCOMTR-MCNC: 225 MG/DL — HIGH (ref 70–99)
GLUCOSE BLDC GLUCOMTR-MCNC: 228 MG/DL — HIGH (ref 70–99)
GLUCOSE BLDC GLUCOMTR-MCNC: 231 MG/DL — HIGH (ref 70–99)
GLUCOSE BLDC GLUCOMTR-MCNC: 234 MG/DL — HIGH (ref 70–99)
GLUCOSE BLDC GLUCOMTR-MCNC: 234 MG/DL — HIGH (ref 70–99)
GLUCOSE BLDC GLUCOMTR-MCNC: 237 MG/DL — HIGH (ref 70–99)
GLUCOSE BLDC GLUCOMTR-MCNC: 237 MG/DL — HIGH (ref 70–99)
GLUCOSE BLDC GLUCOMTR-MCNC: 238 MG/DL — HIGH (ref 70–99)
GLUCOSE BLDC GLUCOMTR-MCNC: 239 MG/DL — HIGH (ref 70–99)
GLUCOSE BLDC GLUCOMTR-MCNC: 241 MG/DL — HIGH (ref 70–99)
GLUCOSE BLDC GLUCOMTR-MCNC: 244 MG/DL — HIGH (ref 70–99)
GLUCOSE BLDC GLUCOMTR-MCNC: 248 MG/DL — HIGH (ref 70–99)
GLUCOSE BLDC GLUCOMTR-MCNC: 253 MG/DL — HIGH (ref 70–99)
GLUCOSE BLDC GLUCOMTR-MCNC: 253 MG/DL — HIGH (ref 70–99)
GLUCOSE BLDC GLUCOMTR-MCNC: 258 MG/DL — HIGH (ref 70–99)
GLUCOSE BLDC GLUCOMTR-MCNC: 260 MG/DL — HIGH (ref 70–99)
GLUCOSE BLDC GLUCOMTR-MCNC: 263 MG/DL — HIGH (ref 70–99)
GLUCOSE BLDC GLUCOMTR-MCNC: 263 MG/DL — HIGH (ref 70–99)
GLUCOSE BLDC GLUCOMTR-MCNC: 264 MG/DL — HIGH (ref 70–99)
GLUCOSE BLDC GLUCOMTR-MCNC: 268 MG/DL — HIGH (ref 70–99)
GLUCOSE BLDC GLUCOMTR-MCNC: 272 MG/DL — HIGH (ref 70–99)
GLUCOSE BLDC GLUCOMTR-MCNC: 276 MG/DL — HIGH (ref 70–99)
GLUCOSE BLDC GLUCOMTR-MCNC: 277 MG/DL — HIGH (ref 70–99)
GLUCOSE BLDC GLUCOMTR-MCNC: 279 MG/DL — HIGH (ref 70–99)
GLUCOSE BLDC GLUCOMTR-MCNC: 282 MG/DL — HIGH (ref 70–99)
GLUCOSE BLDC GLUCOMTR-MCNC: 297 MG/DL — HIGH (ref 70–99)
GLUCOSE BLDC GLUCOMTR-MCNC: 95 MG/DL — SIGNIFICANT CHANGE UP (ref 70–99)
GLUCOSE BLDV-MCNC: 125 MG/DL — HIGH (ref 70–99)
GLUCOSE BLDV-MCNC: 127 MG/DL — HIGH (ref 70–99)
GLUCOSE BLDV-MCNC: 135 MG/DL — HIGH (ref 70–99)
GLUCOSE FLD-MCNC: 151 MG/DL — SIGNIFICANT CHANGE UP
GLUCOSE FLD-MCNC: 169 MG/DL — SIGNIFICANT CHANGE UP
GLUCOSE FLD-MCNC: 206 MG/DL — SIGNIFICANT CHANGE UP
GLUCOSE FLD-MCNC: 218 MG/DL — SIGNIFICANT CHANGE UP
GLUCOSE FLD-MCNC: 230 MG/DL — SIGNIFICANT CHANGE UP
GLUCOSE FLD-MCNC: 292 MG/DL — SIGNIFICANT CHANGE UP
GLUCOSE SERPL-MCNC: 106 MG/DL — HIGH (ref 70–99)
GLUCOSE SERPL-MCNC: 116 MG/DL — HIGH (ref 70–99)
GLUCOSE SERPL-MCNC: 131 MG/DL — HIGH (ref 70–99)
GLUCOSE SERPL-MCNC: 134 MG/DL — HIGH (ref 70–99)
GLUCOSE SERPL-MCNC: 137 MG/DL — HIGH (ref 70–99)
GLUCOSE SERPL-MCNC: 143 MG/DL — HIGH (ref 70–99)
GLUCOSE SERPL-MCNC: 144 MG/DL — HIGH (ref 70–99)
GLUCOSE SERPL-MCNC: 145 MG/DL — HIGH (ref 70–99)
GLUCOSE SERPL-MCNC: 146 MG/DL — HIGH (ref 70–99)
GLUCOSE SERPL-MCNC: 150 MG/DL — HIGH (ref 70–99)
GLUCOSE SERPL-MCNC: 152 MG/DL — HIGH (ref 70–99)
GLUCOSE SERPL-MCNC: 152 MG/DL — HIGH (ref 70–99)
GLUCOSE SERPL-MCNC: 154 MG/DL — HIGH (ref 70–99)
GLUCOSE SERPL-MCNC: 155 MG/DL — HIGH (ref 70–99)
GLUCOSE SERPL-MCNC: 155 MG/DL — HIGH (ref 70–99)
GLUCOSE SERPL-MCNC: 157 MG/DL — HIGH (ref 70–99)
GLUCOSE SERPL-MCNC: 163 MG/DL — HIGH (ref 70–99)
GLUCOSE SERPL-MCNC: 163 MG/DL — HIGH (ref 70–99)
GLUCOSE SERPL-MCNC: 168 MG/DL — HIGH (ref 70–99)
GLUCOSE SERPL-MCNC: 173 MG/DL — HIGH (ref 70–99)
GLUCOSE SERPL-MCNC: 173 MG/DL — HIGH (ref 70–99)
GLUCOSE SERPL-MCNC: 174 MG/DL — HIGH (ref 70–99)
GLUCOSE SERPL-MCNC: 177 MG/DL — HIGH (ref 70–99)
GLUCOSE SERPL-MCNC: 188 MG/DL — HIGH (ref 70–99)
GLUCOSE SERPL-MCNC: 193 MG/DL — HIGH (ref 70–99)
GLUCOSE SERPL-MCNC: 197 MG/DL — HIGH (ref 70–99)
GLUCOSE SERPL-MCNC: 204 MG/DL — HIGH (ref 70–99)
GLUCOSE SERPL-MCNC: 204 MG/DL — HIGH (ref 70–99)
GLUCOSE SERPL-MCNC: 210 MG/DL — HIGH (ref 70–99)
GLUCOSE SERPL-MCNC: 210 MG/DL — HIGH (ref 70–99)
GLUCOSE SERPL-MCNC: 213 MG/DL — HIGH (ref 70–99)
GLUCOSE SERPL-MCNC: 213 MG/DL — HIGH (ref 70–99)
GLUCOSE SERPL-MCNC: 215 MG/DL — HIGH (ref 70–99)
GLUCOSE SERPL-MCNC: 215 MG/DL — HIGH (ref 70–99)
GLUCOSE SERPL-MCNC: 217 MG/DL — HIGH (ref 70–99)
GLUCOSE SERPL-MCNC: 230 MG/DL — HIGH (ref 70–99)
GLUCOSE SERPL-MCNC: 233 MG/DL — HIGH (ref 70–99)
GLUCOSE SERPL-MCNC: 233 MG/DL — HIGH (ref 70–99)
GLUCOSE SERPL-MCNC: 236 MG/DL — HIGH (ref 70–99)
GLUCOSE SERPL-MCNC: 248 MG/DL — HIGH (ref 70–99)
GLUCOSE SERPL-MCNC: 270 MG/DL — HIGH (ref 70–99)
GLUCOSE SERPL-MCNC: 86 MG/DL — SIGNIFICANT CHANGE UP (ref 70–99)
GLUCOSE UR QL: NEGATIVE — SIGNIFICANT CHANGE UP
GRAM STN FLD: SIGNIFICANT CHANGE UP
H PYLORI AB SER-ACNC: 27.8 UNITS — HIGH
H PYLORI AB SER-ACNC: 36 UNITS — HIGH
H PYLORI IGA SER-ACNC: 32 UNITS — HIGH
HAV IGM SER-ACNC: SIGNIFICANT CHANGE UP
HBV CORE AB SER-ACNC: REACTIVE
HBV CORE IGM SER-ACNC: SIGNIFICANT CHANGE UP
HBV CORE IGM SER-ACNC: SIGNIFICANT CHANGE UP
HBV DNA # SERPL NAA+PROBE: SIGNIFICANT CHANGE UP
HBV DNA SERPL NAA+PROBE-LOG#: SIGNIFICANT CHANGE UP LOG10IU/ML
HBV SURFACE AB SER-ACNC: 32.4 MIU/ML — SIGNIFICANT CHANGE UP
HBV SURFACE AG SER-ACNC: SIGNIFICANT CHANGE UP
HCO3 BLDA-SCNC: 24 MMOL/L — SIGNIFICANT CHANGE UP (ref 21–29)
HCO3 BLDA-SCNC: 25 MMOL/L — SIGNIFICANT CHANGE UP (ref 21–29)
HCO3 BLDA-SCNC: 26 MMOL/L — SIGNIFICANT CHANGE UP (ref 21–29)
HCO3 BLDA-SCNC: 28 MMOL/L — SIGNIFICANT CHANGE UP (ref 21–29)
HCO3 BLDV-SCNC: 12 MMOL/L — LOW (ref 21–29)
HCO3 BLDV-SCNC: 14 MMOL/L — LOW (ref 21–29)
HCO3 BLDV-SCNC: 21 MMOL/L — SIGNIFICANT CHANGE UP (ref 21–29)
HCT VFR BLD CALC: 14.4 % — CRITICAL LOW (ref 39–50)
HCT VFR BLD CALC: 14.7 % — CRITICAL LOW (ref 39–50)
HCT VFR BLD CALC: 18.2 % — CRITICAL LOW (ref 39–50)
HCT VFR BLD CALC: 20 % — CRITICAL LOW (ref 39–50)
HCT VFR BLD CALC: 21.3 % — LOW (ref 39–50)
HCT VFR BLD CALC: 22.1 % — LOW (ref 39–50)
HCT VFR BLD CALC: 23.9 % — LOW (ref 39–50)
HCT VFR BLD CALC: 24.2 % — LOW (ref 39–50)
HCT VFR BLD CALC: 24.8 % — LOW (ref 39–50)
HCT VFR BLD CALC: 24.8 % — LOW (ref 39–50)
HCT VFR BLD CALC: 25 % — LOW (ref 39–50)
HCT VFR BLD CALC: 25.1 % — LOW (ref 39–50)
HCT VFR BLD CALC: 25.2 % — LOW (ref 39–50)
HCT VFR BLD CALC: 25.2 % — LOW (ref 39–50)
HCT VFR BLD CALC: 25.4 % — LOW (ref 39–50)
HCT VFR BLD CALC: 25.5 % — LOW (ref 39–50)
HCT VFR BLD CALC: 25.9 % — LOW (ref 39–50)
HCT VFR BLD CALC: 26 % — LOW (ref 39–50)
HCT VFR BLD CALC: 26.7 % — LOW (ref 39–50)
HCT VFR BLD CALC: 26.9 % — LOW (ref 39–50)
HCT VFR BLD CALC: 27.1 % — LOW (ref 39–50)
HCT VFR BLD CALC: 27.2 % — LOW (ref 39–50)
HCT VFR BLD CALC: 27.3 % — LOW (ref 39–50)
HCT VFR BLD CALC: 27.6 % — LOW (ref 39–50)
HCT VFR BLD CALC: 28.4 % — LOW (ref 39–50)
HCT VFR BLD CALC: 28.4 % — LOW (ref 39–50)
HCT VFR BLD CALC: 28.5 % — LOW (ref 39–50)
HCT VFR BLD CALC: 28.6 % — LOW (ref 39–50)
HCT VFR BLD CALC: 28.6 % — LOW (ref 39–50)
HCT VFR BLD CALC: 28.7 % — LOW (ref 39–50)
HCT VFR BLD CALC: 28.7 % — LOW (ref 39–50)
HCT VFR BLD CALC: 28.8 % — LOW (ref 39–50)
HCT VFR BLD CALC: 28.9 % — LOW (ref 39–50)
HCT VFR BLD CALC: 29 % — LOW (ref 39–50)
HCT VFR BLD CALC: 29.4 % — LOW (ref 39–50)
HCT VFR BLD CALC: 29.4 % — LOW (ref 39–50)
HCT VFR BLD CALC: 29.5 % — LOW (ref 39–50)
HCT VFR BLD CALC: 29.7 % — LOW (ref 39–50)
HCT VFR BLD CALC: 29.8 % — LOW (ref 39–50)
HCT VFR BLD CALC: 30 % — LOW (ref 39–50)
HCT VFR BLD CALC: 32.2 % — LOW (ref 39–50)
HCT VFR BLDA CALC: 26 % — LOW (ref 39–50)
HCT VFR BLDA CALC: 30 % — LOW (ref 39–50)
HCT VFR BLDA CALC: 30 % — LOW (ref 39–50)
HCV AB S/CO SERPL IA: 0.17 S/CO — SIGNIFICANT CHANGE UP (ref 0–0.99)
HCV AB SERPL-IMP: SIGNIFICANT CHANGE UP
HGB BLD CALC-MCNC: 8.5 G/DL — LOW (ref 13–17)
HGB BLD CALC-MCNC: 9.7 G/DL — LOW (ref 13–17)
HGB BLD CALC-MCNC: 9.8 G/DL — LOW (ref 13–17)
HGB BLD-MCNC: 10 G/DL — LOW (ref 13–17)
HGB BLD-MCNC: 10.6 G/DL — LOW (ref 13–17)
HGB BLD-MCNC: 11.8 G/DL — LOW (ref 13–17)
HGB BLD-MCNC: 5.1 G/DL — CRITICAL LOW (ref 13–17)
HGB BLD-MCNC: 5.2 G/DL — CRITICAL LOW (ref 13–17)
HGB BLD-MCNC: 6.6 G/DL — CRITICAL LOW (ref 13–17)
HGB BLD-MCNC: 7.1 G/DL — LOW (ref 13–17)
HGB BLD-MCNC: 7.5 G/DL — LOW (ref 13–17)
HGB BLD-MCNC: 7.6 G/DL — LOW (ref 13–17)
HGB BLD-MCNC: 8 G/DL — LOW (ref 13–17)
HGB BLD-MCNC: 8.1 G/DL — LOW (ref 13–17)
HGB BLD-MCNC: 8.2 G/DL — LOW (ref 13–17)
HGB BLD-MCNC: 8.3 G/DL — LOW (ref 13–17)
HGB BLD-MCNC: 8.4 G/DL — LOW (ref 13–17)
HGB BLD-MCNC: 8.6 G/DL — LOW (ref 13–17)
HGB BLD-MCNC: 8.6 G/DL — LOW (ref 13–17)
HGB BLD-MCNC: 8.8 G/DL — LOW (ref 13–17)
HGB BLD-MCNC: 8.9 G/DL — LOW (ref 13–17)
HGB BLD-MCNC: 8.9 G/DL — LOW (ref 13–17)
HGB BLD-MCNC: 9 G/DL — LOW (ref 13–17)
HGB BLD-MCNC: 9 G/DL — LOW (ref 13–17)
HGB BLD-MCNC: 9.1 G/DL — LOW (ref 13–17)
HGB BLD-MCNC: 9.3 G/DL — LOW (ref 13–17)
HGB BLD-MCNC: 9.4 G/DL — LOW (ref 13–17)
HGB BLD-MCNC: 9.5 G/DL — LOW (ref 13–17)
HGB BLD-MCNC: 9.6 G/DL — LOW (ref 13–17)
HGB BLD-MCNC: 9.8 G/DL — LOW (ref 13–17)
HGB BLD-MCNC: 9.9 G/DL — LOW (ref 13–17)
HIV 1+2 AB+HIV1 P24 AG SERPL QL IA: SIGNIFICANT CHANGE UP
HOROWITZ INDEX BLDA+IHG-RTO: 30 — SIGNIFICANT CHANGE UP
HOROWITZ INDEX BLDA+IHG-RTO: 30 — SIGNIFICANT CHANGE UP
HOROWITZ INDEX BLDA+IHG-RTO: 35 — SIGNIFICANT CHANGE UP
HOROWITZ INDEX BLDV+IHG-RTO: 21 — SIGNIFICANT CHANGE UP
HOROWITZ INDEX BLDV+IHG-RTO: 21 — SIGNIFICANT CHANGE UP
HYALINE CASTS # UR AUTO: 0 /LPF — SIGNIFICANT CHANGE UP (ref 0–2)
HYALINE CASTS # UR AUTO: 3 /LPF — HIGH (ref 0–2)
HYALINE CASTS # UR AUTO: 5 /LPF — HIGH (ref 0–2)
HYALINE CASTS # UR AUTO: 6 /LPF — HIGH (ref 0–2)
HYPOCHROMIA BLD QL: SLIGHT — SIGNIFICANT CHANGE UP
IMM GRANULOCYTES NFR BLD AUTO: 0.2 % — SIGNIFICANT CHANGE UP (ref 0–1.5)
IMM GRANULOCYTES NFR BLD AUTO: 0.2 % — SIGNIFICANT CHANGE UP (ref 0–1.5)
IMM GRANULOCYTES NFR BLD AUTO: 0.3 % — SIGNIFICANT CHANGE UP (ref 0–1.5)
IMM GRANULOCYTES NFR BLD AUTO: 0.4 % — SIGNIFICANT CHANGE UP (ref 0–1.5)
IMM GRANULOCYTES NFR BLD AUTO: 0.5 % — SIGNIFICANT CHANGE UP (ref 0–1.5)
IMM GRANULOCYTES NFR BLD AUTO: 0.6 % — SIGNIFICANT CHANGE UP (ref 0–1.5)
IMM GRANULOCYTES NFR BLD AUTO: 0.7 % — SIGNIFICANT CHANGE UP (ref 0–1.5)
IMM GRANULOCYTES NFR BLD AUTO: 0.8 % — SIGNIFICANT CHANGE UP (ref 0–1.5)
IMM GRANULOCYTES NFR BLD AUTO: 1 % — SIGNIFICANT CHANGE UP (ref 0–1.5)
IMM GRANULOCYTES NFR BLD AUTO: 1 % — SIGNIFICANT CHANGE UP (ref 0–1.5)
IMM GRANULOCYTES NFR BLD AUTO: 1.1 % — SIGNIFICANT CHANGE UP (ref 0–1.5)
IMM GRANULOCYTES NFR BLD AUTO: 1.4 % — SIGNIFICANT CHANGE UP (ref 0–1.5)
IMM GRANULOCYTES NFR BLD AUTO: 1.5 % — SIGNIFICANT CHANGE UP (ref 0–1.5)
INR BLD: 1.35 RATIO — HIGH (ref 0.88–1.16)
INR BLD: 1.45 RATIO — HIGH (ref 0.88–1.16)
INR BLD: 1.47 RATIO — HIGH (ref 0.88–1.16)
INR BLD: 1.5 RATIO — HIGH (ref 0.88–1.16)
INR BLD: 1.51 RATIO — HIGH (ref 0.88–1.16)
INR BLD: 1.53 RATIO — HIGH (ref 0.88–1.16)
INR BLD: 1.56 RATIO — HIGH (ref 0.88–1.16)
INR BLD: 1.57 RATIO — HIGH (ref 0.88–1.16)
INR BLD: 1.58 RATIO — HIGH (ref 0.88–1.16)
INR BLD: 1.58 RATIO — HIGH (ref 0.88–1.16)
INR BLD: 1.6 RATIO — HIGH (ref 0.88–1.16)
INR BLD: 1.6 RATIO — HIGH (ref 0.88–1.16)
INR BLD: 1.64 RATIO — HIGH (ref 0.88–1.16)
INR BLD: 1.67 RATIO — HIGH (ref 0.88–1.16)
INR BLD: 1.69 RATIO — HIGH (ref 0.88–1.16)
INR BLD: 1.71 RATIO — HIGH (ref 0.88–1.16)
INR BLD: 1.79 RATIO — HIGH (ref 0.88–1.16)
INR BLD: 1.89 RATIO — HIGH (ref 0.88–1.16)
INR BLD: 1.94 RATIO — HIGH (ref 0.88–1.16)
KETONES UR-MCNC: NEGATIVE — SIGNIFICANT CHANGE UP
LACTATE BLDV-MCNC: 1.4 MMOL/L — SIGNIFICANT CHANGE UP (ref 0.7–2)
LACTATE BLDV-MCNC: 4.8 MMOL/L — CRITICAL HIGH (ref 0.7–2)
LACTATE BLDV-MCNC: 5.1 MMOL/L — CRITICAL HIGH (ref 0.7–2)
LDH SERPL L TO P-CCNC: 272 U/L — HIGH (ref 50–242)
LDH SERPL L TO P-CCNC: 291 U/L — HIGH (ref 50–242)
LDH SERPL L TO P-CCNC: 45 U/L — SIGNIFICANT CHANGE UP
LDH SERPL L TO P-CCNC: 73 U/L — SIGNIFICANT CHANGE UP
LDH SERPL L TO P-CCNC: 76 U/L — SIGNIFICANT CHANGE UP
LDH SERPL L TO P-CCNC: 80 U/L — SIGNIFICANT CHANGE UP
LDH SERPL L TO P-CCNC: 80 U/L — SIGNIFICANT CHANGE UP
LDH SERPL L TO P-CCNC: 98 U/L — SIGNIFICANT CHANGE UP
LEUKOCYTE ESTERASE UR-ACNC: ABNORMAL
LEUKOCYTE ESTERASE UR-ACNC: ABNORMAL
LEUKOCYTE ESTERASE UR-ACNC: NEGATIVE — SIGNIFICANT CHANGE UP
LYMPHOCYTES # BLD AUTO: 0.09 K/UL — LOW (ref 1–3.3)
LYMPHOCYTES # BLD AUTO: 0.2 K/UL — LOW (ref 1–3.3)
LYMPHOCYTES # BLD AUTO: 0.24 K/UL — LOW (ref 1–3.3)
LYMPHOCYTES # BLD AUTO: 0.33 K/UL — LOW (ref 1–3.3)
LYMPHOCYTES # BLD AUTO: 0.39 K/UL — LOW (ref 1–3.3)
LYMPHOCYTES # BLD AUTO: 0.42 K/UL — LOW (ref 1–3.3)
LYMPHOCYTES # BLD AUTO: 0.44 K/UL — LOW (ref 1–3.3)
LYMPHOCYTES # BLD AUTO: 0.45 K/UL — LOW (ref 1–3.3)
LYMPHOCYTES # BLD AUTO: 0.47 K/UL — LOW (ref 1–3.3)
LYMPHOCYTES # BLD AUTO: 0.5 K/UL — LOW (ref 1–3.3)
LYMPHOCYTES # BLD AUTO: 0.51 K/UL — LOW (ref 1–3.3)
LYMPHOCYTES # BLD AUTO: 0.52 K/UL — LOW (ref 1–3.3)
LYMPHOCYTES # BLD AUTO: 0.53 K/UL — LOW (ref 1–3.3)
LYMPHOCYTES # BLD AUTO: 0.53 K/UL — LOW (ref 1–3.3)
LYMPHOCYTES # BLD AUTO: 0.6 K/UL — LOW (ref 1–3.3)
LYMPHOCYTES # BLD AUTO: 0.62 K/UL — LOW (ref 1–3.3)
LYMPHOCYTES # BLD AUTO: 0.66 K/UL — LOW (ref 1–3.3)
LYMPHOCYTES # BLD AUTO: 0.68 K/UL — LOW (ref 1–3.3)
LYMPHOCYTES # BLD AUTO: 0.68 K/UL — LOW (ref 1–3.3)
LYMPHOCYTES # BLD AUTO: 0.71 K/UL — LOW (ref 1–3.3)
LYMPHOCYTES # BLD AUTO: 0.82 K/UL — LOW (ref 1–3.3)
LYMPHOCYTES # BLD AUTO: 0.85 K/UL — LOW (ref 1–3.3)
LYMPHOCYTES # BLD AUTO: 0.9 % — LOW (ref 13–44)
LYMPHOCYTES # BLD AUTO: 0.9 K/UL — LOW (ref 1–3.3)
LYMPHOCYTES # BLD AUTO: 0.92 K/UL — LOW (ref 1–3.3)
LYMPHOCYTES # BLD AUTO: 10.2 % — LOW (ref 13–44)
LYMPHOCYTES # BLD AUTO: 10.6 % — LOW (ref 13–44)
LYMPHOCYTES # BLD AUTO: 11 % — LOW (ref 13–44)
LYMPHOCYTES # BLD AUTO: 11.7 % — LOW (ref 13–44)
LYMPHOCYTES # BLD AUTO: 12.2 % — LOW (ref 13–44)
LYMPHOCYTES # BLD AUTO: 12.4 % — LOW (ref 13–44)
LYMPHOCYTES # BLD AUTO: 12.4 % — LOW (ref 13–44)
LYMPHOCYTES # BLD AUTO: 12.6 % — LOW (ref 13–44)
LYMPHOCYTES # BLD AUTO: 13.3 % — SIGNIFICANT CHANGE UP (ref 13–44)
LYMPHOCYTES # BLD AUTO: 13.5 % — SIGNIFICANT CHANGE UP (ref 13–44)
LYMPHOCYTES # BLD AUTO: 14 % — SIGNIFICANT CHANGE UP (ref 13–44)
LYMPHOCYTES # BLD AUTO: 14.2 % — SIGNIFICANT CHANGE UP (ref 13–44)
LYMPHOCYTES # BLD AUTO: 2.6 % — LOW (ref 13–44)
LYMPHOCYTES # BLD AUTO: 3.2 % — LOW (ref 13–44)
LYMPHOCYTES # BLD AUTO: 3.4 % — LOW (ref 13–44)
LYMPHOCYTES # BLD AUTO: 4.5 % — LOW (ref 13–44)
LYMPHOCYTES # BLD AUTO: 5 % — LOW (ref 13–44)
LYMPHOCYTES # BLD AUTO: 5.9 % — LOW (ref 13–44)
LYMPHOCYTES # BLD AUTO: 6.6 % — LOW (ref 13–44)
LYMPHOCYTES # BLD AUTO: 7.2 % — LOW (ref 13–44)
LYMPHOCYTES # BLD AUTO: 7.9 % — LOW (ref 13–44)
LYMPHOCYTES # BLD AUTO: 8.1 % — LOW (ref 13–44)
LYMPHOCYTES # BLD AUTO: 9 % — LOW (ref 13–44)
LYMPHOCYTES # BLD AUTO: 9.1 % — LOW (ref 13–44)
LYMPHOCYTES # BLD AUTO: 9.3 % — LOW (ref 13–44)
LYMPHOCYTES # FLD: 12 % — SIGNIFICANT CHANGE UP
LYMPHOCYTES # FLD: 35 % — SIGNIFICANT CHANGE UP
LYMPHOCYTES # FLD: 35 % — SIGNIFICANT CHANGE UP
LYMPHOCYTES # FLD: 42 % — SIGNIFICANT CHANGE UP
LYMPHOCYTES # FLD: 50 % — SIGNIFICANT CHANGE UP
LYMPHOCYTES # FLD: 68 % — SIGNIFICANT CHANGE UP
MACROCYTES BLD QL: SIGNIFICANT CHANGE UP
MAGNESIUM SERPL-MCNC: 1.8 MG/DL — SIGNIFICANT CHANGE UP (ref 1.6–2.6)
MAGNESIUM SERPL-MCNC: 1.9 MG/DL — SIGNIFICANT CHANGE UP (ref 1.6–2.6)
MAGNESIUM SERPL-MCNC: 1.9 MG/DL — SIGNIFICANT CHANGE UP (ref 1.6–2.6)
MAGNESIUM SERPL-MCNC: 2 MG/DL — SIGNIFICANT CHANGE UP (ref 1.6–2.6)
MAGNESIUM SERPL-MCNC: 2.1 MG/DL — SIGNIFICANT CHANGE UP (ref 1.6–2.6)
MAGNESIUM SERPL-MCNC: 2.2 MG/DL — SIGNIFICANT CHANGE UP (ref 1.6–2.6)
MAGNESIUM SERPL-MCNC: 2.3 MG/DL — SIGNIFICANT CHANGE UP (ref 1.6–2.6)
MAGNESIUM SERPL-MCNC: 2.4 MG/DL — SIGNIFICANT CHANGE UP (ref 1.6–2.6)
MAGNESIUM SERPL-MCNC: 2.8 MG/DL — HIGH (ref 1.6–2.6)
MAGNESIUM SERPL-MCNC: 3 MG/DL — HIGH (ref 1.6–2.6)
MANUAL SMEAR VERIFICATION: SIGNIFICANT CHANGE UP
MCHC RBC-ENTMCNC: 30.9 GM/DL — LOW (ref 32–36)
MCHC RBC-ENTMCNC: 31 PG — SIGNIFICANT CHANGE UP (ref 27–34)
MCHC RBC-ENTMCNC: 31 PG — SIGNIFICANT CHANGE UP (ref 27–34)
MCHC RBC-ENTMCNC: 31.1 PG — SIGNIFICANT CHANGE UP (ref 27–34)
MCHC RBC-ENTMCNC: 31.1 PG — SIGNIFICANT CHANGE UP (ref 27–34)
MCHC RBC-ENTMCNC: 31.2 GM/DL — LOW (ref 32–36)
MCHC RBC-ENTMCNC: 31.3 GM/DL — LOW (ref 32–36)
MCHC RBC-ENTMCNC: 31.4 GM/DL — LOW (ref 32–36)
MCHC RBC-ENTMCNC: 31.4 PG — SIGNIFICANT CHANGE UP (ref 27–34)
MCHC RBC-ENTMCNC: 31.5 GM/DL — LOW (ref 32–36)
MCHC RBC-ENTMCNC: 31.5 PG — SIGNIFICANT CHANGE UP (ref 27–34)
MCHC RBC-ENTMCNC: 31.6 PG — SIGNIFICANT CHANGE UP (ref 27–34)
MCHC RBC-ENTMCNC: 31.7 GM/DL — LOW (ref 32–36)
MCHC RBC-ENTMCNC: 31.7 PG — SIGNIFICANT CHANGE UP (ref 27–34)
MCHC RBC-ENTMCNC: 31.7 PG — SIGNIFICANT CHANGE UP (ref 27–34)
MCHC RBC-ENTMCNC: 31.8 GM/DL — LOW (ref 32–36)
MCHC RBC-ENTMCNC: 31.8 PG — SIGNIFICANT CHANGE UP (ref 27–34)
MCHC RBC-ENTMCNC: 31.8 PG — SIGNIFICANT CHANGE UP (ref 27–34)
MCHC RBC-ENTMCNC: 31.9 GM/DL — LOW (ref 32–36)
MCHC RBC-ENTMCNC: 31.9 PG — SIGNIFICANT CHANGE UP (ref 27–34)
MCHC RBC-ENTMCNC: 32 GM/DL — SIGNIFICANT CHANGE UP (ref 32–36)
MCHC RBC-ENTMCNC: 32 GM/DL — SIGNIFICANT CHANGE UP (ref 32–36)
MCHC RBC-ENTMCNC: 32 PG — SIGNIFICANT CHANGE UP (ref 27–34)
MCHC RBC-ENTMCNC: 32.1 PG — SIGNIFICANT CHANGE UP (ref 27–34)
MCHC RBC-ENTMCNC: 32.1 PG — SIGNIFICANT CHANGE UP (ref 27–34)
MCHC RBC-ENTMCNC: 32.2 GM/DL — SIGNIFICANT CHANGE UP (ref 32–36)
MCHC RBC-ENTMCNC: 32.2 PG — SIGNIFICANT CHANGE UP (ref 27–34)
MCHC RBC-ENTMCNC: 32.2 PG — SIGNIFICANT CHANGE UP (ref 27–34)
MCHC RBC-ENTMCNC: 32.3 GM/DL — SIGNIFICANT CHANGE UP (ref 32–36)
MCHC RBC-ENTMCNC: 32.3 PG — SIGNIFICANT CHANGE UP (ref 27–34)
MCHC RBC-ENTMCNC: 32.4 PG — SIGNIFICANT CHANGE UP (ref 27–34)
MCHC RBC-ENTMCNC: 32.5 PG — SIGNIFICANT CHANGE UP (ref 27–34)
MCHC RBC-ENTMCNC: 32.5 PG — SIGNIFICANT CHANGE UP (ref 27–34)
MCHC RBC-ENTMCNC: 32.6 GM/DL — SIGNIFICANT CHANGE UP (ref 32–36)
MCHC RBC-ENTMCNC: 32.7 GM/DL — SIGNIFICANT CHANGE UP (ref 32–36)
MCHC RBC-ENTMCNC: 32.7 PG — SIGNIFICANT CHANGE UP (ref 27–34)
MCHC RBC-ENTMCNC: 32.7 PG — SIGNIFICANT CHANGE UP (ref 27–34)
MCHC RBC-ENTMCNC: 32.8 PG — SIGNIFICANT CHANGE UP (ref 27–34)
MCHC RBC-ENTMCNC: 32.9 GM/DL — SIGNIFICANT CHANGE UP (ref 32–36)
MCHC RBC-ENTMCNC: 33 GM/DL — SIGNIFICANT CHANGE UP (ref 32–36)
MCHC RBC-ENTMCNC: 33 PG — SIGNIFICANT CHANGE UP (ref 27–34)
MCHC RBC-ENTMCNC: 33.1 GM/DL — SIGNIFICANT CHANGE UP (ref 32–36)
MCHC RBC-ENTMCNC: 33.1 PG — SIGNIFICANT CHANGE UP (ref 27–34)
MCHC RBC-ENTMCNC: 33.2 GM/DL — SIGNIFICANT CHANGE UP (ref 32–36)
MCHC RBC-ENTMCNC: 33.3 GM/DL — SIGNIFICANT CHANGE UP (ref 32–36)
MCHC RBC-ENTMCNC: 33.3 GM/DL — SIGNIFICANT CHANGE UP (ref 32–36)
MCHC RBC-ENTMCNC: 33.5 GM/DL — SIGNIFICANT CHANGE UP (ref 32–36)
MCHC RBC-ENTMCNC: 33.5 GM/DL — SIGNIFICANT CHANGE UP (ref 32–36)
MCHC RBC-ENTMCNC: 33.6 GM/DL — SIGNIFICANT CHANGE UP (ref 32–36)
MCHC RBC-ENTMCNC: 33.7 GM/DL — SIGNIFICANT CHANGE UP (ref 32–36)
MCHC RBC-ENTMCNC: 33.8 GM/DL — SIGNIFICANT CHANGE UP (ref 32–36)
MCHC RBC-ENTMCNC: 34 GM/DL — SIGNIFICANT CHANGE UP (ref 32–36)
MCHC RBC-ENTMCNC: 34.7 GM/DL — SIGNIFICANT CHANGE UP (ref 32–36)
MCHC RBC-ENTMCNC: 35.1 GM/DL — SIGNIFICANT CHANGE UP (ref 32–36)
MCHC RBC-ENTMCNC: 35.3 GM/DL — SIGNIFICANT CHANGE UP (ref 32–36)
MCHC RBC-ENTMCNC: 35.4 GM/DL — SIGNIFICANT CHANGE UP (ref 32–36)
MCHC RBC-ENTMCNC: 35.4 GM/DL — SIGNIFICANT CHANGE UP (ref 32–36)
MCHC RBC-ENTMCNC: 35.5 GM/DL — SIGNIFICANT CHANGE UP (ref 32–36)
MCHC RBC-ENTMCNC: 35.7 GM/DL — SIGNIFICANT CHANGE UP (ref 32–36)
MCHC RBC-ENTMCNC: 36.2 GM/DL — HIGH (ref 32–36)
MCHC RBC-ENTMCNC: 36.3 GM/DL — HIGH (ref 32–36)
MCHC RBC-ENTMCNC: 36.4 GM/DL — HIGH (ref 32–36)
MCHC RBC-ENTMCNC: 36.6 GM/DL — HIGH (ref 32–36)
MCV RBC AUTO: 100 FL — SIGNIFICANT CHANGE UP (ref 80–100)
MCV RBC AUTO: 100 FL — SIGNIFICANT CHANGE UP (ref 80–100)
MCV RBC AUTO: 100.4 FL — HIGH (ref 80–100)
MCV RBC AUTO: 100.7 FL — HIGH (ref 80–100)
MCV RBC AUTO: 101 FL — HIGH (ref 80–100)
MCV RBC AUTO: 102 FL — HIGH (ref 80–100)
MCV RBC AUTO: 87.7 FL — SIGNIFICANT CHANGE UP (ref 80–100)
MCV RBC AUTO: 88.1 FL — SIGNIFICANT CHANGE UP (ref 80–100)
MCV RBC AUTO: 88.4 FL — SIGNIFICANT CHANGE UP (ref 80–100)
MCV RBC AUTO: 89.9 FL — SIGNIFICANT CHANGE UP (ref 80–100)
MCV RBC AUTO: 90.1 FL — SIGNIFICANT CHANGE UP (ref 80–100)
MCV RBC AUTO: 90.3 FL — SIGNIFICANT CHANGE UP (ref 80–100)
MCV RBC AUTO: 90.6 FL — SIGNIFICANT CHANGE UP (ref 80–100)
MCV RBC AUTO: 91.2 FL — SIGNIFICANT CHANGE UP (ref 80–100)
MCV RBC AUTO: 91.3 FL — SIGNIFICANT CHANGE UP (ref 80–100)
MCV RBC AUTO: 91.6 FL — SIGNIFICANT CHANGE UP (ref 80–100)
MCV RBC AUTO: 91.8 FL — SIGNIFICANT CHANGE UP (ref 80–100)
MCV RBC AUTO: 92.7 FL — SIGNIFICANT CHANGE UP (ref 80–100)
MCV RBC AUTO: 95.6 FL — SIGNIFICANT CHANGE UP (ref 80–100)
MCV RBC AUTO: 95.7 FL — SIGNIFICANT CHANGE UP (ref 80–100)
MCV RBC AUTO: 95.8 FL — SIGNIFICANT CHANGE UP (ref 80–100)
MCV RBC AUTO: 96 FL — SIGNIFICANT CHANGE UP (ref 80–100)
MCV RBC AUTO: 96.2 FL — SIGNIFICANT CHANGE UP (ref 80–100)
MCV RBC AUTO: 96.5 FL — SIGNIFICANT CHANGE UP (ref 80–100)
MCV RBC AUTO: 96.5 FL — SIGNIFICANT CHANGE UP (ref 80–100)
MCV RBC AUTO: 96.6 FL — SIGNIFICANT CHANGE UP (ref 80–100)
MCV RBC AUTO: 96.9 FL — SIGNIFICANT CHANGE UP (ref 80–100)
MCV RBC AUTO: 97.3 FL — SIGNIFICANT CHANGE UP (ref 80–100)
MCV RBC AUTO: 97.4 FL — SIGNIFICANT CHANGE UP (ref 80–100)
MCV RBC AUTO: 97.6 FL — SIGNIFICANT CHANGE UP (ref 80–100)
MCV RBC AUTO: 97.6 FL — SIGNIFICANT CHANGE UP (ref 80–100)
MCV RBC AUTO: 97.7 FL — SIGNIFICANT CHANGE UP (ref 80–100)
MCV RBC AUTO: 98 FL — SIGNIFICANT CHANGE UP (ref 80–100)
MCV RBC AUTO: 98.1 FL — SIGNIFICANT CHANGE UP (ref 80–100)
MCV RBC AUTO: 98.6 FL — SIGNIFICANT CHANGE UP (ref 80–100)
MCV RBC AUTO: 99 FL — SIGNIFICANT CHANGE UP (ref 80–100)
MCV RBC AUTO: 99 FL — SIGNIFICANT CHANGE UP (ref 80–100)
MCV RBC AUTO: 99.2 FL — SIGNIFICANT CHANGE UP (ref 80–100)
MCV RBC AUTO: 99.2 FL — SIGNIFICANT CHANGE UP (ref 80–100)
MCV RBC AUTO: 99.6 FL — SIGNIFICANT CHANGE UP (ref 80–100)
MCV RBC AUTO: 99.7 FL — SIGNIFICANT CHANGE UP (ref 80–100)
MESOTHL CELL # FLD: 3 % — SIGNIFICANT CHANGE UP
MESOTHL CELL # FLD: 5 % — SIGNIFICANT CHANGE UP
MESOTHL CELL # FLD: 6 % — SIGNIFICANT CHANGE UP
MESOTHL CELL # FLD: 8 % — SIGNIFICANT CHANGE UP
MONOCYTES # BLD AUTO: 0.29 K/UL — SIGNIFICANT CHANGE UP (ref 0–0.9)
MONOCYTES # BLD AUTO: 0.32 K/UL — SIGNIFICANT CHANGE UP (ref 0–0.9)
MONOCYTES # BLD AUTO: 0.33 K/UL — SIGNIFICANT CHANGE UP (ref 0–0.9)
MONOCYTES # BLD AUTO: 0.33 K/UL — SIGNIFICANT CHANGE UP (ref 0–0.9)
MONOCYTES # BLD AUTO: 0.36 K/UL — SIGNIFICANT CHANGE UP (ref 0–0.9)
MONOCYTES # BLD AUTO: 0.36 K/UL — SIGNIFICANT CHANGE UP (ref 0–0.9)
MONOCYTES # BLD AUTO: 0.37 K/UL — SIGNIFICANT CHANGE UP (ref 0–0.9)
MONOCYTES # BLD AUTO: 0.41 K/UL — SIGNIFICANT CHANGE UP (ref 0–0.9)
MONOCYTES # BLD AUTO: 0.42 K/UL — SIGNIFICANT CHANGE UP (ref 0–0.9)
MONOCYTES # BLD AUTO: 0.44 K/UL — SIGNIFICANT CHANGE UP (ref 0–0.9)
MONOCYTES # BLD AUTO: 0.45 K/UL — SIGNIFICANT CHANGE UP (ref 0–0.9)
MONOCYTES # BLD AUTO: 0.46 K/UL — SIGNIFICANT CHANGE UP (ref 0–0.9)
MONOCYTES # BLD AUTO: 0.47 K/UL — SIGNIFICANT CHANGE UP (ref 0–0.9)
MONOCYTES # BLD AUTO: 0.47 K/UL — SIGNIFICANT CHANGE UP (ref 0–0.9)
MONOCYTES # BLD AUTO: 0.5 K/UL — SIGNIFICANT CHANGE UP (ref 0–0.9)
MONOCYTES # BLD AUTO: 0.52 K/UL — SIGNIFICANT CHANGE UP (ref 0–0.9)
MONOCYTES # BLD AUTO: 0.52 K/UL — SIGNIFICANT CHANGE UP (ref 0–0.9)
MONOCYTES # BLD AUTO: 0.55 K/UL — SIGNIFICANT CHANGE UP (ref 0–0.9)
MONOCYTES # BLD AUTO: 0.55 K/UL — SIGNIFICANT CHANGE UP (ref 0–0.9)
MONOCYTES # BLD AUTO: 0.57 K/UL — SIGNIFICANT CHANGE UP (ref 0–0.9)
MONOCYTES # BLD AUTO: 0.57 K/UL — SIGNIFICANT CHANGE UP (ref 0–0.9)
MONOCYTES # BLD AUTO: 0.62 K/UL — SIGNIFICANT CHANGE UP (ref 0–0.9)
MONOCYTES # BLD AUTO: 0.62 K/UL — SIGNIFICANT CHANGE UP (ref 0–0.9)
MONOCYTES # BLD AUTO: 0.67 K/UL — SIGNIFICANT CHANGE UP (ref 0–0.9)
MONOCYTES # BLD AUTO: 0.69 K/UL — SIGNIFICANT CHANGE UP (ref 0–0.9)
MONOCYTES # BLD AUTO: 0.7 K/UL — SIGNIFICANT CHANGE UP (ref 0–0.9)
MONOCYTES NFR BLD AUTO: 10 % — SIGNIFICANT CHANGE UP (ref 2–14)
MONOCYTES NFR BLD AUTO: 12.4 % — SIGNIFICANT CHANGE UP (ref 2–14)
MONOCYTES NFR BLD AUTO: 13.1 % — SIGNIFICANT CHANGE UP (ref 2–14)
MONOCYTES NFR BLD AUTO: 13.4 % — SIGNIFICANT CHANGE UP (ref 2–14)
MONOCYTES NFR BLD AUTO: 3.9 % — SIGNIFICANT CHANGE UP (ref 2–14)
MONOCYTES NFR BLD AUTO: 4.1 % — SIGNIFICANT CHANGE UP (ref 2–14)
MONOCYTES NFR BLD AUTO: 4.4 % — SIGNIFICANT CHANGE UP (ref 2–14)
MONOCYTES NFR BLD AUTO: 4.4 % — SIGNIFICANT CHANGE UP (ref 2–14)
MONOCYTES NFR BLD AUTO: 5.2 % — SIGNIFICANT CHANGE UP (ref 2–14)
MONOCYTES NFR BLD AUTO: 5.4 % — SIGNIFICANT CHANGE UP (ref 2–14)
MONOCYTES NFR BLD AUTO: 6.1 % — SIGNIFICANT CHANGE UP (ref 2–14)
MONOCYTES NFR BLD AUTO: 6.2 % — SIGNIFICANT CHANGE UP (ref 2–14)
MONOCYTES NFR BLD AUTO: 6.4 % — SIGNIFICANT CHANGE UP (ref 2–14)
MONOCYTES NFR BLD AUTO: 6.8 % — SIGNIFICANT CHANGE UP (ref 2–14)
MONOCYTES NFR BLD AUTO: 7.2 % — SIGNIFICANT CHANGE UP (ref 2–14)
MONOCYTES NFR BLD AUTO: 7.4 % — SIGNIFICANT CHANGE UP (ref 2–14)
MONOCYTES NFR BLD AUTO: 7.7 % — SIGNIFICANT CHANGE UP (ref 2–14)
MONOCYTES NFR BLD AUTO: 7.7 % — SIGNIFICANT CHANGE UP (ref 2–14)
MONOCYTES NFR BLD AUTO: 7.9 % — SIGNIFICANT CHANGE UP (ref 2–14)
MONOCYTES NFR BLD AUTO: 8 % — SIGNIFICANT CHANGE UP (ref 2–14)
MONOCYTES NFR BLD AUTO: 8 % — SIGNIFICANT CHANGE UP (ref 2–14)
MONOCYTES NFR BLD AUTO: 8.9 % — SIGNIFICANT CHANGE UP (ref 2–14)
MONOCYTES NFR BLD AUTO: 9 % — SIGNIFICANT CHANGE UP (ref 2–14)
MONOCYTES NFR BLD AUTO: 9.1 % — SIGNIFICANT CHANGE UP (ref 2–14)
MONOCYTES NFR BLD AUTO: 9.8 % — SIGNIFICANT CHANGE UP (ref 2–14)
MONOCYTES NFR BLD AUTO: 9.9 % — SIGNIFICANT CHANGE UP (ref 2–14)
MONOS+MACROS # FLD: 12 % — SIGNIFICANT CHANGE UP
MONOS+MACROS # FLD: 15 % — SIGNIFICANT CHANGE UP
MONOS+MACROS # FLD: 42 % — SIGNIFICANT CHANGE UP
MONOS+MACROS # FLD: 5 % — SIGNIFICANT CHANGE UP
MONOS+MACROS # FLD: 67 % — SIGNIFICANT CHANGE UP
MONOS+MACROS # FLD: 9 % — SIGNIFICANT CHANGE UP
MRSA PCR RESULT.: SIGNIFICANT CHANGE UP
NEUTROPHILS # BLD AUTO: 12.2 K/UL — HIGH (ref 1.8–7.4)
NEUTROPHILS # BLD AUTO: 13.5 K/UL — HIGH (ref 1.8–7.4)
NEUTROPHILS # BLD AUTO: 2.51 K/UL — SIGNIFICANT CHANGE UP (ref 1.8–7.4)
NEUTROPHILS # BLD AUTO: 2.62 K/UL — SIGNIFICANT CHANGE UP (ref 1.8–7.4)
NEUTROPHILS # BLD AUTO: 2.66 K/UL — SIGNIFICANT CHANGE UP (ref 1.8–7.4)
NEUTROPHILS # BLD AUTO: 2.95 K/UL — SIGNIFICANT CHANGE UP (ref 1.8–7.4)
NEUTROPHILS # BLD AUTO: 3.29 K/UL — SIGNIFICANT CHANGE UP (ref 1.8–7.4)
NEUTROPHILS # BLD AUTO: 3.54 K/UL — SIGNIFICANT CHANGE UP (ref 1.8–7.4)
NEUTROPHILS # BLD AUTO: 3.91 K/UL — SIGNIFICANT CHANGE UP (ref 1.8–7.4)
NEUTROPHILS # BLD AUTO: 4.14 K/UL — SIGNIFICANT CHANGE UP (ref 1.8–7.4)
NEUTROPHILS # BLD AUTO: 4.2 K/UL — SIGNIFICANT CHANGE UP (ref 1.8–7.4)
NEUTROPHILS # BLD AUTO: 4.46 K/UL — SIGNIFICANT CHANGE UP (ref 1.8–7.4)
NEUTROPHILS # BLD AUTO: 4.59 K/UL — SIGNIFICANT CHANGE UP (ref 1.8–7.4)
NEUTROPHILS # BLD AUTO: 4.66 K/UL — SIGNIFICANT CHANGE UP (ref 1.8–7.4)
NEUTROPHILS # BLD AUTO: 4.98 K/UL — SIGNIFICANT CHANGE UP (ref 1.8–7.4)
NEUTROPHILS # BLD AUTO: 5.07 K/UL — SIGNIFICANT CHANGE UP (ref 1.8–7.4)
NEUTROPHILS # BLD AUTO: 5.24 K/UL — SIGNIFICANT CHANGE UP (ref 1.8–7.4)
NEUTROPHILS # BLD AUTO: 5.52 K/UL — SIGNIFICANT CHANGE UP (ref 1.8–7.4)
NEUTROPHILS # BLD AUTO: 5.58 K/UL — SIGNIFICANT CHANGE UP (ref 1.8–7.4)
NEUTROPHILS # BLD AUTO: 5.86 K/UL — SIGNIFICANT CHANGE UP (ref 1.8–7.4)
NEUTROPHILS # BLD AUTO: 5.95 K/UL — SIGNIFICANT CHANGE UP (ref 1.8–7.4)
NEUTROPHILS # BLD AUTO: 6.47 K/UL — SIGNIFICANT CHANGE UP (ref 1.8–7.4)
NEUTROPHILS # BLD AUTO: 7.05 K/UL — SIGNIFICANT CHANGE UP (ref 1.8–7.4)
NEUTROPHILS # BLD AUTO: 7.08 K/UL — SIGNIFICANT CHANGE UP (ref 1.8–7.4)
NEUTROPHILS # BLD AUTO: 9.08 K/UL — HIGH (ref 1.8–7.4)
NEUTROPHILS # BLD AUTO: 9.41 K/UL — HIGH (ref 1.8–7.4)
NEUTROPHILS NFR BLD AUTO: 67.6 % — SIGNIFICANT CHANGE UP (ref 43–77)
NEUTROPHILS NFR BLD AUTO: 70 % — SIGNIFICANT CHANGE UP (ref 43–77)
NEUTROPHILS NFR BLD AUTO: 70.6 % — SIGNIFICANT CHANGE UP (ref 43–77)
NEUTROPHILS NFR BLD AUTO: 72.2 % — SIGNIFICANT CHANGE UP (ref 43–77)
NEUTROPHILS NFR BLD AUTO: 72.3 % — SIGNIFICANT CHANGE UP (ref 43–77)
NEUTROPHILS NFR BLD AUTO: 73.3 % — SIGNIFICANT CHANGE UP (ref 43–77)
NEUTROPHILS NFR BLD AUTO: 73.8 % — SIGNIFICANT CHANGE UP (ref 43–77)
NEUTROPHILS NFR BLD AUTO: 74.5 % — SIGNIFICANT CHANGE UP (ref 43–77)
NEUTROPHILS NFR BLD AUTO: 75.7 % — SIGNIFICANT CHANGE UP (ref 43–77)
NEUTROPHILS NFR BLD AUTO: 78.4 % — HIGH (ref 43–77)
NEUTROPHILS NFR BLD AUTO: 78.7 % — HIGH (ref 43–77)
NEUTROPHILS NFR BLD AUTO: 79.8 % — HIGH (ref 43–77)
NEUTROPHILS NFR BLD AUTO: 80.6 % — HIGH (ref 43–77)
NEUTROPHILS NFR BLD AUTO: 80.8 % — HIGH (ref 43–77)
NEUTROPHILS NFR BLD AUTO: 81.7 % — HIGH (ref 43–77)
NEUTROPHILS NFR BLD AUTO: 83 % — HIGH (ref 43–77)
NEUTROPHILS NFR BLD AUTO: 83.6 % — HIGH (ref 43–77)
NEUTROPHILS NFR BLD AUTO: 84.9 % — HIGH (ref 43–77)
NEUTROPHILS NFR BLD AUTO: 85.2 % — HIGH (ref 43–77)
NEUTROPHILS NFR BLD AUTO: 85.8 % — HIGH (ref 43–77)
NEUTROPHILS NFR BLD AUTO: 86.2 % — HIGH (ref 43–77)
NEUTROPHILS NFR BLD AUTO: 89.1 % — HIGH (ref 43–77)
NEUTROPHILS NFR BLD AUTO: 90.1 % — HIGH (ref 43–77)
NEUTROPHILS NFR BLD AUTO: 90.4 % — HIGH (ref 43–77)
NEUTROPHILS NFR BLD AUTO: 90.9 % — HIGH (ref 43–77)
NEUTROPHILS NFR BLD AUTO: 94.7 % — HIGH (ref 43–77)
NITRITE UR-MCNC: NEGATIVE — SIGNIFICANT CHANGE UP
NON-GYNECOLOGICAL CYTOLOGY STUDY: SIGNIFICANT CHANGE UP
NRBC # BLD: 0 /100 WBCS — SIGNIFICANT CHANGE UP (ref 0–0)
NT-PROBNP SERPL-SCNC: 299 PG/ML — SIGNIFICANT CHANGE UP (ref 0–300)
NT-PROBNP SERPL-SCNC: 322 PG/ML — HIGH (ref 0–300)
OB PNL STL: NEGATIVE — SIGNIFICANT CHANGE UP
OSMOLALITY SERPL: 329 MOSMOL/KG — HIGH (ref 280–301)
OSMOLALITY UR: 310 MOS/KG — SIGNIFICANT CHANGE UP (ref 300–900)
OTHER CELLS CSF MANUAL: 10 ML/DL — LOW (ref 18–22)
OTHER CELLS CSF MANUAL: 10 ML/DL — LOW (ref 18–22)
OTHER CELLS CSF MANUAL: 8 ML/DL — LOW (ref 18–22)
OTHER CELLS FLD MANUAL: 7 % — SIGNIFICANT CHANGE UP
PCO2 BLDA: 31 MMHG — LOW (ref 32–46)
PCO2 BLDA: 37 MMHG — SIGNIFICANT CHANGE UP (ref 32–46)
PCO2 BLDA: 38 MMHG — SIGNIFICANT CHANGE UP (ref 32–46)
PCO2 BLDA: 48 MMHG — HIGH (ref 32–46)
PCO2 BLDV: 27 MMHG — LOW (ref 35–50)
PCO2 BLDV: 33 MMHG — LOW (ref 35–50)
PCO2 BLDV: 41 MMHG — SIGNIFICANT CHANGE UP (ref 35–50)
PH BLDA: 7.39 — SIGNIFICANT CHANGE UP (ref 7.35–7.45)
PH BLDA: 7.4 — SIGNIFICANT CHANGE UP (ref 7.35–7.45)
PH BLDA: 7.45 — SIGNIFICANT CHANGE UP (ref 7.35–7.45)
PH BLDA: 7.52 — HIGH (ref 7.35–7.45)
PH BLDV: 7.25 — LOW (ref 7.35–7.45)
PH BLDV: 7.27 — LOW (ref 7.35–7.45)
PH BLDV: 7.34 — LOW (ref 7.35–7.45)
PH FLD: 7.37 — SIGNIFICANT CHANGE UP
PH FLD: 7.45 — SIGNIFICANT CHANGE UP
PH FLD: 7.75 — SIGNIFICANT CHANGE UP
PH UR: 5 — SIGNIFICANT CHANGE UP (ref 5–8)
PH UR: 5.5 — SIGNIFICANT CHANGE UP (ref 5–8)
PH UR: 6 — SIGNIFICANT CHANGE UP (ref 5–8)
PHOSPHATE SERPL-MCNC: 1.8 MG/DL — LOW (ref 2.5–4.5)
PHOSPHATE SERPL-MCNC: 1.9 MG/DL — LOW (ref 2.5–4.5)
PHOSPHATE SERPL-MCNC: 11 MG/DL — HIGH (ref 2.5–4.5)
PHOSPHATE SERPL-MCNC: 11.5 MG/DL — HIGH (ref 2.5–4.5)
PHOSPHATE SERPL-MCNC: 11.6 MG/DL — HIGH (ref 2.5–4.5)
PHOSPHATE SERPL-MCNC: 11.7 MG/DL — HIGH (ref 2.5–4.5)
PHOSPHATE SERPL-MCNC: 12.3 MG/DL — HIGH (ref 2.5–4.5)
PHOSPHATE SERPL-MCNC: 2 MG/DL — LOW (ref 2.5–4.5)
PHOSPHATE SERPL-MCNC: 2.2 MG/DL — LOW (ref 2.5–4.5)
PHOSPHATE SERPL-MCNC: 2.2 MG/DL — LOW (ref 2.5–4.5)
PHOSPHATE SERPL-MCNC: 2.3 MG/DL — LOW (ref 2.5–4.5)
PHOSPHATE SERPL-MCNC: 2.3 MG/DL — LOW (ref 2.5–4.5)
PHOSPHATE SERPL-MCNC: 2.4 MG/DL — LOW (ref 2.5–4.5)
PHOSPHATE SERPL-MCNC: 2.7 MG/DL — SIGNIFICANT CHANGE UP (ref 2.5–4.5)
PHOSPHATE SERPL-MCNC: 2.8 MG/DL — SIGNIFICANT CHANGE UP (ref 2.5–4.5)
PHOSPHATE SERPL-MCNC: 3 MG/DL — SIGNIFICANT CHANGE UP (ref 2.5–4.5)
PHOSPHATE SERPL-MCNC: 3.2 MG/DL — SIGNIFICANT CHANGE UP (ref 2.5–4.5)
PHOSPHATE SERPL-MCNC: 3.2 MG/DL — SIGNIFICANT CHANGE UP (ref 2.5–4.5)
PHOSPHATE SERPL-MCNC: 3.3 MG/DL — SIGNIFICANT CHANGE UP (ref 2.5–4.5)
PHOSPHATE SERPL-MCNC: 3.3 MG/DL — SIGNIFICANT CHANGE UP (ref 2.5–4.5)
PHOSPHATE SERPL-MCNC: 3.5 MG/DL — SIGNIFICANT CHANGE UP (ref 2.5–4.5)
PHOSPHATE SERPL-MCNC: 3.9 MG/DL — SIGNIFICANT CHANGE UP (ref 2.5–4.5)
PHOSPHATE SERPL-MCNC: 4.1 MG/DL — SIGNIFICANT CHANGE UP (ref 2.5–4.5)
PHOSPHATE SERPL-MCNC: 4.3 MG/DL — SIGNIFICANT CHANGE UP (ref 2.5–4.5)
PHOSPHATE SERPL-MCNC: 4.4 MG/DL — SIGNIFICANT CHANGE UP (ref 2.5–4.5)
PLAT MORPH BLD: NORMAL — SIGNIFICANT CHANGE UP
PLATELET # BLD AUTO: 106 K/UL — LOW (ref 150–400)
PLATELET # BLD AUTO: 108 K/UL — LOW (ref 150–400)
PLATELET # BLD AUTO: 21 K/UL — LOW (ref 150–400)
PLATELET # BLD AUTO: 26 K/UL — LOW (ref 150–400)
PLATELET # BLD AUTO: 28 K/UL — LOW (ref 150–400)
PLATELET # BLD AUTO: 28 K/UL — LOW (ref 150–400)
PLATELET # BLD AUTO: 30 K/UL — LOW (ref 150–400)
PLATELET # BLD AUTO: 33 K/UL — LOW (ref 150–400)
PLATELET # BLD AUTO: 35 K/UL — LOW (ref 150–400)
PLATELET # BLD AUTO: 35 K/UL — LOW (ref 150–400)
PLATELET # BLD AUTO: 40 K/UL — LOW (ref 150–400)
PLATELET # BLD AUTO: 41 K/UL — LOW (ref 150–400)
PLATELET # BLD AUTO: 43 K/UL — LOW (ref 150–400)
PLATELET # BLD AUTO: 45 K/UL — LOW (ref 150–400)
PLATELET # BLD AUTO: 49 K/UL — LOW (ref 150–400)
PLATELET # BLD AUTO: 55 K/UL — LOW (ref 150–400)
PLATELET # BLD AUTO: 56 K/UL — LOW (ref 150–400)
PLATELET # BLD AUTO: 58 K/UL — LOW (ref 150–400)
PLATELET # BLD AUTO: 62 K/UL — LOW (ref 150–400)
PLATELET # BLD AUTO: 63 K/UL — LOW (ref 150–400)
PLATELET # BLD AUTO: 63 K/UL — LOW (ref 150–400)
PLATELET # BLD AUTO: 68 K/UL — LOW (ref 150–400)
PLATELET # BLD AUTO: 68 K/UL — LOW (ref 150–400)
PLATELET # BLD AUTO: 70 K/UL — LOW (ref 150–400)
PLATELET # BLD AUTO: 71 K/UL — LOW (ref 150–400)
PLATELET # BLD AUTO: 72 K/UL — LOW (ref 150–400)
PLATELET # BLD AUTO: 73 K/UL — LOW (ref 150–400)
PLATELET # BLD AUTO: 74 K/UL — LOW (ref 150–400)
PLATELET # BLD AUTO: 75 K/UL — LOW (ref 150–400)
PLATELET # BLD AUTO: 75 K/UL — LOW (ref 150–400)
PLATELET # BLD AUTO: 76 K/UL — LOW (ref 150–400)
PLATELET # BLD AUTO: 79 K/UL — LOW (ref 150–400)
PLATELET # BLD AUTO: 80 K/UL — LOW (ref 150–400)
PLATELET # BLD AUTO: 82 K/UL — LOW (ref 150–400)
PLATELET # BLD AUTO: 83 K/UL — LOW (ref 150–400)
PLATELET # BLD AUTO: 83 K/UL — LOW (ref 150–400)
PLATELET # BLD AUTO: 90 K/UL — LOW (ref 150–400)
PLATELET # BLD AUTO: 90 K/UL — LOW (ref 150–400)
PLATELET # BLD AUTO: 94 K/UL — LOW (ref 150–400)
PLATELET # BLD AUTO: 99 K/UL — LOW (ref 150–400)
PO2 BLDA: 101 MMHG — SIGNIFICANT CHANGE UP (ref 74–108)
PO2 BLDA: 105 MMHG — SIGNIFICANT CHANGE UP (ref 74–108)
PO2 BLDA: 130 MMHG — HIGH (ref 74–108)
PO2 BLDA: 154 MMHG — HIGH (ref 74–108)
PO2 BLDV: 40 MMHG — SIGNIFICANT CHANGE UP (ref 25–45)
PO2 BLDV: 47 MMHG — HIGH (ref 25–45)
PO2 BLDV: 50 MMHG — HIGH (ref 25–45)
POIKILOCYTOSIS BLD QL AUTO: SLIGHT — SIGNIFICANT CHANGE UP
POTASSIUM BLDV-SCNC: 3.7 MMOL/L — SIGNIFICANT CHANGE UP (ref 3.5–5.3)
POTASSIUM BLDV-SCNC: 5.3 MMOL/L — SIGNIFICANT CHANGE UP (ref 3.5–5.3)
POTASSIUM BLDV-SCNC: 6.3 MMOL/L — CRITICAL HIGH (ref 3.5–5.3)
POTASSIUM SERPL-MCNC: 3.5 MMOL/L — SIGNIFICANT CHANGE UP (ref 3.5–5.3)
POTASSIUM SERPL-MCNC: 3.6 MMOL/L — SIGNIFICANT CHANGE UP (ref 3.5–5.3)
POTASSIUM SERPL-MCNC: 3.7 MMOL/L — SIGNIFICANT CHANGE UP (ref 3.5–5.3)
POTASSIUM SERPL-MCNC: 3.7 MMOL/L — SIGNIFICANT CHANGE UP (ref 3.5–5.3)
POTASSIUM SERPL-MCNC: 3.8 MMOL/L — SIGNIFICANT CHANGE UP (ref 3.5–5.3)
POTASSIUM SERPL-MCNC: 3.9 MMOL/L — SIGNIFICANT CHANGE UP (ref 3.5–5.3)
POTASSIUM SERPL-MCNC: 4 MMOL/L — SIGNIFICANT CHANGE UP (ref 3.5–5.3)
POTASSIUM SERPL-MCNC: 4 MMOL/L — SIGNIFICANT CHANGE UP (ref 3.5–5.3)
POTASSIUM SERPL-MCNC: 4.1 MMOL/L — SIGNIFICANT CHANGE UP (ref 3.5–5.3)
POTASSIUM SERPL-MCNC: 4.2 MMOL/L — SIGNIFICANT CHANGE UP (ref 3.5–5.3)
POTASSIUM SERPL-MCNC: 4.3 MMOL/L — SIGNIFICANT CHANGE UP (ref 3.5–5.3)
POTASSIUM SERPL-MCNC: 4.3 MMOL/L — SIGNIFICANT CHANGE UP (ref 3.5–5.3)
POTASSIUM SERPL-MCNC: 4.4 MMOL/L — SIGNIFICANT CHANGE UP (ref 3.5–5.3)
POTASSIUM SERPL-MCNC: 4.5 MMOL/L — SIGNIFICANT CHANGE UP (ref 3.5–5.3)
POTASSIUM SERPL-MCNC: 4.6 MMOL/L — SIGNIFICANT CHANGE UP (ref 3.5–5.3)
POTASSIUM SERPL-MCNC: 4.8 MMOL/L — SIGNIFICANT CHANGE UP (ref 3.5–5.3)
POTASSIUM SERPL-MCNC: 4.8 MMOL/L — SIGNIFICANT CHANGE UP (ref 3.5–5.3)
POTASSIUM SERPL-MCNC: 4.9 MMOL/L — SIGNIFICANT CHANGE UP (ref 3.5–5.3)
POTASSIUM SERPL-MCNC: 5 MMOL/L — SIGNIFICANT CHANGE UP (ref 3.5–5.3)
POTASSIUM SERPL-MCNC: 5.3 MMOL/L — SIGNIFICANT CHANGE UP (ref 3.5–5.3)
POTASSIUM SERPL-MCNC: 5.4 MMOL/L — HIGH (ref 3.5–5.3)
POTASSIUM SERPL-MCNC: 5.4 MMOL/L — HIGH (ref 3.5–5.3)
POTASSIUM SERPL-MCNC: 5.6 MMOL/L — HIGH (ref 3.5–5.3)
POTASSIUM SERPL-MCNC: 5.7 MMOL/L — HIGH (ref 3.5–5.3)
POTASSIUM SERPL-MCNC: 5.9 MMOL/L — HIGH (ref 3.5–5.3)
POTASSIUM SERPL-SCNC: 3.5 MMOL/L — SIGNIFICANT CHANGE UP (ref 3.5–5.3)
POTASSIUM SERPL-SCNC: 3.6 MMOL/L — SIGNIFICANT CHANGE UP (ref 3.5–5.3)
POTASSIUM SERPL-SCNC: 3.7 MMOL/L — SIGNIFICANT CHANGE UP (ref 3.5–5.3)
POTASSIUM SERPL-SCNC: 3.7 MMOL/L — SIGNIFICANT CHANGE UP (ref 3.5–5.3)
POTASSIUM SERPL-SCNC: 3.8 MMOL/L — SIGNIFICANT CHANGE UP (ref 3.5–5.3)
POTASSIUM SERPL-SCNC: 3.9 MMOL/L — SIGNIFICANT CHANGE UP (ref 3.5–5.3)
POTASSIUM SERPL-SCNC: 4 MMOL/L — SIGNIFICANT CHANGE UP (ref 3.5–5.3)
POTASSIUM SERPL-SCNC: 4 MMOL/L — SIGNIFICANT CHANGE UP (ref 3.5–5.3)
POTASSIUM SERPL-SCNC: 4.1 MMOL/L — SIGNIFICANT CHANGE UP (ref 3.5–5.3)
POTASSIUM SERPL-SCNC: 4.2 MMOL/L — SIGNIFICANT CHANGE UP (ref 3.5–5.3)
POTASSIUM SERPL-SCNC: 4.3 MMOL/L — SIGNIFICANT CHANGE UP (ref 3.5–5.3)
POTASSIUM SERPL-SCNC: 4.3 MMOL/L — SIGNIFICANT CHANGE UP (ref 3.5–5.3)
POTASSIUM SERPL-SCNC: 4.4 MMOL/L — SIGNIFICANT CHANGE UP (ref 3.5–5.3)
POTASSIUM SERPL-SCNC: 4.5 MMOL/L — SIGNIFICANT CHANGE UP (ref 3.5–5.3)
POTASSIUM SERPL-SCNC: 4.6 MMOL/L — SIGNIFICANT CHANGE UP (ref 3.5–5.3)
POTASSIUM SERPL-SCNC: 4.8 MMOL/L — SIGNIFICANT CHANGE UP (ref 3.5–5.3)
POTASSIUM SERPL-SCNC: 4.8 MMOL/L — SIGNIFICANT CHANGE UP (ref 3.5–5.3)
POTASSIUM SERPL-SCNC: 4.9 MMOL/L — SIGNIFICANT CHANGE UP (ref 3.5–5.3)
POTASSIUM SERPL-SCNC: 5 MMOL/L — SIGNIFICANT CHANGE UP (ref 3.5–5.3)
POTASSIUM SERPL-SCNC: 5.3 MMOL/L — SIGNIFICANT CHANGE UP (ref 3.5–5.3)
POTASSIUM SERPL-SCNC: 5.4 MMOL/L — HIGH (ref 3.5–5.3)
POTASSIUM SERPL-SCNC: 5.4 MMOL/L — HIGH (ref 3.5–5.3)
POTASSIUM SERPL-SCNC: 5.6 MMOL/L — HIGH (ref 3.5–5.3)
POTASSIUM SERPL-SCNC: 5.7 MMOL/L — HIGH (ref 3.5–5.3)
POTASSIUM SERPL-SCNC: 5.9 MMOL/L — HIGH (ref 3.5–5.3)
PROT ?TM UR-MCNC: 10 MG/DL — SIGNIFICANT CHANGE UP (ref 0–12)
PROT FLD-MCNC: 0.8 G/DL — SIGNIFICANT CHANGE UP
PROT FLD-MCNC: 1 G/DL — SIGNIFICANT CHANGE UP
PROT FLD-MCNC: 1.8 G/DL — SIGNIFICANT CHANGE UP
PROT FLD-MCNC: 1.9 G/DL — SIGNIFICANT CHANGE UP
PROT FLD-MCNC: 2.1 G/DL — SIGNIFICANT CHANGE UP
PROT FLD-MCNC: 2.3 G/DL — SIGNIFICANT CHANGE UP
PROT SERPL-MCNC: 4.7 G/DL — LOW (ref 6–8.3)
PROT SERPL-MCNC: 5 G/DL — LOW (ref 6–8.3)
PROT SERPL-MCNC: 5.1 G/DL — LOW (ref 6–8.3)
PROT SERPL-MCNC: 5.2 G/DL — LOW (ref 6–8.3)
PROT SERPL-MCNC: 5.3 G/DL — LOW (ref 6–8.3)
PROT SERPL-MCNC: 5.3 G/DL — LOW (ref 6–8.3)
PROT SERPL-MCNC: 5.4 G/DL — LOW (ref 6–8.3)
PROT SERPL-MCNC: 5.5 G/DL — LOW (ref 6–8.3)
PROT SERPL-MCNC: 5.5 G/DL — LOW (ref 6–8.3)
PROT SERPL-MCNC: 5.6 G/DL — LOW (ref 6–8.3)
PROT SERPL-MCNC: 5.6 G/DL — LOW (ref 6–8.3)
PROT SERPL-MCNC: 5.8 G/DL — LOW (ref 6–8.3)
PROT SERPL-MCNC: 5.9 G/DL — LOW (ref 6–8.3)
PROT SERPL-MCNC: 6 G/DL — SIGNIFICANT CHANGE UP (ref 6–8.3)
PROT SERPL-MCNC: 6 G/DL — SIGNIFICANT CHANGE UP (ref 6–8.3)
PROT SERPL-MCNC: 6.1 G/DL — SIGNIFICANT CHANGE UP (ref 6–8.3)
PROT SERPL-MCNC: 6.1 G/DL — SIGNIFICANT CHANGE UP (ref 6–8.3)
PROT SERPL-MCNC: 6.3 G/DL — SIGNIFICANT CHANGE UP (ref 6–8.3)
PROT SERPL-MCNC: 6.3 G/DL — SIGNIFICANT CHANGE UP (ref 6–8.3)
PROT UR-MCNC: ABNORMAL
PROT UR-MCNC: ABNORMAL
PROT UR-MCNC: NEGATIVE — SIGNIFICANT CHANGE UP
PROTHROM AB SERPL-ACNC: 16 SEC — HIGH (ref 10.6–13.6)
PROTHROM AB SERPL-ACNC: 17.1 SEC — HIGH (ref 10.6–13.6)
PROTHROM AB SERPL-ACNC: 17.3 SEC — HIGH (ref 10.6–13.6)
PROTHROM AB SERPL-ACNC: 17.7 SEC — HIGH (ref 10.6–13.6)
PROTHROM AB SERPL-ACNC: 17.8 SEC — HIGH (ref 10.6–13.6)
PROTHROM AB SERPL-ACNC: 18 SEC — HIGH (ref 10.6–13.6)
PROTHROM AB SERPL-ACNC: 18.3 SEC — HIGH (ref 10.6–13.6)
PROTHROM AB SERPL-ACNC: 18.4 SEC — HIGH (ref 10.6–13.6)
PROTHROM AB SERPL-ACNC: 18.5 SEC — HIGH (ref 10.6–13.6)
PROTHROM AB SERPL-ACNC: 18.5 SEC — HIGH (ref 10.6–13.6)
PROTHROM AB SERPL-ACNC: 18.8 SEC — HIGH (ref 10.6–13.6)
PROTHROM AB SERPL-ACNC: 18.8 SEC — HIGH (ref 10.6–13.6)
PROTHROM AB SERPL-ACNC: 19.2 SEC — HIGH (ref 10.6–13.6)
PROTHROM AB SERPL-ACNC: 19.6 SEC — HIGH (ref 10.6–13.6)
PROTHROM AB SERPL-ACNC: 19.8 SEC — HIGH (ref 10.6–13.6)
PROTHROM AB SERPL-ACNC: 20 SEC — HIGH (ref 10.6–13.6)
PROTHROM AB SERPL-ACNC: 20.9 SEC — HIGH (ref 10.6–13.6)
PROTHROM AB SERPL-ACNC: 22 SEC — HIGH (ref 10.6–13.6)
PROTHROM AB SERPL-ACNC: 22.6 SEC — HIGH (ref 10.6–13.6)
RBC # BLD: 1.59 M/UL — LOW (ref 4.2–5.8)
RBC # BLD: 1.61 M/UL — LOW (ref 4.2–5.8)
RBC # BLD: 2.02 M/UL — LOW (ref 4.2–5.8)
RBC # BLD: 2.28 M/UL — LOW (ref 4.2–5.8)
RBC # BLD: 2.32 M/UL — LOW (ref 4.2–5.8)
RBC # BLD: 2.41 M/UL — LOW (ref 4.2–5.8)
RBC # BLD: 2.5 M/UL — LOW (ref 4.2–5.8)
RBC # BLD: 2.54 M/UL — LOW (ref 4.2–5.8)
RBC # BLD: 2.54 M/UL — LOW (ref 4.2–5.8)
RBC # BLD: 2.55 M/UL — LOW (ref 4.2–5.8)
RBC # BLD: 2.55 M/UL — LOW (ref 4.2–5.8)
RBC # BLD: 2.57 M/UL — LOW (ref 4.2–5.8)
RBC # BLD: 2.59 M/UL — LOW (ref 4.2–5.8)
RBC # BLD: 2.59 M/UL — LOW (ref 4.2–5.8)
RBC # BLD: 2.6 M/UL — LOW (ref 4.2–5.8)
RBC # BLD: 2.65 M/UL — LOW (ref 4.2–5.8)
RBC # BLD: 2.68 M/UL — LOW (ref 4.2–5.8)
RBC # BLD: 2.69 M/UL — LOW (ref 4.2–5.8)
RBC # BLD: 2.7 M/UL — LOW (ref 4.2–5.8)
RBC # BLD: 2.74 M/UL — LOW (ref 4.2–5.8)
RBC # BLD: 2.74 M/UL — LOW (ref 4.2–5.8)
RBC # BLD: 2.82 M/UL — LOW (ref 4.2–5.8)
RBC # BLD: 2.83 M/UL — LOW (ref 4.2–5.8)
RBC # BLD: 2.84 M/UL — LOW (ref 4.2–5.8)
RBC # BLD: 2.87 M/UL — LOW (ref 4.2–5.8)
RBC # BLD: 2.88 M/UL — LOW (ref 4.2–5.8)
RBC # BLD: 2.88 M/UL — LOW (ref 4.2–5.8)
RBC # BLD: 2.9 M/UL — LOW (ref 4.2–5.8)
RBC # BLD: 2.92 M/UL — LOW (ref 4.2–5.8)
RBC # BLD: 2.94 M/UL — LOW (ref 4.2–5.8)
RBC # BLD: 2.95 M/UL — LOW (ref 4.2–5.8)
RBC # BLD: 2.95 M/UL — LOW (ref 4.2–5.8)
RBC # BLD: 2.96 M/UL — LOW (ref 4.2–5.8)
RBC # BLD: 2.97 M/UL — LOW (ref 4.2–5.8)
RBC # BLD: 2.98 M/UL — LOW (ref 4.2–5.8)
RBC # BLD: 2.98 M/UL — LOW (ref 4.2–5.8)
RBC # BLD: 3.03 M/UL — LOW (ref 4.2–5.8)
RBC # BLD: 3.11 M/UL — LOW (ref 4.2–5.8)
RBC # BLD: 3.17 M/UL — LOW (ref 4.2–5.8)
RBC # BLD: 3.29 M/UL — LOW (ref 4.2–5.8)
RBC # BLD: 3.58 M/UL — LOW (ref 4.2–5.8)
RBC # FLD: 13.8 % — SIGNIFICANT CHANGE UP (ref 10.3–14.5)
RBC # FLD: 14.2 % — SIGNIFICANT CHANGE UP (ref 10.3–14.5)
RBC # FLD: 14.4 % — SIGNIFICANT CHANGE UP (ref 10.3–14.5)
RBC # FLD: 14.4 % — SIGNIFICANT CHANGE UP (ref 10.3–14.5)
RBC # FLD: 14.6 % — HIGH (ref 10.3–14.5)
RBC # FLD: 14.6 % — HIGH (ref 10.3–14.5)
RBC # FLD: 14.7 % — HIGH (ref 10.3–14.5)
RBC # FLD: 14.8 % — HIGH (ref 10.3–14.5)
RBC # FLD: 14.9 % — HIGH (ref 10.3–14.5)
RBC # FLD: 15 % — HIGH (ref 10.3–14.5)
RBC # FLD: 15.4 % — HIGH (ref 10.3–14.5)
RBC # FLD: 15.5 % — HIGH (ref 10.3–14.5)
RBC # FLD: 15.9 % — HIGH (ref 10.3–14.5)
RBC # FLD: 16.1 % — HIGH (ref 10.3–14.5)
RBC # FLD: 16.2 % — HIGH (ref 10.3–14.5)
RBC # FLD: 16.5 % — HIGH (ref 10.3–14.5)
RBC # FLD: 16.7 % — HIGH (ref 10.3–14.5)
RBC # FLD: 17 % — HIGH (ref 10.3–14.5)
RBC # FLD: 17.1 % — HIGH (ref 10.3–14.5)
RBC # FLD: 17.1 % — HIGH (ref 10.3–14.5)
RBC # FLD: 17.4 % — HIGH (ref 10.3–14.5)
RBC # FLD: 17.6 % — HIGH (ref 10.3–14.5)
RBC # FLD: 17.6 % — HIGH (ref 10.3–14.5)
RBC # FLD: 17.9 % — HIGH (ref 10.3–14.5)
RBC # FLD: 18.4 % — HIGH (ref 10.3–14.5)
RBC # FLD: 19 % — HIGH (ref 10.3–14.5)
RBC # FLD: 19.1 % — HIGH (ref 10.3–14.5)
RBC # FLD: 19.1 % — HIGH (ref 10.3–14.5)
RBC # FLD: 19.2 % — HIGH (ref 10.3–14.5)
RBC # FLD: 19.5 % — HIGH (ref 10.3–14.5)
RBC # FLD: 20.2 % — HIGH (ref 10.3–14.5)
RBC # FLD: 20.4 % — HIGH (ref 10.3–14.5)
RBC # FLD: 20.9 % — HIGH (ref 10.3–14.5)
RBC # FLD: 21.2 % — HIGH (ref 10.3–14.5)
RBC # FLD: 21.6 % — HIGH (ref 10.3–14.5)
RBC # FLD: 22.4 % — HIGH (ref 10.3–14.5)
RBC # FLD: 22.7 % — HIGH (ref 10.3–14.5)
RBC # FLD: 22.8 % — HIGH (ref 10.3–14.5)
RBC # FLD: 22.8 % — HIGH (ref 10.3–14.5)
RBC BLD AUTO: ABNORMAL
RBC CASTS # UR COMP ASSIST: 1 /HPF — SIGNIFICANT CHANGE UP (ref 0–4)
RBC CASTS # UR COMP ASSIST: 1 /HPF — SIGNIFICANT CHANGE UP (ref 0–4)
RBC CASTS # UR COMP ASSIST: 245 /HPF — HIGH (ref 0–4)
RBC CASTS # UR COMP ASSIST: >50 /HPF — SIGNIFICANT CHANGE UP (ref 0–4)
RCV VOL RI: 1030 /UL — HIGH (ref 0–0)
RCV VOL RI: 2150 /UL — HIGH (ref 0–0)
RCV VOL RI: 8875 /UL — HIGH (ref 0–0)
RCV VOL RI: HIGH /UL (ref 0–0)
RH IG SCN BLD-IMP: POSITIVE — SIGNIFICANT CHANGE UP
S AUREUS DNA NOSE QL NAA+PROBE: SIGNIFICANT CHANGE UP
SAO2 % BLDA: 98 % — HIGH (ref 92–96)
SAO2 % BLDA: 99 % — HIGH (ref 92–96)
SAO2 % BLDV: 70 % — SIGNIFICANT CHANGE UP (ref 67–88)
SAO2 % BLDV: 71 % — SIGNIFICANT CHANGE UP (ref 67–88)
SAO2 % BLDV: 75 % — SIGNIFICANT CHANGE UP (ref 67–88)
SARS-COV-2 IGG+IGM SERPL QL IA: 0.4 U/ML — SIGNIFICANT CHANGE UP
SARS-COV-2 IGG+IGM SERPL QL IA: NEGATIVE — SIGNIFICANT CHANGE UP
SARS-COV-2 RNA SPEC QL NAA+PROBE: SIGNIFICANT CHANGE UP
SODIUM ?TM UR-SCNC: 57 MMOL/24HR — SIGNIFICANT CHANGE UP (ref 40–220)
SODIUM SERPL-SCNC: 120 MMOL/L — CRITICAL LOW (ref 135–145)
SODIUM SERPL-SCNC: 121 MMOL/L — LOW (ref 135–145)
SODIUM SERPL-SCNC: 121 MMOL/L — LOW (ref 135–145)
SODIUM SERPL-SCNC: 122 MMOL/L — LOW (ref 135–145)
SODIUM SERPL-SCNC: 124 MMOL/L — LOW (ref 135–145)
SODIUM SERPL-SCNC: 124 MMOL/L — LOW (ref 135–145)
SODIUM SERPL-SCNC: 125 MMOL/L — LOW (ref 135–145)
SODIUM SERPL-SCNC: 130 MMOL/L — LOW (ref 135–145)
SODIUM SERPL-SCNC: 138 MMOL/L — SIGNIFICANT CHANGE UP (ref 135–145)
SODIUM SERPL-SCNC: 139 MMOL/L — SIGNIFICANT CHANGE UP (ref 135–145)
SODIUM SERPL-SCNC: 140 MMOL/L — SIGNIFICANT CHANGE UP (ref 135–145)
SODIUM SERPL-SCNC: 140 MMOL/L — SIGNIFICANT CHANGE UP (ref 135–145)
SODIUM SERPL-SCNC: 141 MMOL/L — SIGNIFICANT CHANGE UP (ref 135–145)
SODIUM SERPL-SCNC: 142 MMOL/L — SIGNIFICANT CHANGE UP (ref 135–145)
SODIUM SERPL-SCNC: 143 MMOL/L — SIGNIFICANT CHANGE UP (ref 135–145)
SODIUM SERPL-SCNC: 144 MMOL/L — SIGNIFICANT CHANGE UP (ref 135–145)
SODIUM SERPL-SCNC: 145 MMOL/L — SIGNIFICANT CHANGE UP (ref 135–145)
SODIUM SERPL-SCNC: 146 MMOL/L — HIGH (ref 135–145)
SODIUM SERPL-SCNC: 147 MMOL/L — HIGH (ref 135–145)
SODIUM SERPL-SCNC: 149 MMOL/L — HIGH (ref 135–145)
SODIUM SERPL-SCNC: 150 MMOL/L — HIGH (ref 135–145)
SODIUM SERPL-SCNC: 150 MMOL/L — HIGH (ref 135–145)
SODIUM SERPL-SCNC: 151 MMOL/L — HIGH (ref 135–145)
SODIUM UR-SCNC: 10 MMOL/L — SIGNIFICANT CHANGE UP
SODIUM UR-SCNC: 53 MMOL/L — SIGNIFICANT CHANGE UP
SODIUM UR-SCNC: 7 MMOL/L — SIGNIFICANT CHANGE UP
SP GR SPEC: 1.01 — LOW (ref 1.01–1.02)
SP GR SPEC: 1.01 — SIGNIFICANT CHANGE UP (ref 1.01–1.02)
SP GR SPEC: 1.02 — SIGNIFICANT CHANGE UP (ref 1.01–1.02)
SPECIMEN SOURCE FLD: SIGNIFICANT CHANGE UP
SPECIMEN SOURCE FLD: SIGNIFICANT CHANGE UP
SPECIMEN SOURCE: SIGNIFICANT CHANGE UP
TM INTERPRETATION: SIGNIFICANT CHANGE UP
TOTAL NUCLEATED CELL COUNT, BODY FLUID: 142 /UL — SIGNIFICANT CHANGE UP
TOTAL NUCLEATED CELL COUNT, BODY FLUID: 193 /UL — SIGNIFICANT CHANGE UP
TOTAL NUCLEATED CELL COUNT, BODY FLUID: 43 /UL — SIGNIFICANT CHANGE UP
TOTAL NUCLEATED CELL COUNT, BODY FLUID: 46 /UL — SIGNIFICANT CHANGE UP
TOTAL NUCLEATED CELL COUNT, BODY FLUID: 675 /UL — SIGNIFICANT CHANGE UP
TOTAL NUCLEATED CELL COUNT, BODY FLUID: 95 /UL — SIGNIFICANT CHANGE UP
TOTAL VOLUME - 24 HOUR: 1350 ML — SIGNIFICANT CHANGE UP
TRIGL FLD-MCNC: 121 MG/DL — SIGNIFICANT CHANGE UP
TRIGL FLD-MCNC: 166 MG/DL — SIGNIFICANT CHANGE UP
TRIGL FLD-MCNC: 214 MG/DL — SIGNIFICANT CHANGE UP
TRIGL FLD-MCNC: 270 MG/DL — SIGNIFICANT CHANGE UP
TRIGL FLD-MCNC: 64 MG/DL — SIGNIFICANT CHANGE UP
TROPONIN T, HIGH SENSITIVITY RESULT: 72 NG/L — HIGH (ref 0–51)
TROPONIN T, HIGH SENSITIVITY RESULT: 86 NG/L — HIGH (ref 0–51)
TROPONIN T, HIGH SENSITIVITY RESULT: 86 NG/L — HIGH (ref 0–51)
TROPONIN T, HIGH SENSITIVITY RESULT: 92 NG/L — HIGH (ref 0–51)
TSH SERPL-MCNC: 1 UIU/ML — SIGNIFICANT CHANGE UP (ref 0.27–4.2)
TUBE TYPE: SIGNIFICANT CHANGE UP
URATE UR-MCNC: 3.7 MG/DL — SIGNIFICANT CHANGE UP
URINE CREATININE CALCULATION: 0.7 G/24 H — LOW (ref 1–2)
UROBILINOGEN FLD QL: NEGATIVE — SIGNIFICANT CHANGE UP
UUN UR-MCNC: 442 MG/DL — SIGNIFICANT CHANGE UP
WBC # BLD: 10.44 K/UL — SIGNIFICANT CHANGE UP (ref 3.8–10.5)
WBC # BLD: 13.41 K/UL — HIGH (ref 3.8–10.5)
WBC # BLD: 15.17 K/UL — HIGH (ref 3.8–10.5)
WBC # BLD: 3.52 K/UL — LOW (ref 3.8–10.5)
WBC # BLD: 3.62 K/UL — LOW (ref 3.8–10.5)
WBC # BLD: 3.63 K/UL — LOW (ref 3.8–10.5)
WBC # BLD: 3.72 K/UL — LOW (ref 3.8–10.5)
WBC # BLD: 4.13 K/UL — SIGNIFICANT CHANGE UP (ref 3.8–10.5)
WBC # BLD: 4.21 K/UL — SIGNIFICANT CHANGE UP (ref 3.8–10.5)
WBC # BLD: 4.42 K/UL — SIGNIFICANT CHANGE UP (ref 3.8–10.5)
WBC # BLD: 4.62 K/UL — SIGNIFICANT CHANGE UP (ref 3.8–10.5)
WBC # BLD: 4.73 K/UL — SIGNIFICANT CHANGE UP (ref 3.8–10.5)
WBC # BLD: 4.78 K/UL — SIGNIFICANT CHANGE UP (ref 3.8–10.5)
WBC # BLD: 4.85 K/UL — SIGNIFICANT CHANGE UP (ref 3.8–10.5)
WBC # BLD: 4.88 K/UL — SIGNIFICANT CHANGE UP (ref 3.8–10.5)
WBC # BLD: 5.01 K/UL — SIGNIFICANT CHANGE UP (ref 3.8–10.5)
WBC # BLD: 5.28 K/UL — SIGNIFICANT CHANGE UP (ref 3.8–10.5)
WBC # BLD: 5.44 K/UL — SIGNIFICANT CHANGE UP (ref 3.8–10.5)
WBC # BLD: 5.47 K/UL — SIGNIFICANT CHANGE UP (ref 3.8–10.5)
WBC # BLD: 5.54 K/UL — SIGNIFICANT CHANGE UP (ref 3.8–10.5)
WBC # BLD: 5.63 K/UL — SIGNIFICANT CHANGE UP (ref 3.8–10.5)
WBC # BLD: 5.68 K/UL — SIGNIFICANT CHANGE UP (ref 3.8–10.5)
WBC # BLD: 5.95 K/UL — SIGNIFICANT CHANGE UP (ref 3.8–10.5)
WBC # BLD: 6.08 K/UL — SIGNIFICANT CHANGE UP (ref 3.8–10.5)
WBC # BLD: 6.27 K/UL — SIGNIFICANT CHANGE UP (ref 3.8–10.5)
WBC # BLD: 6.31 K/UL — SIGNIFICANT CHANGE UP (ref 3.8–10.5)
WBC # BLD: 6.57 K/UL — SIGNIFICANT CHANGE UP (ref 3.8–10.5)
WBC # BLD: 6.6 K/UL — SIGNIFICANT CHANGE UP (ref 3.8–10.5)
WBC # BLD: 6.68 K/UL — SIGNIFICANT CHANGE UP (ref 3.8–10.5)
WBC # BLD: 6.9 K/UL — SIGNIFICANT CHANGE UP (ref 3.8–10.5)
WBC # BLD: 7.02 K/UL — SIGNIFICANT CHANGE UP (ref 3.8–10.5)
WBC # BLD: 7.29 K/UL — SIGNIFICANT CHANGE UP (ref 3.8–10.5)
WBC # BLD: 7.45 K/UL — SIGNIFICANT CHANGE UP (ref 3.8–10.5)
WBC # BLD: 7.47 K/UL — SIGNIFICANT CHANGE UP (ref 3.8–10.5)
WBC # BLD: 7.51 K/UL — SIGNIFICANT CHANGE UP (ref 3.8–10.5)
WBC # BLD: 7.54 K/UL — SIGNIFICANT CHANGE UP (ref 3.8–10.5)
WBC # BLD: 7.7 K/UL — SIGNIFICANT CHANGE UP (ref 3.8–10.5)
WBC # BLD: 7.75 K/UL — SIGNIFICANT CHANGE UP (ref 3.8–10.5)
WBC # BLD: 7.89 K/UL — SIGNIFICANT CHANGE UP (ref 3.8–10.5)
WBC # BLD: 8.73 K/UL — SIGNIFICANT CHANGE UP (ref 3.8–10.5)
WBC # BLD: 8.78 K/UL — SIGNIFICANT CHANGE UP (ref 3.8–10.5)
WBC # BLD: 9.5 K/UL — SIGNIFICANT CHANGE UP (ref 3.8–10.5)
WBC # BLD: 9.59 K/UL — SIGNIFICANT CHANGE UP (ref 3.8–10.5)
WBC # FLD AUTO: 10.44 K/UL — SIGNIFICANT CHANGE UP (ref 3.8–10.5)
WBC # FLD AUTO: 13.41 K/UL — HIGH (ref 3.8–10.5)
WBC # FLD AUTO: 15.17 K/UL — HIGH (ref 3.8–10.5)
WBC # FLD AUTO: 3.52 K/UL — LOW (ref 3.8–10.5)
WBC # FLD AUTO: 3.62 K/UL — LOW (ref 3.8–10.5)
WBC # FLD AUTO: 3.63 K/UL — LOW (ref 3.8–10.5)
WBC # FLD AUTO: 3.72 K/UL — LOW (ref 3.8–10.5)
WBC # FLD AUTO: 4.13 K/UL — SIGNIFICANT CHANGE UP (ref 3.8–10.5)
WBC # FLD AUTO: 4.21 K/UL — SIGNIFICANT CHANGE UP (ref 3.8–10.5)
WBC # FLD AUTO: 4.42 K/UL — SIGNIFICANT CHANGE UP (ref 3.8–10.5)
WBC # FLD AUTO: 4.62 K/UL — SIGNIFICANT CHANGE UP (ref 3.8–10.5)
WBC # FLD AUTO: 4.73 K/UL — SIGNIFICANT CHANGE UP (ref 3.8–10.5)
WBC # FLD AUTO: 4.78 K/UL — SIGNIFICANT CHANGE UP (ref 3.8–10.5)
WBC # FLD AUTO: 4.85 K/UL — SIGNIFICANT CHANGE UP (ref 3.8–10.5)
WBC # FLD AUTO: 4.88 K/UL — SIGNIFICANT CHANGE UP (ref 3.8–10.5)
WBC # FLD AUTO: 5.01 K/UL — SIGNIFICANT CHANGE UP (ref 3.8–10.5)
WBC # FLD AUTO: 5.28 K/UL — SIGNIFICANT CHANGE UP (ref 3.8–10.5)
WBC # FLD AUTO: 5.44 K/UL — SIGNIFICANT CHANGE UP (ref 3.8–10.5)
WBC # FLD AUTO: 5.47 K/UL — SIGNIFICANT CHANGE UP (ref 3.8–10.5)
WBC # FLD AUTO: 5.54 K/UL — SIGNIFICANT CHANGE UP (ref 3.8–10.5)
WBC # FLD AUTO: 5.63 K/UL — SIGNIFICANT CHANGE UP (ref 3.8–10.5)
WBC # FLD AUTO: 5.68 K/UL — SIGNIFICANT CHANGE UP (ref 3.8–10.5)
WBC # FLD AUTO: 5.95 K/UL — SIGNIFICANT CHANGE UP (ref 3.8–10.5)
WBC # FLD AUTO: 6.08 K/UL — SIGNIFICANT CHANGE UP (ref 3.8–10.5)
WBC # FLD AUTO: 6.27 K/UL — SIGNIFICANT CHANGE UP (ref 3.8–10.5)
WBC # FLD AUTO: 6.31 K/UL — SIGNIFICANT CHANGE UP (ref 3.8–10.5)
WBC # FLD AUTO: 6.57 K/UL — SIGNIFICANT CHANGE UP (ref 3.8–10.5)
WBC # FLD AUTO: 6.6 K/UL — SIGNIFICANT CHANGE UP (ref 3.8–10.5)
WBC # FLD AUTO: 6.68 K/UL — SIGNIFICANT CHANGE UP (ref 3.8–10.5)
WBC # FLD AUTO: 6.9 K/UL — SIGNIFICANT CHANGE UP (ref 3.8–10.5)
WBC # FLD AUTO: 7.02 K/UL — SIGNIFICANT CHANGE UP (ref 3.8–10.5)
WBC # FLD AUTO: 7.29 K/UL — SIGNIFICANT CHANGE UP (ref 3.8–10.5)
WBC # FLD AUTO: 7.45 K/UL — SIGNIFICANT CHANGE UP (ref 3.8–10.5)
WBC # FLD AUTO: 7.47 K/UL — SIGNIFICANT CHANGE UP (ref 3.8–10.5)
WBC # FLD AUTO: 7.51 K/UL — SIGNIFICANT CHANGE UP (ref 3.8–10.5)
WBC # FLD AUTO: 7.54 K/UL — SIGNIFICANT CHANGE UP (ref 3.8–10.5)
WBC # FLD AUTO: 7.7 K/UL — SIGNIFICANT CHANGE UP (ref 3.8–10.5)
WBC # FLD AUTO: 7.75 K/UL — SIGNIFICANT CHANGE UP (ref 3.8–10.5)
WBC # FLD AUTO: 7.89 K/UL — SIGNIFICANT CHANGE UP (ref 3.8–10.5)
WBC # FLD AUTO: 8.73 K/UL — SIGNIFICANT CHANGE UP (ref 3.8–10.5)
WBC # FLD AUTO: 8.78 K/UL — SIGNIFICANT CHANGE UP (ref 3.8–10.5)
WBC # FLD AUTO: 9.5 K/UL — SIGNIFICANT CHANGE UP (ref 3.8–10.5)
WBC # FLD AUTO: 9.59 K/UL — SIGNIFICANT CHANGE UP (ref 3.8–10.5)
WBC UR QL: 0 /HPF — SIGNIFICANT CHANGE UP (ref 0–5)
WBC UR QL: 0 /HPF — SIGNIFICANT CHANGE UP (ref 0–5)
WBC UR QL: 13 /HPF — HIGH (ref 0–5)
WBC UR QL: 15 /HPF — HIGH (ref 0–5)

## 2021-01-01 PROCEDURE — 84156 ASSAY OF PROTEIN URINE: CPT

## 2021-01-01 PROCEDURE — 84478 ASSAY OF TRIGLYCERIDES: CPT

## 2021-01-01 PROCEDURE — 71045 X-RAY EXAM CHEST 1 VIEW: CPT | Mod: 26

## 2021-01-01 PROCEDURE — 99223 1ST HOSP IP/OBS HIGH 75: CPT

## 2021-01-01 PROCEDURE — 85014 HEMATOCRIT: CPT

## 2021-01-01 PROCEDURE — 86706 HEP B SURFACE ANTIBODY: CPT

## 2021-01-01 PROCEDURE — 93970 EXTREMITY STUDY: CPT | Mod: 26

## 2021-01-01 PROCEDURE — 99497 ADVNCD CARE PLAN 30 MIN: CPT

## 2021-01-01 PROCEDURE — 99232 SBSQ HOSP IP/OBS MODERATE 35: CPT | Mod: GC

## 2021-01-01 PROCEDURE — 93010 ELECTROCARDIOGRAM REPORT: CPT

## 2021-01-01 PROCEDURE — 83036 HEMOGLOBIN GLYCOSYLATED A1C: CPT

## 2021-01-01 PROCEDURE — 99497 ADVNCD CARE PLAN 30 MIN: CPT | Mod: 25

## 2021-01-01 PROCEDURE — 99233 SBSQ HOSP IP/OBS HIGH 50: CPT | Mod: GC

## 2021-01-01 PROCEDURE — 76705 ECHO EXAM OF ABDOMEN: CPT | Mod: 26

## 2021-01-01 PROCEDURE — 83605 ASSAY OF LACTIC ACID: CPT

## 2021-01-01 PROCEDURE — 82962 GLUCOSE BLOOD TEST: CPT

## 2021-01-01 PROCEDURE — 74230 X-RAY XM SWLNG FUNCJ C+: CPT | Mod: 26

## 2021-01-01 PROCEDURE — 94660 CPAP INITIATION&MGMT: CPT

## 2021-01-01 PROCEDURE — 92610 EVALUATE SWALLOWING FUNCTION: CPT

## 2021-01-01 PROCEDURE — 82550 ASSAY OF CK (CPK): CPT

## 2021-01-01 PROCEDURE — 84100 ASSAY OF PHOSPHORUS: CPT

## 2021-01-01 PROCEDURE — 82803 BLOOD GASES ANY COMBINATION: CPT

## 2021-01-01 PROCEDURE — 93308 TTE F-UP OR LMTD: CPT

## 2021-01-01 PROCEDURE — 94002 VENT MGMT INPAT INIT DAY: CPT

## 2021-01-01 PROCEDURE — 82570 ASSAY OF URINE CREATININE: CPT

## 2021-01-01 PROCEDURE — 49083 ABD PARACENTESIS W/IMAGING: CPT

## 2021-01-01 PROCEDURE — 88112 CYTOPATH CELL ENHANCE TECH: CPT | Mod: 26

## 2021-01-01 PROCEDURE — 94003 VENT MGMT INPAT SUBQ DAY: CPT

## 2021-01-01 PROCEDURE — 85018 HEMOGLOBIN: CPT

## 2021-01-01 PROCEDURE — 99291 CRITICAL CARE FIRST HOUR: CPT

## 2021-01-01 PROCEDURE — 92526 ORAL FUNCTION THERAPY: CPT

## 2021-01-01 PROCEDURE — 85060 BLOOD SMEAR INTERPRETATION: CPT

## 2021-01-01 PROCEDURE — 99285 EMERGENCY DEPT VISIT HI MDM: CPT

## 2021-01-01 PROCEDURE — 82042 OTHER SOURCE ALBUMIN QUAN EA: CPT

## 2021-01-01 PROCEDURE — 92611 MOTION FLUOROSCOPY/SWALLOW: CPT

## 2021-01-01 PROCEDURE — 82272 OCCULT BLD FECES 1-3 TESTS: CPT

## 2021-01-01 PROCEDURE — 86923 COMPATIBILITY TEST ELECTRIC: CPT

## 2021-01-01 PROCEDURE — 99292 CRITICAL CARE ADDL 30 MIN: CPT | Mod: 25

## 2021-01-01 PROCEDURE — 84295 ASSAY OF SERUM SODIUM: CPT

## 2021-01-01 PROCEDURE — 99291 CRITICAL CARE FIRST HOUR: CPT | Mod: 25

## 2021-01-01 PROCEDURE — 99232 SBSQ HOSP IP/OBS MODERATE 35: CPT

## 2021-01-01 PROCEDURE — 87086 URINE CULTURE/COLONY COUNT: CPT

## 2021-01-01 PROCEDURE — 87075 CULTR BACTERIA EXCEPT BLOOD: CPT

## 2021-01-01 PROCEDURE — 87040 BLOOD CULTURE FOR BACTERIA: CPT

## 2021-01-01 PROCEDURE — P9047: CPT

## 2021-01-01 PROCEDURE — 88112 CYTOPATH CELL ENHANCE TECH: CPT

## 2021-01-01 PROCEDURE — 85025 COMPLETE CBC W/AUTO DIFF WBC: CPT

## 2021-01-01 PROCEDURE — 88305 TISSUE EXAM BY PATHOLOGIST: CPT | Mod: 26

## 2021-01-01 PROCEDURE — 80053 COMPREHEN METABOLIC PANEL: CPT

## 2021-01-01 PROCEDURE — 82533 TOTAL CORTISOL: CPT

## 2021-01-01 PROCEDURE — 83935 ASSAY OF URINE OSMOLALITY: CPT

## 2021-01-01 PROCEDURE — 80074 ACUTE HEPATITIS PANEL: CPT

## 2021-01-01 PROCEDURE — 76770 US EXAM ABDO BACK WALL COMP: CPT

## 2021-01-01 PROCEDURE — 93308 TTE F-UP OR LMTD: CPT | Mod: 26

## 2021-01-01 PROCEDURE — 87070 CULTURE OTHR SPECIMN AEROBIC: CPT

## 2021-01-01 PROCEDURE — 85730 THROMBOPLASTIN TIME PARTIAL: CPT

## 2021-01-01 PROCEDURE — 97110 THERAPEUTIC EXERCISES: CPT

## 2021-01-01 PROCEDURE — 44360 SMALL BOWEL ENDOSCOPY: CPT | Mod: GC

## 2021-01-01 PROCEDURE — 99223 1ST HOSP IP/OBS HIGH 75: CPT | Mod: GC

## 2021-01-01 PROCEDURE — 88185 FLOWCYTOMETRY/TC ADD-ON: CPT

## 2021-01-01 PROCEDURE — 32557 INSERT CATH PLEURA W/ IMAGE: CPT

## 2021-01-01 PROCEDURE — P9045: CPT

## 2021-01-01 PROCEDURE — 99233 SBSQ HOSP IP/OBS HIGH 50: CPT

## 2021-01-01 PROCEDURE — 81001 URINALYSIS AUTO W/SCOPE: CPT

## 2021-01-01 PROCEDURE — 87517 HEPATITIS B DNA QUANT: CPT

## 2021-01-01 PROCEDURE — 71250 CT THORAX DX C-: CPT

## 2021-01-01 PROCEDURE — 83615 LACTATE (LD) (LDH) ENZYME: CPT

## 2021-01-01 PROCEDURE — 88189 FLOWCYTOMETRY/READ 16 & >: CPT

## 2021-01-01 PROCEDURE — P9016: CPT

## 2021-01-01 PROCEDURE — 32555 ASPIRATE PLEURA W/ IMAGING: CPT | Mod: GC

## 2021-01-01 PROCEDURE — 87641 MR-STAPH DNA AMP PROBE: CPT

## 2021-01-01 PROCEDURE — P9037: CPT

## 2021-01-01 PROCEDURE — 82140 ASSAY OF AMMONIA: CPT

## 2021-01-01 PROCEDURE — 87205 SMEAR GRAM STAIN: CPT

## 2021-01-01 PROCEDURE — 82553 CREATINE MB FRACTION: CPT

## 2021-01-01 PROCEDURE — 86850 RBC ANTIBODY SCREEN: CPT

## 2021-01-01 PROCEDURE — 86769 SARS-COV-2 COVID-19 ANTIBODY: CPT

## 2021-01-01 PROCEDURE — 70450 CT HEAD/BRAIN W/O DYE: CPT | Mod: 26,MA

## 2021-01-01 PROCEDURE — 88184 FLOWCYTOMETRY/ TC 1 MARKER: CPT

## 2021-01-01 PROCEDURE — 93306 TTE W/DOPPLER COMPLETE: CPT | Mod: 26

## 2021-01-01 PROCEDURE — 74176 CT ABD & PELVIS W/O CONTRAST: CPT

## 2021-01-01 PROCEDURE — 87102 FUNGUS ISOLATION CULTURE: CPT

## 2021-01-01 PROCEDURE — 82945 GLUCOSE OTHER FLUID: CPT

## 2021-01-01 PROCEDURE — 84484 ASSAY OF TROPONIN QUANT: CPT

## 2021-01-01 PROCEDURE — 99285 EMERGENCY DEPT VISIT HI MDM: CPT | Mod: 25

## 2021-01-01 PROCEDURE — 97162 PT EVAL MOD COMPLEX 30 MIN: CPT

## 2021-01-01 PROCEDURE — 86704 HEP B CORE ANTIBODY TOTAL: CPT

## 2021-01-01 PROCEDURE — 86900 BLOOD TYPING SEROLOGIC ABO: CPT

## 2021-01-01 PROCEDURE — 93005 ELECTROCARDIOGRAM TRACING: CPT

## 2021-01-01 PROCEDURE — 31500 INSERT EMERGENCY AIRWAY: CPT

## 2021-01-01 PROCEDURE — 86705 HEP B CORE ANTIBODY IGM: CPT

## 2021-01-01 PROCEDURE — U0005: CPT

## 2021-01-01 PROCEDURE — 80048 BASIC METABOLIC PNL TOTAL CA: CPT

## 2021-01-01 PROCEDURE — P9059: CPT

## 2021-01-01 PROCEDURE — 49083 ABD PARACENTESIS W/IMAGING: CPT | Mod: GC

## 2021-01-01 PROCEDURE — 88305 TISSUE EXAM BY PATHOLOGIST: CPT

## 2021-01-01 PROCEDURE — 97530 THERAPEUTIC ACTIVITIES: CPT

## 2021-01-01 PROCEDURE — 97112 NEUROMUSCULAR REEDUCATION: CPT

## 2021-01-01 PROCEDURE — 74230 X-RAY XM SWLNG FUNCJ C+: CPT

## 2021-01-01 PROCEDURE — 71250 CT THORAX DX C-: CPT | Mod: 26,MA

## 2021-01-01 PROCEDURE — 83880 ASSAY OF NATRIURETIC PEPTIDE: CPT

## 2021-01-01 PROCEDURE — 96365 THER/PROPH/DIAG IV INF INIT: CPT

## 2021-01-01 PROCEDURE — U0003: CPT

## 2021-01-01 PROCEDURE — 76604 US EXAM CHEST: CPT | Mod: 26

## 2021-01-01 PROCEDURE — 70450 CT HEAD/BRAIN W/O DYE: CPT

## 2021-01-01 PROCEDURE — 89051 BODY FLUID CELL COUNT: CPT

## 2021-01-01 PROCEDURE — 84300 ASSAY OF URINE SODIUM: CPT

## 2021-01-01 PROCEDURE — 81003 URINALYSIS AUTO W/O SCOPE: CPT

## 2021-01-01 PROCEDURE — 82947 ASSAY GLUCOSE BLOOD QUANT: CPT

## 2021-01-01 PROCEDURE — 84157 ASSAY OF PROTEIN OTHER: CPT

## 2021-01-01 PROCEDURE — 74176 CT ABD & PELVIS W/O CONTRAST: CPT | Mod: 26

## 2021-01-01 PROCEDURE — 76770 US EXAM ABDO BACK WALL COMP: CPT | Mod: 26

## 2021-01-01 PROCEDURE — 84132 ASSAY OF SERUM POTASSIUM: CPT

## 2021-01-01 PROCEDURE — 36430 TRANSFUSION BLD/BLD COMPNT: CPT

## 2021-01-01 PROCEDURE — 85027 COMPLETE CBC AUTOMATED: CPT

## 2021-01-01 PROCEDURE — 76705 ECHO EXAM OF ABDOMEN: CPT

## 2021-01-01 PROCEDURE — 83735 ASSAY OF MAGNESIUM: CPT

## 2021-01-01 PROCEDURE — 85610 PROTHROMBIN TIME: CPT

## 2021-01-01 PROCEDURE — 86677 HELICOBACTER PYLORI ANTIBODY: CPT

## 2021-01-01 PROCEDURE — C8929: CPT

## 2021-01-01 PROCEDURE — C1729: CPT

## 2021-01-01 PROCEDURE — 99232 SBSQ HOSP IP/OBS MODERATE 35: CPT | Mod: GC,25

## 2021-01-01 PROCEDURE — 32551 INSERTION OF CHEST TUBE: CPT | Mod: 59

## 2021-01-01 PROCEDURE — 99231 SBSQ HOSP IP/OBS SF/LOW 25: CPT

## 2021-01-01 PROCEDURE — 94640 AIRWAY INHALATION TREATMENT: CPT

## 2021-01-01 PROCEDURE — 87640 STAPH A DNA AMP PROBE: CPT

## 2021-01-01 PROCEDURE — 96375 TX/PRO/DX INJ NEW DRUG ADDON: CPT

## 2021-01-01 PROCEDURE — 99498 ADVNCD CARE PLAN ADDL 30 MIN: CPT | Mod: 25

## 2021-01-01 PROCEDURE — 86901 BLOOD TYPING SEROLOGIC RH(D): CPT

## 2021-01-01 PROCEDURE — 83930 ASSAY OF BLOOD OSMOLALITY: CPT

## 2021-01-01 PROCEDURE — 84443 ASSAY THYROID STIM HORMONE: CPT

## 2021-01-01 PROCEDURE — 84560 ASSAY OF URINE/URIC ACID: CPT

## 2021-01-01 PROCEDURE — 87389 HIV-1 AG W/HIV-1&-2 AB AG IA: CPT

## 2021-01-01 PROCEDURE — 82565 ASSAY OF CREATININE: CPT

## 2021-01-01 PROCEDURE — 83986 ASSAY PH BODY FLUID NOS: CPT

## 2021-01-01 PROCEDURE — 82248 BILIRUBIN DIRECT: CPT

## 2021-01-01 PROCEDURE — 71045 X-RAY EXAM CHEST 1 VIEW: CPT

## 2021-01-01 PROCEDURE — 93970 EXTREMITY STUDY: CPT

## 2021-01-01 PROCEDURE — 84540 ASSAY OF URINE/UREA-N: CPT

## 2021-01-01 PROCEDURE — 82435 ASSAY OF BLOOD CHLORIDE: CPT

## 2021-01-01 PROCEDURE — 82330 ASSAY OF CALCIUM: CPT

## 2021-01-01 RX ORDER — LACTULOSE 10 G/15ML
10 SOLUTION ORAL THREE TIMES A DAY
Refills: 0 | Status: DISCONTINUED | OUTPATIENT
Start: 2021-01-01 | End: 2021-01-01

## 2021-01-01 RX ORDER — MIDODRINE HYDROCHLORIDE 2.5 MG/1
20 TABLET ORAL EVERY 8 HOURS
Refills: 0 | Status: DISCONTINUED | OUTPATIENT
Start: 2021-01-01 | End: 2021-01-01

## 2021-01-01 RX ORDER — SUCRALFATE 1 G
1 TABLET ORAL EVERY 6 HOURS
Refills: 0 | Status: DISCONTINUED | OUTPATIENT
Start: 2021-01-01 | End: 2021-01-01

## 2021-01-01 RX ORDER — DEXTROSE 50 % IN WATER 50 %
25 SYRINGE (ML) INTRAVENOUS ONCE
Refills: 0 | Status: DISCONTINUED | OUTPATIENT
Start: 2021-01-01 | End: 2021-01-01

## 2021-01-01 RX ORDER — MEROPENEM 1 G/30ML
1000 INJECTION INTRAVENOUS ONCE
Refills: 0 | Status: COMPLETED | OUTPATIENT
Start: 2021-01-01 | End: 2021-01-01

## 2021-01-01 RX ORDER — ALBUMIN HUMAN 25 %
50 VIAL (ML) INTRAVENOUS ONCE
Refills: 0 | Status: COMPLETED | OUTPATIENT
Start: 2021-01-01 | End: 2021-01-01

## 2021-01-01 RX ORDER — INSULIN GLARGINE 100 [IU]/ML
8 INJECTION, SOLUTION SUBCUTANEOUS AT BEDTIME
Refills: 0 | Status: DISCONTINUED | OUTPATIENT
Start: 2021-01-01 | End: 2021-01-01

## 2021-01-01 RX ORDER — PROPOFOL 10 MG/ML
10 INJECTION, EMULSION INTRAVENOUS
Qty: 500 | Refills: 0 | Status: DISCONTINUED | OUTPATIENT
Start: 2021-01-01 | End: 2021-01-01

## 2021-01-01 RX ORDER — SODIUM CHLORIDE 9 MG/ML
1000 INJECTION, SOLUTION INTRAVENOUS
Refills: 0 | Status: DISCONTINUED | OUTPATIENT
Start: 2021-01-01 | End: 2021-01-01

## 2021-01-01 RX ORDER — IPRATROPIUM/ALBUTEROL SULFATE 18-103MCG
3 AEROSOL WITH ADAPTER (GRAM) INHALATION EVERY 6 HOURS
Refills: 0 | Status: DISCONTINUED | OUTPATIENT
Start: 2021-01-01 | End: 2021-01-01

## 2021-01-01 RX ORDER — NOREPINEPHRINE BITARTRATE/D5W 8 MG/250ML
0.03 PLASTIC BAG, INJECTION (ML) INTRAVENOUS
Qty: 8 | Refills: 0 | Status: DISCONTINUED | OUTPATIENT
Start: 2021-01-01 | End: 2021-01-01

## 2021-01-01 RX ORDER — ALBUMIN HUMAN 25 %
250 VIAL (ML) INTRAVENOUS ONCE
Refills: 0 | Status: COMPLETED | OUTPATIENT
Start: 2021-01-01 | End: 2021-01-01

## 2021-01-01 RX ORDER — ROBINUL 0.2 MG/ML
0.2 INJECTION INTRAMUSCULAR; INTRAVENOUS EVERY 4 HOURS
Refills: 0 | Status: DISCONTINUED | OUTPATIENT
Start: 2021-01-01 | End: 2021-01-01

## 2021-01-01 RX ORDER — POLYETHYLENE GLYCOL 3350 17 G/17G
17 POWDER, FOR SOLUTION ORAL
Qty: 0 | Refills: 0 | DISCHARGE
Start: 2021-01-01

## 2021-01-01 RX ORDER — CEFTRIAXONE 500 MG/1
1000 INJECTION, POWDER, FOR SOLUTION INTRAMUSCULAR; INTRAVENOUS ONCE
Refills: 0 | Status: COMPLETED | OUTPATIENT
Start: 2021-01-01 | End: 2021-01-01

## 2021-01-01 RX ORDER — ACETAMINOPHEN 500 MG
1000 TABLET ORAL ONCE
Refills: 0 | Status: COMPLETED | OUTPATIENT
Start: 2021-01-01 | End: 2021-01-01

## 2021-01-01 RX ORDER — LEVOTHYROXINE SODIUM 125 MCG
44 TABLET ORAL AT BEDTIME
Refills: 0 | Status: DISCONTINUED | OUTPATIENT
Start: 2021-01-01 | End: 2021-01-01

## 2021-01-01 RX ORDER — INSULIN LISPRO 100/ML
VIAL (ML) SUBCUTANEOUS AT BEDTIME
Refills: 0 | Status: DISCONTINUED | OUTPATIENT
Start: 2021-01-01 | End: 2021-01-01

## 2021-01-01 RX ORDER — LEVOTHYROXINE SODIUM 125 MCG
1 TABLET ORAL
Qty: 0 | Refills: 0 | DISCHARGE

## 2021-01-01 RX ORDER — BUMETANIDE 0.25 MG/ML
2 INJECTION INTRAMUSCULAR; INTRAVENOUS ONCE
Refills: 0 | Status: COMPLETED | OUTPATIENT
Start: 2021-01-01 | End: 2021-01-01

## 2021-01-01 RX ORDER — MORPHINE SULFATE 50 MG/1
2 CAPSULE, EXTENDED RELEASE ORAL
Refills: 0 | Status: DISCONTINUED | OUTPATIENT
Start: 2021-01-01 | End: 2021-01-01

## 2021-01-01 RX ORDER — FUROSEMIDE 40 MG
1 TABLET ORAL
Qty: 0 | Refills: 0 | DISCHARGE
Start: 2021-01-01

## 2021-01-01 RX ORDER — LEVOTHYROXINE SODIUM 125 MCG
88 TABLET ORAL DAILY
Refills: 0 | Status: DISCONTINUED | OUTPATIENT
Start: 2021-01-01 | End: 2021-01-01

## 2021-01-01 RX ORDER — FENTANYL CITRATE 50 UG/ML
50 INJECTION INTRAVENOUS ONCE
Refills: 0 | Status: DISCONTINUED | OUTPATIENT
Start: 2021-01-01 | End: 2021-01-01

## 2021-01-01 RX ORDER — FENTANYL CITRATE 50 UG/ML
0.5 INJECTION INTRAVENOUS
Qty: 5000 | Refills: 0 | Status: DISCONTINUED | OUTPATIENT
Start: 2021-01-01 | End: 2021-01-01

## 2021-01-01 RX ORDER — IPRATROPIUM/ALBUTEROL SULFATE 18-103MCG
3 AEROSOL WITH ADAPTER (GRAM) INHALATION ONCE
Refills: 0 | Status: COMPLETED | OUTPATIENT
Start: 2021-01-01 | End: 2021-01-01

## 2021-01-01 RX ORDER — CEFTRIAXONE 500 MG/1
1000 INJECTION, POWDER, FOR SOLUTION INTRAMUSCULAR; INTRAVENOUS EVERY 24 HOURS
Refills: 0 | Status: DISCONTINUED | OUTPATIENT
Start: 2021-01-01 | End: 2021-01-01

## 2021-01-01 RX ORDER — NYSTATIN CREAM 100000 [USP'U]/G
1 CREAM TOPICAL THREE TIMES A DAY
Refills: 0 | Status: DISCONTINUED | OUTPATIENT
Start: 2021-01-01 | End: 2021-01-01

## 2021-01-01 RX ORDER — CHLORHEXIDINE GLUCONATE 213 G/1000ML
15 SOLUTION TOPICAL EVERY 12 HOURS
Refills: 0 | Status: DISCONTINUED | OUTPATIENT
Start: 2021-01-01 | End: 2021-01-01

## 2021-01-01 RX ORDER — SENNA PLUS 8.6 MG/1
2 TABLET ORAL AT BEDTIME
Refills: 0 | Status: DISCONTINUED | OUTPATIENT
Start: 2021-01-01 | End: 2021-01-01

## 2021-01-01 RX ORDER — GLUCAGON INJECTION, SOLUTION 0.5 MG/.1ML
1 INJECTION, SOLUTION SUBCUTANEOUS ONCE
Refills: 0 | Status: DISCONTINUED | OUTPATIENT
Start: 2021-01-01 | End: 2021-01-01

## 2021-01-01 RX ORDER — LACTULOSE 10 G/15ML
30 SOLUTION ORAL EVERY 4 HOURS
Refills: 0 | Status: DISCONTINUED | OUTPATIENT
Start: 2021-01-01 | End: 2021-01-01

## 2021-01-01 RX ORDER — CEFTRIAXONE 500 MG/1
2000 INJECTION, POWDER, FOR SOLUTION INTRAMUSCULAR; INTRAVENOUS EVERY 24 HOURS
Refills: 0 | Status: DISCONTINUED | OUTPATIENT
Start: 2021-01-01 | End: 2021-01-01

## 2021-01-01 RX ORDER — FENTANYL CITRATE 50 UG/ML
100 INJECTION INTRAVENOUS ONCE
Refills: 0 | Status: DISCONTINUED | OUTPATIENT
Start: 2021-01-01 | End: 2021-01-01

## 2021-01-01 RX ORDER — FUROSEMIDE 40 MG
20 TABLET ORAL DAILY
Refills: 0 | Status: DISCONTINUED | OUTPATIENT
Start: 2021-01-01 | End: 2021-01-01

## 2021-01-01 RX ORDER — INSULIN LISPRO 100/ML
VIAL (ML) SUBCUTANEOUS EVERY 6 HOURS
Refills: 0 | Status: DISCONTINUED | OUTPATIENT
Start: 2021-01-01 | End: 2021-01-01

## 2021-01-01 RX ORDER — FUROSEMIDE 40 MG
40 TABLET ORAL DAILY
Refills: 0 | Status: DISCONTINUED | OUTPATIENT
Start: 2021-01-01 | End: 2021-01-01

## 2021-01-01 RX ORDER — PRASUGREL 5 MG/1
1 TABLET, FILM COATED ORAL
Qty: 0 | Refills: 0 | DISCHARGE

## 2021-01-01 RX ORDER — DEXTROSE 50 % IN WATER 50 %
15 SYRINGE (ML) INTRAVENOUS ONCE
Refills: 0 | Status: DISCONTINUED | OUTPATIENT
Start: 2021-01-01 | End: 2021-01-01

## 2021-01-01 RX ORDER — SODIUM BICARBONATE 1 MEQ/ML
50 SYRINGE (ML) INTRAVENOUS ONCE
Refills: 0 | Status: COMPLETED | OUTPATIENT
Start: 2021-01-01 | End: 2021-01-01

## 2021-01-01 RX ORDER — LACTULOSE 10 G/15ML
30 SOLUTION ORAL THREE TIMES A DAY
Refills: 0 | Status: DISCONTINUED | OUTPATIENT
Start: 2021-01-01 | End: 2021-01-01

## 2021-01-01 RX ORDER — INSULIN GLARGINE 100 [IU]/ML
5 INJECTION, SOLUTION SUBCUTANEOUS AT BEDTIME
Refills: 0 | Status: DISCONTINUED | OUTPATIENT
Start: 2021-01-01 | End: 2021-01-01

## 2021-01-01 RX ORDER — INSULIN GLARGINE 100 [IU]/ML
120 INJECTION, SOLUTION SUBCUTANEOUS
Qty: 0 | Refills: 0 | DISCHARGE

## 2021-01-01 RX ORDER — CALCIUM GLUCONATE 100 MG/ML
1 VIAL (ML) INTRAVENOUS ONCE
Refills: 0 | Status: COMPLETED | OUTPATIENT
Start: 2021-01-01 | End: 2021-01-01

## 2021-01-01 RX ORDER — BUMETANIDE 0.25 MG/ML
4 INJECTION INTRAMUSCULAR; INTRAVENOUS ONCE
Refills: 0 | Status: COMPLETED | OUTPATIENT
Start: 2021-01-01 | End: 2021-01-01

## 2021-01-01 RX ORDER — KETAMINE HYDROCHLORIDE 100 MG/ML
0.25 INJECTION INTRAMUSCULAR; INTRAVENOUS
Qty: 1000 | Refills: 0 | Status: DISCONTINUED | OUTPATIENT
Start: 2021-01-01 | End: 2021-01-01

## 2021-01-01 RX ORDER — LACTULOSE 10 G/15ML
20 SOLUTION ORAL THREE TIMES A DAY
Refills: 0 | Status: DISCONTINUED | OUTPATIENT
Start: 2021-01-01 | End: 2021-01-01

## 2021-01-01 RX ORDER — DEXTROSE 50 % IN WATER 50 %
50 SYRINGE (ML) INTRAVENOUS ONCE
Refills: 0 | Status: COMPLETED | OUTPATIENT
Start: 2021-01-01 | End: 2021-01-01

## 2021-01-01 RX ORDER — TIOTROPIUM BROMIDE 18 UG/1
1 CAPSULE ORAL; RESPIRATORY (INHALATION) DAILY
Refills: 0 | Status: DISCONTINUED | OUTPATIENT
Start: 2021-01-01 | End: 2021-01-01

## 2021-01-01 RX ORDER — DEXTROSE 50 % IN WATER 50 %
12.5 SYRINGE (ML) INTRAVENOUS ONCE
Refills: 0 | Status: DISCONTINUED | OUTPATIENT
Start: 2021-01-01 | End: 2021-01-01

## 2021-01-01 RX ORDER — INSULIN LISPRO 100/ML
VIAL (ML) SUBCUTANEOUS
Refills: 0 | Status: DISCONTINUED | OUTPATIENT
Start: 2021-01-01 | End: 2021-01-01

## 2021-01-01 RX ORDER — VANCOMYCIN HCL 1 G
1000 VIAL (EA) INTRAVENOUS ONCE
Refills: 0 | Status: DISCONTINUED | OUTPATIENT
Start: 2021-01-01 | End: 2021-01-01

## 2021-01-01 RX ORDER — CHLORHEXIDINE GLUCONATE 213 G/1000ML
1 SOLUTION TOPICAL
Refills: 0 | Status: DISCONTINUED | OUTPATIENT
Start: 2021-01-01 | End: 2021-01-01

## 2021-01-01 RX ORDER — SODIUM,POTASSIUM PHOSPHATES 278-250MG
1 POWDER IN PACKET (EA) ORAL ONCE
Refills: 0 | Status: COMPLETED | OUTPATIENT
Start: 2021-01-01 | End: 2021-01-01

## 2021-01-01 RX ORDER — FUROSEMIDE 40 MG
1 TABLET ORAL
Qty: 0 | Refills: 0 | DISCHARGE

## 2021-01-01 RX ORDER — NOREPINEPHRINE BITARTRATE/D5W 8 MG/250ML
0.05 PLASTIC BAG, INJECTION (ML) INTRAVENOUS
Qty: 8 | Refills: 0 | Status: DISCONTINUED | OUTPATIENT
Start: 2021-01-01 | End: 2021-01-01

## 2021-01-01 RX ORDER — SODIUM CHLORIDE 9 MG/ML
250 INJECTION, SOLUTION INTRAVENOUS ONCE
Refills: 0 | Status: COMPLETED | OUTPATIENT
Start: 2021-01-01 | End: 2021-01-01

## 2021-01-01 RX ORDER — SODIUM CHLORIDE 9 MG/ML
500 INJECTION, SOLUTION INTRAVENOUS
Refills: 0 | Status: DISCONTINUED | OUTPATIENT
Start: 2021-01-01 | End: 2021-01-01

## 2021-01-01 RX ORDER — ALBUTEROL 90 UG/1
1 AEROSOL, METERED ORAL EVERY 4 HOURS
Refills: 0 | Status: DISCONTINUED | OUTPATIENT
Start: 2021-01-01 | End: 2021-01-01

## 2021-01-01 RX ORDER — AZITHROMYCIN 500 MG/1
500 TABLET, FILM COATED ORAL ONCE
Refills: 0 | Status: COMPLETED | OUTPATIENT
Start: 2021-01-01 | End: 2021-01-01

## 2021-01-01 RX ORDER — INSULIN HUMAN 100 [IU]/ML
5 INJECTION, SOLUTION SUBCUTANEOUS EVERY 6 HOURS
Refills: 0 | Status: DISCONTINUED | OUTPATIENT
Start: 2021-01-01 | End: 2021-01-01

## 2021-01-01 RX ORDER — ALBUMIN HUMAN 25 %
100 VIAL (ML) INTRAVENOUS EVERY 8 HOURS
Refills: 0 | Status: DISCONTINUED | OUTPATIENT
Start: 2021-01-01 | End: 2021-01-01

## 2021-01-01 RX ORDER — FUROSEMIDE 40 MG
20 TABLET ORAL ONCE
Refills: 0 | Status: COMPLETED | OUTPATIENT
Start: 2021-01-01 | End: 2021-01-01

## 2021-01-01 RX ORDER — ACETAMINOPHEN 500 MG
650 TABLET ORAL ONCE
Refills: 0 | Status: COMPLETED | OUTPATIENT
Start: 2021-01-01 | End: 2021-01-01

## 2021-01-01 RX ORDER — INSULIN LISPRO 100/ML
8 VIAL (ML) SUBCUTANEOUS
Qty: 0 | Refills: 0 | DISCHARGE

## 2021-01-01 RX ORDER — ALBUMIN HUMAN 25 %
50 VIAL (ML) INTRAVENOUS EVERY 6 HOURS
Refills: 0 | Status: COMPLETED | OUTPATIENT
Start: 2021-01-01 | End: 2021-01-01

## 2021-01-01 RX ORDER — PANTOPRAZOLE SODIUM 20 MG/1
8 TABLET, DELAYED RELEASE ORAL
Qty: 80 | Refills: 0 | Status: DISCONTINUED | OUTPATIENT
Start: 2021-01-01 | End: 2021-01-01

## 2021-01-01 RX ORDER — LACTULOSE 10 G/15ML
200 SOLUTION ORAL DAILY
Refills: 0 | Status: DISCONTINUED | OUTPATIENT
Start: 2021-01-01 | End: 2021-01-01

## 2021-01-01 RX ORDER — MIDODRINE HYDROCHLORIDE 2.5 MG/1
10 TABLET ORAL EVERY 8 HOURS
Refills: 0 | Status: DISCONTINUED | OUTPATIENT
Start: 2021-01-01 | End: 2021-01-01

## 2021-01-01 RX ORDER — SENNA PLUS 8.6 MG/1
2 TABLET ORAL
Qty: 0 | Refills: 0 | DISCHARGE
Start: 2021-01-01

## 2021-01-01 RX ORDER — PANTOPRAZOLE SODIUM 20 MG/1
40 TABLET, DELAYED RELEASE ORAL
Refills: 0 | Status: DISCONTINUED | OUTPATIENT
Start: 2021-01-01 | End: 2021-01-01

## 2021-01-01 RX ORDER — ALBUTEROL 90 UG/1
2 AEROSOL, METERED ORAL
Qty: 0 | Refills: 0 | DISCHARGE

## 2021-01-01 RX ORDER — INSULIN HUMAN 100 [IU]/ML
5 INJECTION, SOLUTION SUBCUTANEOUS ONCE
Refills: 0 | Status: COMPLETED | OUTPATIENT
Start: 2021-01-01 | End: 2021-01-01

## 2021-01-01 RX ORDER — PRASUGREL 5 MG/1
10 TABLET, FILM COATED ORAL DAILY
Refills: 0 | Status: DISCONTINUED | OUTPATIENT
Start: 2021-01-01 | End: 2021-01-01

## 2021-01-01 RX ORDER — MIDAZOLAM HYDROCHLORIDE 1 MG/ML
4 INJECTION, SOLUTION INTRAMUSCULAR; INTRAVENOUS ONCE
Refills: 0 | Status: DISCONTINUED | OUTPATIENT
Start: 2021-01-01 | End: 2021-01-01

## 2021-01-01 RX ORDER — FUROSEMIDE 40 MG
80 TABLET ORAL
Refills: 0 | Status: DISCONTINUED | OUTPATIENT
Start: 2021-01-01 | End: 2021-01-01

## 2021-01-01 RX ORDER — INSULIN HUMAN 100 [IU]/ML
10 INJECTION, SOLUTION SUBCUTANEOUS ONCE
Refills: 0 | Status: COMPLETED | OUTPATIENT
Start: 2021-01-01 | End: 2021-01-01

## 2021-01-01 RX ORDER — KETOROLAC TROMETHAMINE 30 MG/ML
15 SYRINGE (ML) INJECTION ONCE
Refills: 0 | Status: DISCONTINUED | OUTPATIENT
Start: 2021-01-01 | End: 2021-01-01

## 2021-01-01 RX ORDER — SODIUM ZIRCONIUM CYCLOSILICATE 10 G/10G
10 POWDER, FOR SUSPENSION ORAL EVERY 8 HOURS
Refills: 0 | Status: COMPLETED | OUTPATIENT
Start: 2021-01-01 | End: 2021-01-01

## 2021-01-01 RX ORDER — CALCIUM CARBONATE 500(1250)
1 TABLET ORAL AT BEDTIME
Refills: 0 | Status: DISCONTINUED | OUTPATIENT
Start: 2021-01-01 | End: 2021-01-01

## 2021-01-01 RX ORDER — FAMOTIDINE 10 MG/ML
20 INJECTION INTRAVENOUS
Refills: 0 | Status: DISCONTINUED | OUTPATIENT
Start: 2021-01-01 | End: 2021-01-01

## 2021-01-01 RX ORDER — PANTOPRAZOLE SODIUM 20 MG/1
40 TABLET, DELAYED RELEASE ORAL DAILY
Refills: 0 | Status: DISCONTINUED | OUTPATIENT
Start: 2021-01-01 | End: 2021-01-01

## 2021-01-01 RX ORDER — HUMAN INSULIN 100 [IU]/ML
5 INJECTION, SUSPENSION SUBCUTANEOUS EVERY 6 HOURS
Refills: 0 | Status: DISCONTINUED | OUTPATIENT
Start: 2021-01-01 | End: 2021-01-01

## 2021-01-01 RX ORDER — SACUBITRIL AND VALSARTAN 24; 26 MG/1; MG/1
1 TABLET, FILM COATED ORAL
Qty: 0 | Refills: 0 | DISCHARGE

## 2021-01-01 RX ORDER — SIMETHICONE 80 MG/1
80 TABLET, CHEWABLE ORAL THREE TIMES A DAY
Refills: 0 | Status: DISCONTINUED | OUTPATIENT
Start: 2021-01-01 | End: 2021-01-01

## 2021-01-01 RX ORDER — SPIRONOLACTONE 25 MG/1
50 TABLET, FILM COATED ORAL DAILY
Refills: 0 | Status: DISCONTINUED | OUTPATIENT
Start: 2021-01-01 | End: 2021-01-01

## 2021-01-01 RX ORDER — PANTOPRAZOLE SODIUM 20 MG/1
40 TABLET, DELAYED RELEASE ORAL EVERY 12 HOURS
Refills: 0 | Status: DISCONTINUED | OUTPATIENT
Start: 2021-01-01 | End: 2021-01-01

## 2021-01-01 RX ORDER — OCTREOTIDE ACETATE 200 UG/ML
50 INJECTION, SOLUTION INTRAVENOUS; SUBCUTANEOUS THREE TIMES A DAY
Refills: 0 | Status: DISCONTINUED | OUTPATIENT
Start: 2021-01-01 | End: 2021-01-01

## 2021-01-01 RX ORDER — PROPOFOL 10 MG/ML
30 INJECTION, EMULSION INTRAVENOUS ONCE
Refills: 0 | Status: COMPLETED | OUTPATIENT
Start: 2021-01-01 | End: 2021-01-01

## 2021-01-01 RX ORDER — BUMETANIDE 0.25 MG/ML
4 INJECTION INTRAMUSCULAR; INTRAVENOUS
Qty: 20 | Refills: 0 | Status: DISCONTINUED | OUTPATIENT
Start: 2021-01-01 | End: 2021-01-01

## 2021-01-01 RX ORDER — FAMOTIDINE 10 MG/ML
20 INJECTION INTRAVENOUS DAILY
Refills: 0 | Status: DISCONTINUED | OUTPATIENT
Start: 2021-01-01 | End: 2021-01-01

## 2021-01-01 RX ORDER — LACTULOSE 10 G/15ML
15 SOLUTION ORAL
Qty: 0 | Refills: 0 | DISCHARGE
Start: 2021-01-01

## 2021-01-01 RX ORDER — DEXMEDETOMIDINE HYDROCHLORIDE IN 0.9% SODIUM CHLORIDE 4 UG/ML
0.2 INJECTION INTRAVENOUS
Qty: 200 | Refills: 0 | Status: DISCONTINUED | OUTPATIENT
Start: 2021-01-01 | End: 2021-01-01

## 2021-01-01 RX ORDER — POTASSIUM PHOSPHATE, MONOBASIC POTASSIUM PHOSPHATE, DIBASIC 236; 224 MG/ML; MG/ML
15 INJECTION, SOLUTION INTRAVENOUS ONCE
Refills: 0 | Status: COMPLETED | OUTPATIENT
Start: 2021-01-01 | End: 2021-01-01

## 2021-01-01 RX ORDER — CEFTRIAXONE 500 MG/1
1000 INJECTION, POWDER, FOR SOLUTION INTRAMUSCULAR; INTRAVENOUS EVERY 24 HOURS
Refills: 0 | Status: COMPLETED | OUTPATIENT
Start: 2021-01-01 | End: 2021-01-01

## 2021-01-01 RX ORDER — METOCLOPRAMIDE HCL 10 MG
10 TABLET ORAL ONCE
Refills: 0 | Status: COMPLETED | OUTPATIENT
Start: 2021-01-01 | End: 2021-01-01

## 2021-01-01 RX ORDER — METOPROLOL TARTRATE 50 MG
1 TABLET ORAL
Qty: 0 | Refills: 0 | DISCHARGE

## 2021-01-01 RX ORDER — ALBUTEROL 90 UG/1
2.5 AEROSOL, METERED ORAL ONCE
Refills: 0 | Status: COMPLETED | OUTPATIENT
Start: 2021-01-01 | End: 2021-01-01

## 2021-01-01 RX ORDER — LACTULOSE 10 G/15ML
200 SOLUTION ORAL ONCE
Refills: 0 | Status: COMPLETED | OUTPATIENT
Start: 2021-01-01 | End: 2021-01-01

## 2021-01-01 RX ORDER — FEBUXOSTAT 40 MG/1
1 TABLET ORAL
Qty: 0 | Refills: 0 | DISCHARGE

## 2021-01-01 RX ORDER — LACTULOSE 10 G/15ML
20 SOLUTION ORAL EVERY 6 HOURS
Refills: 0 | Status: DISCONTINUED | OUTPATIENT
Start: 2021-01-01 | End: 2021-01-01

## 2021-01-01 RX ORDER — NYSTATIN CREAM 100000 [USP'U]/G
1 CREAM TOPICAL
Refills: 0 | Status: DISCONTINUED | OUTPATIENT
Start: 2021-01-01 | End: 2021-01-01

## 2021-01-01 RX ORDER — PIPERACILLIN AND TAZOBACTAM 4; .5 G/20ML; G/20ML
3.38 INJECTION, POWDER, LYOPHILIZED, FOR SOLUTION INTRAVENOUS ONCE
Refills: 0 | Status: COMPLETED | OUTPATIENT
Start: 2021-01-01 | End: 2021-01-01

## 2021-01-01 RX ORDER — FUROSEMIDE 40 MG
40 TABLET ORAL ONCE
Refills: 0 | Status: COMPLETED | OUTPATIENT
Start: 2021-01-01 | End: 2021-01-01

## 2021-01-01 RX ORDER — FENTANYL CITRATE 50 UG/ML
20 INJECTION INTRAVENOUS ONCE
Refills: 0 | Status: DISCONTINUED | OUTPATIENT
Start: 2021-01-01 | End: 2021-01-01

## 2021-01-01 RX ORDER — PROPOFOL 10 MG/ML
20 INJECTION, EMULSION INTRAVENOUS ONCE
Refills: 0 | Status: COMPLETED | OUTPATIENT
Start: 2021-01-01 | End: 2021-01-01

## 2021-01-01 RX ORDER — LACTULOSE 10 G/15ML
30 SOLUTION ORAL EVERY 8 HOURS
Refills: 0 | Status: DISCONTINUED | OUTPATIENT
Start: 2021-01-01 | End: 2021-01-01

## 2021-01-01 RX ORDER — LACTULOSE 10 G/15ML
10 SOLUTION ORAL
Refills: 0 | Status: DISCONTINUED | OUTPATIENT
Start: 2021-01-01 | End: 2021-01-01

## 2021-01-01 RX ORDER — FAMOTIDINE 10 MG/ML
1 INJECTION INTRAVENOUS
Qty: 0 | Refills: 0 | DISCHARGE

## 2021-01-01 RX ORDER — SUCRALFATE 1 G
1 TABLET ORAL
Qty: 0 | Refills: 0 | DISCHARGE
Start: 2021-01-01

## 2021-01-01 RX ORDER — SIMETHICONE 80 MG/1
1 TABLET, CHEWABLE ORAL
Qty: 0 | Refills: 0 | DISCHARGE
Start: 2021-01-01

## 2021-01-01 RX ORDER — TAMSULOSIN HYDROCHLORIDE 0.4 MG/1
1 CAPSULE ORAL
Qty: 0 | Refills: 0 | DISCHARGE

## 2021-01-01 RX ORDER — ALBUMIN HUMAN 25 %
100 VIAL (ML) INTRAVENOUS EVERY 6 HOURS
Refills: 0 | Status: DISCONTINUED | OUTPATIENT
Start: 2021-01-01 | End: 2021-01-01

## 2021-01-01 RX ORDER — DEXTROSE 50 % IN WATER 50 %
25 SYRINGE (ML) INTRAVENOUS ONCE
Refills: 0 | Status: COMPLETED | OUTPATIENT
Start: 2021-01-01 | End: 2021-01-01

## 2021-01-01 RX ORDER — INSULIN GLARGINE 100 [IU]/ML
20 INJECTION, SOLUTION SUBCUTANEOUS
Qty: 0 | Refills: 0 | DISCHARGE

## 2021-01-01 RX ORDER — SUCRALFATE 1 G
1 TABLET ORAL EVERY 12 HOURS
Refills: 0 | Status: DISCONTINUED | OUTPATIENT
Start: 2021-01-01 | End: 2021-01-01

## 2021-01-01 RX ORDER — SODIUM CHLORIDE 9 MG/ML
250 INJECTION INTRAMUSCULAR; INTRAVENOUS; SUBCUTANEOUS ONCE
Refills: 0 | Status: COMPLETED | OUTPATIENT
Start: 2021-01-01 | End: 2021-01-01

## 2021-01-01 RX ORDER — CLOPIDOGREL BISULFATE 75 MG/1
1 TABLET, FILM COATED ORAL
Qty: 0 | Refills: 0 | DISCHARGE

## 2021-01-01 RX ORDER — VANCOMYCIN HCL 1 G
1000 VIAL (EA) INTRAVENOUS ONCE
Refills: 0 | Status: COMPLETED | OUTPATIENT
Start: 2021-01-01 | End: 2021-01-01

## 2021-01-01 RX ORDER — ALBUMIN HUMAN 25 %
50 VIAL (ML) INTRAVENOUS EVERY 4 HOURS
Refills: 0 | Status: COMPLETED | OUTPATIENT
Start: 2021-01-01 | End: 2021-01-01

## 2021-01-01 RX ORDER — PIPERACILLIN AND TAZOBACTAM 4; .5 G/20ML; G/20ML
3.38 INJECTION, POWDER, LYOPHILIZED, FOR SOLUTION INTRAVENOUS EVERY 12 HOURS
Refills: 0 | Status: COMPLETED | OUTPATIENT
Start: 2021-01-01 | End: 2021-01-01

## 2021-01-01 RX ORDER — SPIRONOLACTONE 25 MG/1
2 TABLET, FILM COATED ORAL
Qty: 0 | Refills: 0 | DISCHARGE
Start: 2021-01-01

## 2021-01-01 RX ORDER — PANTOPRAZOLE SODIUM 20 MG/1
80 TABLET, DELAYED RELEASE ORAL ONCE
Refills: 0 | Status: COMPLETED | OUTPATIENT
Start: 2021-01-01 | End: 2021-01-01

## 2021-01-01 RX ORDER — POLYETHYLENE GLYCOL 3350 17 G/17G
17 POWDER, FOR SOLUTION ORAL DAILY
Refills: 0 | Status: DISCONTINUED | OUTPATIENT
Start: 2021-01-01 | End: 2021-01-01

## 2021-01-01 RX ORDER — LACTULOSE 10 G/15ML
20 SOLUTION ORAL EVERY 8 HOURS
Refills: 0 | Status: DISCONTINUED | OUTPATIENT
Start: 2021-01-01 | End: 2021-01-01

## 2021-01-01 RX ORDER — PIPERACILLIN AND TAZOBACTAM 4; .5 G/20ML; G/20ML
3.38 INJECTION, POWDER, LYOPHILIZED, FOR SOLUTION INTRAVENOUS EVERY 8 HOURS
Refills: 0 | Status: DISCONTINUED | OUTPATIENT
Start: 2021-01-01 | End: 2021-01-01

## 2021-01-01 RX ORDER — CHLOROTHIAZIDE 500 MG
500 TABLET ORAL ONCE
Refills: 0 | Status: COMPLETED | OUTPATIENT
Start: 2021-01-01 | End: 2021-01-01

## 2021-01-01 RX ORDER — PIPERACILLIN AND TAZOBACTAM 4; .5 G/20ML; G/20ML
3.38 INJECTION, POWDER, LYOPHILIZED, FOR SOLUTION INTRAVENOUS EVERY 12 HOURS
Refills: 0 | Status: DISCONTINUED | OUTPATIENT
Start: 2021-01-01 | End: 2021-01-01

## 2021-01-01 RX ADMIN — PROPOFOL 4.57 MICROGRAM(S)/KG/MIN: 10 INJECTION, EMULSION INTRAVENOUS at 21:17

## 2021-01-01 RX ADMIN — Medication 4: at 00:29

## 2021-01-01 RX ADMIN — SIMETHICONE 80 MILLIGRAM(S): 80 TABLET, CHEWABLE ORAL at 21:29

## 2021-01-01 RX ADMIN — NYSTATIN CREAM 1 APPLICATION(S): 100000 CREAM TOPICAL at 17:59

## 2021-01-01 RX ADMIN — PROPOFOL 20 MILLIGRAM(S): 10 INJECTION, EMULSION INTRAVENOUS at 15:20

## 2021-01-01 RX ADMIN — PANTOPRAZOLE SODIUM 40 MILLIGRAM(S): 20 TABLET, DELAYED RELEASE ORAL at 17:16

## 2021-01-01 RX ADMIN — Medication 1 APPLICATION(S): at 11:46

## 2021-01-01 RX ADMIN — LACTULOSE 10 GRAM(S): 10 SOLUTION ORAL at 06:03

## 2021-01-01 RX ADMIN — Medication 1 GRAM(S): at 05:33

## 2021-01-01 RX ADMIN — LACTULOSE 10 GRAM(S): 10 SOLUTION ORAL at 14:34

## 2021-01-01 RX ADMIN — MORPHINE SULFATE 2 MILLIGRAM(S): 50 CAPSULE, EXTENDED RELEASE ORAL at 05:50

## 2021-01-01 RX ADMIN — HUMAN INSULIN 5 UNIT(S): 100 INJECTION, SUSPENSION SUBCUTANEOUS at 06:01

## 2021-01-01 RX ADMIN — SODIUM CHLORIDE 75 MILLILITER(S): 9 INJECTION, SOLUTION INTRAVENOUS at 05:34

## 2021-01-01 RX ADMIN — SENNA PLUS 2 TABLET(S): 8.6 TABLET ORAL at 23:16

## 2021-01-01 RX ADMIN — Medication 1 GRAM(S): at 11:29

## 2021-01-01 RX ADMIN — Medication 4.29 MICROGRAM(S)/KG/MIN: at 20:13

## 2021-01-01 RX ADMIN — Medication 1: at 18:00

## 2021-01-01 RX ADMIN — Medication 1 GRAM(S): at 17:17

## 2021-01-01 RX ADMIN — Medication 3 MILLILITER(S): at 03:26

## 2021-01-01 RX ADMIN — Medication 400 MILLIGRAM(S): at 23:40

## 2021-01-01 RX ADMIN — Medication 50 MILLILITER(S): at 05:01

## 2021-01-01 RX ADMIN — Medication 1 DROP(S): at 22:12

## 2021-01-01 RX ADMIN — LACTULOSE 10 GRAM(S): 10 SOLUTION ORAL at 21:54

## 2021-01-01 RX ADMIN — LACTULOSE 10 GRAM(S): 10 SOLUTION ORAL at 13:19

## 2021-01-01 RX ADMIN — LACTULOSE 10 GRAM(S): 10 SOLUTION ORAL at 05:09

## 2021-01-01 RX ADMIN — Medication 1: at 12:19

## 2021-01-01 RX ADMIN — Medication 6: at 05:08

## 2021-01-01 RX ADMIN — MIDODRINE HYDROCHLORIDE 20 MILLIGRAM(S): 2.5 TABLET ORAL at 18:09

## 2021-01-01 RX ADMIN — Medication 1 APPLICATION(S): at 11:49

## 2021-01-01 RX ADMIN — PANTOPRAZOLE SODIUM 40 MILLIGRAM(S): 20 TABLET, DELAYED RELEASE ORAL at 05:42

## 2021-01-01 RX ADMIN — NYSTATIN CREAM 1 APPLICATION(S): 100000 CREAM TOPICAL at 05:21

## 2021-01-01 RX ADMIN — Medication 6: at 13:13

## 2021-01-01 RX ADMIN — PANTOPRAZOLE SODIUM 40 MILLIGRAM(S): 20 TABLET, DELAYED RELEASE ORAL at 08:54

## 2021-01-01 RX ADMIN — SPIRONOLACTONE 50 MILLIGRAM(S): 25 TABLET, FILM COATED ORAL at 05:16

## 2021-01-01 RX ADMIN — LACTULOSE 10 GRAM(S): 10 SOLUTION ORAL at 21:35

## 2021-01-01 RX ADMIN — Medication 1 GRAM(S): at 05:16

## 2021-01-01 RX ADMIN — SODIUM CHLORIDE 60 MILLILITER(S): 9 INJECTION, SOLUTION INTRAVENOUS at 20:42

## 2021-01-01 RX ADMIN — NYSTATIN CREAM 1 APPLICATION(S): 100000 CREAM TOPICAL at 05:04

## 2021-01-01 RX ADMIN — Medication 2: at 08:54

## 2021-01-01 RX ADMIN — Medication 3 MILLILITER(S): at 16:28

## 2021-01-01 RX ADMIN — Medication 3 MILLILITER(S): at 05:43

## 2021-01-01 RX ADMIN — NYSTATIN CREAM 1 APPLICATION(S): 100000 CREAM TOPICAL at 21:48

## 2021-01-01 RX ADMIN — Medication 44 MICROGRAM(S): at 21:53

## 2021-01-01 RX ADMIN — Medication 1: at 11:08

## 2021-01-01 RX ADMIN — PIPERACILLIN AND TAZOBACTAM 25 GRAM(S): 4; .5 INJECTION, POWDER, LYOPHILIZED, FOR SOLUTION INTRAVENOUS at 00:50

## 2021-01-01 RX ADMIN — Medication 1 GRAM(S): at 13:13

## 2021-01-01 RX ADMIN — NYSTATIN CREAM 1 APPLICATION(S): 100000 CREAM TOPICAL at 18:00

## 2021-01-01 RX ADMIN — PIPERACILLIN AND TAZOBACTAM 25 GRAM(S): 4; .5 INJECTION, POWDER, LYOPHILIZED, FOR SOLUTION INTRAVENOUS at 17:12

## 2021-01-01 RX ADMIN — NYSTATIN CREAM 1 APPLICATION(S): 100000 CREAM TOPICAL at 22:09

## 2021-01-01 RX ADMIN — NYSTATIN CREAM 1 APPLICATION(S): 100000 CREAM TOPICAL at 21:38

## 2021-01-01 RX ADMIN — MIDODRINE HYDROCHLORIDE 10 MILLIGRAM(S): 2.5 TABLET ORAL at 14:32

## 2021-01-01 RX ADMIN — Medication 25 MILLILITER(S): at 05:25

## 2021-01-01 RX ADMIN — NYSTATIN CREAM 1 APPLICATION(S): 100000 CREAM TOPICAL at 05:35

## 2021-01-01 RX ADMIN — PANTOPRAZOLE SODIUM 10 MG/HR: 20 TABLET, DELAYED RELEASE ORAL at 22:52

## 2021-01-01 RX ADMIN — NYSTATIN CREAM 1 APPLICATION(S): 100000 CREAM TOPICAL at 14:12

## 2021-01-01 RX ADMIN — Medication 3 MILLILITER(S): at 22:22

## 2021-01-01 RX ADMIN — PROPOFOL 3.81 MICROGRAM(S)/KG/MIN: 10 INJECTION, EMULSION INTRAVENOUS at 15:30

## 2021-01-01 RX ADMIN — Medication 50 MILLILITER(S): at 01:29

## 2021-01-01 RX ADMIN — Medication 40 MILLIGRAM(S): at 09:20

## 2021-01-01 RX ADMIN — NYSTATIN CREAM 1 APPLICATION(S): 100000 CREAM TOPICAL at 05:14

## 2021-01-01 RX ADMIN — PANTOPRAZOLE SODIUM 40 MILLIGRAM(S): 20 TABLET, DELAYED RELEASE ORAL at 20:49

## 2021-01-01 RX ADMIN — POLYETHYLENE GLYCOL 3350 17 GRAM(S): 17 POWDER, FOR SOLUTION ORAL at 11:15

## 2021-01-01 RX ADMIN — Medication 3 MILLILITER(S): at 04:32

## 2021-01-01 RX ADMIN — AZITHROMYCIN 500 MILLIGRAM(S): 500 TABLET, FILM COATED ORAL at 20:09

## 2021-01-01 RX ADMIN — LACTULOSE 20 GRAM(S): 10 SOLUTION ORAL at 17:17

## 2021-01-01 RX ADMIN — MIDODRINE HYDROCHLORIDE 10 MILLIGRAM(S): 2.5 TABLET ORAL at 05:23

## 2021-01-01 RX ADMIN — Medication 4: at 17:02

## 2021-01-01 RX ADMIN — Medication 50 MILLILITER(S): at 23:19

## 2021-01-01 RX ADMIN — SIMETHICONE 80 MILLIGRAM(S): 80 TABLET, CHEWABLE ORAL at 23:16

## 2021-01-01 RX ADMIN — SENNA PLUS 2 TABLET(S): 8.6 TABLET ORAL at 22:23

## 2021-01-01 RX ADMIN — NYSTATIN CREAM 1 APPLICATION(S): 100000 CREAM TOPICAL at 21:37

## 2021-01-01 RX ADMIN — MIDODRINE HYDROCHLORIDE 20 MILLIGRAM(S): 2.5 TABLET ORAL at 22:19

## 2021-01-01 RX ADMIN — LACTULOSE 20 GRAM(S): 10 SOLUTION ORAL at 05:16

## 2021-01-01 RX ADMIN — NYSTATIN CREAM 1 APPLICATION(S): 100000 CREAM TOPICAL at 13:27

## 2021-01-01 RX ADMIN — Medication 1 APPLICATION(S): at 12:29

## 2021-01-01 RX ADMIN — Medication 4: at 08:49

## 2021-01-01 RX ADMIN — PROPOFOL 30 MILLIGRAM(S): 10 INJECTION, EMULSION INTRAVENOUS at 15:19

## 2021-01-01 RX ADMIN — PANTOPRAZOLE SODIUM 80 MILLIGRAM(S): 20 TABLET, DELAYED RELEASE ORAL at 09:05

## 2021-01-01 RX ADMIN — CHLORHEXIDINE GLUCONATE 15 MILLILITER(S): 213 SOLUTION TOPICAL at 05:51

## 2021-01-01 RX ADMIN — LACTULOSE 10 GRAM(S): 10 SOLUTION ORAL at 13:23

## 2021-01-01 RX ADMIN — Medication 50 MILLIEQUIVALENT(S): at 03:36

## 2021-01-01 RX ADMIN — Medication 4: at 09:00

## 2021-01-01 RX ADMIN — NYSTATIN CREAM 1 APPLICATION(S): 100000 CREAM TOPICAL at 17:57

## 2021-01-01 RX ADMIN — CHLORHEXIDINE GLUCONATE 1 APPLICATION(S): 213 SOLUTION TOPICAL at 05:51

## 2021-01-01 RX ADMIN — Medication 1 GRAM(S): at 17:16

## 2021-01-01 RX ADMIN — Medication 1 DROP(S): at 05:35

## 2021-01-01 RX ADMIN — PROPOFOL 3.81 MICROGRAM(S)/KG/MIN: 10 INJECTION, EMULSION INTRAVENOUS at 19:01

## 2021-01-01 RX ADMIN — BUMETANIDE 5 MG/HR: 0.25 INJECTION INTRAMUSCULAR; INTRAVENOUS at 21:01

## 2021-01-01 RX ADMIN — Medication 44 MICROGRAM(S): at 21:38

## 2021-01-01 RX ADMIN — Medication 15 MILLIGRAM(S): at 05:59

## 2021-01-01 RX ADMIN — MIDODRINE HYDROCHLORIDE 20 MILLIGRAM(S): 2.5 TABLET ORAL at 12:51

## 2021-01-01 RX ADMIN — CEFTRIAXONE 100 MILLIGRAM(S): 500 INJECTION, POWDER, FOR SOLUTION INTRAMUSCULAR; INTRAVENOUS at 15:47

## 2021-01-01 RX ADMIN — Medication 1000 MILLIGRAM(S): at 00:10

## 2021-01-01 RX ADMIN — SPIRONOLACTONE 50 MILLIGRAM(S): 25 TABLET, FILM COATED ORAL at 05:40

## 2021-01-01 RX ADMIN — PANTOPRAZOLE SODIUM 40 MILLIGRAM(S): 20 TABLET, DELAYED RELEASE ORAL at 08:33

## 2021-01-01 RX ADMIN — LACTULOSE 20 GRAM(S): 10 SOLUTION ORAL at 21:19

## 2021-01-01 RX ADMIN — FENTANYL CITRATE 20 MICROGRAM(S): 50 INJECTION INTRAVENOUS at 19:21

## 2021-01-01 RX ADMIN — NYSTATIN CREAM 1 APPLICATION(S): 100000 CREAM TOPICAL at 05:42

## 2021-01-01 RX ADMIN — Medication 1 GRAM(S): at 00:38

## 2021-01-01 RX ADMIN — CHLORHEXIDINE GLUCONATE 15 MILLILITER(S): 213 SOLUTION TOPICAL at 05:03

## 2021-01-01 RX ADMIN — NYSTATIN CREAM 1 APPLICATION(S): 100000 CREAM TOPICAL at 14:02

## 2021-01-01 RX ADMIN — NYSTATIN CREAM 1 APPLICATION(S): 100000 CREAM TOPICAL at 22:03

## 2021-01-01 RX ADMIN — MIDODRINE HYDROCHLORIDE 20 MILLIGRAM(S): 2.5 TABLET ORAL at 06:43

## 2021-01-01 RX ADMIN — Medication 200 MILLIGRAM(S): at 06:43

## 2021-01-01 RX ADMIN — Medication 20 MILLIGRAM(S): at 05:30

## 2021-01-01 RX ADMIN — Medication 1 DROP(S): at 21:15

## 2021-01-01 RX ADMIN — PANTOPRAZOLE SODIUM 40 MILLIGRAM(S): 20 TABLET, DELAYED RELEASE ORAL at 21:44

## 2021-01-01 RX ADMIN — Medication 3 MILLILITER(S): at 05:33

## 2021-01-01 RX ADMIN — Medication 1: at 00:22

## 2021-01-01 RX ADMIN — Medication 2: at 00:48

## 2021-01-01 RX ADMIN — Medication 2: at 11:51

## 2021-01-01 RX ADMIN — PIPERACILLIN AND TAZOBACTAM 25 GRAM(S): 4; .5 INJECTION, POWDER, LYOPHILIZED, FOR SOLUTION INTRAVENOUS at 17:44

## 2021-01-01 RX ADMIN — Medication 1 DROP(S): at 05:42

## 2021-01-01 RX ADMIN — Medication 1: at 12:11

## 2021-01-01 RX ADMIN — CHLORHEXIDINE GLUCONATE 1 APPLICATION(S): 213 SOLUTION TOPICAL at 10:38

## 2021-01-01 RX ADMIN — Medication 1: at 00:48

## 2021-01-01 RX ADMIN — CHLORHEXIDINE GLUCONATE 1 APPLICATION(S): 213 SOLUTION TOPICAL at 06:50

## 2021-01-01 RX ADMIN — MIDODRINE HYDROCHLORIDE 20 MILLIGRAM(S): 2.5 TABLET ORAL at 13:46

## 2021-01-01 RX ADMIN — Medication 3 MILLILITER(S): at 17:25

## 2021-01-01 RX ADMIN — POLYETHYLENE GLYCOL 3350 17 GRAM(S): 17 POWDER, FOR SOLUTION ORAL at 12:54

## 2021-01-01 RX ADMIN — NYSTATIN CREAM 1 APPLICATION(S): 100000 CREAM TOPICAL at 13:12

## 2021-01-01 RX ADMIN — NYSTATIN CREAM 1 APPLICATION(S): 100000 CREAM TOPICAL at 06:02

## 2021-01-01 RX ADMIN — Medication 2: at 11:41

## 2021-01-01 RX ADMIN — Medication 4: at 08:31

## 2021-01-01 RX ADMIN — Medication 44 MICROGRAM(S): at 21:59

## 2021-01-01 RX ADMIN — CEFTRIAXONE 100 MILLIGRAM(S): 500 INJECTION, POWDER, FOR SOLUTION INTRAMUSCULAR; INTRAVENOUS at 16:28

## 2021-01-01 RX ADMIN — Medication 50 MILLILITER(S): at 17:52

## 2021-01-01 RX ADMIN — CHLORHEXIDINE GLUCONATE 1 APPLICATION(S): 213 SOLUTION TOPICAL at 05:15

## 2021-01-01 RX ADMIN — Medication 44 MICROGRAM(S): at 21:50

## 2021-01-01 RX ADMIN — PANTOPRAZOLE SODIUM 40 MILLIGRAM(S): 20 TABLET, DELAYED RELEASE ORAL at 21:59

## 2021-01-01 RX ADMIN — SIMETHICONE 80 MILLIGRAM(S): 80 TABLET, CHEWABLE ORAL at 13:12

## 2021-01-01 RX ADMIN — LACTULOSE 30 GRAM(S): 10 SOLUTION ORAL at 05:33

## 2021-01-01 RX ADMIN — PANTOPRAZOLE SODIUM 40 MILLIGRAM(S): 20 TABLET, DELAYED RELEASE ORAL at 17:48

## 2021-01-01 RX ADMIN — Medication 44 MICROGRAM(S): at 22:01

## 2021-01-01 RX ADMIN — Medication 50 MILLILITER(S): at 23:41

## 2021-01-01 RX ADMIN — Medication 1 GRAM(S): at 17:45

## 2021-01-01 RX ADMIN — Medication 1: at 01:02

## 2021-01-01 RX ADMIN — NYSTATIN CREAM 1 APPLICATION(S): 100000 CREAM TOPICAL at 13:24

## 2021-01-01 RX ADMIN — FENTANYL CITRATE 50 MICROGRAM(S): 50 INJECTION INTRAVENOUS at 14:45

## 2021-01-01 RX ADMIN — Medication 3: at 06:38

## 2021-01-01 RX ADMIN — Medication 3 MILLILITER(S): at 11:46

## 2021-01-01 RX ADMIN — Medication 2: at 18:02

## 2021-01-01 RX ADMIN — Medication 3 MILLILITER(S): at 11:34

## 2021-01-01 RX ADMIN — MIDODRINE HYDROCHLORIDE 20 MILLIGRAM(S): 2.5 TABLET ORAL at 05:24

## 2021-01-01 RX ADMIN — Medication 3 MILLILITER(S): at 18:11

## 2021-01-01 RX ADMIN — PANTOPRAZOLE SODIUM 10 MG/HR: 20 TABLET, DELAYED RELEASE ORAL at 09:24

## 2021-01-01 RX ADMIN — LACTULOSE 10 GRAM(S): 10 SOLUTION ORAL at 22:13

## 2021-01-01 RX ADMIN — PIPERACILLIN AND TAZOBACTAM 25 GRAM(S): 4; .5 INJECTION, POWDER, LYOPHILIZED, FOR SOLUTION INTRAVENOUS at 00:03

## 2021-01-01 RX ADMIN — Medication 3 MILLILITER(S): at 11:47

## 2021-01-01 RX ADMIN — NYSTATIN CREAM 1 APPLICATION(S): 100000 CREAM TOPICAL at 14:39

## 2021-01-01 RX ADMIN — SODIUM CHLORIDE 75 MILLILITER(S): 9 INJECTION, SOLUTION INTRAVENOUS at 16:45

## 2021-01-01 RX ADMIN — PIPERACILLIN AND TAZOBACTAM 25 GRAM(S): 4; .5 INJECTION, POWDER, LYOPHILIZED, FOR SOLUTION INTRAVENOUS at 10:21

## 2021-01-01 RX ADMIN — Medication 1 GRAM(S): at 23:54

## 2021-01-01 RX ADMIN — SODIUM CHLORIDE 75 MILLILITER(S): 9 INJECTION, SOLUTION INTRAVENOUS at 21:54

## 2021-01-01 RX ADMIN — Medication 44 MICROGRAM(S): at 21:14

## 2021-01-01 RX ADMIN — PIPERACILLIN AND TAZOBACTAM 200 GRAM(S): 4; .5 INJECTION, POWDER, LYOPHILIZED, FOR SOLUTION INTRAVENOUS at 20:42

## 2021-01-01 RX ADMIN — NYSTATIN CREAM 1 APPLICATION(S): 100000 CREAM TOPICAL at 21:58

## 2021-01-01 RX ADMIN — NYSTATIN CREAM 1 APPLICATION(S): 100000 CREAM TOPICAL at 22:15

## 2021-01-01 RX ADMIN — Medication 3 MILLILITER(S): at 11:11

## 2021-01-01 RX ADMIN — Medication 1 DROP(S): at 22:09

## 2021-01-01 RX ADMIN — LACTULOSE 200 GRAM(S): 10 SOLUTION ORAL at 11:45

## 2021-01-01 RX ADMIN — Medication 1 GRAM(S): at 05:35

## 2021-01-01 RX ADMIN — Medication 1 DROP(S): at 05:13

## 2021-01-01 RX ADMIN — Medication 1 GRAM(S): at 13:18

## 2021-01-01 RX ADMIN — PANTOPRAZOLE SODIUM 40 MILLIGRAM(S): 20 TABLET, DELAYED RELEASE ORAL at 21:15

## 2021-01-01 RX ADMIN — Medication 1 DROP(S): at 21:39

## 2021-01-01 RX ADMIN — MIDODRINE HYDROCHLORIDE 20 MILLIGRAM(S): 2.5 TABLET ORAL at 05:01

## 2021-01-01 RX ADMIN — PANTOPRAZOLE SODIUM 40 MILLIGRAM(S): 20 TABLET, DELAYED RELEASE ORAL at 20:14

## 2021-01-01 RX ADMIN — LACTULOSE 10 GRAM(S): 10 SOLUTION ORAL at 22:23

## 2021-01-01 RX ADMIN — PANTOPRAZOLE SODIUM 40 MILLIGRAM(S): 20 TABLET, DELAYED RELEASE ORAL at 17:26

## 2021-01-01 RX ADMIN — Medication 4: at 06:01

## 2021-01-01 RX ADMIN — Medication 1 GRAM(S): at 06:40

## 2021-01-01 RX ADMIN — OCTREOTIDE ACETATE 50 MICROGRAM(S): 200 INJECTION, SOLUTION INTRAVENOUS; SUBCUTANEOUS at 09:10

## 2021-01-01 RX ADMIN — PANTOPRAZOLE SODIUM 40 MILLIGRAM(S): 20 TABLET, DELAYED RELEASE ORAL at 18:07

## 2021-01-01 RX ADMIN — NYSTATIN CREAM 1 APPLICATION(S): 100000 CREAM TOPICAL at 13:46

## 2021-01-01 RX ADMIN — MIDODRINE HYDROCHLORIDE 20 MILLIGRAM(S): 2.5 TABLET ORAL at 22:00

## 2021-01-01 RX ADMIN — Medication 1 DROP(S): at 13:19

## 2021-01-01 RX ADMIN — PIPERACILLIN AND TAZOBACTAM 25 GRAM(S): 4; .5 INJECTION, POWDER, LYOPHILIZED, FOR SOLUTION INTRAVENOUS at 00:46

## 2021-01-01 RX ADMIN — Medication 4: at 05:51

## 2021-01-01 RX ADMIN — NYSTATIN CREAM 1 APPLICATION(S): 100000 CREAM TOPICAL at 17:41

## 2021-01-01 RX ADMIN — NYSTATIN CREAM 1 APPLICATION(S): 100000 CREAM TOPICAL at 18:08

## 2021-01-01 RX ADMIN — Medication 1 DROP(S): at 21:58

## 2021-01-01 RX ADMIN — Medication 3 MILLILITER(S): at 11:05

## 2021-01-01 RX ADMIN — Medication 3 MILLILITER(S): at 18:10

## 2021-01-01 RX ADMIN — LACTULOSE 10 GRAM(S): 10 SOLUTION ORAL at 05:35

## 2021-01-01 RX ADMIN — Medication 1 GRAM(S): at 06:17

## 2021-01-01 RX ADMIN — PANTOPRAZOLE SODIUM 40 MILLIGRAM(S): 20 TABLET, DELAYED RELEASE ORAL at 17:35

## 2021-01-01 RX ADMIN — Medication 1 DROP(S): at 22:04

## 2021-01-01 RX ADMIN — PANTOPRAZOLE SODIUM 40 MILLIGRAM(S): 20 TABLET, DELAYED RELEASE ORAL at 09:23

## 2021-01-01 RX ADMIN — Medication 25 MILLILITER(S): at 00:16

## 2021-01-01 RX ADMIN — LACTULOSE 30 GRAM(S): 10 SOLUTION ORAL at 21:18

## 2021-01-01 RX ADMIN — ALBUTEROL 2.5 MILLIGRAM(S): 90 AEROSOL, METERED ORAL at 04:33

## 2021-01-01 RX ADMIN — Medication 3 MILLILITER(S): at 10:51

## 2021-01-01 RX ADMIN — PANTOPRAZOLE SODIUM 40 MILLIGRAM(S): 20 TABLET, DELAYED RELEASE ORAL at 08:39

## 2021-01-01 RX ADMIN — NYSTATIN CREAM 1 APPLICATION(S): 100000 CREAM TOPICAL at 06:14

## 2021-01-01 RX ADMIN — PANTOPRAZOLE SODIUM 40 MILLIGRAM(S): 20 TABLET, DELAYED RELEASE ORAL at 09:55

## 2021-01-01 RX ADMIN — BUMETANIDE 2 MILLIGRAM(S): 0.25 INJECTION INTRAMUSCULAR; INTRAVENOUS at 18:08

## 2021-01-01 RX ADMIN — LACTULOSE 30 GRAM(S): 10 SOLUTION ORAL at 13:12

## 2021-01-01 RX ADMIN — LACTULOSE 30 GRAM(S): 10 SOLUTION ORAL at 22:00

## 2021-01-01 RX ADMIN — Medication 40 MILLIGRAM(S): at 05:34

## 2021-01-01 RX ADMIN — NYSTATIN CREAM 1 APPLICATION(S): 100000 CREAM TOPICAL at 17:39

## 2021-01-01 RX ADMIN — Medication 1 GRAM(S): at 05:40

## 2021-01-01 RX ADMIN — Medication 3 MILLILITER(S): at 05:17

## 2021-01-01 RX ADMIN — Medication 1 DROP(S): at 22:03

## 2021-01-01 RX ADMIN — SENNA PLUS 2 TABLET(S): 8.6 TABLET ORAL at 21:29

## 2021-01-01 RX ADMIN — PIPERACILLIN AND TAZOBACTAM 25 GRAM(S): 4; .5 INJECTION, POWDER, LYOPHILIZED, FOR SOLUTION INTRAVENOUS at 11:46

## 2021-01-01 RX ADMIN — LACTULOSE 10 GRAM(S): 10 SOLUTION ORAL at 23:02

## 2021-01-01 RX ADMIN — NYSTATIN CREAM 1 APPLICATION(S): 100000 CREAM TOPICAL at 17:47

## 2021-01-01 RX ADMIN — Medication 1 GRAM(S): at 11:46

## 2021-01-01 RX ADMIN — Medication 3 MILLILITER(S): at 23:55

## 2021-01-01 RX ADMIN — NYSTATIN CREAM 1 APPLICATION(S): 100000 CREAM TOPICAL at 21:45

## 2021-01-01 RX ADMIN — NYSTATIN CREAM 1 APPLICATION(S): 100000 CREAM TOPICAL at 13:33

## 2021-01-01 RX ADMIN — Medication 2: at 23:45

## 2021-01-01 RX ADMIN — Medication 1: at 17:58

## 2021-01-01 RX ADMIN — NYSTATIN CREAM 1 APPLICATION(S): 100000 CREAM TOPICAL at 17:52

## 2021-01-01 RX ADMIN — Medication 2: at 06:10

## 2021-01-01 RX ADMIN — Medication 1 GRAM(S): at 18:08

## 2021-01-01 RX ADMIN — BUMETANIDE 20 MG/HR: 0.25 INJECTION INTRAMUSCULAR; INTRAVENOUS at 04:42

## 2021-01-01 RX ADMIN — Medication 1 DROP(S): at 21:18

## 2021-01-01 RX ADMIN — Medication 2: at 12:42

## 2021-01-01 RX ADMIN — CHLORHEXIDINE GLUCONATE 15 MILLILITER(S): 213 SOLUTION TOPICAL at 17:44

## 2021-01-01 RX ADMIN — NYSTATIN CREAM 1 APPLICATION(S): 100000 CREAM TOPICAL at 05:26

## 2021-01-01 RX ADMIN — Medication 1 APPLICATION(S): at 12:42

## 2021-01-01 RX ADMIN — PANTOPRAZOLE SODIUM 40 MILLIGRAM(S): 20 TABLET, DELAYED RELEASE ORAL at 10:17

## 2021-01-01 RX ADMIN — PANTOPRAZOLE SODIUM 40 MILLIGRAM(S): 20 TABLET, DELAYED RELEASE ORAL at 20:43

## 2021-01-01 RX ADMIN — FAMOTIDINE 20 MILLIGRAM(S): 10 INJECTION INTRAVENOUS at 11:03

## 2021-01-01 RX ADMIN — Medication 3 MILLILITER(S): at 06:00

## 2021-01-01 RX ADMIN — MIDAZOLAM HYDROCHLORIDE 4 MILLIGRAM(S): 1 INJECTION, SOLUTION INTRAMUSCULAR; INTRAVENOUS at 15:17

## 2021-01-01 RX ADMIN — PANTOPRAZOLE SODIUM 40 MILLIGRAM(S): 20 TABLET, DELAYED RELEASE ORAL at 18:30

## 2021-01-01 RX ADMIN — SPIRONOLACTONE 50 MILLIGRAM(S): 25 TABLET, FILM COATED ORAL at 05:34

## 2021-01-01 RX ADMIN — CHLORHEXIDINE GLUCONATE 15 MILLILITER(S): 213 SOLUTION TOPICAL at 17:01

## 2021-01-01 RX ADMIN — SIMETHICONE 80 MILLIGRAM(S): 80 TABLET, CHEWABLE ORAL at 13:08

## 2021-01-01 RX ADMIN — NYSTATIN CREAM 1 APPLICATION(S): 100000 CREAM TOPICAL at 14:01

## 2021-01-01 RX ADMIN — POTASSIUM PHOSPHATE, MONOBASIC POTASSIUM PHOSPHATE, DIBASIC 62.5 MILLIMOLE(S): 236; 224 INJECTION, SOLUTION INTRAVENOUS at 09:26

## 2021-01-01 RX ADMIN — Medication 4: at 13:29

## 2021-01-01 RX ADMIN — Medication 44 MICROGRAM(S): at 21:19

## 2021-01-01 RX ADMIN — Medication 3 MILLILITER(S): at 02:40

## 2021-01-01 RX ADMIN — FENTANYL CITRATE 1.59 MICROGRAM(S)/KG/HR: 50 INJECTION INTRAVENOUS at 16:01

## 2021-01-01 RX ADMIN — Medication 3 MILLILITER(S): at 23:34

## 2021-01-01 RX ADMIN — NYSTATIN CREAM 1 APPLICATION(S): 100000 CREAM TOPICAL at 05:15

## 2021-01-01 RX ADMIN — PIPERACILLIN AND TAZOBACTAM 25 GRAM(S): 4; .5 INJECTION, POWDER, LYOPHILIZED, FOR SOLUTION INTRAVENOUS at 00:22

## 2021-01-01 RX ADMIN — Medication 4: at 06:47

## 2021-01-01 RX ADMIN — Medication 1: at 18:20

## 2021-01-01 RX ADMIN — Medication 1 DROP(S): at 15:26

## 2021-01-01 RX ADMIN — Medication 3 MILLILITER(S): at 08:59

## 2021-01-01 RX ADMIN — Medication 50 MILLILITER(S): at 05:08

## 2021-01-01 RX ADMIN — Medication 1: at 18:13

## 2021-01-01 RX ADMIN — Medication 6: at 17:55

## 2021-01-01 RX ADMIN — BUMETANIDE 132 MILLIGRAM(S): 0.25 INJECTION INTRAMUSCULAR; INTRAVENOUS at 06:43

## 2021-01-01 RX ADMIN — SIMETHICONE 80 MILLIGRAM(S): 80 TABLET, CHEWABLE ORAL at 13:18

## 2021-01-01 RX ADMIN — Medication 1 GRAM(S): at 11:39

## 2021-01-01 RX ADMIN — Medication 1 APPLICATION(S): at 11:25

## 2021-01-01 RX ADMIN — Medication 1 GRAM(S): at 05:21

## 2021-01-01 RX ADMIN — SENNA PLUS 2 TABLET(S): 8.6 TABLET ORAL at 21:03

## 2021-01-01 RX ADMIN — Medication 3 MILLILITER(S): at 05:40

## 2021-01-01 RX ADMIN — Medication 88 MICROGRAM(S): at 05:16

## 2021-01-01 RX ADMIN — Medication 1 GRAM(S): at 18:04

## 2021-01-01 RX ADMIN — PANTOPRAZOLE SODIUM 40 MILLIGRAM(S): 20 TABLET, DELAYED RELEASE ORAL at 19:15

## 2021-01-01 RX ADMIN — SODIUM CHLORIDE 75 MILLILITER(S): 9 INJECTION, SOLUTION INTRAVENOUS at 16:51

## 2021-01-01 RX ADMIN — NYSTATIN CREAM 1 APPLICATION(S): 100000 CREAM TOPICAL at 18:06

## 2021-01-01 RX ADMIN — LACTULOSE 10 GRAM(S): 10 SOLUTION ORAL at 16:11

## 2021-01-01 RX ADMIN — PROPOFOL 3.81 MICROGRAM(S)/KG/MIN: 10 INJECTION, EMULSION INTRAVENOUS at 12:04

## 2021-01-01 RX ADMIN — Medication 1 GRAM(S): at 23:10

## 2021-01-01 RX ADMIN — INSULIN HUMAN 5 UNIT(S): 100 INJECTION, SOLUTION SUBCUTANEOUS at 15:53

## 2021-01-01 RX ADMIN — Medication 4: at 12:53

## 2021-01-01 RX ADMIN — NYSTATIN CREAM 1 APPLICATION(S): 100000 CREAM TOPICAL at 05:02

## 2021-01-01 RX ADMIN — CHLORHEXIDINE GLUCONATE 1 APPLICATION(S): 213 SOLUTION TOPICAL at 05:28

## 2021-01-01 RX ADMIN — AZITHROMYCIN 250 MILLIGRAM(S): 500 TABLET, FILM COATED ORAL at 18:52

## 2021-01-01 RX ADMIN — Medication 1 DROP(S): at 22:05

## 2021-01-01 RX ADMIN — Medication 1 DROP(S): at 14:01

## 2021-01-01 RX ADMIN — NYSTATIN CREAM 1 APPLICATION(S): 100000 CREAM TOPICAL at 17:10

## 2021-01-01 RX ADMIN — Medication 3 MILLILITER(S): at 13:20

## 2021-01-01 RX ADMIN — Medication 1 PACKET(S): at 05:51

## 2021-01-01 RX ADMIN — Medication 80 MILLIGRAM(S): at 07:51

## 2021-01-01 RX ADMIN — NYSTATIN CREAM 1 APPLICATION(S): 100000 CREAM TOPICAL at 22:39

## 2021-01-01 RX ADMIN — Medication 4: at 01:03

## 2021-01-01 RX ADMIN — Medication 2: at 11:46

## 2021-01-01 RX ADMIN — Medication 40 MILLIGRAM(S): at 05:03

## 2021-01-01 RX ADMIN — Medication 50 MILLIEQUIVALENT(S): at 03:35

## 2021-01-01 RX ADMIN — KETAMINE HYDROCHLORIDE 1.91 MG/KG/HR: 100 INJECTION INTRAMUSCULAR; INTRAVENOUS at 21:17

## 2021-01-01 RX ADMIN — Medication 1 TABLET(S): at 21:03

## 2021-01-01 RX ADMIN — LACTULOSE 10 GRAM(S): 10 SOLUTION ORAL at 05:07

## 2021-01-01 RX ADMIN — Medication 1 DROP(S): at 06:09

## 2021-01-01 RX ADMIN — Medication 1 GRAM(S): at 00:22

## 2021-01-01 RX ADMIN — NYSTATIN CREAM 1 APPLICATION(S): 100000 CREAM TOPICAL at 16:11

## 2021-01-01 RX ADMIN — Medication 1 GRAM(S): at 00:08

## 2021-01-01 RX ADMIN — PANTOPRAZOLE SODIUM 40 MILLIGRAM(S): 20 TABLET, DELAYED RELEASE ORAL at 08:07

## 2021-01-01 RX ADMIN — Medication 1: at 13:25

## 2021-01-01 RX ADMIN — Medication 4: at 23:50

## 2021-01-01 RX ADMIN — NYSTATIN CREAM 1 APPLICATION(S): 100000 CREAM TOPICAL at 06:48

## 2021-01-01 RX ADMIN — NYSTATIN CREAM 1 APPLICATION(S): 100000 CREAM TOPICAL at 22:11

## 2021-01-01 RX ADMIN — PANTOPRAZOLE SODIUM 40 MILLIGRAM(S): 20 TABLET, DELAYED RELEASE ORAL at 12:28

## 2021-01-01 RX ADMIN — CHLORHEXIDINE GLUCONATE 1 APPLICATION(S): 213 SOLUTION TOPICAL at 05:02

## 2021-01-01 RX ADMIN — Medication 50 MILLILITER(S): at 17:12

## 2021-01-01 RX ADMIN — PANTOPRAZOLE SODIUM 10 MG/HR: 20 TABLET, DELAYED RELEASE ORAL at 17:38

## 2021-01-01 RX ADMIN — CHLORHEXIDINE GLUCONATE 1 APPLICATION(S): 213 SOLUTION TOPICAL at 05:22

## 2021-01-01 RX ADMIN — NYSTATIN CREAM 1 APPLICATION(S): 100000 CREAM TOPICAL at 22:30

## 2021-01-01 RX ADMIN — PANTOPRAZOLE SODIUM 40 MILLIGRAM(S): 20 TABLET, DELAYED RELEASE ORAL at 20:19

## 2021-01-01 RX ADMIN — NYSTATIN CREAM 1 APPLICATION(S): 100000 CREAM TOPICAL at 21:09

## 2021-01-01 RX ADMIN — NYSTATIN CREAM 1 APPLICATION(S): 100000 CREAM TOPICAL at 15:11

## 2021-01-01 RX ADMIN — FENTANYL CITRATE 100 MICROGRAM(S): 50 INJECTION INTRAVENOUS at 16:00

## 2021-01-01 RX ADMIN — Medication 3 MILLILITER(S): at 17:42

## 2021-01-01 RX ADMIN — SPIRONOLACTONE 50 MILLIGRAM(S): 25 TABLET, FILM COATED ORAL at 05:15

## 2021-01-01 RX ADMIN — Medication 44 MICROGRAM(S): at 21:47

## 2021-01-01 RX ADMIN — Medication 2: at 12:56

## 2021-01-01 RX ADMIN — Medication 1: at 18:28

## 2021-01-01 RX ADMIN — Medication 4: at 17:53

## 2021-01-01 RX ADMIN — PANTOPRAZOLE SODIUM 40 MILLIGRAM(S): 20 TABLET, DELAYED RELEASE ORAL at 05:15

## 2021-01-01 RX ADMIN — KETAMINE HYDROCHLORIDE 1.91 MG/KG/HR: 100 INJECTION INTRAMUSCULAR; INTRAVENOUS at 20:36

## 2021-01-01 RX ADMIN — CHLORHEXIDINE GLUCONATE 1 APPLICATION(S): 213 SOLUTION TOPICAL at 06:10

## 2021-01-01 RX ADMIN — LACTULOSE 30 GRAM(S): 10 SOLUTION ORAL at 21:52

## 2021-01-01 RX ADMIN — PANTOPRAZOLE SODIUM 40 MILLIGRAM(S): 20 TABLET, DELAYED RELEASE ORAL at 05:17

## 2021-01-01 RX ADMIN — NYSTATIN CREAM 1 APPLICATION(S): 100000 CREAM TOPICAL at 18:31

## 2021-01-01 RX ADMIN — Medication 2: at 06:51

## 2021-01-01 RX ADMIN — NYSTATIN CREAM 1 APPLICATION(S): 100000 CREAM TOPICAL at 05:18

## 2021-01-01 RX ADMIN — Medication 3 MILLILITER(S): at 10:26

## 2021-01-01 RX ADMIN — NYSTATIN CREAM 1 APPLICATION(S): 100000 CREAM TOPICAL at 13:13

## 2021-01-01 RX ADMIN — Medication 1 DROP(S): at 13:12

## 2021-01-01 RX ADMIN — PIPERACILLIN AND TAZOBACTAM 25 GRAM(S): 4; .5 INJECTION, POWDER, LYOPHILIZED, FOR SOLUTION INTRAVENOUS at 01:21

## 2021-01-01 RX ADMIN — PANTOPRAZOLE SODIUM 40 MILLIGRAM(S): 20 TABLET, DELAYED RELEASE ORAL at 06:09

## 2021-01-01 RX ADMIN — Medication 25 MILLILITER(S): at 15:52

## 2021-01-01 RX ADMIN — NYSTATIN CREAM 1 APPLICATION(S): 100000 CREAM TOPICAL at 17:45

## 2021-01-01 RX ADMIN — Medication 250 MILLIGRAM(S): at 21:44

## 2021-01-01 RX ADMIN — Medication 44 MICROGRAM(S): at 23:09

## 2021-01-01 RX ADMIN — NYSTATIN CREAM 1 APPLICATION(S): 100000 CREAM TOPICAL at 05:06

## 2021-01-01 RX ADMIN — Medication 1 GRAM(S): at 00:54

## 2021-01-01 RX ADMIN — Medication 15 MILLIGRAM(S): at 06:30

## 2021-01-01 RX ADMIN — Medication 1 TABLET(S): at 21:53

## 2021-01-01 RX ADMIN — Medication 44 MICROGRAM(S): at 22:19

## 2021-01-01 RX ADMIN — POLYETHYLENE GLYCOL 3350 17 GRAM(S): 17 POWDER, FOR SOLUTION ORAL at 11:11

## 2021-01-01 RX ADMIN — SODIUM CHLORIDE 75 MILLILITER(S): 9 INJECTION, SOLUTION INTRAVENOUS at 16:48

## 2021-01-01 RX ADMIN — PANTOPRAZOLE SODIUM 40 MILLIGRAM(S): 20 TABLET, DELAYED RELEASE ORAL at 17:45

## 2021-01-01 RX ADMIN — NYSTATIN CREAM 1 APPLICATION(S): 100000 CREAM TOPICAL at 17:21

## 2021-01-01 RX ADMIN — PIPERACILLIN AND TAZOBACTAM 25 GRAM(S): 4; .5 INJECTION, POWDER, LYOPHILIZED, FOR SOLUTION INTRAVENOUS at 00:20

## 2021-01-01 RX ADMIN — Medication 50 MILLIEQUIVALENT(S): at 01:29

## 2021-01-01 RX ADMIN — Medication 4: at 12:40

## 2021-01-01 RX ADMIN — Medication 1 DROP(S): at 05:18

## 2021-01-01 RX ADMIN — NYSTATIN CREAM 1 APPLICATION(S): 100000 CREAM TOPICAL at 17:27

## 2021-01-01 RX ADMIN — MORPHINE SULFATE 2 MILLIGRAM(S): 50 CAPSULE, EXTENDED RELEASE ORAL at 06:16

## 2021-01-01 RX ADMIN — NYSTATIN CREAM 1 APPLICATION(S): 100000 CREAM TOPICAL at 13:17

## 2021-01-01 RX ADMIN — Medication 1 GRAM(S): at 05:24

## 2021-01-01 RX ADMIN — Medication 2: at 00:47

## 2021-01-01 RX ADMIN — Medication 3 MILLILITER(S): at 05:00

## 2021-01-01 RX ADMIN — Medication 2: at 23:16

## 2021-01-01 RX ADMIN — Medication 2: at 17:56

## 2021-01-01 RX ADMIN — Medication 1 GRAM(S): at 02:13

## 2021-01-01 RX ADMIN — NYSTATIN CREAM 1 APPLICATION(S): 100000 CREAM TOPICAL at 22:06

## 2021-01-01 RX ADMIN — Medication 1 DROP(S): at 06:20

## 2021-01-01 RX ADMIN — Medication 50 MILLILITER(S): at 22:42

## 2021-01-01 RX ADMIN — NYSTATIN CREAM 1 APPLICATION(S): 100000 CREAM TOPICAL at 21:15

## 2021-01-01 RX ADMIN — Medication 1 GRAM(S): at 05:02

## 2021-01-01 RX ADMIN — MIDODRINE HYDROCHLORIDE 20 MILLIGRAM(S): 2.5 TABLET ORAL at 21:18

## 2021-01-01 RX ADMIN — Medication 1: at 13:22

## 2021-01-01 RX ADMIN — LACTULOSE 20 GRAM(S): 10 SOLUTION ORAL at 05:24

## 2021-01-01 RX ADMIN — PIPERACILLIN AND TAZOBACTAM 25 GRAM(S): 4; .5 INJECTION, POWDER, LYOPHILIZED, FOR SOLUTION INTRAVENOUS at 21:19

## 2021-01-01 RX ADMIN — Medication 1 TABLET(S): at 22:23

## 2021-01-01 RX ADMIN — Medication 1 DROP(S): at 05:29

## 2021-01-01 RX ADMIN — FENTANYL CITRATE 100 MICROGRAM(S): 50 INJECTION INTRAVENOUS at 15:40

## 2021-01-01 RX ADMIN — Medication 40 MILLIGRAM(S): at 05:15

## 2021-01-01 RX ADMIN — CEFTRIAXONE 100 MILLIGRAM(S): 500 INJECTION, POWDER, FOR SOLUTION INTRAMUSCULAR; INTRAVENOUS at 18:13

## 2021-01-01 RX ADMIN — Medication 3: at 18:00

## 2021-01-01 RX ADMIN — Medication 3 MILLILITER(S): at 10:57

## 2021-01-01 RX ADMIN — Medication 1 GRAM(S): at 17:51

## 2021-01-01 RX ADMIN — PANTOPRAZOLE SODIUM 10 MG/HR: 20 TABLET, DELAYED RELEASE ORAL at 09:28

## 2021-01-01 RX ADMIN — ROBINUL 0.2 MILLIGRAM(S): 0.2 INJECTION INTRAMUSCULAR; INTRAVENOUS at 06:02

## 2021-01-01 RX ADMIN — Medication 1 GRAM(S): at 17:18

## 2021-01-01 RX ADMIN — Medication 2: at 13:44

## 2021-01-01 RX ADMIN — HUMAN INSULIN 5 UNIT(S): 100 INJECTION, SUSPENSION SUBCUTANEOUS at 00:19

## 2021-01-01 RX ADMIN — CHLORHEXIDINE GLUCONATE 1 APPLICATION(S): 213 SOLUTION TOPICAL at 05:04

## 2021-01-01 RX ADMIN — NYSTATIN CREAM 1 APPLICATION(S): 100000 CREAM TOPICAL at 05:41

## 2021-01-01 RX ADMIN — Medication 6: at 18:14

## 2021-01-01 RX ADMIN — Medication 1: at 05:03

## 2021-01-01 RX ADMIN — NYSTATIN CREAM 1 APPLICATION(S): 100000 CREAM TOPICAL at 05:34

## 2021-01-01 RX ADMIN — POLYETHYLENE GLYCOL 3350 17 GRAM(S): 17 POWDER, FOR SOLUTION ORAL at 17:19

## 2021-01-01 RX ADMIN — LACTULOSE 200 GRAM(S): 10 SOLUTION ORAL at 21:09

## 2021-01-01 RX ADMIN — Medication 2: at 13:00

## 2021-01-01 RX ADMIN — Medication 1 GRAM(S): at 17:21

## 2021-01-01 RX ADMIN — CHLORHEXIDINE GLUCONATE 1 APPLICATION(S): 213 SOLUTION TOPICAL at 05:03

## 2021-01-01 RX ADMIN — Medication 1 DROP(S): at 06:14

## 2021-01-01 RX ADMIN — Medication 25 MILLILITER(S): at 15:54

## 2021-01-01 RX ADMIN — MIDODRINE HYDROCHLORIDE 10 MILLIGRAM(S): 2.5 TABLET ORAL at 22:04

## 2021-01-01 RX ADMIN — Medication 1 DROP(S): at 14:10

## 2021-01-01 RX ADMIN — Medication 4.29 MICROGRAM(S)/KG/MIN: at 21:07

## 2021-01-01 RX ADMIN — CHLORHEXIDINE GLUCONATE 1 APPLICATION(S): 213 SOLUTION TOPICAL at 05:52

## 2021-01-01 RX ADMIN — CHLORHEXIDINE GLUCONATE 1 APPLICATION(S): 213 SOLUTION TOPICAL at 05:14

## 2021-01-01 RX ADMIN — Medication 1 DROP(S): at 14:09

## 2021-01-01 RX ADMIN — Medication 2: at 18:32

## 2021-01-01 RX ADMIN — Medication 1 TABLET(S): at 23:02

## 2021-01-01 RX ADMIN — Medication 6: at 12:35

## 2021-01-01 RX ADMIN — MIDODRINE HYDROCHLORIDE 20 MILLIGRAM(S): 2.5 TABLET ORAL at 21:06

## 2021-01-01 RX ADMIN — Medication 4: at 18:01

## 2021-01-01 RX ADMIN — NYSTATIN CREAM 1 APPLICATION(S): 100000 CREAM TOPICAL at 21:53

## 2021-01-01 RX ADMIN — POLYETHYLENE GLYCOL 3350 17 GRAM(S): 17 POWDER, FOR SOLUTION ORAL at 13:17

## 2021-01-01 RX ADMIN — SIMETHICONE 80 MILLIGRAM(S): 80 TABLET, CHEWABLE ORAL at 05:40

## 2021-01-01 RX ADMIN — Medication 1 GRAM(S): at 05:41

## 2021-01-01 RX ADMIN — SODIUM ZIRCONIUM CYCLOSILICATE 10 GRAM(S): 10 POWDER, FOR SUSPENSION ORAL at 18:54

## 2021-01-01 RX ADMIN — Medication 1 GRAM(S): at 05:52

## 2021-01-01 RX ADMIN — Medication 2: at 18:20

## 2021-01-01 RX ADMIN — PIPERACILLIN AND TAZOBACTAM 25 GRAM(S): 4; .5 INJECTION, POWDER, LYOPHILIZED, FOR SOLUTION INTRAVENOUS at 00:54

## 2021-01-01 RX ADMIN — SODIUM CHLORIDE 75 MILLILITER(S): 9 INJECTION, SOLUTION INTRAVENOUS at 21:08

## 2021-01-01 RX ADMIN — LACTULOSE 30 GRAM(S): 10 SOLUTION ORAL at 18:57

## 2021-01-01 RX ADMIN — INSULIN GLARGINE 5 UNIT(S): 100 INJECTION, SOLUTION SUBCUTANEOUS at 22:23

## 2021-01-01 RX ADMIN — NYSTATIN CREAM 1 APPLICATION(S): 100000 CREAM TOPICAL at 21:18

## 2021-01-01 RX ADMIN — Medication 44 MICROGRAM(S): at 21:34

## 2021-01-01 RX ADMIN — Medication 1 APPLICATION(S): at 12:00

## 2021-01-01 RX ADMIN — Medication 1 DROP(S): at 05:23

## 2021-01-01 RX ADMIN — Medication 1 DROP(S): at 21:44

## 2021-01-01 RX ADMIN — PANTOPRAZOLE SODIUM 10 MG/HR: 20 TABLET, DELAYED RELEASE ORAL at 22:15

## 2021-01-01 RX ADMIN — NYSTATIN CREAM 1 APPLICATION(S): 100000 CREAM TOPICAL at 21:19

## 2021-01-01 RX ADMIN — Medication 2: at 18:05

## 2021-01-01 RX ADMIN — CHLORHEXIDINE GLUCONATE 15 MILLILITER(S): 213 SOLUTION TOPICAL at 05:02

## 2021-01-01 RX ADMIN — Medication 1 GRAM(S): at 05:12

## 2021-01-01 RX ADMIN — Medication 3 MILLILITER(S): at 17:23

## 2021-01-01 RX ADMIN — LACTULOSE 20 GRAM(S): 10 SOLUTION ORAL at 21:03

## 2021-01-01 RX ADMIN — NYSTATIN CREAM 1 APPLICATION(S): 100000 CREAM TOPICAL at 21:50

## 2021-01-01 RX ADMIN — INSULIN GLARGINE 5 UNIT(S): 100 INJECTION, SOLUTION SUBCUTANEOUS at 22:10

## 2021-01-01 RX ADMIN — Medication 2: at 11:52

## 2021-01-01 RX ADMIN — NYSTATIN CREAM 1 APPLICATION(S): 100000 CREAM TOPICAL at 05:17

## 2021-01-01 RX ADMIN — Medication 20 MILLIGRAM(S): at 18:01

## 2021-01-01 RX ADMIN — LACTULOSE 20 GRAM(S): 10 SOLUTION ORAL at 10:11

## 2021-01-01 RX ADMIN — Medication 1 DROP(S): at 05:26

## 2021-01-01 RX ADMIN — MIDODRINE HYDROCHLORIDE 20 MILLIGRAM(S): 2.5 TABLET ORAL at 14:10

## 2021-01-01 RX ADMIN — HUMAN INSULIN 5 UNIT(S): 100 INJECTION, SUSPENSION SUBCUTANEOUS at 06:44

## 2021-01-01 RX ADMIN — PANTOPRAZOLE SODIUM 40 MILLIGRAM(S): 20 TABLET, DELAYED RELEASE ORAL at 21:47

## 2021-01-01 RX ADMIN — HUMAN INSULIN 5 UNIT(S): 100 INJECTION, SUSPENSION SUBCUTANEOUS at 01:19

## 2021-01-01 RX ADMIN — Medication 2: at 18:03

## 2021-01-01 RX ADMIN — CHLORHEXIDINE GLUCONATE 1 APPLICATION(S): 213 SOLUTION TOPICAL at 05:18

## 2021-01-01 RX ADMIN — SENNA PLUS 2 TABLET(S): 8.6 TABLET ORAL at 21:53

## 2021-01-01 RX ADMIN — NYSTATIN CREAM 1 APPLICATION(S): 100000 CREAM TOPICAL at 22:21

## 2021-01-01 RX ADMIN — Medication 1 GRAM(S): at 18:01

## 2021-01-01 RX ADMIN — PRASUGREL 10 MILLIGRAM(S): 5 TABLET, FILM COATED ORAL at 11:03

## 2021-01-01 RX ADMIN — MIDODRINE HYDROCHLORIDE 20 MILLIGRAM(S): 2.5 TABLET ORAL at 22:11

## 2021-01-01 RX ADMIN — PANTOPRAZOLE SODIUM 40 MILLIGRAM(S): 20 TABLET, DELAYED RELEASE ORAL at 05:32

## 2021-01-01 RX ADMIN — PIPERACILLIN AND TAZOBACTAM 25 GRAM(S): 4; .5 INJECTION, POWDER, LYOPHILIZED, FOR SOLUTION INTRAVENOUS at 00:35

## 2021-01-01 RX ADMIN — PANTOPRAZOLE SODIUM 40 MILLIGRAM(S): 20 TABLET, DELAYED RELEASE ORAL at 05:34

## 2021-01-01 RX ADMIN — CHLORHEXIDINE GLUCONATE 1 APPLICATION(S): 213 SOLUTION TOPICAL at 05:08

## 2021-01-01 RX ADMIN — NYSTATIN CREAM 1 APPLICATION(S): 100000 CREAM TOPICAL at 05:40

## 2021-01-01 RX ADMIN — CHLORHEXIDINE GLUCONATE 15 MILLILITER(S): 213 SOLUTION TOPICAL at 05:12

## 2021-01-01 RX ADMIN — NYSTATIN CREAM 1 APPLICATION(S): 100000 CREAM TOPICAL at 05:53

## 2021-01-01 RX ADMIN — Medication 1 DROP(S): at 22:21

## 2021-01-01 RX ADMIN — Medication 1 DROP(S): at 05:41

## 2021-01-01 RX ADMIN — LACTULOSE 30 GRAM(S): 10 SOLUTION ORAL at 02:51

## 2021-01-01 RX ADMIN — NYSTATIN CREAM 1 APPLICATION(S): 100000 CREAM TOPICAL at 17:36

## 2021-01-01 RX ADMIN — SIMETHICONE 80 MILLIGRAM(S): 80 TABLET, CHEWABLE ORAL at 05:15

## 2021-01-01 RX ADMIN — PANTOPRAZOLE SODIUM 40 MILLIGRAM(S): 20 TABLET, DELAYED RELEASE ORAL at 11:24

## 2021-01-01 RX ADMIN — Medication 20 MILLIGRAM(S): at 07:52

## 2021-01-01 RX ADMIN — NYSTATIN CREAM 1 APPLICATION(S): 100000 CREAM TOPICAL at 13:18

## 2021-01-01 RX ADMIN — LACTULOSE 10 GRAM(S): 10 SOLUTION ORAL at 21:36

## 2021-01-01 RX ADMIN — SODIUM CHLORIDE 75 MILLILITER(S): 9 INJECTION, SOLUTION INTRAVENOUS at 22:04

## 2021-01-01 RX ADMIN — Medication 25 MILLILITER(S): at 12:41

## 2021-01-01 RX ADMIN — NYSTATIN CREAM 1 APPLICATION(S): 100000 CREAM TOPICAL at 18:14

## 2021-01-01 RX ADMIN — Medication 2: at 12:29

## 2021-01-01 RX ADMIN — Medication 20 MILLIGRAM(S): at 05:32

## 2021-01-01 RX ADMIN — Medication 20 MILLIGRAM(S): at 05:09

## 2021-01-01 RX ADMIN — Medication 7.14 MICROGRAM(S)/KG/MIN: at 05:42

## 2021-01-01 RX ADMIN — HUMAN INSULIN 5 UNIT(S): 100 INJECTION, SUSPENSION SUBCUTANEOUS at 12:40

## 2021-01-01 RX ADMIN — Medication 1 GRAM(S): at 17:11

## 2021-01-01 RX ADMIN — CHLORHEXIDINE GLUCONATE 1 APPLICATION(S): 213 SOLUTION TOPICAL at 06:48

## 2021-01-01 RX ADMIN — Medication 4: at 12:43

## 2021-01-01 RX ADMIN — CEFTRIAXONE 100 MILLIGRAM(S): 500 INJECTION, POWDER, FOR SOLUTION INTRAMUSCULAR; INTRAVENOUS at 12:18

## 2021-01-01 RX ADMIN — PANTOPRAZOLE SODIUM 40 MILLIGRAM(S): 20 TABLET, DELAYED RELEASE ORAL at 20:42

## 2021-01-01 RX ADMIN — Medication 44 MICROGRAM(S): at 22:39

## 2021-01-01 RX ADMIN — OCTREOTIDE ACETATE 50 MICROGRAM(S): 200 INJECTION, SOLUTION INTRAVENOUS; SUBCUTANEOUS at 14:14

## 2021-01-01 RX ADMIN — LACTULOSE 10 GRAM(S): 10 SOLUTION ORAL at 05:02

## 2021-01-01 RX ADMIN — NYSTATIN CREAM 1 APPLICATION(S): 100000 CREAM TOPICAL at 22:44

## 2021-01-01 RX ADMIN — Medication 1 DROP(S): at 05:50

## 2021-01-01 RX ADMIN — INSULIN GLARGINE 5 UNIT(S): 100 INJECTION, SOLUTION SUBCUTANEOUS at 21:46

## 2021-01-01 RX ADMIN — LACTULOSE 30 GRAM(S): 10 SOLUTION ORAL at 10:43

## 2021-01-01 RX ADMIN — Medication 25 MILLILITER(S): at 05:36

## 2021-01-01 RX ADMIN — Medication 50 MILLILITER(S): at 14:15

## 2021-01-01 RX ADMIN — Medication 3 MILLILITER(S): at 23:26

## 2021-01-01 RX ADMIN — NYSTATIN CREAM 1 APPLICATION(S): 100000 CREAM TOPICAL at 22:04

## 2021-01-01 RX ADMIN — SIMETHICONE 80 MILLIGRAM(S): 80 TABLET, CHEWABLE ORAL at 04:31

## 2021-01-01 RX ADMIN — Medication 2: at 12:28

## 2021-01-01 RX ADMIN — NYSTATIN CREAM 1 APPLICATION(S): 100000 CREAM TOPICAL at 13:08

## 2021-01-01 RX ADMIN — PANTOPRAZOLE SODIUM 40 MILLIGRAM(S): 20 TABLET, DELAYED RELEASE ORAL at 05:02

## 2021-01-01 RX ADMIN — PANTOPRAZOLE SODIUM 40 MILLIGRAM(S): 20 TABLET, DELAYED RELEASE ORAL at 09:33

## 2021-01-01 RX ADMIN — Medication 44 MICROGRAM(S): at 22:04

## 2021-01-01 RX ADMIN — CHLORHEXIDINE GLUCONATE 15 MILLILITER(S): 213 SOLUTION TOPICAL at 17:46

## 2021-01-01 RX ADMIN — LACTULOSE 20 GRAM(S): 10 SOLUTION ORAL at 13:17

## 2021-01-01 RX ADMIN — LACTULOSE 30 GRAM(S): 10 SOLUTION ORAL at 13:13

## 2021-01-01 RX ADMIN — NYSTATIN CREAM 1 APPLICATION(S): 100000 CREAM TOPICAL at 14:27

## 2021-01-01 RX ADMIN — LACTULOSE 10 GRAM(S): 10 SOLUTION ORAL at 22:14

## 2021-01-01 RX ADMIN — Medication 100 MILLILITER(S): at 18:00

## 2021-01-01 RX ADMIN — MIDODRINE HYDROCHLORIDE 20 MILLIGRAM(S): 2.5 TABLET ORAL at 13:56

## 2021-01-01 RX ADMIN — Medication 4: at 11:50

## 2021-01-01 RX ADMIN — PIPERACILLIN AND TAZOBACTAM 25 GRAM(S): 4; .5 INJECTION, POWDER, LYOPHILIZED, FOR SOLUTION INTRAVENOUS at 10:18

## 2021-01-01 RX ADMIN — SODIUM CHLORIDE 500 MILLILITER(S): 9 INJECTION, SOLUTION INTRAVENOUS at 11:45

## 2021-01-01 RX ADMIN — Medication 2: at 08:43

## 2021-01-01 RX ADMIN — CEFTRIAXONE 100 MILLIGRAM(S): 500 INJECTION, POWDER, FOR SOLUTION INTRAMUSCULAR; INTRAVENOUS at 11:44

## 2021-01-01 RX ADMIN — Medication 1: at 21:44

## 2021-01-01 RX ADMIN — Medication 1 GRAM(S): at 18:05

## 2021-01-01 RX ADMIN — SODIUM ZIRCONIUM CYCLOSILICATE 10 GRAM(S): 10 POWDER, FOR SUSPENSION ORAL at 21:41

## 2021-01-01 RX ADMIN — Medication 40 MILLIGRAM(S): at 05:16

## 2021-01-01 RX ADMIN — Medication 1 DROP(S): at 12:08

## 2021-01-01 RX ADMIN — PIPERACILLIN AND TAZOBACTAM 200 GRAM(S): 4; .5 INJECTION, POWDER, LYOPHILIZED, FOR SOLUTION INTRAVENOUS at 17:10

## 2021-01-01 RX ADMIN — Medication 44 MICROGRAM(S): at 22:30

## 2021-01-01 RX ADMIN — Medication 88 MICROGRAM(S): at 05:03

## 2021-01-01 RX ADMIN — Medication 4: at 12:26

## 2021-01-01 RX ADMIN — MIDODRINE HYDROCHLORIDE 10 MILLIGRAM(S): 2.5 TABLET ORAL at 13:17

## 2021-01-01 RX ADMIN — CHLORHEXIDINE GLUCONATE 15 MILLILITER(S): 213 SOLUTION TOPICAL at 18:01

## 2021-01-01 RX ADMIN — NYSTATIN CREAM 1 APPLICATION(S): 100000 CREAM TOPICAL at 06:21

## 2021-01-01 RX ADMIN — NYSTATIN CREAM 1 APPLICATION(S): 100000 CREAM TOPICAL at 14:34

## 2021-01-01 RX ADMIN — Medication 50 MILLILITER(S): at 12:27

## 2021-01-01 RX ADMIN — SODIUM CHLORIDE 75 MILLILITER(S): 9 INJECTION, SOLUTION INTRAVENOUS at 12:50

## 2021-01-01 RX ADMIN — PANTOPRAZOLE SODIUM 40 MILLIGRAM(S): 20 TABLET, DELAYED RELEASE ORAL at 07:47

## 2021-01-01 RX ADMIN — Medication 1 GRAM(S): at 17:01

## 2021-01-01 RX ADMIN — Medication 44 MICROGRAM(S): at 21:35

## 2021-01-01 RX ADMIN — Medication 1 GRAM(S): at 11:45

## 2021-01-01 RX ADMIN — MIDODRINE HYDROCHLORIDE 20 MILLIGRAM(S): 2.5 TABLET ORAL at 01:06

## 2021-01-01 RX ADMIN — Medication 650 MILLIGRAM(S): at 18:13

## 2021-01-01 RX ADMIN — Medication 3 MILLILITER(S): at 01:54

## 2021-01-01 RX ADMIN — MIDODRINE HYDROCHLORIDE 20 MILLIGRAM(S): 2.5 TABLET ORAL at 05:35

## 2021-01-01 RX ADMIN — Medication 1 GRAM(S): at 23:44

## 2021-01-01 RX ADMIN — Medication 1: at 05:02

## 2021-01-01 RX ADMIN — Medication 1 DROP(S): at 21:48

## 2021-01-01 RX ADMIN — PROPOFOL 4.57 MICROGRAM(S)/KG/MIN: 10 INJECTION, EMULSION INTRAVENOUS at 20:36

## 2021-01-01 RX ADMIN — Medication 1 DROP(S): at 22:30

## 2021-01-01 RX ADMIN — LACTULOSE 10 GRAM(S): 10 SOLUTION ORAL at 05:18

## 2021-01-01 RX ADMIN — LACTULOSE 10 GRAM(S): 10 SOLUTION ORAL at 05:16

## 2021-01-01 RX ADMIN — NYSTATIN CREAM 1 APPLICATION(S): 100000 CREAM TOPICAL at 05:16

## 2021-01-01 RX ADMIN — Medication 4: at 06:18

## 2021-01-01 RX ADMIN — Medication 3 MILLILITER(S): at 17:56

## 2021-01-01 RX ADMIN — PANTOPRAZOLE SODIUM 40 MILLIGRAM(S): 20 TABLET, DELAYED RELEASE ORAL at 22:00

## 2021-01-01 RX ADMIN — Medication 1 DROP(S): at 13:46

## 2021-01-01 RX ADMIN — PIPERACILLIN AND TAZOBACTAM 25 GRAM(S): 4; .5 INJECTION, POWDER, LYOPHILIZED, FOR SOLUTION INTRAVENOUS at 00:47

## 2021-01-01 RX ADMIN — Medication 88 MICROGRAM(S): at 05:34

## 2021-01-01 RX ADMIN — Medication 1 GRAM(S): at 00:19

## 2021-01-01 RX ADMIN — NYSTATIN CREAM 1 APPLICATION(S): 100000 CREAM TOPICAL at 12:07

## 2021-01-01 RX ADMIN — PANTOPRAZOLE SODIUM 40 MILLIGRAM(S): 20 TABLET, DELAYED RELEASE ORAL at 17:59

## 2021-01-01 RX ADMIN — Medication 2: at 05:41

## 2021-01-01 RX ADMIN — Medication 1 DROP(S): at 05:09

## 2021-01-01 RX ADMIN — MIDODRINE HYDROCHLORIDE 10 MILLIGRAM(S): 2.5 TABLET ORAL at 13:46

## 2021-01-01 RX ADMIN — Medication 44 MICROGRAM(S): at 21:13

## 2021-01-01 RX ADMIN — Medication 1: at 07:56

## 2021-01-01 RX ADMIN — Medication 1 GRAM(S): at 06:10

## 2021-01-01 RX ADMIN — SIMETHICONE 80 MILLIGRAM(S): 80 TABLET, CHEWABLE ORAL at 13:33

## 2021-01-01 RX ADMIN — Medication 1 GRAM(S): at 11:10

## 2021-01-01 RX ADMIN — SPIRONOLACTONE 50 MILLIGRAM(S): 25 TABLET, FILM COATED ORAL at 05:41

## 2021-01-01 RX ADMIN — PANTOPRAZOLE SODIUM 40 MILLIGRAM(S): 20 TABLET, DELAYED RELEASE ORAL at 20:28

## 2021-01-01 RX ADMIN — PIPERACILLIN AND TAZOBACTAM 25 GRAM(S): 4; .5 INJECTION, POWDER, LYOPHILIZED, FOR SOLUTION INTRAVENOUS at 17:19

## 2021-01-01 RX ADMIN — NYSTATIN CREAM 1 APPLICATION(S): 100000 CREAM TOPICAL at 05:32

## 2021-01-01 RX ADMIN — HUMAN INSULIN 5 UNIT(S): 100 INJECTION, SUSPENSION SUBCUTANEOUS at 18:05

## 2021-01-01 RX ADMIN — CHLORHEXIDINE GLUCONATE 1 APPLICATION(S): 213 SOLUTION TOPICAL at 06:19

## 2021-01-01 RX ADMIN — Medication 1: at 06:10

## 2021-01-01 RX ADMIN — SIMETHICONE 80 MILLIGRAM(S): 80 TABLET, CHEWABLE ORAL at 05:01

## 2021-01-01 RX ADMIN — SODIUM CHLORIDE 250 MILLILITER(S): 9 INJECTION INTRAMUSCULAR; INTRAVENOUS; SUBCUTANEOUS at 18:07

## 2021-01-01 RX ADMIN — LACTULOSE 200 GRAM(S): 10 SOLUTION ORAL at 12:51

## 2021-01-01 RX ADMIN — PIPERACILLIN AND TAZOBACTAM 25 GRAM(S): 4; .5 INJECTION, POWDER, LYOPHILIZED, FOR SOLUTION INTRAVENOUS at 08:08

## 2021-01-01 RX ADMIN — NYSTATIN CREAM 1 APPLICATION(S): 100000 CREAM TOPICAL at 13:20

## 2021-01-01 RX ADMIN — Medication 2: at 01:23

## 2021-01-01 RX ADMIN — NYSTATIN CREAM 1 APPLICATION(S): 100000 CREAM TOPICAL at 21:28

## 2021-01-01 RX ADMIN — NYSTATIN CREAM 1 APPLICATION(S): 100000 CREAM TOPICAL at 17:15

## 2021-01-01 RX ADMIN — Medication 88 MICROGRAM(S): at 05:41

## 2021-01-01 RX ADMIN — SPIRONOLACTONE 50 MILLIGRAM(S): 25 TABLET, FILM COATED ORAL at 05:02

## 2021-01-01 RX ADMIN — INSULIN HUMAN 10 UNIT(S): 100 INJECTION, SOLUTION SUBCUTANEOUS at 01:30

## 2021-01-01 RX ADMIN — MIDODRINE HYDROCHLORIDE 20 MILLIGRAM(S): 2.5 TABLET ORAL at 06:17

## 2021-01-01 RX ADMIN — Medication 1 GRAM(S): at 23:50

## 2021-01-01 RX ADMIN — PANTOPRAZOLE SODIUM 40 MILLIGRAM(S): 20 TABLET, DELAYED RELEASE ORAL at 17:21

## 2021-01-01 RX ADMIN — Medication 44 MICROGRAM(S): at 21:37

## 2021-01-01 RX ADMIN — Medication 4.29 MICROGRAM(S)/KG/MIN: at 20:36

## 2021-01-01 RX ADMIN — PIPERACILLIN AND TAZOBACTAM 25 GRAM(S): 4; .5 INJECTION, POWDER, LYOPHILIZED, FOR SOLUTION INTRAVENOUS at 01:03

## 2021-01-01 RX ADMIN — SIMETHICONE 80 MILLIGRAM(S): 80 TABLET, CHEWABLE ORAL at 22:23

## 2021-01-01 RX ADMIN — CHLORHEXIDINE GLUCONATE 1 APPLICATION(S): 213 SOLUTION TOPICAL at 05:26

## 2021-01-01 RX ADMIN — Medication 1: at 17:48

## 2021-01-01 RX ADMIN — Medication 1 APPLICATION(S): at 11:45

## 2021-01-01 RX ADMIN — Medication 44 MICROGRAM(S): at 21:44

## 2021-01-01 RX ADMIN — Medication 2: at 23:42

## 2021-01-01 RX ADMIN — HUMAN INSULIN 5 UNIT(S): 100 INJECTION, SUSPENSION SUBCUTANEOUS at 18:29

## 2021-01-01 RX ADMIN — DEXMEDETOMIDINE HYDROCHLORIDE IN 0.9% SODIUM CHLORIDE 3.81 MICROGRAM(S)/KG/HR: 4 INJECTION INTRAVENOUS at 17:48

## 2021-01-01 RX ADMIN — CHLORHEXIDINE GLUCONATE 15 MILLILITER(S): 213 SOLUTION TOPICAL at 05:22

## 2021-01-01 RX ADMIN — Medication 1: at 07:27

## 2021-01-01 RX ADMIN — Medication 3 MILLILITER(S): at 23:52

## 2021-01-01 RX ADMIN — NYSTATIN CREAM 1 APPLICATION(S): 100000 CREAM TOPICAL at 21:03

## 2021-01-01 RX ADMIN — Medication 44 MICROGRAM(S): at 22:11

## 2021-01-01 RX ADMIN — SIMETHICONE 80 MILLIGRAM(S): 80 TABLET, CHEWABLE ORAL at 16:34

## 2021-01-01 RX ADMIN — Medication 1 APPLICATION(S): at 11:35

## 2021-01-01 RX ADMIN — Medication 1 GRAM(S): at 17:39

## 2021-01-01 RX ADMIN — Medication 3 MILLILITER(S): at 10:00

## 2021-01-01 RX ADMIN — LACTULOSE 30 GRAM(S): 10 SOLUTION ORAL at 06:17

## 2021-01-01 RX ADMIN — Medication 1 GRAM(S): at 12:53

## 2021-01-01 RX ADMIN — SIMETHICONE 80 MILLIGRAM(S): 80 TABLET, CHEWABLE ORAL at 05:34

## 2021-01-01 RX ADMIN — NYSTATIN CREAM 1 APPLICATION(S): 100000 CREAM TOPICAL at 14:10

## 2021-01-01 RX ADMIN — Medication 1 APPLICATION(S): at 12:52

## 2021-01-01 RX ADMIN — LACTULOSE 30 GRAM(S): 10 SOLUTION ORAL at 05:14

## 2021-01-01 RX ADMIN — CHLORHEXIDINE GLUCONATE 15 MILLILITER(S): 213 SOLUTION TOPICAL at 17:39

## 2021-01-01 RX ADMIN — Medication 125 MILLILITER(S): at 02:12

## 2021-01-01 RX ADMIN — MIDODRINE HYDROCHLORIDE 10 MILLIGRAM(S): 2.5 TABLET ORAL at 17:19

## 2021-01-01 RX ADMIN — NYSTATIN CREAM 1 APPLICATION(S): 100000 CREAM TOPICAL at 05:28

## 2021-01-01 RX ADMIN — NYSTATIN CREAM 1 APPLICATION(S): 100000 CREAM TOPICAL at 05:48

## 2021-01-01 RX ADMIN — PANTOPRAZOLE SODIUM 40 MILLIGRAM(S): 20 TABLET, DELAYED RELEASE ORAL at 05:18

## 2021-01-01 RX ADMIN — Medication 4: at 12:32

## 2021-01-01 RX ADMIN — PIPERACILLIN AND TAZOBACTAM 25 GRAM(S): 4; .5 INJECTION, POWDER, LYOPHILIZED, FOR SOLUTION INTRAVENOUS at 08:36

## 2021-01-01 RX ADMIN — PIPERACILLIN AND TAZOBACTAM 25 GRAM(S): 4; .5 INJECTION, POWDER, LYOPHILIZED, FOR SOLUTION INTRAVENOUS at 09:22

## 2021-01-01 RX ADMIN — Medication 2: at 00:49

## 2021-01-01 RX ADMIN — Medication 2: at 18:17

## 2021-01-01 RX ADMIN — PANTOPRAZOLE SODIUM 10 MG/HR: 20 TABLET, DELAYED RELEASE ORAL at 05:03

## 2021-01-01 RX ADMIN — Medication 125 MILLILITER(S): at 01:36

## 2021-01-01 RX ADMIN — CHLORHEXIDINE GLUCONATE 15 MILLILITER(S): 213 SOLUTION TOPICAL at 06:48

## 2021-01-01 RX ADMIN — Medication 2: at 16:11

## 2021-01-01 RX ADMIN — NYSTATIN CREAM 1 APPLICATION(S): 100000 CREAM TOPICAL at 05:33

## 2021-01-01 RX ADMIN — LACTULOSE 30 GRAM(S): 10 SOLUTION ORAL at 21:29

## 2021-01-01 RX ADMIN — NYSTATIN CREAM 1 APPLICATION(S): 100000 CREAM TOPICAL at 05:01

## 2021-01-01 RX ADMIN — LACTULOSE 30 GRAM(S): 10 SOLUTION ORAL at 05:51

## 2021-01-01 RX ADMIN — Medication 25 MILLILITER(S): at 18:06

## 2021-01-01 RX ADMIN — Medication 1 GRAM(S): at 12:39

## 2021-01-01 RX ADMIN — INSULIN GLARGINE 8 UNIT(S): 100 INJECTION, SOLUTION SUBCUTANEOUS at 22:21

## 2021-01-01 RX ADMIN — NYSTATIN CREAM 1 APPLICATION(S): 100000 CREAM TOPICAL at 13:16

## 2021-01-01 RX ADMIN — Medication 2: at 12:58

## 2021-01-01 RX ADMIN — Medication 4: at 23:13

## 2021-01-01 RX ADMIN — PIPERACILLIN AND TAZOBACTAM 25 GRAM(S): 4; .5 INJECTION, POWDER, LYOPHILIZED, FOR SOLUTION INTRAVENOUS at 12:28

## 2021-01-01 RX ADMIN — SPIRONOLACTONE 50 MILLIGRAM(S): 25 TABLET, FILM COATED ORAL at 21:09

## 2021-01-01 RX ADMIN — Medication 100 GRAM(S): at 20:40

## 2021-01-01 RX ADMIN — HUMAN INSULIN 5 UNIT(S): 100 INJECTION, SUSPENSION SUBCUTANEOUS at 06:19

## 2021-01-01 RX ADMIN — Medication 3 MILLILITER(S): at 17:26

## 2021-01-01 RX ADMIN — Medication 1 APPLICATION(S): at 11:47

## 2021-01-01 RX ADMIN — PIPERACILLIN AND TAZOBACTAM 25 GRAM(S): 4; .5 INJECTION, POWDER, LYOPHILIZED, FOR SOLUTION INTRAVENOUS at 18:12

## 2021-01-01 RX ADMIN — SODIUM CHLORIDE 500 MILLILITER(S): 9 INJECTION, SOLUTION INTRAVENOUS at 17:10

## 2021-01-01 RX ADMIN — Medication 25 MILLILITER(S): at 11:32

## 2021-01-01 RX ADMIN — Medication 1 DROP(S): at 13:17

## 2021-01-01 RX ADMIN — Medication 4.29 MICROGRAM(S)/KG/MIN: at 21:17

## 2021-01-01 RX ADMIN — Medication 1 DROP(S): at 14:39

## 2021-01-01 RX ADMIN — Medication 88 MICROGRAM(S): at 05:15

## 2021-01-01 RX ADMIN — PIPERACILLIN AND TAZOBACTAM 25 GRAM(S): 4; .5 INJECTION, POWDER, LYOPHILIZED, FOR SOLUTION INTRAVENOUS at 17:01

## 2021-01-01 RX ADMIN — LACTULOSE 10 GRAM(S): 10 SOLUTION ORAL at 13:34

## 2021-01-01 RX ADMIN — Medication 3 MILLILITER(S): at 17:44

## 2021-01-01 RX ADMIN — Medication 4: at 05:56

## 2021-01-01 RX ADMIN — Medication 5.95 MICROGRAM(S)/KG/MIN: at 09:29

## 2021-01-01 RX ADMIN — Medication 3 MILLILITER(S): at 09:50

## 2021-01-01 RX ADMIN — Medication 1 GRAM(S): at 00:00

## 2021-01-01 RX ADMIN — Medication 6: at 06:01

## 2021-01-01 RX ADMIN — PANTOPRAZOLE SODIUM 40 MILLIGRAM(S): 20 TABLET, DELAYED RELEASE ORAL at 05:40

## 2021-01-01 RX ADMIN — INSULIN GLARGINE 5 UNIT(S): 100 INJECTION, SOLUTION SUBCUTANEOUS at 22:15

## 2021-01-01 RX ADMIN — Medication 1 DROP(S): at 17:11

## 2021-01-01 RX ADMIN — SODIUM CHLORIDE 500 MILLILITER(S): 9 INJECTION, SOLUTION INTRAVENOUS at 08:05

## 2021-01-01 RX ADMIN — Medication 1 DROP(S): at 21:14

## 2021-01-01 RX ADMIN — Medication 1 APPLICATION(S): at 13:13

## 2021-01-01 RX ADMIN — Medication 50 MILLILITER(S): at 11:45

## 2021-01-01 RX ADMIN — LACTULOSE 10 GRAM(S): 10 SOLUTION ORAL at 07:52

## 2021-01-01 RX ADMIN — CHLORHEXIDINE GLUCONATE 1 APPLICATION(S): 213 SOLUTION TOPICAL at 05:35

## 2021-01-01 RX ADMIN — SIMETHICONE 80 MILLIGRAM(S): 80 TABLET, CHEWABLE ORAL at 21:53

## 2021-01-01 RX ADMIN — MIDODRINE HYDROCHLORIDE 20 MILLIGRAM(S): 2.5 TABLET ORAL at 13:16

## 2021-01-01 RX ADMIN — Medication 10 MILLIGRAM(S): at 15:35

## 2021-01-01 RX ADMIN — PANTOPRAZOLE SODIUM 10 MG/HR: 20 TABLET, DELAYED RELEASE ORAL at 09:20

## 2021-01-01 RX ADMIN — CEFTRIAXONE 100 MILLIGRAM(S): 500 INJECTION, POWDER, FOR SOLUTION INTRAMUSCULAR; INTRAVENOUS at 11:10

## 2021-01-01 RX ADMIN — MIDODRINE HYDROCHLORIDE 10 MILLIGRAM(S): 2.5 TABLET ORAL at 21:18

## 2021-01-01 RX ADMIN — Medication 88 MICROGRAM(S): at 07:52

## 2021-01-01 RX ADMIN — Medication 3 MILLILITER(S): at 12:06

## 2021-01-01 RX ADMIN — Medication 3 MILLILITER(S): at 09:30

## 2021-01-01 RX ADMIN — Medication 7.14 MICROGRAM(S)/KG/MIN: at 17:58

## 2021-01-01 RX ADMIN — CEFTRIAXONE 100 MILLIGRAM(S): 500 INJECTION, POWDER, FOR SOLUTION INTRAMUSCULAR; INTRAVENOUS at 16:25

## 2021-01-01 RX ADMIN — Medication 2: at 22:35

## 2021-01-01 RX ADMIN — Medication 4: at 17:18

## 2021-01-01 RX ADMIN — Medication 3 MILLILITER(S): at 21:02

## 2021-01-01 RX ADMIN — LACTULOSE 10 GRAM(S): 10 SOLUTION ORAL at 14:02

## 2021-01-01 RX ADMIN — Medication 1 DROP(S): at 05:15

## 2021-01-01 RX ADMIN — Medication 4: at 17:56

## 2021-01-01 RX ADMIN — Medication 1 APPLICATION(S): at 11:29

## 2021-01-01 RX ADMIN — CHLORHEXIDINE GLUCONATE 1 APPLICATION(S): 213 SOLUTION TOPICAL at 05:42

## 2021-01-01 RX ADMIN — Medication 6: at 17:57

## 2021-01-01 RX ADMIN — PANTOPRAZOLE SODIUM 40 MILLIGRAM(S): 20 TABLET, DELAYED RELEASE ORAL at 08:12

## 2021-01-01 RX ADMIN — INSULIN GLARGINE 8 UNIT(S): 100 INJECTION, SOLUTION SUBCUTANEOUS at 22:34

## 2021-01-01 RX ADMIN — PIPERACILLIN AND TAZOBACTAM 25 GRAM(S): 4; .5 INJECTION, POWDER, LYOPHILIZED, FOR SOLUTION INTRAVENOUS at 00:32

## 2021-01-01 RX ADMIN — Medication 1: at 09:24

## 2021-01-01 RX ADMIN — Medication 25 MILLILITER(S): at 00:40

## 2021-01-01 RX ADMIN — Medication 1 GRAM(S): at 23:07

## 2021-01-01 RX ADMIN — MIDODRINE HYDROCHLORIDE 10 MILLIGRAM(S): 2.5 TABLET ORAL at 05:52

## 2021-01-01 RX ADMIN — LACTULOSE 20 GRAM(S): 10 SOLUTION ORAL at 14:39

## 2021-01-01 RX ADMIN — LACTULOSE 20 GRAM(S): 10 SOLUTION ORAL at 05:34

## 2021-01-01 RX ADMIN — SIMETHICONE 80 MILLIGRAM(S): 80 TABLET, CHEWABLE ORAL at 21:33

## 2021-01-01 RX ADMIN — Medication 1 GRAM(S): at 11:11

## 2021-01-01 RX ADMIN — NYSTATIN CREAM 1 APPLICATION(S): 100000 CREAM TOPICAL at 21:13

## 2021-01-01 RX ADMIN — Medication 2: at 00:55

## 2021-01-01 RX ADMIN — Medication 1: at 05:19

## 2021-01-01 RX ADMIN — Medication 6: at 01:20

## 2021-01-01 RX ADMIN — Medication 3 MILLILITER(S): at 23:45

## 2021-01-01 RX ADMIN — Medication 3 MILLILITER(S): at 23:29

## 2021-01-01 RX ADMIN — Medication 2: at 18:31

## 2021-01-01 RX ADMIN — Medication 2: at 22:22

## 2021-01-01 RX ADMIN — MIDAZOLAM HYDROCHLORIDE 4 MILLIGRAM(S): 1 INJECTION, SOLUTION INTRAMUSCULAR; INTRAVENOUS at 15:18

## 2021-01-01 RX ADMIN — PANTOPRAZOLE SODIUM 40 MILLIGRAM(S): 20 TABLET, DELAYED RELEASE ORAL at 08:36

## 2021-01-01 RX ADMIN — PIPERACILLIN AND TAZOBACTAM 25 GRAM(S): 4; .5 INJECTION, POWDER, LYOPHILIZED, FOR SOLUTION INTRAVENOUS at 08:34

## 2021-01-01 RX ADMIN — Medication 2: at 06:09

## 2021-01-01 RX ADMIN — Medication 3 MILLILITER(S): at 01:22

## 2021-01-01 RX ADMIN — Medication 88 MICROGRAM(S): at 05:10

## 2021-01-01 RX ADMIN — PANTOPRAZOLE SODIUM 40 MILLIGRAM(S): 20 TABLET, DELAYED RELEASE ORAL at 08:41

## 2021-01-01 RX ADMIN — LACTULOSE 10 GRAM(S): 10 SOLUTION ORAL at 05:47

## 2021-01-01 RX ADMIN — Medication 1 GRAM(S): at 23:16

## 2021-01-01 RX ADMIN — SODIUM ZIRCONIUM CYCLOSILICATE 10 GRAM(S): 10 POWDER, FOR SUSPENSION ORAL at 05:09

## 2021-01-01 RX ADMIN — Medication 50 MILLILITER(S): at 02:51

## 2021-01-01 RX ADMIN — NYSTATIN CREAM 1 APPLICATION(S): 100000 CREAM TOPICAL at 13:43

## 2021-01-01 RX ADMIN — PANTOPRAZOLE SODIUM 40 MILLIGRAM(S): 20 TABLET, DELAYED RELEASE ORAL at 08:20

## 2021-01-01 RX ADMIN — Medication 1 GRAM(S): at 11:50

## 2021-01-01 RX ADMIN — NYSTATIN CREAM 1 APPLICATION(S): 100000 CREAM TOPICAL at 05:50

## 2021-01-01 RX ADMIN — Medication 20 MILLIGRAM(S): at 06:55

## 2021-01-01 RX ADMIN — PIPERACILLIN AND TAZOBACTAM 25 GRAM(S): 4; .5 INJECTION, POWDER, LYOPHILIZED, FOR SOLUTION INTRAVENOUS at 08:42

## 2021-01-01 RX ADMIN — Medication 3 MILLILITER(S): at 12:46

## 2021-01-01 RX ADMIN — Medication 1 GRAM(S): at 23:39

## 2021-01-01 RX ADMIN — Medication 40 MILLIGRAM(S): at 05:35

## 2021-01-01 RX ADMIN — Medication 1 GRAM(S): at 11:57

## 2021-01-01 RX ADMIN — INSULIN GLARGINE 5 UNIT(S): 100 INJECTION, SOLUTION SUBCUTANEOUS at 22:03

## 2021-01-01 RX ADMIN — Medication 1 GRAM(S): at 11:15

## 2021-01-01 RX ADMIN — Medication 50 MILLILITER(S): at 17:38

## 2021-01-01 RX ADMIN — SODIUM CHLORIDE 200 MILLILITER(S): 9 INJECTION, SOLUTION INTRAVENOUS at 22:16

## 2021-01-01 RX ADMIN — CEFTRIAXONE 100 MILLIGRAM(S): 500 INJECTION, POWDER, FOR SOLUTION INTRAMUSCULAR; INTRAVENOUS at 11:45

## 2021-01-01 RX ADMIN — Medication 2: at 17:53

## 2021-01-01 RX ADMIN — Medication 1: at 00:38

## 2021-01-01 RX ADMIN — Medication 2: at 17:46

## 2021-01-01 RX ADMIN — NYSTATIN CREAM 1 APPLICATION(S): 100000 CREAM TOPICAL at 05:30

## 2021-01-01 RX ADMIN — CHLORHEXIDINE GLUCONATE 15 MILLILITER(S): 213 SOLUTION TOPICAL at 18:10

## 2021-01-01 RX ADMIN — Medication 1 GRAM(S): at 17:35

## 2021-01-01 RX ADMIN — MEROPENEM 100 MILLIGRAM(S): 1 INJECTION INTRAVENOUS at 05:25

## 2021-01-01 RX ADMIN — Medication 1 DROP(S): at 13:27

## 2021-01-01 RX ADMIN — NYSTATIN CREAM 1 APPLICATION(S): 100000 CREAM TOPICAL at 05:03

## 2021-01-01 RX ADMIN — Medication 3 MILLILITER(S): at 05:52

## 2021-01-01 RX ADMIN — PANTOPRAZOLE SODIUM 40 MILLIGRAM(S): 20 TABLET, DELAYED RELEASE ORAL at 20:12

## 2021-01-01 RX ADMIN — PIPERACILLIN AND TAZOBACTAM 25 GRAM(S): 4; .5 INJECTION, POWDER, LYOPHILIZED, FOR SOLUTION INTRAVENOUS at 11:41

## 2021-01-01 RX ADMIN — Medication 4: at 08:23

## 2021-01-01 RX ADMIN — Medication 3 MILLILITER(S): at 17:06

## 2021-01-01 RX ADMIN — Medication 3 MILLILITER(S): at 18:13

## 2021-01-01 RX ADMIN — Medication 44 MICROGRAM(S): at 22:13

## 2021-01-01 RX ADMIN — LACTULOSE 30 GRAM(S): 10 SOLUTION ORAL at 13:15

## 2021-01-01 RX ADMIN — PIPERACILLIN AND TAZOBACTAM 25 GRAM(S): 4; .5 INJECTION, POWDER, LYOPHILIZED, FOR SOLUTION INTRAVENOUS at 11:38

## 2021-01-01 RX ADMIN — Medication 1 TABLET(S): at 21:29

## 2021-01-01 RX ADMIN — NYSTATIN CREAM 1 APPLICATION(S): 100000 CREAM TOPICAL at 06:10

## 2021-03-12 NOTE — ED PROVIDER NOTE - CROS ED CARDIOVAS ALL NEG
Pt to the Ed with c/o emesis. Pt states she has vomited several times today and had one episode with a small amount of blood in it.
- - -

## 2021-05-16 NOTE — H&P ADULT - HISTORY OF PRESENT ILLNESS
85M mandarin only speaking, c hx CAD /sp stent, CHF s/p AICD, HTN, BPH, CKD (baseline Cr ?1.3), unknown hepatitis c/b cirrhosis, DM2, ?T10-T11 discitis, [place], ?colon tumor of unknown etiology, pw worsening confusion, weakness, SOB, unable to ambulate.    History from pt's grandson Emil Beltre at bedside, and pt's daughter Cleopatra Styles over phone. Pt drowsy, and able to provide some answers to specific questions only. Pt has had about 3 weeks of worsening weakness to the point where he can't walk unassisted, and has had multiple falls, most recently 1 week ago. At baseline, pt walks with a walker. Pt also complaining of increasing SOB, increased mucous production, and subjective fevers, for which he has been taking cefixime empirically for the past 1 week. Pt also has increased abd distension, leg swelling, poor appetite, intermittent daily waxing/waning confusion. Pt's lasix was increased as outpt. Pt reports left chest pain, which is intermittent for the past 30 yrs. Has not received covid vaccine.    VS: Tm 98.4, P 56, BP 93/62, R 22, 95% RA  In the Ed, received azithro, ceftriaxone, , tylenol, duoneb

## 2021-05-16 NOTE — H&P ADULT - NSHPREVIEWOFSYSTEMS_GEN_ALL_CORE
REVIEW OF SYSTEMS:  CONSTITUTIONAL: +weakness. +fevers. +chills. No rigors. +weight loss. +poor appetite.  EYES: No blurry or double vision. No eye pain.  ENT: No hearing difficulty. No vertigo. No dysphagia. No Sinusitis/rhinorrhea.   NECK: No pain. No stiffness/rigidity.  CARDIAC: +chest pain. No palpitations.   RESPIRATORY: +cough. +SOB. No hemoptysis.  GASTROINTESTINAL: No abdominal pain. No nausea. No vomiting. No hematemesis. No diarrhea. No constipation.   GENITOURINARY: No dysuria. No frequency. No hesitancy. +reduced urine output  NEUROLOGICAL: No numbness/tingling. No focal weakness. No urinary or fecal incontinence. No headache. +unsteady gait.  BACK: No back pain. No flank pain.  EXTREMITIES: +lower extremity edema. Full ROM.   SKIN: No rashes. No itching. No other lesions.  ALLERGIC: No lip swelling. No hives.  All other review of systems is negative unless indicated above.  Unless indicated above, unable to assess ROS 2/2

## 2021-05-16 NOTE — ED ADULT NURSE NOTE - OBJECTIVE STATEMENT
86 Yo male PMHx HTN, DM, BIBEMS for SOB and frequent syncopal episodes. Patient poor historian, A&OX2 at baseline, mandarin speaking. As per EMS, family called ambulance for increased wheezing, SOB and multiple syncopal episodes over the past few weeks. Patient arrived awake, alert, needs frequent reorientation. Patient has healing bruises to R-eye and abdomen from previous falls. Airway patent, breathing spontaneous, equal and symmetric chest rise and fall. audible wheezes noted upon exam. skin warm, dry and pink. abdomen soft, distended, non-tender. no non-verbal indicators of chest pain, HA, N/V/D, abdominal pain, fever/chills, urinary symptoms, hematuria, weakness, dizziness, numbness, tinging. Peripheral pulses present b/l. Skin warm, dry and pink. Pt placed in position of comfort. Pt educated on call bell system and provided call bell. Bed in lowest position, wheels locked, appropriate side rails raised. Pt denies needs at this time.

## 2021-05-16 NOTE — H&P ADULT - NSHPPHYSICALEXAM_GEN_ALL_CORE
PHYSICAL EXAM:   GENERAL: Alert. +mildly confused. No acute distress. Not thin. Not cachectic. Not obese.  HEAD:  Atraumatic. Normocephalic.  EYES: EOMI. PERRLA. Normal conjunctiva/sclera. Right periorbital ecchymoses  ENT: Neck supple. No JVD. Moist oral mucosa. Not edentulous. No thrush.  LYMPH: Normal supraclavicular/cervical lymph nodes.   CARDIAC: Not tachy, Not darin. Regular rhythm. Not irregularly irregular. S1. S2.   LUNG/CHEST: reduced BS on right. No wheezes. No rales. +rhonchi.  ABDOMEN: firm. No tenderness. +mild distension. Normal bowel sounds.  BACK: No midline/vertebral tenderness. No flank tenderness.  VASCULAR: +2 b/l radial or ulnar pulses. Palpable DP pulses.  EXTREMITIES:  No clubbing. No cyanosis. +2 R>L LE pitting edema. Moving all 4.  NEUROLOGY: A&Ox3. Non-focal exam. Cranial nerves intact. Normal speech. Sensation intact.  PSYCH: Normal behavior. Flat affect.  SKIN: No jaundice. No erythema. No rash/lesion.  Vascular Access:     ICU Vital Signs Last 24 Hrs  T(C): 36.8 (16 May 2021 19:15), Max: 36.9 (16 May 2021 16:22)  T(F): 98.2 (16 May 2021 19:15), Max: 98.4 (16 May 2021 16:22)  HR: 75 (16 May 2021 22:24) (56 - 90)  BP: 93/62 (16 May 2021 22:24) (93/62 - 116/57)  BP(mean): 68 (16 May 2021 22:24) (68 - 82)  ABP: --  ABP(mean): --  RR: 16 (16 May 2021 22:24) (16 - 22)  SpO2: 95% (16 May 2021 22:24) (95% - 98%)      I&O's Summary

## 2021-05-16 NOTE — ED PROVIDER NOTE - CLINICAL SUMMARY MEDICAL DECISION MAKING FREE TEXT BOX
pt with ho cardial renal w inc dosed of diuretic w worsening ams frequent falls last fall yesterday  periorbital echymosis on rt eye -- pt as per daughter worsing sob evn at rest - co fever and chills - inc wo btreathing - on exam anasarca -le edema abd distension dec bs at bases - warm skin -0 concrewn for infection dec uo -- in setting of inc diuretics - pocus to eval  -- ct r/o ich, r/o infection as pr wheezing - may be cardiac or pulm -- empiric abx - nebs and need admission

## 2021-05-16 NOTE — H&P ADULT - PROBLEM SELECTOR PLAN 7
- hx of poor appetite, cirrhosis, and glc 86 on BMP  - hold long acting for now. ISS only  - restart long acting as glc increases

## 2021-05-16 NOTE — H&P ADULT - NSHPSOCIALHISTORY_GEN_ALL_CORE
Social History:    Marital Status: ( x ) , (  ) Single, (  ) , (  ) , (  )   # of Children: 3  Lives with: (  ) alone, ( x ) children, ( x ) spouse, (  ) parents, (  ) siblings, (  ) friends, (  ) other:   Occupation:     Substance Use/Illicit Drugs: (  ) never used vs other:   Tobacco Usage: (  ) never smoked, ( x ) former smoker, (  ) current smoker and Total Pack-Years: 30 pk yrs  Last Alcohol Usage/Frequency/Amount/Withdrawal/Hx of Abuse:  years ago  Foreign travel:   Animal exposure:

## 2021-05-16 NOTE — ED PROVIDER NOTE - PROGRESS NOTE DETAILS
pocus - pt with large bl pleural effsuion , ascites trace pericardial effusion, however ivc colapsible-- think pt is hypovolemic as bp I s100 systolic  will give small fluid boulus and reval no b lines at this time

## 2021-05-16 NOTE — H&P ADULT - NSHPLABSRESULTS_GEN_ALL_CORE
Personally reviewed old records.  Personally reviewed labs.  Personally reviewed imaging.  Personally reviewed EKG. NSR, rate 72, . Q wave in V1-V5. No ST change.                          11.8   8.78  )-----------( 75       ( 16 May 2021 17:27 )             32.2       05-16    120<LL>  |  86<L>  |  75<H>  ----------------------------<  86  4.9   |  20<L>  |  2.02<H>    Ca    9.1      16 May 2021 17:27  Phos  3.3     05-16  Mg     2.4     05-16    TPro  6.0  /  Alb  3.6  /  TBili  2.0<H>  /  DBili  x   /  AST  73<H>  /  ALT  37  /  AlkPhos  59  05-16            LIVER FUNCTIONS - ( 16 May 2021 17:27 )  Alb: 3.6 g/dL / Pro: 6.0 g/dL / ALK PHOS: 59 U/L / ALT: 37 U/L / AST: 73 U/L / GGT: x                 Urinalysis Basic - ( 16 May 2021 18:12 )    Color: Light Yellow / Appearance: Clear / S.009 / pH: x  Gluc: x / Ketone: Negative  / Bili: Negative / Urobili: Negative   Blood: x / Protein: Negative / Nitrite: Negative   Leuk Esterase: Negative / RBC: 1 /hpf / WBC 0 /HPF   Sq Epi: x / Non Sq Epi: 0 /hpf / Bacteria: Negative

## 2021-05-16 NOTE — H&P ADULT - PROBLEM SELECTOR PLAN 6
- unknown new baseline  - holding entresting  - continuing diuresis  - trend Cr  - check ua/ur lytes as above

## 2021-05-16 NOTE — H&P ADULT - NSICDXPASTSURGICALHX_GEN_ALL_CORE_FT
PAST SURGICAL HISTORY:  AICD (automatic cardioverter/defibrillator) present     Artificial cardiac pacemaker

## 2021-05-16 NOTE — ED PROVIDER NOTE - SECONDARY DIAGNOSIS.
Chronic congestive heart failure, unspecified heart failure type Hyponatremia Acute renal failure superimposed on chronic kidney disease, unspecified CKD stage, unspecified acute renal failure type

## 2021-05-16 NOTE — H&P ADULT - ASSESSMENT
85M mandarin only speaking, c hx CAD /sp stent, CHF s/p AICD, HTN, BPH, CKD (baseline Cr ?1.3), unknown hepatitis c/b cirrhosis, DM2, ?T10-T11 discitis, [place], ?colon tumor of unknown etiology, pw worsening confusion, weakness, SOB, unable to ambulate. 85M mandarin only speaking, c hx CAD /sp stent, CHF s/p AICD, HTN, BPH, CKD (baseline Cr ?1.3), unknown hepatitis c/b cirrhosis, DM2, ?T10-T11 discitis, [place], ?colon tumor of unknown etiology, pw decompensated cirrhosis vs CHF exacerbation, c/b hyponatremia, ANDREEA, worsening debility, poss infection

## 2021-05-16 NOTE — ED PROVIDER NOTE - OBJECTIVE STATEMENT
Mrs. Styles is a 84 y/o M w/ PMH significant for CAD s/p stent s/p PPM/AICD (AICD deactivated), ICM, HTN, BPH, CKD, T2DM, hepatitis, who presented to the hospital Mrs. Styles is a 82 y/o M w/ PMH significant for CAD s/p stent s/p PPM, ICM, HTN, BPH, CKD, T2DM, hepatitis, who presented to the hospital with worsening mental status, SOB/COVARRUBIAS.    Patient is poor historian and collateral information gathered from daughter at bedside.   family has noticed that patient has been having increasing difficulty breathing for several weeks now around Mother's day. He has been complaining "I can't breathe" multiple times even at rest. Patient time to time was gasping for air using accessory muscles per daughter especially when he was exerting himself. His exercise tolerance decreased to a point where he was unable to ambulate and family had to carry the patient to toilet. He only moved his upper extremities and started to not move his legs. Also, patient's cognition started to fluctuate more and he has been "on and off" with sundowning. Also, daughter noticed abdominal bloating and LE edema worsened during this period. a couple of weeks ago, his outpatient provider increased his water pill (pt and daughter not aware of the name) from 2 pills to 3 pills, yet patient's urine output actually decreased. He reported fever and chills, yet no recorded objective fever per daughter.

## 2021-05-16 NOTE — ED ADULT NURSE NOTE - NSIMPLEMENTINTERV_GEN_ALL_ED
Implemented All Fall Risk Interventions:  [place] to call system. Call bell, personal items and telephone within reach. Instruct patient to call for assistance. Room bathroom lighting operational. Non-slip footwear when patient is off stretcher. Physically safe environment: no spills, clutter or unnecessary equipment. Stretcher in lowest position, wheels locked, appropriate side rails in place. Provide visual cue, wrist band, yellow gown, etc. Monitor gait and stability. Monitor for mental status changes and reorient to person, place, and time. Review medications for side effects contributing to fall risk. Reinforce activity limits and safety measures with patient and family.

## 2021-05-16 NOTE — H&P ADULT - PROBLEM SELECTOR PLAN 5
- CTH showing interval increase in size of mass, otherwise no acute events  - suspect 2/2 all medical conditions above  - cont to monitor  - PT eval - CTH showing interval increase in size of mass, otherwise no acute events  - hepatic encephalopathy is also on differential  - suspect 2/2 all medical conditions above  - will start rifaximin  - check ammonia  - cont to monitor ms  - PT eval

## 2021-05-16 NOTE — H&P ADULT - PROBLEM SELECTOR PLAN 1
- large left pl effusion, ascites  - IR consult order placed for therapeutic thora, diagnostic/therapeutic para  - cont home lasix 80 BID  - check coags  - likely cause of pt's thrombocytopenia  - per family, caused by ?hep A. f/u hepatology as outpt

## 2021-05-16 NOTE — ED PROVIDER NOTE - PHYSICAL EXAMINATION
PHYSICAL EXAM:    Constitutional: lying on bed on room air without distress while patient is at  rest.  HEENT: AT/NC, PERRLA; EOMI, MMM, supple neck.  Respiratory: bilatearl crackles worse on right. expiratory sounds, no rhonchi. no accessory muscle use.  Cardiovascular: RRR, 2+ distal pulses. Cap refill ~3 seconds.   Gastrointestinal: soft; NT. distended, +BS  Genitourinary: not examined.  Extremities: no cyanosis; bilateral LE edema, DP and Radial pulses intact.  Neurological: A&Ox 3; responds to pain; responds to verbal commands; patient moves upper extremities against gravity

## 2021-05-16 NOTE — H&P ADULT - NSICDXPASTMEDICALHX_GEN_ALL_CORE_FT
PAST MEDICAL HISTORY:  CAD (coronary artery disease)     CKD (chronic kidney disease)     DM (diabetes mellitus)     Hepatitis     HTN (hypertension)

## 2021-05-16 NOTE — ED ADULT NURSE REASSESSMENT NOTE - NS ED NURSE REASSESS COMMENT FT1
Received report from MARILYN Gu. Pt resting comfortably in stretcher with family at bedside. Pt a.ox2, VSS, sensory/motor function intact, speaking coherently, follows commands and in NAD at this time. Pt denies CP/SOB, f/c, n/v/d at this time. Respiratory rate unlabored and equal with an O2 of 96% on RA. No respiratory distress noted. Plan of care discussed with patient and family. Will continue to monitor.

## 2021-05-16 NOTE — H&P ADULT - PROBLEM SELECTOR PLAN 9
- CT ap to eval reported colon tumor  - also CTH showing increase in size of extraaxial mass. need further discussion with family regarding desire to pursue workup

## 2021-05-16 NOTE — ED PROVIDER NOTE - CARE PLAN
Principal Discharge DX:	Altered mental status, unspecified altered mental status type  Secondary Diagnosis:	Chronic congestive heart failure, unspecified heart failure type  Secondary Diagnosis:	Hyponatremia   Principal Discharge DX:	Altered mental status, unspecified altered mental status type  Secondary Diagnosis:	Chronic congestive heart failure, unspecified heart failure type  Secondary Diagnosis:	Hyponatremia  Secondary Diagnosis:	Acute renal failure superimposed on chronic kidney disease, unspecified CKD stage, unspecified acute renal failure type

## 2021-05-16 NOTE — H&P ADULT - PROBLEM SELECTOR PLAN 4
- pt reported fevers as outpt  - has been taking cefixime for the past 1 week as outpt  - will get dx para  - blood cx, ur cx  - no fevers here  - monitor off abx for now. low threshold for empiric broad abx

## 2021-05-17 NOTE — CONSULT NOTE ADULT - SUBJECTIVE AND OBJECTIVE BOX
[facility] - Division of Pulmonary, Critical Care and Sleep Medicine   Please call [phone] between 8am-pm weekdays, [phone] after hours and weekends      CHIEF COMPLAINT: Progressive weakness, fever, confusion and SOB x 3 weeks.    HPI:  84 yo M, Mandarin only speaking, with a h/o CAD /sp stent, CHF s/p AICD, HTN, BPH, CKD, unknown hepatitis c/b cirrhosis, DM2, T10-T11 discitis, colon tumor who presented with 3 weeks of worsening confusion, weakness, SOB, unable to ambulate.  Per H&P "History from pt's grandson Emil Beltre at bedside, and pt's daughter Cleopatar Styles over phone. Pt drowsy, and able to provide some answers to specific questions only. Pt has had about 3 weeks of worsening weakness to the point where he can't walk unassisted, and has had multiple falls, most recently 1 week ago. At baseline, pt walks with a walker. Pt also complaining of increasing SOB, increased mucous production, and subjective fevers, for which he has been taking cefixime empirically for the past 1 week. Pt also has increased abd distension, leg swelling, poor appetite, intermittent daily waxing/waning confusion. Pt's lasix was increased as outpt. Pt reports left chest pain, which is intermittent for the past 30 yrs. Has not received covid vaccine.  VS: Tm 98.4, P 56, BP 93/62, R 22, 95% RA. In the Ed, received azithro, ceftriaxone, , tylenol, duonebs"    Labs notable for:  CT C/A/P: moderate right sided pleural effusion with compressive atelectasis of RML/RLL, trace left pleural effusion. Cirrhosis with perihepatic ascites. Gallstones.  Moderate abdominopelvic ascites   T10-T11 and T7-T8 loss of disc height with endplate irregularity and heterogeneous mineralization may reflect sequelae of prior discitis osteomyelitis.    PAST MEDICAL & SURGICAL HISTORY:  DM (diabetes mellitus)  HTN (hypertension)  CAD (coronary artery disease)  Hepatitis  CKD (chronic kidney disease)  AICD (automatic cardioverter/defibrillator) present  Artificial cardiac pacemaker    FAMILY HISTORY:  No pertinent family history in first degree relatives    SOCIAL HISTORY:  Smoking: [ ] Never Smoked [ ] Former Smoker (__ packs x ___ years) [ ] Current Smoker  (__ packs x ___ years)  Substance Use: [ ] Never Used [ ] Used ____  EtOH Use:  Marital Status: [ ] Single [ ]  [ ]  [ ]   Occupation:  Recent Travel:  Country of Birth:  Advance Directives:    Allergies    No Known Allergies    Intolerances        HOME MEDICATIONS:    REVIEW OF SYSTEMS:  Constitutional: [ ] fevers [ ] chills [ ] weight loss [ ] weight gain  HEENT: [ ] dry eyes [ ] eye irritation [ ] postnasal drip [ ] nasal congestion  CV: [ ] chest pain [ ] orthopnea [ ] palpitations [ ] murmur  Resp: [ ] cough [ ] shortness of breath [ ] wheezing [ ] sputum [ ] hemoptysis  GI: [ ] nausea [ ] vomiting [ ] diarrhea [ ] constipation [ ] abd pain [ ] dysphagia   : [ ] dysuria [ ] nocturia [ ] hematuria [ ] increased urinary frequency  Musculoskeletal: [ ] myalgias [ ] arthralgias   Skin: [ ] rash [ ] itch  Neurological: [ ] headache [ ] dizziness [ ] syncope [ ] weakness [ ] numbness  Psychiatric: [ ] anxiety [ ] depression  Endocrine: [ ] diabetes [ ] thyroid problem  Hematologic/Lymphatic: [ ] anemia [ ] bleeding problem  Allergic/Immunologic: [ ] itchy eyes [ ] nasal discharge [ ] hives [ ] angioedema  [ ] All other systems negative  [ ] Unable to assess ROS because ________    OBJECTIVE:  ICU Vital Signs Last 24 Hrs  T(C): 36.4 (17 May 2021 11:14), Max: 37.2 (17 May 2021 04:20)  T(F): 97.6 (17 May 2021 11:14), Max: 98.9 (17 May 2021 04:20)  HR: 73 (17 May 2021 11:14) (56 - 90)  BP: 81/47 (17 May 2021 11:14) (81/47 - 122/66)  BP(mean): 68 (16 May 2021 22:24) (68 - 82)  ABP: --  ABP(mean): --  RR: 18 (17 May 2021 11:14) (16 - 22)  SpO2: 97% (17 May 2021 11:14) (95% - 98%)        - @ 07:01  -  - @ 13:41  --------------------------------------------------------  IN: 120 mL / OUT: 0 mL / NET: 120 mL      CAPILLARY BLOOD GLUCOSE      POCT Blood Glucose.: 197 mg/dL (17 May 2021 12:04)      PHYSICAL EXAM:  General:  NAD  HEENT:  NC/AT  Lymph Nodes: No cervical or supraclavicular lymphadenopathy   Neck: Supple  Respiratory:  CTA B/L, no wheezes, crackles or rhonchi  Cardiovascular:  RRR, no m/r/g  Abdomen: soft, NT/ND, +BS  Extremities: no clubbing, cyanosis or edema, warm  Skin: no rash  Neurological: AAOx3, no focal deficits  Psychiatry: not anxious, normal affect    HOSPITAL MEDICATIONS:  MEDICATIONS  (STANDING):  albumin human 25% IVPB 100 milliLiter(s) IV Intermittent every 8 hours  cefTRIAXone   IVPB 2000 milliGRAM(s) IV Intermittent every 24 hours  dextrose 40% Gel 15 Gram(s) Oral once  dextrose 5%. 1000 milliLiter(s) (50 mL/Hr) IV Continuous <Continuous>  dextrose 5%. 1000 milliLiter(s) (100 mL/Hr) IV Continuous <Continuous>  dextrose 50% Injectable 25 Gram(s) IV Push once  dextrose 50% Injectable 12.5 Gram(s) IV Push once  dextrose 50% Injectable 25 Gram(s) IV Push once  famotidine    Tablet 20 milliGRAM(s) Oral daily  glucagon  Injectable 1 milliGRAM(s) IntraMuscular once  insulin lispro (ADMELOG) corrective regimen sliding scale   SubCutaneous three times a day before meals  insulin lispro (ADMELOG) corrective regimen sliding scale   SubCutaneous at bedtime  lactulose Syrup 10 Gram(s) Oral three times a day  levothyroxine 88 MICROGram(s) Oral daily  predniSONE   Tablet 20 milliGRAM(s) Oral daily  rifAXIMin 550 milliGRAM(s) Oral two times a day    MEDICATIONS  (PRN):      LABS:                        8.8    7.75  )-----------( 68       ( 17 May 2021 07:05 )             24.2     Hgb Trend: 8.8<--, 11.8<--  05-17    121<L>  |  88<L>  |  85<H>  ----------------------------<  146<H>  5.0   |  19<L>  |  1.88<H>    Ca    8.4      17 May 2021 07:14  Phos  3.5       Mg     2.2         TPro  4.7<L>  /  Alb  2.7<L>  /  TBili  1.6<H>  /  DBili  x   /  AST  45<H>  /  ALT  26  /  AlkPhos  40      Creatinine Trend: 1.88<--, 2.02<--  PT/INR - ( 17 May 2021 07:06 )   PT: 18.5 sec;   INR: 1.58 ratio         PTT - ( 17 May 2021 07:06 )  PTT:37.0 sec  Urinalysis Basic - ( 16 May 2021 18:12 )    Color: Light Yellow / Appearance: Clear / S.009 / pH: x  Gluc: x / Ketone: Negative  / Bili: Negative / Urobili: Negative   Blood: x / Protein: Negative / Nitrite: Negative   Leuk Esterase: Negative / RBC: 1 /hpf / WBC 0 /HPF   Sq Epi: x / Non Sq Epi: 0 /hpf / Bacteria: Negative        Venous Blood Gas:   @ 17:27  7.35/44/23/24/34  VBG Lactate: 1.9      MICROBIOLOGY:     RADIOLOGY:  [x ] Reviewed and interpreted by me  EXAM:  CT CHEST  2021    INTERPRETATION:  CLINICAL INFORMATION: Shortness of breath  COMPARISON: Chest x-ray from the same date. Chest CT dated 2018.    FINDINGS:    LUNGS AND AIRWAYS: Bilateral compressive atelectasis, greater on the right.  PLEURA: Large right pleural effusion. Small left pleural effusion.  MEDIASTINUM AND JOSE: No lymphadenopathy.  VESSELS: Atherosclerotic changes of the aorta and coronary arteries.  HEART: Heart size is normal. Trace pericardial effusion. A cardiac pacing device is seen within the left chest wall.  CHEST WALL AND LOWER NECK: Within normal limits.  VISUALIZED UPPER ABDOMEN: Nodular contour of the liver due to cirrhosis. Partially visualized perihepatic ascites. Gallstones are noted. Trace nonspecific bilateral perinephric stranding. Subcentimeter short axis upper abdominal lymph nodes, which may be reactive.  BONES: T10-T11 and T7-T8 loss of disc height with endplate irregularity and heterogeneous mineralization may reflect sequelae of prior discitis osteomyelitis.    IMPRESSION:    Large right pleural effusion and small left pleural effusion with associated compressive atelectasis.    Findings suggestive of previous discitis osteomyelitis at T7-T8 and T10-T11.    Cirrhosis and perihepatic ascites.  Cholelithiasis.      EXAM:  CT ABDOMEN AND PELVIS                            PROCEDURE DATE:  2021      INTERPRETATION:  CLINICAL INFORMATION: Evaluate for colonic mass.    COMPARISON: CT abdomen pelvis 1/15/2019.    PROCEDURE:  CT of the Abdomen and Pelvis was performed.  Sagittal and coronal reformats were performed.    FINDINGS:  LOWER CHEST: Moderate to large right pleural effusion with complete collapse of the right middle and lower lobes. Small left pleural effusion with compressive atelectasis. Partially imaged cardiac pacing leads. Bilateral symmetric gynecomastia.    LIVER: Cirrhosis.  BILE DUCTS: Normal caliber.  GALLBLADDER: Cholelithiasis.  SPLEEN: Within normal limits.  PANCREAS: Within normal limits.  ADRENALS: Within normal limits.  KIDNEYS/URETERS: Within normal limits.    BLADDER: Within normal limits.  REPRODUCTIVE ORGANS: Prominent central lobe of the prostate.    BOWEL: No bowel obstruction or definite mass. Appendix is normal.  PERITONEUM: Moderate abdominopelvic ascites with interval increase.  VESSELS: Atherosclerotic disease of the aorta.  RETROPERITONEUM/LYMPH NODES: No lymphadenopathy.  ABDOMINAL WALL: Small right fat-containing inguinal hernia. Subcutaneous edema.  BONES: Sclerotic changes at the T10-T11 level, consistent with sequela of prior discitis/osteomyelitis. Multilevel degenerative changes of the spine.    IMPRESSION:  Moderate to large right pleural effusion with complete collapse of the right middle and lower lobes. Small left pleural effusion with compressive atelectasis.    Cirrhosis and moderate abdominopelvic ascites, increased since prior exam.  No definite colonic mass, however evaluation is limited on this noncontrast study.    EXAM:  CT BRAIN                        PROCEDURE DATE:  2021      INTERPRETATION:  HISTORY: Altered mental status.    COMPARISON: CT scan head 2018    TECHNIQUE: Axial noncontrast CT images of the head were obtained and submitted for interpretation. Reformatted images of the cervical spine were provided. Bone and soft tissue windows were evaluated.    FINDINGS:    There is no acute intracranial mass-effect, hemorrhage, midline shift, or abnormal extra-axial fluid collection.    Right cerebellopontine angle extra-axial mass measures 1.9 x 0.9 cm, measuring 1.6 x 0.7 cm on prior CT of pulses 18    Patchy and confluent regions of periventricular and deep cerebral white matter hypoattenuation due to chronic microangiopathic ischemic changes. Atheromatous calcifications along the carotid siphons are present.    Mild centrally predominant cerebral volume loss noted.  No evidence of hydrocephalus. Basal cisterns are patent.    Visualized paranasal sinuses  and mastoid air cells are clear. Calvarium is intact. Status post post bilateral lens replacement surgery.    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 Differential includes meningioma and schwannoma. Recommend contrast-enhanced MRI of the IACs.    No acute intracranial bleeding.      PULMONARY FUNCTION TESTS: none available    EKG:     [facility] - Division of Pulmonary, Critical Care and Sleep Medicine   Please call [phone] between 8am-pm weekdays, [phone] after hours and weekends      CHIEF COMPLAINT: Progressive weakness, fever, confusion and SOB x 3 weeks.    HPI:  84 yo M, Mandarin only speaking, with a h/o CAD /sp stent, CHF s/p AICD, HTN, BPH, CKD, unknown hepatitis c/b cirrhosis, DM2, T10-T11 discitis, colon tumor who presented with 3 weeks of worsening confusion, weakness, SOB, unable to ambulate.   Patient seen after abdominal ultrasound, sleepy but arousable, unable to provide much history.   Per H&P "History from pt's grandson Emil Beltre at bedside, and pt's daughter Cleopatra Styles over phone. Pt drowsy, and able to provide some answers to specific questions only. Pt has had about 3 weeks of worsening weakness to the point where he can't walk unassisted, and has had multiple falls, most recently 1 week ago. At baseline, pt walks with a walker. Pt also complaining of increasing SOB, increased mucous production, and subjective fevers, for which he has been taking cefixime empirically for the past 1 week. Pt also has increased abd distension, leg swelling, poor appetite, intermittent daily waxing/waning confusion. Pt's lasix was increased as outpt. Pt reports left chest pain, which is intermittent for the past 30 yrs. Has not received covid vaccine.  VS: Tm 98.4, P 56, BP 93/62, R 22, 95% RA. In the Ed, received azithro, ceftriaxone, , tylenol, duonebs"    Labs notable for: coagulopathy INR 1.58, thrombocytopenia platelets 68K, anemia, ANDREEA, hyponatremia    CT C/A/P: moderate right sided pleural effusion with compressive atelectasis of RML/RLL, trace left pleural effusion. Cirrhosis with perihepatic ascites. Gallstones.  Moderate abdominopelvic ascites   T10-T11 and T7-T8 loss of disc height with endplate irregularity and heterogeneous mineralization may reflect sequelae of prior discitis osteomyelitis.    PAST MEDICAL & SURGICAL HISTORY:  DM (diabetes mellitus)  HTN (hypertension)  CAD (coronary artery disease)  Hepatitis  CKD (chronic kidney disease)  AICD (automatic cardioverter/defibrillator) present  Artificial cardiac pacemaker    FAMILY HISTORY:  No pertinent family history in first degree relatives    SOCIAL HISTORY:   Smoking: [ ] Never Smoked [x ] Former Smoker (_1_ packs x _30__ years) [ ] Current Smoker  (__ packs x ___ years)  Substance Use: [x ] Never Used [ ] Used ____  EtOH Use: none  Marital Status: [ ] Single [x ]  [ ]  [ ]     Allergies  No Known Allergies    HOME MEDICATIONS: reviewed from H&P    REVIEW OF SYSTEMS:  Constitutional: [ ] fevers [ ] chills [ ] weight loss [ ] weight gain  HEENT: [ ] dry eyes [ ] eye irritation [ ] postnasal drip [ ] nasal congestion  CV: [ ] chest pain [ ] orthopnea [ ] palpitations [ ] murmur  Resp: [ ] cough [ ] shortness of breath [ ] wheezing [ ] sputum [ ] hemoptysis  GI: [ ] nausea [ ] vomiting [ ] diarrhea [ ] constipation [ ] abd pain [ ] dysphagia   : [ ] dysuria [ ] nocturia [ ] hematuria [ ] increased urinary frequency  Musculoskeletal: [ ] myalgias [ ] arthralgias   Skin: [ ] rash [ ] itch  Neurological: [ ] headache [ ] dizziness [ ] syncope [ ] weakness [ ] numbness  Psychiatric: [ ] anxiety [ ] depression  Endocrine: [ ] diabetes [ ] thyroid problem  Hematologic/Lymphatic: [ ] anemia [ ] bleeding problem  Allergic/Immunologic: [ ] itchy eyes [ ] nasal discharge [ ] hives [ ] angioedema  [ ] All other systems negative  [x ] Unable to assess ROS because ___AMS/encephalopathy_____    OBJECTIVE:  ICU Vital Signs Last 24 Hrs  T(C): 36.4 (17 May 2021 11:14), Max: 37.2 (17 May 2021 04:20)  T(F): 97.6 (17 May 2021 11:14), Max: 98.9 (17 May 2021 04:20)  HR: 73 (17 May 2021 11:14) (56 - 90)  BP: 81/47 (17 May 2021 11:14) (81/47 - 122/66)  BP(mean): 68 (16 May 2021 22:24) (68 - 82)  ABP: --  ABP(mean): --  RR: 18 (17 May 2021 11:14) (16 - 22)  SpO2: 97% (17 May 2021 11:14) (95% - 98%)        -17 @ 07:01  -   @ 13:41  --------------------------------------------------------  IN: 120 mL / OUT: 0 mL / NET: 120 mL      CAPILLARY BLOOD GLUCOSE      POCT Blood Glucose.: 197 mg/dL (17 May 2021 12:04)      PHYSICAL EXAM:  General:  NAD, somnolent but arousable to tactile stimulus  HEENT:  right eye ecchymosis  Neck: Supple  Respiratory:  decreased BS, R>>L, scattered wheezes  Cardiovascular:  RRR, no m/r/g  Abdomen: soft, NT/ND, +BS  Extremities: no clubbing or cyanosis, bipedal 1+edema to lower 1/3 anterior tibial, warm  Skin: no rash  Neurological: somnolent but arousable to tactile stimulus, moving extremities spontaneously.    HOSPITAL MEDICATIONS:  MEDICATIONS  (STANDING):  albumin human 25% IVPB 100 milliLiter(s) IV Intermittent every 8 hours  cefTRIAXone   IVPB 2000 milliGRAM(s) IV Intermittent every 24 hours  dextrose 40% Gel 15 Gram(s) Oral once  dextrose 5%. 1000 milliLiter(s) (50 mL/Hr) IV Continuous <Continuous>  dextrose 5%. 1000 milliLiter(s) (100 mL/Hr) IV Continuous <Continuous>  dextrose 50% Injectable 25 Gram(s) IV Push once  dextrose 50% Injectable 12.5 Gram(s) IV Push once  dextrose 50% Injectable 25 Gram(s) IV Push once  famotidine    Tablet 20 milliGRAM(s) Oral daily  glucagon  Injectable 1 milliGRAM(s) IntraMuscular once  insulin lispro (ADMELOG) corrective regimen sliding scale   SubCutaneous three times a day before meals  insulin lispro (ADMELOG) corrective regimen sliding scale   SubCutaneous at bedtime  lactulose Syrup 10 Gram(s) Oral three times a day  levothyroxine 88 MICROGram(s) Oral daily  predniSONE   Tablet 20 milliGRAM(s) Oral daily  rifAXIMin 550 milliGRAM(s) Oral two times a day    MEDICATIONS  (PRN):      LABS:                        8.8    7.75  )-----------( 68       ( 17 May 2021 07:05 )             24.2     Hgb Trend: 8.8<--, 11.8<--      121<L>  |  88<L>  |  85<H>  ----------------------------<  146<H>  5.0   |  19<L>  |  1.88<H>    Ca    8.4      17 May 2021 07:14  Phos  3.5       Mg     2.2         TPro  4.7<L>  /  Alb  2.7<L>  /  TBili  1.6<H>  /  DBili  x   /  AST  45<H>  /  ALT  26  /  AlkPhos  40      Creatinine Trend: 1.88<--, 2.02<--  PT/INR - ( 17 May 2021 07:06 )   PT: 18.5 sec;   INR: 1.58 ratio         PTT - ( 17 May 2021 07:06 )  PTT:37.0 sec  Urinalysis Basic - ( 16 May 2021 18:12 )    Color: Light Yellow / Appearance: Clear / S.009 / pH: x  Gluc: x / Ketone: Negative  / Bili: Negative / Urobili: Negative   Blood: x / Protein: Negative / Nitrite: Negative   Leuk Esterase: Negative / RBC: 1 /hpf / WBC 0 /HPF   Sq Epi: x / Non Sq Epi: 0 /hpf / Bacteria: Negative        Venous Blood Gas:   @ 17:27  7.35/44/23/24/34  VBG Lactate: 1.9      MICROBIOLOGY: blood culture  pending x2    RADIOLOGY:  [x ] Reviewed and interpreted by me  EXAM:  CT CHEST  2021    INTERPRETATION:  CLINICAL INFORMATION: Shortness of breath  COMPARISON: Chest x-ray from the same date. Chest CT dated 2018.    FINDINGS:    LUNGS AND AIRWAYS: Bilateral compressive atelectasis, greater on the right.  PLEURA: Large right pleural effusion. Small left pleural effusion.  MEDIASTINUM AND JOSE: No lymphadenopathy.  VESSELS: Atherosclerotic changes of the aorta and coronary arteries.  HEART: Heart size is normal. Trace pericardial effusion. A cardiac pacing device is seen within the left chest wall.  CHEST WALL AND LOWER NECK: Within normal limits.  VISUALIZED UPPER ABDOMEN: Nodular contour of the liver due to cirrhosis. Partially visualized perihepatic ascites. Gallstones are noted. Trace nonspecific bilateral perinephric stranding. Subcentimeter short axis upper abdominal lymph nodes, which may be reactive.  BONES: T10-T11 and T7-T8 loss of disc height with endplate irregularity and heterogeneous mineralization may reflect sequelae of prior discitis osteomyelitis.    IMPRESSION:    Large right pleural effusion and small left pleural effusion with associated compressive atelectasis.    Findings suggestive of previous discitis osteomyelitis at T7-T8 and T10-T11.    Cirrhosis and perihepatic ascites.  Cholelithiasis.      EXAM:  CT ABDOMEN AND PELVIS                            PROCEDURE DATE:  2021      INTERPRETATION:  CLINICAL INFORMATION: Evaluate for colonic mass.    COMPARISON: CT abdomen pelvis 1/15/2019.    PROCEDURE:  CT of the Abdomen and Pelvis was performed.  Sagittal and coronal reformats were performed.    FINDINGS:  LOWER CHEST: Moderate to large right pleural effusion with complete collapse of the right middle and lower lobes. Small left pleural effusion with compressive atelectasis. Partially imaged cardiac pacing leads. Bilateral symmetric gynecomastia.    LIVER: Cirrhosis.  BILE DUCTS: Normal caliber.  GALLBLADDER: Cholelithiasis.  SPLEEN: Within normal limits.  PANCREAS: Within normal limits.  ADRENALS: Within normal limits.  KIDNEYS/URETERS: Within normal limits.    BLADDER: Within normal limits.  REPRODUCTIVE ORGANS: Prominent central lobe of the prostate.    BOWEL: No bowel obstruction or definite mass. Appendix is normal.  PERITONEUM: Moderate abdominopelvic ascites with interval increase.  VESSELS: Atherosclerotic disease of the aorta.  RETROPERITONEUM/LYMPH NODES: No lymphadenopathy.  ABDOMINAL WALL: Small right fat-containing inguinal hernia. Subcutaneous edema.  BONES: Sclerotic changes at the T10-T11 level, consistent with sequela of prior discitis/osteomyelitis. Multilevel degenerative changes of the spine.    IMPRESSION:  Moderate to large right pleural effusion with complete collapse of the right middle and lower lobes. Small left pleural effusion with compressive atelectasis.    Cirrhosis and moderate abdominopelvic ascites, increased since prior exam.  No definite colonic mass, however evaluation is limited on this noncontrast study.    EXAM:  CT BRAIN                        PROCEDURE DATE:  2021      INTERPRETATION:  HISTORY: Altered mental status.    COMPARISON: CT scan head 2018    TECHNIQUE: Axial noncontrast CT images of the head were obtained and submitted for interpretation. Reformatted images of the cervical spine were provided. Bone and soft tissue windows were evaluated.    FINDINGS:    There is no acute intracranial mass-effect, hemorrhage, midline shift, or abnormal extra-axial fluid collection.    Right cerebellopontine angle extra-axial mass measures 1.9 x 0.9 cm, measuring 1.6 x 0.7 cm on prior CT of pulses 12/18    Patchy and confluent regions of periventricular and deep cerebral white matter hypoattenuation due to chronic microangiopathic ischemic changes. Atheromatous calcifications along the carotid siphons are present.    Mild centrally predominant cerebral volume loss noted.  No evidence of hydrocephalus. Basal cisterns are patent.    Visualized paranasal sinuses  and mastoid air cells are clear. Calvarium is intact. Status post post bilateral lens replacement surgery.    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 Differential includes meningioma and schwannoma. Recommend contrast-enhanced MRI of the IACs.    No acute intracranial bleeding.      PULMONARY FUNCTION TESTS: none available    EKG:

## 2021-05-17 NOTE — PROGRESS NOTE ADULT - PROBLEM SELECTOR PLAN 9
- CT ap negative for mass  - also CTH showing increase in size of extraaxial mass. Would defer MRI until acute medical conditions stabilize. - CT ap negative for mass  - also CTH showing increase in size of extraaxial mass. Would defer MRI until acute medical conditions stabilize.    discussed with floor NP and dtr Cleopatra  prognosis guarded

## 2021-05-17 NOTE — CONSULT NOTE ADULT - ASSESSMENT
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 CCU consulted given TTE findings with concern for pericardial effusion causing tamponade physiology.    #Hypotension  #Pericardial effusion  - TTE suboptimal in quality, personally reviewed, CT chest also independently reviewed, trace pericardial effusion is out of proportion to visualized pleural effusion and ascites, no significant echographic evidence of tamponade  - Patient without JVD, IVC with respiratory variation on subcostal views, exam not consistent with tamponade physiology  - Agree with fluid resuscitation with albumin per hepatology  - If symptoms persist, recommend MICU consult for evaluation of large pleural effusion and decompensated cirrhosis    No indication for CCU admission at this time, patient's presentation not consistent with tamponade physiology.    Anna Beltre MD  Cardiology Fellow  [phone]  All Cardiology service information can be found 24/7 on [email or web address], password: [email or web address]

## 2021-05-17 NOTE — CHART NOTE - NSCHARTNOTEFT_GEN_A_CORE
Consulted for paracentesis. Defer at this time due to tenuous hemodynamic status, and relatively low ascites volume. Defer thoracentesis to pulmonology and/or ultrasound service.  Reconsult PRN for paracentesis evaluation if needed.

## 2021-05-17 NOTE — PROGRESS NOTE ADULT - ASSESSMENT
85M mandarin only speaking, c hx CAD /sp stent, CHF s/p AICD, HTN, BPH, CKD (baseline Cr ?1.3), unknown hepatitis c/b cirrhosis, DM2, ?T10-T11 discitis, [place], ?colon tumor of unknown etiology, pw decompensated cirrhosis and abd distention and pain concerning for SBP and concomitant ANDREEA.

## 2021-05-17 NOTE — PROGRESS NOTE ADULT - PROBLEM SELECTOR PLAN 5
- CTH showing interval increase in size of mass, otherwise no acute events- would pursue MRI once acute mental status issues improve  - ammonia elevated- likely component of HE, uptitrate lactulose 10mg po tid, titrate to 3-4bm daily  - will start rifaximin  - cont to monitor ms  - PT eval

## 2021-05-17 NOTE — CONSULT NOTE ADULT - ASSESSMENT
84 yo M, Mandarin only speaking, with a h/o CAD /sp stent, CHF s/p AICD, HTN, BPH, CKD, unknown hepatitis c/b cirrhosis, DM2, T10-T11 discitis, colon tumor who presented with 3 weeks of worsening confusion, weakness, SOB, unable to ambulate.   CT C/A/P: moderate right sided pleural effusion with compressive atelectasis of RML/RLL, trace left pleural effusion. Cirrhosis with perihepatic ascites. Gallstones.  Moderate abdominopelvic ascites   T10-T11 and T7-T8 loss of disc height with endplate irregularity and heterogeneous mineralization may reflect sequelae of prior discitis osteomyelitis.    Moderate-large right pleural effusion  Encephalopathy  Decompensated cirrhosis, Hypotension   CAD  HFrEF  ANDREEA on CKD  Coagulopathy INR 1.58, thrombocytopenia platelets 68K, Anemia,   Hyponatremia      A/P:  Pleural effusion is likely 2/2 hepatic process, less likely heart failure related. Doubt new pulmonary infection - RML and RLL appear atelectasized from adjacent pleural effusion.   Not hypoxemic or in acute respiratory distress  Recommend follow up abdominal ultrasound and therapeutic/diagnostic paracentesis  Agree with antibiotics to cover for SBP  Receiving albumin and LR for hypotension- may require midodrine for BP support and to allow for diuresis.   D/w Dr. Zimmerman and NP Carley  Thank you for the consult. Will follow up.

## 2021-05-17 NOTE — PROGRESS NOTE ADULT - PROBLEM SELECTOR PLAN 6
#ANDREEA- Baseline cr ~1.3, currently 1.8- improving  - hold diuretics  - albumin trial  - check ua/ur lytes as above #ANDREEA- Baseline cr ~1.3, currently 1.8- improving  - bladder scan/pvr to ensure not retaining urine  - hold diuretics  - albumin trial  - check ua/ur lytes as above

## 2021-05-17 NOTE — PROGRESS NOTE ADULT - PROBLEM SELECTOR PLAN 3
- hypervolemic hypona  - check urine lytes  - hold lasix given hypotension  - trend Na - Possibly intravascular hypovolemic (despite total body fluid overload) vs. hypervolemic  - check urine lytes/osm  - hold lasix given hypotension  - trend BMP q8 hours

## 2021-05-17 NOTE — CONSULT NOTE ADULT - ASSESSMENT
85M w/ CAD s/p PCI, HF s/p AICD, DMII, HTN, CKD, decompensated cirrhosis of unknown viral etiology, presenting w/ 3 weeks of generalized weakness, dyspnea, and abd distension. Found to have hyponatremia, ANDREEA, and large pleural effusions.    Impression:  #Dyspnea w/o hypoxia - likely related to large effusion, which is likely hepatic hydrothorax.  #Hyponatremia - likely contributing to weakness and AMS. UA shows very dilute urine but osms/Na pending  #Decompensated cirrhosis of unknown etiology, reportedly chronic viral hepatitis  -varices: unknown  -ascites: perihepatic and pelvic ascites present  -HE: likely contributing to AMS in addition to questionable sepsis and hyponatremia  -HCC: none seen but imaging so far non-contrast so unable to exclude HCC  -MELD-Na = 27 on 5/17  #ANDREEA - awaiting urine studies  #Subjective fevers - possible infection leading to decompensation (PNA vs SBP) but no leukocytosis or fevers recorded  #HF - TTE shows decreased LV function and RV function  #Extra-axial mass - growing since 2018    Recommendations:  - Agree w/ paracentesis, would send cell count, gram stain, culture, albumin, protein  - F/u pulm recs re: thoracentesis, would send above studies of pleural fluid as well  - Agree w/ empiric abx (CTX okay) pending dx para and other infectious work-up  - F/u BCx, UCx  - F/u urine osms and urine Na  - Would empirically fluid restrict to 1.5 L   - Agree w/ 25% albumin, 100mL q8h  - Hold diuretics for now  - Continue w/ lactulose titrated to 3-4 BMs per day  - Continue w/ rifaxmin 550mg BID  - Send Hepatitis B core IgM, core Ab total, surface Ag, surface Ab  - Send HCV Ab  - Send WILLIAM, AMA, anti-SmoothMuscle Ab type 1, immunoglobulin panel  - Low Na diet  - Hepatology will follow    Saturnino Aguirre  Gastroenterology/Hepatology Fellow  Available via Microsoft Teams    NON-URGENT CONSULTS:  Please email [email or web address]  [email or web address]  AT NIGHT AND ON WEEKENDS:  Contact on-call GI fellow via answering service ([phone]) from 5pm-8am and on weekends/holidays  MONDAY-FRIDAY 8AM-5PM:  Pager# [phone] ([facility]) or [phone] ([facility])  GI Phone# [phone] ([facility])

## 2021-05-17 NOTE — PROGRESS NOTE ADULT - PROBLEM SELECTOR PLAN 1
suspect 2/2 sbp vs. less likely ADHF given normal BNP. Per dtr, pt had hepatitis A previously in life, but unclear etiology to cirrhosis.  - resume ceftriaxone 2gm iv q day for presumed sbp  - check hep B sAg, sAb, cAb, hep C ab, HIV, EBV  - IR consult order placed for dx para, declining to do thora- will consult pulm  - hold lasix due to hypotension this AM  - start trial of albumin  - consult hepatology as pt has none

## 2021-05-17 NOTE — CONSULT NOTE ADULT - SUBJECTIVE AND OBJECTIVE BOX
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 CCU consulted given TTE findings with concern for pericardial effusion causing tamponade physiology.  Admitted to medicine for management of decompensated cirrhosis vs ADHF, received Lasix IV this morning. Since been hypotensive despite albumin and fluid resuscitation. Patient Mandarin speaking only, only reports dyspnea at bedside, no chest pain or dizziness.    PMHx:   DM (diabetes mellitus)  HTN (hypertension)  CAD (coronary artery disease)  Hepatitis  CKD (chronic kidney disease)    PSHx:   No significant past surgical history    AICD (automatic cardioverter/defibrillator) present    Artificial cardiac pacemaker        Allergies:  No Known Allergies    Home Meds:    Current Medications:   albumin human 25% IVPB 100 milliLiter(s) IV Intermittent every 6 hours  cefTRIAXone   IVPB 2000 milliGRAM(s) IV Intermittent every 24 hours  dextrose 40% Gel 15 Gram(s) Oral once  dextrose 5%. 1000 milliLiter(s) IV Continuous <Continuous>  dextrose 5%. 1000 milliLiter(s) IV Continuous <Continuous>  dextrose 50% Injectable 25 Gram(s) IV Push once  dextrose 50% Injectable 12.5 Gram(s) IV Push once  dextrose 50% Injectable 25 Gram(s) IV Push once  famotidine    Tablet 20 milliGRAM(s) Oral daily  glucagon  Injectable 1 milliGRAM(s) IntraMuscular once  insulin lispro (ADMELOG) corrective regimen sliding scale   SubCutaneous three times a day before meals  insulin lispro (ADMELOG) corrective regimen sliding scale   SubCutaneous at bedtime  lactulose Syrup 10 Gram(s) Oral three times a day  levothyroxine 88 MICROGram(s) Oral daily  predniSONE   Tablet 20 milliGRAM(s) Oral daily  rifAXIMin 550 milliGRAM(s) Oral two times a day  sodium zirconium cyclosilicate 10 Gram(s) Oral every 8 hours      FAMILY HISTORY:  No pertinent family history in first degree relatives    REVIEW OF SYSTEMS:  CONSTITUTIONAL: No weakness, fevers or chills  EYES/ENT: No visual changes;  No dysphagia  NECK: No pain or stiffness  RESPIRATORY: No cough, wheezing, hemoptysis; +shortness of breath  CARDIOVASCULAR: No chest pain or palpitations; No lower extremity edema  GASTROINTESTINAL: No abdominal or epigastric pain. No nausea, vomiting, or hematemesis; No diarrhea or constipation. No melena or hematochezia.  BACK: No back pain  GENITOURINARY: No dysuria, frequency or hematuria  NEUROLOGICAL: No numbness or weakness  SKIN: No itching, burning, rashes, or lesions   All other review of systems is negative unless indicated above.    Physical Exam:  T(F): 97.6 (05-17), Max: 98.9 (05-17)  HR: 75 (05-17) (67 - 79)  BP: 82/50 (05-17) (81/47 - 122/66)  RR: 18 (05-17)  SpO2: 97% (05-17)  GENERAL: Ill appearing, jaundiced, tachypneic  HEAD:  Atraumatic, Normocephalic  ENT: EOMI, scleral icterus, no JVD  CHEST/LUNG: Decreased breath sounds bilaterally  HEART: Regular rate and rhythm; equal S1 and S2  ABDOMEN: Distended, +fluid wave  EXTREMITIES:  No clubbing, cyanosis, or edema  PSYCH: Nl behavior, nl affect  NEUROLOGY: AAOx2-3, non-focal  SKIN: Jaundiced    Cardiovascular Diagnostic Testing:    Echo:  < from: TTE with Doppler (w/Cont) (05.17.21 @ 12:59) >  Conclusions:  1. Endocardium not well visualized; apical septal and  apical hypokoinesis. At least moderate left ventricular  systolic function.    Endocardial visualization enhanced  with intravenous injection of Ultrasonic Enhancing Agent  (Definity). No left ventricular thrombus.  2. Mild diastolic dysfunction (Stage I).  3. Right atrial enlargement.  Intermittent diastlic  inversion of the right atrial free wall by unclear fluid  collection.  Inadequate subcostal windows.  Suggest CT  correlation.  4. Bilateral pleural effusions.  < end of copied text >    Imaging:  < from: CT Chest No Cont (05.16.21 @ 20:47) >  FINDINGS:    LUNGS AND AIRWAYS: Bilateral compressive atelectasis, greater on the right.  PLEURA: Large right pleural effusion. Small left pleural effusion.  MEDIASTINUM AND JOSE: No lymphadenopathy.  VESSELS: Atherosclerotic changes of the aorta and coronary arteries.  HEART: Heart size is normal. Trace pericardial effusion. A cardiac pacing device is seen within the left chest wall.  CHEST WALL AND LOWER NECK: Within normal limits.  VISUALIZED UPPER ABDOMEN: Nodular contour of the liver due to cirrhosis. Partially visualized perihepatic ascites. Gallstones are noted. Trace nonspecific bilateral perinephric stranding. Subcentimeter short axis upper abdominal lymph nodes, which may be reactive.  BONES: T10-T11 and T7-T8 loss of disc height with endplate irregularity and heterogeneous mineralization may reflect sequelae of prior discitis osteomyelitis.    IMPRESSION:    Large right pleural effusion and small left pleural effusion with associated compressive atelectasis.    Findings suggestive of previous discitis osteomyelitis at T7-T8 and T10-T11.    Cirrhosis and perihepatic ascites.  Cholelithiasis.    < end of copied text >    Labs: Personally reviewed                        7.6    9.50  )-----------( 90       ( 17 May 2021 16:18 )             21.3     05-17    121<L>  |  88<L>  |  96<H>  ----------------------------<  204<H>  5.9<H>   |  19<L>  |  2.16<H>    Ca    8.5      17 May 2021 16:18  Phos  3.5     05-17  Mg     2.2     05-17    TPro  5.1<L>  /  Alb  3.3  /  TBili  1.6<H>  /  DBili  x   /  AST  39  /  ALT  26  /  AlkPhos  39<L>  05-17    PT/INR - ( 17 May 2021 07:06 )   PT: 18.5 sec;   INR: 1.58 ratio         PTT - ( 17 May 2021 07:06 )  PTT:37.0 sec    CARDIAC MARKERS ( 17 May 2021 07:14 )  72 ng/L / x     / x     / x     / x     / x      CARDIAC MARKERS ( 16 May 2021 17:27 )  74 ng/L / x     / x     / x     / x     / x        Serum Pro-Brain Natriuretic Peptide: 299 pg/mL (05-17 @ 07:13)  Serum Pro-Brain Natriuretic Peptide: 322 pg/mL (05-16 @ 17:27)    Thyroid Stimulating Hormone, Serum: 1.00 uIU/mL (05-17 @ 08:41)

## 2021-05-17 NOTE — PROGRESS NOTE ADULT - SUBJECTIVE AND OBJECTIVE BOX
Renny Zimmerman MD  Division of Hospital Medicine  Pager: [phone]  If no response or off-hours, page [phone]  -------------------------------------    Patient is a 85y old  Male who presents with a chief complaint of sob, confusion, weakness, can't walk, fever (16 May 2021 23:03)      SUBJECTIVE / OVERNIGHT EVENTS: none acute, noted to be hypotensive this morning  ADDITIONAL REVIEW OF SYSTEMS: pt minimally verbal, appears uncomfortable, motions to his belly. ros otherwise unable to obtain due to encephalopathy and medical condition.    MEDICATIONS  (STANDING):  albumin human 25% IVPB 100 milliLiter(s) IV Intermittent every 8 hours  cefTRIAXone   IVPB 2000 milliGRAM(s) IV Intermittent every 24 hours  dextrose 40% Gel 15 Gram(s) Oral once  dextrose 5%. 1000 milliLiter(s) (50 mL/Hr) IV Continuous <Continuous>  dextrose 5%. 1000 milliLiter(s) (100 mL/Hr) IV Continuous <Continuous>  dextrose 50% Injectable 25 Gram(s) IV Push once  dextrose 50% Injectable 12.5 Gram(s) IV Push once  dextrose 50% Injectable 25 Gram(s) IV Push once  famotidine    Tablet 20 milliGRAM(s) Oral daily  glucagon  Injectable 1 milliGRAM(s) IntraMuscular once  insulin lispro (ADMELOG) corrective regimen sliding scale   SubCutaneous three times a day before meals  insulin lispro (ADMELOG) corrective regimen sliding scale   SubCutaneous at bedtime  lactulose Syrup 10 Gram(s) Oral three times a day  levothyroxine 88 MICROGram(s) Oral daily  predniSONE   Tablet 20 milliGRAM(s) Oral daily  rifAXIMin 550 milliGRAM(s) Oral two times a day    MEDICATIONS  (PRN):      CAPILLARY BLOOD GLUCOSE      POCT Blood Glucose.: 197 mg/dL (17 May 2021 12:04)  POCT Blood Glucose.: 160 mg/dL (17 May 2021 07:43)  POCT Blood Glucose.: 91 mg/dL (16 May 2021 16:33)    I&O's Summary    17 May 2021 07:01  -  17 May 2021 12:12  --------------------------------------------------------  IN: 120 mL / OUT: 0 mL / NET: 120 mL        PHYSICAL EXAM:  Vital Signs Last 24 Hrs  T(C): 36.4 (17 May 2021 11:14), Max: 37.2 (17 May 2021 04:20)  T(F): 97.6 (17 May 2021 11:14), Max: 98.9 (17 May 2021 04:20)  HR: 73 (17 May 2021 11:14) (56 - 90)  BP: 81/47 (17 May 2021 11:14) (81/47 - 122/66)  BP(mean): 68 (16 May 2021 22:24) (68 - 82)  RR: 18 (17 May 2021 11:14) (16 - 22)  SpO2: 97% (17 May 2021 11:14) (95% - 98%)  CONSTITUTIONAL: mild-mod distress  EYES: PERRLA; conjunctiva and sclera clear  ENMT: Moist oral mucosa, no pharyngeal injection or exudates; normal dentition, +R eye periorbital ecchymosis  NECK: Supple, no palpable masses; no thyromegaly  RESPIRATORY: Normal respiratory effort; lungs are clear to auscultation bilaterally  CARDIOVASCULAR: Regular rate and rhythm, normal S1 and S2, no murmur/rub/gallop; No lower extremity edema; Peripheral pulses are 2+ bilaterally  ABDOMEN: softly distended, Nontender to palpation, normoactive bowel sounds, no rebound/guarding; No hepatosplenomegaly  MUSCLOSKELETAL:  Normal gait; no clubbing or cyanosis of digits; no joint swelling or tenderness to palpation  PSYCH: A+O to person,   NEUROLOGY: CN 2-12 are intact and symmetric; no gross sensory deficits;   SKIN: No rashes; no palpable lesions    LABS:                        8.8    7.75  )-----------( 68       ( 17 May 2021 07:05 )             24.2     05-17    121<L>  |  88<L>  |  85<H>  ----------------------------<  146<H>  5.0   |  19<L>  |  1.88<H>    Ca    8.4      17 May 2021 07:14  Phos  3.5     05-  Mg     2.2     05-17    TPro  4.7<L>  /  Alb  2.7<L>  /  TBili  1.6<H>  /  DBili  x   /  AST  45<H>  /  ALT  26  /  AlkPhos  40  05-17    PT/INR - ( 17 May 2021 07:06 )   PT: 18.5 sec;   INR: 1.58 ratio         PTT - ( 17 May 2021 07:06 )  PTT:37.0 sec      Urinalysis Basic - ( 16 May 2021 18:12 )    Color: Light Yellow / Appearance: Clear / S.009 / pH: x  Gluc: x / Ketone: Negative  / Bili: Negative / Urobili: Negative   Blood: x / Protein: Negative / Nitrite: Negative   Leuk Esterase: Negative / RBC: 1 /hpf / WBC 0 /HPF   Sq Epi: x / Non Sq Epi: 0 /hpf / Bacteria: Negative          RADIOLOGY & ADDITIONAL TESTS:  Results Reviewed:   Imaging Personally Reviewed:  Electrocardiogram Personally Reviewed:    COORDINATION OF CARE:  Care Discussed with Consultants/Other Providers [Y/N]:  Prior or Outpatient Records Reviewed [Y/N]:

## 2021-05-17 NOTE — PROGRESS NOTE ADULT - PROBLEM SELECTOR PLAN 2
Pt with documented hx of CHF, unclear if systolic/diastolic. Also has hx of CAD s/p multiple stents and prior MI, s/p AICD. Fluid overload likely hepatic vs. cardiac driven.  - Received OP records, no TTE on file  - TTE inpatient  - tele  - trend CE  - hold lasix and entresto for now

## 2021-05-17 NOTE — PROGRESS NOTE ADULT - PROBLEM SELECTOR PLAN 4
- pt reported fevers as outpt  - has been taking cefixime for the past 1 week as outpt- resume ceftriaxone 2gm I q24  - will get dx para  - blood cx, ur cx  - no fevers here

## 2021-05-17 NOTE — CONSULT NOTE ADULT - SUBJECTIVE AND OBJECTIVE BOX
Chief Complaint:  Patient is a 85y old  Male who presents with a chief complaint of sob, confusion, weakness, can't walk, fever (17 May 2021 13:40)    HPI:SENAIT JHAVERI is a 85y Male presenting w/ 3 weeks of weakness, confusion, SOB and COVARRUBIAS, and abd distension and LE edema. He has a hx of CAD s/p PCI, HF s/p ICD, HTN, DMII, CKD, and cirrhosis of unclear etiology (some viral hepatitis per chart) that appears decompensated. He reports over this time he had worsening mucous production and was taking an antibiotic for empiric bronchitis/PNA without improvement. He also reports subjective fevers but did not check temp at home. He increased lasix for his edema but there was no significant improvement. Family brought him in due to his worsening weakness and inability to get out of bed, as well as his worsening confusion.     PMHX/PSHX:  DM (diabetes mellitus)  HTN (hypertension)  CAD (coronary artery disease)  Hepatitis  CKD (chronic kidney disease)  No significant past surgical history  AICD (automatic cardioverter/defibrillator) present  Artificial cardiac pacemaker    Allergies:  No Known Allergies    Home Medications: reviewed    Hospital Medications:  albumin human 25% IVPB 100 milliLiter(s) IV Intermittent every 8 hours  cefTRIAXone   IVPB 2000 milliGRAM(s) IV Intermittent every 24 hours  dextrose 40% Gel 15 Gram(s) Oral once  dextrose 5%. 1000 milliLiter(s) IV Continuous <Continuous>  dextrose 5%. 1000 milliLiter(s) IV Continuous <Continuous>  dextrose 50% Injectable 25 Gram(s) IV Push once  dextrose 50% Injectable 12.5 Gram(s) IV Push once  dextrose 50% Injectable 25 Gram(s) IV Push once  famotidine    Tablet 20 milliGRAM(s) Oral daily  glucagon  Injectable 1 milliGRAM(s) IntraMuscular once  insulin lispro (ADMELOG) corrective regimen sliding scale   SubCutaneous three times a day before meals  insulin lispro (ADMELOG) corrective regimen sliding scale   SubCutaneous at bedtime  lactulose Syrup 10 Gram(s) Oral three times a day  levothyroxine 88 MICROGram(s) Oral daily  predniSONE   Tablet 20 milliGRAM(s) Oral daily  rifAXIMin 550 milliGRAM(s) Oral two times a day    Social History:   Tob: Denies  EtOH: Denies  Illicit Drugs: Denies    Family history:  No pertinent family history in first degree relatives    Denies family history of colon cancer/polyps, stomach cancer/polyps, pancreatic cancer/masses, liver cancer/disease, ovarian cancer and endometrial cancer.    ROS:   Pt unable to provide    PHYSICAL EXAM:   Vital Signs:  Vital Signs Last 24 Hrs  T(C): 36.4 (17 May 2021 11:14), Max: 37.2 (17 May 2021 04:20)  T(F): 97.6 (17 May 2021 11:14), Max: 98.9 (17 May 2021 04:20)  HR: 73 (17 May 2021 11:14) (56 - 90)  BP: 81/47 (17 May 2021 11:14) (81/47 - 122/66)  BP(mean): 68 (16 May 2021 22:24) (68 - 82)  RR: 18 (17 May 2021 11:14) (16 - 22)  SpO2: 97% (17 May 2021 11:14) (95% - 98%)  Daily Height in cm: 170.18 (16 May 2021 16:22)    Daily Weight in k.3 (17 May 2021 08:26)    GENERAL: no acute distress  NEURO: alert, lethargic, answers some questions  HEENT: anicteric sclera, no conjunctival pallor appreciated  CHEST: no respiratory distress, no accessory muscle use  CARDIAC: regular rate, rhythm  ABDOMEN: soft, non-tender, distended, no rebound or guarding  EXTREMITIES: warm, well perfused, ++ edema  SKIN: no lesions noted    LABS: reviewed                        8.8    7.75  )-----------( 68       ( 17 May 2021 07:05 )             24.2     05-    121<L>  |  88<L>  |  85<H>  ----------------------------<  146<H>  5.0   |  19<L>  |  1.88<H>    Ca    8.4      17 May 2021 07:14  Phos  3.5     05-  Mg     2.2     -17    TPro  4.7<L>  /  Alb  2.7<L>  /  TBili  1.6<H>  /  DBili  x   /  AST  45<H>  /  ALT  26  /  AlkPhos  40  17    LIVER FUNCTIONS - ( 17 May 2021 07:14 )  Alb: 2.7 g/dL / Pro: 4.7 g/dL / ALK PHOS: 40 U/L / ALT: 26 U/L / AST: 45 U/L / GGT: x               Diagnostic Studies: see sunrise for full report

## 2021-05-17 NOTE — CHART NOTE - NSCHARTNOTEFT_GEN_A_CORE
Notified by floor RN/NP that pt's sbp in the 70-80's but patient's mentation is somewhat improved after coming back from US/TTE. I spoke to echo attending Dr. Smith who states patient has suggestion of pericardial effusion on echo with noted intermittent RA diastolic collapse noted. Recommends f/u CT chest- CT chest from yesterday notes trace pericardial effusion. Given low BP and concern for ?tamponade, will continue to hold lasix and give additional 250cc bolus, continue albumin. I spoke to CCU fellow who will also review TTE and evaluate patient for further recs vs. possible CCU transfer.    discussed with floor NP and dtr Lainey pt remains full code at this time.

## 2021-05-18 NOTE — PROVIDER CONTACT NOTE (OTHER) - ASSESSMENT
Minimal urine output noted in Lu. VSS. Patient relatively stable. Minimal urine output noted in Lu. VSS. Patient relatively stable, Expiratory wheezes noted on exam but remains 100 percent on room air, PA notified.

## 2021-05-18 NOTE — PROGRESS NOTE ADULT - SUBJECTIVE AND OBJECTIVE BOX
Chief Complaint:  Patient is a 85y old  Male who presents with a chief complaint of sob, confusion, weakness, can't walk, fever (18 May 2021 08:38)    Reason for consult: cirrhosis    Interval Events: Pt more hypotensive, having melena, transferred to MICU for GI bleed. Hb 5s.     Hospital Medications:  albumin human 25% IVPB 100 milliLiter(s) IV Intermittent every 6 hours  albuterol/ipratropium for Nebulization 3 milliLiter(s) Nebulizer every 6 hours PRN  cefTRIAXone   IVPB 2000 milliGRAM(s) IV Intermittent every 24 hours  chlorhexidine 4% Liquid 1 Application(s) Topical <User Schedule>  levothyroxine 88 MICROGram(s) Oral daily  norepinephrine Infusion 0.05 MICROgram(s)/kG/Min IV Continuous <Continuous>  octreotide  Injectable 50 MICROGram(s) SubCutaneous three times a day  pantoprazole Infusion 8 mG/Hr IV Continuous <Continuous>  predniSONE   Tablet 20 milliGRAM(s) Oral daily  rifAXIMin 550 milliGRAM(s) Oral two times a day      ROS: Pt unable to provide    PHYSICAL EXAM:   Vital Signs:  Vital Signs Last 24 Hrs  T(C): 36.7 (18 May 2021 09:28), Max: 36.9 (18 May 2021 03:57)  T(F): 98 (18 May 2021 09:28), Max: 98.4 (18 May 2021 03:57)  HR: 100 (18 May 2021 10:30) (72 - 105)  BP: 131/58 (18 May 2021 10:30) (77/46 - 148/67)  BP(mean): 84 (18 May 2021 10:30) (71 - 97)  RR: 14 (18 May 2021 10:30) (14 - 22)  SpO2: 99% (18 May 2021 10:30) (93% - 100%)  Daily     Daily     GENERAL: no acute distress  NEURO: somnolent  HEENT: anicteric sclera, no conjunctival pallor appreciated  CHEST: no respiratory distress, no accessory muscle use  CARDIAC: regular rate, rhythm  ABDOMEN: soft, non-tender, non-distended, no rebound or guarding  EXTREMITIES: warm, well perfused, no edema  SKIN: no lesions noted    LABS: reviewed                        5.2    9.59  )-----------( 71       ( 18 May 2021 10:06 )             14.7     05-18    122<L>  |  89<L>  |  103<H>  ----------------------------<  152<H>  4.9   |  19<L>  |  2.72<H>    Ca    9.0      18 May 2021 06:33  Phos  3.5     05-17  Mg     2.3     05-18    TPro  5.2<L>  /  Alb  3.6  /  TBili  1.2  /  DBili  0.5<H>  /  AST  29  /  ALT  21  /  AlkPhos  30<L>  05-18    LIVER FUNCTIONS - ( 18 May 2021 06:33 )  Alb: 3.6 g/dL / Pro: 5.2 g/dL / ALK PHOS: 30 U/L / ALT: 21 U/L / AST: 29 U/L / GGT: x             Interval Diagnostic Studies: see sunrise for full report

## 2021-05-18 NOTE — CHART NOTE - NSCHARTNOTEFT_GEN_A_CORE
MICU Accept Note    CHIEF COMPLAINT:     HPI / INTERVAL HISTORY:    PAST MEDICAL & SURGICAL HISTORY:  DM (diabetes mellitus)    HTN (hypertension)    CAD (coronary artery disease)    Hepatitis    CKD (chronic kidney disease)    AICD (automatic cardioverter/defibrillator) present    Artificial cardiac pacemaker        FAMILY HISTORY:  No pertinent family history in first degree relatives        SOCIAL HISTORY:  Smoking: [  ] Never Smoked  [  ] Former Smoker (# packs x # years)  [  ] Current Smoker (# packs x # years)  Substance Use:   EtOH Use:   Marital Status: [  ] Single  [  ]   [  ]   [  ]   Sexual History:   Occupation:  Recent Travel:  Country of Birth:   Advance Directives:     HOME MEDICATIONS:      Allergies    No Known Allergies    Intolerances          REVIEW OF SYSTEMS:  Constitutional: No fevers, chills, weight loss, weight gain  HEENT: No vision problems, eye pain, nasal congestion, rhinorrhea, sore throat, dysphagia  CV: No chest pain, orthopnea, palpitations  Resp: No cough, dyspnea, wheezing, hemoptysis  GI: No nausea, vomiting, diarrhea, constipation, abdominal pain  : [ ] dysuria [ ] nocturia [ ] hematuria [ ] increased urinary frequency  Musculoskeletal: [ ] back pain [ ] myalgias [ ] arthralgias [ ] fracture  Skin: [ ] rash [ ] itch  Neurological: [ ] headache [ ] dizziness [ ] syncope [ ] weakness [ ] numbness  Psychiatric: [ ] anxiety [ ] depression  Endocrine: [ ] diabetes [ ] thyroid problem  Hematologic/Lymphatic: [ ] anemia [ ] bleeding problem  Allergic/Immunologic: [ ] itchy eyes [ ] nasal discharge [ ] hives [ ] angioedema  [ ] All other systems negative  [ ] Unable to assess ROS because ________    OBJECTIVE:  ICU Vital Signs Last 24 Hrs  T(C): 36.7 (18 May 2021 09:28), Max: 36.9 (18 May 2021 03:57)  T(F): 98 (18 May 2021 09:28), Max: 98.4 (18 May 2021 03:57)  HR: 100 (18 May 2021 10:29) (72 - 105)  BP: 142/65 (18 May 2021 10:00) (77/46 - 142/65)  BP(mean): 94 (18 May 2021 10:00) (71 - 94)  ABP: --  ABP(mean): --  RR: 22 (18 May 2021 10:00) (18 - 22)  SpO2: 100% (18 May 2021 10:29) (93% - 100%)         @ 07:  -   @ 07:00  --------------------------------------------------------  IN: 1090 mL / OUT: 500 mL / NET: 590 mL     @ 07:  -   @ 10:39  --------------------------------------------------------  IN: 500 mL / OUT: 0 mL / NET: 500 mL      CAPILLARY BLOOD GLUCOSE      POCT Blood Glucose.: 192 mg/dL (18 May 2021 08:51)      PHYSICAL EXAM:  General:   HEENT:   Neck:   Chest/Lungs:  Heart:  Abdomen:   Extremities:   Skin:   Neuro:   Psych:     LINES:     HOSPITAL MEDICATIONS:  MEDICATIONS  (STANDING):  albumin human 25% IVPB 100 milliLiter(s) IV Intermittent every 6 hours  cefTRIAXone   IVPB 2000 milliGRAM(s) IV Intermittent every 24 hours  chlorhexidine 4% Liquid 1 Application(s) Topical <User Schedule>  levothyroxine 88 MICROGram(s) Oral daily  norepinephrine Infusion 0.05 MICROgram(s)/kG/Min (5.95 mL/Hr) IV Continuous <Continuous>  octreotide  Injectable 50 MICROGram(s) SubCutaneous three times a day  pantoprazole Infusion 8 mG/Hr (10 mL/Hr) IV Continuous <Continuous>  predniSONE   Tablet 20 milliGRAM(s) Oral daily  rifAXIMin 550 milliGRAM(s) Oral two times a day    MEDICATIONS  (PRN):  albuterol/ipratropium for Nebulization 3 milliLiter(s) Nebulizer every 6 hours PRN Shortness of Breath and/or Wheezing      LABS:                        5.2    9.59  )-----------( 71       ( 18 May 2021 10:06 )             14.7     Hgb Trend: 5.2<--, 5.1<--, 7.6<--, 8.8<--, 11.8<--  0518    122<L>  |  89<L>  |  103<H>  ----------------------------<  152<H>  4.9   |  19<L>  |  2.72<H>    Ca    9.0      18 May 2021 06:33  Phos  3.5       Mg     2.3         TPro  5.2<L>  /  Alb  3.6  /  TBili  1.2  /  DBili  0.5<H>  /  AST  29  /  ALT  21  /  AlkPhos  30<L>      Creatinine Trend: 2.72<--, 2.16<--, 1.88<--, 2.02<--  PT/INR - ( 18 May 2021 10:06 )   PT: 22.0 sec;   INR: 1.89 ratio         PTT - ( 18 May 2021 10:06 )  PTT:46.8 sec  Urinalysis Basic - ( 17 May 2021 16:21 )    Color: Yellow / Appearance: Clear / S.012 / pH: x  Gluc: x / Ketone: Negative  / Bili: Negative / Urobili: Negative   Blood: x / Protein: Negative / Nitrite: Negative   Leuk Esterase: Negative / RBC: x / WBC x   Sq Epi: x / Non Sq Epi: x / Bacteria: x      Arterial Blood Gas:   @ 10:26  7.38/33/180/19/100/-5.4  ABG lactate: --    Venous Blood Gas:   @ 16:26  7.34/42/29/22/47  VBG Lactate: 2.5  Venous Blood Gas:   @ 17:27  7.35/44/23/24/34  VBG Lactate: 1.9      MICROBIOLOGY:     RADIOLOGY & ADDITIONAL TESTS:    Assessment and plan:   # Neuro      - Via mandarin, patient is alert, oriented to name, place (knows he is in a hospital), year of his birth, but not current year, follows commands (squeezes both hands, wiggles toes).     # CV     - concern for hemorrhagic shock 2/2 GI bleed vs. septic shock        - 2 units pRBC ordered      - plan for endoscope to evaluate GI bleed as follows   # Pulm  # GI   # Heme  # Endo   # DVT prophylaxis  # Ethics: MICU Accept Note    CHIEF COMPLAINT:     HPI / INTERVAL HISTORY:    PAST MEDICAL & SURGICAL HISTORY:  DM (diabetes mellitus)    HTN (hypertension)    CAD (coronary artery disease)    Hepatitis    CKD (chronic kidney disease)    AICD (automatic cardioverter/defibrillator) present    Artificial cardiac pacemaker        FAMILY HISTORY:  No pertinent family history in first degree relatives        SOCIAL HISTORY:  Smoking: [  ] Never Smoked  [  ] Former Smoker (# packs x # years)  [  ] Current Smoker (# packs x # years)  Substance Use:   EtOH Use:   Marital Status: [  ] Single  [  ]   [  ]   [  ]   Sexual History:   Occupation:  Recent Travel:  Country of Birth:   Advance Directives:     HOME MEDICATIONS:      Allergies    No Known Allergies    Intolerances          REVIEW OF SYSTEMS:  Unable to obtain full ROS due to patient mental status, patient denies chest pain/SOB/abdominal pain.     OBJECTIVE:  ICU Vital Signs Last 24 Hrs  T(C): 36.7 (18 May 2021 09:28), Max: 36.9 (18 May 2021 03:57)  T(F): 98 (18 May 2021 09:28), Max: 98.4 (18 May 2021 03:57)  HR: 100 (18 May 2021 10:29) (72 - 105)  BP: 142/65 (18 May 2021 10:00) (77/46 - 142/65)  BP(mean): 94 (18 May 2021 10:00) (71 - 94)  ABP: --  ABP(mean): --  RR: 22 (18 May 2021 10:00) (18 - 22)  SpO2: 100% (18 May 2021 10:29) (93% - 100%)         @ : @ 07:00  --------------------------------------------------------  IN: 1090 mL / OUT: 500 mL / NET: 590 mL     @ 07: @ 10:39  --------------------------------------------------------  IN: 500 mL / OUT: 0 mL / NET: 500 mL      CAPILLARY BLOOD GLUCOSE      POCT Blood Glucose.: 192 mg/dL (18 May 2021 08:51)      PHYSICAL EXAM:  GENERAL: NAD  HEENT: R eye ecchymosis, jaundiced sclera    CV: Regular rate and rhythm. No murmurs, rubs, or gallops  Respiratory: normal respiratory effort, speaking in complete sentences. + diffuse wheezes even without stethescope  ABDOMEN: Soft, Nontender, Nondistended; Bowel sounds normal  EXTREMITIES: + edema bilateral LE   PSYCH: Alert, oriented to person and place, not oriented to current year, able to state his birth year   NEURO: Symmetric facial expressions. Moves 4 extremities on command   Skin: No rashes    LINES:  peripheral IV x2    HOSPITAL MEDICATIONS:  MEDICATIONS  (STANDING):  albumin human 25% IVPB 100 milliLiter(s) IV Intermittent every 6 hours  cefTRIAXone   IVPB 2000 milliGRAM(s) IV Intermittent every 24 hours  chlorhexidine 4% Liquid 1 Application(s) Topical <User Schedule>  levothyroxine 88 MICROGram(s) Oral daily  norepinephrine Infusion 0.05 MICROgram(s)/kG/Min (5.95 mL/Hr) IV Continuous <Continuous>  octreotide  Injectable 50 MICROGram(s) SubCutaneous three times a day  pantoprazole Infusion 8 mG/Hr (10 mL/Hr) IV Continuous <Continuous>  predniSONE   Tablet 20 milliGRAM(s) Oral daily  rifAXIMin 550 milliGRAM(s) Oral two times a day    MEDICATIONS  (PRN):  albuterol/ipratropium for Nebulization 3 milliLiter(s) Nebulizer every 6 hours PRN Shortness of Breath and/or Wheezing      LABS:                        5.2    9.59  )-----------( 71       ( 18 May 2021 10:06 )             14.7     Hgb Trend: 5.2<--, 5.1<--, 7.6<--, 8.8<--, 11.8<--  05-18    122<L>  |  89<L>  |  103<H>  ----------------------------<  152<H>  4.9   |  19<L>  |  2.72<H>    Ca    9.0      18 May 2021 06:33  Phos  3.5       Mg     2.3         TPro  5.2<L>  /  Alb  3.6  /  TBili  1.2  /  DBili  0.5<H>  /  AST  29  /  ALT  21  /  AlkPhos  30<L>  18    Creatinine Trend: 2.72<--, 2.16<--, 1.88<--, 2.02<--  PT/INR - ( 18 May 2021 10:06 )   PT: 22.0 sec;   INR: 1.89 ratio         PTT - ( 18 May 2021 10:06 )  PTT:46.8 sec  Urinalysis Basic - ( 17 May 2021 16:21 )    Color: Yellow / Appearance: Clear / S.012 / pH: x  Gluc: x / Ketone: Negative  / Bili: Negative / Urobili: Negative   Blood: x / Protein: Negative / Nitrite: Negative   Leuk Esterase: Negative / RBC: x / WBC x   Sq Epi: x / Non Sq Epi: x / Bacteria: x      Arterial Blood Gas:   @ 10:26  7.38/33/180/19/100/-5.4  ABG lactate: --    Venous Blood Gas:   @ 16:26  7.34/42/29/22/47  VBG Lactate: 2.5  Venous Blood Gas:   @ 17:27  7.35/44/23/24/34  VBG Lactate: 1.9      MICROBIOLOGY:     RADIOLOGY & ADDITIONAL TESTS:  < from: CT Abdomen and Pelvis No Cont (21 @ 23:44) >    IMPRESSION:  Moderate to large right pleural effusion with complete collapse of the right middle and lower lobes. Small left pleural effusion with compressive atelectasis.  Cirrhosis and moderate abdominopelvic ascites, increased since prior exam.  No definite colonic mass, however evaluation is limited on this noncontrast study.  < end of copied text >      Assessment and plan:   # Neuro      - Via mandarin, patient is alert, oriented to name, place (knows he is in a hospital), year of his birth, but not current year, follows commands (squeezes both hands, wiggles toes).     # CV     - concern for hemorrhagic shock 2/2 GI bleed vs. septic shock        - 2 units pRBC ordered      - plan for endoscope to evaluate GI bleed as follows     # Pulm    - current satting well on RA     - plan for intubation for EGD at bedside in MICU    # GI      1. 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 GI bleed         - 2 unit pRBC ordered         - plan for EGD at bedside in MICU    # Heme       -     # Endo        -   # DVT prophylaxis  # Ethics:       - per primary MICU Accept Note    CHIEF COMPLAINT:     HPI / INTERVAL HISTORY:    PAST MEDICAL & SURGICAL HISTORY:  DM (diabetes mellitus)    HTN (hypertension)    CAD (coronary artery disease)    Hepatitis    CKD (chronic kidney disease)    AICD (automatic cardioverter/defibrillator) present    Artificial cardiac pacemaker        FAMILY HISTORY:  No pertinent family history in first degree relatives        SOCIAL HISTORY:  Smoking: [  ] Never Smoked  [  ] Former Smoker (# packs x # years)  [  ] Current Smoker (# packs x # years)  Substance Use:   EtOH Use:   Marital Status: [  ] Single  [  ]   [  ]   [  ]   Sexual History:   Occupation:  Recent Travel:  Country of Birth:   Advance Directives:     HOME MEDICATIONS:      Allergies    No Known Allergies    Intolerances          REVIEW OF SYSTEMS:  Unable to obtain full ROS due to patient mental status, patient denies chest pain/SOB/abdominal pain.     OBJECTIVE:  ICU Vital Signs Last 24 Hrs  T(C): 36.7 (18 May 2021 09:28), Max: 36.9 (18 May 2021 03:57)  T(F): 98 (18 May 2021 09:28), Max: 98.4 (18 May 2021 03:57)  HR: 100 (18 May 2021 10:29) (72 - 105)  BP: 142/65 (18 May 2021 10:00) (77/46 - 142/65)  BP(mean): 94 (18 May 2021 10:00) (71 - 94)  ABP: --  ABP(mean): --  RR: 22 (18 May 2021 10:00) (18 - 22)  SpO2: 100% (18 May 2021 10:29) (93% - 100%)         @ : @ 07:00  --------------------------------------------------------  IN: 1090 mL / OUT: 500 mL / NET: 590 mL     @ 07: @ 10:39  --------------------------------------------------------  IN: 500 mL / OUT: 0 mL / NET: 500 mL      CAPILLARY BLOOD GLUCOSE      POCT Blood Glucose.: 192 mg/dL (18 May 2021 08:51)      PHYSICAL EXAM:  GENERAL: NAD  HEENT: R eye ecchymosis, jaundiced sclera    CV: Regular rate and rhythm. No murmurs, rubs, or gallops  Respiratory: normal respiratory effort, speaking in complete sentences. + diffuse wheezes even without stethescope  ABDOMEN: Soft, Nontender, Nondistended; Bowel sounds normal  EXTREMITIES: + edema bilateral LE   PSYCH: Alert, oriented to person and place, not oriented to current year, able to state his birth year   NEURO: Symmetric facial expressions. Moves 4 extremities on command   Skin: No rashes    LINES:  peripheral IV x2    HOSPITAL MEDICATIONS:  MEDICATIONS  (STANDING):  albumin human 25% IVPB 100 milliLiter(s) IV Intermittent every 6 hours  cefTRIAXone   IVPB 2000 milliGRAM(s) IV Intermittent every 24 hours  chlorhexidine 4% Liquid 1 Application(s) Topical <User Schedule>  levothyroxine 88 MICROGram(s) Oral daily  norepinephrine Infusion 0.05 MICROgram(s)/kG/Min (5.95 mL/Hr) IV Continuous <Continuous>  octreotide  Injectable 50 MICROGram(s) SubCutaneous three times a day  pantoprazole Infusion 8 mG/Hr (10 mL/Hr) IV Continuous <Continuous>  predniSONE   Tablet 20 milliGRAM(s) Oral daily  rifAXIMin 550 milliGRAM(s) Oral two times a day    MEDICATIONS  (PRN):  albuterol/ipratropium for Nebulization 3 milliLiter(s) Nebulizer every 6 hours PRN Shortness of Breath and/or Wheezing      LABS:                        5.2    9.59  )-----------( 71       ( 18 May 2021 10:06 )             14.7     Hgb Trend: 5.2<--, 5.1<--, 7.6<--, 8.8<--, 11.8<--  05-18    122<L>  |  89<L>  |  103<H>  ----------------------------<  152<H>  4.9   |  19<L>  |  2.72<H>    Ca    9.0      18 May 2021 06:33  Phos  3.5       Mg     2.3         TPro  5.2<L>  /  Alb  3.6  /  TBili  1.2  /  DBili  0.5<H>  /  AST  29  /  ALT  21  /  AlkPhos  30<L>      Creatinine Trend: 2.72<--, 2.16<--, 1.88<--, 2.02<--  PT/INR - ( 18 May 2021 10:06 )   PT: 22.0 sec;   INR: 1.89 ratio         PTT - ( 18 May 2021 10:06 )  PTT:46.8 sec  Urinalysis Basic - ( 17 May 2021 16:21 )    Color: Yellow / Appearance: Clear / S.012 / pH: x  Gluc: x / Ketone: Negative  / Bili: Negative / Urobili: Negative   Blood: x / Protein: Negative / Nitrite: Negative   Leuk Esterase: Negative / RBC: x / WBC x   Sq Epi: x / Non Sq Epi: x / Bacteria: x      Arterial Blood Gas:   @ 10:26  7.38/33/180/19/100/-5.4  ABG lactate: --    Venous Blood Gas:   @ 16:26  7.34/42/29/22/47  VBG Lactate: 2.5  Venous Blood Gas:   @ 17:27  7.35/44/23/24/34  VBG Lactate: 1.9      MICROBIOLOGY:     RADIOLOGY & ADDITIONAL TESTS:  < from: CT Abdomen and Pelvis No Cont (21 @ 23:44) >    IMPRESSION:  Moderate to large right pleural effusion with complete collapse of the right middle and lower lobes. Small left pleural effusion with compressive atelectasis.  Cirrhosis and moderate abdominopelvic ascites, increased since prior exam.  No definite colonic mass, however evaluation is limited on this noncontrast study.  < end of copied text >      Assessment and plan:   # Neuro      - Via mandarin, patient is alert, oriented to name, place (knows he is in a hospital), year of his birth, but not current year, follows commands (squeezes both hands, wiggles toes).       - concern for hepatic encephalopathy, f/u serum ammonia       - when tolerating PO, c/w rifaximin, lactulose ---- patient is anticipated to be intubated in the afternoon, will plan for tube feeds     # CV     - concern for hemorrhagic shock 2/2 GI bleed      - 2 units pRBC, 1 unit of platelet ordered      - plan for endoscope to evaluate GI bleed as follows     # Pulm    - current satting well on RA     - plan for intubation for EGD at bedside in MICU    # GI      1. 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 GI bleed         - on         - 2 unit pRBC ordered         - plan for EGD at bedside in MICU    # Heme       -     # Endo        -   # DVT prophylaxis  # Ethics:       - per primary MICU Accept Note    HPI / INTERVAL HISTORY:  85M w/ CAD s/p PCI, HF s/p AICD, DMII, HTN, CKD, decompensated cirrhosis of unknown etiology, presenting w/ 3 weeks of generalized weakness, dyspnea, and abd distension.         Patient found to have liver cirrhosis with large pleural effusion, ascites on CT A/P, hyponatremia,  worsening Hgb 11.8--> 7.6 --> 5.1, w/ melena and bright red blood per rectum concern for GI bleed, worsening ANDREEA with oliguria concern for hepatorenal syndrome, patient received albumin 25%100cc q6h since , and also receiving ceftriaxone for empiric treatment of SBP.  MICU consulted for hypotension 80/50 with lethargic mental status. Rapid response called, levophed initiated, and GI plan for endoscope at bedside in MICU, also initiated on octreotide.       PAST MEDICAL & SURGICAL HISTORY:  DM (diabetes mellitus)    HTN (hypertension)    CAD (coronary artery disease)    Hepatitis    CKD (chronic kidney disease)    AICD (automatic cardioverter/defibrillator) present    Artificial cardiac pacemaker        FAMILY HISTORY:  No pertinent family history in first degree relatives        SOCIAL HISTORY:  Smoking: [  ] Never Smoked  [  ] Former Smoker (# packs x # years)  [  ] Current Smoker (# packs x # years)  Substance Use:   EtOH Use:   Marital Status: [  ] Single  [  ]   [  ]   [  ]   Sexual History:   Occupation:  Recent Travel:  Country of Birth:   Advance Directives:     HOME MEDICATIONS:      Allergies    No Known Allergies    Intolerances          REVIEW OF SYSTEMS:  Unable to obtain full ROS due to patient mental status, patient denies chest pain/SOB/abdominal pain.     OBJECTIVE:  ICU Vital Signs Last 24 Hrs  T(C): 36.7 (18 May 2021 09:28), Max: 36.9 (18 May 2021 03:57)  T(F): 98 (18 May 2021 09:28), Max: 98.4 (18 May 2021 03:57)  HR: 100 (18 May 2021 10:29) (72 - 105)  BP: 142/65 (18 May 2021 10:00) (77/46 - 142/65)  BP(mean): 94 (18 May 2021 10:00) (71 - 94)  ABP: --  ABP(mean): --  RR: 22 (18 May 2021 10:00) (18 - 22)  SpO2: 100% (18 May 2021 10:29) (93% - 100%)        17 @ 07:  -   @ 07:00  --------------------------------------------------------  IN: 1090 mL / OUT: 500 mL / NET: 590 mL     @ 07: @ 10:39  --------------------------------------------------------  IN: 500 mL / OUT: 0 mL / NET: 500 mL      CAPILLARY BLOOD GLUCOSE      POCT Blood Glucose.: 192 mg/dL (18 May 2021 08:51)      PHYSICAL EXAM:  GENERAL: NAD  HEENT: R eye ecchymosis, jaundiced sclera    CV: Regular rate and rhythm. No murmurs, rubs, or gallops  Respiratory: normal respiratory effort, speaking in complete sentences. + diffuse wheezes even without stethescope  ABDOMEN: Soft, Nontender, Nondistended; Bowel sounds normal  EXTREMITIES: + edema bilateral LE   PSYCH: Alert, oriented to person and place, not oriented to current year, able to state his birth year   NEURO: Symmetric facial expressions. Moves 4 extremities on command   Skin: No rashes    LINES:  peripheral IV x2    HOSPITAL MEDICATIONS:  MEDICATIONS  (STANDING):  albumin human 25% IVPB 100 milliLiter(s) IV Intermittent every 6 hours  cefTRIAXone   IVPB 2000 milliGRAM(s) IV Intermittent every 24 hours  chlorhexidine 4% Liquid 1 Application(s) Topical <User Schedule>  levothyroxine 88 MICROGram(s) Oral daily  norepinephrine Infusion 0.05 MICROgram(s)/kG/Min (5.95 mL/Hr) IV Continuous <Continuous>  octreotide  Injectable 50 MICROGram(s) SubCutaneous three times a day  pantoprazole Infusion 8 mG/Hr (10 mL/Hr) IV Continuous <Continuous>  predniSONE   Tablet 20 milliGRAM(s) Oral daily  rifAXIMin 550 milliGRAM(s) Oral two times a day    MEDICATIONS  (PRN):  albuterol/ipratropium for Nebulization 3 milliLiter(s) Nebulizer every 6 hours PRN Shortness of Breath and/or Wheezing      LABS:                        5.2    9.59  )-----------( 71       ( 18 May 2021 10:06 )             14.7     Hgb Trend: 5.2<--, 5.1<--, 7.6<--, 8.8<--, 11.8<--      122<L>  |  89<L>  |  103<H>  ----------------------------<  152<H>  4.9   |  19<L>  |  2.72<H>    Ca    9.0      18 May 2021 06:33  Phos  3.5       Mg     2.3         TPro  5.2<L>  /  Alb  3.6  /  TBili  1.2  /  DBili  0.5<H>  /  AST  29  /  ALT  21  /  AlkPhos  30<L>      Creatinine Trend: 2.72<--, 2.16<--, 1.88<--, 2.02<--  PT/INR - ( 18 May 2021 10:06 )   PT: 22.0 sec;   INR: 1.89 ratio         PTT - ( 18 May 2021 10:06 )  PTT:46.8 sec  Urinalysis Basic - ( 17 May 2021 16:21 )    Color: Yellow / Appearance: Clear / S.012 / pH: x  Gluc: x / Ketone: Negative  / Bili: Negative / Urobili: Negative   Blood: x / Protein: Negative / Nitrite: Negative   Leuk Esterase: Negative / RBC: x / WBC x   Sq Epi: x / Non Sq Epi: x / Bacteria: x      Arterial Blood Gas:   @ 10:26  7.38/33/180/19/100/-5.4  ABG lactate: --    Venous Blood Gas:   @ 16:26  7.34/42/29/22/47  VBG Lactate: 2.5  Venous Blood Gas:   @ 17:27  7.35/44/23/24/34  VBG Lactate: 1.9      MICROBIOLOGY:     RADIOLOGY & ADDITIONAL TESTS:  < from: CT Abdomen and Pelvis No Cont (21 @ 23:44) >    IMPRESSION:  Moderate to large right pleural effusion with complete collapse of the right middle and lower lobes. Small left pleural effusion with compressive atelectasis.  Cirrhosis and moderate abdominopelvic ascites, increased since prior exam.  No definite colonic mass, however evaluation is limited on this noncontrast study.  < end of copied text >    < from: TTE with Doppler (w/Cont) (21 @ 12:59) >  EF 40-45%  Conclusions:  1. Endocardium not well visualized; apical septal and  apical hypokoinesis. At least moderate left ventricular  systolic function.    Endocardial visualization enhanced  with intravenous injection of Ultrasonic Enhancing Agent  (Definity). No left ventricular thrombus.  2. Mild diastolic dysfunction (Stage I).  3. Right atrial enlargement.  Intermittent diastlic  inversion of the right atrial free wall by unclear fluid  collection.  Inadequate subcostal windows.  Suggest CT  correlation.  4. Bilateral pleural effusions.  Suggest CT and clinical correlation of percardial effusion  as apical and subcostal images are limted.  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 Differential includes meningioma and schwannoma. Recommend contrast-enhanced MRI of the IACs.  No acute intracranial bleeding.  < end of copied text >          Assessment and plan:   86 yo M w/ HF (EF 40-45%, moderate LV systolic function), cirrhosis of unknown etiology (suspect 2/2 hep B), ANDREEA, hypotension and down-trending Hgb suspect 2/2 GI bleed  # Neuro     1. metabolic encephalopathy       - currently, via mandarin, patient is alert, oriented to name, place (knows he is in a hospital), year of his birth, but not current year, follows commands (squeezes both hands, wiggles toes).       - concern for hepatic encephalopathy vs. encephalopathy 2/2 low BP/anemia      - f/u serum ammonia       - when tolerating PO, c/w rifaximin, lactulose ---- patient is anticipated to be intubated in the afternoon, will plan for tube feeds      2. 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 Differential includes meningioma and schwannoma. Recommend contrast-enhanced MRI of the IACs. No acute intracranial bleeding.    # CV     - c/w levophed, for MAP > 65     - concern for hemorrhagic shock 2/2 GI bleed      - 2 units pRBC, 1 unit of platelet ordered      - plan for endoscope to evaluate GI bleed as follows     # Pulm    - current satting well on RA     1.     - plan for intubation for EGD at bedside in MICU    # GI      1. 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 GI bleed         - on         - 2 unit pRBC ordered         - plan for EGD at bedside in MICU    # Heme       -     # Endo        -   # DVT prophylaxis  # Ethics:       - per primary    Patient examined and care reviewed in detail on bedside rounds  Critically ill on pressors with cirrhosis/GIB/possible sepsis/large right PLEFF  Frequent bedside visits with therapy change today.   I have personally provided 35+ minutes of critical care time concurrently with the resident/fellow; this excludes time spent on separate procedures. MICU Accept Note    HPI / INTERVAL HISTORY:  85M w/ CAD s/p PCI, HF s/p AICD, DMII, HTN, CKD, decompensated cirrhosis of unknown etiology, presenting w/ 3 weeks of generalized weakness, dyspnea, and abd distension.         Patient found to have liver cirrhosis with large pleural effusion, ascites on CT A/P, hyponatremia,  worsening Hgb 11.8--> 7.6 --> 5.1, w/ melena and bright red blood per rectum concern for GI bleed, worsening ANDREEA with oliguria concern for hepatorenal syndrome, patient received albumin 25%100cc q6h since , and also receiving ceftriaxone for empiric treatment of SBP.  MICU consulted for hypotension 80/50 with lethargic mental status. Rapid response called, levophed initiated, and GI plan for endoscope at bedside in MICU, also initiated on octreotide.       PAST MEDICAL & SURGICAL HISTORY:  DM (diabetes mellitus)    HTN (hypertension)    CAD (coronary artery disease)    Hepatitis    CKD (chronic kidney disease)    AICD (automatic cardioverter/defibrillator) present    Artificial cardiac pacemaker        FAMILY HISTORY:  No pertinent family history in first degree relatives        SOCIAL HISTORY:  Smoking: [  ] Never Smoked  [  ] Former Smoker (# packs x # years)  [  ] Current Smoker (# packs x # years)  Substance Use:   EtOH Use:   Marital Status: [  ] Single  [  ]   [  ]   [  ]   Sexual History:   Occupation:  Recent Travel:  Country of Birth:   Advance Directives:     HOME MEDICATIONS:      Allergies    No Known Allergies    Intolerances          REVIEW OF SYSTEMS:  Unable to obtain full ROS due to patient mental status, patient denies chest pain/SOB/abdominal pain.     OBJECTIVE:  ICU Vital Signs Last 24 Hrs  T(C): 36.7 (18 May 2021 09:28), Max: 36.9 (18 May 2021 03:57)  T(F): 98 (18 May 2021 09:28), Max: 98.4 (18 May 2021 03:57)  HR: 100 (18 May 2021 10:29) (72 - 105)  BP: 142/65 (18 May 2021 10:00) (77/46 - 142/65)  BP(mean): 94 (18 May 2021 10:00) (71 - 94)  ABP: --  ABP(mean): --  RR: 22 (18 May 2021 10:00) (18 - 22)  SpO2: 100% (18 May 2021 10:29) (93% - 100%)        17 @ 07:  -   @ 07:00  --------------------------------------------------------  IN: 1090 mL / OUT: 500 mL / NET: 590 mL     @ 07: @ 10:39  --------------------------------------------------------  IN: 500 mL / OUT: 0 mL / NET: 500 mL      CAPILLARY BLOOD GLUCOSE      POCT Blood Glucose.: 192 mg/dL (18 May 2021 08:51)      PHYSICAL EXAM:  GENERAL: NAD  HEENT: R eye ecchymosis, jaundiced sclera    CV: Regular rate and rhythm. No murmurs, rubs, or gallops  Respiratory: normal respiratory effort, speaking in complete sentences. + diffuse wheezes even without stethescope  ABDOMEN: Soft, Nontender, Nondistended; Bowel sounds normal  EXTREMITIES: + edema bilateral LE   PSYCH: Alert, oriented to person and place, not oriented to current year, able to state his birth year   NEURO: Symmetric facial expressions. Moves 4 extremities on command   Skin: No rashes    LINES:  peripheral IV x2    HOSPITAL MEDICATIONS:  MEDICATIONS  (STANDING):  albumin human 25% IVPB 100 milliLiter(s) IV Intermittent every 6 hours  cefTRIAXone   IVPB 2000 milliGRAM(s) IV Intermittent every 24 hours  chlorhexidine 4% Liquid 1 Application(s) Topical <User Schedule>  levothyroxine 88 MICROGram(s) Oral daily  norepinephrine Infusion 0.05 MICROgram(s)/kG/Min (5.95 mL/Hr) IV Continuous <Continuous>  octreotide  Injectable 50 MICROGram(s) SubCutaneous three times a day  pantoprazole Infusion 8 mG/Hr (10 mL/Hr) IV Continuous <Continuous>  predniSONE   Tablet 20 milliGRAM(s) Oral daily  rifAXIMin 550 milliGRAM(s) Oral two times a day    MEDICATIONS  (PRN):  albuterol/ipratropium for Nebulization 3 milliLiter(s) Nebulizer every 6 hours PRN Shortness of Breath and/or Wheezing      LABS:                        5.2    9.59  )-----------( 71       ( 18 May 2021 10:06 )             14.7     Hgb Trend: 5.2<--, 5.1<--, 7.6<--, 8.8<--, 11.8<--      122<L>  |  89<L>  |  103<H>  ----------------------------<  152<H>  4.9   |  19<L>  |  2.72<H>    Ca    9.0      18 May 2021 06:33  Phos  3.5       Mg     2.3         TPro  5.2<L>  /  Alb  3.6  /  TBili  1.2  /  DBili  0.5<H>  /  AST  29  /  ALT  21  /  AlkPhos  30<L>      Creatinine Trend: 2.72<--, 2.16<--, 1.88<--, 2.02<--  PT/INR - ( 18 May 2021 10:06 )   PT: 22.0 sec;   INR: 1.89 ratio         PTT - ( 18 May 2021 10:06 )  PTT:46.8 sec  Urinalysis Basic - ( 17 May 2021 16:21 )    Color: Yellow / Appearance: Clear / S.012 / pH: x  Gluc: x / Ketone: Negative  / Bili: Negative / Urobili: Negative   Blood: x / Protein: Negative / Nitrite: Negative   Leuk Esterase: Negative / RBC: x / WBC x   Sq Epi: x / Non Sq Epi: x / Bacteria: x      Arterial Blood Gas:   @ 10:26  7.38/33/180/19/100/-5.4  ABG lactate: --    Venous Blood Gas:   @ 16:26  7.34/42/29/22/47  VBG Lactate: 2.5  Venous Blood Gas:   @ 17:27  7.35/44/23/24/34  VBG Lactate: 1.9      MICROBIOLOGY:     RADIOLOGY & ADDITIONAL TESTS:  < from: CT Abdomen and Pelvis No Cont (21 @ 23:44) >    IMPRESSION:  Moderate to large right pleural effusion with complete collapse of the right middle and lower lobes. Small left pleural effusion with compressive atelectasis.  Cirrhosis and moderate abdominopelvic ascites, increased since prior exam.  No definite colonic mass, however evaluation is limited on this noncontrast study.  < end of copied text >    < from: TTE with Doppler (w/Cont) (21 @ 12:59) >  EF 40-45%  Conclusions:  1. Endocardium not well visualized; apical septal and  apical hypokoinesis. At least moderate left ventricular  systolic function.    Endocardial visualization enhanced  with intravenous injection of Ultrasonic Enhancing Agent  (Definity). No left ventricular thrombus.  2. Mild diastolic dysfunction (Stage I).  3. Right atrial enlargement.  Intermittent diastlic  inversion of the right atrial free wall by unclear fluid  collection.  Inadequate subcostal windows.  Suggest CT  correlation.  4. Bilateral pleural effusions.  Suggest CT and clinical correlation of percardial effusion  as apical and subcostal images are limted.  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 Differential includes meningioma and schwannoma. Recommend contrast-enhanced MRI of the IACs.  No acute intracranial bleeding.  < end of copied text >          Assessment and plan:   85M w/ CAD s/p PCI, HF s/p AICD, DMII, HTN, CKD, decompensated cirrhosis of unknown etiology    84 yo M w/ HF (EF 40-45%, moderate LV systolic function), cirrhosis of unknown etiology (suspect 2/2 hep B), ANDREEA, hypotension and down-trending Hgb suspect 2/2 GI bleed  # Neuro     1. metabolic encephalopathy       - currently, via mandarin, patient is alert, oriented to name, place (knows he is in a hospital), year of his birth, but not current year, follows commands (squeezes both hands, wiggles toes).       - concern for hepatic encephalopathy vs. encephalopathy 2/2 low BP/anemia      - f/u serum ammonia       - when tolerating PO, c/w rifaximin, lactulose ---- patient is anticipated to be intubated in the afternoon, will plan for tube feeds      2. 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 Differential includes meningioma and schwannoma. Recommend contrast-enhanced MRI of the IACs. No acute intracranial bleeding.    # CV     1. Shock      - c/w levophed, for MAP > 65     - concern for hemorrhagic shock 2/2 GI bleed      - 2 units pRBC, 1 unit of platelet ordered      - plan for endoscope to evaluate GI bleed as follows     2. HF      - s/p AICD     - TTE : EF 40-45%, moderate LV systolic function, no LV thrombus, mild diastolic dysfunction (stage I)     - home HF medications: entresto 24-26 BID, lasix 80 PO daily --- currently holding in setting of hypotension, current volume status acceptable     3. CAD      - home prasugrel 10mg Po daily --- holding in setting of active bleed     # Pulm    1. Pleural effusion     - CT chest ():  Moderate to large R pleural effusion with complete collapse of R middle and lower lobes. Small L pleural effusion with complete atelectasis.      - plan for diagnostic/therapeutic paracentesis, suspect 2/2 cirrhosis     - currently satting well on RA ---  plan for intubation for EGD at bedside in MICU    # GI      1. 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          - hepatology following          -varices: unknown         -ascites: perihepatic and pelvic ascites present         -HE: likely contributing to AMS          -HCC: none seen but imaging so far non-contrast so unable to exclude HCC         -MELD-Na = 31 on           -c/w cerftriaxone IV for empiric treatment of SBP, at least 7 day course ( -  )          - plan for diagnostic/therapeutic paracentesis          - plan for EGD           - daily MELD-Na labs          - - when tolerating PO, c/w rifaximin, lactulose ---- patient is anticipated to be intubated in the afternoon, will plan for tube feeds          -Chronic liver disease work-up: Hepatitis B surface Ag, Ab, core IgM negative, core total positive, HBV DNA and E-Ag pendin     2. GI bleed         - Hgb trend 11.8 --> 7.6 -- > 5.1 , reports melena + BRBPR on floors         - 2 unit pRBC, 1 unit platelet ordered         - plan for EGD at bedside in MICU      # Heme       - anemia           - likely 2/2 acute blood loss 2/2 GI bleed, plan as per above      - thrombocytopenia           - Plt 75--> 68 --> 90 --> 58 --> 71          - likely 2/2 liver cirrhosis , 1 unit platelet ordered as plan for procedure      - Coagulopathic         -  currently INR 1.89 -- will trend daily         - likely 2/2 liver cirrhosis --- concurrently increased risk of thrombosis & bleeding     # Endo        - DM type 2        - A1c 5.5 on        - home regimen: Toujeo 20 units subQ bedtime; Humalog 8 units subQ TID w/ meals         - hold home insulin        - while NPO, FS q6h, low dose sliding scale insulin q6h     # DVT prophylaxis       - SCD        - hold chemical prophyaxis in setting of active GI bleed and plans for procedure (thoracentesis/paracentesis)     # Ethics:       - per primary team discussion with family as of   --- FULL CODE    Patient examined and care reviewed in detail on bedside rounds  Critically ill on pressors with cirrhosis/GIB/possible sepsis/large right PLEFF  Frequent bedside visits with therapy change today.   I have personally provided 35+ minutes of critical care time concurrently with the resident/fellow; this excludes time spent on separate procedures. MICU Accept Note    HPI / INTERVAL HISTORY:  85M w/ CAD s/p PCI, HF s/p AICD, DMII, HTN, CKD, decompensated cirrhosis of unknown etiology, presenting w/ 3 weeks of generalized weakness, dyspnea, and abd distension.         Patient found to have liver cirrhosis with large pleural effusion, ascites on CT A/P, hyponatremia,  worsening Hgb 11.8--> 7.6 --> 5.1, w/ melena and bright red blood per rectum concern for GI bleed, worsening ANDREEA with oliguria concern for hepatorenal syndrome, patient received albumin 25%100cc q6h since , and also receiving ceftriaxone for empiric treatment of SBP.  MICU consulted for hypotension 80/50 with lethargic mental status. Rapid response called, levophed initiated, and GI plan for endoscope at bedside in MICU, also initiated on octreotide.       PAST MEDICAL & SURGICAL HISTORY:  DM (diabetes mellitus)    HTN (hypertension)    CAD (coronary artery disease)    Hepatitis    CKD (chronic kidney disease)    AICD (automatic cardioverter/defibrillator) present    Artificial cardiac pacemaker        FAMILY HISTORY:  No pertinent family history in first degree relatives        SOCIAL HISTORY:  Smoking: [  ] Never Smoked  [  ] Former Smoker (# packs x # years)  [  ] Current Smoker (# packs x # years)  Substance Use:   EtOH Use:   Marital Status: [  ] Single  [  ]   [  ]   [  ]   Sexual History:   Occupation:  Recent Travel:  Country of Birth:   Advance Directives:     HOME MEDICATIONS:      Allergies    No Known Allergies    Intolerances          REVIEW OF SYSTEMS:  Unable to obtain full ROS due to patient mental status, patient denies chest pain/SOB/abdominal pain.     OBJECTIVE:  ICU Vital Signs Last 24 Hrs  T(C): 36.7 (18 May 2021 09:28), Max: 36.9 (18 May 2021 03:57)  T(F): 98 (18 May 2021 09:28), Max: 98.4 (18 May 2021 03:57)  HR: 100 (18 May 2021 10:29) (72 - 105)  BP: 142/65 (18 May 2021 10:00) (77/46 - 142/65)  BP(mean): 94 (18 May 2021 10:00) (71 - 94)  ABP: --  ABP(mean): --  RR: 22 (18 May 2021 10:00) (18 - 22)  SpO2: 100% (18 May 2021 10:29) (93% - 100%)        17 @ 07:  -   @ 07:00  --------------------------------------------------------  IN: 1090 mL / OUT: 500 mL / NET: 590 mL     @ 07: @ 10:39  --------------------------------------------------------  IN: 500 mL / OUT: 0 mL / NET: 500 mL      CAPILLARY BLOOD GLUCOSE      POCT Blood Glucose.: 192 mg/dL (18 May 2021 08:51)      PHYSICAL EXAM:  GENERAL: NAD  HEENT: R eye ecchymosis, jaundiced sclera    CV: Regular rate and rhythm. No murmurs, rubs, or gallops  Respiratory: normal respiratory effort, speaking in complete sentences. + diffuse wheezes even without stethescope  ABDOMEN: Soft, Nontender, Nondistended; Bowel sounds normal  EXTREMITIES: + edema bilateral LE   PSYCH: Alert, oriented to person and place, not oriented to current year, able to state his birth year   NEURO: Symmetric facial expressions. Moves 4 extremities on command   Skin: No rashes    LINES:  peripheral IV x2    HOSPITAL MEDICATIONS:  MEDICATIONS  (STANDING):  albumin human 25% IVPB 100 milliLiter(s) IV Intermittent every 6 hours  cefTRIAXone   IVPB 2000 milliGRAM(s) IV Intermittent every 24 hours  chlorhexidine 4% Liquid 1 Application(s) Topical <User Schedule>  levothyroxine 88 MICROGram(s) Oral daily  norepinephrine Infusion 0.05 MICROgram(s)/kG/Min (5.95 mL/Hr) IV Continuous <Continuous>  octreotide  Injectable 50 MICROGram(s) SubCutaneous three times a day  pantoprazole Infusion 8 mG/Hr (10 mL/Hr) IV Continuous <Continuous>  predniSONE   Tablet 20 milliGRAM(s) Oral daily  rifAXIMin 550 milliGRAM(s) Oral two times a day    MEDICATIONS  (PRN):  albuterol/ipratropium for Nebulization 3 milliLiter(s) Nebulizer every 6 hours PRN Shortness of Breath and/or Wheezing      LABS:                        5.2    9.59  )-----------( 71       ( 18 May 2021 10:06 )             14.7     Hgb Trend: 5.2<--, 5.1<--, 7.6<--, 8.8<--, 11.8<--      122<L>  |  89<L>  |  103<H>  ----------------------------<  152<H>  4.9   |  19<L>  |  2.72<H>    Ca    9.0      18 May 2021 06:33  Phos  3.5       Mg     2.3         TPro  5.2<L>  /  Alb  3.6  /  TBili  1.2  /  DBili  0.5<H>  /  AST  29  /  ALT  21  /  AlkPhos  30<L>      Creatinine Trend: 2.72<--, 2.16<--, 1.88<--, 2.02<--  PT/INR - ( 18 May 2021 10:06 )   PT: 22.0 sec;   INR: 1.89 ratio         PTT - ( 18 May 2021 10:06 )  PTT:46.8 sec  Urinalysis Basic - ( 17 May 2021 16:21 )    Color: Yellow / Appearance: Clear / S.012 / pH: x  Gluc: x / Ketone: Negative  / Bili: Negative / Urobili: Negative   Blood: x / Protein: Negative / Nitrite: Negative   Leuk Esterase: Negative / RBC: x / WBC x   Sq Epi: x / Non Sq Epi: x / Bacteria: x      Arterial Blood Gas:   @ 10:26  7.38/33/180/19/100/-5.4  ABG lactate: --    Venous Blood Gas:   @ 16:26  7.34/42/29/22/47  VBG Lactate: 2.5  Venous Blood Gas:   @ 17:27  7.35/44/23/24/34  VBG Lactate: 1.9      MICROBIOLOGY:     RADIOLOGY & ADDITIONAL TESTS:  < from: CT Abdomen and Pelvis No Cont (21 @ 23:44) >    IMPRESSION:  Moderate to large right pleural effusion with complete collapse of the right middle and lower lobes. Small left pleural effusion with compressive atelectasis.  Cirrhosis and moderate abdominopelvic ascites, increased since prior exam.  No definite colonic mass, however evaluation is limited on this noncontrast study.  < end of copied text >    < from: TTE with Doppler (w/Cont) (21 @ 12:59) >  EF 40-45%  Conclusions:  1. Endocardium not well visualized; apical septal and  apical hypokoinesis. At least moderate left ventricular  systolic function.    Endocardial visualization enhanced  with intravenous injection of Ultrasonic Enhancing Agent  (Definity). No left ventricular thrombus.  2. Mild diastolic dysfunction (Stage I).  3. Right atrial enlargement.  Intermittent diastlic  inversion of the right atrial free wall by unclear fluid  collection.  Inadequate subcostal windows.  Suggest CT  correlation.  4. Bilateral pleural effusions.  Suggest CT and clinical correlation of percardial effusion  as apical and subcostal images are limted.  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 Differential includes meningioma and schwannoma. Recommend contrast-enhanced MRI of the IACs.  No acute intracranial bleeding.  < end of copied text >          Assessment and plan:     84 yo M w/ HF (EF 40-45%, moderate LV systolic function), cirrhosis of unknown etiology (suspect 2/2 hep B), ANDREEA suspect 2/2 hepatorenal syndrome, down-trending Hgb suspect 2/2 GI bleed, admitted to MICU due to shock placed on pressor support.   # Neuro     1. metabolic encephalopathy       - currently, via mandarin, patient is alert, oriented to name, place (knows he is in a hospital), year of his birth, but not current year, follows commands (squeezes both hands, wiggles toes).       - concern for hepatic encephalopathy vs. encephalopathy 2/2 low BP/anemia      - f/u serum ammonia       - when tolerating PO, c/w rifaximin, lactulose ---- patient is anticipated to be intubated in the afternoon, will plan for tube feeds      2. 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 Differential includes meningioma and schwannoma. Recommend contrast-enhanced MRI of the IACs. No acute intracranial bleeding.    # CV     1. Shock      - c/w levophed, for MAP > 65     - concern for hemorrhagic shock 2/2 GI bleed      - 2 units pRBC, 1 unit of platelet ordered      - plan for endoscope to evaluate GI bleed as follows     2. HF      - s/p AICD     - TTE : EF 40-45%, moderate LV systolic function, no LV thrombus, mild diastolic dysfunction (stage I)     - home HF medications: entresto 24-26 BID, lasix 80 PO daily --- currently holding in setting of hypotension, current volume status acceptable     3. CAD      - home prasugrel 10mg Po daily --- holding in setting of active bleed     # Pulm    1. Pleural effusion     - CT chest ():  Moderate to large R pleural effusion with complete collapse of R middle and lower lobes. Small L pleural effusion with complete atelectasis.      - plan for diagnostic/therapeutic paracentesis, suspect 2/2 cirrhosis     - currently satting well on RA ---  plan for intubation for EGD at bedside in MICU    # GI      1. 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          - hepatology following          -varices: unknown         -ascites: perihepatic and pelvic ascites present         -HE: likely contributing to AMS          -HCC: none seen but imaging so far non-contrast so unable to exclude HCC         -MELD-Na = 31 on           -c/w cerftriaxone IV for empiric treatment of SBP, at least 7 day course ( -  )          - plan for diagnostic/therapeutic paracentesis          - plan for EGD           - daily MELD-Na labs          - - when tolerating PO, c/w rifaximin, lactulose ---- patient is anticipated to be intubated in the afternoon, will plan for tube feeds          -Chronic liver disease work-up: Hepatitis B surface Ag, Ab, core IgM negative, core total positive, HBV DNA and E-Ag pendin     2. GI bleed         - Hgb trend 11.8 --> 7.6 -- > 5.1 , reports melena + BRBPR on floors         - 2 unit pRBC, 1 unit platelet ordered         - plan for EGD at bedside in MICU      # Heme       - anemia           - likely 2/2 acute blood loss 2/2 GI bleed, plan as per above      - thrombocytopenia           - Plt 75--> 68 --> 90 --> 58 --> 71          - likely 2/2 liver cirrhosis , 1 unit platelet ordered as plan for procedure      - Coagulopathic         -  currently INR 1.89 -- will trend daily         - likely 2/2 liver cirrhosis --- concurrently increased risk of thrombosis & bleeding     # Endo        - DM type 2        - A1c 5.5 on        - home regimen: Toujeo 20 units subQ bedtime; Humalog 8 units subQ TID w/ meals         - hold home insulin        - while NPO, FS q6h, low dose sliding scale insulin q6h     # DVT prophylaxis       - SCD        - hold chemical prophyaxis in setting of active GI bleed and plans for procedure (thoracentesis/paracentesis)     # Ethics:       - per primary team discussion with family as of   --- FULL CODE    Patient examined and care reviewed in detail on bedside rounds  Critically ill on pressors with cirrhosis/GIB/possible sepsis/large right PLEFF  Frequent bedside visits with therapy change today.   I have personally provided 35+ minutes of critical care time concurrently with the resident/fellow; this excludes time spent on separate procedures. MICU Accept Note    HPI / INTERVAL HISTORY:  85M w/ CAD s/p PCI, HF s/p AICD, DMII, HTN, CKD, decompensated cirrhosis of unknown etiology, presenting w/ 3 weeks of generalized weakness, dyspnea, and abd distension.         Patient found to have liver cirrhosis with large pleural effusion, ascites on CT A/P, hyponatremia,  worsening Hgb 11.8--> 7.6 --> 5.1, w/ melena and bright red blood per rectum concern for GI bleed, worsening ANDREEA with oliguria concern for hepatorenal syndrome, patient received albumin 25%100cc q6h since , and also receiving ceftriaxone for empiric treatment of SBP.  MICU consulted for hypotension 80/50 with lethargic mental status. Rapid response called, levophed initiated, and GI plan for endoscope at bedside in MICU, also initiated on octreotide.       PAST MEDICAL & SURGICAL HISTORY:  DM (diabetes mellitus)    HTN (hypertension)    CAD (coronary artery disease)    Hepatitis    CKD (chronic kidney disease)    AICD (automatic cardioverter/defibrillator) present    Artificial cardiac pacemaker        FAMILY HISTORY:  No pertinent family history in first degree relatives        SOCIAL HISTORY:  Smoking: [  ] Never Smoked  [  ] Former Smoker (# packs x # years)  [  ] Current Smoker (# packs x # years)  Substance Use:   EtOH Use:   Marital Status: [  ] Single  [  ]   [  ]   [  ]   Sexual History:   Occupation:  Recent Travel:  Country of Birth:   Advance Directives:     HOME MEDICATIONS:      Allergies    No Known Allergies    Intolerances          REVIEW OF SYSTEMS:  Unable to obtain full ROS due to patient mental status, patient denies chest pain/SOB/abdominal pain.     OBJECTIVE:  ICU Vital Signs Last 24 Hrs  T(C): 36.7 (18 May 2021 09:28), Max: 36.9 (18 May 2021 03:57)  T(F): 98 (18 May 2021 09:28), Max: 98.4 (18 May 2021 03:57)  HR: 100 (18 May 2021 10:29) (72 - 105)  BP: 142/65 (18 May 2021 10:00) (77/46 - 142/65)  BP(mean): 94 (18 May 2021 10:00) (71 - 94)  ABP: --  ABP(mean): --  RR: 22 (18 May 2021 10:00) (18 - 22)  SpO2: 100% (18 May 2021 10:29) (93% - 100%)        17 @ 07:  -   @ 07:00  --------------------------------------------------------  IN: 1090 mL / OUT: 500 mL / NET: 590 mL     @ 07: @ 10:39  --------------------------------------------------------  IN: 500 mL / OUT: 0 mL / NET: 500 mL      CAPILLARY BLOOD GLUCOSE      POCT Blood Glucose.: 192 mg/dL (18 May 2021 08:51)      PHYSICAL EXAM:  GENERAL: NAD  HEENT: R eye ecchymosis, jaundiced sclera    CV: Regular rate and rhythm. No murmurs, rubs, or gallops  Respiratory: normal respiratory effort, speaking in complete sentences. + diffuse wheezes even without stethescope  ABDOMEN: Soft, Nontender, Nondistended; Bowel sounds normal  EXTREMITIES: + edema bilateral LE   PSYCH: Alert, oriented to person and place, not oriented to current year, able to state his birth year   NEURO: Symmetric facial expressions. Moves 4 extremities on command   Skin: No rashes    LINES:  peripheral IV x2    HOSPITAL MEDICATIONS:  MEDICATIONS  (STANDING):  albumin human 25% IVPB 100 milliLiter(s) IV Intermittent every 6 hours  cefTRIAXone   IVPB 2000 milliGRAM(s) IV Intermittent every 24 hours  chlorhexidine 4% Liquid 1 Application(s) Topical <User Schedule>  levothyroxine 88 MICROGram(s) Oral daily  norepinephrine Infusion 0.05 MICROgram(s)/kG/Min (5.95 mL/Hr) IV Continuous <Continuous>  octreotide  Injectable 50 MICROGram(s) SubCutaneous three times a day  pantoprazole Infusion 8 mG/Hr (10 mL/Hr) IV Continuous <Continuous>  predniSONE   Tablet 20 milliGRAM(s) Oral daily  rifAXIMin 550 milliGRAM(s) Oral two times a day    MEDICATIONS  (PRN):  albuterol/ipratropium for Nebulization 3 milliLiter(s) Nebulizer every 6 hours PRN Shortness of Breath and/or Wheezing      LABS:                        5.2    9.59  )-----------( 71       ( 18 May 2021 10:06 )             14.7     Hgb Trend: 5.2<--, 5.1<--, 7.6<--, 8.8<--, 11.8<--      122<L>  |  89<L>  |  103<H>  ----------------------------<  152<H>  4.9   |  19<L>  |  2.72<H>    Ca    9.0      18 May 2021 06:33  Phos  3.5       Mg     2.3         TPro  5.2<L>  /  Alb  3.6  /  TBili  1.2  /  DBili  0.5<H>  /  AST  29  /  ALT  21  /  AlkPhos  30<L>      Creatinine Trend: 2.72<--, 2.16<--, 1.88<--, 2.02<--  PT/INR - ( 18 May 2021 10:06 )   PT: 22.0 sec;   INR: 1.89 ratio         PTT - ( 18 May 2021 10:06 )  PTT:46.8 sec  Urinalysis Basic - ( 17 May 2021 16:21 )    Color: Yellow / Appearance: Clear / S.012 / pH: x  Gluc: x / Ketone: Negative  / Bili: Negative / Urobili: Negative   Blood: x / Protein: Negative / Nitrite: Negative   Leuk Esterase: Negative / RBC: x / WBC x   Sq Epi: x / Non Sq Epi: x / Bacteria: x      Arterial Blood Gas:   @ 10:26  7.38/33/180/19/100/-5.4  ABG lactate: --    Venous Blood Gas:   @ 16:26  7.34/42/29/22/47  VBG Lactate: 2.5  Venous Blood Gas:   @ 17:27  7.35/44/23/24/34  VBG Lactate: 1.9      MICROBIOLOGY:     RADIOLOGY & ADDITIONAL TESTS:  < from: CT Abdomen and Pelvis No Cont (21 @ 23:44) >    IMPRESSION:  Moderate to large right pleural effusion with complete collapse of the right middle and lower lobes. Small left pleural effusion with compressive atelectasis.  Cirrhosis and moderate abdominopelvic ascites, increased since prior exam.  No definite colonic mass, however evaluation is limited on this noncontrast study.  < end of copied text >    < from: TTE with Doppler (w/Cont) (21 @ 12:59) >  EF 40-45%  Conclusions:  1. Endocardium not well visualized; apical septal and  apical hypokoinesis. At least moderate left ventricular  systolic function.    Endocardial visualization enhanced  with intravenous injection of Ultrasonic Enhancing Agent  (Definity). No left ventricular thrombus.  2. Mild diastolic dysfunction (Stage I).  3. Right atrial enlargement.  Intermittent diastlic  inversion of the right atrial free wall by unclear fluid  collection.  Inadequate subcostal windows.  Suggest CT  correlation.  4. Bilateral pleural effusions.  Suggest CT and clinical correlation of percardial effusion  as apical and subcostal images are limted.  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 Differential includes meningioma and schwannoma. Recommend contrast-enhanced MRI of the IACs.  No acute intracranial bleeding.  < end of copied text >          Assessment and plan:     84 yo M w/ HF (EF 40-45%, moderate LV systolic function), cirrhosis of unknown etiology (suspect 2/2 hep B), ANDREEA suspect 2/2 hepatorenal syndrome, down-trending Hgb suspect 2/2 GI bleed, admitted to MICU due to shock placed on pressor support.   # Neuro     1. metabolic encephalopathy       - currently, via mandarin, patient is alert, oriented to name, place (knows he is in a hospital), year of his birth, but not current year, follows commands (squeezes both hands, wiggles toes).       - concern for hepatic encephalopathy vs. encephalopathy 2/2 low BP/anemia      - f/u serum ammonia       - when tolerating PO, c/w rifaximin, lactulose ---- patient is anticipated to be intubated in the afternoon, will plan for tube feeds      2. 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 Differential includes meningioma and schwannoma. Recommend contrast-enhanced MRI of the IACs. No acute intracranial bleeding.    # CV     1. Shock      - c/w levophed, for MAP > 65     - concern for hemorrhagic shock 2/2 GI bleed      - 2 units pRBC, 1 unit of platelet ordered      - plan for endoscope to evaluate GI bleed as follows     2. HF      - s/p AICD     - TTE : EF 40-45%, moderate LV systolic function, no LV thrombus, mild diastolic dysfunction (stage I)     - home HF medications: entresto 24-26 BID, lasix 80 PO daily --- currently holding in setting of hypotension, current volume status acceptable     3. CAD      - home prasugrel 10mg Po daily --- holding in setting of active bleed     # Pulm    1. Pleural effusion     - CT chest ():  Moderate to large R pleural effusion with complete collapse of R middle and lower lobes. Small L pleural effusion with complete atelectasis.      - plan for diagnostic/therapeutic paracentesis, suspect 2/2 cirrhosis     - currently satting well on RA ---  plan for intubation for EGD at bedside in MICU    # GI      1. 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          - hepatology following          -varices: unknown         -ascites: perihepatic and pelvic ascites present         -HE: likely contributing to AMS          -HCC: none seen but imaging so far non-contrast so unable to exclude HCC         -MELD-Na = 31 on           -c/w cerftriaxone IV for empiric treatment of SBP, at least 7 day course ( -  )          - plan for diagnostic/therapeutic paracentesis          - plan for EGD           - daily MELD-Na labs          - - when tolerating PO, c/w rifaximin, lactulose ---- patient is anticipated to be intubated in the afternoon, will plan for tube feeds          -Chronic liver disease work-up: Hepatitis B surface Ag, Ab, core IgM negative, core total positive, HBV DNA and E-Ag pendin     2. GI bleed         - Hgb trend 11.8 --> 7.6 -- > 5.1 , reports melena + BRBPR on floors         - 2 unit pRBC, 1 unit platelet ordered         - plan for EGD at bedside in MICU      # Heme       - anemia           - likely 2/2 acute blood loss 2/2 GI bleed, plan as per above      - thrombocytopenia           - Plt 75--> 68 --> 90 --> 58 --> 71          - likely 2/2 liver cirrhosis , 1 unit platelet ordered as plan for procedure      - Coagulopathic         -  currently INR 1.89 -- will trend daily         - likely 2/2 liver cirrhosis --- concurrently increased risk of thrombosis & bleeding     # Endo        - DM type 2        - A1c 5.5 on        - home regimen: Toujeo 20 units subQ bedtime; Humalog 8 units subQ TID w/ meals         - hold home insulin        - while NPO, FS q6h, low dose sliding scale insulin q6h     # DVT prophylaxis       - SCD        - hold chemical prophyaxis in setting of active GI bleed and plans for procedure (thoracentesis/paracentesis)     # Ethics:       - per primary team discussion with family as of   --- FULL CODE    Patient examined and care reviewed in detail on bedside rounds  Critically ill on pressors with cirrhosis/GIB/possible sepsis/large right PLEFF  Frequent bedside visits with therapy change today.   I have personally provided 70+ minutes of critical care time concurrently with the resident/fellow; this excludes time spent on separate procedures.  I have personally provided 35+ minutes of critical care time concurrently with the resident/fellow; this excludes time spent on separate procedures. MICU Accept Note    HPI / INTERVAL HISTORY:  85M w/ CAD s/p PCI, HF s/p AICD, DMII, HTN, CKD, decompensated cirrhosis of unknown etiology, presenting w/ 3 weeks of generalized weakness, dyspnea, and abd distension.         Patient found to have liver cirrhosis with large pleural effusion, ascites on CT A/P, hyponatremia,  worsening Hgb 11.8--> 7.6 --> 5.1, w/ melena and bright red blood per rectum concern for GI bleed, worsening ANDREEA with oliguria concern for hepatorenal syndrome, patient received albumin 25%100cc q6h since , and also receiving ceftriaxone for empiric treatment of SBP.  MICU consulted for hypotension 80/50 with lethargic mental status. Rapid response called, levophed initiated, and GI plan for endoscope at bedside in MICU, also initiated on octreotide.       PAST MEDICAL & SURGICAL HISTORY:  DM (diabetes mellitus)    HTN (hypertension)    CAD (coronary artery disease)    Hepatitis    CKD (chronic kidney disease)    AICD (automatic cardioverter/defibrillator) present    Artificial cardiac pacemaker        FAMILY HISTORY:  No pertinent family history in first degree relatives        SOCIAL HISTORY:  Smoking: [  ] Never Smoked  [  ] Former Smoker (# packs x # years)  [  ] Current Smoker (# packs x # years)  Substance Use:   EtOH Use:   Marital Status: [  ] Single  [  ]   [  ]   [  ]   Sexual History:   Occupation:  Recent Travel:  Country of Birth:   Advance Directives:     HOME MEDICATIONS:      Allergies    No Known Allergies    Intolerances          REVIEW OF SYSTEMS:  Unable to obtain full ROS due to patient mental status, patient denies chest pain/SOB/abdominal pain.     OBJECTIVE:  ICU Vital Signs Last 24 Hrs  T(C): 36.7 (18 May 2021 09:28), Max: 36.9 (18 May 2021 03:57)  T(F): 98 (18 May 2021 09:28), Max: 98.4 (18 May 2021 03:57)  HR: 100 (18 May 2021 10:29) (72 - 105)  BP: 142/65 (18 May 2021 10:00) (77/46 - 142/65)  BP(mean): 94 (18 May 2021 10:00) (71 - 94)  ABP: --  ABP(mean): --  RR: 22 (18 May 2021 10:00) (18 - 22)  SpO2: 100% (18 May 2021 10:29) (93% - 100%)        17 @ 07:  -   @ 07:00  --------------------------------------------------------  IN: 1090 mL / OUT: 500 mL / NET: 590 mL     @ 07: @ 10:39  --------------------------------------------------------  IN: 500 mL / OUT: 0 mL / NET: 500 mL      CAPILLARY BLOOD GLUCOSE      POCT Blood Glucose.: 192 mg/dL (18 May 2021 08:51)      PHYSICAL EXAM:  GENERAL: NAD  HEENT: R eye ecchymosis, jaundiced sclera    CV: Regular rate and rhythm. No murmurs, rubs, or gallops  Respiratory: normal respiratory effort, speaking in complete sentences. + diffuse wheezes even without stethescope  ABDOMEN: Soft, Nontender, Nondistended; Bowel sounds normal  EXTREMITIES: + edema bilateral LE   PSYCH: Alert, oriented to person and place, not oriented to current year, able to state his birth year   NEURO: Symmetric facial expressions. Moves 4 extremities on command   Skin: No rashes    LINES:  peripheral IV x2    HOSPITAL MEDICATIONS:  MEDICATIONS  (STANDING):  albumin human 25% IVPB 100 milliLiter(s) IV Intermittent every 6 hours  cefTRIAXone   IVPB 2000 milliGRAM(s) IV Intermittent every 24 hours  chlorhexidine 4% Liquid 1 Application(s) Topical <User Schedule>  levothyroxine 88 MICROGram(s) Oral daily  norepinephrine Infusion 0.05 MICROgram(s)/kG/Min (5.95 mL/Hr) IV Continuous <Continuous>  octreotide  Injectable 50 MICROGram(s) SubCutaneous three times a day  pantoprazole Infusion 8 mG/Hr (10 mL/Hr) IV Continuous <Continuous>  predniSONE   Tablet 20 milliGRAM(s) Oral daily  rifAXIMin 550 milliGRAM(s) Oral two times a day    MEDICATIONS  (PRN):  albuterol/ipratropium for Nebulization 3 milliLiter(s) Nebulizer every 6 hours PRN Shortness of Breath and/or Wheezing      LABS:                        5.2    9.59  )-----------( 71       ( 18 May 2021 10:06 )             14.7     Hgb Trend: 5.2<--, 5.1<--, 7.6<--, 8.8<--, 11.8<--      122<L>  |  89<L>  |  103<H>  ----------------------------<  152<H>  4.9   |  19<L>  |  2.72<H>    Ca    9.0      18 May 2021 06:33  Phos  3.5       Mg     2.3         TPro  5.2<L>  /  Alb  3.6  /  TBili  1.2  /  DBili  0.5<H>  /  AST  29  /  ALT  21  /  AlkPhos  30<L>      Creatinine Trend: 2.72<--, 2.16<--, 1.88<--, 2.02<--  PT/INR - ( 18 May 2021 10:06 )   PT: 22.0 sec;   INR: 1.89 ratio         PTT - ( 18 May 2021 10:06 )  PTT:46.8 sec  Urinalysis Basic - ( 17 May 2021 16:21 )    Color: Yellow / Appearance: Clear / S.012 / pH: x  Gluc: x / Ketone: Negative  / Bili: Negative / Urobili: Negative   Blood: x / Protein: Negative / Nitrite: Negative   Leuk Esterase: Negative / RBC: x / WBC x   Sq Epi: x / Non Sq Epi: x / Bacteria: x      Arterial Blood Gas:   @ 10:26  7.38/33/180/19/100/-5.4  ABG lactate: --    Venous Blood Gas:   @ 16:26  7.34/42/29/22/47  VBG Lactate: 2.5  Venous Blood Gas:   @ 17:27  7.35/44/23/24/34  VBG Lactate: 1.9      MICROBIOLOGY:     RADIOLOGY & ADDITIONAL TESTS:  < from: CT Abdomen and Pelvis No Cont (21 @ 23:44) >    IMPRESSION:  Moderate to large right pleural effusion with complete collapse of the right middle and lower lobes. Small left pleural effusion with compressive atelectasis.  Cirrhosis and moderate abdominopelvic ascites, increased since prior exam.  No definite colonic mass, however evaluation is limited on this noncontrast study.  < end of copied text >    < from: TTE with Doppler (w/Cont) (21 @ 12:59) >  EF 40-45%  Conclusions:  1. Endocardium not well visualized; apical septal and  apical hypokoinesis. At least moderate left ventricular  systolic function.    Endocardial visualization enhanced  with intravenous injection of Ultrasonic Enhancing Agent  (Definity). No left ventricular thrombus.  2. Mild diastolic dysfunction (Stage I).  3. Right atrial enlargement.  Intermittent diastlic  inversion of the right atrial free wall by unclear fluid  collection.  Inadequate subcostal windows.  Suggest CT  correlation.  4. Bilateral pleural effusions.  Suggest CT and clinical correlation of percardial effusion  as apical and subcostal images are limted.  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 Differential includes meningioma and schwannoma. Recommend contrast-enhanced MRI of the IACs.  No acute intracranial bleeding.  < end of copied text >          Assessment and plan:     84 yo M w/ HF (EF 40-45%, moderate LV systolic function), cirrhosis of unknown etiology (suspect 2/2 hep B), ANDREEA suspect 2/2 hepatorenal syndrome, down-trending Hgb suspect 2/2 GI bleed, admitted to MICU due to shock placed on pressor support.   # Neuro     1. metabolic encephalopathy       - currently, via mandarin, patient is alert, oriented to name, place (knows he is in a hospital), year of his birth, but not current year, follows commands (squeezes both hands, wiggles toes).       - concern for hepatic encephalopathy vs. encephalopathy 2/2 low BP/anemia      - f/u serum ammonia       - when tolerating PO, c/w rifaximin, lactulose ---- patient is anticipated to be intubated in the afternoon, will plan for tube feeds      2. 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 Differential includes meningioma and schwannoma. Recommend contrast-enhanced MRI of the IACs. No acute intracranial bleeding.    # CV     1. Shock      - c/w levophed, for MAP > 65     - concern for hemorrhagic shock 2/2 GI bleed      - 2 units pRBC, 1 unit of platelet ordered      - plan for endoscope to evaluate GI bleed as follows     2. HF      - s/p AICD     - TTE : EF 40-45%, moderate LV systolic function, no LV thrombus, mild diastolic dysfunction (stage I)     - home HF medications: entresto 24-26 BID, lasix 80 PO daily --- currently holding in setting of hypotension, current volume status acceptable     3. CAD      - home prasugrel 10mg Po daily --- holding in setting of active bleed     # Pulm    1. Pleural effusion     - CT chest ():  Moderate to large R pleural effusion with complete collapse of R middle and lower lobes. Small L pleural effusion with complete atelectasis.      - plan for diagnostic/therapeutic paracentesis, suspect 2/2 cirrhosis     - currently satting well on RA ---  plan for intubation for EGD at bedside in MICU    # Renal     Oliguric ANDREEA     - concern for hepatorenal syndrome     - c/w albumin, octreotide     - Lu in place, strict Is and Os     - Renal following, appreciate recs     # GI      1. 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          - hepatology following          -varices: unknown         -ascites: perihepatic and pelvic ascites present         -HE: likely contributing to AMS          -HCC: none seen but imaging so far non-contrast so unable to exclude HCC         -MELD-Na = 31 on           -c/w cerftriaxone IV for empiric treatment of SBP, at least 7 day course ( -  )          - plan for diagnostic/therapeutic paracentesis          - plan for EGD           - daily MELD-Na labs          - - when tolerating PO, c/w rifaximin, lactulose ---- patient is anticipated to be intubated in the afternoon, will plan for tube feeds          -Chronic liver disease work-up: Hepatitis B surface Ag, Ab, core IgM negative, core total positive, HBV DNA and E-Ag pendin     2. GI bleed         - Hgb trend 11.8 --> 7.6 -- > 5.1 , reports melena + BRBPR on floors         - 2 unit pRBC, 1 unit platelet ordered         - plan for EGD at bedside in MICU      # Heme       - anemia           - likely 2/2 acute blood loss 2/2 GI bleed, plan as per above      - thrombocytopenia           - Plt 75--> 68 --> 90 --> 58 --> 71          - likely 2/2 liver cirrhosis , 1 unit platelet ordered as plan for procedure      - Coagulopathic         -  currently INR 1.89 -- will trend daily         - likely 2/2 liver cirrhosis --- concurrently increased risk of thrombosis & bleeding     # Endo        - DM type 2        - A1c 5.5 on        - home regimen: Toujeo 20 units subQ bedtime; Humalog 8 units subQ TID w/ meals         - hold home insulin        - while NPO, FS q6h, low dose sliding scale insulin q6h     # DVT prophylaxis       - SCD        - hold chemical prophyaxis in setting of active GI bleed and plans for procedure (thoracentesis/paracentesis)     # Ethics:       - per primary team discussion with family as of   --- FULL CODE    Patient examined and care reviewed in detail on bedside rounds  Critically ill on pressors with cirrhosis/GIB/possible sepsis/large right PLEFF  Frequent bedside visits with therapy change today.   I have personally provided 70+ minutes of critical care time concurrently with the resident/fellow; this excludes time spent on separate procedures.  I have personally provided 35+ minutes of critical care time concurrently with the resident/fellow; this excludes time spent on separate procedures.

## 2021-05-18 NOTE — CHART NOTE - NSCHARTNOTEFT_GEN_A_CORE
Bre Judge PGY-2   Resident Physician  [phone]/ [id number]        Called daughter Cleopatra Styles, and obtained collateral.      Patient had severe hepatitis A infection 1962, self resolved, never had hepatitis again. 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 In 1988, patient immigrated to the [place], and was diagnosed with DM type 2, in 2011 patient lad cardiac sent and also AICD placement due to arrhythmia. In 2019, patient had AICD replacement due to mechanical problems. About 3 years ago, patient developed ascites, refractory, placed on diuretics, s/p multiple paracentesis. Unclear etiology of ascites per family. Patient persistently edematous, chronically complains of SOB, and since 5/12 worsening SOB, on 5/16 + melena, subsequently came to the hospital.

## 2021-05-18 NOTE — PROGRESS NOTE ADULT - SUBJECTIVE AND OBJECTIVE BOX
Renny Zimmerman MD  Division of Hospital Medicine  Pager: [phone]  If no response or off-hours, page [phone]  -------------------------------------    Patient is a 85y old  Male who presents with a chief complaint of sob, confusion, weakness, can't walk, fever (18 May 2021 07:37)      SUBJECTIVE / OVERNIGHT EVENTS: maroon stool reported o/n, recurrent hypotension this AM  ADDITIONAL REVIEW OF SYSTEMS: pt minimally arousable to sternal rub, ROs unable to fullyobtain.    MEDICATIONS  (STANDING):  albumin human 25% IVPB 100 milliLiter(s) IV Intermittent every 6 hours  cefTRIAXone   IVPB 2000 milliGRAM(s) IV Intermittent every 24 hours  dextrose 40% Gel 15 Gram(s) Oral once  dextrose 5%. 1000 milliLiter(s) (50 mL/Hr) IV Continuous <Continuous>  dextrose 5%. 1000 milliLiter(s) (100 mL/Hr) IV Continuous <Continuous>  dextrose 50% Injectable 25 Gram(s) IV Push once  dextrose 50% Injectable 12.5 Gram(s) IV Push once  dextrose 50% Injectable 25 Gram(s) IV Push once  famotidine    Tablet 20 milliGRAM(s) Oral daily  glucagon  Injectable 1 milliGRAM(s) IntraMuscular once  insulin lispro (ADMELOG) corrective regimen sliding scale   SubCutaneous three times a day before meals  insulin lispro (ADMELOG) corrective regimen sliding scale   SubCutaneous at bedtime  lactated ringers. 500 milliLiter(s) (500 mL/Hr) IV Continuous <Continuous>  lactated ringers. 500 milliLiter(s) (500 mL/Hr) IV Continuous <Continuous>  lactulose Syrup 10 Gram(s) Oral three times a day  levothyroxine 88 MICROGram(s) Oral daily  predniSONE   Tablet 20 milliGRAM(s) Oral daily  rifAXIMin 550 milliGRAM(s) Oral two times a day    MEDICATIONS  (PRN):      CAPILLARY BLOOD GLUCOSE      POCT Blood Glucose.: 185 mg/dL (18 May 2021 07:23)  POCT Blood Glucose.: 258 mg/dL (17 May 2021 21:38)  POCT Blood Glucose.: 231 mg/dL (17 May 2021 16:04)  POCT Blood Glucose.: 197 mg/dL (17 May 2021 12:04)    I&O's Summary    17 May 2021 07:01  -  18 May 2021 07:00  --------------------------------------------------------  IN: 1090 mL / OUT: 500 mL / NET: 590 mL        PHYSICAL EXAM:  Vital Signs Last 24 Hrs  T(C): 36.9 (18 May 2021 03:57), Max: 36.9 (18 May 2021 03:57)  T(F): 98.4 (18 May 2021 03:57), Max: 98.4 (18 May 2021 03:57)  HR: 90 (18 May 2021 07:38) (72 - 94)  BP: 90/54 (18 May 2021 07:51) (77/46 - 133/55)  BP(mean): --  RR: 18 (18 May 2021 07:38) (18 - 18)  SpO2: 94% (18 May 2021 07:38) (93% - 100%)  CONSTITUTIONAL: NAD, well-developed, well-groomed  EYES: PERRLA; conjunctiva and sclera clear  ENMT: Moist oral mucosa, no pharyngeal injection or exudates; normal dentition, pale mucosa  NECK: Supple, no palpable masses; no thyromegaly  RESPIRATORY: Normal respiratory effort; lungs are clear to auscultation bilaterally  CARDIOVASCULAR: Regular rate and rhythm, normal S1 and S2, no murmur/rub/gallop; No lower extremity edema; Peripheral pulses are 2+ bilaterally  ABDOMEN: +softly distended, Nontender to palpation, normoactive bowel sounds, no rebound/guarding; No hepatosplenomegaly  : reyes  MUSCLOSKELETAL:  Normal gait; no clubbing or cyanosis of digits; no joint swelling or tenderness to palpation  PSYCH: A+Ox0, lethargic  NEUROLOGY: unable to fully assess  SKIN: No rashes; no palpable lesions    LABS:                        5.1    7.47  )-----------( 58       ( 18 May 2021 06:33 )             14.4     05-18    122<L>  |  89<L>  |  103<H>  ----------------------------<  152<H>  4.9   |  19<L>  |  2.72<H>    Ca    9.0      18 May 2021 06:33  Phos  3.5     05-  Mg     2.3     -    TPro  5.2<L>  /  Alb  3.6  /  TBili  1.2  /  DBili  0.5<H>  /  AST  29  /  ALT  21  /  AlkPhos  30<L>  05-18    PT/INR - ( 17 May 2021 07:06 )   PT: 18.5 sec;   INR: 1.58 ratio         PTT - ( 17 May 2021 07:06 )  PTT:37.0 sec      Urinalysis Basic - ( 17 May 2021 16:21 )    Color: Yellow / Appearance: Clear / S.012 / pH: x  Gluc: x / Ketone: Negative  / Bili: Negative / Urobili: Negative   Blood: x / Protein: Negative / Nitrite: Negative   Leuk Esterase: Negative / RBC: x / WBC x   Sq Epi: x / Non Sq Epi: x / Bacteria: x        Culture - Blood (collected 16 May 2021 21:58)  Source: .Blood Blood-Peripheral  Preliminary Report (17 May 2021 22:01):    No growth to date.    Culture - Blood (collected 16 May 2021 21:58)  Source: .Blood Blood  Preliminary Report (17 May 2021 22:01):    No growth to date.        RADIOLOGY & ADDITIONAL TESTS:  Results Reviewed:   Imaging Personally Reviewed:  Electrocardiogram Personally Reviewed:    COORDINATION OF CARE:  Care Discussed with Consultants/Other Providers [Y/N]: hepatology, MICU  Prior or Outpatient Records Reviewed [Y/N]:

## 2021-05-18 NOTE — PROGRESS NOTE ADULT - ASSESSMENT
Pt with hx of cirrhosis (unclear etiology- not seeing a hepatologist), combined systolic/diastolic HF, CAD s/p MI with AICD for inducible VT as per cardiologist admitted with FTT, anasarca and ?fevers concern for hepatic encephalopathy and decompensated cirrhosis with presumed 2/2 sbp vs. less likely ADHF and concomitant hyponatremia likely intravascularly hypovolemic. Pt with recurrent hypotension yesterday, improved initially with fluids, now hypotensive again today with downtrending Hb and worsening ANDREEA with maroon stool reported o/n concerning for GIB, possibly variceal.    Per discussion with daughter, will pursue full curative treatment and keep patient FULL CODE.

## 2021-05-18 NOTE — PROGRESS NOTE ADULT - PROBLEM SELECTOR PLAN 2
suspect 2/2 sbp vs. less likely ADHF given normal BNP. Per dtr, pt had hepatitis A previously in life, but unclear etiology to cirrhosis.  - resume ceftriaxone 2gm iv q day for presumed sbp  - check hep B sAg, sAb, cAb, hep C ab, HIV, EBV, autoimmune hepatitis labs per hepatology  - IR declining para/thora due to instability- may reattempt later once stable  - hold lasix, entresto, monitor i/o via reyes  - continue albumin/octreotide  - hepatology on board

## 2021-05-18 NOTE — PROGRESS NOTE ADULT - ASSESSMENT
85M w/ CAD s/p PCI, HF s/p AICD, DMII, HTN, CKD, decompensated cirrhosis of unknown viral etiology, presenting w/ 3 weeks of generalized weakness, dyspnea, and abd distension. Found to have hyponatremia, ANDREEA, and large pleural effusions. Transferred to MICU on 5/18 for GI bleed w/ Hb suresh 5s.     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  #Hyponatremia - likely contributing to weakness and AMS. 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  -varices: unknown  -ascites: perihepatic and pelvic ascites present  -HE: likely contributing to AMS in addition to questionable sepsis and hyponatremia  -HCC: none seen but imaging so far non-contrast so unable to exclude HCC  -MELD-Na = 27 on 5/17  -Chronic liver disease work-up: Hepatitis B surface Ag, Ab, core IgM negative, core total positive, HBV DNA and E-Ag pending  #ANDREEA - worsened by bleed, low urine Na c/w poor renal perfusion, possible component of HRS, pt now receiving all components of HRS tx w/ transfer to ICU (pressors, octreotide gtt, continues to get albumin)  #Subjective fevers - possible infection leading to decompensation (PNA vs SBP) but no leukocytosis or fevers recorded  #HF - TTE shows decreased LV function and RV function  #Extra-axial mass - growing since 2018    Recommendations:  - Plan for EGD today in MICU  - Please transfuse 2u pRBC ASAP  - Patient will need intubation for procedure  - Continue w/ PPI and octreotide gtt  - Continue w/ CTX, will need at least 7 days  - After procedure, will require diagnostic para and may require thoracentesis, would send cell count, gram stain, culture, albumin, protein  - F/u BCx, UCx  - Would empirically fluid restrict to 1.5 L   - Agree w/ 25% albumin, 100mL q6h for ANDREEA w/ urine studies showing poor renal perfusion  - Hold diuretics for now  - Continue w/ lactulose titrated to 3-4 BMs per day when taking PO  - Continue w/ rifaxmin 550mg BID when taking PO  - Send Hepatitis B core IgM, core Ab total, surface Ag, surface Ab  - Low Na diet when able to take PO  - Trend CBC, CMP, INR daily  - Hepatology will follow    Saturnino Aguirre  Gastroenterology/Hepatology Fellow  Available via [employer] Teams    NON-URGENT CONSULTS:  Please email [email or web address]  [email or web address]  AT NIGHT AND ON WEEKENDS:  Contact on-call GI fellow via answering service ([phone]) from 5pm-8am and on weekends/holidays  MONDAY-FRIDAY 8AM-5PM:  Pager# [phone] ([facility]) or [phone] ([facility])  GI Phone# [phone] ([facility])

## 2021-05-18 NOTE — PROGRESS NOTE ADULT - PROBLEM SELECTOR PLAN 1
Maroon stool reported o/n and this morning, pt hypotensive and lethargic- despite negative FOBT suspect GIB possibly variceal vs. gastritis  - MICU consulted, patient to be transferred for urgent endoscopy- hepatology in agreement  - 2 large bore IVs  - protonix gtt with bolus  - start octreotide 50mcg sc tid  - holding midodrine due to aspiration risk- start peripheral levophed pending MICU transfer Maroon stool reported o/n and this morning, pt hypotensive and lethargic- despite negative FOBT suspect GIB possibly variceal vs. gastritis. RRT called for hypotension and need for levophed.  - MICU consulted, patient to be transferred for urgent endoscopy- hepatology in agreement  - 2 large bore IVs  - protonix gtt with bolus  - start octreotide 50mcg sc tid  - holding midodrine due to aspiration risk- start peripheral levophed pending MICU transfer

## 2021-05-18 NOTE — CHART NOTE - NSCHARTNOTEFT_GEN_A_CORE
Patient with hypotension to SBP 78, Very lethargic  AM labs:                       5.1    7.47  )-----------( 58       ( 18 May 2021 06:33 )             14.4   05-18    122<L>  |  89<L>  |  103<H>  ----------------------------<  152<H>  4.9   |  19<L>  |  2.72<H>    Ca    9.0      18 May 2021 06:33  Phos  3.5     05-17  Mg     2.3     05-18    TPro  5.2<L>  /  Alb  3.6  /  TBili  1.2  /  DBili  0.5<H>  /  AST  29  /  ALT  21  /  AlkPhos  30<L>  05-18    Discussed above with Dr YAMILEX Zimmerman  Plan:  PRBC x 1, Plts x1 ordered -  cc IV bolus while awaiting blood products  MICU consult called at this time  As per Dr Zimmerman's conversation with patient's family (Wife and daughter) last evening - patient is not currently DNR/DNI  Further plan pending MICU recommendations

## 2021-05-18 NOTE — PROGRESS NOTE ADULT - PROBLEM SELECTOR PLAN 10
- CT ap negative for mass  - also CTH showing increase in size of extraaxial mass. Would defer MRI until acute medical conditions stabilize.    discussed with floor NP, MICU, hepatology and dtr Cleopatra  prognosis guarded- remains full code. - CT ap negative for mass  - also CTH showing increase in size of extraaxial mass. Would defer MRI until acute medical conditions stabilize.    discussed with floor NP, MICU, hepatology and dtr Cleopatra who is aware pt is being transferred to MICU.  prognosis guarded- remains full code.

## 2021-05-18 NOTE — PROGRESS NOTE ADULT - PROBLEM SELECTOR PLAN 5
- pt reported fevers as outpt  - has been taking cefixime for the past 1 week as outpt- resume ceftriaxone 2gm I q24  - will get dx/tx para once stable  - blood cx, ur cx  - no fevers here

## 2021-05-18 NOTE — CONSULT NOTE ADULT - SUBJECTIVE AND OBJECTIVE BOX
[facility] DIVISION OF KIDNEY DISEASES AND HYPERTENSION   -- INITIAL CONSULT NOTE --  Terese Guevara, Nephrology Fellow     NS Pager: [phone] / CHUNG Pager: [phone]  After 5pm or on weekend, please page the on-call fellow via [email or web address].  --------------------------------------------------------------------------------    HPI: 85M PMHx CKD, cirrhosis (?viral hepatitis), CAD /sp stent, CHF s/p AICD, HTN, BPH,  DM2, ?colon tumor of unknown etiology who present to ED for worsening confusion, weakness, SOB, unable to ambulate.  Per EMR, family report patient being very drowsy, weak, had multiple falls (last fall 1 week prior), decrease appetite, wax/wean mental status.      Nephrology consulted for hyponatremia and ANDREEA on CKD.  [facility] patient's baseline serum creatinine is 1.4-1.6 in Jan 2019 and prior sNa 130-135.  On admission sCr 2.0 and sNa 120 with sOsm 329, Uosm 310, lactate 2.5, urinalysis bland (no protein/blood/rbc), CT showed large R pleural effusion and cirrhosis.  Pt given CTX/Azithromycin, IVF (NS/LR), lasix IV.  Outpatient home medication included entresto and lasix.  Blood pressure overnight sbp 90s.    PAST HISTORY  --------------------------------------------------------------------------------  PAST MEDICAL & SURGICAL HISTORY:  DM (diabetes mellitus)  HTN (hypertension)  CAD (coronary artery disease)  Hepatitis  CKD (chronic kidney disease)  AICD (automatic cardioverter/defibrillator) present  Artificial cardiac pacemaker    FAMILY HISTORY:  No pertinent family history in first degree relatives    PAST SOCIAL HISTORY:  ALLERGIES & MEDICATIONS  --------------------------------------------------------------------------------  Allergies  No Known Allergies    Intolerances    Standing Inpatient Medications  albumin human 25% IVPB 100 milliLiter(s) IV Intermittent every 6 hours  cefTRIAXone   IVPB 2000 milliGRAM(s) IV Intermittent every 24 hours  dextrose 40% Gel 15 Gram(s) Oral once  dextrose 5%. 1000 milliLiter(s) IV Continuous <Continuous>  dextrose 5%. 1000 milliLiter(s) IV Continuous <Continuous>  dextrose 50% Injectable 25 Gram(s) IV Push once  dextrose 50% Injectable 12.5 Gram(s) IV Push once  dextrose 50% Injectable 25 Gram(s) IV Push once  famotidine    Tablet 20 milliGRAM(s) Oral daily  glucagon  Injectable 1 milliGRAM(s) IntraMuscular once  insulin lispro (ADMELOG) corrective regimen sliding scale   SubCutaneous three times a day before meals  insulin lispro (ADMELOG) corrective regimen sliding scale   SubCutaneous at bedtime  lactated ringers. 500 milliLiter(s) IV Continuous <Continuous>  lactulose Syrup 10 Gram(s) Oral three times a day  levothyroxine 88 MICROGram(s) Oral daily  predniSONE   Tablet 20 milliGRAM(s) Oral daily  rifAXIMin 550 milliGRAM(s) Oral two times a day    PRN Inpatient Medications    REVIEW OF SYSTEMS    VITALS/PHYSICAL EXAM  --------------------------------------------------------------------------------  T(C): 36.9 (05-18-21 @ 03:57), Max: 36.9 (05-18-21 @ 03:57)  HR: 90 (05-18-21 @ 07:38) (72 - 94)  BP: 87/50 (05-18-21 @ 07:38) (77/46 - 133/55)  RR: 18 (05-18-21 @ 07:38) (18 - 18)  SpO2: 94% (05-18-21 @ 07:38) (93% - 100%)  Wt(kg): --  Height (cm): 170.2 (05-16-21 @ 16:22)  Weight (kg): 63.5 (05-16-21 @ 16:22)  BMI (kg/m2): 21.9 (05-16-21 @ 16:22)  BSA (m2): 1.74 (05-16-21 @ 16:22)    05-17-21 @ 07:01  -  05-18-21 @ 07:00  --------------------------------------------------------  IN: 1090 mL / OUT: 500 mL / NET: 590 mL    Physical Exam:    LABS/STUDIES  --------------------------------------------------------------------------------                5.1    7.47  >-----------<  58       [05-18-21 @ 06:33]              14.4     122  |  89  |  103  ----------------------------<  152      [05-18-21 @ 06:33]  4.9   |  19  |  2.72        Ca     9.0     [05-18-21 @ 06:33]      Mg     2.3     [05-18-21 @ 06:33]      Phos  3.5     [05-17-21 @ 07:14]    TPro  5.2  /  Alb  3.6  /  TBili  1.2  /  DBili  0.5  /  AST  29  /  ALT  21  /  AlkPhos  30  [05-18-21 @ 06:33]    PT/INR: PT 18.5 , INR 1.58       [05-17-21 @ 07:06]  PTT: 37.0       [05-17-21 @ 07:06]          [05-17-21 @ 07:14]  Serum Osmolality 329      [05-17-21 @ 18:48]    Creatinine Trend:  SCr 2.72 [05-18 @ 06:33]  SCr 2.16 [05-17 @ 16:18]  SCr 1.88 [05-17 @ 07:14]  SCr 2.02 [05-16 @ 17:27]    Urinalysis - [05-17-21 @ 16:21]      Color Yellow / Appearance Clear / SG 1.012 / pH 5.0      Gluc Negative / Ketone Negative  / Bili Negative / Urobili Negative       Blood Negative / Protein Negative / Leuk Est Negative / Nitrite Negative      RBC  / WBC  / Hyaline  / Gran  / Sq Epi  / Non Sq Epi  / Bacteria     Urine Creatinine 106      [05-17-21 @ 16:21]  Urine Protein 10      [05-18-21 @ 01:11]  Urine Sodium 10      [05-17-21 @ 16:21]  Urine Urea Nitrogen 442      [05-17-21 @ 22:45]  Urine Osmolality 310      [05-17-21 @ 16:21]    HbA1c 7.1      [01-12-19 @ 07:22]  TSH 1.00      [05-17-21 @ 08:41]    HBsAb 32.4      [05-17-21 @ 23:13]  HBsAg Nonreact      [05-17-21 @ 23:13]  HBcAb Reactive      [05-17-21 @ 23:13]  HCV 0.17, Nonreact      [05-17-21 @ 23:13]  HIV Nonreact      [05-18-21 @ 01:05]     [facility] DIVISION OF KIDNEY DISEASES AND HYPERTENSION   -- INITIAL CONSULT NOTE --  Terese Guevara, Nephrology Fellow     NS Pager: [phone] / CHUNG Pager: [phone]  After 5pm or on weekend, please page the on-call fellow via [email or web address].  --------------------------------------------------------------------------------    HPI: 85M PMHx CKD, cirrhosis (?viral hepatitis), CAD /sp stent, CHF s/p AICD, HTN, BPH,  DM2, ?colon tumor of unknown etiology who present to ED for worsening confusion, weakness, SOB, unable to ambulate.  Per EMR, family report patient being very drowsy, weak, had multiple falls (last fall 1 week prior), decrease appetite, wax/wean mental status.      Nephrology consulted for hyponatremia and ANDREEA on CKD.  [facility] patient's baseline serum creatinine is 1.4-1.6 in Jan 2019 and prior sNa 130-135.  On admission sCr 2.0 and sNa 120 with sOsm 329, Uosm 310, lactate 2.5, urinalysis bland (no protein/blood/rbc), CT showed large R pleural effusion and cirrhosis.  Pt given CTX/Azithromycin, IVF (NS/LR), lasix IV.  Outpatient home medication included entresto and lasix.  Blood pressure overnight sbp 90s.      PAST HISTORY  --------------------------------------------------------------------------------  PAST MEDICAL & SURGICAL HISTORY:  DM (diabetes mellitus)  HTN (hypertension)  CAD (coronary artery disease)  Hepatitis  CKD (chronic kidney disease)  AICD (automatic cardioverter/defibrillator) present  Artificial cardiac pacemaker    FAMILY HISTORY:  No pertinent family history in first degree relatives    PAST SOCIAL HISTORY:  ALLERGIES & MEDICATIONS  --------------------------------------------------------------------------------  Allergies  No Known Allergies    Intolerances    Standing Inpatient Medications  albumin human 25% IVPB 100 milliLiter(s) IV Intermittent every 6 hours  cefTRIAXone   IVPB 2000 milliGRAM(s) IV Intermittent every 24 hours  dextrose 40% Gel 15 Gram(s) Oral once  dextrose 5%. 1000 milliLiter(s) IV Continuous <Continuous>  dextrose 5%. 1000 milliLiter(s) IV Continuous <Continuous>  dextrose 50% Injectable 25 Gram(s) IV Push once  dextrose 50% Injectable 12.5 Gram(s) IV Push once  dextrose 50% Injectable 25 Gram(s) IV Push once  famotidine    Tablet 20 milliGRAM(s) Oral daily  glucagon  Injectable 1 milliGRAM(s) IntraMuscular once  insulin lispro (ADMELOG) corrective regimen sliding scale   SubCutaneous three times a day before meals  insulin lispro (ADMELOG) corrective regimen sliding scale   SubCutaneous at bedtime  lactated ringers. 500 milliLiter(s) IV Continuous <Continuous>  lactulose Syrup 10 Gram(s) Oral three times a day  levothyroxine 88 MICROGram(s) Oral daily  predniSONE   Tablet 20 milliGRAM(s) Oral daily  rifAXIMin 550 milliGRAM(s) Oral two times a day    PRN Inpatient Medications    REVIEW OF SYSTEMS    VITALS/PHYSICAL EXAM  --------------------------------------------------------------------------------  T(C): 36.9 (05-18-21 @ 03:57), Max: 36.9 (05-18-21 @ 03:57)  HR: 90 (05-18-21 @ 07:38) (72 - 94)  BP: 87/50 (05-18-21 @ 07:38) (77/46 - 133/55)  RR: 18 (05-18-21 @ 07:38) (18 - 18)  SpO2: 94% (05-18-21 @ 07:38) (93% - 100%)  Wt(kg): --  Height (cm): 170.2 (05-16-21 @ 16:22)  Weight (kg): 63.5 (05-16-21 @ 16:22)  BMI (kg/m2): 21.9 (05-16-21 @ 16:22)  BSA (m2): 1.74 (05-16-21 @ 16:22)    05-17-21 @ 07:01  -  05-18-21 @ 07:00  --------------------------------------------------------  IN: 1090 mL / OUT: 500 mL / NET: 590 mL    Physical Exam:  	Gen: NAD, well-appearing on nasal cannula  	HEENT: bruise around right periorbital  	Pulm: CTA B/L, no crackles  	CV: RRR, S1S2  	GI: +BS, soft, nontender/nondistended  	: reyes catheter in place  	MSK: Warm, no edema              Neuro: AAOx3  	Psych: flat affect and mood  	Skin: cool to touch feet    LABS/STUDIES  --------------------------------------------------------------------------------                5.1    7.47  >-----------<  58       [05-18-21 @ 06:33]              14.4     122  |  89  |  103  ----------------------------<  152      [05-18-21 @ 06:33]  4.9   |  19  |  2.72        Ca     9.0     [05-18-21 @ 06:33]      Mg     2.3     [05-18-21 @ 06:33]      Phos  3.5     [05-17-21 @ 07:14]    TPro  5.2  /  Alb  3.6  /  TBili  1.2  /  DBili  0.5  /  AST  29  /  ALT  21  /  AlkPhos  30  [05-18-21 @ 06:33]    PT/INR: PT 18.5 , INR 1.58       [05-17-21 @ 07:06]  PTT: 37.0       [05-17-21 @ 07:06]          [05-17-21 @ 07:14]  Serum Osmolality 329      [05-17-21 @ 18:48]    Creatinine Trend:  SCr 2.72 [05-18 @ 06:33]  SCr 2.16 [05-17 @ 16:18]  SCr 1.88 [05-17 @ 07:14]  SCr 2.02 [05-16 @ 17:27]    Urinalysis - [05-17-21 @ 16:21]      Color Yellow / Appearance Clear / SG 1.012 / pH 5.0      Gluc Negative / Ketone Negative  / Bili Negative / Urobili Negative       Blood Negative / Protein Negative / Leuk Est Negative / Nitrite Negative      RBC  / WBC  / Hyaline  / Gran  / Sq Epi  / Non Sq Epi  / Bacteria     Urine Creatinine 106      [05-17-21 @ 16:21]  Urine Protein 10      [05-18-21 @ 01:11]  Urine Sodium 10      [05-17-21 @ 16:21]  Urine Urea Nitrogen 442      [05-17-21 @ 22:45]  Urine Osmolality 310      [05-17-21 @ 16:21]    HbA1c 7.1      [01-12-19 @ 07:22]  TSH 1.00      [05-17-21 @ 08:41]    HBsAb 32.4      [05-17-21 @ 23:13]  HBsAg Nonreact      [05-17-21 @ 23:13]  HBcAb Reactive      [05-17-21 @ 23:13]  HCV 0.17, Nonreact      [05-17-21 @ 23:13]  HIV Nonreact      [05-18-21 @ 01:05]

## 2021-05-18 NOTE — CHART NOTE - NSCHARTNOTEFT_GEN_A_CORE
Bre Judge PGY-2   Resident Physician  [phone]/ [id number]         Called patient's PCP Dr. Lui via [phone]. 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 Patient's PMH includes HTN, hypothyroidism, CHF, DM type 2 & CAD, and patient has had progressive worsening functional status. No known history of cirrhosis, 2017 US only notable for mild fatty liver disease. Dr. Lui urged patient to go to hospital multiple times, but patient reluctant, replying that his functional status was poor and was doubtful that hospitalization would change his course, and expressed desire of DNR/DNI. On May 10, patient called Dr. Lui due to SOB & wheezing, due to concern for CHF exacerbation with superimposed asthma exacerbation, Dr. Lui prescribed prednisone 50mg PO daily x1 week (May 10 1st dose), and cefixime 400mg PO daily x 10 days (May 10 1st dose). Confirmed that patient was not never on prednisone before. Confirmed rest of med rec with Dr. Lui.

## 2021-05-18 NOTE — PROGRESS NOTE ADULT - ATTENDING COMMENTS
85M w/ CAD s/p PCI, HF s/p AICD, DMII, HTN, CKD, decompensated cirrhosis (unclear etiology) p/w weakness, dyspnea, ascites found to be hyponatremic, renal failure, pleural effusions now with AMS, GI bleed.    Abx/octreotide/PPI BID  ICU now, intubate, bedside EGD

## 2021-05-18 NOTE — PROGRESS NOTE ADULT - CONVERSATION DETAILS
Discussed worsening mental status and hypotension and plan of care. Also discussed high likelihood cardiac arrest given advanced age and comorbidities and pt's code preferences. Daughter states it has not been approached before and would like to discuss with the rest of the family before providing an answer. For now pt remains full code.
Discussed patient's worsening clinical status and need for MICU transfer for likely GIB. Also discussed code status- at this time patient remains full code.

## 2021-05-18 NOTE — PROGRESS NOTE ADULT - PROBLEM SELECTOR PLAN 3
Pt with documented hx of CHF. Also has hx of CAD s/p multiple stents and prior MI, s/p AICD. Fluid overload likely hepatic vs. cardiac driven. AICD placed 2/2 inducible VT likely related to prior MI, not for HF.  - Received OP records, no TTE on file- pt with EF ~40%, combined systolic/diastolic dysfunction.  - TTE inpatient  - tele  - trend CE  - hold lasix and entresto for now

## 2021-05-18 NOTE — PROGRESS NOTE ADULT - PROBLEM SELECTOR PLAN 4
likely intravascular hypovolemic (despite total body fluid overload) vs. less likely hypervolemic  - recheck urine lytes/osm  - hold lasix given hypotension  - trend BMP q8 hours  - nephrology consulted yesterday as confirmed by both day and night floor NPs- will hopefully see patient today.

## 2021-05-18 NOTE — CONSULT NOTE ADULT - ASSESSMENT
85M PMHx CKD, cirrhosis (?viral hepatitis) - admitted for decompensated cirrhosis.  Nephrology consulted for hyponatremia and ANDREEA on CKD.        # Hyponatremia  Pt w/ acute on chronic hyponatremia hypervolemic possibly 2/2 cirrhosis.  On admission sNa 120 (prior sNa 130-135), s/p lasix IV, IVF.  Last sNa 120    Recommendations:  - agree with albumin IV, consider starting midodrine PO (for BP optimization).  Continue fluid restriction 1 liter/day  - please change all dextrose in IV medication to normal saline if possible, can call pharmacy for assistance  - hepatology/IR consulted; thoracentesis/paracentesis pending hemodynamic stability   - ordered for repeat urinalysis, urine sodium/osm  - monitor BMP/sNa q4-6hr for now, avoid rapid correction goal 6-8mEq/24hr    # ANDREEA  Pt with ANDREEA on CKD likely 2/2 pre renal in the setting hypotension, entresto/lasix.  On admission sCr 2.0 (baseline sCr 1.4-1.6 in Jan 2019), lactate 2.5, UA bland. Overnight sBP 90s and last sCr 2.1  - consider starting midodrine for BP optimization  - agree with holding entresto until ANDREEA resolve then can rechallenge   - f/u Tolu  - monitor BMP, strict I/O, avoid nephrotoxic agents (NSAIDs, PPI, contrast), renally dose medications per GFR.   85M PMHx CKD, cirrhosis (?viral hepatitis) - admitted for decompensated cirrhosis.  Nephrology consulted for hyponatremia and ANDREEA on CKD.        # Hyponatremia  Pt w/ acute on chronic hyponatremia hypervolemic possibly 2/2 cirrhosis.  On admission sNa 120 (prior sNa 130-135), s/p lasix IV, IVF.  Last sNa 120    Recommendations:  - blood transfusion per primary team  - consider starting midodrine PO (for BP optimization).  Continue fluid restriction 1 liter/day  - please change all dextrose in IV medication to normal saline if possible (IV antibiotic), can call pharmacy for assistance  - hepatology/IR consulted; thoracentesis/paracentesis pending hemodynamic stability   - ordered for repeat urinalysis, urine sodium/osm  - monitor BMP/sNa q4-6hr for now, avoid rapid correction goal 6-8mEq/24hr    # ANDREEA  Pt with ANDREEA on CKD likely 2/2 pre renal in the setting hypotension, entresto/lasix, acute anemia.  On admission sCr 2.0 (baseline sCr 1.4-1.6 in Jan 2019), lactate 2.5, UA bland. Overnight sBP 90s and last sCr 2.1  - currently no absolute indication for dialysis, will re-assess daily  - blood transfusion, consider starting midodrine for BP optimization  - agree with holding entresto until ANDREEA resolve then can rechallenge   - f/u Urine electrolytes, can consider paracentesis once stable  - monitor BMP, strict I/O, avoid nephrotoxic agents (NSAIDs, PPI, contrast), renally dose medications per GFR.

## 2021-05-18 NOTE — CONSULT NOTE ADULT - ATTENDING COMMENTS
85M w/ CAD s/p PCI, HF s/p AICD, DMII, HTN, CKD, decompensated cirrhosis (unclear etiology) p/w weakness, dyspnea, ascites found to be hyponatremic, renal failure, pleural effusions.     Diagnostic and therapeutic paracentesis + thoracentesis.  Albumin challenge.  Closely trend sodium levels.  Agree with empiric ceftriaxone.
Seen on floor during MICU transfer related RRT  1.  ARF-.  Volume, hemodynamic resuscitation.  Transfusion.  NO  renal replacement required at present.  Hold entresto  2,  HYponatremia--volume resuscitation, minimize hypotonic fluids.  No absolute need for rapid correction.  If mental status remains depressed can give intermittent boluses 100cc 3%NaCl  3.  Lactic acidosis--hypotension, 4.  Pressor, volume, PRBC  4.  Anemia--etiology unclear.  Transfuse to hgb>7

## 2021-05-19 NOTE — DIETITIAN INITIAL EVALUATION ADULT. - ENTERAL
1. Recommend as medically feasible, initiate trickle feds of Vital 1.5 and increase as tolerated until goal rate 60ml/hr x 18hrs + 2 'No Carb Prosource TF' daily. At goal to provide 1080ml formula, 1700kcals, 95g protein, 825ml free H2O (meets 25kcals/kg & 1.4g protein/kg based on IBW 67.1kg).

## 2021-05-19 NOTE — DIETITIAN INITIAL EVALUATION ADULT. - CHIEF COMPLAINT
85M, PMH of CAD s/p PCI, CHF s/p AICD, T2DM, HTN, CKD, decompensated cirrhosis of unknown etiology, P/w 3 weeks of generalized weakness, dyspnea, and abd distension. Found w/ liver cirrhosis & large PE + ascites; also w/ melena - concern for GIB and currently w/ worsening ANDREEA. Transferred to MICU s/p intubation 5/18  - Sedated (fent, prop); Pressors (levo - currently on hold)

## 2021-05-19 NOTE — PROGRESS NOTE ADULT - SUBJECTIVE AND OBJECTIVE BOX
Chief Complaint:  Patient is a 85y old  Male who presents with a chief complaint of sob, confusion, weakness, can't walk, fever (19 May 2021 08:29)    Reason for consult: cirrhosis, weakness, GI bleed    Interval Events: No further GI bleeding, off pressors. Pleural fluid with high TG.     Hospital Medications:  albuterol/ipratropium for Nebulization 3 milliLiter(s) Nebulizer every 6 hours PRN  cefTRIAXone   IVPB 2000 milliGRAM(s) IV Intermittent every 24 hours  chlorhexidine 0.12% Liquid 15 milliLiter(s) Oral Mucosa every 12 hours  chlorhexidine 4% Liquid 1 Application(s) Topical <User Schedule>  dextrose 40% Gel 15 Gram(s) Oral once  dextrose 5%. 1000 milliLiter(s) IV Continuous <Continuous>  dextrose 5%. 1000 milliLiter(s) IV Continuous <Continuous>  dextrose 50% Injectable 25 Gram(s) IV Push once  dextrose 50% Injectable 12.5 Gram(s) IV Push once  dextrose 50% Injectable 25 Gram(s) IV Push once  fentaNYL   Infusion... 0.5 MICROgram(s)/kG/Hr IV Continuous <Continuous>  glucagon  Injectable 1 milliGRAM(s) IntraMuscular once  insulin lispro (ADMELOG) corrective regimen sliding scale   SubCutaneous every 6 hours  levothyroxine Injectable 44 MICROGram(s) IV Push at bedtime  norepinephrine Infusion 0.05 MICROgram(s)/kG/Min IV Continuous <Continuous>  nystatin Ointment 1 Application(s) Topical two times a day  pantoprazole Infusion 8 mG/Hr IV Continuous <Continuous>  propofol Infusion 10 MICROgram(s)/kG/Min IV Continuous <Continuous>      ROS: Pt unable to provide    PHYSICAL EXAM:   Vital Signs:  Vital Signs Last 24 Hrs  T(C): 36.7 (19 May 2021 08:00), Max: 36.7 (19 May 2021 08:00)  T(F): 98.1 (19 May 2021 08:00), Max: 98.1 (19 May 2021 08:00)  HR: 71 (19 May 2021 08:40) (64 - 127)  BP: 113/58 (19 May 2021 08:00) (86/42 - 166/72)  BP(mean): 80 (19 May 2021 08:00) (61 - 103)  RR: 16 (19 May 2021 08:00) (12 - 20)  SpO2: 100% (19 May 2021 08:40) (72% - 100%)  Daily     Daily     GENERAL: no acute distress, intubated  NEURO: sedated  HEENT: anicteric sclera, no conjunctival pallor appreciated  CHEST: no respiratory distress, no accessory muscle use  CARDIAC: regular rate, rhythm  ABDOMEN: soft, non-tender, non-distended, no rebound or guarding  EXTREMITIES: warm, well perfused, no edema  SKIN: no lesions noted    LABS: reviewed                        8.0    4.42  )-----------( 26       ( 19 May 2021 08:16 )             22.1     05-19    125<L>  |  91<L>  |  99<H>  ----------------------------<  157<H>  4.3   |  18<L>  |  2.44<H>    Ca    9.0      19 May 2021 01:26  Phos  4.1     05-19  Mg     2.3     05-19    TPro  5.2<L>  /  Alb  4.1  /  TBili  1.8<H>  /  DBili  x   /  AST  24  /  ALT  18  /  AlkPhos  29<L>  05-19    LIVER FUNCTIONS - ( 19 May 2021 01:26 )  Alb: 4.1 g/dL / Pro: 5.2 g/dL / ALK PHOS: 29 U/L / ALT: 18 U/L / AST: 24 U/L / GGT: x             Interval Diagnostic Studies: see sunrise for full report

## 2021-05-19 NOTE — PROGRESS NOTE ADULT - ATTENDING COMMENTS
Patient examined and care reviewed in detail on bedside rounds  Critically ill on vent/pressors with UGIB/ANDREEA/liver failure Now s/p high volume thoracentesis For discontinuation of sedation with SBT when awake  Frequent bedside visits with therapy change today.   I have personally provided 70+ minutes of critical care time concurrently with the resident/fellow; this excludes time spent on separate procedures.  I have personally provided 35+ minutes of critical care time concurrently with the resident/fellow; this excludes time spent on separate procedures.

## 2021-05-19 NOTE — DIETITIAN INITIAL EVALUATION ADULT. - PERTINENT LABORATORY DATA
05-19 Na125 mmol/L<L> Glu 157 mg/dL<H> K+ 4.3 mmol/L Cr  2.44 mg/dL<H> BUN 99 mg/dL<H> Phos 4.1 mg/dL Alb 4.1 g/dL PAB n/a       CAPILLARY BLOOD GLUCOSE  POCT Blood Glucose.: 153 mg/dL (19 May 2021 04:50)  POCT Blood Glucose.: 201 mg/dL (18 May 2021 23:40)  POCT Blood Glucose.: 192 mg/dL (18 May 2021 08:51)

## 2021-05-19 NOTE — DIETITIAN INITIAL EVALUATION ADULT. - OTHER INFO
Diet Hx in-house: Pt was on PO diet upon admission 5/16 up until RRT 5/18; noted to consume <50% at meals (per flowsheets). Pt has been NPO since 5/18.  - Propofol ordered - if infusions continue at current rate (11.4ml/hr), to provide additional 300kcals/day    GI: Last BM 5/18 (x5) - maroon stools noted; no bowel regimen ordered at this time    Weight Hx Per [facility] Growth Chart: 169Ibs (1/2018), 149Ibs (12/2018), 178Ibs (1/2019) - wt fluctations were likely 2/2 fluid shifts  Dosing weight 5/16: 139.9Ibs; Daily weights: 168Ibs (5/17), 173Ibs (5/18)    - Noted pt diuresed upon admit and s/p thoracentesis 5/18 (1.6L fluid removal) ; of note pt also on PO lasix PTA.    Hx of DM, A1c 5.5% (5/17) - indicating optimal glycemic control, however concern for hypoglycemic episodes? DM medication management PTA (per H&P) included: Toucatrachita HumaLOG  - Insulin Sliding Scale ordered to optimize BG

## 2021-05-19 NOTE — PROGRESS NOTE ADULT - ATTENDING COMMENTS
85M w/ CAD s/p PCI, HF s/p AICD, DMII, HTN, CKD, decompensated cirrhosis (unclear etiology) p/w weakness, dyspnea, ascites found to be hyponatremic, renal failure, pleural effusions now with AMS, GI bleed s/p large duodenal ulcer 5/18/21.     PPI drip for at least 72 hours then PPI BID.  Abx x 7 days.  Vent management per ICU.

## 2021-05-19 NOTE — PROGRESS NOTE ADULT - ASSESSMENT
85M w/ CAD s/p PCI, HF s/p AICD, DMII, HTN, CKD, decompensated cirrhosis of unknown viral etiology, presenting w/ 3 weeks of generalized weakness, dyspnea, and abd distension. Found to have hyponatremia, ANDREEA, and large pleural effusions. Transferred to MICU on 5/18 for GI bleed from large duodenal ulcer. 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  #Hyponatremia - likely contributing to weakness and AMS.  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  -varices: unknown  -ascites: perihepatic and pelvic ascites present  -HE: likely contributing to AMS in addition to questionable sepsis and hyponatremia  -HCC: none seen but imaging so far non-contrast so unable to exclude HCC  -MELD-Na = 30 on 5/17  -Chronic liver disease work-up: Hepatitis B surface Ag, Ab, core IgM negative, core total positive, HBV DNA and E-Ag pending  #ANDREEA - worsened by bleed, low urine Na c/w poor renal perfusion, possible component of HRS, pt now receiving all components of HRS tx w/ transfer to ICU (pressors, octreotide gtt, continues to get albumin)  #Subjective fevers - possible infection leading to decompensation (PNA vs SBP) but no leukocytosis or fevers recorded  #HF - TTE shows decreased LV function and RV function  #Extra-axial mass - growing since 2018    Recommendations:  - PPI gtt for at least 72 hours post procedure (can end 5/21 PM), would transition to PPI BID  - Continue w/ CTX, would not end before 5/24  - Recommend diagnostic para would send cell count, gram stain, culture, albumin, protein  - F/u cytology from pleural fluid  - Agree w/ 25% albumin, 100mL q6h for ANDREEA w/ urine studies showing poor renal perfusion  - Hold diuretics for now  - Continue w/ lactulose titrated to 3-4 BMs per day when taking PO  - Continue w/ rifaxmin 550mg BID when taking PO  - Send Hepatitis B DNA  - F/u H pylori Ab serum, will tx outpatient if positive  - Low Na diet when able to take PO  - Trend CBC, CMP, INR daily  - Hepatology will follow    Saturnino K. Sreenivasan  Gastroenterology/Hepatology Fellow  Available via Microsoft Teams    NON-URGENT CONSULTS:  Please email [email or web address]  AT NIGHT AND ON WEEKENDS:  Contact on-call GI fellow via answering service ([phone]) from 5pm-8am and on weekends/holidays  MONDAY-FRIDAY 8AM-5PM:  Pager# [phone] ([facility]) or [phone] ([facility])  GI Phone# [phone] ([facility])

## 2021-05-19 NOTE — PROGRESS NOTE ADULT - ASSESSMENT
84 yo M w/ HF (EF 40-45%, moderate LV systolic function), cirrhosis of unknown etiology (suspect 2/2 hep B), ANDREEA suspect 2/2 hepatorenal syndrome, down-trending Hgb suspect 2/2 GI bleed, admitted to MICU due to shock placed on pressor support.   # Neuro     1. Metabolic encephalopathy       - this morning, patient opes eyes spontaneously, did not respond to verbal questions or follow verbal commands, grimaces to painful stimuli (nail bed pressure and sternal rub) ---likely 2/2 residual sedatives       - c/w propofol gtt, while intubated       - serum ammonia 24, low suspicion for hepatic encephalopathy currently       - when feeds starts will resume rifaximin, lactulose     2. R cerebellopontine angle extra-axial mass      - s/p head CT 5/16: Interval enlargement of right cerebellopontine angle extra-axial mass. Differential includes meningioma and schwannoma. Recommend contrast-enhanced MRI of the IACs. No acute intracranial bleeding.     - obtained collateral from daughter and PCP, no history of cancer.    # CV     1. Shock      - resolved, off pressors since 5/18 PM      - Etiology: hemorrhagic shock 2/2 GI bleed 2/2 duodenal ulcer, currently bleedings stopped      - 5/18--5/19, s/p 4 units pRBC, 1 unit platelet, 1 unit FFP      - s/p EGD on 5/18: - Small (< 5 mm) esophageal varices.                                           - One non-bleeding, large (4 cm x 4 cm), cratered duodenal ulcer with     2. HF      - s/p AICD     - TTE 5/17: EF 40-45%, moderate LV systolic function, no LV thrombus, mild diastolic dysfunction (stage I)     - home HF medications: entresto 24-26 BID, lasix 80 PO daily --- currently holding in setting of hypotension, current volume status acceptable     3. CAD      - home prasugrel 10mg Po daily --- holding in setting of active bleed     # Pulm    1. Pleural effusion     - CT chest (5/16):  Moderate to large R pleural effusion with complete collapse of R middle and lower lobes. Small L pleural effusion with complete atelectasis.      - s/p intubation 5/18 in preparation for EGD, Volume A/C: Rate 16, TV 400cc, PEEP 5, FiO2 40 ----plan for extubation later today   - s/p thoracentesis on 5/18, -1.6L, negative gram stain, based on Light's criteria, transudative     # Renal     ANDREEA    - improving, UOP 2.9L last 24 hr, Cr peaked at 2.74 on 5/18, currently downtrended to 2.4 today.    - likely pre-renal vs. ATN in setting of hypotension 2/2 GI bleed, unlikely hepatorenal syndrome     - Discontinued albumin, octreotide     - Lu in place, strict Is and Os     - Renal following, appreciate recs     # GI      1. 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          - hepatology following          -varices: s/p EGD 5/18/2021, 5mm esophageal varices          -ascites: perihepatic and pelvic ascites present         -HE: likely contributing to AMS          -HCC: none seen but imaging so far non-contrast so unable to exclude HCC         -MELD-Na = 30 on 5/19         - s/p diagnostic paracentesis --- f/u studies, will decide whether c/w treatment does ceftriaxone vs. prophylactic does ceftriaxone          - daily MELD-Na labs          - starting feeding, resume rifaximin, lactulose                 -Chronic liver disease work-up: Hepatitis B surface Ag, Ab, core IgM negative, core total positive, HBV DNA and E-Ag pendin     2. GI bleed         -s/p EGD 5/18: - Small (< 5 mm) esophageal varices.                                        - One non-bleeding, large (4 cm x 4 cm), cratered duodenal ulcer with           -  5/18--5/19, s/p 4 units pRBC, 1 unit platelet, 1 unit FFP         - c/w protonix gtt 72 hrs post-EGD         - CBC q8h     # Heme       - thrombocytopenia           - Plt dropped to 26 thuis AM, unclear etiology as further drop, previously > 50         - s/p 1 unit platelet on 5/18----> 1 unit platelet ordered today 5/19      - Coagulopathic         -  currently INR 1.79-- will trend daily         - likely 2/2 liver cirrhosis --- concurrently increased risk of thrombosis & bleeding     # Endo        - DM type 2        - A1c 5.5 on 5/17       - home regimen: Toujeo 20 units subQ bedtime; Humalog 8 units subQ TID w/ meals         - hold home insulin        - while NPO, FS q6h, low dose sliding scale insulin q6h     # DVT prophylaxis       - SCD        - hold chemical prophyaxis in setting of active GI bleed and plans for procedure (thoracentesis/paracentesis)     # Ethics:       - per primary team discussion with family as of 5/18  --- FULL CODE       - per discussion with PCP Dr Lui (see chart note 5/18), 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

## 2021-05-19 NOTE — DIETITIAN INITIAL EVALUATION ADULT. - PROBLEM SELECTOR PLAN 5
- CTH showing interval increase in size of mass, otherwise no acute events  - hepatic encephalopathy is also on differential  - suspect 2/2 all medical conditions above  - will start rifaximin  - check ammonia  - cont to monitor ms  - PT eval

## 2021-05-19 NOTE — DIETITIAN INITIAL EVALUATION ADULT. - OTHER CALCULATIONS
IBW (67.1kg) utilized in setting of weight discrepancy; defer fluids to team. Batson State Equation (REE): 1364kcals (20kcals/kg per IBW)

## 2021-05-19 NOTE — PROGRESS NOTE ADULT - SUBJECTIVE AND OBJECTIVE BOX
[facility] DIVISION OF KIDNEY DISEASES AND HYPERTENSION   -- FOLLOW UP NOTE --   Terese Guevara  Nephrology Fellow  Pager [facility]: [phone]   /  Pager [facility]: [phone]  (after 5pm or weekend please page the on-call fellow via [email or web address])  ======================================================  24 hour events/subjective:  - yesterday/overnight given total 4u prbc, 1u plt, thoracentesis done 1.6L removed and intubated for airway protection/AMS.  Vitals afebrile, BP 90-100s/50s, total UOP 0.8 liters in the past 24hr  - patient seen and examined at bedside this morning intubated/sedated FIO2 40%  - vitals/lab/medications reviewed, noted for sCr 2.7 to 2.4, sNa 120 to 125, hgb 5.1 to 7.1    PAST HISTORY  --------------------------------------------------------------------------------  No significant changes to PMH, PSH, FHx, SHx, unless otherwise noted    ALLERGIES & MEDICATIONS  --------------------------------------------------------------------------------  Allergies  No Known Allergies    Intolerances    Standing Inpatient Medications  albumin human 25% IVPB 100 milliLiter(s) IV Intermittent every 6 hours  cefTRIAXone   IVPB 2000 milliGRAM(s) IV Intermittent every 24 hours  chlorhexidine 0.12% Liquid 15 milliLiter(s) Oral Mucosa every 12 hours  chlorhexidine 4% Liquid 1 Application(s) Topical <User Schedule>  dextrose 40% Gel 15 Gram(s) Oral once  dextrose 5%. 1000 milliLiter(s) IV Continuous <Continuous>  dextrose 5%. 1000 milliLiter(s) IV Continuous <Continuous>  dextrose 50% Injectable 25 Gram(s) IV Push once  dextrose 50% Injectable 12.5 Gram(s) IV Push once  dextrose 50% Injectable 25 Gram(s) IV Push once  fentaNYL   Infusion... 0.5 MICROgram(s)/kG/Hr IV Continuous <Continuous>  glucagon  Injectable 1 milliGRAM(s) IntraMuscular once  insulin lispro (ADMELOG) corrective regimen sliding scale   SubCutaneous every 6 hours  levothyroxine Injectable 44 MICROGram(s) IV Push at bedtime  norepinephrine Infusion 0.05 MICROgram(s)/kG/Min IV Continuous <Continuous>  nystatin Ointment 1 Application(s) Topical two times a day  pantoprazole Infusion 8 mG/Hr IV Continuous <Continuous>  propofol Infusion 10 MICROgram(s)/kG/Min IV Continuous <Continuous>    PRN Inpatient Medications  albuterol/ipratropium for Nebulization 3 milliLiter(s) Nebulizer every 6 hours PRN    REVIEW OF SYSTEMS  unable to obtain d/t intubated/sedated    VITALS/PHYSICAL EXAM  --------------------------------------------------------------------------------  T(C): 36 (05-19-21 @ 04:00), Max: 36.7 (05-18-21 @ 09:28)  HR: 72 (05-19-21 @ 06:00) (64 - 127)  BP: 114/59 (05-19-21 @ 06:00) (86/42 - 166/72)  RR: 19 (05-19-21 @ 06:00) (12 - 22)  SpO2: 100% (05-19-21 @ 06:00) (72% - 100%)  Wt(kg): --    05-18-21 @ 07:01  -  05-19-21 @ 07:00  --------------------------------------------------------  IN: 2691.5 mL / OUT: 2435 mL / NET: 256.5 mL    Physical Exam:  	Gen: intubated  	HEENT: bruise around right periorbital  	Pulm: mechanical vent sounds  	CV: non tachycardic  	GI: soft  	: reyes catheter in place  	MSK: Warm, no edema              Neuro: unable to assess, sedated  	Psych: unable to assess, sedated  	Skin: cool to touch feet    LABS/STUDIES  --------------------------------------------------------------------------------              7.1    4.88  >-----------<  55       [05-19-21 @ 01:25]              20.0     125  |  91  |  99  ----------------------------<  157      [05-19-21 @ 01:26]  4.3   |  18  |  2.44        Ca     9.0     [05-19-21 @ 01:26]      Mg     2.3     [05-19-21 @ 01:26]      Phos  4.1     [05-19-21 @ 01:26]    TPro  5.2  /  Alb  4.1  /  TBili  1.8  /  DBili  x   /  AST  24  /  ALT  18  /  AlkPhos  29  [05-19-21 @ 01:26]    PT/INR: PT 20.9 , INR 1.79       [05-19-21 @ 01:25]  PTT: 41.1       [05-19-21 @ 01:25]          [05-18-21 @ 15:00]  Serum Osmolality 329      [05-17-21 @ 18:48]    Creatinine Trend:  SCr 2.44 [05-19 @ 01:26]  SCr 2.48 [05-19 @ 00:26]  SCr 2.74 [05-18 @ 10:06]  SCr 2.72 [05-18 @ 06:33]  SCr 2.16 [05-17 @ 16:18]    Urinalysis - [05-18-21 @ 11:13]      Color Yellow / Appearance Slightly Turbid / SG 1.016 / pH 6.0      Gluc Negative / Ketone Negative  / Bili Negative / Urobili Negative       Blood Large / Protein 30 mg/dL / Leuk Est Large / Nitrite Negative       / WBC 13 / Hyaline 3 / Gran  / Sq Epi  / Non Sq Epi 3 / Bacteria Negative    Urine Creatinine 106      [05-17-21 @ 16:21]  Urine Protein 10      [05-18-21 @ 01:11]  Urine Sodium 10      [05-17-21 @ 16:21]  Urine Urea Nitrogen 442      [05-17-21 @ 22:45]  Urine Osmolality 310      [05-17-21 @ 16:21]    HbA1c 7.1      [01-12-19 @ 07:22]  TSH 1.00      [05-17-21 @ 08:41]    HBsAb 32.4      [05-17-21 @ 23:13]  HBsAg Nonreact      [05-17-21 @ 23:13]  HBcAb Reactive      [05-17-21 @ 23:13]  HCV 0.17, Nonreact      [05-17-21 @ 23:13]  HIV Nonreact      [05-18-21 @ 01:05]     [facility] DIVISION OF KIDNEY DISEASES AND HYPERTENSION   -- FOLLOW UP NOTE --   Terese Guevara  Nephrology Fellow  Pager [facility]: [phone]   /  Pager [facility]: [phone]  (after 5pm or weekend please page the on-call fellow via [email or web address])  ======================================================  24 hour events/subjective:  - yesterday/overnight given total 4u prbc, 1u plt, thoracentesis done 1.6L removed and intubated for airway protection/AMS.  Vitals afebrile, BP 90-100s/50s, total UOP 0.8 liters in the past 24hr  - patient seen and examined at bedside this morning intubated/sedated FIO2 40%  - vitals/lab/medications reviewed, noted for sCr 2.7 to 2.4, sNa 120 to 125, hgb 5.1 to 7.1    PAST HISTORY  --------------------------------------------------------------------------------  No significant changes to PMH, PSH, FHx, SHx, unless otherwise noted    ALLERGIES & MEDICATIONS  --------------------------------------------------------------------------------  Allergies  No Known Allergies    Intolerances    Standing Inpatient Medications  albumin human 25% IVPB 100 milliLiter(s) IV Intermittent every 6 hours  cefTRIAXone   IVPB 2000 milliGRAM(s) IV Intermittent every 24 hours  chlorhexidine 0.12% Liquid 15 milliLiter(s) Oral Mucosa every 12 hours  chlorhexidine 4% Liquid 1 Application(s) Topical <User Schedule>  dextrose 40% Gel 15 Gram(s) Oral once  dextrose 5%. 1000 milliLiter(s) IV Continuous <Continuous>  dextrose 5%. 1000 milliLiter(s) IV Continuous <Continuous>  dextrose 50% Injectable 25 Gram(s) IV Push once  dextrose 50% Injectable 12.5 Gram(s) IV Push once  dextrose 50% Injectable 25 Gram(s) IV Push once  fentaNYL   Infusion... 0.5 MICROgram(s)/kG/Hr IV Continuous <Continuous>  glucagon  Injectable 1 milliGRAM(s) IntraMuscular once  insulin lispro (ADMELOG) corrective regimen sliding scale   SubCutaneous every 6 hours  levothyroxine Injectable 44 MICROGram(s) IV Push at bedtime  norepinephrine Infusion 0.05 MICROgram(s)/kG/Min IV Continuous <Continuous>  nystatin Ointment 1 Application(s) Topical two times a day  pantoprazole Infusion 8 mG/Hr IV Continuous <Continuous>  propofol Infusion 10 MICROgram(s)/kG/Min IV Continuous <Continuous>    PRN Inpatient Medications  albuterol/ipratropium for Nebulization 3 milliLiter(s) Nebulizer every 6 hours PRN    REVIEW OF SYSTEMS  unable to obtain d/t intubated/sedated    VITALS/PHYSICAL EXAM  --------------------------------------------------------------------------------  T(C): 36 (05-19-21 @ 04:00), Max: 36.7 (05-18-21 @ 09:28)  HR: 72 (05-19-21 @ 06:00) (64 - 127)  BP: 114/59 (05-19-21 @ 06:00) (86/42 - 166/72)  RR: 19 (05-19-21 @ 06:00) (12 - 22)  SpO2: 100% (05-19-21 @ 06:00) (72% - 100%)  Wt(kg): --    05-18-21 @ 07:01  -  05-19-21 @ 07:00  --------------------------------------------------------  IN: 2691.5 mL / OUT: 2435 mL / NET: 256.5 mL    Physical Exam:  	Gen: intubated  	HEENT: bruise around right periorbital  	Pulm: mechanical vent sounds  	CV: non tachycardic  	GI: distended abdomen  	: reyes catheter in place  	MSK: Warm, b/l ankle edema              Neuro: unable to assess, sedated  	Psych: unable to assess, sedated  	Skin: cool to touch feet    LABS/STUDIES  --------------------------------------------------------------------------------              7.1    4.88  >-----------<  55       [05-19-21 @ 01:25]              20.0     125  |  91  |  99  ----------------------------<  157      [05-19-21 @ 01:26]  4.3   |  18  |  2.44        Ca     9.0     [05-19-21 @ 01:26]      Mg     2.3     [05-19-21 @ 01:26]      Phos  4.1     [05-19-21 @ 01:26]    TPro  5.2  /  Alb  4.1  /  TBili  1.8  /  DBili  x   /  AST  24  /  ALT  18  /  AlkPhos  29  [05-19-21 @ 01:26]    PT/INR: PT 20.9 , INR 1.79       [05-19-21 @ 01:25]  PTT: 41.1       [05-19-21 @ 01:25]          [05-18-21 @ 15:00]  Serum Osmolality 329      [05-17-21 @ 18:48]    Creatinine Trend:  SCr 2.44 [05-19 @ 01:26]  SCr 2.48 [05-19 @ 00:26]  SCr 2.74 [05-18 @ 10:06]  SCr 2.72 [05-18 @ 06:33]  SCr 2.16 [05-17 @ 16:18]    Urinalysis - [05-18-21 @ 11:13]      Color Yellow / Appearance Slightly Turbid / SG 1.016 / pH 6.0      Gluc Negative / Ketone Negative  / Bili Negative / Urobili Negative       Blood Large / Protein 30 mg/dL / Leuk Est Large / Nitrite Negative       / WBC 13 / Hyaline 3 / Gran  / Sq Epi  / Non Sq Epi 3 / Bacteria Negative    Urine Creatinine 106      [05-17-21 @ 16:21]  Urine Protein 10      [05-18-21 @ 01:11]  Urine Sodium 10      [05-17-21 @ 16:21]  Urine Urea Nitrogen 442      [05-17-21 @ 22:45]  Urine Osmolality 310      [05-17-21 @ 16:21]    HbA1c 7.1      [01-12-19 @ 07:22]  TSH 1.00      [05-17-21 @ 08:41]    HBsAb 32.4      [05-17-21 @ 23:13]  HBsAg Nonreact      [05-17-21 @ 23:13]  HBcAb Reactive      [05-17-21 @ 23:13]  HCV 0.17, Nonreact      [05-17-21 @ 23:13]  HIV Nonreact      [05-18-21 @ 01:05]

## 2021-05-19 NOTE — PROGRESS NOTE ADULT - ASSESSMENT
85M PMHx CKD, cirrhosis (?viral hepatitis) - admitted for decompensated cirrhosis.  Nephrology consulted for hyponatremia and ANDREEA on CKD.        # Hyponatremia  Pt w/ acute on chronic hyponatremia hypervolemic possibly 2/2 cirrhosis.  On admission sNa 120 (prior sNa 130-135), s/p lasix IV, IVF.  Last sNa 125 after blood transfusion    Recommendations:  - consider starting midodrine PO (for BP optimization).  Continue fluid restriction 1 liter/day  - please change all dextrose in IV medication to normal saline if possible (IV antibiotic CTX)  - hepatology/IR consulted; s/p thoracentesis 1.6L removed 5/18  - monitor BMP    # ANDREEA  Pt with non-oliguric ANDREEA on CKD likely 2/2 pre renal in the setting hypotension, entresto/lasix, acute anemia.  On admission sCr 2.0 (baseline sCr 1.4-1.6 in Jan 2019), peaked 2.7 now improving. UA bland. Last sCr 2.4, non oliguric  - currently no absolute indication for dialysis, will re-assess daily  - agree with holding entresto until ANDREEA resolve then can rechallenge   - BP optimization/antibiotic/blood transfusion per ICU team  - consider decreasing ceftriaxone dosing given ANDREEA and cirrhosis  - monitor BMP, strict I/O, avoid nephrotoxic agents (NSAIDs, PPI, contrast), renally dose medications per GFR.   85M PMHx CKD, cirrhosis (?viral hepatitis) - admitted for decompensated cirrhosis.  Nephrology consulted for hyponatremia and ANDREEA on CKD.        # Hyponatremia  Pt w/ acute on chronic hyponatremia hypervolemic possibly 2/2 cirrhosis.  On admission sNa 120 (prior sNa 130-135), s/p lasix IV, IVF.  Last sNa 125 improving after blood transfusion    Recommendations:  - consider starting midodrine PO (for BP optimization).  Continue fluid restriction 1 liter/day. Rec Blood transfusion  - please change all dextrose in IV medication to normal saline if possible (IV antibiotic CTX)  - hepatology/IR consulted; s/p thoracentesis 1.6L removed 5/18  - monitor BMP    # ANDREEA  Pt with non-oliguric ANDREEA on CKD likely 2/2 pre renal in the setting hypotension, entresto/lasix, acute anemia.  On admission sCr 2.0 (baseline sCr 1.4-1.6 in Jan 2019), peaked 2.7 now improving. UA bland. Last sCr 2.4, non oliguric  - currently no absolute indication for dialysis, will re-assess daily  - agree with holding entresto until ANDREEA resolve then can rechallenge   - BP optimization/antibiotic/blood transfusion per ICU team  - consider decreasing ceftriaxone dosing given ANDREEA and cirrhosis  - monitor BMP, strict I/O, avoid nephrotoxic agents (NSAIDs, PPI, contrast), renally dose medications per GFR.

## 2021-05-19 NOTE — PROGRESS NOTE ADULT - ATTENDING COMMENTS
Intubated, sedated  1.  ARF--no HD required at present  2.  Hyponatremia--improving with volume supplementation.  Free water restriction  2.  Lactic acidosis--improving with hemodynamic optimization  4.  Chylothorax--right sided, high triglycerides.  Currently NPO.  Need dietary to give regimen limited in long chain fatty acids    discussed with MICU team, attending

## 2021-05-19 NOTE — PROGRESS NOTE ADULT - SUBJECTIVE AND OBJECTIVE BOX
PATIENT: SENAIT JHAVERI   AGE: Mode: AC/ CMV (Assist Control/ Continuous Mandatory Ventilation), RR (machine): 16, TV (machine): 400, FiO2: 40, PEEP: 5, ITime: 1, MAP: 9, PIP: 23o     CHIEF COMPLAINT:  Patient is a 85y old  Male who presents with a chief complaint of sob, confusion, weakness, can't walk, fever (19 May 2021 07:30)      OVERNIGHT EVENTS:    SUBJECTIVE: Patient seen and examined at bedside.     REVIEW OF SYSTEM:      OBJECTIVE:    VITAL SIGNS:  ICU Vital Signs Last 24 Hrs  T(C): 36 (19 May 2021 04:00), Max: 36.7 (18 May 2021 09:28)  T(F): 96.8 (19 May 2021 04:00), Max: 98 (18 May 2021 09:28)  HR: 71 (19 May 2021 07:00) (64 - 127)  BP: 114/58 (19 May 2021 07:00) (86/42 - 166/72)  BP(mean): 79 (19 May 2021 07:00) (61 - 103)  ABP: --  ABP(mean): --  RR: 16 (19 May 2021 07:00) (12 - 22)  SpO2: 100% (19 May 2021 07:00) (72% - 100%)    Mode: AC/ CMV (Assist Control/ Continuous Mandatory Ventilation), RR (machine): 16, TV (machine): 400, FiO2: 40, PEEP: 5, ITime: 1, MAP: 9, PIP: 23    :  -   @ 07:00  --------------------------------------------------------  IN: 2691.5 mL / OUT: 2435 mL / NET: 256.5 mL      CAPILLARY BLOOD GLUCOSE      POCT Blood Glucose.: 153 mg/dL (19 May 2021 04:50)      I/O SUMMARY:    21 @ 07:  -  21 @ 07:00  --------------------------------------------------------  IN: 2691.5 mL / OUT: 2435 mL / NET: 256.5 mL      PHYSICAL EXAM:        MEDICATIONS:  MEDICATIONS  (STANDING):  albumin human 25% IVPB 100 milliLiter(s) IV Intermittent every 6 hours  cefTRIAXone   IVPB 2000 milliGRAM(s) IV Intermittent every 24 hours  chlorhexidine 0.12% Liquid 15 milliLiter(s) Oral Mucosa every 12 hours  chlorhexidine 4% Liquid 1 Application(s) Topical <User Schedule>  dextrose 40% Gel 15 Gram(s) Oral once  dextrose 5%. 1000 milliLiter(s) (50 mL/Hr) IV Continuous <Continuous>  dextrose 5%. 1000 milliLiter(s) (100 mL/Hr) IV Continuous <Continuous>  dextrose 50% Injectable 25 Gram(s) IV Push once  dextrose 50% Injectable 12.5 Gram(s) IV Push once  dextrose 50% Injectable 25 Gram(s) IV Push once  fentaNYL   Infusion... 0.5 MICROgram(s)/kG/Hr (1.59 mL/Hr) IV Continuous <Continuous>  glucagon  Injectable 1 milliGRAM(s) IntraMuscular once  insulin lispro (ADMELOG) corrective regimen sliding scale   SubCutaneous every 6 hours  levothyroxine Injectable 44 MICROGram(s) IV Push at bedtime  norepinephrine Infusion 0.05 MICROgram(s)/kG/Min (5.95 mL/Hr) IV Continuous <Continuous>  nystatin Ointment 1 Application(s) Topical two times a day  pantoprazole Infusion 8 mG/Hr (10 mL/Hr) IV Continuous <Continuous>  propofol Infusion 10 MICROgram(s)/kG/Min (3.81 mL/Hr) IV Continuous <Continuous>    MEDICATIONS  (PRN):  albuterol/ipratropium for Nebulization 3 milliLiter(s) Nebulizer every 6 hours PRN Shortness of Breath and/or Wheezing      ALLERGIES:  Allergies    No Known Allergies    Intolerances        LABS:                        7.1    4.88  )-----------( 55       ( 19 May 2021 01:25 )             20.0     05-19    125<L>  |  91<L>  |  99<H>  ----------------------------<  157<H>  4.3   |  18<L>  |  2.44<H>    Ca    9.0      19 May 2021 01:26  Phos  4.1       Mg     2.3         TPro  5.2<L>  /  Alb  4.1  /  TBili  1.8<H>  /  DBili  x   /  AST  24  /  ALT  18  /  AlkPhos  29<L>      LIVER FUNCTIONS - ( 19 May 2021 01:26 )  Alb: 4.1 g/dL / Pro: 5.2 g/dL / ALK PHOS: 29 U/L / ALT: 18 U/L / AST: 24 U/L / GGT: x           COAGULATION STUDIES:     aPTT  41.1 sec    (21 @ 01:25)     PT     20.9 sec    (21 @ 01:25)     INR    1.79 ratio         (21 @ 01:25), COAGULATION STUDIES:     aPTT  28.4 sec    (21 @ 00:26)     PT     18.5 sec    (21 @ 00:26)     INR    1.58 ratio         (21 @ 00:26), COAGULATION STUDIES:     aPTT  46.8 sec    (21 @ 10:06)     PT     22.0 sec    (21 @ 10:06)     INR    1.89 ratio         (21 @ 10:06)   PT/INR - ( 19 May 2021 01:25 )   PT: 20.9 sec;   INR: 1.79 ratio         PTT - ( 19 May 2021 01:25 )  PTT:41.1 sec  Urinalysis Basic - ( 18 May 2021 11:13 )    Color: Yellow / Appearance: Slightly Turbid / S.016 / pH: x  Gluc: x / Ketone: Negative  / Bili: Negative / Urobili: Negative   Blood: x / Protein: 30 mg/dL / Nitrite: Negative   Leuk Esterase: Large / RBC: 245 /hpf / WBC 13 /HPF   Sq Epi: x / Non Sq Epi: 3 /hpf / Bacteria: Negative      Mode: AC/ CMV (Assist Control/ Continuous Mandatory Ventilation), RR (machine): 16, TV (machine): 400, FiO2: 40, PEEP: 5, ITime: 1, MAP: 9, PIP: 23  ABG - ( 19 May 2021 01:21 )  pH, Arterial: 7.37  pH, Blood: x     /  pCO2: 36    /  pO2: 144   / HCO3: 20    / Base Excess: -3.7  /  SaO2: 100               POCT Blood Glucose.: 153 mg/dL (21 @ 04:50)  POCT Blood Glucose.: 201 mg/dL (21 @ 23:40)  POCT Blood Glucose.: 192 mg/dL (21 @ 08:51)    Urinalysis Basic - ( 18 May 2021 11:13 )    Color: Yellow / Appearance: Slightly Turbid / S.016 / pH: x  Gluc: x / Ketone: Negative  / Bili: Negative / Urobili: Negative   Blood: x / Protein: 30 mg/dL / Nitrite: Negative   Leuk Esterase: Large / RBC: 245 /hpf / WBC 13 /HPF   Sq Epi: x / Non Sq Epi: 3 /hpf / Bacteria: Negative        MICROBIOLOGY:    Culture - Sputum (collected 21 @ 20:51)  Source: .Sputum Sputum  Gram Stain (21 @ 00:12):    Rare polymorphonuclear leukocytes per low power field    Few Squamous epithelial cells per low power field    Rare Gram positive cocci in pairs per oil power field    Culture - Fungal, Body Fluid (collected 21 @ 20:51)  Source: .Body Fluid Pleural Fluid  Preliminary Report (21 @ 07:17):    Testing in progress    Culture - Body Fluid with Gram Stain (collected 21 @ 20:51)  Source: .Body Fluid Pleural Fluid  Gram Stain (21 @ 00:21):    polymorphonuclear leukocytes seen    No organisms seen    by cytocentrifuge    Culture - Blood (collected 21 @ 21:58)  Source: .Blood Blood-Peripheral  Preliminary Report (21 @ 22:01):    No growth to date.    Culture - Blood (collected 21 @ 21:58)  Source: .Blood Blood  Preliminary Report (21 @ 22:01):    No growth to date.        RADIOLOGY & ADDITIONAL TESTS: Reviewed. PATIENT: SENAIT JHAVERI   AGE: Mode: AC/ CMV (Assist Control/ Continuous Mandatory Ventilation), RR (machine): 16, TV (machine): 400, FiO2: 40, PEEP: 5, ITime: 1, MAP: 9, PIP: 23o     CHIEF COMPLAINT:  Patient is a 85y old  Male who presents with a chief complaint of sob, confusion, weakness, can't walk, fever (19 May 2021 07:30)      OVERNIGHT EVENTS: Hgb 6.6 at 7pm, patient received 2 units pRBC, 1 unit FFP overnight. Hgb increased to 8.     SUBJECTIVE: Patient seen and examined at bedside. WIth all sedatives off, patient opes eyes spontaneously, did not respond to verbal questions or follow verbal commands, grimaces to painful stimuli (nail bed pressure and sternal rub)     REVIEW OF SYSTEM:  Unable to assess ROS due to patient mental status.     OBJECTIVE:    VITAL SIGNS:  ICU Vital Signs Last 24 Hrs  T(C): 36 (19 May 2021 04:00), Max: 36.7 (18 May 2021 09:28)  T(F): 96.8 (19 May 2021 04:00), Max: 98 (18 May 2021 09:28)  HR: 71 (19 May 2021 07:00) (64 - 127)  BP: 114/58 (19 May 2021 07:00) (86/42 - 166/72)  BP(mean): 79 (19 May 2021 07:00) (61 - 103)  ABP: --  ABP(mean): --  RR: 16 (19 May 2021 07:00) (12 - 22)  SpO2: 100% (19 May 2021 07:00) (72% - 100%)    Mode: AC/ CMV (Assist Control/ Continuous Mandatory Ventilation), RR (machine): 16, TV (machine): 400, FiO2: 40, PEEP: 5, ITime: 1, MAP: 9, PIP: 23    05-18 @ 07:01  -  05-19 @ 07:00  --------------------------------------------------------  IN: 2691.5 mL / OUT: 2435 mL / NET: 256.5 mL      CAPILLARY BLOOD GLUCOSE      POCT Blood Glucose.: 153 mg/dL (19 May 2021 04:50)      I/O SUMMARY:    21 @ 07:01  -  21 @ 07:00  --------------------------------------------------------  IN: 2691.5 mL / OUT: 2435 mL / NET: 256.5 mL      PHYSICAL EXAM:  GENERAL: lethargic, no acute distress   HEAD:  Atraumatic, Normocephalic  HEENT: scleral anicteric.   CV: Regular rate and rhythm. No murmurs, rubs, or gallops  Respiratory: intubated, on mechanical ventilator, + mechanical breath sounds, no crackles   ABDOMEN: Soft, Nontender, Nondistended; Bowel sounds normal  EXTREMITIES: + bilateral LE edema   NEURO: With all sedatives off, patient opes eyes spontaneously, did not respond to verbal questions or follow verbal commands, grimaces to painful stimuli (nail bed pressure and sternal rub)   Skin: +fungal infection in groin/buttocks area   Access: peripheral IVs       MEDICATIONS:  MEDICATIONS  (STANDING):  albumin human 25% IVPB 100 milliLiter(s) IV Intermittent every 6 hours  cefTRIAXone   IVPB 2000 milliGRAM(s) IV Intermittent every 24 hours  chlorhexidine 0.12% Liquid 15 milliLiter(s) Oral Mucosa every 12 hours  chlorhexidine 4% Liquid 1 Application(s) Topical <User Schedule>  dextrose 40% Gel 15 Gram(s) Oral once  dextrose 5%. 1000 milliLiter(s) (50 mL/Hr) IV Continuous <Continuous>  dextrose 5%. 1000 milliLiter(s) (100 mL/Hr) IV Continuous <Continuous>  dextrose 50% Injectable 25 Gram(s) IV Push once  dextrose 50% Injectable 12.5 Gram(s) IV Push once  dextrose 50% Injectable 25 Gram(s) IV Push once  fentaNYL   Infusion... 0.5 MICROgram(s)/kG/Hr (1.59 mL/Hr) IV Continuous <Continuous>  glucagon  Injectable 1 milliGRAM(s) IntraMuscular once  insulin lispro (ADMELOG) corrective regimen sliding scale   SubCutaneous every 6 hours  levothyroxine Injectable 44 MICROGram(s) IV Push at bedtime  norepinephrine Infusion 0.05 MICROgram(s)/kG/Min (5.95 mL/Hr) IV Continuous <Continuous>  nystatin Ointment 1 Application(s) Topical two times a day  pantoprazole Infusion 8 mG/Hr (10 mL/Hr) IV Continuous <Continuous>  propofol Infusion 10 MICROgram(s)/kG/Min (3.81 mL/Hr) IV Continuous <Continuous>    MEDICATIONS  (PRN):  albuterol/ipratropium for Nebulization 3 milliLiter(s) Nebulizer every 6 hours PRN Shortness of Breath and/or Wheezing      ALLERGIES:  Allergies    No Known Allergies    Intolerances        LABS:                        7.1    4.88  )-----------( 55       ( 19 May 2021 01:25 )             20.0         125<L>  |  91<L>  |  99<H>  ----------------------------<  157<H>  4.3   |  18<L>  |  2.44<H>    Ca    9.0      19 May 2021 01:26  Phos  4.1       Mg     2.3         TPro  5.2<L>  /  Alb  4.1  /  TBili  1.8<H>  /  DBili  x   /  AST  24  /  ALT  18  /  AlkPhos  29<L>      LIVER FUNCTIONS - ( 19 May 2021 01:26 )  Alb: 4.1 g/dL / Pro: 5.2 g/dL / ALK PHOS: 29 U/L / ALT: 18 U/L / AST: 24 U/L / GGT: x           COAGULATION STUDIES:     aPTT  41.1 sec    (21 @ 01:25)     PT     20.9 sec    (21 @ 01:25)     INR    1.79 ratio         (21 @ 01:25), COAGULATION STUDIES:     aPTT  28.4 sec    (21 @ 00:26)     PT     18.5 sec    (21 @ 00:26)     INR    1.58 ratio         (21 @ 00:26), COAGULATION STUDIES:     aPTT  46.8 sec    (21 @ 10:06)     PT     22.0 sec    (21 @ 10:06)     INR    1.89 ratio         (21 @ 10:06)   PT/INR - ( 19 May 2021 01:25 )   PT: 20.9 sec;   INR: 1.79 ratio         PTT - ( 19 May 2021 01:25 )  PTT:41.1 sec  Urinalysis Basic - ( 18 May 2021 11:13 )    Color: Yellow / Appearance: Slightly Turbid / S.016 / pH: x  Gluc: x / Ketone: Negative  / Bili: Negative / Urobili: Negative   Blood: x / Protein: 30 mg/dL / Nitrite: Negative   Leuk Esterase: Large / RBC: 245 /hpf / WBC 13 /HPF   Sq Epi: x / Non Sq Epi: 3 /hpf / Bacteria: Negative      Mode: AC/ CMV (Assist Control/ Continuous Mandatory Ventilation), RR (machine): 16, TV (machine): 400, FiO2: 40, PEEP: 5, ITime: 1, MAP: 9, PIP: 23  ABG - ( 19 May 2021 01:21 )  pH, Arterial: 7.37  pH, Blood: x     /  pCO2: 36    /  pO2: 144   / HCO3: 20    / Base Excess: -3.7  /  SaO2: 100               POCT Blood Glucose.: 153 mg/dL (21 @ 04:50)  POCT Blood Glucose.: 201 mg/dL (21 @ 23:40)  POCT Blood Glucose.: 192 mg/dL (21 @ 08:51)    Urinalysis Basic - ( 18 May 2021 11:13 )    Color: Yellow / Appearance: Slightly Turbid / S.016 / pH: x  Gluc: x / Ketone: Negative  / Bili: Negative / Urobili: Negative   Blood: x / Protein: 30 mg/dL / Nitrite: Negative   Leuk Esterase: Large / RBC: 245 /hpf / WBC 13 /HPF   Sq Epi: x / Non Sq Epi: 3 /hpf / Bacteria: Negative        MICROBIOLOGY:    Culture - Sputum (collected 21 @ 20:51)  Source: .Sputum Sputum  Gram Stain (21 @ 00:12):    Rare polymorphonuclear leukocytes per low power field    Few Squamous epithelial cells per low power field    Rare Gram positive cocci in pairs per oil power field    Culture - Fungal, Body Fluid (collected 21 @ 20:51)  Source: .Body Fluid Pleural Fluid  Preliminary Report (21 @ 07:17):    Testing in progress    Culture - Body Fluid with Gram Stain (collected 21 @ 20:51)  Source: .Body Fluid Pleural Fluid  Gram Stain (21 @ 00:21):    polymorphonuclear leukocytes seen    No organisms seen    by cytocentrifuge    Culture - Blood (collected 21 @ 21:58)  Source: .Blood Blood-Peripheral  Preliminary Report (21 @ 22:01):    No growth to date.    Culture - Blood (collected 21 @ 21:58)  Source: .Blood Blood  Preliminary Report (21 @ 22:01):    No growth to date.        RADIOLOGY & ADDITIONAL TESTS: Reviewed.  Impression:          - Small (< 5 mm) esophageal varices.                       - A small amount of food (residue) in the stomach.                       - One non-bleeding, large (4 cm x 4 cm), cratered duodenal ulcer with                        pigmented material. This was the source of patient's bleeding.

## 2021-05-19 NOTE — DIETITIAN INITIAL EVALUATION ADULT. - ADD RECOMMEND
Please trend triglyceride levels while pt receiving Propofol infusions 2. Recommend addition of Nephrovite supplementation pending no existing contraindications. 3. Please trend triglyceride levels while pt receiving Propofol infusions; RD to monitor infusions and adjust EN accordingly.

## 2021-05-19 NOTE — DIETITIAN INITIAL EVALUATION ADULT. - ORAL INTAKE PTA/DIET HISTORY
Unable to obtain subjective information from pt at this time; pt remains intubated/sedated. Per chart, NKFA, no micronutrient supplementation PTA. Unknown chewing/swallowing function PTA at this time.   - Per H&P pt noted with weight loss & poor appetite PTA

## 2021-05-19 NOTE — DIETITIAN INITIAL EVALUATION ADULT. - PERTINENT MEDS FT
MEDICATIONS  (STANDING):  albumin human 25% IVPB  cefTRIAXone   IVPB  dextrose 40% Gel  dextrose 5%.  dextrose 5%.  dextrose 50% Injectable  dextrose 50% Injectable  dextrose 50% Injectable  glucagon  Injectable  insulin lispro (ADMELOG) corrective regimen sliding scale  levothyroxine Injectable  norepinephrine Infusion  pantoprazole Infusion

## 2021-05-19 NOTE — DIETITIAN INITIAL EVALUATION ADULT. - HEIGHT FOR BMI (INCHES)
Thoracic Surgery    Patient: Tony Oropeza Date: 2018   : 1965 Attending: Moose Reyes MD   53 year old male Room: /A     HPI: This is a 53 year old male patient with past medical history significant for hypertension, chronic back pain and tobacco use (current everyday smoker, 30 pack year history). He initially established care with pulmonology, Dr. Vee following an ED visit for abnormal chest xray and elevated D-Dimer. CT scan was completed showing a 1.3 cm solid nodule within the right lower lobe with background emphysematous changes, right upper lobe linear nodular density, and right hilar and mediastinal lymphadenopathy. Thereafter a PET CT was completed. Results include hypermetabolic right lower lobe pulmonary nodule, suspicious for malignancy, extensive lymphadenopathy involving the lower neck, chest, and upper abdomen, increased uptake within the sigmoid colon and tiny foci of increased uptake, which appear to correlate with the left lateral fifth rib and posterolateral left 10th rib without definite CT correlates. Extensive new groundglass and patchy airspace opacities bilaterally and more confluent right middle lobe opacity with increased uptake was also seen. He underwent EBUS on 10/20/2018 with Dr. Anaya. 10L lymph node, station 7 lymph node were negative for malignant cells but were consistent with caseating granuloma, and 11 R lymph node was negative for malignant cells. QuantiFERON gold test was negative. He was started on steroid taper with plan for follow up CT. Repeat imaging was completed. He was referred to thoracic surgery, oncology and radiation oncology. He underwent PFTs which were adequate for resection. Right upper lobe nodule was biopsied with result showing benign alveolar lung tissue with focal intra-alveolar pigmented histocytes otherwise nondiagnostic of a specific radiologic lesion. He underwent repeat PET CT 18. Results include FDG avid right  lower lobe 1.9 cm nodule, consistent with biopsy-proven adenocarcinoma, mildly FDG avid 1.2 cm spiculated right apical nodule - findings are stable since the prior PET/CT on 10/13/2017, nonspecific. Resolving FDG avid lower neck, thoracic, and upper abdomen lymphadenopathy with residual mildly FDG avid mediastinal and hilar nodes. He was seen in clinic to discuss surgical resection. He underwent robotic takedown of adhesions, right upper lobe wedge resection, right lower lobe wedge resection with lymph node dissection with Dr. Reyes on 8/10/2018. His post operative course was unremarkable. His chest tube was removed POD#1 and he was discharged home. He was seen in clinic yesterday due to elevated temperature. WBC count was elevated and chest xray with slightly worse consolidation. He was admitted to observation status for further work up.     Subjective: Continues to feel well, denies chills or nausea. Ambulating independently. Occasional productive cough with tan secretions     Assessment/Plan:  S/p robotic takedown of adhesions, right upper lobe wedge resection, right lower lobe wedge resection with lymph node dissection: Surgical incisions stable. Ambulate every 4 hours around the clock. Pathology as seen below.   Fever/Leukocytosis: U/A negative. Blood cultures preliminary without growth to date. WBC normalized. ID Following- not recommending antibiotic/antimicrobial treatment.   Aggressive pulmonary hygiene. IS/CDB/OOB. Flutter. Mucolytic.   Paroxysmal atrial fibrillation: BB. Amio. Maintaining NSR.   Post operative pain on chronic pain: Controlled.   Hypoxemia: IS/CDB/OOB. Ambulate. Flutter. Mucolytic. Check RA POX     Tobacco Best Practice  Nicotine replacement therapy ordered: nicotine patch transdermal  Varenicline tartrate oral not ordered due to: patient refuses varenicline tartrate for tobacco cessation and  bupropion oral not ordered due to: other tobacco cessation product currently in use    Smoking  cessation counseling provided to the patient.     Discharge Disposition: Home       Vital 24 Hour Range Last Value   Temperature Temp  Min: 98.9 °F (37.2 °C)  Max: 99.5 °F (37.5 °C) 98.9 °F (37.2 °C) (08/17/18 1551)   Pulse Pulse  Min: 80  Max: 83 83 (08/18/18 0906)   Respiratory Resp  Min: 18  Max: 20 20 (08/18/18 0906)   Non-Invasive  Blood Pressure BP  Min: 109/74  Max: 148/94 109/74 (08/18/18 0906)   Pulse Oximetry SpO2  Min: 85 %  Max: 95 % 93 % (08/18/18 0906)     Oxygen: RA    Weight over the past 48 Hours:  Patient Vitals for the past 48 hrs:   Weight   08/17/18 0553 71.3 kg   08/18/18 0500 71 kg      Admit Weight:   Weight: 72.1 kg (08/13/18 1555)  BMI:   BMI (Calculated): 23.52 (08/13/18 1555)    Intake/Output:    Last Stool Occurrence: 1 (08/16/18 2006)  No intake/output data recorded.  I/O last 3 completed shifts:  In: 2660 [P.O.:2660]  Out: 5650 [Urine:5650]      Intake/Output Summary (Last 24 hours) at 08/18/18 0949  Last data filed at 08/18/18 0452   Gross per 24 hour   Intake             2420 ml   Output             5650 ml   Net            -3230 ml       Diet: Regular     Activity: Up independently. Ambulate every 4 hours around the clock.     Rhythm: Sinus rhythm    Physical Exam:   Heart: Regular rate and rhythm, S1, S2 normal, no murmur, click, rub or gallop  Lungs: Diminished breath sounds  bibasilar and no wheezing or crackles heard  Abdomen: soft, non tender, +BS, +BM  Incision(s): Right thoracoscopy Clean, dry and intact and suture removed  Neurologic: Grossly normal, Alert and oriented to person, place and time and Hand grasp equal bilaterally  Extremities: extremities normal, atraumatic, no cyanosis or edema    Laboratory Results:      Recent Labs  Lab 08/18/18  0358 08/17/18  0908 08/16/18  0723 08/15/18  0929 08/15/18  0818  08/14/18  0345 08/14/18  0214   SODIUM 131*  --   --   --   --   --  126* 128*   POTASSIUM 4.2  --  4.4 4.4  --   < > 3.3* 3.3*   CHLORIDE 96*  --   --   --   --   --   91* 90*   CO2 27  --   --   --   --   --  27 28   BUN 8  --   --   --   --   --  10 10   CREATININE 0.60*  --   --   --   --   --  0.82 0.73   GLUCOSE 101*  --   --   --   --   --  102* 143*   MG 2.5*  --  2.4 2.2  --   --   --  2.0   WBC  --  10.3 12.9*  --  11.1*  --   --  11.5*   HGB  --  13.6 12.5*  --  13.2  --   --  13.5   HCT  --  41.0 37.6*  --  39.5  --   --  39.9   PLT  --  341 262  --  249  --   --  195   < > = values in this interval not displayed.    Pathology/Cultures: Reviewed   A: Lung, right upper lobe, wedge resection:     - Pulmonary adenocarcinoma, acinar subtype, approximately 1.2 cm, see tumor synoptic.     - Tumor is 0.4 cm from staple margin.         B: Lymph nodes, station 7, lymphadenectomy:     - Metastatic pulmonary adenocarcinoma involving three of 10 lymph node fragments (3/10).     - Size of largest metastasis 0.4 cm.     - Silicotic nodules and nonnecrotizing granulomas; AFB and GMS stains are negative.         C: Lymph nodes, station 4, lymphadenectomy:     - Eight fragments of benign lymph node tissue (0/8).     - Silicotic nodules and nonnecrotizing granulomas.         D: Lymph nodes, station 2, biopsy:     - Four fragments of benign lymph node tissue (0/4).         E: Lymph node, station 10, biopsy:     - One benign lymph node (0/1).     - Silicotic nodules and nonnecrotizing granulomas.         F: Lung, right lower lobe, wedge resection:     - Pulmonary adenocarcinoma, 3.5 cm, acinar predominant, see tumor synoptic.     TNM Descriptors:   m (multiple primary tumors)     Primary Tumor (pT):   pT2a:  Tumor > 3 cm, but <= 4 cm in greatest dimension     Regional Lymph Nodes (pN):   pN2: Metastasis in ipsilateral mediastinal and / or subcarinal lymph node(s)         Comment: The right lower lobe tumor previously had molecular/prognostic testing performed ([id number]), with the following results: PD-L1: High expression; Tumor proportion score 95%     Lung next generation mutational  panel: KRAS Exon 2 mutation detected         The right upper lobe tumor has lung molecular/prognostic testing ordered, however the paucity of tumor cells may be limit the testing.    Solid Tumor Mutation:  In process    Lung Cancer Fusion:   In process    Chest X-Ray: Reviewed 8/18    Medications/Infusions:  Scheduled:   • amiodarone  400 mg Oral 2 times per day   • polyethylene glycol  17 g Oral Daily   • guaiFENesin  1,200 mg Oral 2 times per day   • umeclidinium-vilanterol  1 puff Inhalation Daily   • metoPROLOL succinate  25 mg Oral Daily   • nicotine  1 patch Transdermal Daily   • docusate sodium-sennosides  2 tablet Oral Daily   • atorvastatin  10 mg Oral QHS   • sodium chloride (PF)  2 mL Injection 2 times per day   • enoxaparin (LOVENOX) injection  40 mg Subcutaneous QHS   • nicotine  1 patch Transdermal Daily       Continuous Infusions:       Discussed with or notes reviewed:  Patient, Family, RN and MD      Thoracic Surgery  Sue Riedel APNP [phone]   7

## 2021-05-20 NOTE — PROGRESS NOTE ADULT - PROBLEM SELECTOR PLAN 9
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   -Non-urgent neurosurgical evaluation  -Possible cause of [place]? on the R side.

## 2021-05-20 NOTE — PROGRESS NOTE ADULT - ASSESSMENT
85M PMHx CKD, cirrhosis (?viral hepatitis) - admitted for decompensated cirrhosis.  Nephrology consulted for hyponatremia and ANDREEA on CKD.        # Hyponatremia  Pt w/ acute on chronic hyponatremia hypervolemic possibly 2/2 cirrhosis.  On admission sNa 120 (prior sNa 130-135), s/p lasix IV, IVF.  - Na up to 130  - Continue fluid restriction 1 liter/day.   - please change all dextrose in IV medication to normal saline if possible (IV antibiotic CTX)  - hepatology/IR consulted; s/p thoracentesis 1.6L removed 5/18  - monitor BMP    # ANDREEA  Pt with non-oliguric ANDREEA on CKD likely 2/2 pre renal in the setting hypotension, entresto/lasix, acute anemia.  On admission sCr 2.0 (baseline sCr 1.4-1.6 in Jan 2019), peaked 2.7 now improving. UA bland. Last sCr 2.16, non oliguric  - agree with holding entresto until ANDREEA resolve then can rechallenge   - BP optimization/antibiotic/blood transfusion per ICU team  - monitor BMP, strict I/O, avoid nephrotoxic agents (NSAIDs, PPI, contrast), renally dose medications per GFR.

## 2021-05-20 NOTE — PHYSICAL THERAPY INITIAL EVALUATION ADULT - PERTINENT HX OF CURRENT PROBLEM, REHAB EVAL
85M Mandarin speaking, PMH CAD /sp stent, CHF s/p AICD, HTN, BPH, CKD, unknown hepatitis c/b cirrhosis, DM2, T10-T11 discitis, [place], colon tumor of unknown etiology. Presents to ED with worsening confusion, weakness, SOB over past 3 weeks and now unable to ambulate. Chest X-ray and CT (+)Large right pleural effusion, small left pleural effusion w atelectasis.  Head CT (+) Enlargement of right cerebellopontine angle extra-axial mass, includes meningioma and schwannoma.
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

## 2021-05-20 NOTE — SWALLOW BEDSIDE ASSESSMENT ADULT - SWALLOW EVAL: DIAGNOSIS
85M mandarin only speaking, c hx CAD /sp stent, CHF s/p AICD, HTN, BPH, CKD (baseline Cr ?1.3), unknown hepatitis c/b cirrhosis, DM2, ?T10-T11 discitis, [place], ?colon tumor of unknown etiology, pw decompensated cirrhosis vs CHF exacerbation, c/b hyponatremia, ANDREEA, worsening debility, poss infection as per H&P. 85M mandarin only speaking, c hx CAD /sp stent, CHF s/p AICD, HTN, BPH, CKD (baseline Cr ?1.3), unknown hepatitis c/b cirrhosis, DM2, ?T10-T11 discitis, [place], ?colon tumor of unknown etiology, pw decompensated cirrhosis vs CHF exacerbation, c/b hyponatremia, ANDREEA, worsening debility, poss infection as per H&P. Pt seen bedside, currently on aerosol mask s/p extubation earlier this morning. Plan for transition to NC later today as per discussion with Dr. Judge. This service to defer swallow assessment until pt transitioned to NC. Per Dr. Judge, pt cleared for all PO trials from a GI perspective. Will f/u.

## 2021-05-20 NOTE — PROGRESS NOTE ADULT - PROBLEM SELECTOR PLAN 5
Acute on chronic hyponatremia. Appears to be hypervolemic hypernatremia i/s/o cirrhosis. Patient's diuresis on hold given ANDREEA. XIt=210 on presentation now GMr=[id number]  -CTM on daily BMPs  -1L fluid restriction

## 2021-05-20 NOTE — PROGRESS NOTE ADULT - SUBJECTIVE AND OBJECTIVE BOX
Roderick Zimmerman M.D.  Internal Medicine PGY-1  [phone] / [id number]     Patient is a 85y old  Male who presents with a chief complaint of sob, confusion, weakness, can't walk, fever (20 May 2021 11:43)      Hospital Course:    HPI: 85M mandarin only speaking, c hx CAD /sp stent, CHF s/p AICD, HTN, BPH, CKD (baseline Cr ?1.3), unknown hepatitis c/b cirrhosis, DM2, ?T10-T11 discitis, [place], ?colon tumor of unknown etiology, pw worsening confusion, weakness, SOB, unable to ambulate.    History from pt's grandson Emil Beltre at bedside, and pt's daughter Cleopatra Styles over phone. Pt drowsy, and able to provide some answers to specific questions only. Pt has had about 3 weeks of worsening weakness to the point where he can't walk unassisted, and has had multiple falls, most recently 1 week ago. At baseline, pt walks with a walker. Pt also complaining of increasing SOB, increased mucous production, and subjective fevers, for which he has been taking cefixime empirically for the past 1 week. Pt also has increased abd distension, leg swelling, poor appetite, intermittent daily waxing/waning confusion. Pt's lasix was increased as outpt. Pt reports left chest pain, which is intermittent for the past 30 yrs. Has not received covid vaccine.      SUBJECTIVE / OVERNIGHT EVENTS:        MEDICATIONS  (STANDING):  cefTRIAXone   IVPB 1000 milliGRAM(s) IV Intermittent every 24 hours  dextrose 40% Gel 15 Gram(s) Oral once  dextrose 5%. 1000 milliLiter(s) (50 mL/Hr) IV Continuous <Continuous>  dextrose 5%. 1000 milliLiter(s) (100 mL/Hr) IV Continuous <Continuous>  dextrose 50% Injectable 25 Gram(s) IV Push once  dextrose 50% Injectable 12.5 Gram(s) IV Push once  dextrose 50% Injectable 25 Gram(s) IV Push once  glucagon  Injectable 1 milliGRAM(s) IntraMuscular once  insulin lispro (ADMELOG) corrective regimen sliding scale   SubCutaneous every 6 hours  levothyroxine Injectable 44 MICROGram(s) IV Push at bedtime  nystatin Ointment 1 Application(s) Topical two times a day  pantoprazole Infusion 8 mG/Hr (10 mL/Hr) IV Continuous <Continuous>    MEDICATIONS  (PRN):  albuterol/ipratropium for Nebulization 3 milliLiter(s) Nebulizer every 6 hours PRN Shortness of Breath and/or Wheezing      Vital Signs Last 24 Hrs  T(C): 36.5 (20 May 2021 14:11), Max: 43 (20 May 2021 04:00)  T(F): 97.7 (20 May 2021 14:11), Max: 109.4 (20 May 2021 04:00)  HR: 67 (20 May 2021 14:11) (64 - 78)  BP: 127/70 (20 May 2021 14:11) (111/58 - 143/65)  BP(mean): 87 (20 May 2021 13:00) (79 - 93)  RR: 20 (20 May 2021 14:11) (9 - 28)  SpO2: 100% (20 May 2021 14:11) (99% - 100%)      PHYSICAL EXAM  GENERAL: NAD, well-developed  HEAD:  Atraumatic, Normocephalic  EYES: EOMI, PERRLA, conjunctiva and sclera clear  NECK: Supple, No JVD  CHEST/LUNG: Clear to auscultation bilaterally; No wheeze  HEART: Regular rate and rhythm; No murmurs, rubs, or gallops  ABDOMEN: Soft, Nontender, Nondistended; Bowel sounds present  EXTREMITIES:  2+ Peripheral Pulses, No clubbing, cyanosis, or edema  PSYCH: AAOx3  SKIN: No rashes or lesions    CAPILLARY BLOOD GLUCOSE      POCT Blood Glucose.: 161 mg/dL (20 May 2021 11:04)  POCT Blood Glucose.: 159 mg/dL (19 May 2021 17:43)    I&O's Summary    19 May 2021 07:01  -  20 May 2021 07:00  --------------------------------------------------------  IN: 721.7 mL / OUT: 1440 mL / NET: -718.3 mL    20 May 2021 07:01  -  20 May 2021 15:35  --------------------------------------------------------  IN: 50 mL / OUT: 235 mL / NET: -185 mL        LABS:                        9.1    7.02  )-----------( 83       ( 20 May 2021 00:13 )             25.9     05-20    130<L>  |  96  |  96<H>  ----------------------------<  137<H>  4.1   |  19<L>  |  2.16<H>    Ca    9.3      20 May 2021 00:13  Phos  2.8     05-20  Mg     2.2     05-20    TPro  5.2<L>  /  Alb  3.9  /  TBili  2.6<H>  /  DBili  x   /  AST  32  /  ALT  22  /  AlkPhos  31<L>  05-20    PT/INR - ( 20 May 2021 00:13 )   PT: 19.8 sec;   INR: 1.69 ratio         PTT - ( 20 May 2021 00:13 )  PTT:38.7 sec          RADIOLOGY & ADDITIONAL TESTS:     MICROBIOLOGY:    ANTIMICROBIALS:    CONSULTS: Roderick Zimmerman M.D.  Internal Medicine PGY-1  [phone] / [id number]     Patient is a 85y old  Male who presents with a chief complaint of sob, confusion, weakness, can't walk, fever (20 May 2021 11:43)      Hospital Course:    HPI: 85M mandarin only speaking, c hx CAD /sp stent, CHF s/p AICD, HTN, BPH, CKD (baseline Cr ?1.3), unknown hepatitis c/b cirrhosis, DM2, ?T10-T11 discitis, [place], ?colon tumor of unknown etiology, pw worsening confusion, weakness, SOB, unable to ambulate.    History from pt's grandson Emil Beltre at bedside, and pt's daughter Cleopatra Styles over phone. Pt drowsy, and able to provide some answers to specific questions only. Pt has had about 3 weeks of worsening weakness to the point where he can't walk unassisted, and has had multiple falls, most recently 1 week ago. At baseline, pt walks with a walker. Pt also complaining of increasing SOB, increased mucous production, and subjective fevers, for which he has been taking cefixime empirically for the past 1 week. Pt also has increased abd distension, leg swelling, poor appetite, intermittent daily waxing/waning confusion. Pt's lasix was increased as outpt. Pt reports left chest pain, which is intermittent for the past 30 yrs. Has not received covid vaccine.      SUBJECTIVE / OVERNIGHT EVENTS:    Briefly the patient was admitted to the hospital in the setting of encephalopathy, weakness, and s/p a fall. CTH in the ED was negative for acute intracranial hemorrhage but found evidence of an enlarging cerebellopontine mass on the R side. The patient's course here in the hospital was complicated by hypotension and dropping hemoglobin with concern for melanotic stools along with BRBPR. The patient's anemia was as low as 5.8 and he was found to be hypotensive requiring pressors. As a result the patient was admitted to the ICU in the setting of decompensated cirrhosis most likely 2/2 GI Bleed. The patient was briefly on pressor support in the MICU from 5/18-5/18. In addition, hepatology was following the patient and did an EGD in the MICU with evidence of duodenal ulcer and <5mm non-bleeding variceal ulcer. The patient was intubated for the EGD and extubated on 5/20. For his bleed the patient has been transfused a total of 4U of pRBCs, 1U of platelets, and 1U of FFP. The patient after being stabilized was transferred to the floors. On the floors the patient is responsive, hard of hearing, but interacting stating yes and no appropriately to questions. The patient also states that he wants to speak to his daughter and is feeling much improved but still feeling tired and weak.     MEDICATIONS  (STANDING):  cefTRIAXone   IVPB 1000 milliGRAM(s) IV Intermittent every 24 hours  dextrose 40% Gel 15 Gram(s) Oral once  dextrose 5%. 1000 milliLiter(s) (50 mL/Hr) IV Continuous <Continuous>  dextrose 5%. 1000 milliLiter(s) (100 mL/Hr) IV Continuous <Continuous>  dextrose 50% Injectable 25 Gram(s) IV Push once  dextrose 50% Injectable 12.5 Gram(s) IV Push once  dextrose 50% Injectable 25 Gram(s) IV Push once  glucagon  Injectable 1 milliGRAM(s) IntraMuscular once  insulin lispro (ADMELOG) corrective regimen sliding scale   SubCutaneous every 6 hours  levothyroxine Injectable 44 MICROGram(s) IV Push at bedtime  nystatin Ointment 1 Application(s) Topical two times a day  pantoprazole Infusion 8 mG/Hr (10 mL/Hr) IV Continuous <Continuous>    MEDICATIONS  (PRN):  albuterol/ipratropium for Nebulization 3 milliLiter(s) Nebulizer every 6 hours PRN Shortness of Breath and/or Wheezing      Vital Signs Last 24 Hrs  T(C): 36.5 (20 May 2021 14:11), Max: 43 (20 May 2021 04:00)  T(F): 97.7 (20 May 2021 14:11), Max: 109.4 (20 May 2021 04:00)  HR: 67 (20 May 2021 14:11) (64 - 78)  BP: 127/70 (20 May 2021 14:11) (111/58 - 143/65)  BP(mean): 87 (20 May 2021 13:00) (79 - 93)  RR: 20 (20 May 2021 14:11) (9 - 28)  SpO2: 100% (20 May 2021 14:11) (99% - 100%)      GENERAL: Patient in bed in [facility]  NEURO: alert, eyes open spontaneously, does answer verbal questions and follows verbal commands, speech mostly sensible to the mandarin , moves 4 extremities spontaneously.   HEAD:  Atraumatic, Normocephalic  HEENT: scleral anicteric.   CV: Regular rate and rhythm. No murmurs, rubs, or gallops; + AICD in the left chest wall.   Respiratory: normal respiratory efforts. Lungs clear to auscultation bilaterally, no wheezes/crackles.  ABDOMEN: Soft, Nontender, Nondistended; Bowel sounds normal  EXTREMITIES: + lower extremity edema  Skin: erythematous groin area     CAPILLARY BLOOD GLUCOSE      POCT Blood Glucose.: 161 mg/dL (20 May 2021 11:04)  POCT Blood Glucose.: 159 mg/dL (19 May 2021 17:43)    I&O's Summary    19 May 2021 07:01  -  20 May 2021 07:00  --------------------------------------------------------  IN: 721.7 mL / OUT: 1440 mL / NET: -718.3 mL    20 May 2021 07:01  -  20 May 2021 15:35  --------------------------------------------------------  IN: 50 mL / OUT: 235 mL / NET: -185 mL        LABS:                        9.1    7.02  )-----------( 83       ( 20 May 2021 00:13 )             25.9     05-20    130<L>  |  96  |  96<H>  ----------------------------<  137<H>  4.1   |  19<L>  |  2.16<H>    Ca    9.3      20 May 2021 00:13  Phos  2.8     05-20  Mg     2.2     05-20    TPro  5.2<L>  /  Alb  3.9  /  TBili  2.6<H>  /  DBili  x   /  AST  32  /  ALT  22  /  AlkPhos  31<L>  05-20    PT/INR - ( 20 May 2021 00:13 )   PT: 19.8 sec;   INR: 1.69 ratio         PTT - ( 20 May 2021 00:13 )  PTT:38.7 sec          RADIOLOGY & ADDITIONAL TESTS:     MICROBIOLOGY:    ANTIMICROBIALS:    CONSULTS:

## 2021-05-20 NOTE — PROGRESS NOTE ADULT - ATTENDING COMMENTS
85M w/ CAD s/p PCI, HF s/p AICD, DMII, HTN, CKD, decompensated cirrhosis (unclear etiology) p/w weakness, dyspnea, ascites found to be hyponatremic, renal failure, pleural effusions now with AMS, GI bleed s/p large duodenal ulcer 5/18/21.     PPI drip for at least 72 hours then PPI BID.  Abx x 7 days.  Extubated today, doing better but still not responding. Appears to have swallowing difficulties as well.  Recommend dobhoff and starting lactulose + rifaximin.

## 2021-05-20 NOTE — PROGRESS NOTE ADULT - SUBJECTIVE AND OBJECTIVE BOX
[facility] DIVISION OF KIDNEY DISEASES AND HYPERTENSION -- FOLLOW UP NOTE  --------------------------------------------------------------------------------  Alejandro Sams   Nephrology Fellow  Pager NS: [phone]/ [facility]: [phone]  (After 5 pm or on weekends please page the on-call fellow, can view the schedule on Dispatch. Login is sonali catalan, schedule under [facility] medicine, psych, derm.      Patient is a 85y old  Male who presents with a chief complaint of sob, confusion, weakness, can't walk, fever (19 May 2021 10:07)      24 hour events/subjective: Patient seen and examined at the bedside. Vital signs, labs, medications reviewed. Extubated this AM, Na up to 130, Scr 2.16mg/dl, 1.4L UO in 24hrs.        PAST HISTORY  --------------------------------------------------------------------------------  No significant changes to PMH, PSH, FHx, SHx, unless otherwise noted    ALLERGIES & MEDICATIONS  --------------------------------------------------------------------------------  Allergies    No Known Allergies    Intolerances      Standing Inpatient Medications  cefTRIAXone   IVPB 1000 milliGRAM(s) IV Intermittent every 24 hours  chlorhexidine 4% Liquid 1 Application(s) Topical <User Schedule>  dextrose 40% Gel 15 Gram(s) Oral once  dextrose 5%. 1000 milliLiter(s) IV Continuous <Continuous>  dextrose 5%. 1000 milliLiter(s) IV Continuous <Continuous>  dextrose 50% Injectable 25 Gram(s) IV Push once  dextrose 50% Injectable 12.5 Gram(s) IV Push once  dextrose 50% Injectable 25 Gram(s) IV Push once  glucagon  Injectable 1 milliGRAM(s) IntraMuscular once  insulin lispro (ADMELOG) corrective regimen sliding scale   SubCutaneous every 6 hours  levothyroxine Injectable 44 MICROGram(s) IV Push at bedtime  nystatin Ointment 1 Application(s) Topical two times a day  pantoprazole Infusion 8 mG/Hr IV Continuous <Continuous>    PRN Inpatient Medications  albuterol/ipratropium for Nebulization 3 milliLiter(s) Nebulizer every 6 hours PRN      REVIEW OF SYSTEMS  --------------------------------------------------------------------------------  Unable to assess 2/2 lethargy  >>> <<<    VITALS/PHYSICAL EXAM  --------------------------------------------------------------------------------  T(C): 36.8 (05-20-21 @ 08:00), Max: 43 (05-20-21 @ 04:00)  HR: 66 (05-20-21 @ 09:00) (60 - 78)  BP: 113/55 (05-20-21 @ 09:00) (105/56 - 143/65)  RR: 9 (05-20-21 @ 09:00) (9 - 28)  SpO2: 100% (05-20-21 @ 09:00) (99% - 100%)  Wt(kg): --        05-19-21 @ 07:01  -  05-20-21 @ 07:00  --------------------------------------------------------  IN: 721.7 mL / OUT: 1440 mL / NET: -718.3 mL    05-20-21 @ 07:01  -  05-20-21 @ 10:10  --------------------------------------------------------  IN: 20 mL / OUT: 160 mL / NET: -140 mL      Physical Exam:    	Gen: NAD  	HEENT: Anicteric, venturi mask  	Pulm: CTA B/L anteriorly  	CV: S1S2  	Abd: Soft, +BS   	MSK: No LE edema B/L  	Neuro: Awake, lethargic  	Skin: Warm and dry  	Vascular access:      LABS/STUDIES  --------------------------------------------------------------------------------              9.1    7.02  >-----------<  83       [05-20-21 @ 00:13]              25.9     130  |  96  |  96  ----------------------------<  137      [05-20-21 @ 00:13]  4.1   |  19  |  2.16        Ca     9.3     [05-20-21 @ 00:13]      Mg     2.2     [05-20-21 @ 00:13]      Phos  2.8     [05-20-21 @ 00:13]    TPro  5.2  /  Alb  3.9  /  TBili  2.6  /  DBili  x   /  AST  32  /  ALT  22  /  AlkPhos  31  [05-20-21 @ 00:13]    PT/INR: PT 19.8 , INR 1.69       [05-20-21 @ 00:13]  PTT: 38.7       [05-20-21 @ 00:13]          [05-18-21 @ 15:00]    Creatinine Trend:  SCr 2.16 [05-20 @ 00:13]  SCr 2.44 [05-19 @ 01:26]  SCr 2.48 [05-19 @ 00:26]  SCr 2.74 [05-18 @ 10:06]  SCr 2.72 [05-18 @ 06:33]    Urinalysis - [05-18-21 @ 11:13]      Color Yellow / Appearance Slightly Turbid / SG 1.016 / pH 6.0      Gluc Negative / Ketone Negative  / Bili Negative / Urobili Negative       Blood Large / Protein 30 mg/dL / Leuk Est Large / Nitrite Negative       / WBC 13 / Hyaline 3 / Gran  / Sq Epi  / Non Sq Epi 3 / Bacteria Negative    Urine Creatinine 106      [05-17-21 @ 16:21]  Urine Protein 10      [05-18-21 @ 01:11]  Urine Sodium 10      [05-17-21 @ 16:21]  Urine Urea Nitrogen 442      [05-17-21 @ 22:45]  Urine Osmolality 310      [05-17-21 @ 16:21]    HbA1c 7.1      [01-12-19 @ 07:22]  TSH 1.00      [05-17-21 @ 08:41]    HBsAb 32.4      [05-17-21 @ 23:13]  HBsAg Nonreact      [05-17-21 @ 23:13]  HBcAb Reactive      [05-17-21 @ 23:13]  HCV 0.17, Nonreact      [05-17-21 @ 23:13]  HIV Nonreact      [05-18-21 @ 01:05]

## 2021-05-20 NOTE — CHART NOTE - NSCHARTNOTEFT_GEN_A_CORE
MAR Accept Note  Transfer to:  [facility]  Accepting Attending Physician:  Dr. Rossi      Patient seen and examined.   Labs and data reviewed.   No findings precluding transfer of service.       HPI/MICU COURSE:   Please refer to MICU transfer note for full details. 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 During hospitalization Hgb down trended from 11.8 on May 16 to 5.1 on May 18, w/ hypotension and progressive lethargic mental status, and worsening ANDREEA initially put on octreotide/albumin due to concern for hepatorenal syndrome. On May 18, patient admitted to MICU, put on levophed, intubated for EGD which showed small (< 5 mm) esophageal varices, and one non-bleeding, large (4 cm x 4 cm), cratered duodenal ulcer, from 5/18--5/19, s/p 4 units pRBC, 2 units platelet, 1 unit FFP. No active bleed and off levophed since 5/18 PM. s/p thoracentesis on 5/18 -1.6L, transudative based on LIght's criteria; s/p diagnostic paracentesis on 5/19, negative for SBP, continue prophylactic ceftriaxone for 7 day (5/16-5/22).            ANDREEA improved with great urine output (1-2L per day), Cr downtrended to 2.16, unlikely 2/2 hepatorenal more likely 2/2 prerenal in setting of hypovolumia, octreotide/albumin discontinued.           Patient extubated on 5/20 5am. Failed bedside swallow test, pending formal speech swallow, GI cleared patient for NG tube placement for meds/feeds.         ASSESSMENT & PLAN:   84 yo M w/ HF (EF 40-45%, moderate LV systolic function), cirrhosis of unknown etiology (suspect 2/2 hep B), ANDREEA suspect 2/2 hepatorenal syndrome, down-trending Hgb suspect 2/2 GI bleed, admitted to MICU due to shock placed on pressor support.   # Neuro     1. Metabolic encephalopathy       - this morning, patient opes eyes spontaneously, did not respond to verbal questions or follow verbal commands---likely 2/2 residual sedatives       - now off all sedatives        - serum ammonia 24, low suspicion for hepatic encephalopathy currently       - when feeds starts will resume rifaximin, lactulose     2. R cerebellopontine angle extra-axial mass      - s/p head CT 5/16: Interval enlargement of right cerebellopontine angle extra-axial mass. Differential includes meningioma and schwannoma. Recommend contrast-enhanced MRI of the IACs. No acute intracranial bleeding.     - obtained collateral from daughter and PCP, no history of cancer.    # CV     1. Shock      - resolved, off pressors since 5/18 PM      - Etiology: hemorrhagic shock 2/2 GI bleed 2/2 duodenal ulcer, currently bleedings stopped      - 5/18--5/19, s/p 4 units pRBC, 1 unit platelet, 1 unit FFP      - s/p EGD on 5/18: - Small (< 5 mm) esophageal varices.                                           - One non-bleeding, large (4 cm x 4 cm), cratered duodenal ulcer with     2. HF      - s/p AICD     - TTE 5/17: EF 40-45%, moderate LV systolic function, no LV thrombus, mild diastolic dysfunction (stage I)     - home HF medications: entresto 24-26 BID, lasix 80 PO daily --- currently holding in setting of hypotension, current volume status acceptable     3. CAD      - home prasugrel 10mg Po daily --- holding in setting of active bleed     # Pulm    1. Pleural effusion     - CT chest (5/16):  Moderate to large R pleural effusion with complete collapse of R middle and lower lobes. Small L pleural effusion with complete atelectasis.      - s/p intubation 5/18 in preparation for EGD; s/p extubation 5/20    - s/p thoracentesis on 5/18, -1.6L, negative gram stain, based on Light's criteria, transudative     # Renal     ANDREEA    - improving, UOP 1440cc last 24 hr, Cr peaked at 2.74 on 5/18, currently downtrended to 2.16 today.    - likely pre-renal vs. ATN in setting of hypotension 2/2 GI bleed, unlikely hepatorenal syndrome     - Off albumin, octreotide     - Remove reyes 5/20 ---> trial of void     - Renal following, appreciate recs     # GI      1. 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          - hepatology following          -varices: s/p EGD 5/18/2021, 5mm esophageal varices          -ascites: perihepatic and pelvic ascites present         -HE: likely contributing to AMS          -HCC: none seen but imaging so far non-contrast so unable to exclude HCC         -MELD-Na = 27 on 5/20         - s/p diagnostic paracentesis 5/19, negative for SBP          - daily MELD-Na labs          - starting feeding, resume rifaximin, lactulose                 -Chronic liver disease work-up: Hepatitis B surface Ag, Ab, core IgM negative, core total positive, HBV DNA and E-Ag pendin     2. GI bleed         -s/p EGD 5/18: - Small (< 5 mm) esophageal varices.                                        - One non-bleeding, large (4 cm x 4 cm), cratered duodenal ulcer           -  5/18--5/19, s/p 4 units pRBC, 2 unit platelet, 1 unit FFP         - c/w protonix gtt 72 hrs post-EGD         - CBC q8h      3. Feeding       - patient failed bedside swallow test, pending formal speech swallow eval      - discussed with GI team, OK to proceed with NG tube  placement (as varices < 5mm on EGD)     # ID   - will c/w prophylactic ceftriaxone (5/16--5/22)  for total of 7 days in setting of GI bleed (cirrhosis), and negative SBP   - blood culture (5/18) NGTD  - sputum culture normal respiratory dakota  - pleural fluid culture no growth  - peritoneal fluid negative gram stain  - urine culture no growth  - paracentesis 5/19: total nucleated cells 46, negative for SBP     # Heme       - thrombocytopenia           - This AM, platelet improved to 83         - s/p 1 unit platelet on 5/18, 1 unit platelet on 5/19      - Coagulopathic         -  currently INR 1.69-- will trend daily         - likely 2/2 liver cirrhosis --- concurrently increased risk of thrombosis & bleeding     # Endo        - DM type 2        - A1c 5.5 on 5/17       - home regimen: Toujeo 20 units subQ bedtime; Humalog 8 units subQ TID w/ meals         - hold home insulin        - while NPO, FS q6h, low dose sliding scale insulin q6h     # DVT prophylaxis       - SCD        - hold chemical prophyaxis in setting of active GI bleed and plans for procedure (thoracentesis/paracentesis)     # Ethics:       - per primary team discussion with family as of 5/18  --- FULL CODE       - per discussion with PCP Dr Lui (see chart note 5/18), 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     FOR FOLLOW UP:  [ ] Need NG tube placement, then start rifaximin, lactulose, tube feeds  [ ] monitor mental status (baseline as of 5/18 AM, alert, oriented x2 (name, place, year of birth), follows verbal commands (squeezes fingers/wiggles toes symmetrically bilaterally)  [ ] formal speech and swallow eval   [ ] last day protonix gtt 5/21 PM, then transition to protonix IV BID   [ ] s/p reyes removal 5/20---> f/u trial of void   [ ] monitor CBC q12h   [ ] f/u hepatology rec   [ ] f/u renal rec.      Merlin Jurado  Internal Medicine PGY-3  MAR [id number]

## 2021-05-20 NOTE — PROGRESS NOTE ADULT - ASSESSMENT
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   # Neuro     1. Metabolic encephalopathy       - this morning, patient opes eyes spontaneously, did not respond to verbal questions or follow verbal commands---likely 2/2 residual sedatives       - now off all sedatives        - serum ammonia 24, low suspicion for hepatic encephalopathy currently       - when feeds starts will resume rifaximin, lactulose     2. R cerebellopontine angle extra-axial mass      - s/p head CT 5/16: Interval enlargement of right cerebellopontine angle extra-axial mass. Differential includes meningioma and schwannoma. Recommend contrast-enhanced MRI of the IACs. No acute intracranial bleeding.     - obtained collateral from daughter and PCP, no history of cancer.    # CV     1. Shock      - resolved, off pressors since 5/18 PM      - Etiology: hemorrhagic shock 2/2 GI bleed 2/2 duodenal ulcer, currently bleedings stopped      - 5/18--5/19, s/p 4 units pRBC, 1 unit platelet, 1 unit FFP      - s/p EGD on 5/18: - Small (< 5 mm) esophageal varices.                                           - One non-bleeding, large (4 cm x 4 cm), cratered duodenal ulcer with     2. HF      - s/p AICD     - TTE 5/17: EF 40-45%, moderate LV systolic function, no LV thrombus, mild diastolic dysfunction (stage I)     - home HF medications: entresto 24-26 BID, lasix 80 PO daily --- currently holding in setting of hypotension, current volume status acceptable     3. CAD      - home prasugrel 10mg Po daily --- holding in setting of active bleed     # Pulm    1. Pleural effusion     - CT chest (5/16):  Moderate to large R pleural effusion with complete collapse of R middle and lower lobes. Small L pleural effusion with complete atelectasis.      - s/p intubation 5/18 in preparation for EGD; s/p extubation 5/20    - s/p thoracentesis on 5/18, -1.6L, negative gram stain, based on Light's criteria, transudative     # Renal     ANDREEA    - improving, UOP 1440cc last 24 hr, Cr peaked at 2.74 on 5/18, currently downtrended to 2.16 today.    - likely pre-renal vs. ATN in setting of hypotension 2/2 GI bleed, unlikely hepatorenal syndrome     - Off albumin, octreotide     - Remove reyes 5/20 ---> trial of void     - Renal following, appreciate recs     # GI      1. 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          - hepatology following          -varices: s/p EGD 5/18/2021, 5mm esophageal varices          -ascites: perihepatic and pelvic ascites present         -HE: likely contributing to AMS          -HCC: none seen but imaging so far non-contrast so unable to exclude HCC         -MELD-Na = 27 on 5/20         - s/p diagnostic paracentesis 5/19, negative for SBP          - daily MELD-Na labs          - starting feeding, resume rifaximin, lactulose                 -Chronic liver disease work-up: Hepatitis B surface Ag, Ab, core IgM negative, core total positive, HBV DNA and E-Ag pendin     2. GI bleed         -s/p EGD 5/18: - Small (< 5 mm) esophageal varices.                                        - One non-bleeding, large (4 cm x 4 cm), cratered duodenal ulcer with           -  5/18--5/19, s/p 4 units pRBC, 1 unit platelet, 1 unit FFP         - c/w protonix gtt 72 hrs post-EGD         - CBC q8h     # Heme       - thrombocytopenia           - This AM, platelet improved to 83         - s/p 1 unit platelet on 5/18, 1 unit platelet on 5/19      - Coagulopathic         -  currently INR 1.69-- will trend daily         - likely 2/2 liver cirrhosis --- concurrently increased risk of thrombosis & bleeding     # Endo        - DM type 2        - A1c 5.5 on 5/17       - home regimen: Toujeo 20 units subQ bedtime; Humalog 8 units subQ TID w/ meals         - hold home insulin        - while NPO, FS q6h, low dose sliding scale insulin q6h     # DVT prophylaxis       - SCD        - hold chemical prophyaxis in setting of active GI bleed and plans for procedure (thoracentesis/paracentesis)     # Ethics:       - per primary team discussion with family as of 5/18  --- FULL CODE       - per discussion with PCP Dr Lui (see chart note 5/18), 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   # Neuro     1. Metabolic encephalopathy       - this morning, patient opes eyes spontaneously, did not respond to verbal questions or follow verbal commands---likely 2/2 residual sedatives       - now off all sedatives        - serum ammonia 24, low suspicion for hepatic encephalopathy currently       - when feeds starts will resume rifaximin, lactulose     2. R cerebellopontine angle extra-axial mass      - s/p head CT 5/16: Interval enlargement of right cerebellopontine angle extra-axial mass. Differential includes meningioma and schwannoma. Recommend contrast-enhanced MRI of the IACs. No acute intracranial bleeding.     - obtained collateral from daughter and PCP, no history of cancer.    # CV     1. Shock      - resolved, off pressors since 5/18 PM      - Etiology: hemorrhagic shock 2/2 GI bleed 2/2 duodenal ulcer, currently bleedings stopped      - 5/18--5/19, s/p 4 units pRBC, 1 unit platelet, 1 unit FFP      - s/p EGD on 5/18: - Small (< 5 mm) esophageal varices.                                           - One non-bleeding, large (4 cm x 4 cm), cratered duodenal ulcer with     2. HF      - s/p AICD     - TTE 5/17: EF 40-45%, moderate LV systolic function, no LV thrombus, mild diastolic dysfunction (stage I)     - home HF medications: entresto 24-26 BID, lasix 80 PO daily --- currently holding in setting of hypotension, current volume status acceptable     3. CAD      - home prasugrel 10mg Po daily --- holding in setting of active bleed     # Pulm    1. Pleural effusion     - CT chest (5/16):  Moderate to large R pleural effusion with complete collapse of R middle and lower lobes. Small L pleural effusion with complete atelectasis.      - s/p intubation 5/18 in preparation for EGD; s/p extubation 5/20    - s/p thoracentesis on 5/18, -1.6L, negative gram stain, based on Light's criteria, transudative     # Renal     ANDREEA    - improving, UOP 1440cc last 24 hr, Cr peaked at 2.74 on 5/18, currently downtrended to 2.16 today.    - likely pre-renal vs. ATN in setting of hypotension 2/2 GI bleed, unlikely hepatorenal syndrome     - Off albumin, octreotide     - Remove reyes 5/20 ---> trial of void     - Renal following, appreciate recs     # GI      1. 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          - hepatology following          -varices: s/p EGD 5/18/2021, 5mm esophageal varices          -ascites: perihepatic and pelvic ascites present         -HE: likely contributing to AMS          -HCC: none seen but imaging so far non-contrast so unable to exclude HCC         -MELD-Na = 27 on 5/20         - s/p diagnostic paracentesis 5/19, negative for SBP          - daily MELD-Na labs          - starting feeding, resume rifaximin, lactulose                 -Chronic liver disease work-up: Hepatitis B surface Ag, Ab, core IgM negative, core total positive, HBV DNA and E-Ag pendin     2. GI bleed         -s/p EGD 5/18: - Small (< 5 mm) esophageal varices.                                        - One non-bleeding, large (4 cm x 4 cm), cratered duodenal ulcer with           -  5/18--5/19, s/p 4 units pRBC, 1 unit platelet, 1 unit FFP         - c/w protonix gtt 72 hrs post-EGD         - CBC q8h     # ID   - will c/w prophylactic ceftriaxone (5/16--5/22)  for total of 7 days in setting of GI bleed (cirrhosis), and negative SBP   - blood culture (5/18) NGTD  - sputum culture normal respiratory [place]ota  - pleural fluid culture no growth  - peritoneal fluid negative gram stain  - urine culture no growth  - paracentesis 5/19: total nucleated cells 46, negative for SBP     # Heme       - thrombocytopenia           - This AM, platelet improved to 83         - s/p 1 unit platelet on 5/18, 1 unit platelet on 5/19      - Coagulopathic         -  currently INR 1.69-- will trend daily         - likely 2/2 liver cirrhosis --- concurrently increased risk of thrombosis & bleeding     # Endo        - DM type 2        - A1c 5.5 on 5/17       - home regimen: Toujeo 20 units subQ bedtime; Humalog 8 units subQ TID w/ meals         - hold home insulin        - while NPO, FS q6h, low dose sliding scale insulin q6h     # DVT prophylaxis       - SCD        - hold chemical prophyaxis in setting of active GI bleed and plans for procedure (thoracentesis/paracentesis)     # Ethics:       - per primary team discussion with family as of 5/18  --- FULL CODE       - per discussion with PCP Dr Lui (see chart note 5/18), 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   # Neuro     1. Metabolic encephalopathy       - this morning, patient opes eyes spontaneously, did not respond to verbal questions or follow verbal commands---likely 2/2 residual sedatives       - now off all sedatives        - serum ammonia 24, low suspicion for hepatic encephalopathy currently       - when feeds starts will resume rifaximin, lactulose     2. R cerebellopontine angle extra-axial mass      - s/p head CT 5/16: Interval enlargement of right cerebellopontine angle extra-axial mass. Differential includes meningioma and schwannoma. Recommend contrast-enhanced MRI of the IACs. No acute intracranial bleeding.     - obtained collateral from daughter and PCP, no history of cancer.    # CV     1. Shock      - resolved, off pressors since 5/18 PM      - Etiology: hemorrhagic shock 2/2 GI bleed 2/2 duodenal ulcer, currently bleedings stopped      - 5/18--5/19, s/p 4 units pRBC, 1 unit platelet, 1 unit FFP      - s/p EGD on 5/18: - Small (< 5 mm) esophageal varices.                                           - One non-bleeding, large (4 cm x 4 cm), cratered duodenal ulcer with     2. HF      - s/p AICD     - TTE 5/17: EF 40-45%, moderate LV systolic function, no LV thrombus, mild diastolic dysfunction (stage I)     - home HF medications: entresto 24-26 BID, lasix 80 PO daily --- currently holding in setting of hypotension, current volume status acceptable     3. CAD      - home prasugrel 10mg Po daily --- holding in setting of active bleed     # Pulm    1. Pleural effusion     - CT chest (5/16):  Moderate to large R pleural effusion with complete collapse of R middle and lower lobes. Small L pleural effusion with complete atelectasis.      - s/p intubation 5/18 in preparation for EGD; s/p extubation 5/20    - s/p thoracentesis on 5/18, -1.6L, negative gram stain, based on Light's criteria, transudative     # Renal     ANDREEA    - improving, UOP 1440cc last 24 hr, Cr peaked at 2.74 on 5/18, currently downtrended to 2.16 today.    - likely pre-renal vs. ATN in setting of hypotension 2/2 GI bleed, unlikely hepatorenal syndrome     - Off albumin, octreotide     - Remove reyes 5/20 ---> trial of void     - Renal following, appreciate recs     # GI      1. 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          - hepatology following          -varices: s/p EGD 5/18/2021, 5mm esophageal varices          -ascites: perihepatic and pelvic ascites present         -HE: likely contributing to AMS          -HCC: none seen but imaging so far non-contrast so unable to exclude HCC         -MELD-Na = 27 on 5/20         - s/p diagnostic paracentesis 5/19, negative for SBP          - daily MELD-Na labs          - starting feeding, resume rifaximin, lactulose                 -Chronic liver disease work-up: Hepatitis B surface Ag, Ab, core IgM negative, core total positive, HBV DNA and E-Ag pendin     2. GI bleed         -s/p EGD 5/18: - Small (< 5 mm) esophageal varices.                                        - One non-bleeding, large (4 cm x 4 cm), cratered duodenal ulcer with           -  5/18--5/19, s/p 4 units pRBC, 1 unit platelet, 1 unit FFP         - c/w protonix gtt 72 hrs post-EGD         - CBC q8h    3. Feeding       - patient failed bedside swallow test, pending formal speech swallow eval      - discussed with GI team, OK to proceed with NG tube  placement (as varices < 5mm on EGD)     # ID   - will c/w prophylactic ceftriaxone (5/16--5/22)  for total of 7 days in setting of GI bleed (cirrhosis), and negative SBP   - blood culture (5/18) NGTD  - sputum culture normal respiratory [place]  - pleural fluid culture no growth  - peritoneal fluid negative gram stain  - urine culture no growth  - paracentesis 5/19: total nucleated cells 46, negative for SBP     # Heme       - thrombocytopenia           - This AM, platelet improved to 83         - s/p 1 unit platelet on 5/18, 1 unit platelet on 5/19      - Coagulopathic         -  currently INR 1.69-- will trend daily         - likely 2/2 liver cirrhosis --- concurrently increased risk of thrombosis & bleeding     # Endo        - DM type 2        - A1c 5.5 on 5/17       - home regimen: Toujeo 20 units subQ bedtime; Humalog 8 units subQ TID w/ meals         - hold home insulin        - while NPO, FS q6h, low dose sliding scale insulin q6h     # DVT prophylaxis       - SCD        - hold chemical prophyaxis in setting of active GI bleed and plans for procedure (thoracentesis/paracentesis)     # Ethics:       - per primary team discussion with family as of 5/18  --- FULL CODE       - per discussion with PCP Dr Lui (see chart note 5/18), 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

## 2021-05-20 NOTE — PHYSICAL THERAPY INITIAL EVALUATION ADULT - ADDITIONAL COMMENTS
Pt questionable historian. Pt lives with wife and dtr in a home with (?) steps to enter. Pt stated PTA he was mostly (I) with ADLs and functional mobility with no assistive device. family assists if needed.

## 2021-05-20 NOTE — PROGRESS NOTE ADULT - PROBLEM SELECTOR PLAN 3
Encephalopathy multifactorial in etiology of metabolic deragements (hyponatremia vs. HE vs ARF), patient endorsing subacute onset of weakness and lethargy unclear how far off the patient is from baseline however, patient seems extremely weak, maybe in the setting of possible symptomatic anemia as well. No evidence of hypercapnia or hypoxia on patient's vitals and labs.   -CTM  -CTH negative for acute bleed.   -Passed bedside speech and swallow eval will start soft diet and have the patient's meds converted to PO. Will convey these findings to speech and swallow tmrw.   -C/w lactulose and rifaximin titrate to 3-4 BMs. Stool counts.

## 2021-05-20 NOTE — AIRWAY REMOVAL NOTE  ADULT & PEDS - ARTIFICAL AIRWAY REMOVAL COMMENTS
Written order for extubation verified. The patient was identified by full name and birth date compared to the identification band. Present during the procedure was MARILYN Saenz, JULIETA Fink and Dr. Kendrick.

## 2021-05-20 NOTE — PROGRESS NOTE ADULT - PROBLEM SELECTOR PLAN 10
#Bicytopenia: Thrombocytopenia i/s/o hepatic dysfunction. Anemia i/s/o GIB.   #Pericardial Effusion: Seen on CT Chest this admission, not tamponade physiology per cards note.   #Pleural Effusion: R Pleff s/p thoracentesis   DVT: SCDs  Diet: Soft, CC, DASH TLC,

## 2021-05-20 NOTE — PROGRESS NOTE ADULT - PROBLEM SELECTOR PLAN 2
Decompensated i/s/o possible worsening ascites vs. UGIB vs. metabolic derangement (hyponatremia) leading to patient's hepatic encephalopathy and worsening status. Cirrhosis most likely i/s/o hepatitis.   -F/u acute hepatitis panel and cirrhosis labs  -MELD-Na=27, f/u daily MELD labs  -GIB stable  -Ascites: Holding lasix i/s/o ARF and hypotension  -Varices: <5mm on EGD, non-bleeding s/p octreotide. Now on PPI and ceftriaxone for SBP prophylaxis till 5/24.   -HE: C/w lactolose 10 TID and 550 mg rifaximin BID.

## 2021-05-20 NOTE — CHART NOTE - NSCHARTNOTEFT_GEN_A_CORE
MICU Transfer Note    Transfer from: MICU    Transfer to: ( X ) Medicine    (  ) Telemetry     (   ) RCU        (    ) Palliative         (   ) Stroke Unit          (   ) __________________    Accepting Physician: Dr. Timothy Ackerman   Signout given to:     MICU COURSE:          ASSESSMENT & PLAN:   86 yo M w/ HF (EF 40-45%, moderate LV systolic function), cirrhosis of unknown etiology (suspect 2/2 hep B), ANDREEA suspect 2/2 hepatorenal syndrome, down-trending Hgb suspect 2/2 GI bleed, admitted to MICU due to shock placed on pressor support.   # Neuro     1. Metabolic encephalopathy       - this morning, patient opes eyes spontaneously, did not respond to verbal questions or follow verbal commands---likely 2/2 residual sedatives       - now off all sedatives        - serum ammonia 24, low suspicion for hepatic encephalopathy currently       - when feeds starts will resume rifaximin, lactulose     2. R cerebellopontine angle extra-axial mass      - s/p head CT 5/16: Interval enlargement of right cerebellopontine angle extra-axial mass. Differential includes meningioma and schwannoma. Recommend contrast-enhanced MRI of the IACs. No acute intracranial bleeding.     - obtained collateral from daughter and PCP, no history of cancer.    # CV     1. Shock      - resolved, off pressors since 5/18 PM      - Etiology: hemorrhagic shock 2/2 GI bleed 2/2 duodenal ulcer, currently bleedings stopped      - 5/18--5/19, s/p 4 units pRBC, 1 unit platelet, 1 unit FFP      - s/p EGD on 5/18: - Small (< 5 mm) esophageal varices.                                           - One non-bleeding, large (4 cm x 4 cm), cratered duodenal ulcer with     2. HF      - s/p AICD     - TTE 5/17: EF 40-45%, moderate LV systolic function, no LV thrombus, mild diastolic dysfunction (stage I)     - home HF medications: entresto 24-26 BID, lasix 80 PO daily --- currently holding in setting of hypotension, current volume status acceptable     3. CAD      - home prasugrel 10mg Po daily --- holding in setting of active bleed     # Pulm    1. Pleural effusion     - CT chest (5/16):  Moderate to large R pleural effusion with complete collapse of R middle and lower lobes. Small L pleural effusion with complete atelectasis.      - s/p intubation 5/18 in preparation for EGD; s/p extubation 5/20    - s/p thoracentesis on 5/18, -1.6L, negative gram stain, based on Light's criteria, transudative     # Renal     ANDREEA    - improving, UOP 1440cc last 24 hr, Cr peaked at 2.74 on 5/18, currently downtrended to 2.16 today.    - likely pre-renal vs. ATN in setting of hypotension 2/2 GI bleed, unlikely hepatorenal syndrome     - Off albumin, octreotide     - Remove reyes 5/20 ---> trial of void     - Renal following, appreciate recs     # GI      1. 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          - hepatology following          -varices: s/p EGD 5/18/2021, 5mm esophageal varices          -ascites: perihepatic and pelvic ascites present         -HE: likely contributing to AMS          -HCC: none seen but imaging so far non-contrast so unable to exclude HCC         -MELD-Na = 27 on 5/20         - s/p diagnostic paracentesis 5/19, negative for SBP          - daily MELD-Na labs          - starting feeding, resume rifaximin, lactulose                 -Chronic liver disease work-up: Hepatitis B surface Ag, Ab, core IgM negative, core total positive, HBV DNA and E-Ag pendin     2. GI bleed         -s/p EGD 5/18: - Small (< 5 mm) esophageal varices.                                        - One non-bleeding, large (4 cm x 4 cm), cratered duodenal ulcer with           -  5/18--5/19, s/p 4 units pRBC, 1 unit platelet, 1 unit FFP         - c/w protonix gtt 72 hrs post-EGD         - CBC q8h     # ID   - will c/w prophylactic ceftriaxone (5/16--5/22)  for total of 7 days in setting of GI bleed (cirrhosis), and negative SBP   - blood culture (5/18) NGTD  - sputum culture normal respiratory dakota  - pleural fluid culture no growth  - peritoneal fluid negative gram stain  - urine culture no growth  - paracentesis 5/19: total nucleated cells 46, negative for SBP     # Heme       - thrombocytopenia           - This AM, platelet improved to 83         - s/p 1 unit platelet on 5/18, 1 unit platelet on 5/19      - Coagulopathic         -  currently INR 1.69-- will trend daily         - likely 2/2 liver cirrhosis --- concurrently increased risk of thrombosis & bleeding     # Endo        - DM type 2        - A1c 5.5 on 5/17       - home regimen: Toujeo 20 units subQ bedtime; Humalog 8 units subQ TID w/ meals         - hold home insulin        - while NPO, FS q6h, low dose sliding scale insulin q6h     # DVT prophylaxis       - SCD        - hold chemical prophyaxis in setting of active GI bleed and plans for procedure (thoracentesis/paracentesis)     # Ethics:       - per primary team discussion with family as of 5/18  --- FULL CODE       - per discussion with PCP Dr Lui (see chart note 5/18), 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 Timothy Ackerman   Signout given to:     MICU COURSE:  84 yo M w/ HF (EF 40-45%, moderate LV systolic function), cirrhosis of unknown etiology (suspect 2/2 hep B), presented to hospital on 5/16 due to melena and increased SOB at home, found to have decompensated cirrhosis with large pleural effusion and ascites. During hospitalization Hgb down trended from 11.8 on May 16 to 5.1 on May 18, w/ hypotension and progressive lethargic mental status, and worsening ANDREEA initially put on octreotide/albumin due to concern for hepatorenal syndrome. On May 18, patient admitted to MICU, put on levophed, intubated for EGD which showed small (< 5 mm) esophageal varices, and one non-bleeding, large (4 cm x 4 cm), cratered duodenal ulcer, from 5/18--5/19, s/p 4 units pRBC, 2 units platelet, 1 unit FFP. No active bleed and off levophed since 5/18 PM. s/p thoracentesis on 5/18 -1.6L, transudative based on LIght's criteria; s/p diagnostic paracentesis on 5/19, negative for SBP, continue prophylactic ceftriaxone for 7 day (5/16-5/22).            ANDREEA improved with great urine output (1-2L per day), Cr downtrended to 2.16, unlikely 2/2 hepatorenal more likely 2/2 prerenal in setting of hypovolumia, octreotide/albumin discontinued.           Patient extubated on 5/20 5am. Failed bedside swallow test, pending formal speech swallow, GI cleared patient for NG tube placement for meds/feeds.         ASSESSMENT & PLAN:   84 yo M w/ HF (EF 40-45%, moderate LV systolic function), cirrhosis of unknown etiology (suspect 2/2 hep B), ANDREEA suspect 2/2 hepatorenal syndrome, down-trending Hgb suspect 2/2 GI bleed, admitted to MICU due to shock placed on pressor support.   # Neuro     1. Metabolic encephalopathy       - this morning, patient opes eyes spontaneously, did not respond to verbal questions or follow verbal commands---likely 2/2 residual sedatives       - now off all sedatives        - serum ammonia 24, low suspicion for hepatic encephalopathy currently       - when feeds starts will resume rifaximin, lactulose     2. R cerebellopontine angle extra-axial mass      - s/p head CT 5/16: Interval enlargement of right cerebellopontine angle extra-axial mass. Differential includes meningioma and schwannoma. Recommend contrast-enhanced MRI of the IACs. No acute intracranial bleeding.     - obtained collateral from daughter and PCP, no history of cancer.    # CV     1. Shock      - resolved, off pressors since 5/18 PM      - Etiology: hemorrhagic shock 2/2 GI bleed 2/2 duodenal ulcer, currently bleedings stopped      - 5/18--5/19, s/p 4 units pRBC, 1 unit platelet, 1 unit FFP      - s/p EGD on 5/18: - Small (< 5 mm) esophageal varices.                                           - One non-bleeding, large (4 cm x 4 cm), cratered duodenal ulcer with     2. HF      - s/p AICD     - TTE 5/17: EF 40-45%, moderate LV systolic function, no LV thrombus, mild diastolic dysfunction (stage I)     - home HF medications: entresto 24-26 BID, lasix 80 PO daily --- currently holding in setting of hypotension, current volume status acceptable     3. CAD      - home prasugrel 10mg Po daily --- holding in setting of active bleed     # Pulm    1. Pleural effusion     - CT chest (5/16):  Moderate to large R pleural effusion with complete collapse of R middle and lower lobes. Small L pleural effusion with complete atelectasis.      - s/p intubation 5/18 in preparation for EGD; s/p extubation 5/20    - s/p thoracentesis on 5/18, -1.6L, negative gram stain, based on Light's criteria, transudative     # Renal     ANDREEA    - improving, UOP 1440cc last 24 hr, Cr peaked at 2.74 on 5/18, currently downtrended to 2.16 today.    - likely pre-renal vs. ATN in setting of hypotension 2/2 GI bleed, unlikely hepatorenal syndrome     - Off albumin, octreotide     - Remove reyes 5/20 ---> trial of void     - Renal following, appreciate recs     # GI      1. 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          - hepatology following          -varices: s/p EGD 5/18/2021, 5mm esophageal varices          -ascites: perihepatic and pelvic ascites present         -HE: likely contributing to AMS          -HCC: none seen but imaging so far non-contrast so unable to exclude HCC         -MELD-Na = 27 on 5/20         - s/p diagnostic paracentesis 5/19, negative for SBP          - daily MELD-Na labs          - starting feeding, resume rifaximin, lactulose                 -Chronic liver disease work-up: Hepatitis B surface Ag, Ab, core IgM negative, core total positive, HBV DNA and E-Ag pendin     2. GI bleed         -s/p EGD 5/18: - Small (< 5 mm) esophageal varices.                                        - One non-bleeding, large (4 cm x 4 cm), cratered duodenal ulcer           -  5/18--5/19, s/p 4 units pRBC, 2 unit platelet, 1 unit FFP         - c/w protonix gtt 72 hrs post-EGD         - CBC q8h      3. Feeding       - patient failed bedside swallow test, pending formal speech swallow eval      - discussed with GI team, OK to proceed with NG tube  placement (as varices < 5mm on EGD)     # ID   - will c/w prophylactic ceftriaxone (5/16--5/22)  for total of 7 days in setting of GI bleed (cirrhosis), and negative SBP   - blood culture (5/18) NGTD  - sputum culture normal respiratory [place]  - pleural fluid culture no growth  - peritoneal fluid negative gram stain  - urine culture no growth  - paracentesis 5/19: total nucleated cells 46, negative for SBP     # Heme       - thrombocytopenia           - This AM, platelet improved to 83         - s/p 1 unit platelet on 5/18, 1 unit platelet on 5/19      - Coagulopathic         -  currently INR 1.69-- will trend daily         - likely 2/2 liver cirrhosis --- concurrently increased risk of thrombosis & bleeding     # Endo        - DM type 2        - A1c 5.5 on 5/17       - home regimen: Toujeo 20 units subQ bedtime; Humalog 8 units subQ TID w/ meals         - hold home insulin        - while NPO, FS q6h, low dose sliding scale insulin q6h     # DVT prophylaxis       - SCD        - hold chemical prophyaxis in setting of active GI bleed and plans for procedure (thoracentesis/paracentesis)     # Ethics:       - per primary team discussion with family as of 5/18  --- FULL CODE       - per discussion with PCP Dr Lui (see chart note 5/18), 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 Timothy Ackerman   Signout given to:     MICU COURSE:  86 yo M w/ HF (EF 40-45%, moderate LV systolic function), cirrhosis of unknown etiology (suspect 2/2 hep B), presented to hospital on 5/16 due to melena and increased SOB at home, found to have decompensated cirrhosis with large pleural effusion and ascites. During hospitalization Hgb down trended from 11.8 on May 16 to 5.1 on May 18, w/ hypotension and progressive lethargic mental status, and worsening ANDREEA initially put on octreotide/albumin due to concern for hepatorenal syndrome. On May 18, patient admitted to MICU, put on levophed, intubated for EGD which showed small (< 5 mm) esophageal varices, and one non-bleeding, large (4 cm x 4 cm), cratered duodenal ulcer, from 5/18--5/19, s/p 4 units pRBC, 2 units platelet, 1 unit FFP. No active bleed and off levophed since 5/18 PM. s/p thoracentesis on 5/18 -1.6L, transudative based on LIght's criteria; s/p diagnostic paracentesis on 5/19, negative for SBP, continue prophylactic ceftriaxone for 7 day (5/16-5/22).            ANDREEA improved with great urine output (1-2L per day), Cr downtrended to 2.16, unlikely 2/2 hepatorenal more likely 2/2 prerenal in setting of hypovolumia, octreotide/albumin discontinued.           Patient extubated on 5/20 5am. Failed bedside swallow test, pending formal speech swallow, GI cleared patient for NG tube placement for meds/feeds.         ASSESSMENT & PLAN:   86 yo M w/ HF (EF 40-45%, moderate LV systolic function), cirrhosis of unknown etiology (suspect 2/2 hep B), ANDREEA suspect 2/2 hepatorenal syndrome, down-trending Hgb suspect 2/2 GI bleed, admitted to MICU due to shock placed on pressor support.   # Neuro     1. Metabolic encephalopathy       - this morning, patient opes eyes spontaneously, did not respond to verbal questions or follow verbal commands---likely 2/2 residual sedatives       - now off all sedatives        - serum ammonia 24, low suspicion for hepatic encephalopathy currently       - when feeds starts will resume rifaximin, lactulose     2. R cerebellopontine angle extra-axial mass      - s/p head CT 5/16: Interval enlargement of right cerebellopontine angle extra-axial mass. Differential includes meningioma and schwannoma. Recommend contrast-enhanced MRI of the IACs. No acute intracranial bleeding.     - obtained collateral from daughter and PCP, no history of cancer.    # CV     1. Shock      - resolved, off pressors since 5/18 PM      - Etiology: hemorrhagic shock 2/2 GI bleed 2/2 duodenal ulcer, currently bleedings stopped      - 5/18--5/19, s/p 4 units pRBC, 1 unit platelet, 1 unit FFP      - s/p EGD on 5/18: - Small (< 5 mm) esophageal varices.                                           - One non-bleeding, large (4 cm x 4 cm), cratered duodenal ulcer with     2. HF      - s/p AICD     - TTE 5/17: EF 40-45%, moderate LV systolic function, no LV thrombus, mild diastolic dysfunction (stage I)     - home HF medications: entresto 24-26 BID, lasix 80 PO daily --- currently holding in setting of hypotension, current volume status acceptable     3. CAD      - home prasugrel 10mg Po daily --- holding in setting of active bleed     # Pulm    1. Pleural effusion     - CT chest (5/16):  Moderate to large R pleural effusion with complete collapse of R middle and lower lobes. Small L pleural effusion with complete atelectasis.      - s/p intubation 5/18 in preparation for EGD; s/p extubation 5/20    - s/p thoracentesis on 5/18, -1.6L, negative gram stain, based on Light's criteria, transudative     # Renal     ANDREEA    - improving, UOP 1440cc last 24 hr, Cr peaked at 2.74 on 5/18, currently downtrended to 2.16 today.    - likely pre-renal vs. ATN in setting of hypotension 2/2 GI bleed, unlikely hepatorenal syndrome     - Off albumin, octreotide     - Remove reyes 5/20 ---> trial of void     - Renal following, appreciate recs     # GI      1. 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          - hepatology following          -varices: s/p EGD 5/18/2021, 5mm esophageal varices          -ascites: perihepatic and pelvic ascites present         -HE: likely contributing to AMS          -HCC: none seen but imaging so far non-contrast so unable to exclude HCC         -MELD-Na = 27 on 5/20         - s/p diagnostic paracentesis 5/19, negative for SBP          - daily MELD-Na labs          - starting feeding, resume rifaximin, lactulose                 -Chronic liver disease work-up: Hepatitis B surface Ag, Ab, core IgM negative, core total positive, HBV DNA and E-Ag pendin     2. GI bleed         -s/p EGD 5/18: - Small (< 5 mm) esophageal varices.                                        - One non-bleeding, large (4 cm x 4 cm), cratered duodenal ulcer           -  5/18--5/19, s/p 4 units pRBC, 2 unit platelet, 1 unit FFP         - c/w protonix gtt 72 hrs post-EGD         - CBC q8h      3. Feeding       - patient failed bedside swallow test, pending formal speech swallow eval      - discussed with GI team, OK to proceed with NG tube  placement (as varices < 5mm on EGD)     # ID   - will c/w prophylactic ceftriaxone (5/16--5/22)  for total of 7 days in setting of GI bleed (cirrhosis), and negative SBP   - blood culture (5/18) NGTD  - sputum culture normal respiratory [place]  - pleural fluid culture no growth  - peritoneal fluid negative gram stain  - urine culture no growth  - paracentesis 5/19: total nucleated cells 46, negative for SBP     # Heme       - thrombocytopenia           - This AM, platelet improved to 83         - s/p 1 unit platelet on 5/18, 1 unit platelet on 5/19      - Coagulopathic         -  currently INR 1.69-- will trend daily         - likely 2/2 liver cirrhosis --- concurrently increased risk of thrombosis & bleeding     # Endo        - DM type 2        - A1c 5.5 on 5/17       - home regimen: Toujeo 20 units subQ bedtime; Humalog 8 units subQ TID w/ meals         - hold home insulin        - while NPO, FS q6h, low dose sliding scale insulin q6h     # DVT prophylaxis       - SCD        - hold chemical prophyaxis in setting of active GI bleed and plans for procedure (thoracentesis/paracentesis)     # Ethics:       - per primary team discussion with family as of 5/18  --- FULL CODE       - per discussion with PCP Dr Lui (see chart note 5/18), 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     FOR FOLLOW UP:  [ ] Need NG tube placement, then start rifaximin, lactulose, tube feeds  [ ] monitor mental status (baseline as of 5/18 AM, alert, oriented x2 (name, place, year of birth), follows verbal commands (squeezes fingers/wiggles toes symmetrically bilaterally)  [ ] formal speech and swallow eval   [ ] monitor CBC q12h   [ ] f/u hepatology rec   [ ] f/u renal rec MICU Transfer Note    Transfer from: MICU    Transfer to: ( X ) Medicine    (  ) Telemetry     (   ) RCU        (    ) Palliative         (   ) Stroke Unit          (   ) __________________    Accepting Physician: Dr. Timothy Ackerman   Signout given to:     MICU COURSE:  84 yo M w/ HF (EF 40-45%, moderate LV systolic function), cirrhosis of unknown etiology (suspect 2/2 hep B), presented to hospital on 5/16 due to melena and increased SOB at home, found to have decompensated cirrhosis with large pleural effusion and ascites. During hospitalization Hgb down trended from 11.8 on May 16 to 5.1 on May 18, w/ hypotension and progressive lethargic mental status, and worsening ANDREEA initially put on octreotide/albumin due to concern for hepatorenal syndrome. On May 18, patient admitted to MICU, put on levophed, intubated for EGD which showed small (< 5 mm) esophageal varices, and one non-bleeding, large (4 cm x 4 cm), cratered duodenal ulcer, from 5/18--5/19, s/p 4 units pRBC, 2 units platelet, 1 unit FFP. No active bleed and off levophed since 5/18 PM. s/p thoracentesis on 5/18 -1.6L, transudative based on LIght's criteria; s/p diagnostic paracentesis on 5/19, negative for SBP, continue prophylactic ceftriaxone for 7 day (5/16-5/22).            ANDREEA improved with great urine output (1-2L per day), Cr downtrended to 2.16, unlikely 2/2 hepatorenal more likely 2/2 prerenal in setting of hypovolumia, octreotide/albumin discontinued.           Patient extubated on 5/20 5am. Failed bedside swallow test, pending formal speech swallow, GI cleared patient for NG tube placement for meds/feeds.         ASSESSMENT & PLAN:   84 yo M w/ HF (EF 40-45%, moderate LV systolic function), cirrhosis of unknown etiology (suspect 2/2 hep B), ANDREEA suspect 2/2 hepatorenal syndrome, down-trending Hgb suspect 2/2 GI bleed, admitted to MICU due to shock placed on pressor support.   # Neuro     1. Metabolic encephalopathy       - this morning, patient opes eyes spontaneously, did not respond to verbal questions or follow verbal commands---likely 2/2 residual sedatives       - now off all sedatives        - serum ammonia 24, low suspicion for hepatic encephalopathy currently       - when feeds starts will resume rifaximin, lactulose     2. R cerebellopontine angle extra-axial mass      - s/p head CT 5/16: Interval enlargement of right cerebellopontine angle extra-axial mass. Differential includes meningioma and schwannoma. Recommend contrast-enhanced MRI of the IACs. No acute intracranial bleeding.     - obtained collateral from daughter and PCP, no history of cancer.    # CV     1. Shock      - resolved, off pressors since 5/18 PM      - Etiology: hemorrhagic shock 2/2 GI bleed 2/2 duodenal ulcer, currently bleedings stopped      - 5/18--5/19, s/p 4 units pRBC, 1 unit platelet, 1 unit FFP      - s/p EGD on 5/18: - Small (< 5 mm) esophageal varices.                                           - One non-bleeding, large (4 cm x 4 cm), cratered duodenal ulcer with     2. HF      - s/p AICD     - TTE 5/17: EF 40-45%, moderate LV systolic function, no LV thrombus, mild diastolic dysfunction (stage I)     - home HF medications: entresto 24-26 BID, lasix 80 PO daily --- currently holding in setting of hypotension, current volume status acceptable     3. CAD      - home prasugrel 10mg Po daily --- holding in setting of active bleed     # Pulm    1. Pleural effusion     - CT chest (5/16):  Moderate to large R pleural effusion with complete collapse of R middle and lower lobes. Small L pleural effusion with complete atelectasis.      - s/p intubation 5/18 in preparation for EGD; s/p extubation 5/20    - s/p thoracentesis on 5/18, -1.6L, negative gram stain, based on Light's criteria, transudative     # Renal     ANDREEA    - improving, UOP 1440cc last 24 hr, Cr peaked at 2.74 on 5/18, currently downtrended to 2.16 today.    - likely pre-renal vs. ATN in setting of hypotension 2/2 GI bleed, unlikely hepatorenal syndrome     - Off albumin, octreotide     - Remove reyes 5/20 ---> trial of void     - Renal following, appreciate recs     # GI      1. 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          - hepatology following          -varices: s/p EGD 5/18/2021, 5mm esophageal varices          -ascites: perihepatic and pelvic ascites present         -HE: likely contributing to AMS          -HCC: none seen but imaging so far non-contrast so unable to exclude HCC         -MELD-Na = 27 on 5/20         - s/p diagnostic paracentesis 5/19, negative for SBP          - daily MELD-Na labs          - starting feeding, resume rifaximin, lactulose                 -Chronic liver disease work-up: Hepatitis B surface Ag, Ab, core IgM negative, core total positive, HBV DNA and E-Ag pendin     2. GI bleed         -s/p EGD 5/18: - Small (< 5 mm) esophageal varices.                                        - One non-bleeding, large (4 cm x 4 cm), cratered duodenal ulcer           -  5/18--5/19, s/p 4 units pRBC, 2 unit platelet, 1 unit FFP         - c/w protonix gtt 72 hrs post-EGD         - CBC q8h      3. Feeding       - patient failed bedside swallow test, pending formal speech swallow eval      - discussed with GI team, OK to proceed with NG tube  placement (as varices < 5mm on EGD)     # ID   - will c/w prophylactic ceftriaxone (5/16--5/22)  for total of 7 days in setting of GI bleed (cirrhosis), and negative SBP   - blood culture (5/18) NGTD  - sputum culture normal respiratory [place]  - pleural fluid culture no growth  - peritoneal fluid negative gram stain  - urine culture no growth  - paracentesis 5/19: total nucleated cells 46, negative for SBP     # Heme       - thrombocytopenia           - This AM, platelet improved to 83         - s/p 1 unit platelet on 5/18, 1 unit platelet on 5/19      - Coagulopathic         -  currently INR 1.69-- will trend daily         - likely 2/2 liver cirrhosis --- concurrently increased risk of thrombosis & bleeding     # Endo        - DM type 2        - A1c 5.5 on 5/17       - home regimen: Toujeo 20 units subQ bedtime; Humalog 8 units subQ TID w/ meals         - hold home insulin        - while NPO, FS q6h, low dose sliding scale insulin q6h     # DVT prophylaxis       - SCD        - hold chemical prophyaxis in setting of active GI bleed and plans for procedure (thoracentesis/paracentesis)     # Ethics:       - per primary team discussion with family as of 5/18  --- FULL CODE       - per discussion with PCP Dr Lui (see chart note 5/18), 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     FOR FOLLOW UP:  [ ] Need NG tube placement, then start rifaximin, lactulose, tube feeds  [ ] monitor mental status (baseline as of 5/18 AM, alert, oriented x2 (name, place, year of birth), follows verbal commands (squeezes fingers/wiggles toes symmetrically bilaterally)  [ ] formal speech and swallow eval   [ ] last day protonix gtt 5/21 PM, then transition to protonix IV BID   [ ] monitor CBC q12h   [ ] f/u hepatology rec   [ ] f/u renal rec MICU Transfer Note    Transfer from: MICU    Transfer to: ( X ) Medicine    (  ) Telemetry     (   ) RCU        (    ) Palliative         (   ) Stroke Unit          (   ) __________________    Accepting Physician: Dr. Timothy Ackerman   Signout given to:     MICU COURSE:  84 yo M w/ HF (EF 40-45%, moderate LV systolic function), cirrhosis of unknown etiology (suspect 2/2 hep B), presented to hospital on 5/16 due to melena and increased SOB at home, found to have decompensated cirrhosis with large pleural effusion and ascites. During hospitalization Hgb down trended from 11.8 on May 16 to 5.1 on May 18, w/ hypotension and progressive lethargic mental status, and worsening ANDREEA initially put on octreotide/albumin due to concern for hepatorenal syndrome. On May 18, patient admitted to MICU, put on levophed, intubated for EGD which showed small (< 5 mm) esophageal varices, and one non-bleeding, large (4 cm x 4 cm), cratered duodenal ulcer, from 5/18--5/19, s/p 4 units pRBC, 2 units platelet, 1 unit FFP. No active bleed and off levophed since 5/18 PM. s/p thoracentesis on 5/18 -1.6L, transudative based on LIght's criteria; s/p diagnostic paracentesis on 5/19, negative for SBP, continue prophylactic ceftriaxone for 7 day (5/16-5/22).            ANDREEA improved with great urine output (1-2L per day), Cr downtrended to 2.16, unlikely 2/2 hepatorenal more likely 2/2 prerenal in setting of hypovolumia, octreotide/albumin discontinued.           Patient extubated on 5/20 5am. Failed bedside swallow test, pending formal speech swallow, GI cleared patient for NG tube placement for meds/feeds.         ASSESSMENT & PLAN:   84 yo M w/ HF (EF 40-45%, moderate LV systolic function), cirrhosis of unknown etiology (suspect 2/2 hep B), ANDREEA suspect 2/2 hepatorenal syndrome, down-trending Hgb suspect 2/2 GI bleed, admitted to MICU due to shock placed on pressor support.   # Neuro     1. Metabolic encephalopathy       - this morning, patient opes eyes spontaneously, did not respond to verbal questions or follow verbal commands---likely 2/2 residual sedatives       - now off all sedatives        - serum ammonia 24, low suspicion for hepatic encephalopathy currently       - when feeds starts will resume rifaximin, lactulose     2. R cerebellopontine angle extra-axial mass      - s/p head CT 5/16: Interval enlargement of right cerebellopontine angle extra-axial mass. Differential includes meningioma and schwannoma. Recommend contrast-enhanced MRI of the IACs. No acute intracranial bleeding.     - obtained collateral from daughter and PCP, no history of cancer.    # CV     1. Shock      - resolved, off pressors since 5/18 PM      - Etiology: hemorrhagic shock 2/2 GI bleed 2/2 duodenal ulcer, currently bleedings stopped      - 5/18--5/19, s/p 4 units pRBC, 1 unit platelet, 1 unit FFP      - s/p EGD on 5/18: - Small (< 5 mm) esophageal varices.                                           - One non-bleeding, large (4 cm x 4 cm), cratered duodenal ulcer with     2. HF      - s/p AICD     - TTE 5/17: EF 40-45%, moderate LV systolic function, no LV thrombus, mild diastolic dysfunction (stage I)     - home HF medications: entresto 24-26 BID, lasix 80 PO daily --- currently holding in setting of hypotension, current volume status acceptable     3. CAD      - home prasugrel 10mg Po daily --- holding in setting of active bleed     # Pulm    1. Pleural effusion     - CT chest (5/16):  Moderate to large R pleural effusion with complete collapse of R middle and lower lobes. Small L pleural effusion with complete atelectasis.      - s/p intubation 5/18 in preparation for EGD; s/p extubation 5/20    - s/p thoracentesis on 5/18, -1.6L, negative gram stain, based on Light's criteria, transudative     # Renal     ANDREEA    - improving, UOP 1440cc last 24 hr, Cr peaked at 2.74 on 5/18, currently downtrended to 2.16 today.    - likely pre-renal vs. ATN in setting of hypotension 2/2 GI bleed, unlikely hepatorenal syndrome     - Off albumin, octreotide     - Remove reyes 5/20 ---> trial of void     - Renal following, appreciate recs     # GI      1. 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          - hepatology following          -varices: s/p EGD 5/18/2021, 5mm esophageal varices          -ascites: perihepatic and pelvic ascites present         -HE: likely contributing to AMS          -HCC: none seen but imaging so far non-contrast so unable to exclude HCC         -MELD-Na = 27 on 5/20         - s/p diagnostic paracentesis 5/19, negative for SBP          - daily MELD-Na labs          - starting feeding, resume rifaximin, lactulose                 -Chronic liver disease work-up: Hepatitis B surface Ag, Ab, core IgM negative, core total positive, HBV DNA and E-Ag pendin     2. GI bleed         -s/p EGD 5/18: - Small (< 5 mm) esophageal varices.                                        - One non-bleeding, large (4 cm x 4 cm), cratered duodenal ulcer           -  5/18--5/19, s/p 4 units pRBC, 2 unit platelet, 1 unit FFP         - c/w protonix gtt 72 hrs post-EGD         - CBC q8h      3. Feeding       - patient failed bedside swallow test, pending formal speech swallow eval      - discussed with GI team, OK to proceed with NG tube  placement (as varices < 5mm on EGD)     # ID   - will c/w prophylactic ceftriaxone (5/16--5/22)  for total of 7 days in setting of GI bleed (cirrhosis), and negative SBP   - blood culture (5/18) NGTD  - sputum culture normal respiratory [place]  - pleural fluid culture no growth  - peritoneal fluid negative gram stain  - urine culture no growth  - paracentesis 5/19: total nucleated cells 46, negative for SBP     # Heme       - thrombocytopenia           - This AM, platelet improved to 83         - s/p 1 unit platelet on 5/18, 1 unit platelet on 5/19      - Coagulopathic         -  currently INR 1.69-- will trend daily         - likely 2/2 liver cirrhosis --- concurrently increased risk of thrombosis & bleeding     # Endo        - DM type 2        - A1c 5.5 on 5/17       - home regimen: Toujeo 20 units subQ bedtime; Humalog 8 units subQ TID w/ meals         - hold home insulin        - while NPO, FS q6h, low dose sliding scale insulin q6h     # DVT prophylaxis       - SCD        - hold chemical prophyaxis in setting of active GI bleed and plans for procedure (thoracentesis/paracentesis)     # Ethics:       - per primary team discussion with family as of 5/18  --- FULL CODE       - per discussion with PCP Dr Lui (see chart note 5/18), 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     FOR FOLLOW UP:  [ ] Need NG tube placement, then start rifaximin, lactulose, tube feeds  [ ] monitor mental status (baseline as of 5/18 AM, alert, oriented x2 (name, place, year of birth), follows verbal commands (squeezes fingers/wiggles toes symmetrically bilaterally)  [ ] formal speech and swallow eval   [ ] last day protonix gtt 5/21 PM, then transition to protonix IV BID   [ ] s/p reyes removal 5/20---> f/u trial of void   [ ] monitor CBC q12h   [ ] f/u hepatology rec   [ ] f/u renal rec

## 2021-05-20 NOTE — PROGRESS NOTE ADULT - ATTENDING COMMENTS
86 yo M w/ HF (EF 40-45%, moderate LV systolic function), cirrhosis of unknown etiology transferred out of the ICU. S/p UGIB, hypotension, and acute renal failure requiring MICU stay for pressor support. EGD w esophageal varices, duodenal ulcer.     Now hemodynamically stable. Passed bedside swallow assessment, started on clears.     On Protonix gtt. Monitor. 84 yo M w/ HF (EF 40-45%, moderate LV systolic dysfunction), cirrhosis of unknown etiology w ICU stay as above. S/p UGIB, hypotension, and acute renal failure requiring pressor support. EGD w esophageal varices, duodenal ulcer.     Now hemodynamically stable. Passed bedside swallow assessment, started on clears.     On Protonix gtt. Monitor.

## 2021-05-20 NOTE — PROGRESS NOTE ADULT - SUBJECTIVE AND OBJECTIVE BOX
PATIENT: SENAIT JHAVERI   AGE: Mode: offo     CHIEF COMPLAINT:  Patient is a 85y old  Male who presents with a chief complaint of sob, confusion, weakness, can't walk, fever (20 May 2021 10:09)      OVERNIGHT EVENTS: This morning at 5am, patient was extubated. Otherwise no acute adverse event overnight.     SUBJECTIVE: Patient seen and examined at bedside. Patient alert, eyes open spontaneously, did not answer any ROS questions.       OBJECTIVE:    VITAL SIGNS:  ICU Vital Signs Last 24 Hrs  T(C): 36.8 (20 May 2021 08:00), Max: 43 (20 May 2021 04:00)  T(F): 98.2 (20 May 2021 08:00), Max: 109.4 (20 May 2021 04:00)  HR: 66 (20 May 2021 09:00) (60 - 78)  BP: 113/55 (20 May 2021 09:00) (105/56 - 143/65)  BP(mean): 79 (20 May 2021 09:00) (75 - 93)  ABP: --  ABP(mean): --  RR: 9 (20 May 2021 09:00) (9 - 28)  SpO2: 100% (20 May 2021 09:00) (99% - 100%)    GENERAL: Patient in bed in NAD  NEURO: alert, eyes open spontaneously, does not answer any verbal questions or follow any verbal commands, nonsensible speech, moves 4 extremities spontaneously   HEAD:  Atraumatic, Normocephalic  HEENT: scleral anicteric.   CV: Regular rate and rhythm. No murmurs, rubs, or gallops  Respiratory: normal respiratory efforts. Lungs clear to auscultation bilaterally, no wheezes/crackles.  ABDOMEN: Soft, Nontender, Nondistended; Bowel sounds normal  EXTREMITIES: + lower extremity edema  Skin: erythematous groin area     Mode: off    05-19 @ 07:01  -  05-20 @ 07:00  --------------------------------------------------------  IN: 721.7 mL / OUT: 1440 mL / NET: -718.3 mL    05-20 @ 07:01  - 05-20 @ 11:47  --------------------------------------------------------  IN: 20 mL / OUT: 160 mL / NET: -140 mL      CAPILLARY BLOOD GLUCOSE      POCT Blood Glucose.: 161 mg/dL (20 May 2021 11:04)      I/O SUMMARY:    05-19-21 @ 07:01  -  05-20-21 @ 07:00  --------------------------------------------------------  IN: 721.7 mL / OUT: 1440 mL / NET: -718.3 mL    05-20-21 @ 07:01  -  05-20-21 @ 11:47  --------------------------------------------------------  IN: 20 mL / OUT: 160 mL / NET: -140 mL      PHYSICAL EXAM:      MEDICATIONS:  MEDICATIONS  (STANDING):  cefTRIAXone   IVPB 1000 milliGRAM(s) IV Intermittent every 24 hours  chlorhexidine 4% Liquid 1 Application(s) Topical <User Schedule>  dextrose 40% Gel 15 Gram(s) Oral once  dextrose 5%. 1000 milliLiter(s) (50 mL/Hr) IV Continuous <Continuous>  dextrose 5%. 1000 milliLiter(s) (100 mL/Hr) IV Continuous <Continuous>  dextrose 50% Injectable 25 Gram(s) IV Push once  dextrose 50% Injectable 12.5 Gram(s) IV Push once  dextrose 50% Injectable 25 Gram(s) IV Push once  glucagon  Injectable 1 milliGRAM(s) IntraMuscular once  insulin lispro (ADMELOG) corrective regimen sliding scale   SubCutaneous every 6 hours  levothyroxine Injectable 44 MICROGram(s) IV Push at bedtime  nystatin Ointment 1 Application(s) Topical two times a day  pantoprazole Infusion 8 mG/Hr (10 mL/Hr) IV Continuous <Continuous>    MEDICATIONS  (PRN):  albuterol/ipratropium for Nebulization 3 milliLiter(s) Nebulizer every 6 hours PRN Shortness of Breath and/or Wheezing      ALLERGIES:  Allergies    No Known Allergies    Intolerances        LABS:                        9.1    7.02  )-----------( 83       ( 20 May 2021 00:13 )             25.9     05-20    130<L>  |  96  |  96<H>  ----------------------------<  137<H>  4.1   |  19<L>  |  2.16<H>    Ca    9.3      20 May 2021 00:13  Phos  2.8     05-20  Mg     2.2     05-20    TPro  5.2<L>  /  Alb  3.9  /  TBili  2.6<H>  /  DBili  x   /  AST  32  /  ALT  22  /  AlkPhos  31<L>  05-20    LIVER FUNCTIONS - ( 20 May 2021 00:13 )  Alb: 3.9 g/dL / Pro: 5.2 g/dL / ALK PHOS: 31 U/L / ALT: 22 U/L / AST: 32 U/L / GGT: x           COAGULATION STUDIES:     aPTT  38.7 sec    (05-20-21 @ 00:13)     PT     19.8 sec    (05-20-21 @ 00:13)     INR    1.69 ratio         (05-20-21 @ 00:13), COAGULATION STUDIES:     aPTT  41.1 sec    (05-19-21 @ 01:25)     PT     20.9 sec    (05-19-21 @ 01:25)     INR    1.79 ratio         (05-19-21 @ 01:25), COAGULATION STUDIES:     aPTT  28.4 sec    (05-19-21 @ 00:26)     PT     18.5 sec    (05-19-21 @ 00:26)     INR    1.58 ratio         (05-19-21 @ 00:26)   PT/INR - ( 20 May 2021 00:13 )   PT: 19.8 sec;   INR: 1.69 ratio         PTT - ( 20 May 2021 00:13 )  PTT:38.7 sec    Mode: off  ABG - ( 20 May 2021 06:37 )  pH, Arterial: 7.43  pH, Blood: x     /  pCO2: 35    /  pO2: 139   / HCO3: 23    / Base Excess: -.9   /  SaO2: 99                POCT Blood Glucose.: 161 mg/dL (05-20-21 @ 11:04)  POCT Blood Glucose.: 159 mg/dL (05-19-21 @ 17:43)  POCT Blood Glucose.: 122 mg/dL (05-19-21 @ 11:54)        MICROBIOLOGY:    Culture - Fungal, Body Fluid (collected 05-19-21 @ 14:29)  Source: .Body Fluid Peritoneal Fluid  Preliminary Report (05-20-21 @ 09:09):    Testing in progress    Culture - Body Fluid with Gram Stain (collected 05-19-21 @ 14:29)  Source: .Body Fluid Peritoneal Fluid  Gram Stain (05-19-21 @ 20:28):    polymorphonuclear leukocytes seen    No organisms seen    by cytocentrifuge    Culture - Urine (collected 05-18-21 @ 23:09)  Source: .Urine Catheterized  Final Report (05-19-21 @ 21:46):    No growth    Culture - Sputum (collected 05-18-21 @ 20:51)  Source: .Sputum Sputum  Gram Stain (05-19-21 @ 00:12):    Rare polymorphonuclear leukocytes per low power field    Few Squamous epithelial cells per low power field    Rare Gram positive cocci in pairs per oil power field  Preliminary Report (05-19-21 @ 17:45):    Normal Respiratory Shadia present    Culture - Fungal, Body Fluid (collected 05-18-21 @ 20:51)  Source: .Body Fluid Pleural Fluid  Preliminary Report (05-19-21 @ 07:17):    Testing in progress    Culture - Body Fluid with Gram Stain (collected 05-18-21 @ 20:51)  Source: .Body Fluid Pleural Fluid  Gram Stain (05-19-21 @ 00:21):    polymorphonuclear leukocytes seen    No organisms seen    by cytocentrifuge  Preliminary Report (05-19-21 @ 17:06):    No growth    Culture - Blood (collected 05-18-21 @ 14:21)  Source: .Blood Blood-Peripheral  Preliminary Report (05-19-21 @ 15:05):    No growth to date.    Culture - Blood (collected 05-18-21 @ 12:51)  Source: .Blood Blood-Venous  Preliminary Report (05-19-21 @ 13:02):    No growth to date.        RADIOLOGY & ADDITIONAL TESTS: Reviewed.

## 2021-05-20 NOTE — PROGRESS NOTE ADULT - PROBLEM SELECTOR PLAN 6
s/p AICD     - TTE 5/17: EF 40-45%, moderate LV systolic function, no LV thrombus, mild diastolic dysfunction (stage I)     - home HF medications: entresto 24-26 BID, lasix 80 PO daily --- currently holding in setting of hypotension, current volume status acceptable

## 2021-05-20 NOTE — PROGRESS NOTE ADULT - PROBLEM SELECTOR PLAN 8
A1c=5.5 most likely normal i/s/o possible GI bleed. Patient on toujeo 20 and humalog 8 premeals at home.   -Restarting consistent Carb diet on 5/20  -Will hold off on premeal insulin with sliding scale and adjust insulin as needed. LESTER as needed.   -Will dose reduce basal insulin by 75% and start the patient on 5units.

## 2021-05-20 NOTE — PROGRESS NOTE ADULT - SUBJECTIVE AND OBJECTIVE BOX
Chief Complaint:  Patient is a 85y old  Male who presents with a chief complaint of sob, confusion, weakness, can't walk, fever (19 May 2021 10:07)    Reason for consult: cirrhosis, GI bleed    Interval Events: Pt extubated, no further bleeding, pt confused     Hospital Medications:  albuterol/ipratropium for Nebulization 3 milliLiter(s) Nebulizer every 6 hours PRN  cefTRIAXone   IVPB 1000 milliGRAM(s) IV Intermittent every 24 hours  chlorhexidine 4% Liquid 1 Application(s) Topical <User Schedule>  dextrose 40% Gel 15 Gram(s) Oral once  dextrose 5%. 1000 milliLiter(s) IV Continuous <Continuous>  dextrose 5%. 1000 milliLiter(s) IV Continuous <Continuous>  dextrose 50% Injectable 25 Gram(s) IV Push once  dextrose 50% Injectable 12.5 Gram(s) IV Push once  dextrose 50% Injectable 25 Gram(s) IV Push once  glucagon  Injectable 1 milliGRAM(s) IntraMuscular once  insulin lispro (ADMELOG) corrective regimen sliding scale   SubCutaneous every 6 hours  levothyroxine Injectable 44 MICROGram(s) IV Push at bedtime  nystatin Ointment 1 Application(s) Topical two times a day  pantoprazole Infusion 8 mG/Hr IV Continuous <Continuous>    ROS: Pt unable to provide    PHYSICAL EXAM:   Vital Signs:  Vital Signs Last 24 Hrs  T(C): 36.8 (20 May 2021 08:00), Max: 43 (20 May 2021 04:00)  T(F): 98.2 (20 May 2021 08:00), Max: 109.4 (20 May 2021 04:00)  HR: 66 (20 May 2021 09:00) (60 - 78)  BP: 113/55 (20 May 2021 09:00) (105/56 - 143/65)  BP(mean): 79 (20 May 2021 09:00) (75 - 93)  RR: 9 (20 May 2021 09:00) (9 - 28)  SpO2: 100% (20 May 2021 09:00) (99% - 100%)  Daily     Daily     GENERAL: no acute distress  NEURO: alert, does not respond to questions, no asterixis  HEENT: anicteric sclera, no conjunctival pallor appreciated  CHEST: no respiratory distress, no accessory muscle use  CARDIAC: regular rate, rhythm  ABDOMEN: soft, non-tender, non-distended, no rebound or guarding  EXTREMITIES: warm, well perfused, no edema  SKIN: no lesions noted    LABS: reviewed                        9.1    7.02  )-----------( 83       ( 20 May 2021 00:13 )             25.9     05-20    130<L>  |  96  |  96<H>  ----------------------------<  137<H>  4.1   |  19<L>  |  2.16<H>    Ca    9.3      20 May 2021 00:13  Phos  2.8     05-20  Mg     2.2     05-20    TPro  5.2<L>  /  Alb  3.9  /  TBili  2.6<H>  /  DBili  x   /  AST  32  /  ALT  22  /  AlkPhos  31<L>  05-20    LIVER FUNCTIONS - ( 20 May 2021 00:13 )  Alb: 3.9 g/dL / Pro: 5.2 g/dL / ALK PHOS: 31 U/L / ALT: 22 U/L / AST: 32 U/L / GGT: x             Interval Diagnostic Studies: see sunrise for full report

## 2021-05-20 NOTE — PROGRESS NOTE ADULT - ASSESSMENT
84 yo M w/ HF (EF 40-45%, moderate LV systolic function), cirrhosis of unknown etiology (suspect 2/2 hepatitis) initially admitted to the hospital found to be acutely encephalopathic 2/2 acute on chronic hyponatremia with a course complicated by UGIB leading to decompensated cirrhosis, hypotension, and acute renal failure requiring MICU stay for pressor support and temporization of acute GIB. Course in the MICU significant for EGD requiring intubation findings significant for esophageal varices, duodenal ulcer. Patient with ascites initially concerning for possibly SBP however diagnostic paracentesis negative for SBP now on prophylactic antibiotics and a PPI infusion.

## 2021-05-20 NOTE — PROGRESS NOTE ADULT - PROBLEM SELECTOR PLAN 1
Anemic to suresh 5.8 Anemic to ~5.8 most likely i/s/o UGIB possibly from duodenal ulcer vs. erosive esophagitis vs. dieulafoy lesion.   -S/p 4u pRBCs, 1u platelets, 1u FFP and MICU admission for hypotension requiring pressors briefly.   -Now stable  -CTM H/H  -T and S active  -PPI gtt @10cc/hr until 5/21 then PPI IV BID

## 2021-05-20 NOTE — PROGRESS NOTE ADULT - PROBLEM SELECTOR PLAN 4
Patient with CKD most likely i/s/o diabetes. Now with ANDREEA on CKD (Scr baseline from notes appears to be 1.2-1.3) now with ANDREEA with SCr downtrending. ANDREEA most likely i/s/o pre-renal azotemia (acute blood loss vs. splanchnic vasoplegia from cirrhosis vs. lethargy and decreased PO intake) vs. ATN from anemia.   -CTM SCr  -Avoid nephrotoxic agents  -Holding patient's entresto and lasix.   -Nephrology following appreciate recommendations  -1L fluid restriction for hyponatremia will monitor patient's PO feeds.

## 2021-05-20 NOTE — PROGRESS NOTE ADULT - ASSESSMENT
85M w/ CAD s/p PCI, HF s/p AICD, DMII, HTN, CKD, decompensated cirrhosis of unknown viral etiology, presenting w/ 3 weeks of generalized weakness, dyspnea, and abd distension. Found to have hyponatremia, ANDREEA, and large pleural effusions. Transferred to MICU on 5/18 for GI bleed from large duodenal ulcer. 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  #Hyponatremia - likely contributing to weakness and AMS.  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  -varices: small on EGD 5/18  -ascites: perihepatic and pelvic ascites present, s/p dx para 5/19, neg for SBP  -HE: present on exam  -HCC: none seen but imaging so far non-contrast so unable to exclude HCC  -MELD-Na = 27 on 5/20  -Chronic liver disease work-up: Hepatitis B surface Ag, Ab, core IgM negative, core total positive, HBV DNA and E-Ag pending  #ANDREEA - improved w/ albumin, low urine Na c/w poor renal perfusio  #HF - TTE shows decreased LV function and RV function  #Extra-axial mass - growing since 2018  #H pylori Ab positive    Recommendations:  - PPI gtt for at least 72 hours post procedure (can end 5/21 PM), would transition to IV PPI BID  - Continue w/ CTX, would not end before 5/24  - F/u cytology from pleural fluid  - Agree w/ 25% albumin, 100mL q6h for ANDREEA w/ urine studies showing poor renal perfusion  - Hold diuretics for now  - Continue w/ lactulose titrated to 3-4 BMs per day via NG tube  - Continue w/ rifaxmin 550mg BID via NG  - Send Hepatitis B DNA  - Low Na diet when able to take PO  - Trend CBC, CMP, INR daily  - Plan for H pylori treatment as outpatient  - Hepatology will follow    Saturnino Aguirre  Gastroenterology/Hepatology Fellow  Available via [employer] Teams    NON-URGENT CONSULTS:  Please email [email or web address]  AT NIGHT AND ON WEEKENDS:  Contact on-call GI fellow via answering service ([phone]) from 5pm-8am and on weekends/holidays  MONDAY-FRIDAY 8AM-5PM:  Pager# [phone] ([facility]) or [phone] ([facility])  GI Phone# [phone] ([facility])

## 2021-05-21 NOTE — PROGRESS NOTE ADULT - SUBJECTIVE AND OBJECTIVE BOX
Roderick Zimmerman M.D.  Internal Medicine PGY-1  [phone] / [id number]     Patient is a 85y old  Male who presents with a chief complaint of sob, confusion, weakness, can't walk, fever (20 May 2021 15:35)      SUBJECTIVE / OVERNIGHT EVENTS:          MEDICATIONS  (STANDING):  cefTRIAXone   IVPB 1000 milliGRAM(s) IV Intermittent every 24 hours  dextrose 40% Gel 15 Gram(s) Oral once  dextrose 5%. 1000 milliLiter(s) (50 mL/Hr) IV Continuous <Continuous>  dextrose 5%. 1000 milliLiter(s) (100 mL/Hr) IV Continuous <Continuous>  dextrose 50% Injectable 25 Gram(s) IV Push once  dextrose 50% Injectable 12.5 Gram(s) IV Push once  dextrose 50% Injectable 25 Gram(s) IV Push once  glucagon  Injectable 1 milliGRAM(s) IntraMuscular once  insulin glargine Injectable (LANTUS) 5 Unit(s) SubCutaneous at bedtime  insulin lispro (ADMELOG) corrective regimen sliding scale   SubCutaneous three times a day with meals  lactulose Syrup 10 Gram(s) Oral three times a day  levothyroxine Injectable 44 MICROGram(s) IV Push at bedtime  nystatin Ointment 1 Application(s) Topical two times a day  nystatin Powder 1 Application(s) Topical three times a day  pantoprazole Infusion 8 mG/Hr (10 mL/Hr) IV Continuous <Continuous>  rifAXIMin 550 milliGRAM(s) Oral two times a day    MEDICATIONS  (PRN):  albuterol/ipratropium for Nebulization 3 milliLiter(s) Nebulizer every 6 hours PRN Shortness of Breath and/or Wheezing      Vital Signs Last 24 Hrs  T(C): 36.5 (21 May 2021 04:02), Max: 36.8 (20 May 2021 08:00)  T(F): 97.7 (21 May 2021 04:02), Max: 98.2 (20 May 2021 08:00)  HR: 69 (21 May 2021 04:02) (65 - 91)  BP: 99/58 (21 May 2021 04:02) (99/58 - 131/60)  BP(mean): 87 (20 May 2021 13:05) (79 - 87)  RR: 18 (21 May 2021 04:02) (9 - 28)  SpO2: 95% (21 May 2021 04:02) (95% - 100%)      PHYSICAL EXAM  GENERAL: NAD, well-developed  HEAD:  Atraumatic, Normocephalic  EYES: EOMI, PERRLA, conjunctiva and sclera clear  NECK: Supple, No JVD  CHEST/LUNG: Clear to auscultation bilaterally; No wheeze  HEART: Regular rate and rhythm; No murmurs, rubs, or gallops  ABDOMEN: Soft, Nontender, Nondistended; Bowel sounds present  EXTREMITIES:  2+ Peripheral Pulses, No clubbing, cyanosis, or edema  PSYCH: AAOx3  SKIN: No rashes or lesions    CAPILLARY BLOOD GLUCOSE      POCT Blood Glucose.: 164 mg/dL (20 May 2021 21:42)  POCT Blood Glucose.: 156 mg/dL (20 May 2021 17:52)  POCT Blood Glucose.: 161 mg/dL (20 May 2021 11:04)    I&O's Summary    20 May 2021 07:01  -  21 May 2021 07:00  --------------------------------------------------------  IN: 50 mL / OUT: 235 mL / NET: -185 mL        LABS:                        10.6   7.51  )-----------( 73       ( 20 May 2021 19:44 )             30.0     05-20    130<L>  |  96  |  96<H>  ----------------------------<  137<H>  4.1   |  19<L>  |  2.16<H>    Ca    9.3      20 May 2021 00:13  Phos  2.8     05-20  Mg     2.2     05-20    TPro  5.2<L>  /  Alb  3.9  /  TBili  2.6<H>  /  DBili  x   /  AST  32  /  ALT  22  /  AlkPhos  31<L>  05-20    PT/INR - ( 20 May 2021 00:13 )   PT: 19.8 sec;   INR: 1.69 ratio         PTT - ( 20 May 2021 00:13 )  PTT:38.7 sec          RADIOLOGY & ADDITIONAL TESTS:     MICROBIOLOGY:    ANTIMICROBIALS:    CONSULTS: Roderick Zimmerman M.D.  Internal Medicine PGY-1  [phone] / [id number]     Patient is a 85y old  Male who presents with a chief complaint of sob, confusion, weakness, can't walk, fever (20 May 2021 15:35)      SUBJECTIVE / OVERNIGHT EVENTS:    Patient seen and examined at the bedside this am. Per nursing no acute events      MEDICATIONS  (STANDING):  cefTRIAXone   IVPB 1000 milliGRAM(s) IV Intermittent every 24 hours  dextrose 40% Gel 15 Gram(s) Oral once  dextrose 5%. 1000 milliLiter(s) (50 mL/Hr) IV Continuous <Continuous>  dextrose 5%. 1000 milliLiter(s) (100 mL/Hr) IV Continuous <Continuous>  dextrose 50% Injectable 25 Gram(s) IV Push once  dextrose 50% Injectable 12.5 Gram(s) IV Push once  dextrose 50% Injectable 25 Gram(s) IV Push once  glucagon  Injectable 1 milliGRAM(s) IntraMuscular once  insulin glargine Injectable (LANTUS) 5 Unit(s) SubCutaneous at bedtime  insulin lispro (ADMELOG) corrective regimen sliding scale   SubCutaneous three times a day with meals  lactulose Syrup 10 Gram(s) Oral three times a day  levothyroxine Injectable 44 MICROGram(s) IV Push at bedtime  nystatin Ointment 1 Application(s) Topical two times a day  nystatin Powder 1 Application(s) Topical three times a day  pantoprazole Infusion 8 mG/Hr (10 mL/Hr) IV Continuous <Continuous>  rifAXIMin 550 milliGRAM(s) Oral two times a day    MEDICATIONS  (PRN):  albuterol/ipratropium for Nebulization 3 milliLiter(s) Nebulizer every 6 hours PRN Shortness of Breath and/or Wheezing      Vital Signs Last 24 Hrs  T(C): 36.5 (21 May 2021 04:02), Max: 36.8 (20 May 2021 08:00)  T(F): 97.7 (21 May 2021 04:02), Max: 98.2 (20 May 2021 08:00)  HR: 69 (21 May 2021 04:02) (65 - 91)  BP: 99/58 (21 May 2021 04:02) (99/58 - 131/60)  BP(mean): 87 (20 May 2021 13:05) (79 - 87)  RR: 18 (21 May 2021 04:02) (9 - 28)  SpO2: 95% (21 May 2021 04:02) (95% - 100%)      PHYSICAL EXAM  GENERAL: NAD, well-developed  HEAD:  Atraumatic, Normocephalic  EYES: EOMI, PERRLA, conjunctiva and sclera clear  NECK: Supple, No JVD  CHEST/LUNG: Clear to auscultation bilaterally; No wheeze  HEART: Regular rate and rhythm; No murmurs, rubs, or gallops  ABDOMEN: Soft, Nontender, Nondistended; Bowel sounds present  EXTREMITIES:  2+ Peripheral Pulses, No clubbing, cyanosis, or edema  PSYCH: AAOx3  SKIN: No rashes or lesions    CAPILLARY BLOOD GLUCOSE      POCT Blood Glucose.: 164 mg/dL (20 May 2021 21:42)  POCT Blood Glucose.: 156 mg/dL (20 May 2021 17:52)  POCT Blood Glucose.: 161 mg/dL (20 May 2021 11:04)    I&O's Summary    20 May 2021 07:01  -  21 May 2021 07:00  --------------------------------------------------------  IN: 50 mL / OUT: 235 mL / NET: -185 mL        LABS:                        10.6   7.51  )-----------( 73       ( 20 May 2021 19:44 )             30.0     05-20    130<L>  |  96  |  96<H>  ----------------------------<  137<H>  4.1   |  19<L>  |  2.16<H>    Ca    9.3      20 May 2021 00:13  Phos  2.8     05-20  Mg     2.2     05-20    TPro  5.2<L>  /  Alb  3.9  /  TBili  2.6<H>  /  DBili  x   /  AST  32  /  ALT  22  /  AlkPhos  31<L>  05-20    PT/INR - ( 20 May 2021 00:13 )   PT: 19.8 sec;   INR: 1.69 ratio         PTT - ( 20 May 2021 00:13 )  PTT:38.7 sec          RADIOLOGY & ADDITIONAL TESTS:     MICROBIOLOGY:    ANTIMICROBIALS:    CONSULTS: Roderick Zimmerman M.D.  Internal Medicine PGY-1  [phone] / [id number]     Patient is a 85y old  Male who presents with a chief complaint of sob, confusion, weakness, can't walk, fever (20 May 2021 15:35)      SUBJECTIVE / OVERNIGHT EVENTS:    Patient seen and examined at the bedside this am. Per nursing no acute events overnight. This morning mandarin  used, patient endorsing urinary retention, and abdominal distension. States that he is also very dehydrated, but mentating appropriately.       MEDICATIONS  (STANDING):  cefTRIAXone   IVPB 1000 milliGRAM(s) IV Intermittent every 24 hours  dextrose 40% Gel 15 Gram(s) Oral once  dextrose 5%. 1000 milliLiter(s) (50 mL/Hr) IV Continuous <Continuous>  dextrose 5%. 1000 milliLiter(s) (100 mL/Hr) IV Continuous <Continuous>  dextrose 50% Injectable 25 Gram(s) IV Push once  dextrose 50% Injectable 12.5 Gram(s) IV Push once  dextrose 50% Injectable 25 Gram(s) IV Push once  glucagon  Injectable 1 milliGRAM(s) IntraMuscular once  insulin glargine Injectable (LANTUS) 5 Unit(s) SubCutaneous at bedtime  insulin lispro (ADMELOG) corrective regimen sliding scale   SubCutaneous three times a day with meals  lactulose Syrup 10 Gram(s) Oral three times a day  levothyroxine Injectable 44 MICROGram(s) IV Push at bedtime  nystatin Ointment 1 Application(s) Topical two times a day  nystatin Powder 1 Application(s) Topical three times a day  pantoprazole Infusion 8 mG/Hr (10 mL/Hr) IV Continuous <Continuous>  rifAXIMin 550 milliGRAM(s) Oral two times a day    MEDICATIONS  (PRN):  albuterol/ipratropium for Nebulization 3 milliLiter(s) Nebulizer every 6 hours PRN Shortness of Breath and/or Wheezing      Vital Signs Last 24 Hrs  T(C): 36.5 (21 May 2021 04:02), Max: 36.8 (20 May 2021 08:00)  T(F): 97.7 (21 May 2021 04:02), Max: 98.2 (20 May 2021 08:00)  HR: 69 (21 May 2021 04:02) (65 - 91)  BP: 99/58 (21 May 2021 04:02) (99/58 - 131/60)  BP(mean): 87 (20 May 2021 13:05) (79 - 87)  RR: 18 (21 May 2021 04:02) (9 - 28)  SpO2: 95% (21 May 2021 04:02) (95% - 100%)      GENERAL: Patient in bed in [facility]  NEURO: alert, eyes open spontaneously, does answer verbal questions and follows verbal commands, speech mostly sensible to the mandarin , moves 4 extremities spontaneously.   HEAD:  Atraumatic, Normocephalic  HEENT: scleral anicteric.   CV: Regular rate and rhythm. No murmurs, rubs, or gallops; + AICD in the left chest wall.   Respiratory: normal respiratory efforts. Lungs clear to auscultation bilaterally, no wheezes/crackles.  ABDOMEN: Soft, Nontender, Nondistended; Bowel sounds normal  EXTREMITIES: + lower extremity edema  Skin: erythematous groin area     CAPILLARY BLOOD GLUCOSE      POCT Blood Glucose.: 164 mg/dL (20 May 2021 21:42)  POCT Blood Glucose.: 156 mg/dL (20 May 2021 17:52)  POCT Blood Glucose.: 161 mg/dL (20 May 2021 11:04)    I&O's Summary    20 May 2021 07:01  -  21 May 2021 07:00  --------------------------------------------------------  IN: 50 mL / OUT: 235 mL / NET: -185 mL        LABS:                        10.6   7.51  )-----------( 73       ( 20 May 2021 19:44 )             30.0     05-20    130<L>  |  96  |  96<H>  ----------------------------<  137<H>  4.1   |  19<L>  |  2.16<H>    Ca    9.3      20 May 2021 00:13  Phos  2.8     05-20  Mg     2.2     05-20    TPro  5.2<L>  /  Alb  3.9  /  TBili  2.6<H>  /  DBili  x   /  AST  32  /  ALT  22  /  AlkPhos  31<L>  05-20    PT/INR - ( 20 May 2021 00:13 )   PT: 19.8 sec;   INR: 1.69 ratio         PTT - ( 20 May 2021 00:13 )  PTT:38.7 sec          RADIOLOGY & ADDITIONAL TESTS:     MICROBIOLOGY:    ANTIMICROBIALS:    CONSULTS:

## 2021-05-21 NOTE — PROGRESS NOTE ADULT - SUBJECTIVE AND OBJECTIVE BOX
[facility] DIVISION OF KIDNEY DISEASES AND HYPERTENSION -- FOLLOW UP NOTE  --------------------------------------------------------------------------------  Alejandro Sams   Nephrology Fellow  Pager NS: [phone]/ [facility]: [phone]  (After 5 pm or on weekends please page the on-call fellow, can view the schedule on [facility]. Login is sonali catalan, schedule under [facility] medicine, psych, derm.      Patient is a 85y old  Male who presents with a chief complaint of sob, confusion, weakness, can't walk, fever (21 May 2021 09:42)      24 hour events/subjective: Patient seen and examined at the bedside. Vital signs, labs, medications reviewed. Non-oliguric with downtrending Cr. Na improved to 138        PAST HISTORY  --------------------------------------------------------------------------------  No significant changes to PMH, PSH, FHx, SHx, unless otherwise noted    ALLERGIES & MEDICATIONS  --------------------------------------------------------------------------------  Allergies    No Known Allergies    Intolerances      Standing Inpatient Medications  cefTRIAXone   IVPB 1000 milliGRAM(s) IV Intermittent every 24 hours  dextrose 40% Gel 15 Gram(s) Oral once  dextrose 5%. 1000 milliLiter(s) IV Continuous <Continuous>  dextrose 5%. 1000 milliLiter(s) IV Continuous <Continuous>  dextrose 50% Injectable 25 Gram(s) IV Push once  dextrose 50% Injectable 12.5 Gram(s) IV Push once  dextrose 50% Injectable 25 Gram(s) IV Push once  glucagon  Injectable 1 milliGRAM(s) IntraMuscular once  insulin glargine Injectable (LANTUS) 5 Unit(s) SubCutaneous at bedtime  insulin lispro (ADMELOG) corrective regimen sliding scale   SubCutaneous three times a day with meals  lactulose Syrup 10 Gram(s) Oral three times a day  levothyroxine Injectable 44 MICROGram(s) IV Push at bedtime  nystatin Ointment 1 Application(s) Topical two times a day  nystatin Powder 1 Application(s) Topical three times a day  pantoprazole Infusion 8 mG/Hr IV Continuous <Continuous>  rifAXIMin 550 milliGRAM(s) Oral two times a day    PRN Inpatient Medications  albuterol/ipratropium for Nebulization 3 milliLiter(s) Nebulizer every 6 hours PRN      REVIEW OF SYSTEMS  --------------------------------------------------------------------------------  Gen: No fevers/chills  Skin: No rashes  Head/Eyes/Ears: Normal hearing, no difficulty seeing  Respiratory: No dyspnea, cough  CV: No chest pain  GI: No abdominal pain, diarrhea  : No dysuria, hematuria  MSK: No  edema  Heme: No easy bruising or bleeding  Psych: No significant depression    >>> <<<    VITALS/PHYSICAL EXAM  --------------------------------------------------------------------------------  T(C): 36.5 (05-21-21 @ 04:02), Max: 36.8 (05-20-21 @ 23:37)  HR: 69 (05-21-21 @ 04:02) (65 - 91)  BP: 99/58 (05-21-21 @ 04:02) (99/58 - 131/60)  RR: 18 (05-21-21 @ 04:02) (13 - 20)  SpO2: 95% (05-21-21 @ 04:02) (95% - 100%)  Wt(kg): --  Height (cm): 170.2 (05-20-21 @ 22:21)  Weight (kg): 79.6 (05-20-21 @ 22:21)  BMI (kg/m2): 27.5 (05-20-21 @ 22:21)  BSA (m2): 1.91 (05-20-21 @ 22:21)      05-20-21 @ 07:01  -  05-21-21 @ 07:00  --------------------------------------------------------  IN: 170 mL / OUT: 235 mL / NET: -65 mL      Physical Exam:    	Gen: NAD  	HEENT: Anicteric  	Pulm: CTA B/L  	CV: S1S2  	Abd: Soft, +BS   	MSK: No LE edema B/L  	Neuro: Awake  	Skin: Warm and dry  	Vascular access:      LABS/STUDIES  --------------------------------------------------------------------------------              9.9    6.27  >-----------<  76       [05-21-21 @ 09:12]              28.5     138  |  103  |  76  ----------------------------<  163      [05-21-21 @ 09:12]  3.8   |  21  |  1.82        Ca     9.6     [05-21-21 @ 09:12]      Mg     2.2     [05-21-21 @ 09:12]      Phos  2.2     [05-21-21 @ 09:12]    TPro  5.2  /  Alb  3.7  /  TBili  1.7  /  DBili  x   /  AST  30  /  ALT  19  /  AlkPhos  35  [05-21-21 @ 09:12]    PT/INR: PT 17.7 , INR 1.50       [05-21-21 @ 09:12]  PTT: 38.3       [05-21-21 @ 09:12]      Creatinine Trend:  SCr 1.82 [05-21 @ 09:12]  SCr 2.16 [05-20 @ 00:13]  SCr 2.44 [05-19 @ 01:26]  SCr 2.48 [05-19 @ 00:26]  SCr 2.74 [05-18 @ 10:06]    Urinalysis - [05-18-21 @ 11:13]      Color Yellow / Appearance Slightly Turbid / SG 1.016 / pH 6.0      Gluc Negative / Ketone Negative  / Bili Negative / Urobili Negative       Blood Large / Protein 30 mg/dL / Leuk Est Large / Nitrite Negative       / WBC 13 / Hyaline 3 / Gran  / Sq Epi  / Non Sq Epi 3 / Bacteria Negative    Urine Creatinine 106      [05-17-21 @ 16:21]  Urine Protein 10      [05-18-21 @ 01:11]  Urine Sodium 10      [05-17-21 @ 16:21]  Urine Urea Nitrogen 442      [05-17-21 @ 22:45]  Urine Osmolality 310      [05-17-21 @ 16:21]    HbA1c 7.1      [01-12-19 @ 07:22]  TSH 1.00      [05-17-21 @ 08:41]    HBsAb 32.4      [05-17-21 @ 23:13]  HBsAg Nonreact      [05-17-21 @ 23:13]  HBcAb Reactive      [05-17-21 @ 23:13]  HCV 0.17, Nonreact      [05-17-21 @ 23:13]  HIV Nonreact      [05-18-21 @ 01:05]

## 2021-05-21 NOTE — CHART NOTE - NSCHARTNOTEFT_GEN_A_CORE
Nutrition Follow Up Note  Patient seen for: Malnutrition Follow up on [facility]    Chart reviewed, events noted. Per chart, "84 yo Male w/ PMH CAD s/p PCI, HF s/p AICD, DM2, HTN, CKD, decompensated cirrhosis (unclear etiology) initially admitted to the hospital found to be acutely encephalopathic 2/2 acute on chronic hyponatremia with a course complicated by UGIB leading to decompensated cirrhosis, hypotension, and acute renal failure requiring MICU stay for pressor support and temporization of acute GIB. Course in the MICU significant for EGD requiring intubation findings significant for esophageal varices, duodenal ulcer. Patient with ascites initially concerning for possibly SBP however diagnostic paracentesis negative for SBP now on prophylactic antibiotics and a PPI infusion." Noted pt extubated  and transferred to medical floor. Noted speech therapist evaluated pt on --> speech recommended pt to be "advanced to soft texture diet per chart review with no concern for dysphagia and team requested for speech service to sign-off at this time."    Source: [x] Patient, Mandarin speaking, [place]  Services utilized: Milly, ID#: [id number]    [x] EMR        [x] RN     Diet Order:   Diet, Clear Liquid:   Consistent Carbohydrate {Evening Snack} (CSTCHOSN)  DASH/TLC {Sodium & Cholesterol Restricted} (DASH)  Low Fat (LOWFAT) (21)    - Is current order adequate?  [x]  No: diet not advanced to solids yet    - PO intake:      [x] <50%  Poor    - Nutrition-related concerns: Upon RD visit, RD attempted to speak with pt via  services, however pt very lethargic at this time, not providing much information during interview. RD observed pt with clear liquids lunch tray at bedside, pt had consumed 100% of apple juice and a few sips of gingerale. Per discussion with pt's nurse, pt has been coughing with the liquids, unclear if dysphagia vs. lthargy as speech therapist signed off on pt yesterday. No nausea, vomiting, constipation or diarrhea reported per nurse. Last bowel movement today, 5/21 x2 per flow sheets. Noted pt ordered for lactulose.    Weights:   Daily Weight in k.5 (-18), Weight in k.3 (-17); most recent weight noted.  Will continue to monitor trends as able. Weight fluctuations likely related to fluid shifts at this time. Pt status post paracentesis  with 50 ml drained and on diuretics, though being held now.    Nutritionally Pertinent MEDICATIONS  (STANDING):  cefTRIAXone   IVPB  dextrose 40% Gel  dextrose 5%.  dextrose 5%.  dextrose 50% Injectable  dextrose 50% Injectable  dextrose 50% Injectable  glucagon  Injectable  insulin glargine Injectable (LANTUS)  insulin lispro (ADMELOG) corrective regimen sliding scale  lactulose Syrup  levothyroxine Injectable  pantoprazole Infusion  rifAXIMin    Pertinent Labs:  @ 09:12: Na 138, BUN 76<H>, Cr 1.82<H>, <H>, K+ 3.8, Phos 2.2<L>, Mg 2.2, Alk Phos 35<L>, ALT/SGPT 19, AST/SGOT 30    A1C with Estimated Average Glucose Result: 5.5 % (21 @ 08:47) - within normal limits     Finger Sticks:  POCT Blood Glucose.: 171 mg/dL ( @ 11:53)  POCT Blood Glucose.: 173 mg/dL ( @ 08:51)  POCT Blood Glucose.: 164 mg/dL ( @ 21:42)  POCT Blood Glucose.: 156 mg/dL ( @ 17:52)    Skin per nursing documentation: pt with left lateral heel suspected deep tissue injury per flow sheets  Edema: 2+ left hand, right hand per flow sheets    Estimated Needs:   [x] recalculated  based on IBW of 67.2 kg:  Estimated energy needs (30-35 calories/kg): 9431-1614 calories  Estimated protein needs (1.4-1.6 g/kg):  gm  Defer fluid needs to team    Previous Nutrition Diagnosis: Mild, acute on chronic malnutrition  Nutrition Diagnosis is: [x] ongoing, being addressed with gradual diet advancement as appropriate pre medical team    New Nutrition Diagnosis: [x] Increased nutrient needs (energy, protein) related to metabolic demand for nutrients as evidenced by pt with suspected deep tissue injury.    Nutrition Care Plan:  [x] In Progress    Nutrition Recommendations:      1. Recommend adding ensure clear 2x/day to current clear liquids diet order for additional calories/protein  2. Upon diet advancement, recommend low sodium diet (defer diet texture to team) + recommend adding Glucerna oral nutrition supplement 2x/day to optimize nutritional intake  3. If appropriate, would recommend re-consult speech therapist as pt coughing on liquids at this time; defer additional diet texture to team  4. Recommend adding Nephro-Damien daily if no contraindications for wound healing    Monitoring and Evaluation:   Continue to monitor nutritional intake, tolerance to diet prescription, weights, labs, skin integrity    RD remains available upon request and will follow up per protocol  Alka Mack MS, RD, CDN pgr #[phone] Nutrition Follow Up Note  Patient seen for: Malnutrition Follow up on [facility]    Chart reviewed, events noted. Per chart, "84 yo Male w/ PMH CAD s/p PCI, HF s/p AICD, DM2, HTN, CKD, decompensated cirrhosis (unclear etiology) initially admitted to the hospital found to be acutely encephalopathic 2/2 acute on chronic hyponatremia with a course complicated by UGIB leading to decompensated cirrhosis, hypotension, and acute renal failure requiring MICU stay for pressor support and temporization of acute GIB. Course in the MICU significant for EGD requiring intubation findings significant for esophageal varices, duodenal ulcer. Patient with ascites initially concerning for possibly SBP however diagnostic paracentesis negative for SBP now on prophylactic antibiotics and a PPI infusion." Noted pt extubated  and transferred to medical floor. Noted speech therapist evaluated pt on --> speech recommended pt to be "advanced to soft texture diet per chart review with no concern for dysphagia and team requested for speech service to sign-off at this time."    Source: [x] Patient, Mandarin speaking, [place]  Services utilized: Milly, ID#: [id number]    [x] EMR        [x] RN     Diet Order:   Diet, Clear Liquid:   Consistent Carbohydrate {Evening Snack} (CSTCHOSN)  DASH/TLC {Sodium & Cholesterol Restricted} (DASH)  Low Fat (LOWFAT) (21)    - Is current order adequate?  [x]  No: diet not advanced to solids yet    - PO intake:      [x] <50%  Poor    - Nutrition-related concerns: Upon RD visit, RD attempted to speak with pt via  services, however pt very lethargic at this time, not providing much information during interview. RD observed pt with clear liquids lunch tray at bedside, pt had consumed 100% of apple juice and a few sips of gingerale. Per discussion with pt's nurse, pt has been coughing with the liquids, unclear if dysphagia vs. lthargy as speech therapist signed off on pt yesterday. No nausea, vomiting, constipation or diarrhea reported per nurse. Last bowel movement today, 5/21 x2 per flow sheets. Noted pt ordered for lactulose.    Weights:   Daily Weight in k.5 (-18), Weight in k.3 (-17); most recent weight noted.  Will continue to monitor trends as able. Weight fluctuations likely related to fluid shifts at this time. Pt status post paracentesis  with 50 ml drained and on diuretics, though being held now.    Nutritionally Pertinent MEDICATIONS  (STANDING):  cefTRIAXone   IVPB  dextrose 40% Gel  dextrose 5%.  dextrose 5%.  dextrose 50% Injectable  dextrose 50% Injectable  dextrose 50% Injectable  glucagon  Injectable  insulin glargine Injectable (LANTUS)  insulin lispro (ADMELOG) corrective regimen sliding scale  lactulose Syrup  levothyroxine Injectable  pantoprazole Infusion  rifAXIMin    Pertinent Labs:  @ 09:12: Na 138, BUN 76<H>, Cr 1.82<H>, <H>, K+ 3.8, Phos 2.2<L>, Mg 2.2, Alk Phos 35<L>, ALT/SGPT 19, AST/SGOT 30    A1C with Estimated Average Glucose Result: 5.5 % (21 @ 08:47) - within normal limits     Finger Sticks:  POCT Blood Glucose.: 171 mg/dL ( @ 11:53)  POCT Blood Glucose.: 173 mg/dL ( @ 08:51)  POCT Blood Glucose.: 164 mg/dL ( @ 21:42)  POCT Blood Glucose.: 156 mg/dL ( @ 17:52)    Skin per nursing documentation: pt with left lateral heel suspected deep tissue injury per flow sheets  Edema: 2+ left hand, right hand per flow sheets    Estimated Needs:   [x] recalculated  based on IBW of 67.2 kg:  Estimated energy needs (30-35 calories/kg): 6051-4142 calories  Estimated protein needs (1.4-1.6 g/kg):  gm  Defer fluid needs to team    Previous Nutrition Diagnosis: Mild, acute on chronic malnutrition  Nutrition Diagnosis is: [x] ongoing, being addressed with gradual diet advancement as appropriate pre medical team    New Nutrition Diagnosis: [x] Increased nutrient needs (energy, protein) related to metabolic demand for nutrients as evidenced by pt with suspected deep tissue injury.    Nutrition Care Plan:  [x] In Progress    Nutrition Recommendations:      1. Recommend adding ensure clear 2x/day to current clear liquids diet order for additional calories/protein  2. Upon diet advancement, recommend low sodium diet (defer diet texture to team) + recommend adding Glucerna oral nutrition supplement 2x/day to optimize nutritional intake  3. If appropriate, would recommend re-consult speech therapist as pt coughing on liquids at this time; defer additional diet texture to team  4. Recommend adding Nephro-Damien daily if no contraindications for wound healing    Monitoring and Evaluation:   Continue to monitor nutritional intake, tolerance to diet prescription, weights, labs, skin integrity    RD remains available upon request and will follow up per protocol  Alka Mack MS, RD, CDN pgr #[phone]

## 2021-05-21 NOTE — PROGRESS NOTE ADULT - PROBLEM SELECTOR PLAN 9
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   -Non-urgent neurosurgical evaluation  -Possible cause of [place]? on the R side.

## 2021-05-21 NOTE — PROGRESS NOTE ADULT - SUBJECTIVE AND OBJECTIVE BOX
Chief Complaint:  Patient is a 85y old  Male who presents with a chief complaint of sob, confusion, weakness, can't walk, fever (21 May 2021 07:34)    Reason for consult: cirrhosis, GI Bleed    Interval Events: Transferred to floor, mental status improved, no reported bleeding, Hb stable.     Hospital Medications:  albuterol/ipratropium for Nebulization 3 milliLiter(s) Nebulizer every 6 hours PRN  cefTRIAXone   IVPB 1000 milliGRAM(s) IV Intermittent every 24 hours  dextrose 40% Gel 15 Gram(s) Oral once  dextrose 5%. 1000 milliLiter(s) IV Continuous <Continuous>  dextrose 5%. 1000 milliLiter(s) IV Continuous <Continuous>  dextrose 50% Injectable 25 Gram(s) IV Push once  dextrose 50% Injectable 12.5 Gram(s) IV Push once  dextrose 50% Injectable 25 Gram(s) IV Push once  glucagon  Injectable 1 milliGRAM(s) IntraMuscular once  insulin glargine Injectable (LANTUS) 5 Unit(s) SubCutaneous at bedtime  insulin lispro (ADMELOG) corrective regimen sliding scale   SubCutaneous three times a day with meals  lactulose Syrup 10 Gram(s) Oral three times a day  levothyroxine Injectable 44 MICROGram(s) IV Push at bedtime  nystatin Ointment 1 Application(s) Topical two times a day  nystatin Powder 1 Application(s) Topical three times a day  pantoprazole Infusion 8 mG/Hr IV Continuous <Continuous>  rifAXIMin 550 milliGRAM(s) Oral two times a day      ROS: Pt unable to provide    PHYSICAL EXAM:   Vital Signs:  Vital Signs Last 24 Hrs  T(C): 36.5 (21 May 2021 04:02), Max: 36.8 (20 May 2021 23:37)  T(F): 97.7 (21 May 2021 04:02), Max: 98.2 (20 May 2021 23:37)  HR: 69 (21 May 2021 04:02) (65 - 91)  BP: 99/58 (21 May 2021 04:02) (99/58 - 131/60)  BP(mean): 87 (20 May 2021 13:05) (86 - 87)  RR: 18 (21 May 2021 04:02) (13 - 20)  SpO2: 95% (21 May 2021 04:02) (95% - 100%)  Daily Height in cm: 170.18 (20 May 2021 22:21)    Daily     GENERAL: no acute distress  NEURO: alert, oriented x 3 in mandarin, no asterixis  HEENT: anicteric sclera, no conjunctival pallor appreciated  CHEST: no respiratory distress, no accessory muscle use  CARDIAC: regular rate, rhythm  ABDOMEN: soft, non-tender, non-distended, no rebound or guarding  EXTREMITIES: warm, well perfused, no edema  SKIN: no lesions noted    LABS: reviewed                        10.6   7.51  )-----------( 73       ( 20 May 2021 19:44 )             30.0     05-20    130<L>  |  96  |  96<H>  ----------------------------<  137<H>  4.1   |  19<L>  |  2.16<H>    Ca    9.3      20 May 2021 00:13  Phos  2.8     05-20  Mg     2.2     05-20    TPro  5.2<L>  /  Alb  3.9  /  TBili  2.6<H>  /  DBili  x   /  AST  32  /  ALT  22  /  AlkPhos  31<L>  05-20    LIVER FUNCTIONS - ( 20 May 2021 00:13 )  Alb: 3.9 g/dL / Pro: 5.2 g/dL / ALK PHOS: 31 U/L / ALT: 22 U/L / AST: 32 U/L / GGT: x             Interval Diagnostic Studies: see sunrise for full report

## 2021-05-21 NOTE — PROGRESS NOTE ADULT - PROBLEM SELECTOR PLAN 5
Acute on chronic hyponatremia. Appears to be hypervolemic hypernatremia i/s/o cirrhosis. Patient's diuresis on hold given ANDREEA. BWm=195 on presentation now MFv=[id number]  -CTM on daily BMPs  -1L fluid restriction Acute on chronic hyponatremia. Appears to be hypervolemic hypernatremia i/s/o cirrhosis. Patient's diuresis on hold given ANDREEA. IIj=673 on presentation now KUi=405  -CTM on daily BMPs  -Correcting to 138 on 5/21 f/u BMP and will no longer fluid restrict and give D5 if needing correction for over-correction.

## 2021-05-21 NOTE — PROGRESS NOTE ADULT - ATTENDING COMMENTS
No new complaints  1.  ARF--improvement.  NON oliguric, no HD required.  Volume optimization  2.  Hyponatremai--hypotonic volume restriction.  Resolved, trend  3.  Chylothorax--met with my TPN team to work with pt dietician to create diet deficient in long chain fatty acids    discussed with med and TPN teams

## 2021-05-21 NOTE — PROGRESS NOTE ADULT - SUBJECTIVE AND OBJECTIVE BOX
[facility] - Division of Pulmonary, Critical Care and Sleep Medicine   Please call [phone] between 8am-pm weekdays, [phone] after hours and weekends    Interval Events:    REVIEW OF SYSTEMS:  CV: [ ] chest pain   Resp: [ ] cough [ ] shortness of breath   [ ] All other systems negative  [ ] Unable to assess ROS because ________    OBJECTIVE:  ICU Vital Signs Last 24 Hrs  T(C): 36.5 (21 May 2021 04:02), Max: 36.8 (20 May 2021 23:37)  T(F): 97.7 (21 May 2021 04:02), Max: 98.2 (20 May 2021 23:37)  HR: 69 (21 May 2021 04:02) (65 - 91)  BP: 99/58 (21 May 2021 04:02) (99/58 - 131/60)  BP(mean): 87 (20 May 2021 13:05) (86 - 87)  ABP: --  ABP(mean): --  RR: 18 (21 May 2021 04:02) (13 - 20)  SpO2: 95% (21 May 2021 04:02) (95% - 100%)        05-20 @ 07:01  -  05-21 @ 07:00  --------------------------------------------------------  IN: 170 mL / OUT: 235 mL / NET: -65 mL      CAPILLARY BLOOD GLUCOSE      POCT Blood Glucose.: 173 mg/dL (21 May 2021 08:51)      PHYSICAL EXAM:  General: NAD  HEENT: NC/AT  Lymph Nodes: no cervical or supraclavicular lymphadenopathy  Neck: supple  Respiratory:  CTA b/l, no wheezes, crackles or rhonchi  Cardiovascular:  RRR, no m/r/g  Abdomen: soft, NT/ND, +BS  Extremities: no clubbing, cyanosis or edema, warm  Skin: no rash  Neurological: AAOx3, non focal exam  Psychiatry: not anxious appearing, normal affect and mood    HOSPITAL MEDICATIONS:  MEDICATIONS  (STANDING):  cefTRIAXone   IVPB 1000 milliGRAM(s) IV Intermittent every 24 hours  dextrose 40% Gel 15 Gram(s) Oral once  dextrose 5%. 1000 milliLiter(s) (50 mL/Hr) IV Continuous <Continuous>  dextrose 5%. 1000 milliLiter(s) (100 mL/Hr) IV Continuous <Continuous>  dextrose 50% Injectable 25 Gram(s) IV Push once  dextrose 50% Injectable 12.5 Gram(s) IV Push once  dextrose 50% Injectable 25 Gram(s) IV Push once  glucagon  Injectable 1 milliGRAM(s) IntraMuscular once  insulin glargine Injectable (LANTUS) 5 Unit(s) SubCutaneous at bedtime  insulin lispro (ADMELOG) corrective regimen sliding scale   SubCutaneous three times a day with meals  lactulose Syrup 10 Gram(s) Oral three times a day  levothyroxine Injectable 44 MICROGram(s) IV Push at bedtime  nystatin Ointment 1 Application(s) Topical two times a day  nystatin Powder 1 Application(s) Topical three times a day  pantoprazole Infusion 8 mG/Hr (10 mL/Hr) IV Continuous <Continuous>  rifAXIMin 550 milliGRAM(s) Oral two times a day    MEDICATIONS  (PRN):  albuterol/ipratropium for Nebulization 3 milliLiter(s) Nebulizer every 6 hours PRN Shortness of Breath and/or Wheezing      LABS:                        9.9    6.27  )-----------( 76       ( 21 May 2021 09:12 )             28.5     Hgb Trend: 9.9<--, 10.6<--, 9.1<--, 8.0<--, 7.1<--  05-20    130<L>  |  96  |  96<H>  ----------------------------<  137<H>  4.1   |  19<L>  |  2.16<H>    Ca    9.3      20 May 2021 00:13  Phos  2.8     05-20  Mg     2.2     05-20    TPro  5.2<L>  /  Alb  3.9  /  TBili  2.6<H>  /  DBili  x   /  AST  32  /  ALT  22  /  AlkPhos  31<L>  05-20    Creatinine Trend: 2.16<--, 2.44<--, 2.48<--, 2.74<--, 2.72<--, 2.16<--  PT/INR - ( 20 May 2021 00:13 )   PT: 19.8 sec;   INR: 1.69 ratio         PTT - ( 20 May 2021 00:13 )  PTT:38.7 sec    Arterial Blood Gas:  05-20 @ 06:37  7.43/35/139/23/99/-.9  ABG lactate: --  Arterial Blood Gas:  05-20 @ 04:10  7.43/33/357/22/100/-1.8  ABG lactate: --  Arterial Blood Gas:  05-19 @ 20:18  7.44/33/115/22/99/-1.3  ABG lactate: --        MICROBIOLOGY:     RADIOLOGY:  [ ] Reviewed and interpreted by me   [facility] - Division of Pulmonary, Critical Care and Sleep Medicine   Please call [phone] between 8am-pm weekdays, [phone] after hours and weekends    Interval Events: Transferred from MICU to floor.  More awake, oriented, using Mandarin  - breathing is improved. Mild abdominal discomfort    REVIEW OF SYSTEMS:  CV: [ ] chest pain   Resp: [ ] cough [ ] shortness of breath   [ x] All other systems negative  [ ] Unable to assess ROS because ________    OBJECTIVE:  ICU Vital Signs Last 24 Hrs  T(C): 36.5 (21 May 2021 04:02), Max: 36.8 (20 May 2021 23:37)  T(F): 97.7 (21 May 2021 04:02), Max: 98.2 (20 May 2021 23:37)  HR: 69 (21 May 2021 04:02) (65 - 91)  BP: 99/58 (21 May 2021 04:02) (99/58 - 131/60)  BP(mean): 87 (20 May 2021 13:05) (86 - 87)  ABP: --  ABP(mean): --  RR: 18 (21 May 2021 04:02) (13 - 20)  SpO2: 95% (21 May 2021 04:02) (95% - 100%)        05-20 @ 07:01  -  05-21 @ 07:00  --------------------------------------------------------  IN: 170 mL / OUT: 235 mL / NET: -65 mL      CAPILLARY BLOOD GLUCOSE      POCT Blood Glucose.: 173 mg/dL (21 May 2021 08:51)      PHYSICAL EXAM:  General: NAD  HEENT: NC/AT  Lymph Nodes: no cervical or supraclavicular lymphadenopathy  Neck: supple  Respiratory:  CTA b/l, no wheezes, crackles or rhonchi  Cardiovascular:  RRR, no m/r/g  Abdomen: soft, NT/ND, +BS  Extremities: no clubbing, cyanosis or edema, warm  Skin: no rash  Neurological: AAOx3, non focal exam  Psychiatry: not anxious appearing, normal affect and mood    HOSPITAL MEDICATIONS:  MEDICATIONS  (STANDING):  cefTRIAXone   IVPB 1000 milliGRAM(s) IV Intermittent every 24 hours  dextrose 40% Gel 15 Gram(s) Oral once  dextrose 5%. 1000 milliLiter(s) (50 mL/Hr) IV Continuous <Continuous>  dextrose 5%. 1000 milliLiter(s) (100 mL/Hr) IV Continuous <Continuous>  dextrose 50% Injectable 25 Gram(s) IV Push once  dextrose 50% Injectable 12.5 Gram(s) IV Push once  dextrose 50% Injectable 25 Gram(s) IV Push once  glucagon  Injectable 1 milliGRAM(s) IntraMuscular once  insulin glargine Injectable (LANTUS) 5 Unit(s) SubCutaneous at bedtime  insulin lispro (ADMELOG) corrective regimen sliding scale   SubCutaneous three times a day with meals  lactulose Syrup 10 Gram(s) Oral three times a day  levothyroxine Injectable 44 MICROGram(s) IV Push at bedtime  nystatin Ointment 1 Application(s) Topical two times a day  nystatin Powder 1 Application(s) Topical three times a day  pantoprazole Infusion 8 mG/Hr (10 mL/Hr) IV Continuous <Continuous>  rifAXIMin 550 milliGRAM(s) Oral two times a day    MEDICATIONS  (PRN):  albuterol/ipratropium for Nebulization 3 milliLiter(s) Nebulizer every 6 hours PRN Shortness of Breath and/or Wheezing      LABS:                        9.9    6.27  )-----------( 76       ( 21 May 2021 09:12 )             28.5     Hgb Trend: 9.9<--, 10.6<--, 9.1<--, 8.0<--, 7.1<--  05-20    130<L>  |  96  |  96<H>  ----------------------------<  137<H>  4.1   |  19<L>  |  2.16<H>    Ca    9.3      20 May 2021 00:13  Phos  2.8     05-20  Mg     2.2     05-20    TPro  5.2<L>  /  Alb  3.9  /  TBili  2.6<H>  /  DBili  x   /  AST  32  /  ALT  22  /  AlkPhos  31<L>  05-20    Creatinine Trend: 2.16<--, 2.44<--, 2.48<--, 2.74<--, 2.72<--, 2.16<--  PT/INR - ( 20 May 2021 00:13 )   PT: 19.8 sec;   INR: 1.69 ratio         PTT - ( 20 May 2021 00:13 )  PTT:38.7 sec    Arterial Blood Gas:  05-20 @ 06:37  7.43/35/139/23/99/-.9  ABG lactate: --  Arterial Blood Gas:  05-20 @ 04:10  7.43/33/357/22/100/-1.8  ABG lactate: --  Arterial Blood Gas:  05-19 @ 20:18  7.44/33/115/22/99/-1.3  ABG lactate: --        MICROBIOLOGY:   PEritonieal 5/19- fungal culture NGTD, bacterial culture neg  Urine cx 5/18 negative  Pleural fluid 5/18 negative    RADIOLOGY:  [x ] Reviewed and interpreted by me   [facility] - Division of Pulmonary, Critical Care and Sleep Medicine   Please call [phone] between 8am-pm weekdays, [phone] after hours and weekends    Interval Events: Transferred from MICU to floor.  More awake, oriented, using Mandarin  - breathing is improved. Mild abdominal discomfort    REVIEW OF SYSTEMS:  CV: [ ] chest pain   Resp: [ ] cough [ ] shortness of breath   [ x] All other systems negative  [ ] Unable to assess ROS because ________    OBJECTIVE:  ICU Vital Signs Last 24 Hrs  T(C): 36.5 (21 May 2021 04:02), Max: 36.8 (20 May 2021 23:37)  T(F): 97.7 (21 May 2021 04:02), Max: 98.2 (20 May 2021 23:37)  HR: 69 (21 May 2021 04:02) (65 - 91)  BP: 99/58 (21 May 2021 04:02) (99/58 - 131/60)  BP(mean): 87 (20 May 2021 13:05) (86 - 87)  ABP: --  ABP(mean): --  RR: 18 (21 May 2021 04:02) (13 - 20)  SpO2: 95% (21 May 2021 04:02) (95% - 100%)        05-20 @ 07:01  -  05-21 @ 07:00  --------------------------------------------------------  IN: 170 mL / OUT: 235 mL / NET: -65 mL      CAPILLARY BLOOD GLUCOSE      POCT Blood Glucose.: 173 mg/dL (21 May 2021 08:51)      PHYSICAL EXAM:  General: NAD  HEENT: right periorbital eechymosis  Lymph Nodes: no cervical or supraclavicular lymphadenopathy  Neck: supple  Respiratory:  CTA b/l, no wheezes, crackles or rhonchi  Cardiovascular:  RRR, no m/r/g  Abdomen: soft, distended, NT  Extremities: no clubbing, cyanosis or edema, warm  Skin: no rash  Neurological: AAOx3, non focal exam  Psychiatry: not anxious appearing, normal affect and mood    HOSPITAL MEDICATIONS:  MEDICATIONS  (STANDING):  cefTRIAXone   IVPB 1000 milliGRAM(s) IV Intermittent every 24 hours  dextrose 40% Gel 15 Gram(s) Oral once  dextrose 5%. 1000 milliLiter(s) (50 mL/Hr) IV Continuous <Continuous>  dextrose 5%. 1000 milliLiter(s) (100 mL/Hr) IV Continuous <Continuous>  dextrose 50% Injectable 25 Gram(s) IV Push once  dextrose 50% Injectable 12.5 Gram(s) IV Push once  dextrose 50% Injectable 25 Gram(s) IV Push once  glucagon  Injectable 1 milliGRAM(s) IntraMuscular once  insulin glargine Injectable (LANTUS) 5 Unit(s) SubCutaneous at bedtime  insulin lispro (ADMELOG) corrective regimen sliding scale   SubCutaneous three times a day with meals  lactulose Syrup 10 Gram(s) Oral three times a day  levothyroxine Injectable 44 MICROGram(s) IV Push at bedtime  nystatin Ointment 1 Application(s) Topical two times a day  nystatin Powder 1 Application(s) Topical three times a day  pantoprazole Infusion 8 mG/Hr (10 mL/Hr) IV Continuous <Continuous>  rifAXIMin 550 milliGRAM(s) Oral two times a day    MEDICATIONS  (PRN):  albuterol/ipratropium for Nebulization 3 milliLiter(s) Nebulizer every 6 hours PRN Shortness of Breath and/or Wheezing      LABS:                        9.9    6.27  )-----------( 76       ( 21 May 2021 09:12 )             28.5     Hgb Trend: 9.9<--, 10.6<--, 9.1<--, 8.0<--, 7.1<--  05-20    130<L>  |  96  |  96<H>  ----------------------------<  137<H>  4.1   |  19<L>  |  2.16<H>    Ca    9.3      20 May 2021 00:13  Phos  2.8     05-20  Mg     2.2     05-20    TPro  5.2<L>  /  Alb  3.9  /  TBili  2.6<H>  /  DBili  x   /  AST  32  /  ALT  22  /  AlkPhos  31<L>  05-20    Creatinine Trend: 2.16<--, 2.44<--, 2.48<--, 2.74<--, 2.72<--, 2.16<--  PT/INR - ( 20 May 2021 00:13 )   PT: 19.8 sec;   INR: 1.69 ratio         PTT - ( 20 May 2021 00:13 )  PTT:38.7 sec    Arterial Blood Gas:  05-20 @ 06:37  7.43/35/139/23/99/-.9  ABG lactate: --  Arterial Blood Gas:  05-20 @ 04:10  7.43/33/357/22/100/-1.8  ABG lactate: --  Arterial Blood Gas:  05-19 @ 20:18  7.44/33/115/22/99/-1.3  ABG lactate: --        MICROBIOLOGY:   PEritonieal 5/19- fungal culture NGTD, bacterial culture neg  Urine cx 5/18 negative  Pleural fluid 5/18 negative    RADIOLOGY:  [x ] Reviewed and interpreted by me

## 2021-05-21 NOTE — PROGRESS NOTE ADULT - ASSESSMENT
PATIENT SEEN AND EXAMINED - FULL NOTE TO FOLLOW  PATIENT CLINICALLY MUCH IMPROVED     please repeat CXR  care per primary team   84 yo M with a h/o HFrEF, cirrhosis of unknown etiology, a/w confusion, falls, found to be hyponatremic, hypotensive with decompensated cirrhosis and large right sided pleural effusion. developed shock and ANDREEA due to UGIB, s/p EGD findings significant for esophageal varices, duodenal ulcer. Resuscitated and maintained on ppi drip.  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   Ascites initially concerning for possibly SBP however diagnostic paracentesis negative for SBP, being treated with prophylactic antibiotics.     Rec:  1. Pleural effusion - hepatic hydro/chylothorax:  Please repeat CXR  Supplemental O2, goal sats 92-96%  Out of bed to chair as tolerated, incentive spirometry    2. Cirrhosis, HRS -  Antibiotics ppx, Rifaximin, Midodrine to maintain MAP >65  PPI drip for UGIB/duodenal ulcer  Hepatology following.    Care per primary team    d/w patient and resident at bedside.

## 2021-05-21 NOTE — PROGRESS NOTE ADULT - PROBLEM SELECTOR PLAN 4
Patient with CKD most likely i/s/o diabetes. Now with ANDREEA on CKD (Scr baseline from notes appears to be 1.2-1.3) now with ANDREEA with SCr downtrending. ANDREEA most likely i/s/o pre-renal azotemia (acute blood loss vs. splanchnic vasoplegia from cirrhosis vs. lethargy and decreased PO intake) vs. ATN from anemia.   -CTM SCr  -Avoid nephrotoxic agents  -Holding patient's entresto and lasix.   -Nephrology following appreciate recommendations  -1L fluid restriction for hyponatremia will monitor patient's PO feeds. Patient with CKD most likely i/s/o diabetes. Now with ANDREEA on CKD (Scr baseline from notes appears to be 1.2-1.3) now with ANDREEA with SCr downtrending. ANDREEA most likely i/s/o pre-renal azotemia (acute blood loss vs. splanchnic vasoplegia from cirrhosis vs. lethargy and decreased PO intake) vs. ATN from anemia.   -CTM SCr  -Avoid nephrotoxic agents  -Holding patient's entresto and lasix.   -Nephrology following appreciate recommendations

## 2021-05-21 NOTE — PROGRESS NOTE ADULT - ASSESSMENT
85M PMHx CKD, cirrhosis (?viral hepatitis) - admitted for decompensated cirrhosis.  Nephrology consulted for hyponatremia and ANDREEA on CKD.        # Hyponatremia  Pt w/ acute on chronic hyponatremia hypervolemic possibly 2/2 cirrhosis.  On admission sNa 120 (prior sNa 130-135), s/p lasix IV, IVF.  - Na up to 138 now as ANDREEA resolves  - No longer need to fluid restrict the patient  - Please repeat BMP this afternoon, goal should be to keep pt Na no more than 138 for today. If Na is higher would re lower with D5W  - hepatology/IR consulted; s/p thoracentesis 1.6L removed 5/18  - monitor BMP    # ANDREEA  Pt with non-oliguric ANDREEA on CKD likely 2/2 pre renal in the setting hypotension, entresto/lasix, acute anemia.  On admission sCr 2.0 (baseline sCr 1.4-1.6 in Jan 2019), peaked 2.7 now improving. UA bland.   - agree with holding entresto until ANDREEA resolve then can rechallenge   - BP optimization/antibiotic/blood transfusion per ICU team  - monitor BMP, strict I/O, avoid nephrotoxic agents (NSAIDs, PPI, contrast), renally dose medications per GFR.

## 2021-05-21 NOTE — PROGRESS NOTE ADULT - PROBLEM SELECTOR PLAN 1
Anemic to ~5.8 most likely i/s/o UGIB possibly from duodenal ulcer vs. erosive esophagitis vs. dieulafoy lesion.   -S/p 4u pRBCs, 1u platelets, 1u FFP and MICU admission for hypotension requiring pressors briefly.   -Now stable  -CTM H/H  -T and S active  -PPI gtt @10cc/hr until 5/21 then PPI IV BID

## 2021-05-21 NOTE — PROGRESS NOTE ADULT - ATTENDING COMMENTS
84 yo M w/ HF (EF 40-45%), cirrhosis of unknown etiology (suspect 2/2 hepatitis) aw acute encephalopathy, hyponatremia. Course complicated by UGIB, hypotension and acute renal failure requiring MICU stay for pressor support. EGD w esophageal varices, duodenal ulcer.     Now stabilized, started on clears. Monitor.

## 2021-05-21 NOTE — PROGRESS NOTE ADULT - ATTENDING COMMENTS
85M w/ CAD s/p PCI, HF s/p AICD, DMII, HTN, CKD, decompensated cirrhosis (unclear etiology) p/w weakness, dyspnea, ascites found to be hyponatremic, renal failure, pleural effusions now with AMS, GI bleed s/p large duodenal ulcer 5/18/21.     PPI drip for at least 72 hours > PPI BID.  Abx x 7 days.  AAO x 4 today.  Dispo planning.  Check H pylori Ab, treat if positive.

## 2021-05-21 NOTE — PROGRESS NOTE ADULT - PROBLEM SELECTOR PLAN 10
#Bicytopenia: Thrombocytopenia i/s/o hepatic dysfunction. Anemia i/s/o GIB.   #Pericardial Effusion: Seen on CT Chest this admission, not tamponade physiology per cards note.   #Pleural Effusion: R Pleff s/p thoracentesis   DVT: SCDs  Diet: Soft, CC, DASH TLC, #Bicytopenia: Thrombocytopenia i/s/o hepatic dysfunction. Anemia i/s/o GIB.   #Pericardial Effusion: Seen on CT Chest this admission, not tamponade physiology per cards note.   #Pleural Effusion: R Pleff s/p thoracentesis   DVT: SCDs  Diet: Clears, CC, DASH/TLC.

## 2021-05-21 NOTE — PROGRESS NOTE ADULT - ASSESSMENT
85M w/ CAD s/p PCI, HF s/p AICD, DMII, HTN, CKD, decompensated cirrhosis of unknown viral etiology, presenting w/ 3 weeks of generalized weakness, dyspnea, and abd distension. Found to have hyponatremia, ANDREEA, and large pleural effusions. Transferred to MICU on 5/18 for GI bleed from large duodenal ulcer. 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  -varices: small on EGD 5/18  -ascites: perihepatic and pelvic ascites present, s/p dx para 5/19, neg for SBP  -HE: present on exam  -HCC: none seen but imaging so far non-contrast so unable to exclude HCC  -MELD-Na = 27 on 5/20  -Chronic liver disease work-up: Hepatitis B surface Ag, Ab, core IgM negative, core total positive, HBV DNA and E-Ag pending  #ANDREEA - improved w/ albumin, low urine Na c/w poor renal perfusio  #HF - TTE shows decreased LV function and RV function  #Extra-axial mass - growing since 2018  #H pylori Ab positive    Recommendations:  - PPI gtt for at least 72 hours post procedure (can end 5/21 PM), would transition to IV PPI BID  - Continue w/ CTX ending 5/22 (7 day course)  - F/u cytology from pleural fluid  - Agree w/ 25% albumin, 100mL q6h for ANDREEA w/ urine studies showing poor renal perfusion  - Hold diuretics for now  - Continue w/ lactulose titrated to 3-4 BMs per day via NG tube  - Continue w/ rifaxmin 550mg BID via NG  - Send Hepatitis B DNA  - Low Na diet when able to take PO  - Trend CBC, CMP, INR daily  - Plan for H pylori treatment as outpatient  - Hepatology will follow    Saturnino Aguirre  Gastroenterology/Hepatology Fellow  Available via [employer] Teams    NON-URGENT CONSULTS:  Please email [email or web address]  [email or web address]  AT NIGHT AND ON WEEKENDS:  Contact on-call GI fellow via answering service ([phone]) from 5pm-8am and on weekends/holidays  MONDAY-FRIDAY 8AM-5PM:  Pager# [phone] ([facility]) or [phone] ([facility])  GI Phone# [phone] ([facility])

## 2021-05-21 NOTE — PROGRESS NOTE ADULT - PROBLEM SELECTOR PLAN 2
Decompensated i/s/o possible worsening ascites vs. UGIB vs. metabolic derangement (hyponatremia) leading to patient's hepatic encephalopathy and worsening status. Cirrhosis most likely i/s/o hepatitis.   -F/u acute hepatitis panel and cirrhosis labs  -MELD-Na=27, f/u daily MELD labs  -GIB stable  -Ascites: Holding lasix i/s/o ARF and hypotension  -Varices: <5mm on EGD, non-bleeding s/p octreotide. Now on PPI and ceftriaxone for SBP prophylaxis till 5/24.   -HE: C/w lactolose 10 TID and 550 mg rifaximin BID. Decompensated i/s/o possible worsening ascites vs. UGIB vs. metabolic derangement (hyponatremia) leading to patient's hepatic encephalopathy and worsening status. Cirrhosis most likely i/s/o hepatitis.   -F/u acute hepatitis panel and cirrhosis labs  -MELD-Na=27, f/u daily MELD labs  -GIB stable. On clears for now.   -Ascites: Holding lasix i/s/o ARF and hypotension  -Varices: <5mm on EGD, non-bleeding s/p octreotide. Now on PPI and ceftriaxone for SBP prophylaxis till 5/24.   -HE: C/w lactolose 10 TID and 550 mg rifaximin BID.

## 2021-05-22 NOTE — PROGRESS NOTE ADULT - ATTENDING COMMENTS
85 year old male with PMH CHF (EF 40-45%, moderate LV systolic function) and cirrhosis of unknown etiology (suspect 2/2 hepatitis) who was initially admitted to the hospital due to acute encephalopathic secondary to acute on chronic hyponatremia. His hyponatremia has resolved however, his course was complicated by UGIB leading to decompensated cirrhosis, hypotension, and acute renal failure requiring MICU stay for pressor support and temporization of acute GIB. In the MICU, he had and EGD which showed esophageal varices and a duodenal ulcer s/p octreotide drip and ceftriaxone currently on IV PPI BID. He also had ascites which was initially concerning for possibly SBP however, diagnostic paracentesis was negative for SBP. Hemoglobin has stabilized. Speech and swallow consulted for possible aspiration, will follow up recs. Rest of plan as above.    García Convissar, DO  Pager [phone]  If no answer [phone]

## 2021-05-22 NOTE — PROGRESS NOTE ADULT - PROBLEM SELECTOR PLAN 5
Acute on chronic hyponatremia. Appears to be hypervolemic hypernatremia i/s/o cirrhosis. Patient's diuresis on hold given ANDREEA. OVi=407 on presentation now PYp=647  -CTM on daily BMPs  -Correcting to 138 on 5/21 f/u BMP and will no longer fluid restrict and give D5 if needing correction for over-correction. Acute on chronic hyponatremia. Appears to be hypervolemic hypernatremia i/s/o cirrhosis. Patient's diuresis on hold given ANDREEA. GKr=381 on presentation now RHk=467  -CTM on daily BMPs  -Holding steady @139, s/p D5 200cc/2hrs. Will CTM.

## 2021-05-22 NOTE — PROGRESS NOTE ADULT - PROBLEM SELECTOR PLAN 10
#Bicytopenia: Thrombocytopenia i/s/o hepatic dysfunction. Anemia i/s/o GIB.   #Pericardial Effusion: Seen on CT Chest this admission, not tamponade physiology per cards note.   #Pleural Effusion: R Pleff s/p thoracentesis   DVT: SCDs  Diet: Clears, CC, DASH/TLC. #Bicytopenia: Thrombocytopenia i/s/o hepatic dysfunction. Anemia i/s/o GIB.   #Pericardial Effusion: Seen on CT Chest this admission, not tamponade physiology per cards note.   #Pleural Effusion: R Pleff s/p thoracentesis   DVT: SCDs  Diet: Dysphagia I diet, CC, DASH/TLC.

## 2021-05-22 NOTE — PROGRESS NOTE ADULT - PROBLEM SELECTOR PLAN 2
Decompensated i/s/o possible worsening ascites vs. UGIB vs. metabolic derangement (hyponatremia) leading to patient's hepatic encephalopathy and worsening status. Cirrhosis most likely i/s/o hepatitis.   -F/u acute hepatitis panel and cirrhosis labs  -MELD-Na=27, f/u daily MELD labs  -GIB stable. On clears for now.   -Ascites: Holding lasix i/s/o ARF and hypotension  -Varices: <5mm on EGD, non-bleeding s/p octreotide. Now on PPI and ceftriaxone for SBP prophylaxis till 5/24.   -HE: C/w lactolose 10 TID and 550 mg rifaximin BID. Decompensated i/s/o possible worsening ascites vs. UGIB vs. metabolic derangement (hyponatremia) leading to patient's hepatic encephalopathy and worsening status. Cirrhosis most likely i/s/o hepatitis.   -F/u acute hepatitis panel and cirrhosis labs  -MELD-Na=27, f/u daily MELD labs  -GIB stable. On clears for now.   -Ascites: Holding lasix i/s/o ARF and hypotension will need to touch base with hepatology whether to restart prior to discharge.   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 Now on PPI and ceftriaxone for SBP prophylaxis till 5/24.   -HE: C/w lactolose 10 TID and 550 mg rifaximin BID.

## 2021-05-22 NOTE — PROGRESS NOTE ADULT - ASSESSMENT
86 yo M w/ HF (EF 40-45%, moderate LV systolic function), cirrhosis of unknown etiology (suspect 2/2 hepatitis) initially admitted to the hospital found to be acutely encephalopathic 2/2 acute on chronic hyponatremia with a course complicated by UGIB leading to decompensated cirrhosis, hypotension, and acute renal failure requiring MICU stay for pressor support and temporization of acute GIB. Course in the MICU significant for EGD requiring intubation findings significant for esophageal varices, duodenal ulcer. Patient with ascites initially concerning for possibly SBP however diagnostic paracentesis negative for SBP now on prophylactic antibiotics and a PPI infusion.

## 2021-05-22 NOTE — PROGRESS NOTE ADULT - PROBLEM SELECTOR PLAN 9
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   -Non-urgent neurosurgical evaluation  -Possible cause of [place]? on the R side.

## 2021-05-22 NOTE — PROGRESS NOTE ADULT - PROBLEM SELECTOR PLAN 3
Encephalopathy multifactorial in etiology of metabolic deragements (hyponatremia vs. HE vs ARF), patient endorsing subacute onset of weakness and lethargy unclear how far off the patient is from baseline however, patient seems extremely weak, maybe in the setting of possible symptomatic anemia as well. No evidence of hypercapnia or hypoxia on patient's vitals and labs.   -CTM  -CTH negative for acute bleed.   -Passed bedside speech and swallow eval will start soft diet and have the patient's meds converted to PO. Will convey these findings to speech and swallow tmrw.   -C/w lactulose and rifaximin titrate to 3-4 BMs. Stool counts. Encephalopathy multifactorial in etiology of metabolic derangements (hyponatremia vs. HE vs ARF), patient endorsing subacute onset of weakness and lethargy unclear how far off the patient is from baseline however, patient seems extremely weak, maybe in the setting of possible symptomatic anemia as well. No evidence of hypercapnia or hypoxia on patient's vitals and labs.   -CTM  -CTH negative for acute bleed.   -Passed bedside speech and swallow eval pt on dysphagia I diet for now, will reconsult Speech and swallow as patient with evidence of coughing from clear liquid diet.   -C/w lactulose and rifaximin titrate to 3-4 BMs. Stool counts.

## 2021-05-22 NOTE — PROGRESS NOTE ADULT - PROBLEM SELECTOR PLAN 1
Anemic to ~5.8 most likely i/s/o UGIB possibly from duodenal ulcer vs. erosive esophagitis vs. dieulafoy lesion.   -S/p 4u pRBCs, 1u platelets, 1u FFP and MICU admission for hypotension requiring pressors briefly.   -Now stable  -CTM H/H  -T and S active  -PPI gtt @10cc/hr until 5/21 then PPI IV BID Anemic to ~5.8 most likely i/s/o UGIB possibly from duodenal ulcer vs. erosive esophagitis vs. dieulafoy lesion.   -S/p 4u pRBCs, 1u platelets, 1u FFP and MICU admission for hypotension requiring pressors briefly.   -Now stable  -CTM H/H  -T and S active  -PPI gtt @10cc/hr until 5/21 now on 40IVP BID.

## 2021-05-22 NOTE — PROGRESS NOTE ADULT - SUBJECTIVE AND OBJECTIVE BOX
Roderick Zimmerman M.D.  Internal Medicine PGY-1  [phone] / [phone]     Patient is a 85y old  Male who presents with a chief complaint of sob, confusion, weakness, can't walk, fever (21 May 2021 10:02)      SUBJECTIVE / OVERNIGHT EVENTS:          MEDICATIONS  (STANDING):  cefTRIAXone   IVPB 1000 milliGRAM(s) IV Intermittent every 24 hours  dextrose 40% Gel 15 Gram(s) Oral once  dextrose 5%. 1000 milliLiter(s) (50 mL/Hr) IV Continuous <Continuous>  dextrose 5%. 1000 milliLiter(s) (100 mL/Hr) IV Continuous <Continuous>  dextrose 5%. 1000 milliLiter(s) (200 mL/Hr) IV Continuous <Continuous>  dextrose 50% Injectable 25 Gram(s) IV Push once  dextrose 50% Injectable 12.5 Gram(s) IV Push once  dextrose 50% Injectable 25 Gram(s) IV Push once  glucagon  Injectable 1 milliGRAM(s) IntraMuscular once  insulin glargine Injectable (LANTUS) 5 Unit(s) SubCutaneous at bedtime  insulin lispro (ADMELOG) corrective regimen sliding scale   SubCutaneous three times a day with meals  lactulose Syrup 10 Gram(s) Oral three times a day  levothyroxine Injectable 44 MICROGram(s) IV Push at bedtime  nystatin Ointment 1 Application(s) Topical two times a day  nystatin Powder 1 Application(s) Topical three times a day  pantoprazole Infusion 8 mG/Hr (10 mL/Hr) IV Continuous <Continuous>  rifAXIMin 550 milliGRAM(s) Oral two times a day    MEDICATIONS  (PRN):  albuterol/ipratropium for Nebulization 3 milliLiter(s) Nebulizer every 6 hours PRN Shortness of Breath and/or Wheezing      Vital Signs Last 24 Hrs  T(C): 36.5 (22 May 2021 04:13), Max: 37.1 (21 May 2021 23:34)  T(F): 97.7 (22 May 2021 04:13), Max: 98.7 (21 May 2021 23:34)  HR: 81 (22 May 2021 04:13) (72 - 97)  BP: 113/69 (22 May 2021 04:13) (100/64 - 113/69)  BP(mean): --  RR: 18 (22 May 2021 04:13) (17 - 18)  SpO2: 96% (22 May 2021 04:13) (96% - 98%)      PHYSICAL EXAM  GENERAL: NAD, well-developed  HEAD:  Atraumatic, Normocephalic  EYES: EOMI, PERRLA, conjunctiva and sclera clear  NECK: Supple, No JVD  CHEST/LUNG: Clear to auscultation bilaterally; No wheeze  HEART: Regular rate and rhythm; No murmurs, rubs, or gallops  ABDOMEN: Soft, Nontender, Nondistended; Bowel sounds present  EXTREMITIES:  2+ Peripheral Pulses, No clubbing, cyanosis, or edema  PSYCH: AAOx3  SKIN: No rashes or lesions    CAPILLARY BLOOD GLUCOSE      POCT Blood Glucose.: 211 mg/dL (21 May 2021 22:11)  POCT Blood Glucose.: 192 mg/dL (21 May 2021 17:56)  POCT Blood Glucose.: 171 mg/dL (21 May 2021 11:53)  POCT Blood Glucose.: 173 mg/dL (21 May 2021 08:51)    I&O's Summary    21 May 2021 07:01  -  22 May 2021 07:00  --------------------------------------------------------  IN: 1130 mL / OUT: 400 mL / NET: 730 mL        LABS:                        10.0   6.68  )-----------( 73       ( 21 May 2021 16:47 )             29.4     05-22    139  |  106  |  67<H>  ----------------------------<  210<H>  3.8   |  21<L>  |  1.69<H>    Ca    9.9      22 May 2021 00:25  Phos  1.8     05-22  Mg     2.2     05-22    TPro  5.2<L>  /  Alb  3.7  /  TBili  1.7<H>  /  DBili  x   /  AST  30  /  ALT  19  /  AlkPhos  35<L>  05-21    PT/INR - ( 21 May 2021 09:12 )   PT: 17.7 sec;   INR: 1.50 ratio         PTT - ( 21 May 2021 09:12 )  PTT:38.3 sec          RADIOLOGY & ADDITIONAL TESTS:     MICROBIOLOGY:    ANTIMICROBIALS:    CONSULTS: Roderick Zimmerman M.D.  Internal Medicine PGY-1  [phone] / [phone]     Patient is a 85y old  Male who presents with a chief complaint of sob, confusion, weakness, can't walk, fever (21 May 2021 10:02)      SUBJECTIVE / OVERNIGHT EVENTS:    Patient seen and examined at the bedside this am. Per nursing no acute events overnight. This morning mandarin  used, patient [place] but states that he still feels confused and weak. Patient able to state his name. States that he has not had the same abdominal pain as yesterday. Endorses feeling very thirsty in the morning otherwise patient states that he is having BMs, per nursing not bloody, dark i/s/o lactulose use.         MEDICATIONS  (STANDING):  cefTRIAXone   IVPB 1000 milliGRAM(s) IV Intermittent every 24 hours  dextrose 40% Gel 15 Gram(s) Oral once  dextrose 5%. 1000 milliLiter(s) (50 mL/Hr) IV Continuous <Continuous>  dextrose 5%. 1000 milliLiter(s) (100 mL/Hr) IV Continuous <Continuous>  dextrose 5%. 1000 milliLiter(s) (200 mL/Hr) IV Continuous <Continuous>  dextrose 50% Injectable 25 Gram(s) IV Push once  dextrose 50% Injectable 12.5 Gram(s) IV Push once  dextrose 50% Injectable 25 Gram(s) IV Push once  glucagon  Injectable 1 milliGRAM(s) IntraMuscular once  insulin glargine Injectable (LANTUS) 5 Unit(s) SubCutaneous at bedtime  insulin lispro (ADMELOG) corrective regimen sliding scale   SubCutaneous three times a day with meals  lactulose Syrup 10 Gram(s) Oral three times a day  levothyroxine Injectable 44 MICROGram(s) IV Push at bedtime  nystatin Ointment 1 Application(s) Topical two times a day  nystatin Powder 1 Application(s) Topical three times a day  pantoprazole Infusion 8 mG/Hr (10 mL/Hr) IV Continuous <Continuous>  rifAXIMin 550 milliGRAM(s) Oral two times a day    MEDICATIONS  (PRN):  albuterol/ipratropium for Nebulization 3 milliLiter(s) Nebulizer every 6 hours PRN Shortness of Breath and/or Wheezing      Vital Signs Last 24 Hrs  T(C): 36.5 (22 May 2021 04:13), Max: 37.1 (21 May 2021 23:34)  T(F): 97.7 (22 May 2021 04:13), Max: 98.7 (21 May 2021 23:34)  HR: 81 (22 May 2021 04:13) (72 - 97)  BP: 113/69 (22 May 2021 04:13) (100/64 - 113/69)  BP(mean): --  RR: 18 (22 May 2021 04:13) (17 - 18)  SpO2: 96% (22 May 2021 04:13) (96% - 98%)      GENERAL: Patient in bed in [facility]  NEURO: alert, eyes open spontaneously, does answer verbal questions and follows verbal commands, speech mostly sensible to the mandarin , moves 4 extremities spontaneously.   HEAD:  Atraumatic, Normocephalic  HEENT: scleral anicteric.   CV: Regular rate and rhythm. No murmurs, rubs, or gallops; + AICD in the left chest wall.   Respiratory: normal respiratory efforts. Lungs clear to auscultation bilaterally, no wheezes/crackles.  ABDOMEN: Soft, Nontender, Nondistended; Bowel sounds normal  EXTREMITIES: + lower extremity edema  Skin: erythematous groin area     CAPILLARY BLOOD GLUCOSE      POCT Blood Glucose.: 211 mg/dL (21 May 2021 22:11)  POCT Blood Glucose.: 192 mg/dL (21 May 2021 17:56)  POCT Blood Glucose.: 171 mg/dL (21 May 2021 11:53)  POCT Blood Glucose.: 173 mg/dL (21 May 2021 08:51)    I&O's Summary    21 May 2021 07:01  -  22 May 2021 07:00  --------------------------------------------------------  IN: 1130 mL / OUT: 400 mL / NET: 730 mL        LABS:                        10.0   6.68  )-----------( 73       ( 21 May 2021 16:47 )             29.4     05-22    139  |  106  |  67<H>  ----------------------------<  210<H>  3.8   |  21<L>  |  1.69<H>    Ca    9.9      22 May 2021 00:25  Phos  1.8     05-22  Mg     2.2     05-22    TPro  5.2<L>  /  Alb  3.7  /  TBili  1.7<H>  /  DBili  x   /  AST  30  /  ALT  19  /  AlkPhos  35<L>  05-21    PT/INR - ( 21 May 2021 09:12 )   PT: 17.7 sec;   INR: 1.50 ratio         PTT - ( 21 May 2021 09:12 )  PTT:38.3 sec          RADIOLOGY & ADDITIONAL TESTS:     MICROBIOLOGY:    ANTIMICROBIALS:    CONSULTS:

## 2021-05-22 NOTE — PROGRESS NOTE ADULT - PROBLEM SELECTOR PLAN 4
Patient with CKD most likely i/s/o diabetes. Now with ANDREEA on CKD (Scr baseline from notes appears to be 1.2-1.3) now with ADNREEA with SCr downtrending. ANDREEA most likely i/s/o pre-renal azotemia (acute blood loss vs. splanchnic vasoplegia from cirrhosis vs. lethargy and decreased PO intake) vs. ATN from anemia.   -CTM SCr  -Avoid nephrotoxic agents  -Holding patient's entresto and lasix.   -Nephrology following appreciate recommendations

## 2021-05-23 NOTE — PROGRESS NOTE ADULT - PROBLEM SELECTOR PLAN 5
Acute on chronic hyponatremia. Appears to be hypervolemic hypernatremia i/s/o cirrhosis. Patient's diuresis on hold given ANDREEA. CJs=407 on presentation now OHk=445  -CTM on daily BMPs  -Holding steady @139, s/p D5 200cc/2hrs. Will CTM.

## 2021-05-23 NOTE — SWALLOW BEDSIDE ASSESSMENT ADULT - SLP GENERAL OBSERVATIONS
Pt encountered in bed, awake, on 3L NC. Mandarin speaking, video  utilized (Maria Teresa Ruelas, #[id number]). Pt is oriented to self only, with confusion. Speech is tangential, and pt with poor topic maintenance. Demonstrates difficulty following simple directives 2/2 confusion. Vocal quality WFL. Intermittent cough noted at baseline.

## 2021-05-23 NOTE — PROGRESS NOTE ADULT - PROBLEM SELECTOR PLAN 3
Encephalopathy multifactorial in etiology of metabolic derangements (hyponatremia vs. HE vs ARF), patient endorsing subacute onset of weakness and lethargy unclear how far off the patient is from baseline however, patient seems extremely weak, maybe in the setting of possible symptomatic anemia as well. No evidence of hypercapnia or hypoxia on patient's vitals and labs.   -CTM  -CTH negative for acute bleed.   -Passed bedside speech and swallow eval pt on dysphagia I diet for now, will reconsult Speech and swallow as patient with evidence of coughing from clear liquid diet.   -C/w lactulose and rifaximin titrate to 3-4 BMs. Stool counts.

## 2021-05-23 NOTE — DISCHARGE NOTE PROVIDER - NSDCCPTREATMENT_GEN_ALL_CORE_FT
PRINCIPAL PROCEDURE  Procedure: CT abdomen  Findings and Treatment: PROCEDURE DATE:  05/16/2021    INTERPRETATION:  CLINICAL INFORMATION: Evaluate for colonic mass.  COMPARISON: CT abdomen pelvis 1/15/2019.  CONTRAST/COMPLICATIONS:  IV Contrast: None  Oral Contrast: None  Complications: None  PROCEDURE:  CT of the Abdomen and Pelvis was performed.  Sagittal and coronal reformats were performed.  FINDINGS:  LOWER CHEST: Moderate to large right pleural effusion with complete collapse of the right middle and lower lobes. Small left pleural effusion with compressive atelectasis. Partially imaged cardiac pacing leads. Bilateral symmetric gynecomastia.  LIVER: Cirrhosis.  BILE DUCTS: Normal caliber.  GALLBLADDER: Cholelithiasis.  SPLEEN: Within normal limits.  PANCREAS: Within normal limits.  ADRENALS: Within normal limits.  KIDNEYS/URETERS: Within normal limits.  BLADDER: Within normal limits.  REPRODUCTIVE ORGANS: Prominent central lobe of the prostate.  BOWEL: No bowel obstruction or definite mass. Appendix is normal.  PERITONEUM: Moderate abdominopelvic ascites with interval increase.  VESSELS: Atherosclerotic disease of the aorta.  RETROPERITONEUM/LYMPH NODES: No lymphadenopathy.  ABDOMINAL WALL: Small right fat-containing inguinal hernia. Subcutaneous edema.  BONES: Sclerotic changes at the T10-T11 level, consistent with sequela of prior discitis/osteomyelitis. Multilevel degenerative changes of the spine.  IMPRESSION:  Moderate to large right pleural effusion with complete collapse of the right middle and lower lobes. Small left pleural effusion with compressive atelectasis.  Cirrhosis and moderate abdominopelvic ascites, increased since prior exam.  No definite colonic mass, however evaluation is limited on this noncontrast study.        SECONDARY PROCEDURE  Procedure: Chest xray, PA & lateral  Findings and Treatment: XR CHEST PORTABLE URGENT 1V                        PROCEDURE DATE:  05/29/2021    INTERPRETATION:  XR CHEST URGENT. One view.  INDICATION: respiratory distress  COMPARISON: 5/27/2021  FINDINGS/  IMPRESSION: The left ICD is unchanged.  Large right pleural effusion and partial atelectasis of right lung is again noted. The left lung is clear.

## 2021-05-23 NOTE — SWALLOW BEDSIDE ASSESSMENT ADULT - ADDITIONAL RECOMMENDATIONS
***Addendum: 5/21:Chart reviewed and discussed pt with Dr. Zelaya. As per discussion, pt to be advanced to soft texture diet by medical team with no concern for dysphagia. Team requested for speech service to sign-off at this time.
Maintain good oral hygiene.

## 2021-05-23 NOTE — SWALLOW BEDSIDE ASSESSMENT ADULT - ASR SWALLOW RECOMMEND DIAG
An MBS is recommended to objectively assess swallow function and r/o dysphagia. Will tentatively schedule for 5/24, pending receipt of order. An MBS is recommended to objectively assess swallow function and rule out aspiration. Will tentatively schedule for 5/24, pending receipt of order.

## 2021-05-23 NOTE — PROGRESS NOTE ADULT - PROBLEM SELECTOR PLAN 9
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   -Non-urgent neurosurgical evaluation  -Possible cause of [place]? on the R side.

## 2021-05-23 NOTE — PROGRESS NOTE ADULT - SUBJECTIVE AND OBJECTIVE BOX
Jv Zelaya M.D.  Internal Medicine PGY-2  [phone]     Patient is a 85y old  Male who presents with a chief complaint of sob, confusion, weakness, can't walk, fever (21 May 2021 10:02)      SUBJECTIVE / OVERNIGHT EVENTS:    Patient seen and examined at the bedside this am. Per nursing no acute events overnight. This morning mandarin  used, patient [place] but states that he still feels confused and weak. Patient able to state his name. States that he has not had the same abdominal pain as yesterday. Endorses feeling very thirsty in the morning otherwise patient states that he is having BMs, per nursing not bloody, dark i/s/o lactulose use.     MEDICATIONS  (STANDING):  dextrose 40% Gel 15 Gram(s) Oral once  dextrose 5%. 1000 milliLiter(s) (50 mL/Hr) IV Continuous <Continuous>  dextrose 5%. 1000 milliLiter(s) (100 mL/Hr) IV Continuous <Continuous>  dextrose 5%. 1000 milliLiter(s) (200 mL/Hr) IV Continuous <Continuous>  dextrose 50% Injectable 25 Gram(s) IV Push once  dextrose 50% Injectable 12.5 Gram(s) IV Push once  dextrose 50% Injectable 25 Gram(s) IV Push once  glucagon  Injectable 1 milliGRAM(s) IntraMuscular once  insulin glargine Injectable (LANTUS) 5 Unit(s) SubCutaneous at bedtime  insulin lispro (ADMELOG) corrective regimen sliding scale   SubCutaneous three times a day with meals  lactulose Syrup 10 Gram(s) Oral three times a day  levothyroxine Injectable 44 MICROGram(s) IV Push at bedtime  nystatin Ointment 1 Application(s) Topical two times a day  nystatin Powder 1 Application(s) Topical three times a day  pantoprazole  Injectable 40 milliGRAM(s) IV Push two times a day  rifAXIMin 550 milliGRAM(s) Oral two times a day    MEDICATIONS  (PRN):  albuterol/ipratropium for Nebulization 3 milliLiter(s) Nebulizer every 6 hours PRN Shortness of Breath and/or Wheezing      Vital Signs Last 24 Hrs  T(C): 36.5 (22 May 2021 04:13), Max: 37.1 (21 May 2021 23:34)  T(F): 97.7 (22 May 2021 04:13), Max: 98.7 (21 May 2021 23:34)  HR: 81 (22 May 2021 04:13) (72 - 97)  BP: 113/69 (22 May 2021 04:13) (100/64 - 113/69)  BP(mean): --  RR: 18 (22 May 2021 04:13) (17 - 18)  SpO2: 96% (22 May 2021 04:13) (96% - 98%)      GENERAL: Patient in bed in [facility]  NEURO: alert, eyes open spontaneously, does answer verbal questions and follows verbal commands, speech mostly sensible to the mandarin , moves 4 extremities spontaneously.   HEAD:  Atraumatic, Normocephalic  HEENT: scleral anicteric.   CV: Regular rate and rhythm. No murmurs, rubs, or gallops; + AICD in the left chest wall.   Respiratory: normal respiratory efforts. Lungs clear to auscultation bilaterally, no wheezes/crackles.  ABDOMEN: Soft, Nontender, Nondistended; Bowel sounds normal  EXTREMITIES: + lower extremity edema  Skin: erythematous groin area     CAPILLARY BLOOD GLUCOSE    POCT Blood Glucose.: 188 mg/dL (22 May 2021 22:06)  POCT Blood Glucose.: 196 mg/dL (22 May 2021 17:59)  POCT Blood Glucose.: 217 mg/dL (22 May 2021 12:09)  POCT Blood Glucose.: 141 mg/dL (22 May 2021 08:24)      I&O's Summary    22 May 2021 07:01  -  23 May 2021 07:00  --------------------------------------------------------  IN: 410 mL / OUT: 700 mL / NET: -290 mL      PTT - ( 21 May 2021 09:12 )  PTT:38.3 sec      RADIOLOGY & ADDITIONAL TESTS:     MICROBIOLOGY:    ANTIMICROBIALS:    CONSULTS:

## 2021-05-23 NOTE — SWALLOW BEDSIDE ASSESSMENT ADULT - SWALLOW EVAL: DIAGNOSIS
84 yo M w/ HF, cirrhosis of unknown etiology (suspect 2/2 hepatitis) initially admitted and found to be acutely encephalopathic 2/2 acute on chronic hyponatremia with a course complicated by UGIB leading to decompensated cirrhosis, hypotension, and acute renal failure requiring MICU stay. Pt presents with a suspected oropharyngeal dysphagia. There is inconsistently delayed oral transit across consistencies. Multiple swallows per bolus are suggestive of pharyngeal residue. Wet breath sounds and occasional wheezing observed post-swallow with all trials, concerning for laryngeal penetration/aspiration.

## 2021-05-23 NOTE — DISCHARGE NOTE PROVIDER - PROVIDER TOKENS
PROVIDER:[[id number]],FOLLOWUP:[1 week],ESTABLISHEDPATIENT:[T]],PROVIDER:[TOKEN:[[id number]],FOLLOWUP:[1 week],ESTABLISHEDPATIENT:[T]],PROVIDER:[TOKEN:[[id number]],FOLLOWUP:[2 weeks],ESTABLISHEDPATIENT:[T]]

## 2021-05-23 NOTE — SWALLOW BEDSIDE ASSESSMENT ADULT - SLP PERTINENT HISTORY OF CURRENT PROBLEM
85M mandarin only speaking, c hx CAD /sp stent, CHF s/p AICD, HTN, BPH, CKD (baseline Cr ?1.3), unknown hepatitis c/b cirrhosis, DM2, ?T10-T11 discitis, [place], ?colon tumor of unknown etiology, pw worsening confusion, weakness, SOB, unable to ambulate.  History from pt's grandson Emil Beltre at bedside, and pt's daughter Cleopatra Styles over phone. Pt drowsy, and able to provide some answers to specific questions only. Pt has had about 3 weeks of worsening weakness to the point where he can't walk unassisted, and has had multiple falls, most recently 1 week ago. At baseline, pt walks with a walker. Pt also complaining of increasing SOB, increased mucous production, and subjective fevers, for which he has been taking cefixime empirically for the past 1 week
85M mandarin only speaking, c hx CAD /sp stent, CHF s/p AICD, HTN, BPH, CKD (baseline Cr ?1.3), unknown hepatitis c/b cirrhosis, DM2, ?T10-T11 discitis, [place], ?colon tumor of unknown etiology, pw worsening confusion, weakness, SOB, unable to ambulate.  History from pt's grandson Emil Beltre at bedside, and pt's daughter Cleopatra Styles over phone. Pt drowsy, and able to provide some answers to specific questions only. Pt has had about 3 weeks of worsening weakness to the point where he can't walk unassisted, and has had multiple falls, most recently 1 week ago. At baseline, pt walks with a walker. Pt also complaining of increasing SOB, increased mucous production, and subjective fevers, for which he has been taking cefixime empirically for the past 1 week

## 2021-05-23 NOTE — SWALLOW BEDSIDE ASSESSMENT ADULT - SWALLOW EVAL: RECOMMENDED DIET
NPO, with non-oral nutrition/hydration/medications. Would recommend NPO, except medications. Meds to be crushed or whole pill floated in applesauce.

## 2021-05-23 NOTE — DISCHARGE NOTE PROVIDER - HOSPITAL COURSE
HPI:    85M mandarin only speaking, c hx CAD /sp stent, CHF s/p AICD, HTN, BPH, CKD (baseline Cr ?1.3), unknown hepatitis c/b cirrhosis, DM2, ?T10-T11 discitis, [place], ?colon tumor of unknown etiology, pw worsening confusion, weakness, SOB, unable to ambulate.    History from pt's grandson Emil Beltre at bedside, and pt's daughter Cleopatra Styles over phone. Pt drowsy, and able to provide some answers to specific questions only. Pt has had about 3 weeks of worsening weakness to the point where he can't walk unassisted, and has had multiple falls, most recently 1 week ago. At baseline, pt walks with a walker. Pt also complaining of increasing SOB, increased mucous production, and subjective fevers, for which he has been taking cefixime empirically for the past 1 week. Pt also has increased abd distension, leg swelling, poor appetite, intermittent daily waxing/waning confusion. Pt's lasix was increased as outpt. Pt reports left chest pain, which is intermittent for the past 30 yrs. Has not received covid vaccine.    VS: Tm 98.4, P 56, BP 93/62, R 22, 95% RA  In the Ed, received azithro, ceftriaxone, , tylenol, duoneb      MICU Course:    During hospitalization Hgb down trended from 11.8 on May 16 to 5.1 on May 18, w/ hypotension and progressive lethargic mental status, and worsening ANDREEA initially put on octreotide/albumin due to concern for hepatorenal syndrome. On May 18, patient admitted to MICU, put on levophed, intubated for EGD which showed small (< 5 mm) esophageal varices, and one non-bleeding, large (4 cm x 4 cm), cratered duodenal ulcer, from 5/18--5/19, s/p 4 units pRBC, 2 units platelet, 1 unit FFP. No active bleed and off levophed since 5/18 PM. s/p thoracentesis on 5/18 -1.6L, transudative based on Light's criteria; s/p diagnostic paracentesis on 5/19, negative for SBP, continue prophylactic ceftriaxone for 7 day (5/16-5/22). ANDREEA improved with great urine output (1-2L per day), Cr downtrended to 2.16, unlikely 2/2 hepatorenal more likely 2/2 prerenal in setting of hypovolumia, octreotide/albumin discontinued. Patient extubated on 5/20 5am. Failed bedside swallow test, pending formal speech swallow, GI cleared patient for NG tube placement for meds/feeds.    Floor Course:    On the floors the patient's respiratory status improved and the patient weaned off supplemental oxygen within a day or two. The patient's mental status also improved however the patient remained lethargic. Patient's hyponatremia resolved and his ANDREEA also resolved. The patient's course was complicated by dysphagia requiring speech and swallow re-evaluation. Initially patient from MICU transfer had passed a bedside swallow evaluation however after patient started taking down thin liquids it was apparent that the patient became more tachypneic and had some respiratory distress concerning for possible aspiration. Speech and swallow did a bedside evaluation which the patient failed and the patient's MBS on 5/24 showed.... 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 Patient's ascites also was re-evaluated given the patient's endorsement of worsening pain however, per repeat ultrasound of the abdomen patient's ascites was stable.    HPI:    85M mandarin only speaking, c hx CAD /sp stent, CHF s/p AICD, HTN, BPH, CKD (baseline Cr ?1.3), unknown hepatitis c/b cirrhosis, DM2, ?T10-T11 discitis, [place], ?colon tumor of unknown etiology, pw worsening confusion, weakness, SOB, unable to ambulate.    History from pt's grandson Emil Beltre at bedside, and pt's daughter Cleopatra Styles over phone. Pt drowsy, and able to provide some answers to specific questions only. Pt has had about 3 weeks of worsening weakness to the point where he can't walk unassisted, and has had multiple falls, most recently 1 week ago. At baseline, pt walks with a walker. Pt also complaining of increasing SOB, increased mucous production, and subjective fevers, for which he has been taking cefixime empirically for the past 1 week. Pt also has increased abd distension, leg swelling, poor appetite, intermittent daily waxing/waning confusion. Pt's lasix was increased as outpt. Pt reports left chest pain, which is intermittent for the past 30 yrs. Has not received covid vaccine.    VS: Tm 98.4, P 56, BP 93/62, R 22, 95% RA  In the Ed, received azithro, ceftriaxone, , tylenol, duoneb      MICU Course:    During hospitalization Hgb down trended from 11.8 on May 16 to 5.1 on May 18, w/ hypotension and progressive lethargic mental status, and worsening ANDREEA initially put on octreotide/albumin due to concern for hepatorenal syndrome. On May 18, patient admitted to MICU, put on levophed, intubated for EGD which showed small (< 5 mm) esophageal varices, and one non-bleeding, large (4 cm x 4 cm), cratered duodenal ulcer, from 5/18--5/19, s/p 4 units pRBC, 2 units platelet, 1 unit FFP. No active bleed and off levophed since 5/18 PM. s/p thoracentesis on 5/18 -1.6L, transudative based on Light's criteria; s/p diagnostic paracentesis on 5/19, negative for SBP, continue prophylactic ceftriaxone for 7 day (5/16-5/22). ANDREEA improved with great urine output (1-2L per day), Cr downtrended to 2.16, unlikely 2/2 hepatorenal more likely 2/2 prerenal in setting of hypovolumia, octreotide/albumin discontinued. Patient extubated on 5/20 5am. Failed bedside swallow test, pending formal speech swallow, GI cleared patient for NG tube placement for meds/feeds.    Floor Course:    On the floors the patient's respiratory status improved and the patient weaned off supplemental oxygen within a day or two. The patient's mental status also improved however the patient remained lethargic. Patient's hyponatremia resolved and his ANDREEA also resolved. The patient's course was complicated by dysphagia requiring speech and swallow re-evaluation. Initially patient from MICU transfer had passed a bedside swallow evaluation however after patient started taking down thin liquids it was apparent that the patient became more tachypneic and had some respiratory distress concerning for possible aspiration. Speech and swallow did a bedside evaluation which the patient failed and the patient's MBS on 5/24 recommended a nothing by mouth diet. Repeat MBS study was performed which did find that the patient was able to tolerate a dysphagia diet. 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 Patient's ascites also was re-evaluated given the patient's endorsement of worsening pain however, per repeat ultrasound of the abdomen patient's ascites was stable. IR performed a therapeutic tap for the patient's ascites which improved the patient's symptoms. Chest PT and suctioning was also performed for increased secretions that the patient complained about.     Patient is stable and medically cleared for discharge to rehab.

## 2021-05-23 NOTE — PROGRESS NOTE ADULT - ATTENDING COMMENTS
85 year old male with PMH CHF (EF 40-45%, moderate LV systolic function) and cirrhosis of unknown etiology (suspect 2/2 hepatitis) who was initially admitted to the hospital due to acute encephalopathic secondary to acute on chronic hyponatremia. His hyponatremia has resolved however, his course was complicated by UGIB leading to decompensated cirrhosis, hypotension, and acute renal failure requiring MICU stay for pressor support and temporization of acute GIB. In the MICU, he had and EGD which showed esophageal varices and a duodenal ulcer s/p octreotide drip and ceftriaxone currently on IV PPI BID. He also had ascites which was initially concerning for possibly SBP however, diagnostic paracentesis was negative for SBP. Hemoglobin has stabilized. Speech and swallow consulted for possible aspiration, recommending NPO with MBS planned for tomorrow. Rest of plan as above.    García Convissar, DO  Pager [phone]  If no answer [phone]

## 2021-05-23 NOTE — DISCHARGE NOTE PROVIDER - DETAILS OF MALNUTRITION DIAGNOSIS/DIAGNOSES
This patient has been assessed with a concern for Malnutrition and was treated during this hospitalization for the following Nutrition diagnosis/diagnoses:     -  05/19/2021: Mild protein-calorie malnutrition

## 2021-05-23 NOTE — DISCHARGE NOTE PROVIDER - NSDCMRMEDTOKEN_GEN_ALL_CORE_FT
Albuterol (Eqv-ProAir HFA) 90 mcg/inh inhalation aerosol: 2 puff(s) inhaled every 6 hours, As Needed  cefixime 400 mg oral tablet: 1 tab(s) orally once a day  Effient 10 mg oral tablet: 1 tab(s) orally once a day  Entresto 24 mg-26 mg oral tablet: 1 tab(s) orally 2 times a day  febuxostat 40 mg oral tablet: 1 tab(s) orally once a day  HumaLOG 100 units/mL subcutaneous solution: 8 unit(s) subcutaneous 3 times a day  insulin lispro 100 units/mL injectable solution (obsolete): sliding scale insulin TID w/ meals  Lasix 80 mg oral tablet: 1 tab(s) orally 2 times a day  levothyroxine 88 mcg (0.088 mg) oral tablet: 1 tab(s) orally once a day  Pepcid 20 mg oral tablet: 1 tab(s) orally 2 times a day  predniSONE 50 mg oral tablet: 1 tab(s) orally once a day  Toujeo SoloStar 300 units/mL subcutaneous solution: 20 unit(s) subcutaneous once a day   Albuterol (Eqv-ProAir HFA) 90 mcg/inh inhalation aerosol: 2 puff(s) inhaled every 6 hours, As Needed  cefixime 400 mg oral tablet: 1 tab(s) orally once a day  Effient 10 mg oral tablet: 1 tab(s) orally once a day  Entresto 24 mg-26 mg oral tablet: 1 tab(s) orally 2 times a day  febuxostat 40 mg oral tablet: 1 tab(s) orally once a day  furosemide 40 mg oral tablet: 1 tab(s) orally once a day  HumaLOG 100 units/mL subcutaneous solution: 8 unit(s) subcutaneous 3 times a day  lactulose 10 g/15 mL oral syrup: 15 milliliter(s) orally 3 times a day  levothyroxine 88 mcg (0.088 mg) oral tablet: 1 tab(s) orally once a day  Pepcid 20 mg oral tablet: 1 tab(s) orally 2 times a day  polyethylene glycol 3350 oral powder for reconstitution: 17 gram(s) orally once a day  predniSONE 50 mg oral tablet: 1 tab(s) orally once a day  rifAXIMin 550 mg oral tablet: 1 tab(s) orally 2 times a day  senna oral tablet: 2 tab(s) orally once a day (at bedtime)  simethicone 80 mg oral tablet, chewable: 1 tab(s) orally 3 times a day  spironolactone 25 mg oral tablet: 2 tab(s) orally once a day  sucralfate 1 g oral tablet: 1 tab(s) orally every 6 hours  Toujeo SoloStar 300 units/mL subcutaneous solution: 20 unit(s) subcutaneous once a day

## 2021-05-23 NOTE — SWALLOW BEDSIDE ASSESSMENT ADULT - COMMENTS
Pt also has increased abd distension, leg swelling, poor appetite, intermittent daily waxing/waning confusion. Pt's lasix was increased as outpt. Pt reports left chest pain, which is intermittent for the past 30 yrs. Has not received covid vaccine.  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 Differential includes meningioma and schwannoma.  5/16 CT Chest:  Moderate to large R pleural effusion with complete collapse of R middle and lower lobes. Small L pleural effusion with complete atelectasis.    5/18 Rapid Response: "Patient with BP 80's/40's in acute renal failure with known cirrhosis and HF now with likely upper GI bleed with findings of melena, with symptomatic anemia . Micu transfer note: "Patient found to have liver cirrhosis with large pleural effusion, ascites on CT A/P, hyponatremia,  worsening Hgb 11.8--> 7.6 --> 5.1, w/ melena and bright red blood per rectum concern for GI bleed, worsening ANDREEA with oliguria concern for hepatorenal syndrome.  Pt INTUBATED 5/18 in preparation for EGD. EGD findings: esophageal varices.  One non-bleeding, large (4 cm x 4 cm), cratered duodenal ulcer.  5/20- 0454 - EXTUBATED
Pt also has increased abd distension, leg swelling, poor appetite, intermittent daily waxing/waning confusion. Pt's lasix was increased as outpt. Pt reports left chest pain, which is intermittent for the past 30 yrs. Has not received covid vaccine.  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 Differential includes meningioma and schwannoma.  5/16 CT Chest:  Moderate to large R pleural effusion with complete collapse of R middle and lower lobes. Small L pleural effusion with complete atelectasis.    5/18 Rapid Response: "Patient with BP 80's/40's in acute renal failure with known cirrhosis and HF now with likely upper GI bleed with findings of melena, with symptomatic anemia . Micu transfer note: "Patient found to have liver cirrhosis with large pleural effusion, ascites on CT A/P, hyponatremia,  worsening Hgb 11.8--> 7.6 --> 5.1, w/ melena and bright red blood per rectum concern for GI bleed, worsening ANDREEA with oliguria concern for hepatorenal syndrome.  Pt INTUBATED 5/18 in preparation for EGD. EGD findings: esophageal varices.  One non-bleeding, large (4 cm x 4 cm), cratered duodenal ulcer.  5/20- EXTUBATED. Orders received for bedside swallow evaluation -> evaluation deferred as pt on aerosol mask   5/21: 5/21: As per SLP discussion with MD, pt to be advanced to soft texture diet by medical team with no concern for dysphagia. Team requested for speech service to sign-off at this time.  5/22: Passed bedside speech and swallow eval pt on dysphagia I diet for now, will reconsult Speech and swallow as patient with evidence of coughing from clear liquid diet.

## 2021-05-23 NOTE — DISCHARGE NOTE PROVIDER - CARE PROVIDERS DIRECT ADDRESSES
,DirectAddress_Unknown,[email or web address],DirectAddress_Unknown

## 2021-05-23 NOTE — PROGRESS NOTE ADULT - PROBLEM SELECTOR PLAN 2
Decompensated i/s/o possible worsening ascites vs. UGIB vs. metabolic derangement (hyponatremia) leading to patient's hepatic encephalopathy and worsening status. Cirrhosis most likely i/s/o hepatitis.   -F/u acute hepatitis panel and cirrhosis labs  -MELD-Na=27, f/u daily MELD labs  -GIB stable. On clears for now.   -Ascites: Holding lasix i/s/o ARF and hypotension will need to touch base with hepatology whether to restart prior to discharge.   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 Now on PPI and ceftriaxone for SBP prophylaxis till 5/24.   -HE: C/w lactolose 10 TID and 550 mg rifaximin BID.

## 2021-05-23 NOTE — DISCHARGE NOTE PROVIDER - NSDCFUADDAPPT_GEN_ALL_CORE_FT
Please follow up with your primary care provider within 1 week of discharge for further lab-work, monitoring, and mangement of your chronic health conditions.    Please follow-up with Nephrology for further monitoring of your kidney function within 2 weeks of discharge.

## 2021-05-23 NOTE — PROGRESS NOTE ADULT - PROBLEM SELECTOR PLAN 1
Anemic to ~5.8 most likely i/s/o UGIB possibly from duodenal ulcer vs. erosive esophagitis vs. dieulafoy lesion.   -S/p 4u pRBCs, 1u platelets, 1u FFP and MICU admission for hypotension requiring pressors briefly.   -Now stable  -CTM H/H  -T and S active  -PPI gtt @10cc/hr until 5/21 now on 40IVP BID.

## 2021-05-23 NOTE — SWALLOW BEDSIDE ASSESSMENT ADULT - ASPIRATION PRECAUTIONS
Monitor for s/s aspiration/laryngeal penetration. If noted:  D/C p.o. intake, provide non-oral nutrition/hydration/meds, and contact this service @ [phone]

## 2021-05-23 NOTE — PROGRESS NOTE ADULT - PROBLEM SELECTOR PLAN 10
#Bicytopenia: Thrombocytopenia i/s/o hepatic dysfunction. Anemia i/s/o GIB.   #Pericardial Effusion: Seen on CT Chest this admission, not tamponade physiology per cards note.   #Pleural Effusion: R Pleff s/p thoracentesis   DVT: SCDs  Diet: Dysphagia I diet, CC, DASH/TLC.

## 2021-05-23 NOTE — PROGRESS NOTE ADULT - PROBLEM SELECTOR PLAN 4
Patient with CKD most likely i/s/o diabetes. Now with ANDREEA on CKD (Scr baseline from notes appears to be 1.2-1.3) now with ANDREEA with SCr downtrending. ANDREEA most likely i/s/o pre-renal azotemia (acute blood loss vs. splanchnic vasoplegia from cirrhosis vs. lethargy and decreased PO intake) vs. ATN from anemia.   -CTM SCr  -Avoid nephrotoxic agents  -Holding patient's entresto and lasix.   -Nephrology following appreciate recommendations

## 2021-05-24 NOTE — PROGRESS NOTE ADULT - PROBLEM SELECTOR PLAN 6
s/p AICD     - TTE 5/17: EF 40-45%, moderate LV systolic function, no LV thrombus, mild diastolic dysfunction (stage I)     - home HF medications: entresto 24-26 BID, lasix 80 PO daily --- currently holding in setting of hypotension, current volume status acceptable s/p AICD     - TTE 5/17: EF 40-45%, moderate LV systolic function, no LV thrombus, mild diastolic dysfunction (stage I)     - home HF medications: entresto 24-26 BID, lasix 80 PO daily --- currently holding in setting of hypotension, current volume status acceptable    - holding entresto i/s/o ANDREEA, will re-evaluate once Cr normalizes

## 2021-05-24 NOTE — PROGRESS NOTE ADULT - ASSESSMENT
85M w/ CAD s/p PCI, HF s/p AICD, DMII, HTN, CKD, decompensated cirrhosis of unknown viral etiology, presenting w/ 3 weeks of generalized weakness, dyspnea, and abd distension. Found to have hyponatremia, ANDREEA, and large pleural effusions. Transferred to MICU on 5/18 for GI bleed from large duodenal ulcer. 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  -varices: small on EGD 5/18  -ascites: perihepatic and pelvic ascites present, s/p dx para 5/19, neg for SBP  -HE: present on exam  -HCC: none seen but imaging so far non-contrast so unable to exclude HCC  -MELD-Na = 27 on 5/20  -Chronic liver disease work-up: Hepatitis B surface Ag, Ab, core IgM negative, core total positive, HBV DNA and E-Ag pending  #ANDREEA - improved w/ albumin, low urine Na c/w poor renal perfusio  #HF - TTE shows decreased LV function and RV function  #Extra-axial mass - growing since 2018  #H pylori Ab positive    Recommendations:  - IV PPI BID while inpatient, transition to PO PPI BID on discharge x 2 weeks  - Add sucralfate suspension 1g q6h x 14 days starting today  - Would start lasix 40mg daily and spironolactone 50mg daily  - Check Tolu after starting these medications  - Continue w/ lactulose titrated to 3-4 BMs per day   - Continue w/ rifaxmin 550mg BID  - F/u Hepatitis B DNA  - Low Na diet  - Trend CBC, CMP, INR daily  - Plan for H pylori treatment as outpatient  - Hepatology will follow    Saturnino Aguirre  Gastroenterology/Hepatology Fellow  Available via Microsoft Teams    NON-URGENT CONSULTS:  Please email [email or web address]  [email or web address]  AT NIGHT AND ON WEEKENDS:  Contact on-call GI fellow via answering service ([phone]) from 5pm-8am and on weekends/holidays  MONDAY-FRIDAY 8AM-5PM:  Pager# [phone] ([facility] or [phone] ([facility])  GI Phone# [phone] ([facility])

## 2021-05-24 NOTE — PROGRESS NOTE ADULT - PROBLEM SELECTOR PLAN 3
Encephalopathy multifactorial in etiology of metabolic derangements (hyponatremia vs. HE vs ARF), patient endorsing subacute onset of weakness and lethargy unclear how far off the patient is from baseline however, patient seems extremely weak, maybe in the setting of possible symptomatic anemia as well. No evidence of hypercapnia or hypoxia on patient's vitals and labs.   -CTM  -CTH negative for acute bleed.   -Passed bedside speech and swallow eval pt on dysphagia I diet for now, will reconsult Speech and swallow as patient with evidence of coughing from clear liquid diet.   -C/w lactulose and rifaximin titrate to 3-4 BMs. Stool counts. Encephalopathy multifactorial in etiology of metabolic derangements (hyponatremia vs. HE vs ARF), patient endorsing subacute onset of weakness and lethargy unclear how far off the patient is from baseline however, patient seems extremely weak, maybe in the setting of possible symptomatic anemia as well. No evidence of hypercapnia or hypoxia on patient's vitals and labs.   -CTM  -CTH negative for acute bleed.   -Passed bedside speech and swallow eval pt on dysphagia I diet for now, will reconsult Speech and swallow as patient with evidence of coughing from clear liquid diet.   -C/w lactulose and rifaximin titrate to 3-4 BMs. Stool counts.  -Pt failed MBS study, possibly compounded by encephalopathic/confused state with the test - will remain on NPO status but with addition of D5+NS for maintenance fluids, oral swabs for dry mouth  -Possible increased ronchi heard after MBS testing, will order CXR to assess for aspiration PNA event

## 2021-05-24 NOTE — SWALLOW VFSS/MBS ASSESSMENT ADULT - DIAGNOSTIC IMPRESSIONS
Pt presents with an oropharyngeal dysphagia, superimposed upon reduced mental status. Pt with Pt presents with an oropharyngeal dysphagia, superimposed upon reduced mental status. Pt initially with eyes closed, required verbal + tactile cues to alert. Confusion noted throughout study, pt frequently phonating with PO in the oral cavity. There is inconsistently delayed oral transit and delayed pharyngeal swallow trigger. Reduced epiglottic retroflexion and reduced hyolaryngeal excursion negatively impact airway protection. There is laryngeal penetration without retrieval with nectar thick liquids. Pt unable to follow directives for cued cough. There is x1 instance of silent aspiration with thin puree, without retrieval, and x1 instance of silent aspiration with honey-thick liquids, without retrieval. As study progressed, swallow function appeared to become entrained, with improved epiglottic retroflexion and improvement in airway protection. Further instances of laryngeal penetration without retrieval/ aspiration were not observed with purees/honey-thickened fluids. Minimal pharyngeal residue after the primary swallow is reduced via repeat swallow.

## 2021-05-24 NOTE — PROGRESS NOTE ADULT - PROBLEM SELECTOR PLAN 2
Decompensated i/s/o possible worsening ascites vs. UGIB vs. metabolic derangement (hyponatremia) leading to patient's hepatic encephalopathy and worsening status. Cirrhosis most likely i/s/o hepatitis.   -F/u acute hepatitis panel and cirrhosis labs  -MELD-Na=27, f/u daily MELD labs  -GIB stable. On clears for now.   -Ascites: Holding lasix i/s/o ARF and hypotension will need to touch base with hepatology whether to restart prior to discharge.   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 Now on PPI and ceftriaxone for SBP prophylaxis till 5/24.   -HE: C/w lactolose 10 TID and 550 mg rifaximin BID. Decompensated i/s/o possible worsening ascites vs. UGIB vs. metabolic derangement (hyponatremia) leading to patient's hepatic encephalopathy and worsening status. Cirrhosis most likely i/s/o hepatitis.   -F/u acute hepatitis panel and cirrhosis labs  -MELD-Na=27, f/u daily MELD labs  -GIB stable. On clears for now.   -Ascites: Holding lasix i/s/o ARF and hypotension will need to touch base with hepatology whether to restart prior to discharge.   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 Now on PPI and ceftriaxone for SBP prophylaxis till 5/24.   -HE: C/w lactolose 10 TID and 550 mg rifaximin BID.  -started carafate 1g Q6H for 14 days, Lasix 40 mg daily, Spironolactone 50 mg daily Decompensated i/s/o possible worsening ascites vs. UGIB vs. metabolic derangement (hyponatremia) leading to patient's hepatic encephalopathy and worsening status. Cirrhosis most likely i/s/o hepatitis.   -F/u acute hepatitis panel and cirrhosis labs  -MELD-Na=27, f/u daily MELD labs  -GIB stable. On clears for now.   -Ascites: Holding lasix i/s/o ARF and hypotension will need to touch base with hepatology whether to restart prior to discharge.   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 Now on PPI and ceftriaxone for SBP prophylaxis till 5/24.   -HE: C/w lactolose 10 TID and 550 mg rifaximin BID.  -started carafate 1g Q6H for 14 days, Lasix 40 mg daily, Spironolactone 50 mg daily  -lactulose and lasix converted non-PO methods d/t failed MBS study

## 2021-05-24 NOTE — PROGRESS NOTE ADULT - PROBLEM SELECTOR PLAN 9
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   -Non-urgent neurosurgical evaluation  -Possible cause of [place]? on the R side.

## 2021-05-24 NOTE — PROGRESS NOTE ADULT - PROBLEM SELECTOR PLAN 5
Acute on chronic hyponatremia. Appears to be hypervolemic hypernatremia i/s/o cirrhosis. Patient's diuresis on hold given ANDREEA. SNz=548 on presentation now LWk=991  -CTM on daily BMPs  -Holding steady @139, s/p D5 200cc/2hrs. Will CTM.

## 2021-05-24 NOTE — PROGRESS NOTE ADULT - ATTENDING COMMENTS
Hepatology Staff: Rodrigo Brandon MD    I saw and examined the patient along with  Dr. Aguirre 05-24-21 @ 14:37  Patient Medical Record, hosptial course was reviewed and summarized as below:    Vitals: Vital Signs Last 24 Hrs  T(C): 36.4 (24 May 2021 13:16), Max: 36.9 (24 May 2021 05:16)  T(F): 97.5 (24 May 2021 13:16), Max: 98.4 (24 May 2021 05:16)  HR: 94 (24 May 2021 13:16) (83 - 94)  BP: 145/82 (24 May 2021 13:16) (111/66 - 145/82)    RR: 17 (24 May 2021 13:16) (17 - 20)  SpO2: 100% (24 May 2021 13:16) (96% - 100%)    Labs:INR: 1.64 ratio (05-24-21 @ 07:17)  Creatinine, Serum: 1.51 mg/dL (05-24-21 @ 07:12)  Bilirubin Total, Serum: 2.1 mg/dL (05-24-21 @ 07:12)      I/O: I&O's Summary    23 May 2021 07:01  -  24 May 2021 07:00  --------------------------------------------------------  IN: 0 mL / OUT: 1200 mL / NET: -1200 mL      Nutritional Status:   Albumin, Serum: 3.8 g/dL (05-24-21 @ 07:12)    Recommendations: This is a 85-year-old male with multiple comorbidities as outlined in fellow's note with history of decompensated cirrhosis was originally admitted with 3 weeks history of generalized weakness, dyspnea and abdominal distention and was found to have severe hyponatremia, ANDREEA and large pleural effusions.  He was transferred to MICU on 5- for GI bleeding from a large duodenal ulcer, status post endoscopy on 5-.  No specific intervention was performed as it was not actively bleeding.  Agree with above outlined recommendations and plan as noted in fellow's note.  Keep patient on IV PPI twice a day.  Initiate Carafate suspension 10 mL every 6 hours for 14 days starting today.  Resume diuretics with Lasix 40 mg daily and spironolactone 50 mg daily and monitor urine sodium to assess response, target sodium level in the urine sodium more than 77 mEq/L.  Agree with the rest of the recommendations.      Plan discussed with Primary team.

## 2021-05-24 NOTE — PROGRESS NOTE ADULT - ATTENDING COMMENTS
85 year old male with PMH CHF (EF 40-45%, moderate LV systolic function) and cirrhosis of unknown etiology (suspect 2/2 hepatitis) who was initially admitted to the hospital due to acute encephalopathy secondary to acute on chronic hyponatremia. His hyponatremia has resolved however, his course was complicated by UGIB leading to decompensated cirrhosis, hypotension, and acute renal failure requiring MICU stay for pressor support and temporization of acute GIB. In the MICU, he had an EGD which showed esophageal varices and a duodenal ulcer s/p octreotide drip and ceftriaxone currently on IV PPI BID. He also had ascites which was initially concerning for possibly SBP however, diagnostic paracentesis was negative for SBP. Hemoglobin has stabilized. Speech and swallow consulted for possible aspiration, MBS planned for today. Follow up hepatology, pulmonary and nephrology recommendations. Rest of plan as above.    García Convissar, DO  Pager [phone]  If no answer [phone]

## 2021-05-24 NOTE — PROGRESS NOTE ADULT - SUBJECTIVE AND OBJECTIVE BOX
Juan York MD  PGY-1, Internal Medicine  Pager #: [phone], [phone]    Patient is a 85y old  Male who presents with a chief complaint of sob, confusion, weakness, can't walk, fever (23 May 2021 23:04)    OVERNIGHT/INTERVAL EVENTS:    SUBJECTIVE: Patient seen and examined bedside.     Denies fevers/chills, headaches/dizziness, nausea/vomiting, chest pain, SOB, abdominal pain.     MEDICATIONS  (STANDING):  dextrose 40% Gel 15 Gram(s) Oral once  dextrose 5%. 1000 milliLiter(s) (50 mL/Hr) IV Continuous <Continuous>  dextrose 5%. 1000 milliLiter(s) (100 mL/Hr) IV Continuous <Continuous>  dextrose 50% Injectable 25 Gram(s) IV Push once  dextrose 50% Injectable 12.5 Gram(s) IV Push once  dextrose 50% Injectable 25 Gram(s) IV Push once  glucagon  Injectable 1 milliGRAM(s) IntraMuscular once  insulin lispro (ADMELOG) corrective regimen sliding scale   SubCutaneous every 6 hours  lactulose Syrup 10 Gram(s) Oral three times a day  levothyroxine Injectable 44 MICROGram(s) IV Push at bedtime  nystatin Ointment 1 Application(s) Topical two times a day  nystatin Powder 1 Application(s) Topical three times a day  pantoprazole  Injectable 40 milliGRAM(s) IV Push two times a day  rifAXIMin 550 milliGRAM(s) Oral two times a day    MEDICATIONS  (PRN):  albuterol/ipratropium for Nebulization 3 milliLiter(s) Nebulizer every 6 hours PRN Shortness of Breath and/or Wheezing      CAPILLARY BLOOD GLUCOSE      POCT Blood Glucose.: 176 mg/dL (24 May 2021 05:15)  POCT Blood Glucose.: 180 mg/dL (24 May 2021 00:13)  POCT Blood Glucose.: 199 mg/dL (23 May 2021 21:27)  POCT Blood Glucose.: 207 mg/dL (23 May 2021 17:28)  POCT Blood Glucose.: 199 mg/dL (23 May 2021 12:41)  POCT Blood Glucose.: 209 mg/dL (23 May 2021 08:49)    I&O's Summary    23 May 2021 07:01  -  24 May 2021 07:00  --------------------------------------------------------  IN: 0 mL / OUT: 1200 mL / NET: -1200 mL        PHYSICAL EXAM:  Vital Signs Last 24 Hrs  T(C): 36.9 (24 May 2021 05:16), Max: 36.9 (24 May 2021 05:16)  T(F): 98.4 (24 May 2021 05:16), Max: 98.4 (24 May 2021 05:16)  HR: 83 (24 May 2021 05:16) (83 - 96)  BP: 118/61 (24 May 2021 05:16) (108/70 - 120/74)  BP(mean): --  RR: 17 (24 May 2021 05:16) (17 - 20)  SpO2: 96% (24 May 2021 05:16) (96% - 100%)  CONSTITUTIONAL: NAD, lying in bed comfortably  EYES: EOMI, PERRLA; conjunctiva and sclera clear  ENMT: Moist oral mucosa; normal dentition  NECK: Supple, no palpable masses, no JVD  RESPIRATORY: Lungs clear to ascultation b/l; no rales/ronchi/wheezing; unlabored respirations  CARDIOVASCULAR: Regular rate and rhythm, normal S1 and S2, no murmurs/rubs/gallops  ABDOMEN: Soft, normal bowel sounds, nondistended, nontender  MUSCULOSKELETAL: No joint swelling or tenderness to palpation  PSYCH: Affect appropriate  NEUROLOGY: AAOx3, CNs grossly intact  SKIN: No rashes; no palpable lesions    LABS:                        9.5    7.89  )-----------( 63       ( 23 May 2021 07:48 )             28.6     05-23    141  |  107  |  56<H>  ----------------------------<  173<H>  3.9   |  19<L>  |  1.64<H>    Ca    9.4      23 May 2021 07:30  Phos  2.3     05-23  Mg     2.2     05-23    TPro  5.4<L>  /  Alb  3.8  /  TBili  1.4<H>  /  DBili  x   /  AST  24  /  ALT  17  /  AlkPhos  38<L>  05-23                INTERVAL RADIOLOGY/IMAGING STUDIES:     Juan York MD  PGY-1, Internal Medicine  Pager #: [phone], [phone]    Patient is a 85y old  Male who presents with a chief complaint of sob, confusion, weakness, can't walk, fever (23 May 2021 23:04)    OVERNIGHT/INTERVAL EVENTS: No overnight events.    SUBJECTIVE: Patient seen and examined bedside. Patient states he feels dehydrated and has not been able to drink water over the weekend pending MBS study. Also complains still of mild abdominal pain.     Denies fevers/chills, headaches/dizziness, nausea/vomiting, chest pain.    MEDICATIONS  (STANDING):  dextrose 40% Gel 15 Gram(s) Oral once  dextrose 5%. 1000 milliLiter(s) (50 mL/Hr) IV Continuous <Continuous>  dextrose 5%. 1000 milliLiter(s) (100 mL/Hr) IV Continuous <Continuous>  dextrose 50% Injectable 25 Gram(s) IV Push once  dextrose 50% Injectable 12.5 Gram(s) IV Push once  dextrose 50% Injectable 25 Gram(s) IV Push once  glucagon  Injectable 1 milliGRAM(s) IntraMuscular once  insulin lispro (ADMELOG) corrective regimen sliding scale   SubCutaneous every 6 hours  lactulose Syrup 10 Gram(s) Oral three times a day  levothyroxine Injectable 44 MICROGram(s) IV Push at bedtime  nystatin Ointment 1 Application(s) Topical two times a day  nystatin Powder 1 Application(s) Topical three times a day  pantoprazole  Injectable 40 milliGRAM(s) IV Push two times a day  rifAXIMin 550 milliGRAM(s) Oral two times a day    MEDICATIONS  (PRN):  albuterol/ipratropium for Nebulization 3 milliLiter(s) Nebulizer every 6 hours PRN Shortness of Breath and/or Wheezing      CAPILLARY BLOOD GLUCOSE      POCT Blood Glucose.: 176 mg/dL (24 May 2021 05:15)  POCT Blood Glucose.: 180 mg/dL (24 May 2021 00:13)  POCT Blood Glucose.: 199 mg/dL (23 May 2021 21:27)  POCT Blood Glucose.: 207 mg/dL (23 May 2021 17:28)  POCT Blood Glucose.: 199 mg/dL (23 May 2021 12:41)  POCT Blood Glucose.: 209 mg/dL (23 May 2021 08:49)    I&O's Summary    23 May 2021 07:01  -  24 May 2021 07:00  --------------------------------------------------------  IN: 0 mL / OUT: 1200 mL / NET: -1200 mL        PHYSICAL EXAM:  Vital Signs Last 24 Hrs  T(C): 36.9 (24 May 2021 05:16), Max: 36.9 (24 May 2021 05:16)  T(F): 98.4 (24 May 2021 05:16), Max: 98.4 (24 May 2021 05:16)  HR: 83 (24 May 2021 05:16) (83 - 96)  BP: 118/61 (24 May 2021 05:16) (108/70 - 120/74)  BP(mean): --  RR: 17 (24 May 2021 05:16) (17 - 20)  SpO2: 96% (24 May 2021 05:16) (96% - 100%)  CONSTITUTIONAL: NAD, lying in bed comfortably  EYES: EOMI, PERRLA; conjunctiva and sclera clear  ENMT: Moist oral mucosa; normal dentition  NECK: Supple, no palpable masses, no JVD  RESPIRATORY: Lungs clear to ascultation b/l; no rales/ronchi/wheezing; unlabored respirations  CARDIOVASCULAR: Regular rate and rhythm, normal S1 and S2, no murmurs/rubs/gallops, +AICD in left chest wall  ABDOMEN: Soft, normal bowel sounds, nondistended, nontender  MUSCULOSKELETAL: No joint swelling or tenderness to palpation  PSYCH: Affect appropriate  NEUROLOGY: Answers questions and responds to commands appropriately  SKIN: No rashes; no palpable lesions    LABS:                        9.5    7.89  )-----------( 63       ( 23 May 2021 07:48 )             28.6     05-23    141  |  107  |  56<H>  ----------------------------<  173<H>  3.9   |  19<L>  |  1.64<H>    Ca    9.4      23 May 2021 07:30  Phos  2.3     05-23  Mg     2.2     05-23    TPro  5.4<L>  /  Alb  3.8  /  TBili  1.4<H>  /  DBili  x   /  AST  24  /  ALT  17  /  AlkPhos  38<L>  05-23                INTERVAL RADIOLOGY/IMAGING STUDIES:     Juan York MD  PGY-1, Internal Medicine  Pager #: [phone], [phone]    Patient is a 85y old  Male who presents with a chief complaint of sob, confusion, weakness, can't walk, fever (23 May 2021 23:04)    OVERNIGHT/INTERVAL EVENTS: No overnight events.    SUBJECTIVE: Patient seen and examined bedside. Patient states he feels dehydrated and has not been able to drink water over the weekend pending MBS study. Also complains still of mild abdominal pain. Does appear a bit confused and mentions being in a classroom.     Denies fevers/chills, headaches/dizziness, nausea/vomiting, chest pain.    MEDICATIONS  (STANDING):  dextrose 40% Gel 15 Gram(s) Oral once  dextrose 5%. 1000 milliLiter(s) (50 mL/Hr) IV Continuous <Continuous>  dextrose 5%. 1000 milliLiter(s) (100 mL/Hr) IV Continuous <Continuous>  dextrose 50% Injectable 25 Gram(s) IV Push once  dextrose 50% Injectable 12.5 Gram(s) IV Push once  dextrose 50% Injectable 25 Gram(s) IV Push once  glucagon  Injectable 1 milliGRAM(s) IntraMuscular once  insulin lispro (ADMELOG) corrective regimen sliding scale   SubCutaneous every 6 hours  lactulose Syrup 10 Gram(s) Oral three times a day  levothyroxine Injectable 44 MICROGram(s) IV Push at bedtime  nystatin Ointment 1 Application(s) Topical two times a day  nystatin Powder 1 Application(s) Topical three times a day  pantoprazole  Injectable 40 milliGRAM(s) IV Push two times a day  rifAXIMin 550 milliGRAM(s) Oral two times a day    MEDICATIONS  (PRN):  albuterol/ipratropium for Nebulization 3 milliLiter(s) Nebulizer every 6 hours PRN Shortness of Breath and/or Wheezing      CAPILLARY BLOOD GLUCOSE      POCT Blood Glucose.: 176 mg/dL (24 May 2021 05:15)  POCT Blood Glucose.: 180 mg/dL (24 May 2021 00:13)  POCT Blood Glucose.: 199 mg/dL (23 May 2021 21:27)  POCT Blood Glucose.: 207 mg/dL (23 May 2021 17:28)  POCT Blood Glucose.: 199 mg/dL (23 May 2021 12:41)  POCT Blood Glucose.: 209 mg/dL (23 May 2021 08:49)    I&O's Summary    23 May 2021 07:01  -  24 May 2021 07:00  --------------------------------------------------------  IN: 0 mL / OUT: 1200 mL / NET: -1200 mL        PHYSICAL EXAM:  Vital Signs Last 24 Hrs  T(C): 36.9 (24 May 2021 05:16), Max: 36.9 (24 May 2021 05:16)  T(F): 98.4 (24 May 2021 05:16), Max: 98.4 (24 May 2021 05:16)  HR: 83 (24 May 2021 05:16) (83 - 96)  BP: 118/61 (24 May 2021 05:16) (108/70 - 120/74)  BP(mean): --  RR: 17 (24 May 2021 05:16) (17 - 20)  SpO2: 96% (24 May 2021 05:16) (96% - 100%)  CONSTITUTIONAL: NAD, lying in bed comfortably  EYES: EOMI, PERRLA; conjunctiva and sclera clear  ENMT: Moist oral mucosa; normal dentition  NECK: Supple, no palpable masses, no JVD  RESPIRATORY: Lungs clear to ascultation b/l; no rales/ronchi/wheezing; unlabored respirations  CARDIOVASCULAR: Regular rate and rhythm, normal S1 and S2, no murmurs/rubs/gallops, +AICD in left chest wall  ABDOMEN: Soft, normal bowel sounds, nondistended, nontender  MUSCULOSKELETAL: No joint swelling or tenderness to palpation  PSYCH: Affect appropriate  NEUROLOGY: Answers questions and responds to commands appropriately  SKIN: No rashes; no palpable lesions    LABS:                        9.5    7.89  )-----------( 63       ( 23 May 2021 07:48 )             28.6     05-23    141  |  107  |  56<H>  ----------------------------<  173<H>  3.9   |  19<L>  |  1.64<H>    Ca    9.4      23 May 2021 07:30  Phos  2.3     05-23  Mg     2.2     05-23    TPro  5.4<L>  /  Alb  3.8  /  TBili  1.4<H>  /  DBili  x   /  AST  24  /  ALT  17  /  AlkPhos  38<L>  05-23                INTERVAL RADIOLOGY/IMAGING STUDIES:

## 2021-05-24 NOTE — PROGRESS NOTE ADULT - PROBLEM SELECTOR PLAN 8
A1c=5.5 most likely normal i/s/o possible GI bleed. Patient on toujeo 20 and humalog 8 premeals at home.   -Restarting consistent Carb diet on 5/20  -Will hold off on premeal insulin with sliding scale and adjust insulin as needed. LESTER as needed.   -Will dose reduce basal insulin by 75% and start the patient on 5units. A1c=5.5 most likely normal i/s/o possible GI bleed. Patient on toujeo 20 and humalog 8 premeals at home.   -Restarting consistent Carb diet on 5/20  -Will hold off on premeal insulin with sliding scale and adjust insulin as needed. LESTER as needed.   -Will dose reduce basal insulin by 75% and start the patient on 5units.  -Pt started on D5 w/ NS for NPO status

## 2021-05-24 NOTE — PROGRESS NOTE ADULT - ASSESSMENT
85M PMHx CKD, cirrhosis (?viral hepatitis) - admitted for decompensated cirrhosis.  Nephrology consulted for hyponatremia and ANDREEA on CKD.        # Hyponatremia  Pt w/ acute on chronic hyponatremia hypervolemic possibly 2/2 cirrhosis.  On admission sNa 120 (prior sNa 130-135), s/p lasix IV, IVF.  - Na now 139      # ANDREEA  Pt with non-oliguric ANDREEA on CKD likely 2/2 ATN in the setting hypotension, entresto/lasix, acute anemia and decreased EABV.  On admission sCr 2.0 (baseline sCr 1.4-1.6 in Jan 2019), peaked 2.7 now improved. UA bland.   - agree with holding entresto for now, can restart in a few days if Cr remains stable  - Cr is now down to 1.5mg/dl which is his baseline.   - BP optimization/antibiotic/blood transfusion per ICU team  - monitor BMP, strict I/O, avoid nephrotoxic agents (NSAIDs, PPI, contrast), renally dose medications per GFR.

## 2021-05-24 NOTE — PROGRESS NOTE ADULT - ATTENDING COMMENTS
Feeling improved  1.  ARF--progressive improvement, non oliguric, no HD.  Holding entresto which may be resumed as Cr improves with LOW dose    discussed with med team

## 2021-05-24 NOTE — SWALLOW VFSS/MBS ASSESSMENT ADULT - ROSENBEK'S PENETRATION ASPIRATION SCALE
(8) contrast passes glottis, visible subglottic residue remains, absent patient response (aspiration) (3) contrast remains above the vocal cords, visible residue remains (penetration) (1) no aspiration, contrast does not enter airway

## 2021-05-24 NOTE — PROGRESS NOTE ADULT - SUBJECTIVE AND OBJECTIVE BOX
[facility] DIVISION OF KIDNEY DISEASES AND HYPERTENSION -- FOLLOW UP NOTE  --------------------------------------------------------------------------------  Alejandro Sams   Nephrology Fellow  Pager NS: [phone]/ [facility]: [phone]  (After 5 pm or on weekends please page the on-call fellow, can view the schedule on [employer]. Login is sonali catalan, schedule under [facility] medicine, psych, derm.      Patient is a 85y old  Male who presents with a chief complaint of sob, confusion, weakness, can't walk, fever (24 May 2021 07:25)      24 hour events/subjective: Patient seen and examined at the bedside. Vital signs, labs, medications reviewed. No complaints noted.        PAST HISTORY  --------------------------------------------------------------------------------  No significant changes to PMH, PSH, FHx, SHx, unless otherwise noted    ALLERGIES & MEDICATIONS  --------------------------------------------------------------------------------  Allergies    No Known Allergies    Intolerances      Standing Inpatient Medications  dextrose 40% Gel 15 Gram(s) Oral once  dextrose 5%. 1000 milliLiter(s) IV Continuous <Continuous>  dextrose 5%. 1000 milliLiter(s) IV Continuous <Continuous>  dextrose 50% Injectable 25 Gram(s) IV Push once  dextrose 50% Injectable 12.5 Gram(s) IV Push once  dextrose 50% Injectable 25 Gram(s) IV Push once  glucagon  Injectable 1 milliGRAM(s) IntraMuscular once  insulin lispro (ADMELOG) corrective regimen sliding scale   SubCutaneous every 6 hours  lactulose Syrup 10 Gram(s) Oral three times a day  levothyroxine Injectable 44 MICROGram(s) IV Push at bedtime  nystatin Ointment 1 Application(s) Topical two times a day  nystatin Powder 1 Application(s) Topical three times a day  pantoprazole  Injectable 40 milliGRAM(s) IV Push two times a day  rifAXIMin 550 milliGRAM(s) Oral two times a day    PRN Inpatient Medications  albuterol/ipratropium for Nebulization 3 milliLiter(s) Nebulizer every 6 hours PRN      REVIEW OF SYSTEMS  --------------------------------------------------------------------------------  Gen: No fevers/chills  Skin: No rashes  Head/Eyes/Ears: Normal hearing, no difficulty seeing  Respiratory: No dyspnea, cough  CV: No chest pain  GI: No abdominal pain, diarrhea  : No dysuria, hematuria  MSK: No  edema  Heme: No easy bruising or bleeding  Psych: No significant depression    >>> <<<    VITALS/PHYSICAL EXAM  --------------------------------------------------------------------------------  T(C): 36.9 (05-24-21 @ 05:16), Max: 36.9 (05-24-21 @ 05:16)  HR: 83 (05-24-21 @ 05:16) (83 - 96)  BP: 118/61 (05-24-21 @ 05:16) (108/70 - 120/74)  RR: 17 (05-24-21 @ 05:16) (17 - 20)  SpO2: 96% (05-24-21 @ 05:16) (96% - 100%)  Wt(kg): --        05-23-21 @ 07:01  -  05-24-21 @ 07:00  --------------------------------------------------------  IN: 0 mL / OUT: 1200 mL / NET: -1200 mL      Physical Exam:    	Gen: NAD  	HEENT: Anicteric  	Pulm: CTA B/L  	CV: S1S2  	Abd: Soft, +BS   	MSK: No LE edema B/L  	Neuro: Awake  	Skin: Warm and dry  	Vascular access:      LABS/STUDIES  --------------------------------------------------------------------------------              9.3    7.54  >-----------<  49       [05-24-21 @ 07:18]              28.9     139  |  107  |  54  ----------------------------<  174      [05-24-21 @ 07:12]  4.3   |  20  |  1.51        Ca     9.9     [05-24-21 @ 07:12]      Mg     2.3     [05-24-21 @ 07:12]      Phos  2.7     [05-24-21 @ 07:12]    TPro  5.5  /  Alb  3.8  /  TBili  2.1  /  DBili  x   /  AST  27  /  ALT  22  /  AlkPhos  41  [05-24-21 @ 07:12]    PT/INR: PT 19.2 , INR 1.64       [05-24-21 @ 07:17]      Creatinine Trend:  SCr 1.51 [05-24 @ 07:12]  SCr 1.64 [05-23 @ 07:30]  SCr 1.56 [05-22 @ 08:50]  SCr 1.69 [05-22 @ 00:25]  SCr 1.67 [05-21 @ 16:47]    Urinalysis - [05-18-21 @ 11:13]      Color Yellow / Appearance Slightly Turbid / SG 1.016 / pH 6.0      Gluc Negative / Ketone Negative  / Bili Negative / Urobili Negative       Blood Large / Protein 30 mg/dL / Leuk Est Large / Nitrite Negative       / WBC 13 / Hyaline 3 / Gran  / Sq Epi  / Non Sq Epi 3 / Bacteria Negative    Urine Creatinine 106      [05-17-21 @ 16:21]  Urine Protein 10      [05-18-21 @ 01:11]  Urine Sodium 10      [05-17-21 @ 16:21]  Urine Urea Nitrogen 442      [05-17-21 @ 22:45]  Urine Osmolality 310      [05-17-21 @ 16:21]    HbA1c 7.1      [01-12-19 @ 07:22]  TSH 1.00      [05-17-21 @ 08:41]    HBsAb 32.4      [05-17-21 @ 23:13]  HBsAg Nonreact      [05-17-21 @ 23:13]  HBcAb Reactive      [05-17-21 @ 23:13]  HCV 0.17, Nonreact      [05-17-21 @ 23:13]  HIV Nonreact      [05-18-21 @ 01:05]

## 2021-05-24 NOTE — SWALLOW VFSS/MBS ASSESSMENT ADULT - SLP GENERAL OBSERVATIONS
Pt encountered in radiology, secure in [place] NENA chair. +3L NC. Mandarin speaking, video  utilized Pt encountered in radiology, secure in [place] NENA chair. +3L NC. Mandarin speaking, video  utilized (Fam, #[id number]).

## 2021-05-24 NOTE — PROGRESS NOTE ADULT - SUBJECTIVE AND OBJECTIVE BOX
Chief Complaint:  Patient is a 85y old  Male who presents with a chief complaint of sob, confusion, weakness, can't walk, fever (24 May 2021 11:20)    Reason for consult: cirrhosis, GIB    Interval Events: No reported bleeding Hb stable. Pt has no complaints this AM.     Hospital Medications:  albuterol/ipratropium for Nebulization 3 milliLiter(s) Nebulizer every 6 hours PRN  dextrose 40% Gel 15 Gram(s) Oral once  dextrose 5%. 1000 milliLiter(s) IV Continuous <Continuous>  dextrose 5%. 1000 milliLiter(s) IV Continuous <Continuous>  dextrose 50% Injectable 25 Gram(s) IV Push once  dextrose 50% Injectable 12.5 Gram(s) IV Push once  dextrose 50% Injectable 25 Gram(s) IV Push once  glucagon  Injectable 1 milliGRAM(s) IntraMuscular once  insulin lispro (ADMELOG) corrective regimen sliding scale   SubCutaneous every 6 hours  lactulose Syrup 10 Gram(s) Oral three times a day  levothyroxine Injectable 44 MICROGram(s) IV Push at bedtime  nystatin Ointment 1 Application(s) Topical two times a day  nystatin Powder 1 Application(s) Topical three times a day  pantoprazole  Injectable 40 milliGRAM(s) IV Push two times a day  rifAXIMin 550 milliGRAM(s) Oral two times a day      ROS:   General:  No  fevers, chills, night sweats, fatigue  Eyes:  Good vision, no reported pain  ENT:  No sore throat, pain, runny nose  CV:  No pain, palpitations  Pulm:  No dyspnea, cough  GI:  See HPI, otherwise negative  :  No  incontinence, nocturia  Muscle:  No pain, weakness  Neuro:  No memory problems  Psych:  No insomnia, mood problems, depression  Endocrine:  No polyuria, polydipsia, cold/heat intolerance  Heme:  No petechiae, ecchymosis, easy bruisability  Skin:  No rash    PHYSICAL EXAM:   Vital Signs:  Vital Signs Last 24 Hrs  T(C): 36.4 (24 May 2021 11:35), Max: 36.9 (24 May 2021 05:16)  T(F): 97.5 (24 May 2021 11:35), Max: 98.4 (24 May 2021 05:16)  HR: 90 (24 May 2021 11:35) (83 - 96)  BP: 111/66 (24 May 2021 11:35) (108/70 - 120/74)  BP(mean): --  RR: 18 (24 May 2021 11:35) (17 - 20)  SpO2: 100% (24 May 2021 11:35) (96% - 100%)  Daily     Daily     GENERAL: no acute distress  NEURO: alert, oriented, no asterixis  HEENT: anicteric sclera, no conjunctival pallor appreciated  CHEST: no respiratory distress, no accessory muscle use  CARDIAC: regular rate, rhythm  ABDOMEN: soft, non-tender, distended, no rebound or guarding  EXTREMITIES: warm, well perfused, no edema  SKIN: no lesions noted    LABS: reviewed                        9.3    7.54  )-----------( 49       ( 24 May 2021 07:18 )             28.9     05-24    139  |  107  |  54<H>  ----------------------------<  174<H>  4.3   |  20<L>  |  1.51<H>    Ca    9.9      24 May 2021 07:12  Phos  2.7     05-24  Mg     2.3     05-24    TPro  5.5<L>  /  Alb  3.8  /  TBili  2.1<H>  /  DBili  x   /  AST  27  /  ALT  22  /  AlkPhos  41  05-24    LIVER FUNCTIONS - ( 24 May 2021 07:12 )  Alb: 3.8 g/dL / Pro: 5.5 g/dL / ALK PHOS: 41 U/L / ALT: 22 U/L / AST: 27 U/L / GGT: x             Interval Diagnostic Studies: see sunrise for full report

## 2021-05-25 NOTE — PROGRESS NOTE ADULT - PROBLEM SELECTOR PLAN 2
Decompensated i/s/o possible worsening ascites vs. UGIB vs. metabolic derangement (hyponatremia) leading to patient's hepatic encephalopathy and worsening status. Cirrhosis most likely i/s/o hepatitis.   -F/u acute hepatitis panel and cirrhosis labs  -MELD-Na=27, f/u daily MELD labs  -GIB stable. On clears for now.   -Ascites: Holding lasix i/s/o ARF and hypotension will need to touch base with hepatology whether to restart prior to discharge.   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 Now on PPI and ceftriaxone for SBP prophylaxis till 5/24.   -HE: C/w lactolose 10 TID and 550 mg rifaximin BID.  -started carafate 1g Q6H for 14 days, Lasix 40 mg daily, Spironolactone 50 mg daily  -lactulose and lasix converted non-PO methods d/t failed MBS study Decompensated i/s/o possible worsening ascites vs. UGIB vs. metabolic derangement (hyponatremia) leading to patient's hepatic encephalopathy and worsening status. Cirrhosis most likely i/s/o hepatitis.   -F/u acute hepatitis panel and cirrhosis labs  -MELD-Na=27, f/u daily MELD labs  -GIB stable. On clears for now.   -Ascites: Holding lasix i/s/o ARF and hypotension will need to touch base with hepatology whether to restart prior to discharge.   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 Now on PPI and ceftriaxone for SBP prophylaxis till 5/24.   -HE: C/w lactolose 10 TID and 550 mg rifaximin BID.  -Hepatology recs appreciated: started carafate 1g Q6H for 14 days, Lasix 40 mg daily, Spironolactone 50 mg daily   -However, pt failed MBS study, will convert all possible PO medications to NPO methods  -touch-base with speech+swallow again on timing for repeat MBS study, as well as updates on pt condition and language barrier possibly affecting swallow studies  -to discuss with family on options (NGT vs. PEG vs. pleasure feeds)

## 2021-05-25 NOTE — PROGRESS NOTE ADULT - ATTENDING COMMENTS
Hepatology Staff: Rodrigo Brandon MD    I saw and examined the patient along with  Dr. Aguirre 05-25-21 @ 12:31  Patient Medical Record, hosptial course was reviewed and summarized as below:    Vitals: Vital Signs Last 24 Hrs  T(C): 36.6 (25 May 2021 04:23), Max: 36.8 (24 May 2021 18:00)  T(F): 97.9 (25 May 2021 04:23), Max: 98.2 (24 May 2021 18:00)  HR: 88 (25 May 2021 11:07) (82 - 96)  BP: 105/62 (25 May 2021 04:23) (105/62 - 145/82)    RR: 18 (25 May 2021 11:07) (17 - 18)  SpO2: 100% (25 May 2021 11:07) (98% - 100%)    Diuretics:furosemide   Injectable 20 milliGRAM(s) IV Push daily  spironolactone 50 milliGRAM(s) Oral daily    Labs:Creatinine, Serum: 1.39 mg/dL (05-25-21 @ 06:47)  Bilirubin Total, Serum: 2.0 mg/dL (05-25-21 @ 06:47)      I/O: I&O's Summary    24 May 2021 07:01  -  25 May 2021 07:00  --------------------------------------------------------  IN: 900 mL / OUT: 200 mL / NET: 700 mL    25 May 2021 07:01  -  25 May 2021 12:31  --------------------------------------------------------  IN: 0 mL / OUT: 350 mL / NET: -350 mL      Nutritional Status:   Albumin, Serum: 3.7 g/dL (05-25-21 @ 06:47)    Recommendations:  This is a 85-year-old male with multiple comorbidities as outlined in fellow's note with history of decompensated cirrhosis was originally admitted with 3 weeks history of generalized weakness, dyspnea and abdominal distention and was found to have severe hyponatremia, ANDREEA and large pleural effusions.  He was transferred to MICU on 5- for GI bleeding from a large duodenal ulcer, status post endoscopy on 5-.  No specific intervention was performed as it was not actively bleeding.     Agree with above outlined recommendations and plan as noted in fellow's note.     I recommend to continue with IV PPI twice a day (switched to oral PPI at the time of hospital discharge) and Carafate suspension 10 mL every 6 hours.  Would recommend keeping diuretics at a dose of Lasix 40 mg daily.  Monitor urine sodium to assess response to diuretic therapy.  Target urine sodium levels will be more than 77 mg/L on diuretics and 2 g salt restricted diet.      Agree with the rest of the recommendations.      Plan discussed with Primary team.

## 2021-05-25 NOTE — PROGRESS NOTE ADULT - PROBLEM SELECTOR PLAN 3
Encephalopathy multifactorial in etiology of metabolic derangements (hyponatremia vs. HE vs ARF), patient endorsing subacute onset of weakness and lethargy unclear how far off the patient is from baseline however, patient seems extremely weak, maybe in the setting of possible symptomatic anemia as well. No evidence of hypercapnia or hypoxia on patient's vitals and labs.   -CTM  -CTH negative for acute bleed.   -Passed bedside speech and swallow eval pt on dysphagia I diet for now, will reconsult Speech and swallow as patient with evidence of coughing from clear liquid diet.   -C/w lactulose and rifaximin titrate to 3-4 BMs. Stool counts.  -Pt failed MBS study, possibly compounded by encephalopathic/confused state with the test - will remain on NPO status but with addition of D5+NS for maintenance fluids, oral swabs for dry mouth  -Possible increased ronchi heard after MBS testing, will order CXR to assess for aspiration PNA event Encephalopathy multifactorial in etiology of metabolic derangements (hyponatremia vs. HE vs ARF), patient endorsing subacute onset of weakness and lethargy unclear how far off the patient is from baseline however, patient seems extremely weak, maybe in the setting of possible symptomatic anemia as well. No evidence of hypercapnia or hypoxia on patient's vitals and labs.   -CTM  -CTH negative for acute bleed.   -Passed bedside speech and swallow eval pt on dysphagia I diet for now, will reconsult Speech and swallow as patient with evidence of coughing from clear liquid diet.   -C/w lactulose and rifaximin titrate to 3-4 BMs. Stool counts.  -Pt failed MBS study, possibly compounded by encephalopathic/confused state with the test - will remain on NPO status but with addition of D5+NS for maintenance fluids, oral swabs for dry mouth

## 2021-05-25 NOTE — DISCHARGE NOTE NURSING/CASE MANAGEMENT/SOCIAL WORK - PATIENT PORTAL LINK FT
You can access the FollowMyHealth Patient Portal offered by [facility] by registering at the following website: [email or web address]. By joining [facility]’s FollowMyHealth portal, you will also be able to view your health information using other applications (apps) compatible with our system.

## 2021-05-25 NOTE — PROGRESS NOTE ADULT - ATTENDING COMMENTS
85 year old male with PMH CHF (EF 40-45%, moderate LV systolic function) and cirrhosis of unknown etiology (suspect 2/2 hepatitis) who was initially admitted to the hospital due to acute encephalopathy secondary to acute on chronic hyponatremia. His hyponatremia has resolved however, his course was complicated by UGIB leading to decompensated cirrhosis, hypotension, and acute renal failure requiring MICU stay for pressor support and temporization of acute GIB. In the MICU, he had an EGD which showed esophageal varices and a duodenal ulcer s/p octreotide drip and ceftriaxone currently on IV PPI BID. He also had ascites which was initially concerning for possibly SBP however, diagnostic paracentesis was negative for SBP. Hemoglobin has stabilized. Speech and swallow was consulted for possible aspiration, he failed MBS and will have a repeat tomorrow and from there, will make a decision about PEG tube vs pleasure feeds. Follow up hepatology, pulmonary and nephrology recommendations. Rest of plan as above.    García Convissar, DO  Pager [phone]  If no answer [phone]

## 2021-05-25 NOTE — PROGRESS NOTE ADULT - PROBLEM SELECTOR PLAN 5
Acute on chronic hyponatremia. Appears to be hypervolemic hypernatremia i/s/o cirrhosis. Patient's diuresis on hold given ANDREEA. RNz=610 on presentation now OMs=519  -CTM on daily BMPs  -Holding steady @139, s/p D5 200cc/2hrs. Will CTM.

## 2021-05-25 NOTE — PROGRESS NOTE ADULT - PROBLEM SELECTOR PLAN 8
A1c=5.5 most likely normal i/s/o possible GI bleed. Patient on toujeo 20 and humalog 8 premeals at home.   -Restarting consistent Carb diet on 5/20  -Will hold off on premeal insulin with sliding scale and adjust insulin as needed. LESTER as needed.   -Will dose reduce basal insulin by 75% and start the patient on 5units.  -Pt started on D5 w/ NS for NPO status

## 2021-05-25 NOTE — PROGRESS NOTE ADULT - PROBLEM SELECTOR PLAN 9
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   -Non-urgent neurosurgical evaluation  -Possible cause of [place]? on the R side.

## 2021-05-25 NOTE — PROGRESS NOTE ADULT - ASSESSMENT
85M w/ CAD s/p PCI, HF s/p AICD, DMII, HTN, CKD, decompensated cirrhosis of unknown viral etiology, presenting w/ 3 weeks of generalized weakness, dyspnea, and abd distension. Found to have hyponatremia, ANDREEA, and large pleural effusions. Transferred to MICU on 5/18 for GI bleed from large duodenal ulcer.     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  -varices: small on EGD 5/18  -ascites: perihepatic and pelvic ascites present, s/p dx para 5/19, neg for SBP  -HE: present on exam  -HCC: none seen but imaging so far non-contrast so unable to exclude HCC  -MELD-Na = 18 on 5/25  -Chronic liver disease work-up: Hepatitis B surface Ag, Ab, core IgM negative, core total positive, HBV DNA neg  #ANDREEA - improved w/ albumin, low urine Na c/w poor renal perfusio  #HF - TTE shows decreased LV function and RV function  #Extra-axial mass - growing since 2018  #H pylori Ab positive    Recommendations:  - IV PPI BID while inpatient, transition to PO PPI BID on discharge x 2 weeks  - Add sucralfate suspension 1g q6h x 14 days (5/24-  - Continue w/ lasix 40mg daily and spironolactone 50mg daily  - Check Tolu   - Continue w/ lactulose titrated to 3-4 BMs per day, enemas okay while patient NPO  - Continue w/ rifaxmin 550mg BID when taking PO  - Low Na diet when able to tolerate PO  - Trend CBC, CMP, INR daily  - Plan for H pylori treatment as outpatient  - Hepatology will follow    Saturnino Aguirre  Gastroenterology/Hepatology Fellow  Available via [employer] Teams    NON-URGENT CONSULTS:  Please email [email or web address]  [email or web address]  AT NIGHT AND ON WEEKENDS:  Contact on-call GI fellow via answering service ([phone]) from 5pm-8am and on weekends/holidays  MONDAY-FRIDAY 8AM-5PM:  Pager# [phone] ([facility]) or [phone] ([facility])  GI Phone# [phone] ([facility])   85M w/ CAD s/p PCI, HF s/p AICD, DMII, HTN, CKD, decompensated cirrhosis of unknown viral etiology, presenting w/ 3 weeks of generalized weakness, dyspnea, and abd distension. Found to have hyponatremia, ANDREEA, and large pleural effusions. Transferred to MICU on 5/18 for GI bleed from large duodenal ulcer.     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  -varices: small on EGD 5/18  -ascites: perihepatic and pelvic ascites present, s/p dx para 5/19, neg for SBP  -HE: present on exam  -HCC: none seen but imaging so far non-contrast so unable to exclude HCC  -MELD-Na = 18 on 5/25  -Chronic liver disease work-up: Hepatitis B surface Ag, Ab, core IgM negative, core total positive, HBV DNA neg  #ANDREEA - improved w/ albumin, low urine Na c/w poor renal perfusio  #HF - TTE shows decreased LV function and RV function  #Extra-axial mass - growing since 2018  #H pylori Ab positive    Recommendations:  - IV PPI BID while inpatient, transition to PO PPI BID on discharge x 2 weeks  - Add sucralfate suspension 1g q6h x 14 days (5/24-  - Continue w/ lasix 40mg daily and spironolactone 50mg daily  - Continue w/ lactulose titrated to 3-4 BMs per day, enemas okay while patient NPO  - Continue w/ rifaxmin 550mg BID when taking PO  - Low Na diet when able to tolerate PO  - Trend CBC, CMP, INR daily  - Plan for H pylori treatment as outpatient  - Please call us back as needed. We will arrange follow up and document it in a chart note.     Saturnino Aguirre  Gastroenterology/Hepatology Fellow  Available via Microsoft Teams    NON-URGENT CONSULTS:  Please email [email or web address]  [email or web address]  AT NIGHT AND ON WEEKENDS:  Contact on-call GI fellow via answering service ([phone]) from 5pm-8am and on weekends/holidays  MONDAY-FRIDAY 8AM-5PM:  Pager# [phone] ([facility]) or [phone] ([facility])  GI Phone# [phone] ([facility])

## 2021-05-25 NOTE — PROGRESS NOTE ADULT - PROBLEM SELECTOR PLAN 6
s/p AICD     - TTE 5/17: EF 40-45%, moderate LV systolic function, no LV thrombus, mild diastolic dysfunction (stage I)     - home HF medications: entresto 24-26 BID, lasix 80 PO daily --- currently holding in setting of hypotension, current volume status acceptable    - holding entresto i/s/o ANDREEA, will re-evaluate once Cr normalizes

## 2021-05-25 NOTE — PROGRESS NOTE ADULT - SUBJECTIVE AND OBJECTIVE BOX
Chief Complaint:  Patient is a 85y old  Male who presents with a chief complaint of sob, confusion, weakness, can't walk, fever (25 May 2021 07:19)    Reason for consult: cirrhosis, GI bleed    Interval Events: Hb stable, pt otherwise doing well    Hospital Medications:  albuterol/ipratropium for Nebulization 3 milliLiter(s) Nebulizer every 6 hours PRN  dextrose 40% Gel 15 Gram(s) Oral once  dextrose 5% + sodium chloride 0.9%. 1000 milliLiter(s) IV Continuous <Continuous>  dextrose 5%. 1000 milliLiter(s) IV Continuous <Continuous>  dextrose 5%. 1000 milliLiter(s) IV Continuous <Continuous>  dextrose 50% Injectable 25 Gram(s) IV Push once  dextrose 50% Injectable 12.5 Gram(s) IV Push once  dextrose 50% Injectable 25 Gram(s) IV Push once  furosemide   Injectable 20 milliGRAM(s) IV Push daily  glucagon  Injectable 1 milliGRAM(s) IntraMuscular once  insulin lispro (ADMELOG) corrective regimen sliding scale   SubCutaneous every 6 hours  lactulose Retention Enema 200 Gram(s) Rectal daily  levothyroxine Injectable 44 MICROGram(s) IV Push at bedtime  nystatin Ointment 1 Application(s) Topical two times a day  nystatin Powder 1 Application(s) Topical three times a day  pantoprazole  Injectable 40 milliGRAM(s) IV Push two times a day  rifAXIMin 550 milliGRAM(s) Oral two times a day  spironolactone 50 milliGRAM(s) Oral daily  sucralfate 1 Gram(s) Oral every 6 hours      ROS: Pt unable to provide    PHYSICAL EXAM:   Vital Signs:  Vital Signs Last 24 Hrs  T(C): 36.6 (25 May 2021 04:23), Max: 36.8 (24 May 2021 18:00)  T(F): 97.9 (25 May 2021 04:23), Max: 98.2 (24 May 2021 18:00)  HR: 87 (25 May 2021 04:23) (82 - 96)  BP: 105/62 (25 May 2021 04:23) (105/62 - 145/82)  BP(mean): --  RR: 18 (25 May 2021 04:23) (17 - 18)  SpO2: 100% (25 May 2021 04:23) (98% - 100%)  Daily     Daily     GENERAL: no acute distress  NEURO: alert, no asterixis  HEENT: anicteric sclera, no conjunctival pallor appreciated  CHEST: no respiratory distress, no accessory muscle use  CARDIAC: regular rate, rhythm  ABDOMEN: soft, non-tender, distended, no rebound or guarding  EXTREMITIES: warm, well perfused, no edema  SKIN: no lesions noted    LABS: reviewed                        9.0    6.57  )-----------( 45       ( 25 May 2021 06:47 )             28.4     05-25    143  |  110<H>  |  50<H>  ----------------------------<  213<H>  4.2   |  20<L>  |  1.39<H>    Ca    9.6      25 May 2021 06:47  Phos  2.3     05-25  Mg     2.2     05-25    TPro  5.4<L>  /  Alb  3.7  /  TBili  2.0<H>  /  DBili  x   /  AST  29  /  ALT  23  /  AlkPhos  40  05-25    LIVER FUNCTIONS - ( 25 May 2021 06:47 )  Alb: 3.7 g/dL / Pro: 5.4 g/dL / ALK PHOS: 40 U/L / ALT: 23 U/L / AST: 29 U/L / GGT: x             Interval Diagnostic Studies: see sunrise for full report

## 2021-05-26 NOTE — SWALLOW VFSS/MBS ASSESSMENT ADULT - ASPIRATION PRECAUTIONS
Maintain aspiration precautions!/yes
Monitor for s/s aspiration/laryngeal penetration. If noted:  D/C p.o. intake, provide non-oral nutrition/hydration/meds, and contact this service @ [id number]/yes

## 2021-05-26 NOTE — SWALLOW VFSS/MBS ASSESSMENT ADULT - SPECIFY REASON(S)
To objectively assess swallow function, r/o dysphagia
To objectively assess swallow function and r/o dysphagia

## 2021-05-26 NOTE — PROGRESS NOTE ADULT - PROBLEM SELECTOR PLAN 3
Encephalopathy multifactorial in etiology of metabolic derangements (hyponatremia vs. HE vs ARF), patient endorsing subacute onset of weakness and lethargy unclear how far off the patient is from baseline however, patient seems extremely weak, maybe in the setting of possible symptomatic anemia as well. No evidence of hypercapnia or hypoxia on patient's vitals and labs.   -CTM  -CTH negative for acute bleed.   -Passed bedside speech and swallow eval pt on dysphagia I diet for now, will reconsult Speech and swallow as patient with evidence of coughing from clear liquid diet.   -C/w lactulose and rifaximin titrate to 3-4 BMs. Stool counts.  -Pt failed MBS study, possibly compounded by encephalopathic/confused state with the test - will remain on NPO status but with addition of D5+NS for maintenance fluids, oral swabs for dry mouth

## 2021-05-26 NOTE — SWALLOW VFSS/MBS ASSESSMENT ADULT - DIAGNOSTIC IMPRESSIONS
Pt presents with an oropharyngeal dysphagia. There is delayed oral transit across consistencies. Premature spillage is observed with nectar thick and thin liquids. Pharyngeal swallow trigger is delayed. Airway protection is reduced. There is silent aspiration with retrieval to the vocal cords during the swallow with thin liquids, deep laryngeal penetration with incomplete retrieval during the swallow with nectar thick liquids, and trace laryngeal penetration with full retrieval during subsequent swallows with honey-thick liquids via cup. Pt unable to follow cues for small, single sips of liquid. Pharyngeal clearance is reduced, with minimal pharyngeal residue after the primary swallow that is eventually cleared. Pt presents with an oropharyngeal dysphagia. There is delayed oral transit across consistencies. Reduced bolus control evidenced via premature spillage to the hypopharynx with soft solids and liquids via cup. Pharyngeal swallow trigger is inconsistently delayed. Airway protection is reduced. There is silent aspiration with retrieval to the vocal cords during the swallow with thin liquids, deep laryngeal penetration with incomplete retrieval during the swallow with nectar thick liquids, and trace laryngeal penetration with full retrieval during subsequent swallows with honey-thick liquids via cup. Pt unable to follow cues for small, single sips of liquid. Pharyngeal clearance is reduced, with minimal pharyngeal residue after the primary swallow that is eventually cleared via repeat swallows.

## 2021-05-26 NOTE — SWALLOW VFSS/MBS ASSESSMENT ADULT - COMMENTS
Pt also has increased abd distension, leg swelling, poor appetite, intermittent daily waxing/waning confusion. Pt's lasix was increased as outpt. Pt reports left chest pain, which is intermittent for the past 30 yrs. Has not received covid vaccine.  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 Differential includes meningioma and schwannoma.  5/16 CT Chest:  Moderate to large R pleural effusion with complete collapse of R middle and lower lobes. Small L pleural effusion with complete atelectasis.    5/18 Rapid Response: "Patient with BP 80's/40's in acute renal failure with known cirrhosis and HF now with likely upper GI bleed with findings of melena, with symptomatic anemia . [facility] transfer note: "Patient found to have liver cirrhosis with large pleural effusion, ascites on CT A/P, hyponatremia,  worsening Hgb 11.8--> 7.6 --> 5.1, w/ melena and bright red blood per rectum concern for GI bleed, worsening ANDREEA with oliguria concern for hepatorenal syndrome.  Pt INTUBATED 5/18 in preparation for EGD. EGD findings: esophageal varices.  One non-bleeding, large (4 cm x 4 cm), cratered duodenal ulcer.  5/20- EXTUBATED. Orders received for bedside swallow evaluation -> evaluation deferred as pt on aerosol mask   5/21: 5/21: As per SLP discussion with MD, pt to be advanced to soft texture diet by medical team with no concern for dysphagia. Team requested for speech service to sign-off at this time.  5/22: Passed bedside speech and swallow eval pt on dysphagia I diet for now, will reconsult Speech and swallow as patient with evidence of coughing from clear liquid diet.
Pt also has increased abd distension, leg swelling, poor appetite, intermittent daily waxing/waning confusion. Pt's lasix was increased as outpt. Pt reports left chest pain, which is intermittent for the past 30 yrs. Has not received covid vaccine.  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 Differential includes meningioma and schwannoma.  5/16 CT Chest:  Moderate to large R pleural effusion with complete collapse of R middle and lower lobes. Small L pleural effusion with complete atelectasis.    5/18 Rapid Response: "Patient with BP 80's/40's in acute renal failure with known cirrhosis and HF now with likely upper GI bleed with findings of melena, with symptomatic anemia . [facility] transfer note: "Patient found to have liver cirrhosis with large pleural effusion, ascites on CT A/P, hyponatremia,  worsening Hgb 11.8--> 7.6 --> 5.1, w/ melena and bright red blood per rectum concern for GI bleed, worsening ANDREEA with oliguria concern for hepatorenal syndrome.  Pt INTUBATED 5/18 in preparation for EGD. EGD findings: esophageal varices.  One non-bleeding, large (4 cm x 4 cm), cratered duodenal ulcer.  5/20- EXTUBATED. Orders received for bedside swallow evaluation -> evaluation deferred as pt on aerosol mask   5/21: 5/21: As per SLP discussion with MD, pt to be advanced to soft texture diet by medical team with no concern for dysphagia. Team requested for speech service to sign-off at this time.  5/22: Passed bedside speech and swallow eval pt on dysphagia I diet for now, will reconsult Speech and swallow as patient with evidence of coughing from clear liquid diet.

## 2021-05-26 NOTE — SWALLOW VFSS/MBS ASSESSMENT ADULT - SLP GENERAL OBSERVATIONS
Pt encountered in radiology, secure in [place] NENA chair. On room air. Mandarin speaking, pt's daughter present for exam and opting to serve as .

## 2021-05-26 NOTE — PROGRESS NOTE ADULT - PROBLEM SELECTOR PLAN 9
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   -Non-urgent neurosurgical evaluation  -Possible cause of [place]? on the R side.

## 2021-05-26 NOTE — SWALLOW VFSS/MBS ASSESSMENT ADULT - RECOMMENDED CONSISTENCY
Based on the results of this assessment, would recommend NPO, with non-oral nutrition/hydration/medications. This service to follow-up at bedside to assess tolerance of and candidacy for initiation of a conservative diet of dysphagia 1 with honey-thick liquids.
Dysphagia 1 with honey-thick liquids. ALL PO VIA TEASPOON.

## 2021-05-26 NOTE — PROGRESS NOTE ADULT - PROBLEM SELECTOR PLAN 2
Decompensated i/s/o possible worsening ascites vs. UGIB vs. metabolic derangement (hyponatremia) leading to patient's hepatic encephalopathy and worsening status. Cirrhosis most likely i/s/o hepatitis.   -F/u acute hepatitis panel and cirrhosis labs  -MELD-Na=27, f/u daily MELD labs  -GIB stable. On clears for now.   -Ascites: Holding lasix i/s/o ARF and hypotension will need to touch base with hepatology whether to restart prior to discharge.   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 Now on PPI and ceftriaxone for SBP prophylaxis till 5/24.   -HE: C/w lactolose 10 TID and 550 mg rifaximin BID.  -Hepatology recs appreciated: started carafate 1g Q6H for 14 days, Lasix 40 mg daily, Spironolactone 50 mg daily   -However, pt failed MBS study, will convert all possible PO medications to NPO methods  -touch-base with speech+swallow again on timing for repeat MBS study, as well as updates on pt condition and language barrier possibly affecting swallow studies  -to discuss with family on options (NGT vs. PEG vs. pleasure feeds) Decompensated i/s/o possible worsening ascites vs. UGIB vs. metabolic derangement (hyponatremia) leading to patient's hepatic encephalopathy and worsening status. Cirrhosis most likely i/s/o hepatitis.   -F/u acute hepatitis panel and cirrhosis labs  -MELD-Na=27, f/u daily MELD labs  -GIB stable. On clears for now.   -Ascites: Holding lasix i/s/o ARF and hypotension will need to touch base with hepatology whether to restart prior to discharge.   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 Now on PPI and ceftriaxone for SBP prophylaxis till 5/24.   -HE: C/w lactolose 10 TID and 550 mg rifaximin BID.  -Hepatology recs appreciated: started carafate 1g Q6H for 14 days, Lasix 40 mg daily, Spironolactone 50 mg daily   -However, pt failed MBS study, will convert all possible PO medications to NPO methods  -touch-base with speech+swallow again on timing for repeat MBS study, as well as updates on pt condition and language barrier possibly affecting swallow studies  -repeat MBS study on (5/26) @ 1:30 PM  -to discuss with family on options (NGT vs. PEG vs. pleasure feeds) pending MBS study

## 2021-05-26 NOTE — SWALLOW VFSS/MBS ASSESSMENT ADULT - LARYNGEAL PENETRATION DURING THE SWALLOW - SILENT
With cup sips, along the laryngeal surface of the epiglottis, with full retrieval during subsequent swallows./Trace Along the laryngeal surface of the epiglottis, deep, with incomplete retrieval. Pt does not consistently follow directives for cued cough./Trace Along the laryngeal surface of the epiglottis, without complete retrieval./Trace

## 2021-05-26 NOTE — PROGRESS NOTE ADULT - ATTENDING COMMENTS
85 year old male with PMH CHF (EF 40-45%, moderate LV systolic function) and cirrhosis of unknown etiology (suspect 2/2 hepatitis) who was initially admitted to the hospital due to acute encephalopathy secondary to acute on chronic hyponatremia. His hyponatremia has resolved however, his course was complicated by UGIB leading to decompensated cirrhosis, hypotension, and acute renal failure requiring MICU stay for pressor support and temporization of acute GIB. In the MICU, he had an EGD which showed esophageal varices and a duodenal ulcer s/p octreotide drip and ceftriaxone currently on IV PPI BID. He also had ascites which was initially concerning for possibly SBP however, diagnostic paracentesis was negative for SBP. His hemoglobin has stabilized since being transferred out of the MICU. Speech and swallow was consulted as the patient was coughing while eating. He failed an MBS and will have a repeat today and from there, we will make a decision with the family about a possible PEG tube vs pleasure feeds. Follow up hepatology, pulmonary and nephrology recommendations. Rest of plan as above.    García Convissar, DO  Pager [phone]  If no answer [phone]

## 2021-05-26 NOTE — SWALLOW VFSS/MBS ASSESSMENT ADULT - ASPIRATION DURING THE SWALLOW - SILENT
On initial trials of thin puree and honey-thick liquids via teaspoon, there is trace silent aspiration during the swallow, without retrieval. Pt unable to follow directives for cued cough. As study continues, swallow function becomes entrained; additional penetration/aspiration is not observed.
With retrieval above the level of the vocal cords, though residue remains in the supraglottic space. Does not completely clear despite cued cough./Trace

## 2021-05-26 NOTE — SWALLOW VFSS/MBS ASSESSMENT ADULT - SLP PERTINENT HISTORY OF CURRENT PROBLEM
85M mandarin only speaking, c hx CAD /sp stent, CHF s/p AICD, HTN, BPH, CKD (baseline Cr ?1.3), unknown hepatitis c/b cirrhosis, DM2, ?T10-T11 discitis, [place], ?colon tumor of unknown etiology, pw worsening confusion, weakness, SOB, unable to ambulate.  History from pt's grandson Emil Beltre at bedside, and pt's daughter Cleopatra Styles over phone. Pt drowsy, and able to provide some answers to specific questions only. Pt has had about 3 weeks of worsening weakness to the point where he can't walk unassisted, and has had multiple falls, most recently 1 week ago. At baseline, pt walks with a walker. Pt also complaining of increasing SOB, increased mucous production, and subjective fevers, for which he has been taking cefixime empirically for the past 1 week
85M mandarin only speaking, c hx CAD /sp stent, CHF s/p AICD, HTN, BPH, CKD (baseline Cr ?1.3), unknown hepatitis c/b cirrhosis, DM2, ?T10-T11 discitis, [place], ?colon tumor of unknown etiology, pw worsening confusion, weakness, SOB, unable to ambulate.  History from pt's grandson Emil Beltre at bedside, and pt's daughter Cleopatra Styles over phone. Pt drowsy, and able to provide some answers to specific questions only. Pt has had about 3 weeks of worsening weakness to the point where he can't walk unassisted, and has had multiple falls, most recently 1 week ago. At baseline, pt walks with a walker. Pt also complaining of increasing SOB, increased mucous production, and subjective fevers, for which he has been taking cefixime empirically for the past 1 week

## 2021-05-26 NOTE — SWALLOW VFSS/MBS ASSESSMENT ADULT - ORAL PREPARATORY PHASE
Initially with reduced stripping of the utensil, however improved as study continued. Functional Prolonged mastication

## 2021-05-26 NOTE — PROGRESS NOTE ADULT - PROBLEM SELECTOR PLAN 5
Acute on chronic hyponatremia. Appears to be hypervolemic hypernatremia i/s/o cirrhosis. Patient's diuresis on hold given ANDREEA. TMp=033 on presentation now JNq=236  -CTM on daily BMPs  -Holding steady @139, s/p D5 200cc/2hrs. Will CTM.

## 2021-05-26 NOTE — SWALLOW VFSS/MBS ASSESSMENT ADULT - ORAL PHASE
+Piecemeal deglutition/Delayed oral transit time With cup sips, there is premature spillage to the pyriform sinuses. Minimal oral cavity residue is cleared via repeat swallow./Delayed oral transit time/Incomplete tongue to palate contact Premature spillage to the pyriform sinuses/Delayed oral transit time/Incomplete tongue to palate contact Premature spillage to the pyriform sinuses/Delayed oral transit time/Uncontrolled bolus / spillover in ruddy-pharynx

## 2021-05-26 NOTE — SWALLOW VFSS/MBS ASSESSMENT ADULT - ROSENBEK'S PENETRATION ASPIRATION SCALE
(1) no aspiration, contrast does not enter airway (2) contrast enters airway, remains above the vocal cords, no residue remains (penetration) (3) contrast remains above the vocal cords, visible residue remains (penetration) (6) contrast passes glottis, no subglottic residue remains (aspiration)

## 2021-05-26 NOTE — PROGRESS NOTE ADULT - SUBJECTIVE AND OBJECTIVE BOX
Juan York MD  PGY-1, Internal Medicine  Pager #: [phone], [phone]    Patient is a 85y old  Male who presents with a chief complaint of sob, confusion, weakness, can't walk, fever (25 May 2021 09:53)    OVERNIGHT/INTERVAL EVENTS:    SUBJECTIVE: Patient seen and examined bedside.     Denies fevers/chills, headaches/dizziness, nausea/vomiting, chest pain, SOB, abdominal pain.     MEDICATIONS  (STANDING):  dextrose 40% Gel 15 Gram(s) Oral once  dextrose 5% + sodium chloride 0.9%. 1000 milliLiter(s) (75 mL/Hr) IV Continuous <Continuous>  dextrose 5%. 1000 milliLiter(s) (100 mL/Hr) IV Continuous <Continuous>  dextrose 5%. 1000 milliLiter(s) (50 mL/Hr) IV Continuous <Continuous>  dextrose 50% Injectable 25 Gram(s) IV Push once  dextrose 50% Injectable 12.5 Gram(s) IV Push once  dextrose 50% Injectable 25 Gram(s) IV Push once  furosemide   Injectable 20 milliGRAM(s) IV Push daily  glucagon  Injectable 1 milliGRAM(s) IntraMuscular once  insulin lispro (ADMELOG) corrective regimen sliding scale   SubCutaneous every 6 hours  lactulose Retention Enema 200 Gram(s) Rectal daily  levothyroxine Injectable 44 MICROGram(s) IV Push at bedtime  nystatin Ointment 1 Application(s) Topical two times a day  nystatin Powder 1 Application(s) Topical three times a day  pantoprazole  Injectable 40 milliGRAM(s) IV Push two times a day  rifAXIMin 550 milliGRAM(s) Oral two times a day  spironolactone 50 milliGRAM(s) Oral daily  sucralfate 1 Gram(s) Oral every 6 hours    MEDICATIONS  (PRN):  albuterol/ipratropium for Nebulization 3 milliLiter(s) Nebulizer every 6 hours PRN Shortness of Breath and/or Wheezing      CAPILLARY BLOOD GLUCOSE      POCT Blood Glucose.: 192 mg/dL (26 May 2021 06:02)  POCT Blood Glucose.: 199 mg/dL (26 May 2021 00:10)  POCT Blood Glucose.: 200 mg/dL (25 May 2021 17:50)  POCT Blood Glucose.: 237 mg/dL (25 May 2021 13:16)    I&O's Summary    25 May 2021 07:01  -  26 May 2021 07:00  --------------------------------------------------------  IN: 900 mL / OUT: 900 mL / NET: 0 mL        PHYSICAL EXAM:  Vital Signs Last 24 Hrs  T(C): 36.6 (26 May 2021 04:31), Max: 36.7 (25 May 2021 13:53)  T(F): 97.8 (26 May 2021 04:31), Max: 98 (25 May 2021 13:53)  HR: 83 (26 May 2021 04:31) (78 - 88)  BP: 116/82 (26 May 2021 04:31) (108/60 - 130/54)  BP(mean): --  RR: 18 (26 May 2021 04:31) (17 - 18)  SpO2: 94% (26 May 2021 04:31) (94% - 100%)  CONSTITUTIONAL: NAD, lying in bed comfortably  EYES: EOMI, PERRLA; conjunctiva and sclera clear  ENMT: Moist oral mucosa; normal dentition  NECK: Supple, no palpable masses, no JVD  RESPIRATORY: Lungs clear to ascultation b/l; no rales/ronchi/wheezing; unlabored respirations  CARDIOVASCULAR: Regular rate and rhythm, normal S1 and S2, no murmurs/rubs/gallops  ABDOMEN: Soft, normal bowel sounds, nondistended, nontender  MUSCULOSKELETAL: No joint swelling or tenderness to palpation  PSYCH: Affect appropriate  NEUROLOGY: AAOx3, CNs grossly intact  SKIN: No rashes; no palpable lesions    LABS:                        9.0    6.57  )-----------( 45       ( 25 May 2021 06:47 )             28.4     05-25    143  |  110<H>  |  50<H>  ----------------------------<  213<H>  4.2   |  20<L>  |  1.39<H>    Ca    9.6      25 May 2021 06:47  Phos  2.3     05-25  Mg     2.2     05-25    TPro  5.4<L>  /  Alb  3.7  /  TBili  2.0<H>  /  DBili  x   /  AST  29  /  ALT  23  /  AlkPhos  40  05-25                INTERVAL RADIOLOGY/IMAGING STUDIES:     Juan York MD  PGY-1, Internal Medicine  Pager #: [phone], [phone]    Patient is a 85y old  Male who presents with a chief complaint of sob, confusion, weakness, can't walk, fever (25 May 2021 09:53)    OVERNIGHT/INTERVAL EVENTS: No overnight events.     SUBJECTIVE: Patient seen and examined bedside. Pt reports feeling very dehydrated and dry. Also mentions feeling "foggy" in terms of his mental state.     Denies fevers/chills, headaches/dizziness, nausea/vomiting, chest pain, SOB.     MEDICATIONS  (STANDING):  dextrose 40% Gel 15 Gram(s) Oral once  dextrose 5% + sodium chloride 0.9%. 1000 milliLiter(s) (75 mL/Hr) IV Continuous <Continuous>  dextrose 5%. 1000 milliLiter(s) (100 mL/Hr) IV Continuous <Continuous>  dextrose 5%. 1000 milliLiter(s) (50 mL/Hr) IV Continuous <Continuous>  dextrose 50% Injectable 25 Gram(s) IV Push once  dextrose 50% Injectable 12.5 Gram(s) IV Push once  dextrose 50% Injectable 25 Gram(s) IV Push once  furosemide   Injectable 20 milliGRAM(s) IV Push daily  glucagon  Injectable 1 milliGRAM(s) IntraMuscular once  insulin lispro (ADMELOG) corrective regimen sliding scale   SubCutaneous every 6 hours  lactulose Retention Enema 200 Gram(s) Rectal daily  levothyroxine Injectable 44 MICROGram(s) IV Push at bedtime  nystatin Ointment 1 Application(s) Topical two times a day  nystatin Powder 1 Application(s) Topical three times a day  pantoprazole  Injectable 40 milliGRAM(s) IV Push two times a day  rifAXIMin 550 milliGRAM(s) Oral two times a day  spironolactone 50 milliGRAM(s) Oral daily  sucralfate 1 Gram(s) Oral every 6 hours    MEDICATIONS  (PRN):  albuterol/ipratropium for Nebulization 3 milliLiter(s) Nebulizer every 6 hours PRN Shortness of Breath and/or Wheezing      CAPILLARY BLOOD GLUCOSE      POCT Blood Glucose.: 192 mg/dL (26 May 2021 06:02)  POCT Blood Glucose.: 199 mg/dL (26 May 2021 00:10)  POCT Blood Glucose.: 200 mg/dL (25 May 2021 17:50)  POCT Blood Glucose.: 237 mg/dL (25 May 2021 13:16)    I&O's Summary    25 May 2021 07:01  -  26 May 2021 07:00  --------------------------------------------------------  IN: 900 mL / OUT: 900 mL / NET: 0 mL        PHYSICAL EXAM:  Vital Signs Last 24 Hrs  T(C): 36.6 (26 May 2021 04:31), Max: 36.7 (25 May 2021 13:53)  T(F): 97.8 (26 May 2021 04:31), Max: 98 (25 May 2021 13:53)  HR: 83 (26 May 2021 04:31) (78 - 88)  BP: 116/82 (26 May 2021 04:31) (108/60 - 130/54)  RR: 18 (26 May 2021 04:31) (17 - 18)  SpO2: 94% (26 May 2021 04:31) (94% - 100%)    CONSTITUTIONAL: NAD, lying in bed comfortably  EYES: EOMI, PERRLA; conjunctiva and sclera clear  ENMT: Moist oral mucosa; normal dentition  NECK: Supple, no palpable masses, no JVD  RESPIRATORY: Lungs clear to ascultation b/l; no rales/ronchi/wheezing; unlabored respirations  CARDIOVASCULAR: Regular rate and rhythm, normal S1 and S2, no murmurs/rubs/gallops, +AICD in left chest wall  ABDOMEN: Soft, normal bowel sounds, nontender, +distended abdomen  MUSCULOSKELETAL: No joint swelling or tenderness to palpation  PSYCH: Affect appropriate  NEUROLOGY: Answers questions and responds to commands appropriately  SKIN: No rashes; no palpable lesions  LABS:                        9.0    6.57  )-----------( 45       ( 25 May 2021 06:47 )             28.4     05-25    143  |  110<H>  |  50<H>  ----------------------------<  213<H>  4.2   |  20<L>  |  1.39<H>    Ca    9.6      25 May 2021 06:47  Phos  2.3     05-25  Mg     2.2     05-25    TPro  5.4<L>  /  Alb  3.7  /  TBili  2.0<H>  /  DBili  x   /  AST  29  /  ALT  23  /  AlkPhos  40  05-25                INTERVAL RADIOLOGY/IMAGING STUDIES:

## 2021-05-27 NOTE — PROGRESS NOTE ADULT - PROBLEM SELECTOR PLAN 9
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   -Non-urgent neurosurgical evaluation  -Possible cause of [place]? on the R side.

## 2021-05-27 NOTE — PROGRESS NOTE ADULT - PROBLEM SELECTOR PLAN 5
Acute on chronic hyponatremia. Appears to be hypervolemic hypernatremia i/s/o cirrhosis. Patient's diuresis on hold given ANDREEA. QOq=995 on presentation now GOw=897  -CTM on daily BMPs  -Holding steady @139, s/p D5 200cc/2hrs. Will CTM.

## 2021-05-27 NOTE — PROGRESS NOTE ADULT - SUBJECTIVE AND OBJECTIVE BOX
Juan York MD  PGY-1, Internal Medicine  Pager #: [phone], [phone]    Patient is a 85y old  Male who presents with a chief complaint of sob, confusion, weakness, can't walk, fever (26 May 2021 07:27)    OVERNIGHT/INTERVAL EVENTS:    SUBJECTIVE: Patient seen and examined bedside.     Denies fevers/chills, headaches/dizziness, nausea/vomiting, chest pain, SOB, abdominal pain.     MEDICATIONS  (STANDING):  dextrose 40% Gel 15 Gram(s) Oral once  dextrose 5%. 1000 milliLiter(s) (100 mL/Hr) IV Continuous <Continuous>  dextrose 5%. 1000 milliLiter(s) (50 mL/Hr) IV Continuous <Continuous>  dextrose 50% Injectable 25 Gram(s) IV Push once  dextrose 50% Injectable 12.5 Gram(s) IV Push once  dextrose 50% Injectable 25 Gram(s) IV Push once  furosemide    Tablet 40 milliGRAM(s) Oral daily  glucagon  Injectable 1 milliGRAM(s) IntraMuscular once  insulin lispro (ADMELOG) corrective regimen sliding scale   SubCutaneous every 6 hours  lactulose Syrup 10 Gram(s) Oral three times a day  levothyroxine 88 MICROGram(s) Oral daily  nystatin Ointment 1 Application(s) Topical two times a day  nystatin Powder 1 Application(s) Topical three times a day  pantoprazole    Tablet 40 milliGRAM(s) Oral every 12 hours  polyethylene glycol 3350 17 Gram(s) Oral daily  rifAXIMin 550 milliGRAM(s) Oral two times a day  senna 2 Tablet(s) Oral at bedtime  simethicone 80 milliGRAM(s) Chew three times a day  spironolactone 50 milliGRAM(s) Oral daily  sucralfate 1 Gram(s) Oral every 6 hours    MEDICATIONS  (PRN):  albuterol/ipratropium for Nebulization 3 milliLiter(s) Nebulizer every 6 hours PRN Shortness of Breath and/or Wheezing      CAPILLARY BLOOD GLUCOSE      POCT Blood Glucose.: 253 mg/dL (27 May 2021 06:07)  POCT Blood Glucose.: 162 mg/dL (27 May 2021 00:36)  POCT Blood Glucose.: 241 mg/dL (26 May 2021 17:46)  POCT Blood Glucose.: 220 mg/dL (26 May 2021 12:42)    I&O's Summary    26 May 2021 07:01  -  27 May 2021 07:00  --------------------------------------------------------  IN: 0 mL / OUT: 675 mL / NET: -675 mL        PHYSICAL EXAM:  Vital Signs Last 24 Hrs  T(C): 36.4 (27 May 2021 05:14), Max: 36.8 (27 May 2021 00:10)  T(F): 97.6 (27 May 2021 05:14), Max: 98.3 (27 May 2021 00:10)  HR: 85 (27 May 2021 05:14) (84 - 98)  BP: 112/65 (27 May 2021 05:14) (112/65 - 117/76)  BP(mean): --  RR: 17 (27 May 2021 05:14) (17 - 18)  SpO2: 99% (27 May 2021 05:14) (96% - 99%)  CONSTITUTIONAL: NAD, lying in bed comfortably  EYES: EOMI, PERRLA; conjunctiva and sclera clear  ENMT: Moist oral mucosa; normal dentition  NECK: Supple, no palpable masses, no JVD  RESPIRATORY: Lungs clear to ascultation b/l; no rales/ronchi/wheezing; unlabored respirations  CARDIOVASCULAR: Regular rate and rhythm, normal S1 and S2, no murmurs/rubs/gallops  ABDOMEN: Soft, normal bowel sounds, nondistended, nontender  MUSCULOSKELETAL: No joint swelling or tenderness to palpation  PSYCH: Affect appropriate  NEUROLOGY: AAOx3, CNs grossly intact  SKIN: No rashes; no palpable lesions    LABS:                        9.5    5.95  )-----------( 40       ( 26 May 2021 07:22 )             29.7     05-26    147<H>  |  115<H>  |  40<H>  ----------------------------<  204<H>  3.8   |  21<L>  |  1.20    Ca    9.6      26 May 2021 07:19  Phos  2.0     05-26  Mg     2.0     05-26    TPro  5.4<L>  /  Alb  3.7  /  TBili  1.9<H>  /  DBili  x   /  AST  31  /  ALT  24  /  AlkPhos  42  05-26                INTERVAL RADIOLOGY/IMAGING STUDIES:     Juan York MD  PGY-1, Internal Medicine  Pager #: [phone], [phone]    Patient is a 85y old  Male who presents with a chief complaint of sob, confusion, weakness, can't walk, fever (26 May 2021 07:27)    OVERNIGHT/INTERVAL EVENTS: Overnight, patient complained to night team of abdominal discomfort/pain and pressure/tenseness. Pt was given Toradol w/ mild relief.     SUBJECTIVE: Patient seen and examined bedside. Pt appears a bit confused this morning. He states he has abdominal discomfort and tenseness but no abdominal pain. Per daughter, patient, when he appears more lucid, has complained about constant abdominal "tenseness", and pain/discomfort. Otherwise, he still mentions feeling very dehydrated.     Denies fevers/chills, headaches/dizziness, nausea/vomiting, chest pain, SOB.    MEDICATIONS  (STANDING):  dextrose 40% Gel 15 Gram(s) Oral once  dextrose 5%. 1000 milliLiter(s) (100 mL/Hr) IV Continuous <Continuous>  dextrose 5%. 1000 milliLiter(s) (50 mL/Hr) IV Continuous <Continuous>  dextrose 50% Injectable 25 Gram(s) IV Push once  dextrose 50% Injectable 12.5 Gram(s) IV Push once  dextrose 50% Injectable 25 Gram(s) IV Push once  furosemide    Tablet 40 milliGRAM(s) Oral daily  glucagon  Injectable 1 milliGRAM(s) IntraMuscular once  insulin lispro (ADMELOG) corrective regimen sliding scale   SubCutaneous every 6 hours  lactulose Syrup 10 Gram(s) Oral three times a day  levothyroxine 88 MICROGram(s) Oral daily  nystatin Ointment 1 Application(s) Topical two times a day  nystatin Powder 1 Application(s) Topical three times a day  pantoprazole    Tablet 40 milliGRAM(s) Oral every 12 hours  polyethylene glycol 3350 17 Gram(s) Oral daily  rifAXIMin 550 milliGRAM(s) Oral two times a day  senna 2 Tablet(s) Oral at bedtime  simethicone 80 milliGRAM(s) Chew three times a day  spironolactone 50 milliGRAM(s) Oral daily  sucralfate 1 Gram(s) Oral every 6 hours    MEDICATIONS  (PRN):  albuterol/ipratropium for Nebulization 3 milliLiter(s) Nebulizer every 6 hours PRN Shortness of Breath and/or Wheezing      CAPILLARY BLOOD GLUCOSE      POCT Blood Glucose.: 253 mg/dL (27 May 2021 06:07)  POCT Blood Glucose.: 162 mg/dL (27 May 2021 00:36)  POCT Blood Glucose.: 241 mg/dL (26 May 2021 17:46)  POCT Blood Glucose.: 220 mg/dL (26 May 2021 12:42)    I&O's Summary    26 May 2021 07:01  -  27 May 2021 07:00  --------------------------------------------------------  IN: 0 mL / OUT: 675 mL / NET: -675 mL        PHYSICAL EXAM:  Vital Signs Last 24 Hrs  T(C): 36.4 (27 May 2021 05:14), Max: 36.8 (27 May 2021 00:10)  T(F): 97.6 (27 May 2021 05:14), Max: 98.3 (27 May 2021 00:10)  HR: 85 (27 May 2021 05:14) (84 - 98)  BP: 112/65 (27 May 2021 05:14) (112/65 - 117/76)  RR: 17 (27 May 2021 05:14) (17 - 18)  SpO2: 99% (27 May 2021 05:14) (96% - 99%)    CONSTITUTIONAL: NAD, lying in bed  EYES: EOMI, PERRLA; conjunctiva and sclera clear  ENMT: Dry oral mucosa; normal dentition  NECK: Supple, no palpable masses, no JVD  RESPIRATORY: Lungs clear to ascultation b/l; no rales/ronchi/wheezing; unlabored respirations  CARDIOVASCULAR: Regular rate and rhythm, normal S1 and S2, no murmurs/rubs/gallops, +AICD in left chest wall  ABDOMEN: ?TTP diffusley +distended, firm abdomen  MUSCULOSKELETAL: No joint swelling or tenderness to palpation  PSYCH: Affect appropriate  NEUROLOGY: Answers questions and responds to commands appropriately but exhibits mild confusion  SKIN: Large bruises over forearms from blood draws    LABS:                        9.5    5.95  )-----------( 40       ( 26 May 2021 07:22 )             29.7     05-26    147<H>  |  115<H>  |  40<H>  ----------------------------<  204<H>  3.8   |  21<L>  |  1.20    Ca    9.6      26 May 2021 07:19  Phos  2.0     05-26  Mg     2.0     05-26    TPro  5.4<L>  /  Alb  3.7  /  TBili  1.9<H>  /  DBili  x   /  AST  31  /  ALT  24  /  AlkPhos  42  05-26                INTERVAL RADIOLOGY/IMAGING STUDIES:

## 2021-05-27 NOTE — PROGRESS NOTE ADULT - PROBLEM SELECTOR PLAN 2
Decompensated i/s/o possible worsening ascites vs. UGIB vs. metabolic derangement (hyponatremia) leading to patient's hepatic encephalopathy and worsening status. Cirrhosis most likely i/s/o hepatitis.   -F/u acute hepatitis panel and cirrhosis labs  -MELD-Na=27, f/u daily MELD labs  -GIB stable. On clears for now.   -Ascites: Holding lasix i/s/o ARF and hypotension will need to touch base with hepatology whether to restart prior to discharge.   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 Now on PPI and ceftriaxone for SBP prophylaxis till 5/24.   -HE: C/w lactolose 10 TID and 550 mg rifaximin BID.  -Hepatology recs appreciated: started carafate 1g Q6H for 14 days, Lasix 40 mg daily, Spironolactone 50 mg daily   -However, pt failed MBS study, will convert all possible PO medications to NPO methods  -touch-base with speech+swallow again on timing for repeat MBS study, as well as updates on pt condition and language barrier possibly affecting swallow studies  -repeat MBS study on (5/26) @ 1:30 PM  -to discuss with family on options (NGT vs. PEG vs. pleasure feeds) pending MBS study Decompensated i/s/o possible worsening ascites vs. UGIB vs. metabolic derangement (hyponatremia) leading to patient's hepatic encephalopathy and worsening status. Cirrhosis most likely i/s/o hepatitis.   -F/u acute hepatitis panel and cirrhosis labs  -MELD-Na=27, f/u daily MELD labs  -GIB stable. On clears for now.   -Ascites: Holding lasix i/s/o ARF and hypotension will need to touch base with hepatology whether to restart prior to discharge.   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 Now on PPI and ceftriaxone for SBP prophylaxis till 5/24.   -HE: C/w lactolose 10 TID and 550 mg rifaximin BID.  -Hepatology recs appreciated: started carafate 1g Q6H for 14 days, Lasix 40 mg daily, Spironolactone 50 mg daily   -However, pt failed MBS study, will convert all possible PO medications to NPO methods  -touch-base with speech+swallow again on timing for repeat MBS study, as well as updates on pt condition and language barrier possibly affecting swallow studies  -repeat MBS study on (5/26) @ 1:30 PM, pt now on dysphagia 1 diet and medications converted to oral  -pt with ascites and distended abdomen, symptomatic with abdominal pain/tenderness/discomfort -> will obtain abdominal ultrasound to assess ascites and consult IR for possible therapeutic drain

## 2021-05-27 NOTE — PROGRESS NOTE ADULT - PROBLEM SELECTOR PLAN 3
Encephalopathy multifactorial in etiology of metabolic derangements (hyponatremia vs. HE vs ARF), patient endorsing subacute onset of weakness and lethargy unclear how far off the patient is from baseline however, patient seems extremely weak, maybe in the setting of possible symptomatic anemia as well. No evidence of hypercapnia or hypoxia on patient's vitals and labs.   -CTM  -CTH negative for acute bleed.   -Passed bedside speech and swallow eval pt on dysphagia I diet for now, will reconsult Speech and swallow as patient with evidence of coughing from clear liquid diet.   -C/w lactulose and rifaximin titrate to 3-4 BMs. Stool counts.  -Pt failed MBS study, possibly compounded by encephalopathic/confused state with the test - will remain on NPO status but with addition of D5+NS for maintenance fluids, oral swabs for dry mouth Encephalopathy multifactorial in etiology of metabolic derangements (hyponatremia vs. HE vs ARF), patient endorsing subacute onset of weakness and lethargy unclear how far off the patient is from baseline however, patient seems extremely weak, maybe in the setting of possible symptomatic anemia as well. No evidence of hypercapnia or hypoxia on patient's vitals and labs.   -CTM  -CTH negative for acute bleed.   -Passed bedside speech and swallow eval pt on dysphagia I diet for now, will reconsult Speech and swallow as patient with evidence of coughing from clear liquid diet.   -C/w lactulose and rifaximin titrate to 3-4 BMs. Stool counts.  -Pt now on dysphagia 1 diet after 2nd MBS study

## 2021-05-27 NOTE — PROGRESS NOTE ADULT - ATTENDING COMMENTS
85 year old male with PMH CHF (EF 40-45%, moderate LV systolic function) and cirrhosis of unknown etiology (suspect 2/2 hepatitis) who was initially admitted to the hospital due to acute encephalopathy secondary to acute on chronic hyponatremia. His hyponatremia has resolved however, his course was complicated by UGIB leading to decompensated cirrhosis, hypotension, and acute renal failure requiring MICU stay for pressor support and temporization of acute GIB. In the MICU, he had an EGD which showed esophageal varices and a duodenal ulcer s/p octreotide drip and ceftriaxone currently on IV PPI BID. He also had ascites which was initially concerning for possibly SBP however, diagnostic paracentesis was negative for SBP. His hemoglobin has stabilized since being transferred out of the MICU. Speech and swallow was consulted as the patient was coughing while eating. He failed initial MBS but on repeat, could tolerate a dysphagia 1 diet. Abdomen is more distended and tense today, will look for more ascites for possible paracentesis prior to discharge. Follow up hepatology, pulmonary and nephrology recommendations. Rest of plan as above.    García Convissar, DO  Pager [phone]  If no answer [phone]

## 2021-05-28 NOTE — PROGRESS NOTE ADULT - PROBLEM SELECTOR PLAN 9
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   -Non-urgent neurosurgical evaluation  -Possible cause of [place]? on the R side.

## 2021-05-28 NOTE — PROGRESS NOTE ADULT - SUBJECTIVE AND OBJECTIVE BOX
[facility] DIVISION OF KIDNEY DISEASES AND HYPERTENSION -- FOLLOW UP NOTE  --------------------------------------------------------------------------------  Alejandro Sams   Nephrology Fellow  Pager NS: [phone]/ [facility]: [phone]  (After 5 pm or on weekends please page the on-call fellow, can view the schedule on [employer]. Login is sonali catalan, schedule under [facility] medicine, psych, derm.      Patient is a 85y old  Male who presents with a chief complaint of sob, confusion, weakness, can't walk, fever (28 May 2021 07:16)      24 hour events/subjective: Patient seen and examined at the bedside. Vital signs, labs, medications reviewed. Pt appears mildly confused. Per daughter, patient, when he appears more lucid, has complained about constant abdominal "tenseness", and pain/discomfort.      PAST HISTORY  --------------------------------------------------------------------------------  No significant changes to PMH, PSH, FHx, SHx, unless otherwise noted    ALLERGIES & MEDICATIONS  --------------------------------------------------------------------------------  Allergies    No Known Allergies    Intolerances      Standing Inpatient Medications  calcium carbonate    500 mG (Tums) Chewable 1 Tablet(s) Chew at bedtime  dextrose 40% Gel 15 Gram(s) Oral once  dextrose 5%. 1000 milliLiter(s) IV Continuous <Continuous>  dextrose 5%. 1000 milliLiter(s) IV Continuous <Continuous>  dextrose 50% Injectable 25 Gram(s) IV Push once  dextrose 50% Injectable 12.5 Gram(s) IV Push once  dextrose 50% Injectable 25 Gram(s) IV Push once  furosemide    Tablet 40 milliGRAM(s) Oral daily  glucagon  Injectable 1 milliGRAM(s) IntraMuscular once  insulin lispro (ADMELOG) corrective regimen sliding scale   SubCutaneous three times a day before meals  insulin lispro (ADMELOG) corrective regimen sliding scale   SubCutaneous at bedtime  lactated ringers. 1000 milliLiter(s) IV Continuous <Continuous>  lactulose Syrup 10 Gram(s) Oral three times a day  levothyroxine 88 MICROGram(s) Oral daily  nystatin Ointment 1 Application(s) Topical two times a day  nystatin Powder 1 Application(s) Topical three times a day  pantoprazole    Tablet 40 milliGRAM(s) Oral every 12 hours  polyethylene glycol 3350 17 Gram(s) Oral daily  rifAXIMin 550 milliGRAM(s) Oral two times a day  senna 2 Tablet(s) Oral at bedtime  simethicone 80 milliGRAM(s) Chew three times a day  spironolactone 50 milliGRAM(s) Oral daily  sucralfate 1 Gram(s) Oral every 6 hours    PRN Inpatient Medications  albuterol/ipratropium for Nebulization 3 milliLiter(s) Nebulizer every 6 hours PRN      REVIEW OF SYSTEMS  --------------------------------------------------------------------------------  Gen: No fevers/chills  Skin: No rashes  Head/Eyes/Ears: Normal hearing, no difficulty seeing  Respiratory: No dyspnea, cough  CV: No chest pain  GI: +abdominal pain, diarrhea  : No dysuria, hematuria  MSK: No  edema    >>> <<<    VITALS/PHYSICAL EXAM  --------------------------------------------------------------------------------  T(C): 36.6 (05-28-21 @ 11:50), Max: 36.9 (05-27-21 @ 21:43)  HR: 83 (05-28-21 @ 11:50) (78 - 90)  BP: 112/68 (05-28-21 @ 11:50) (107/53 - 129/79)  RR: 18 (05-28-21 @ 11:50) (18 - 18)  SpO2: 94% (05-28-21 @ 11:50) (94% - 100%)  Wt(kg): --        05-27-21 @ 07:01  -  05-28-21 @ 07:00  --------------------------------------------------------  IN: 360 mL / OUT: 350 mL / NET: 10 mL    05-28-21 @ 07:01  -  05-28-21 @ 14:49  --------------------------------------------------------  IN: 120 mL / OUT: 0 mL / NET: 120 mL      Physical Exam:    	Gen: NAD  	HEENT: Anicteric  	Pulm: CTA B/L  	CV: S1S2  	Abd: Soft, +BS, distended    	MSK: No LE edema B/L  	Neuro: Awake  	Skin: Warm and dry  	Vascular access:      LABS/STUDIES  --------------------------------------------------------------------------------              9.3    7.45  >-----------<  30       [05-28-21 @ 07:03]              29.8     144  |  111  |  43  ----------------------------<  210      [05-28-21 @ 07:00]  4.6   |  20  |  1.62        Ca     9.4     [05-28-21 @ 07:00]      Mg     2.1     [05-28-21 @ 07:00]      Phos  2.8     [05-28-21 @ 07:00]    TPro  5.2  /  Alb  3.3  /  TBili  1.6  /  DBili  x   /  AST  25  /  ALT  21  /  AlkPhos  48  [05-28-21 @ 07:00]          Creatinine Trend:  SCr 1.62 [05-28 @ 07:00]  SCr 1.29 [05-27 @ 11:03]  SCr 1.20 [05-26 @ 07:19]  SCr 1.39 [05-25 @ 06:47]  SCr 1.51 [05-24 @ 07:12]    Urinalysis - [05-18-21 @ 11:13]      Color Yellow / Appearance Slightly Turbid / SG 1.016 / pH 6.0      Gluc Negative / Ketone Negative  / Bili Negative / Urobili Negative       Blood Large / Protein 30 mg/dL / Leuk Est Large / Nitrite Negative       / WBC 13 / Hyaline 3 / Gran  / Sq Epi  / Non Sq Epi 3 / Bacteria Negative    Urine Sodium 7      [05-28-21 @ 05:37]    HbA1c 7.1      [01-12-19 @ 07:22]  TSH 1.00      [05-17-21 @ 08:41]    HBsAb 32.4      [05-17-21 @ 23:13]  HBsAg Nonreact      [05-17-21 @ 23:13]  HBcAb Reactive      [05-17-21 @ 23:13]  HCV 0.17, Nonreact      [05-17-21 @ 23:13]  HIV Nonreact      [05-18-21 @ 01:05]

## 2021-05-28 NOTE — PROGRESS NOTE ADULT - ATTENDING COMMENTS
Sedate but arousable  1.  ARF--likely recent cause NSAID +/- hypotension.  Minimize nephrotoxin exposures including radiocontrast, NSAID, ACEi     discussed with med team

## 2021-05-28 NOTE — CONSULT NOTE ADULT - SUBJECTIVE AND OBJECTIVE BOX
p ([id number])     HPI:  85M mandarin only speaking, c hx CAD /sp stent, CHF s/p AICD, HTN, BPH, CKD (baseline Cr ?1.3), unknown hepatitis c/b cirrhosis, DM2, ?T10-T11 discitis, [place], ?colon tumor of unknown etiology, pw worsening confusion, weakness, SOB, unable to ambulate.    History from pt's grandson Emil Beltre at bedside, and pt's daughter Cleopatra Styles over phone. Pt drowsy, and able to provide some answers to specific questions only. Pt has had about 3 weeks of worsening weakness to the point where he can't walk unassisted, and has had multiple falls, most recently 1 week ago. At baseline, pt walks with a walker. Pt also complaining of increasing SOB, increased mucous production, and subjective fevers, for which he has been taking cefixime empirically for the past 1 week. Pt also has increased abd distension, leg swelling, poor appetite, intermittent daily waxing/waning confusion. Pt's lasix was increased as outpt. Pt reports left chest pain, which is intermittent for the past 30 yrs. Has not received covid vaccine.    VS: Tm 98.4, P 56, BP 93/62, R 22, 95% RA  In the Ed, received azithro, ceftriaxone, , tylenol, duoneb (16 May 2021 23:03)      AAOx1 (limited by SOB), extremely dyspnic, VALE AG, abd bloating.     CTH shows small CPA R mass which is slightly enlarged on recent scan, still quite small.    --Anticoagulation:    =====================  PAST MEDICAL HISTORY   DM (diabetes mellitus)    HTN (hypertension)    CAD (coronary artery disease)    Hepatitis    CKD (chronic kidney disease)      PAST SURGICAL HISTORY   No significant past surgical history    AICD (automatic cardioverter/defibrillator) present    Artificial cardiac pacemaker          MEDICATIONS:  Antibiotics:  rifAXIMin 550 milliGRAM(s) Oral two times a day    Neuro:    Other:  albuterol/ipratropium for Nebulization 3 milliLiter(s) Nebulizer every 6 hours PRN  calcium carbonate    500 mG (Tums) Chewable 1 Tablet(s) Chew at bedtime  dextrose 40% Gel 15 Gram(s) Oral once  dextrose 5%. 1000 milliLiter(s) IV Continuous <Continuous>  dextrose 5%. 1000 milliLiter(s) IV Continuous <Continuous>  dextrose 50% Injectable 25 Gram(s) IV Push once  dextrose 50% Injectable 12.5 Gram(s) IV Push once  dextrose 50% Injectable 25 Gram(s) IV Push once  furosemide    Tablet 40 milliGRAM(s) Oral daily  glucagon  Injectable 1 milliGRAM(s) IntraMuscular once  insulin lispro (ADMELOG) corrective regimen sliding scale   SubCutaneous three times a day before meals  insulin lispro (ADMELOG) corrective regimen sliding scale   SubCutaneous at bedtime  lactated ringers. 1000 milliLiter(s) IV Continuous <Continuous>  lactulose Syrup 10 Gram(s) Oral three times a day  levothyroxine 88 MICROGram(s) Oral daily  pantoprazole    Tablet 40 milliGRAM(s) Oral every 12 hours  polyethylene glycol 3350 17 Gram(s) Oral daily  senna 2 Tablet(s) Oral at bedtime  simethicone 80 milliGRAM(s) Chew three times a day  spironolactone 50 milliGRAM(s) Oral daily  sucralfate 1 Gram(s) Oral every 6 hours      SOCIAL HISTORY:   Occupation:   Marital Status:     FAMILY HISTORY:  No pertinent family history in first degree relatives        ROS: Negative except per HPI    LABS:                          9.3    7.45  )-----------( 30       ( 28 May 2021 07:03 )             29.8     05-28    144  |  111<H>  |  43<H>  ----------------------------<  210<H>  4.6   |  20<L>  |  1.62<H>    Ca    9.4      28 May 2021 07:00  Phos  2.8     05-28  Mg     2.1     05-28    TPro  5.2<L>  /  Alb  3.3  /  TBili  1.6<H>  /  DBili  x   /  AST  25  /  ALT  21  /  AlkPhos  48  05-28

## 2021-05-28 NOTE — PROGRESS NOTE ADULT - PROBLEM SELECTOR PLAN 5
Acute on chronic hyponatremia. Appears to be hypervolemic hypernatremia i/s/o cirrhosis. Patient's diuresis on hold given ANDREEA. STz=853 on presentation now VJw=109  -CTM on daily BMPs  -Holding steady @139, s/p D5 200cc/2hrs. Will CTM.

## 2021-05-28 NOTE — CONSULT NOTE ADULT - ASSESSMENT
Jensen, Zun    85M w/ CAD s/p PCI, HF s/p AICD, DMII, HTN, CKD, decompensated cirrhosis p/w 3 weeks of generalized weakness, dyspnea, and abd distension. Exam: AAOx1, extremely dyspnic, VALE AG, abd bloating. CTH shows small CPA R mass which is slightly enlarged on recent scan, still quite small.  -No neurosurgical intervention

## 2021-05-28 NOTE — PROGRESS NOTE ADULT - PROBLEM SELECTOR PLAN 2
Decompensated i/s/o possible worsening ascites vs. UGIB vs. metabolic derangement (hyponatremia) leading to patient's hepatic encephalopathy and worsening status. Cirrhosis most likely i/s/o hepatitis.   -F/u acute hepatitis panel and cirrhosis labs  -MELD-Na=27, f/u daily MELD labs  -GIB stable. On clears for now.   -Ascites: Holding lasix i/s/o ARF and hypotension will need to touch base with hepatology whether to restart prior to discharge.   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 Now on PPI and ceftriaxone for SBP prophylaxis till 5/24.   -HE: C/w lactolose 10 TID and 550 mg rifaximin BID.  -Hepatology recs appreciated: started carafate 1g Q6H for 14 days, Lasix 40 mg daily, Spironolactone 50 mg daily   -However, pt failed MBS study, will convert all possible PO medications to NPO methods  -touch-base with speech+swallow again on timing for repeat MBS study, as well as updates on pt condition and language barrier possibly affecting swallow studies  -repeat MBS study on (5/26) @ 1:30 PM, pt now on dysphagia 1 diet and medications converted to oral  -pt with ascites and distended abdomen, symptomatic with abdominal pain/tenderness/discomfort -> will obtain abdominal ultrasound to assess ascites and consult IR for possible therapeutic drain Decompensated i/s/o possible worsening ascites vs. UGIB vs. metabolic derangement (hyponatremia) leading to patient's hepatic encephalopathy and worsening status. Cirrhosis most likely i/s/o hepatitis.   -F/u acute hepatitis panel and cirrhosis labs  -MELD-Na=27, f/u daily MELD labs  -GIB stable. On clears for now.   -Ascites: Holding lasix i/s/o ARF and hypotension will need to touch base with hepatology whether to restart prior to discharge.   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 Now on PPI and ceftriaxone for SBP prophylaxis till 5/24.   -HE: C/w lactolose 10 TID and 550 mg rifaximin BID.  -Hepatology recs appreciated: started carafate 1g Q6H for 14 days, Lasix 40 mg daily, Spironolactone 50 mg daily   -However, pt failed MBS study, will convert all possible PO medications to NPO methods  -touch-base with speech+swallow again on timing for repeat MBS study, as well as updates on pt condition and language barrier possibly affecting swallow studies  -repeat MBS study on (5/26) @ 1:30 PM, pt now on dysphagia 1 diet and medications converted to oral  -pt with ascites and distended abdomen, symptomatic with abdominal pain/tenderness/discomfort -> will obtain abdominal ultrasound to assess ascites and consult IR for possible therapeutic drain  -f/u IR on therapeutic drain

## 2021-05-28 NOTE — PROGRESS NOTE ADULT - SUBJECTIVE AND OBJECTIVE BOX
Juan York MD  PGY-1, Internal Medicine  Pager #: [phone], [phone]    Patient is a 85y old  Male who presents with a chief complaint of sob, confusion, weakness, can't walk, fever (27 May 2021 07:24)    OVERNIGHT/INTERVAL EVENTS:    SUBJECTIVE: Patient seen and examined bedside.     Denies fevers/chills, headaches/dizziness, nausea/vomiting, chest pain, SOB, abdominal pain.     MEDICATIONS  (STANDING):  calcium carbonate    500 mG (Tums) Chewable 1 Tablet(s) Chew at bedtime  dextrose 40% Gel 15 Gram(s) Oral once  dextrose 5%. 1000 milliLiter(s) (50 mL/Hr) IV Continuous <Continuous>  dextrose 5%. 1000 milliLiter(s) (100 mL/Hr) IV Continuous <Continuous>  dextrose 50% Injectable 12.5 Gram(s) IV Push once  dextrose 50% Injectable 25 Gram(s) IV Push once  dextrose 50% Injectable 25 Gram(s) IV Push once  furosemide    Tablet 40 milliGRAM(s) Oral daily  glucagon  Injectable 1 milliGRAM(s) IntraMuscular once  insulin lispro (ADMELOG) corrective regimen sliding scale   SubCutaneous three times a day before meals  insulin lispro (ADMELOG) corrective regimen sliding scale   SubCutaneous at bedtime  lactulose Syrup 10 Gram(s) Oral three times a day  levothyroxine 88 MICROGram(s) Oral daily  nystatin Ointment 1 Application(s) Topical two times a day  nystatin Powder 1 Application(s) Topical three times a day  pantoprazole    Tablet 40 milliGRAM(s) Oral every 12 hours  polyethylene glycol 3350 17 Gram(s) Oral daily  rifAXIMin 550 milliGRAM(s) Oral two times a day  senna 2 Tablet(s) Oral at bedtime  simethicone 80 milliGRAM(s) Chew three times a day  spironolactone 50 milliGRAM(s) Oral daily  sucralfate 1 Gram(s) Oral every 6 hours    MEDICATIONS  (PRN):  albuterol/ipratropium for Nebulization 3 milliLiter(s) Nebulizer every 6 hours PRN Shortness of Breath and/or Wheezing      CAPILLARY BLOOD GLUCOSE      POCT Blood Glucose.: 204 mg/dL (27 May 2021 23:13)  POCT Blood Glucose.: 196 mg/dL (27 May 2021 21:59)  POCT Blood Glucose.: 239 mg/dL (27 May 2021 17:56)  POCT Blood Glucose.: 248 mg/dL (27 May 2021 12:42)    I&O's Summary    27 May 2021 07:01  -  28 May 2021 07:00  --------------------------------------------------------  IN: 360 mL / OUT: 350 mL / NET: 10 mL        PHYSICAL EXAM:  Vital Signs Last 24 Hrs  T(C): 36.6 (28 May 2021 04:28), Max: 36.9 (27 May 2021 21:43)  T(F): 97.9 (28 May 2021 04:28), Max: 98.5 (27 May 2021 21:43)  HR: 90 (28 May 2021 04:28) (78 - 90)  BP: 129/72 (28 May 2021 04:28) (107/53 - 129/79)  BP(mean): --  RR: 18 (28 May 2021 04:28) (18 - 19)  SpO2: 98% (28 May 2021 04:28) (97% - 100%)  CONSTITUTIONAL: NAD, lying in bed comfortably  EYES: EOMI, PERRLA; conjunctiva and sclera clear  ENMT: Moist oral mucosa; normal dentition  NECK: Supple, no palpable masses, no JVD  RESPIRATORY: Lungs clear to ascultation b/l; no rales/ronchi/wheezing; unlabored respirations  CARDIOVASCULAR: Regular rate and rhythm, normal S1 and S2, no murmurs/rubs/gallops  ABDOMEN: Soft, normal bowel sounds, nondistended, nontender  MUSCULOSKELETAL: No joint swelling or tenderness to palpation  PSYCH: Affect appropriate  NEUROLOGY: AAOx3, CNs grossly intact  SKIN: No rashes; no palpable lesions    LABS:                        8.8    6.60  )-----------( 35       ( 27 May 2021 11:03 )             28.5     05-27    146<H>  |  113<H>  |  38<H>  ----------------------------<  248<H>  4.6   |  20<L>  |  1.29    Ca    9.2      27 May 2021 11:03  Phos  2.8     05-27  Mg     2.0     05-27    TPro  5.4<L>  /  Alb  3.6  /  TBili  1.7<H>  /  DBili  x   /  AST  36  /  ALT  21  /  AlkPhos  40  05-27                INTERVAL RADIOLOGY/IMAGING STUDIES:     Juan York MD  PGY-1, Internal Medicine  Pager #: [phone], [phone]    Patient is a 85y old  Male who presents with a chief complaint of sob, confusion, weakness, can't walk, fever (27 May 2021 07:24)    OVERNIGHT/INTERVAL EVENTS: No acute overnight events.    SUBJECTIVE: Patient seen and examined bedside. Pt appears mildly confused. Per daughter, patient, when he appears more lucid, has complained about constant abdominal "tenseness", and pain/discomfort. Otherwise, he still mentions feeling very dehydrated.     Denies fevers/chills, headaches/dizziness, nausea/vomiting, chest pain.    MEDICATIONS  (STANDING):  calcium carbonate    500 mG (Tums) Chewable 1 Tablet(s) Chew at bedtime  dextrose 40% Gel 15 Gram(s) Oral once  dextrose 5%. 1000 milliLiter(s) (50 mL/Hr) IV Continuous <Continuous>  dextrose 5%. 1000 milliLiter(s) (100 mL/Hr) IV Continuous <Continuous>  dextrose 50% Injectable 12.5 Gram(s) IV Push once  dextrose 50% Injectable 25 Gram(s) IV Push once  dextrose 50% Injectable 25 Gram(s) IV Push once  furosemide    Tablet 40 milliGRAM(s) Oral daily  glucagon  Injectable 1 milliGRAM(s) IntraMuscular once  insulin lispro (ADMELOG) corrective regimen sliding scale   SubCutaneous three times a day before meals  insulin lispro (ADMELOG) corrective regimen sliding scale   SubCutaneous at bedtime  lactulose Syrup 10 Gram(s) Oral three times a day  levothyroxine 88 MICROGram(s) Oral daily  nystatin Ointment 1 Application(s) Topical two times a day  nystatin Powder 1 Application(s) Topical three times a day  pantoprazole    Tablet 40 milliGRAM(s) Oral every 12 hours  polyethylene glycol 3350 17 Gram(s) Oral daily  rifAXIMin 550 milliGRAM(s) Oral two times a day  senna 2 Tablet(s) Oral at bedtime  simethicone 80 milliGRAM(s) Chew three times a day  spironolactone 50 milliGRAM(s) Oral daily  sucralfate 1 Gram(s) Oral every 6 hours    MEDICATIONS  (PRN):  albuterol/ipratropium for Nebulization 3 milliLiter(s) Nebulizer every 6 hours PRN Shortness of Breath and/or Wheezing      CAPILLARY BLOOD GLUCOSE      POCT Blood Glucose.: 204 mg/dL (27 May 2021 23:13)  POCT Blood Glucose.: 196 mg/dL (27 May 2021 21:59)  POCT Blood Glucose.: 239 mg/dL (27 May 2021 17:56)  POCT Blood Glucose.: 248 mg/dL (27 May 2021 12:42)    I&O's Summary    27 May 2021 07:01  -  28 May 2021 07:00  --------------------------------------------------------  IN: 360 mL / OUT: 350 mL / NET: 10 mL        PHYSICAL EXAM:  Vital Signs Last 24 Hrs  T(C): 36.6 (28 May 2021 04:28), Max: 36.9 (27 May 2021 21:43)  T(F): 97.9 (28 May 2021 04:28), Max: 98.5 (27 May 2021 21:43)  HR: 90 (28 May 2021 04:28) (78 - 90)  BP: 129/72 (28 May 2021 04:28) (107/53 - 129/79)  BP(mean): --  RR: 18 (28 May 2021 04:28) (18 - 19)  SpO2: 98% (28 May 2021 04:28) (97% - 100%)  CONSTITUTIONAL: NAD, lying in bed comfortably  EYES: EOMI, PERRLA; conjunctiva and sclera clear  ENMT: Moist oral mucosa; normal dentition  NECK: Supple, no palpable masses, no JVD  RESPIRATORY: Lungs clear to ascultation b/l; no rales/ronchi/wheezing; unlabored respirations  CARDIOVASCULAR: Regular rate and rhythm, normal S1 and S2, no murmurs/rubs/gallops  ABDOMEN: Soft, normal bowel sounds, nondistended, nontender  MUSCULOSKELETAL: No joint swelling or tenderness to palpation  PSYCH: Affect appropriate  NEUROLOGY: AAOx3, CNs grossly intact  SKIN: No rashes; no palpable lesions    LABS:                        8.8    6.60  )-----------( 35       ( 27 May 2021 11:03 )             28.5     05-27    146<H>  |  113<H>  |  38<H>  ----------------------------<  248<H>  4.6   |  20<L>  |  1.29    Ca    9.2      27 May 2021 11:03  Phos  2.8     05-27  Mg     2.0     05-27    TPro  5.4<L>  /  Alb  3.6  /  TBili  1.7<H>  /  DBili  x   /  AST  36  /  ALT  21  /  AlkPhos  40  05-27                INTERVAL RADIOLOGY/IMAGING STUDIES:     Juan York MD  PGY-1, Internal Medicine  Pager #: [phone], [phone]    Patient is a 85y old  Male who presents with a chief complaint of sob, confusion, weakness, can't walk, fever (27 May 2021 07:24)    OVERNIGHT/INTERVAL EVENTS: No acute overnight events.    SUBJECTIVE: Patient seen and examined bedside. Pt appears mildly confused. Per daughter, patient, when he appears more lucid, has complained about constant abdominal "tenseness", and pain/discomfort. Otherwise, he still mentions feeling very dehydrated.     Denies fevers/chills, headaches/dizziness, nausea/vomiting, chest pain.    MEDICATIONS  (STANDING):  calcium carbonate    500 mG (Tums) Chewable 1 Tablet(s) Chew at bedtime  dextrose 40% Gel 15 Gram(s) Oral once  dextrose 5%. 1000 milliLiter(s) (50 mL/Hr) IV Continuous <Continuous>  dextrose 5%. 1000 milliLiter(s) (100 mL/Hr) IV Continuous <Continuous>  dextrose 50% Injectable 12.5 Gram(s) IV Push once  dextrose 50% Injectable 25 Gram(s) IV Push once  dextrose 50% Injectable 25 Gram(s) IV Push once  furosemide    Tablet 40 milliGRAM(s) Oral daily  glucagon  Injectable 1 milliGRAM(s) IntraMuscular once  insulin lispro (ADMELOG) corrective regimen sliding scale   SubCutaneous three times a day before meals  insulin lispro (ADMELOG) corrective regimen sliding scale   SubCutaneous at bedtime  lactulose Syrup 10 Gram(s) Oral three times a day  levothyroxine 88 MICROGram(s) Oral daily  nystatin Ointment 1 Application(s) Topical two times a day  nystatin Powder 1 Application(s) Topical three times a day  pantoprazole    Tablet 40 milliGRAM(s) Oral every 12 hours  polyethylene glycol 3350 17 Gram(s) Oral daily  rifAXIMin 550 milliGRAM(s) Oral two times a day  senna 2 Tablet(s) Oral at bedtime  simethicone 80 milliGRAM(s) Chew three times a day  spironolactone 50 milliGRAM(s) Oral daily  sucralfate 1 Gram(s) Oral every 6 hours    MEDICATIONS  (PRN):  albuterol/ipratropium for Nebulization 3 milliLiter(s) Nebulizer every 6 hours PRN Shortness of Breath and/or Wheezing      CAPILLARY BLOOD GLUCOSE      POCT Blood Glucose.: 204 mg/dL (27 May 2021 23:13)  POCT Blood Glucose.: 196 mg/dL (27 May 2021 21:59)  POCT Blood Glucose.: 239 mg/dL (27 May 2021 17:56)  POCT Blood Glucose.: 248 mg/dL (27 May 2021 12:42)    I&O's Summary    27 May 2021 07:01  -  28 May 2021 07:00  --------------------------------------------------------  IN: 360 mL / OUT: 350 mL / NET: 10 mL        PHYSICAL EXAM:  Vital Signs Last 24 Hrs  T(C): 36.6 (28 May 2021 04:28), Max: 36.9 (27 May 2021 21:43)  T(F): 97.9 (28 May 2021 04:28), Max: 98.5 (27 May 2021 21:43)  HR: 90 (28 May 2021 04:28) (78 - 90)  BP: 129/72 (28 May 2021 04:28) (107/53 - 129/79)  RR: 18 (28 May 2021 04:28) (18 - 19)  SpO2: 98% (28 May 2021 04:28) (97% - 100%)    CONSTITUTIONAL: NAD, lying in bed  EYES: EOMI, PERRLA; conjunctiva and sclera clear  ENMT: Dry oral mucosa; normal dentition  NECK: Supple, no palpable masses, no JVD  RESPIRATORY: Lungs clear to ascultation b/l; no rales/ronchi/wheezing; unlabored respirations  CARDIOVASCULAR: Regular rate and rhythm, normal S1 and S2, no murmurs/rubs/gallops, +AICD in left chest wall  ABDOMEN: ?TTP diffusely, +distended, firm abdomen  MUSCULOSKELETAL: No joint swelling or tenderness to palpation  PSYCH: Affect appropriate  NEUROLOGY: Answers questions and responds to commands appropriately but exhibits mild confusion  SKIN: Large bruises over forearms from blood draws    LABS:                        8.8    6.60  )-----------( 35       ( 27 May 2021 11:03 )             28.5     05-27    146<H>  |  113<H>  |  38<H>  ----------------------------<  248<H>  4.6   |  20<L>  |  1.29    Ca    9.2      27 May 2021 11:03  Phos  2.8     05-27  Mg     2.0     05-27    TPro  5.4<L>  /  Alb  3.6  /  TBili  1.7<H>  /  DBili  x   /  AST  36  /  ALT  21  /  AlkPhos  40  05-27                INTERVAL RADIOLOGY/IMAGING STUDIES:

## 2021-05-28 NOTE — PROGRESS NOTE ADULT - PROBLEM SELECTOR PLAN 4
Patient with CKD most likely i/s/o diabetes. Now with ANDREEA on CKD (Scr baseline from notes appears to be 1.2-1.3) now with ANDREEA with SCr downtrending. ANDREEA most likely i/s/o pre-renal azotemia (acute blood loss vs. splanchnic vasoplegia from cirrhosis vs. lethargy and decreased PO intake) vs. ATN from anemia.   -CTM SCr  -Avoid nephrotoxic agents  -Holding patient's entresto and lasix.   -Nephrology following appreciate recommendations Patient with CKD most likely i/s/o diabetes. Now with ANDREEA on CKD (Scr baseline from notes appears to be 1.2-1.3) now with ANDREEA with SCr downtrending. ANDREEA most likely i/s/o pre-renal azotemia (acute blood loss vs. splanchnic vasoplegia from cirrhosis vs. lethargy and decreased PO intake) vs. ATN from anemia.   -CTM SCr  -Avoid nephrotoxic agents  -Holding patient's entresto and lasix.   -Nephrology following appreciate recommendations  -pt likely dehydrated based on labs and past NPO status, will start pt on some IVFs to supplement diet Patient with CKD most likely i/s/o diabetes. Now with ANDREEA on CKD (Scr baseline from notes appears to be 1.2-1.3) now with ANDREEA with SCr downtrending. ANDREEA most likely i/s/o pre-renal azotemia (acute blood loss vs. splanchnic vasoplegia from cirrhosis vs. lethargy and decreased PO intake) vs. ATN from anemia.   -CTM SCr  -Avoid nephrotoxic agents  -Holding patient's entresto and lasix.   -Nephrology following appreciate recommendations  -pt likely dehydrated based on labs and past NPO status, will start pt on some gentle IVFs to supplement diet

## 2021-05-28 NOTE — PROGRESS NOTE ADULT - PROBLEM SELECTOR PLAN 3
Encephalopathy multifactorial in etiology of metabolic derangements (hyponatremia vs. HE vs ARF), patient endorsing subacute onset of weakness and lethargy unclear how far off the patient is from baseline however, patient seems extremely weak, maybe in the setting of possible symptomatic anemia as well. No evidence of hypercapnia or hypoxia on patient's vitals and labs.   -CTM  -CTH negative for acute bleed.   -Passed bedside speech and swallow eval pt on dysphagia I diet for now, will reconsult Speech and swallow as patient with evidence of coughing from clear liquid diet.   -C/w lactulose and rifaximin titrate to 3-4 BMs. Stool counts.  -Pt now on dysphagia 1 diet after 2nd MBS study

## 2021-05-28 NOTE — PROGRESS NOTE ADULT - ASSESSMENT
85M PMHx CKD, cirrhosis (?viral hepatitis) - admitted for decompensated cirrhosis.  Nephrology consulted for hyponatremia and ANDREEA on CKD.      #Hyponatremia  - Resolved    # ANDREEA  Pt with non-oliguric ANDREEA on CKD likely 2/2 ATN in the setting hypotension, entresto/lasix, acute anemia and decreased EABV.  On admission sCr 2.0 (baseline sCr 1.4-1.6 in Jan 2019), peaked at 2.7mg/dl, improved to 1.2mg/dl. Now up to 1.6mg/dl. UA bland.   - Cr up today in the setting of Toradol use and low BP. Please avoid giving NSAID's, especially IV toradol  - Ok to proceed with paracentesis today, would give albumin back 1:1  - C/w lasix/ spironolactone at this time  - BP optimization/antibiotic/blood transfusion per ICU team  - monitor BMP, strict I/O, avoid nephrotoxic agents (NSAIDs, PPI, contrast), renally dose medications per GFR.

## 2021-05-28 NOTE — PROGRESS NOTE ADULT - ATTENDING COMMENTS
85 year old male with PMH CHF (EF 40-45%, moderate LV systolic function) and cirrhosis of unknown etiology (suspect 2/2 hepatitis) who was initially admitted to the hospital due to acute encephalopathy secondary to acute on chronic hyponatremia. His hyponatremia has resolved however, his course was complicated by UGIB leading to decompensated cirrhosis, hypotension, and acute renal failure requiring MICU stay for pressor support and temporization of acute GIB. In the MICU, he had an EGD which showed esophageal varices and a duodenal ulcer s/p octreotide drip and ceftriaxone currently on PO PPI BID. He also had ascites which was initially concerning for possibly SBP however, diagnostic paracentesis was negative for SBP. His abdomen has become more distended and tense and he is for paracentesis today with IR. His hemoglobin has stabilized since being transferred out of the MICU. Speech and swallow was consulted as the patient was coughing while eating. He failed initial MBS but on repeat, he could tolerate a dysphagia 1 diet which was started. 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 Additionally, follow up hepatology, pulmonary and nephrology recommendations. Rest of plan as above.    García Convissar, DO  Pager [phone]  If no answer [phone]

## 2021-05-28 NOTE — PRE PROCEDURE NOTE - PRE PROCEDURE EVALUATION
Vascular & Interventional Radiology Pre-Procedure Note    Procedure Name: Therapeutic paracentesis     HPI: 85y Male with decompensated cirrhosis develops abdominal pain and distension. Found to have mild to moderate ascites on imaging.     Allergies:   Medications (Abx/Cardiac/Anticoagulation/Blood Products)    furosemide    Tablet: 40 milliGRAM(s) Oral (05-28 @ 05:03)  rifAXIMin: 550 milliGRAM(s) Oral (05-28 @ 05:02)  spironolactone: 50 milliGRAM(s) Oral (05-28 @ 05:02)    Data:    T(C): 36.6  HR: 83  BP: 112/68  RR: 18  SpO2: 94%    -WBC 7.45 / HgB 9.3 / Hct 29.8 / Plt 30  -Na 144 / Cl 111 / BUN 43 / Glucose 210  -K 4.6 / CO2 20 / Cr 1.62  -ALT 21 / Alk Phos 48 / T.Bili 1.6  -INR1.64    Imaging: Mild to moderate ascites on ultrasound.     Plan:   85y Male presents for therapeutic paracentesis for symptomatic ascites.

## 2021-05-29 NOTE — PROGRESS NOTE ADULT - PROBLEM SELECTOR PLAN 2
Decompensated i/s/o possible worsening ascites vs. UGIB vs. metabolic derangement (hyponatremia) leading to patient's hepatic encephalopathy and worsening status. Cirrhosis most likely i/s/o hepatitis.   -F/u acute hepatitis panel and cirrhosis labs  -MELD-Na=27, f/u daily MELD labs  -GIB stable. On clears for now.   -Ascites: Holding lasix i/s/o ARF and hypotension will need to touch base with hepatology whether to restart prior to discharge.   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 Now on PPI and ceftriaxone for SBP prophylaxis till 5/24.   -HE: C/w lactolose 10 TID and 550 mg rifaximin BID.  -Hepatology recs appreciated: started carafate 1g Q6H for 14 days, Lasix 40 mg daily, Spironolactone 50 mg daily   -However, pt failed MBS study, will convert all possible PO medications to NPO methods  -touch-base with speech+swallow again on timing for repeat MBS study, as well as updates on pt condition and language barrier possibly affecting swallow studies  -repeat MBS study on (5/26) @ 1:30 PM, pt now on dysphagia 1 diet and medications converted to oral  -pt with ascites and distended abdomen, symptomatic with abdominal pain/tenderness/discomfort -> will obtain abdominal ultrasound to assess ascites and consult IR for possible therapeutic drain  -f/u IR on therapeutic drain Decompensated i/s/o possible worsening ascites vs. UGIB vs. metabolic derangement (hyponatremia) leading to patient's hepatic encephalopathy and worsening status. Cirrhosis most likely i/s/o hepatitis.   -F/u acute hepatitis panel and cirrhosis labs  -MELD-Na=27, f/u daily MELD labs  -GIB stable. On clears for now.   -Ascites: Holding lasix i/s/o ARF and hypotension will need to touch base with hepatology whether to restart prior to discharge.   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 Now on PPI and ceftriaxone for SBP prophylaxis till 5/24.   -HE: C/w lactolose 10 TID and 550 mg rifaximin BID.  -Hepatology recs appreciated: started carafate 1g Q6H for 14 days, Lasix 40 mg daily, Spironolactone 50 mg daily   -However, pt failed MBS study, will convert all possible PO medications to NPO methods  -touch-base with speech+swallow again on timing for repeat MBS study, as well as updates on pt condition and language barrier possibly affecting swallow studies  -repeat MBS study on (5/26) @ 1:30 PM, pt now on dysphagia 1 diet and medications converted to oral  -pt with ascites and distended abdomen, symptomatic with abdominal pain/tenderness/discomfort -> will obtain abdominal ultrasound to assess ascites and consult IR for possible therapeutic drain  -s/p therapeutic drain 5/28, pt reports improvement in abdominal symptoms

## 2021-05-29 NOTE — PROVIDER CONTACT NOTE (OTHER) - ACTION/TREATMENT ORDERED:
Dr Stewart in attendance, awaiting further orders, will continue to monitor Dr Stewart in attendance, awaiting further orders, will continue to monitor 1250 Stat duoneb given

## 2021-05-29 NOTE — PROGRESS NOTE ADULT - ATTENDING COMMENTS
85 M with HFrEF, cirrhosis of unknown etiology (suspect 2/2 hepatitis) presented with hepatic encephalopathy, with course complicated by UGIB leading to decompensated cirrhosis, hypotension, and acute renal failure requiring MICU stay for pressor support and temporization of acute GIB.   Seen at bedside; appears confused today. Not having BMs.  -increase lactulose to 30mg TID for goal 3Bms/day  -delirium precautions

## 2021-05-29 NOTE — PROGRESS NOTE ADULT - PROBLEM SELECTOR PLAN 9
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   -Non-urgent neurosurgical evaluation  -Possible cause of [place]? on the R side.

## 2021-05-29 NOTE — PROGRESS NOTE ADULT - PROBLEM SELECTOR PLAN 3
Encephalopathy multifactorial in etiology of metabolic derangements (hyponatremia vs. HE vs ARF), patient endorsing subacute onset of weakness and lethargy unclear how far off the patient is from baseline however, patient seems extremely weak, maybe in the setting of possible symptomatic anemia as well. No evidence of hypercapnia or hypoxia on patient's vitals and labs.   -CTM  -CTH negative for acute bleed.   -Passed bedside speech and swallow eval pt on dysphagia I diet for now, will reconsult Speech and swallow as patient with evidence of coughing from clear liquid diet.   -C/w lactulose and rifaximin titrate to 3-4 BMs. Stool counts.  -Pt now on dysphagia 1 diet after 2nd MBS study Encephalopathy multifactorial in etiology of metabolic derangements (hyponatremia vs. HE vs ARF), patient endorsing subacute onset of weakness and lethargy unclear how far off the patient is from baseline however, patient seems extremely weak, maybe in the setting of possible symptomatic anemia as well. No evidence of hypercapnia or hypoxia on patient's vitals and labs.   -CTM  -CTH negative for acute bleed.   -Passed bedside speech and swallow eval pt on dysphagia I diet for now, will reconsult Speech and swallow as patient with evidence of coughing from clear liquid diet.   -C/w lactulose and rifaximin titrate to 3-4 BMs. Stool counts.  -Pt now on dysphagia 1 diet after 2nd MBS study  -increased lactulose to 30 mg TID, titrate to 3-4 BMs

## 2021-05-29 NOTE — PROGRESS NOTE ADULT - PROBLEM SELECTOR PLAN 4
Patient with CKD most likely i/s/o diabetes. Now with ANDREEA on CKD (Scr baseline from notes appears to be 1.2-1.3) now with ANDREEA with SCr downtrending. ANDREEA most likely i/s/o pre-renal azotemia (acute blood loss vs. splanchnic vasoplegia from cirrhosis vs. lethargy and decreased PO intake) vs. ATN from anemia.   -CTM SCr  -Avoid nephrotoxic agents  -Holding patient's entresto and lasix.   -Nephrology following appreciate recommendations  -pt likely dehydrated based on labs and past NPO status, will start pt on some gentle IVFs to supplement diet

## 2021-05-29 NOTE — PROGRESS NOTE ADULT - PROBLEM SELECTOR PLAN 5
Acute on chronic hyponatremia. Appears to be hypervolemic hypernatremia i/s/o cirrhosis. Patient's diuresis on hold given ANDREEA. KMw=025 on presentation now DCp=887  -CTM on daily BMPs  -Holding steady @139, s/p D5 200cc/2hrs. Will CTM.

## 2021-05-29 NOTE — PROGRESS NOTE ADULT - SUBJECTIVE AND OBJECTIVE BOX
Juan York MD  PGY-1, Internal Medicine  Pager #: [phone], [phone]    Patient is a 85y old  Male who presents with a chief complaint of sob, confusion, weakness, can't walk, fever (28 May 2021 16:02)    OVERNIGHT/INTERVAL EVENTS:    SUBJECTIVE: Patient seen and examined bedside.     Denies fevers/chills, headaches/dizziness, nausea/vomiting, chest pain, SOB, abdominal pain.     MEDICATIONS  (STANDING):  calcium carbonate    500 mG (Tums) Chewable 1 Tablet(s) Chew at bedtime  dextrose 40% Gel 15 Gram(s) Oral once  dextrose 5%. 1000 milliLiter(s) (100 mL/Hr) IV Continuous <Continuous>  dextrose 5%. 1000 milliLiter(s) (50 mL/Hr) IV Continuous <Continuous>  dextrose 50% Injectable 25 Gram(s) IV Push once  dextrose 50% Injectable 12.5 Gram(s) IV Push once  dextrose 50% Injectable 25 Gram(s) IV Push once  furosemide    Tablet 40 milliGRAM(s) Oral daily  glucagon  Injectable 1 milliGRAM(s) IntraMuscular once  insulin glargine Injectable (LANTUS) 5 Unit(s) SubCutaneous at bedtime  insulin lispro (ADMELOG) corrective regimen sliding scale   SubCutaneous three times a day before meals  insulin lispro (ADMELOG) corrective regimen sliding scale   SubCutaneous at bedtime  lactated ringers. 1000 milliLiter(s) (75 mL/Hr) IV Continuous <Continuous>  lactulose Syrup 10 Gram(s) Oral three times a day  levothyroxine 88 MICROGram(s) Oral daily  nystatin Ointment 1 Application(s) Topical two times a day  nystatin Powder 1 Application(s) Topical three times a day  pantoprazole    Tablet 40 milliGRAM(s) Oral every 12 hours  polyethylene glycol 3350 17 Gram(s) Oral daily  rifAXIMin 550 milliGRAM(s) Oral two times a day  senna 2 Tablet(s) Oral at bedtime  simethicone 80 milliGRAM(s) Chew three times a day  spironolactone 50 milliGRAM(s) Oral daily  sucralfate 1 Gram(s) Oral every 6 hours    MEDICATIONS  (PRN):  albuterol/ipratropium for Nebulization 3 milliLiter(s) Nebulizer every 6 hours PRN Shortness of Breath and/or Wheezing      CAPILLARY BLOOD GLUCOSE      POCT Blood Glucose.: 268 mg/dL (28 May 2021 22:02)  POCT Blood Glucose.: 297 mg/dL (28 May 2021 18:09)  POCT Blood Glucose.: 282 mg/dL (28 May 2021 12:32)  POCT Blood Glucose.: 238 mg/dL (28 May 2021 08:41)    I&O's Summary    28 May 2021 07:01  -  29 May 2021 07:00  --------------------------------------------------------  IN: 360 mL / OUT: 600 mL / NET: -240 mL        PHYSICAL EXAM:  Vital Signs Last 24 Hrs  T(C): 36.8 (29 May 2021 06:10), Max: 36.8 (29 May 2021 06:10)  T(F): 98.3 (29 May 2021 06:10), Max: 98.3 (29 May 2021 06:10)  HR: 90 (29 May 2021 06:10) (81 - 90)  BP: 121/70 (29 May 2021 06:10) (100/61 - 121/70)  BP(mean): --  RR: 18 (29 May 2021 06:10) (17 - 18)  SpO2: 97% (29 May 2021 06:10) (94% - 100%)  CONSTITUTIONAL: NAD, lying in bed comfortably  EYES: EOMI, PERRLA; conjunctiva and sclera clear  ENMT: Moist oral mucosa; normal dentition  NECK: Supple, no palpable masses, no JVD  RESPIRATORY: Lungs clear to ascultation b/l; no rales/ronchi/wheezing; unlabored respirations  CARDIOVASCULAR: Regular rate and rhythm, normal S1 and S2, no murmurs/rubs/gallops  ABDOMEN: Soft, normal bowel sounds, nondistended, nontender  MUSCULOSKELETAL: No joint swelling or tenderness to palpation  PSYCH: Affect appropriate  NEUROLOGY: AAOx3, CNs grossly intact  SKIN: No rashes; no palpable lesions    LABS:                        9.3    7.45  )-----------( 30       ( 28 May 2021 07:03 )             29.8     05-28    144  |  111<H>  |  43<H>  ----------------------------<  210<H>  4.6   |  20<L>  |  1.62<H>    Ca    9.4      28 May 2021 07:00  Phos  2.8     05-28  Mg     2.1     05-28    TPro  5.2<L>  /  Alb  3.3  /  TBili  1.6<H>  /  DBili  x   /  AST  25  /  ALT  21  /  AlkPhos  48  05-28                INTERVAL RADIOLOGY/IMAGING STUDIES:     Juan York MD  PGY-1, Internal Medicine  Pager #: [phone], [phone]    Patient is a 85y old  Male who presents with a chief complaint of sob, confusion, weakness, can't walk, fever (28 May 2021 16:02)    OVERNIGHT/INTERVAL EVENTS: NC increased for patient comfort     SUBJECTIVE: Patient seen and examined bedside.     Denies fevers/chills, headaches/dizziness, nausea/vomiting, chest pain, SOB, abdominal pain.     MEDICATIONS  (STANDING):  calcium carbonate    500 mG (Tums) Chewable 1 Tablet(s) Chew at bedtime  dextrose 40% Gel 15 Gram(s) Oral once  dextrose 5%. 1000 milliLiter(s) (100 mL/Hr) IV Continuous <Continuous>  dextrose 5%. 1000 milliLiter(s) (50 mL/Hr) IV Continuous <Continuous>  dextrose 50% Injectable 25 Gram(s) IV Push once  dextrose 50% Injectable 12.5 Gram(s) IV Push once  dextrose 50% Injectable 25 Gram(s) IV Push once  furosemide    Tablet 40 milliGRAM(s) Oral daily  glucagon  Injectable 1 milliGRAM(s) IntraMuscular once  insulin glargine Injectable (LANTUS) 5 Unit(s) SubCutaneous at bedtime  insulin lispro (ADMELOG) corrective regimen sliding scale   SubCutaneous three times a day before meals  insulin lispro (ADMELOG) corrective regimen sliding scale   SubCutaneous at bedtime  lactated ringers. 1000 milliLiter(s) (75 mL/Hr) IV Continuous <Continuous>  lactulose Syrup 10 Gram(s) Oral three times a day  levothyroxine 88 MICROGram(s) Oral daily  nystatin Ointment 1 Application(s) Topical two times a day  nystatin Powder 1 Application(s) Topical three times a day  pantoprazole    Tablet 40 milliGRAM(s) Oral every 12 hours  polyethylene glycol 3350 17 Gram(s) Oral daily  rifAXIMin 550 milliGRAM(s) Oral two times a day  senna 2 Tablet(s) Oral at bedtime  simethicone 80 milliGRAM(s) Chew three times a day  spironolactone 50 milliGRAM(s) Oral daily  sucralfate 1 Gram(s) Oral every 6 hours    MEDICATIONS  (PRN):  albuterol/ipratropium for Nebulization 3 milliLiter(s) Nebulizer every 6 hours PRN Shortness of Breath and/or Wheezing      CAPILLARY BLOOD GLUCOSE      POCT Blood Glucose.: 268 mg/dL (28 May 2021 22:02)  POCT Blood Glucose.: 297 mg/dL (28 May 2021 18:09)  POCT Blood Glucose.: 282 mg/dL (28 May 2021 12:32)  POCT Blood Glucose.: 238 mg/dL (28 May 2021 08:41)    I&O's Summary    28 May 2021 07:01  -  29 May 2021 07:00  --------------------------------------------------------  IN: 360 mL / OUT: 600 mL / NET: -240 mL        PHYSICAL EXAM:  Vital Signs Last 24 Hrs  T(C): 36.8 (29 May 2021 06:10), Max: 36.8 (29 May 2021 06:10)  T(F): 98.3 (29 May 2021 06:10), Max: 98.3 (29 May 2021 06:10)  HR: 90 (29 May 2021 06:10) (81 - 90)  BP: 121/70 (29 May 2021 06:10) (100/61 - 121/70)  BP(mean): --  RR: 18 (29 May 2021 06:10) (17 - 18)  SpO2: 97% (29 May 2021 06:10) (94% - 100%)  CONSTITUTIONAL: NAD, lying in bed comfortably  EYES: EOMI, PERRLA; conjunctiva and sclera clear  ENMT: Moist oral mucosa; normal dentition  NECK: Supple, no palpable masses, no JVD  RESPIRATORY: Lungs clear to ascultation b/l; no rales/ronchi/wheezing; unlabored respirations  CARDIOVASCULAR: Regular rate and rhythm, normal S1 and S2, no murmurs/rubs/gallops  ABDOMEN: Soft, normal bowel sounds, nondistended, nontender  MUSCULOSKELETAL: No joint swelling or tenderness to palpation  PSYCH: Affect appropriate  NEUROLOGY: AAOx3, CNs grossly intact  SKIN: No rashes; no palpable lesions    LABS:                        9.3    7.45  )-----------( 30       ( 28 May 2021 07:03 )             29.8     05-28    144  |  111<H>  |  43<H>  ----------------------------<  210<H>  4.6   |  20<L>  |  1.62<H>    Ca    9.4      28 May 2021 07:00  Phos  2.8     05-28  Mg     2.1     05-28    TPro  5.2<L>  /  Alb  3.3  /  TBili  1.6<H>  /  DBili  x   /  AST  25  /  ALT  21  /  AlkPhos  48  05-28                INTERVAL RADIOLOGY/IMAGING STUDIES:     Juan York MD  PGY-1, Internal Medicine  Pager #: [phone]    Patient is a 85y old  Male who presents with a chief complaint of sob, confusion, weakness, can't walk, fever (28 May 2021 16:02)    OVERNIGHT/INTERVAL EVENTS: NC increased for patient comfort.     SUBJECTIVE: Patient seen and examined bedside. Reports improvement in his abdominal distension and less tenseness. No acute new complaints.     Denies fevers/chills, headaches/dizziness, nausea/vomiting, chest pain, SOB. Pt had 1 bowel movement overnight.     MEDICATIONS  (STANDING):  calcium carbonate    500 mG (Tums) Chewable 1 Tablet(s) Chew at bedtime  dextrose 40% Gel 15 Gram(s) Oral once  dextrose 5%. 1000 milliLiter(s) (100 mL/Hr) IV Continuous <Continuous>  dextrose 5%. 1000 milliLiter(s) (50 mL/Hr) IV Continuous <Continuous>  dextrose 50% Injectable 25 Gram(s) IV Push once  dextrose 50% Injectable 12.5 Gram(s) IV Push once  dextrose 50% Injectable 25 Gram(s) IV Push once  furosemide    Tablet 40 milliGRAM(s) Oral daily  glucagon  Injectable 1 milliGRAM(s) IntraMuscular once  insulin glargine Injectable (LANTUS) 5 Unit(s) SubCutaneous at bedtime  insulin lispro (ADMELOG) corrective regimen sliding scale   SubCutaneous three times a day before meals  insulin lispro (ADMELOG) corrective regimen sliding scale   SubCutaneous at bedtime  lactated ringers. 1000 milliLiter(s) (75 mL/Hr) IV Continuous <Continuous>  lactulose Syrup 10 Gram(s) Oral three times a day  levothyroxine 88 MICROGram(s) Oral daily  nystatin Ointment 1 Application(s) Topical two times a day  nystatin Powder 1 Application(s) Topical three times a day  pantoprazole    Tablet 40 milliGRAM(s) Oral every 12 hours  polyethylene glycol 3350 17 Gram(s) Oral daily  rifAXIMin 550 milliGRAM(s) Oral two times a day  senna 2 Tablet(s) Oral at bedtime  simethicone 80 milliGRAM(s) Chew three times a day  spironolactone 50 milliGRAM(s) Oral daily  sucralfate 1 Gram(s) Oral every 6 hours    MEDICATIONS  (PRN):  albuterol/ipratropium for Nebulization 3 milliLiter(s) Nebulizer every 6 hours PRN Shortness of Breath and/or Wheezing      CAPILLARY BLOOD GLUCOSE      POCT Blood Glucose.: 268 mg/dL (28 May 2021 22:02)  POCT Blood Glucose.: 297 mg/dL (28 May 2021 18:09)  POCT Blood Glucose.: 282 mg/dL (28 May 2021 12:32)  POCT Blood Glucose.: 238 mg/dL (28 May 2021 08:41)    I&O's Summary    28 May 2021 07:01  -  29 May 2021 07:00  --------------------------------------------------------  IN: 360 mL / OUT: 600 mL / NET: -240 mL        PHYSICAL EXAM:  Vital Signs Last 24 Hrs  T(C): 36.8 (29 May 2021 06:10), Max: 36.8 (29 May 2021 06:10)  T(F): 98.3 (29 May 2021 06:10), Max: 98.3 (29 May 2021 06:10)  HR: 90 (29 May 2021 06:10) (81 - 90)  BP: 121/70 (29 May 2021 06:10) (100/61 - 121/70)  RR: 18 (29 May 2021 06:10) (17 - 18)  SpO2: 97% (29 May 2021 06:10) (94% - 100%)    CONSTITUTIONAL: NAD, frail, lying in bed  EYES: EOMI, PERRLA; conjunctiva and sclera clear  ENMT: Dry oral mucosa; normal dentition  NECK: Supple, no palpable masses, no JVD  RESPIRATORY: Lungs clear to ascultation b/l; no rales/ronchi/wheezing; unlabored respirations  CARDIOVASCULAR: Regular rate and rhythm, normal S1 and S2, no murmurs/rubs/gallops, +AICD in left chest wall  ABDOMEN: ?TTP diffusley +distended, firm abdomen  MUSCULOSKELETAL: No joint swelling or tenderness to palpation  PSYCH: Affect appropriate  NEUROLOGY: Answers questions and responds to commands appropriately but exhibits mild confusion  SKIN: Large bruises over forearms from blood draws    LABS:                        9.3    7.45  )-----------( 30       ( 28 May 2021 07:03 )             29.8     05-28    144  |  111<H>  |  43<H>  ----------------------------<  210<H>  4.6   |  20<L>  |  1.62<H>    Ca    9.4      28 May 2021 07:00  Phos  2.8     05-28  Mg     2.1     05-28    TPro  5.2<L>  /  Alb  3.3  /  TBili  1.6<H>  /  DBili  x   /  AST  25  /  ALT  21  /  AlkPhos  48  05-28                INTERVAL RADIOLOGY/IMAGING STUDIES:     Juan York MD  PGY-1, Internal Medicine  Pager #: [phone]    Patient is a 85y old  Male who presents with a chief complaint of sob, confusion, weakness, can't walk, fever (28 May 2021 16:02)    OVERNIGHT/INTERVAL EVENTS: NC increased for patient comfort.     SUBJECTIVE: Patient seen and examined bedside. Reports improvement in his abdominal distension and less tenseness. No acute new complaints.     Denies fevers/chills, headaches/dizziness, nausea/vomiting, chest pain, SOB. Pt had 2 bowel movement overnight.     MEDICATIONS  (STANDING):  calcium carbonate    500 mG (Tums) Chewable 1 Tablet(s) Chew at bedtime  dextrose 40% Gel 15 Gram(s) Oral once  dextrose 5%. 1000 milliLiter(s) (100 mL/Hr) IV Continuous <Continuous>  dextrose 5%. 1000 milliLiter(s) (50 mL/Hr) IV Continuous <Continuous>  dextrose 50% Injectable 25 Gram(s) IV Push once  dextrose 50% Injectable 12.5 Gram(s) IV Push once  dextrose 50% Injectable 25 Gram(s) IV Push once  furosemide    Tablet 40 milliGRAM(s) Oral daily  glucagon  Injectable 1 milliGRAM(s) IntraMuscular once  insulin glargine Injectable (LANTUS) 5 Unit(s) SubCutaneous at bedtime  insulin lispro (ADMELOG) corrective regimen sliding scale   SubCutaneous three times a day before meals  insulin lispro (ADMELOG) corrective regimen sliding scale   SubCutaneous at bedtime  lactated ringers. 1000 milliLiter(s) (75 mL/Hr) IV Continuous <Continuous>  lactulose Syrup 10 Gram(s) Oral three times a day  levothyroxine 88 MICROGram(s) Oral daily  nystatin Ointment 1 Application(s) Topical two times a day  nystatin Powder 1 Application(s) Topical three times a day  pantoprazole    Tablet 40 milliGRAM(s) Oral every 12 hours  polyethylene glycol 3350 17 Gram(s) Oral daily  rifAXIMin 550 milliGRAM(s) Oral two times a day  senna 2 Tablet(s) Oral at bedtime  simethicone 80 milliGRAM(s) Chew three times a day  spironolactone 50 milliGRAM(s) Oral daily  sucralfate 1 Gram(s) Oral every 6 hours    MEDICATIONS  (PRN):  albuterol/ipratropium for Nebulization 3 milliLiter(s) Nebulizer every 6 hours PRN Shortness of Breath and/or Wheezing      CAPILLARY BLOOD GLUCOSE      POCT Blood Glucose.: 268 mg/dL (28 May 2021 22:02)  POCT Blood Glucose.: 297 mg/dL (28 May 2021 18:09)  POCT Blood Glucose.: 282 mg/dL (28 May 2021 12:32)  POCT Blood Glucose.: 238 mg/dL (28 May 2021 08:41)    I&O's Summary    28 May 2021 07:01  -  29 May 2021 07:00  --------------------------------------------------------  IN: 360 mL / OUT: 600 mL / NET: -240 mL        PHYSICAL EXAM:  Vital Signs Last 24 Hrs  T(C): 36.8 (29 May 2021 06:10), Max: 36.8 (29 May 2021 06:10)  T(F): 98.3 (29 May 2021 06:10), Max: 98.3 (29 May 2021 06:10)  HR: 90 (29 May 2021 06:10) (81 - 90)  BP: 121/70 (29 May 2021 06:10) (100/61 - 121/70)  RR: 18 (29 May 2021 06:10) (17 - 18)  SpO2: 97% (29 May 2021 06:10) (94% - 100%)    CONSTITUTIONAL: NAD, frail, lying in bed  EYES: EOMI, PERRLA; conjunctiva and sclera clear  ENMT: Dry oral mucosa; normal dentition  NECK: Supple, no palpable masses, no JVD  RESPIRATORY: Lungs clear to ascultation b/l; no rales/ronchi/wheezing; unlabored respirations  CARDIOVASCULAR: Regular rate and rhythm, normal S1 and S2, no murmurs/rubs/gallops, +AICD in left chest wall  ABDOMEN: ?TTP diffusley +distended, firm abdomen  MUSCULOSKELETAL: No joint swelling or tenderness to palpation  PSYCH: Affect appropriate  NEUROLOGY: Answers questions and responds to commands appropriately but exhibits mild confusion  SKIN: Large bruises over forearms from blood draws    LABS:                        9.3    7.45  )-----------( 30       ( 28 May 2021 07:03 )             29.8     05-28    144  |  111<H>  |  43<H>  ----------------------------<  210<H>  4.6   |  20<L>  |  1.62<H>    Ca    9.4      28 May 2021 07:00  Phos  2.8     05-28  Mg     2.1     05-28    TPro  5.2<L>  /  Alb  3.3  /  TBili  1.6<H>  /  DBili  x   /  AST  25  /  ALT  21  /  AlkPhos  48  05-28                INTERVAL RADIOLOGY/IMAGING STUDIES:

## 2021-05-30 NOTE — PROGRESS NOTE ADULT - ATTENDING COMMENTS
85 M with HFrEF, cirrhosis of unknown etiology (suspect 2/2 hepatitis) presented with hepatic encephalopathy, with course complicated by UGIB leading to decompensated cirrhosis, hypotension, and acute renal failure requiring MICU stay for pressor support and temporization of acute GIB.   Improved mentation today. Had several BMs overnight.  -decrease to lactulose 20mg TID; hold afternoon dose   -increase lantus to 8U QHS  -thrombocytopenia in setting of liver disease; stable  -ANDREEA improving  -cont delirium precautions .  -PT eval/OOB to chair

## 2021-05-30 NOTE — PROGRESS NOTE ADULT - PROBLEM SELECTOR PLAN 4
Patient with CKD most likely i/s/o diabetes. Now with ANDREEA on CKD (Scr baseline from notes appears to be 1.2-1.3) now with ANDREEA with SCr downtrending. ANDREEA most likely i/s/o pre-renal azotemia (acute blood loss vs. splanchnic vasoplegia from cirrhosis vs. lethargy and decreased PO intake) vs. ATN from anemia.   -CTM SCr  -Avoid nephrotoxic agents  -Holding patient's entresto and lasix.   -Nephrology following appreciate recommendations  -pt likely dehydrated based on labs and past NPO status, will start pt on some gentle IVFs to supplement diet Patient with CKD most likely i/s/o diabetes. Now with ANDREEA on CKD (Scr baseline from notes appears to be 1.2-1.3) now with ANDREEA with SCr downtrending. ANDREEA most likely i/s/o pre-renal azotemia (acute blood loss vs. splanchnic vasoplegia from cirrhosis vs. lethargy and decreased PO intake) vs. ATN from anemia.   -CTM SCr, downtrending  -Avoid nephrotoxic agents  -Holding patient's entresto and lasix.   -Nephrology following appreciate recommendations  -pt likely dehydrated based on labs and past NPO status, will start pt on some gentle IVFs to supplement diet

## 2021-05-30 NOTE — PROGRESS NOTE ADULT - PROBLEM SELECTOR PLAN 9
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   -Non-urgent neurosurgical evaluation  -Possible cause of [place] [employer] [place]? on the R side.

## 2021-05-30 NOTE — PROGRESS NOTE ADULT - SUBJECTIVE AND OBJECTIVE BOX
***INCOMPLETE NOTE***  Edwin Gross MD PGY-2  Internal Medicine  Pager [phone] / [phone]     Edwin Gross MD PGY-2  Internal Medicine  Pager [phone] / [phone]    Patient is a 85y old  Male who presents with a chief complaint of sob, confusion, weakness, can't walk, fever (30 May 2021 06:26)    SUBJECTIVE / OVERNIGHT EVENTS:  No acute events overnight. Patient not interactive with [employer] Cosmo Calhoun ID# [id number], RN provided translation  - Patient denies current complaints except for need to defecate.     MEDICATIONS  (STANDING):  calcium carbonate    500 mG (Tums) Chewable 1 Tablet(s) Chew at bedtime  dextrose 40% Gel 15 Gram(s) Oral once  dextrose 5%. 1000 milliLiter(s) (50 mL/Hr) IV Continuous <Continuous>  dextrose 5%. 1000 milliLiter(s) (100 mL/Hr) IV Continuous <Continuous>  dextrose 50% Injectable 12.5 Gram(s) IV Push once  dextrose 50% Injectable 25 Gram(s) IV Push once  dextrose 50% Injectable 25 Gram(s) IV Push once  furosemide    Tablet 40 milliGRAM(s) Oral daily  glucagon  Injectable 1 milliGRAM(s) IntraMuscular once  insulin glargine Injectable (LANTUS) 8 Unit(s) SubCutaneous at bedtime  insulin lispro (ADMELOG) corrective regimen sliding scale   SubCutaneous three times a day before meals  insulin lispro (ADMELOG) corrective regimen sliding scale   SubCutaneous at bedtime  lactated ringers. 1000 milliLiter(s) (75 mL/Hr) IV Continuous <Continuous>  lactulose Syrup 20 Gram(s) Oral three times a day  levothyroxine 88 MICROGram(s) Oral daily  nystatin Ointment 1 Application(s) Topical two times a day  nystatin Powder 1 Application(s) Topical three times a day  pantoprazole    Tablet 40 milliGRAM(s) Oral every 12 hours  polyethylene glycol 3350 17 Gram(s) Oral daily  rifAXIMin 550 milliGRAM(s) Oral two times a day  senna 2 Tablet(s) Oral at bedtime  simethicone 80 milliGRAM(s) Chew three times a day  spironolactone 50 milliGRAM(s) Oral daily  sucralfate 1 Gram(s) Oral every 6 hours    MEDICATIONS  (PRN):  albuterol/ipratropium for Nebulization 3 milliLiter(s) Nebulizer every 6 hours PRN Shortness of Breath and/or Wheezing      CAPILLARY BLOOD GLUCOSE      POCT Blood Glucose.: 220 mg/dL (30 May 2021 12:08)  POCT Blood Glucose.: 237 mg/dL (30 May 2021 08:29)  POCT Blood Glucose.: 214 mg/dL (29 May 2021 22:00)  POCT Blood Glucose.: 279 mg/dL (29 May 2021 17:51)    I&O's Summary    29 May 2021 07:01  -  30 May 2021 07:00  --------------------------------------------------------  IN: 100 mL / OUT: 750 mL / NET: -650 mL        PHYSICAL EXAM:  Vital Signs Last 24 Hrs  T(C): 36.4 (05-30-21 @ 11:32)  T(F): 97.5 (05-30-21 @ 11:32), Max: 97.7 (05-30-21 @ 04:32)  HR: 88 (05-30-21 @ 11:32) (84 - 89)  BP: 102/57 (05-30-21 @ 11:32)  BP(mean): --  RR: 18 (05-30-21 @ 11:32) (17 - 20)  SpO2: 100% (05-30-21 @ 11:32) (99% - 100%)  Wt(kg): --    05-29 @ 07:01  -  05-30 @ 07:00  --------------------------------------------------------  IN: 100 mL / OUT: 750 mL / NET: -650 mL    CONSTITUTIONAL: NAD, frail, lying in bed  EYES: EOMI, PERRLA; conjunctiva and sclera clear  ENMT: Dry oral mucosa; normal dentition  NECK: Supple, no palpable masses  RESPIRATORY: Lungs clear to ascultation b/l; no rales/ronchi/wheezing; unlabored respirations  CARDIOVASCULAR: Regular rate and rhythm, normal S1 and S2, no murmurs/rubs/gallops, +AICD in left chest wall  ABDOMEN: TTP diffusely +distended, fullness but not rigid abdomen  MUSCULOSKELETAL: No joint swelling or tenderness to palpation  NEUROLOGY: Answers questions and responds to commands appropriately but exhibits mild confusion  SKIN: Large bruises over forearms from blood draws    LABS:                        8.6    4.62  )-----------( 28       ( 30 May 2021 07:33 )             26.9     05-30    147<H>  |  112<H>  |  43<H>  ----------------------------<  213<H>  4.5   |  20<L>  |  1.44<H>    Ca    9.5      30 May 2021 07:30  Phos  3.2     05-30  Mg     2.0     05-30    TPro  5.4<L>  /  Alb  3.4  /  TBili  2.0<H>  /  DBili  x   /  AST  24  /  ALT  19  /  AlkPhos  45  05-30        RADIOLOGY & ADDITIONAL TESTS:    Imaging Personally Reviewed:    Consultant(s) Notes Reviewed:      Care Discussed with Consultants/Other Providers:

## 2021-05-30 NOTE — PROGRESS NOTE ADULT - PROBLEM SELECTOR PLAN 5
Acute on chronic hyponatremia. Appears to be hypervolemic hypernatremia i/s/o cirrhosis. Patient's diuresis on hold given ANDREEA. ILw=327 on presentation now ITi=914  -CTM on daily BMPs  -Holding steady @139, s/p D5 200cc/2hrs. Will CTM.

## 2021-05-30 NOTE — PROGRESS NOTE ADULT - PROBLEM SELECTOR PLAN 2
Decompensated i/s/o possible worsening ascites vs. UGIB vs. metabolic derangement (hyponatremia) leading to patient's hepatic encephalopathy and worsening status. Cirrhosis most likely i/s/o hepatitis.   -F/u acute hepatitis panel and cirrhosis labs  -MELD-Na=27, f/u daily MELD labs  -GIB stable. On clears for now.   -Ascites: Holding lasix i/s/o ARF and hypotension will need to touch base with hepatology whether to restart prior to discharge.   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 Now on PPI and ceftriaxone for SBP prophylaxis till 5/24.   -HE: C/w lactolose 10 TID and 550 mg rifaximin BID.  -Hepatology recs appreciated: started carafate 1g Q6H for 14 days, Lasix 40 mg daily, Spironolactone 50 mg daily   -However, pt failed MBS study, will convert all possible PO medications to NPO methods  -touch-base with speech+swallow again on timing for repeat MBS study, as well as updates on pt condition and language barrier possibly affecting swallow studies  -repeat MBS study on (5/26) @ 1:30 PM, pt now on dysphagia 1 diet and medications converted to oral  -pt with ascites and distended abdomen, symptomatic with abdominal pain/tenderness/discomfort -> will obtain abdominal ultrasound to assess ascites and consult IR for possible therapeutic drain  -s/p therapeutic drain 5/28, pt reports improvement in abdominal symptoms

## 2021-05-30 NOTE — PROGRESS NOTE ADULT - PROBLEM SELECTOR PLAN 3
Encephalopathy multifactorial in etiology of metabolic derangements (hyponatremia vs. HE vs ARF), patient endorsing subacute onset of weakness and lethargy unclear how far off the patient is from baseline however, patient seems extremely weak, maybe in the setting of possible symptomatic anemia as well. No evidence of hypercapnia or hypoxia on patient's vitals and labs.   -CTM  -CTH negative for acute bleed.   -Passed bedside speech and swallow eval pt on dysphagia I diet for now, will reconsult Speech and swallow as patient with evidence of coughing from clear liquid diet.   -C/w lactulose and rifaximin titrate to 3-4 BMs. Stool counts.  -Pt now on dysphagia 1 diet after 2nd MBS study Encephalopathy multifactorial in etiology of metabolic derangements (hyponatremia vs. HE vs ARF), patient endorsing subacute onset of weakness and lethargy unclear how far off the patient is from baseline however, patient seems extremely weak, maybe in the setting of possible symptomatic anemia as well. No evidence of hypercapnia or hypoxia on patient's vitals and labs.   -CTM  -CTH negative for acute bleed.   -Passed bedside speech and swallow eval pt on dysphagia I diet for now, will reconsult Speech and swallow as patient with evidence of coughing from clear liquid diet.   -C/w lactulose and rifaximin titrate to 3-4 BMs. Stool counts. Decrease lactulose to 20mg q8h  -Pt now on dysphagia 1 diet after 2nd MBS study

## 2021-05-30 NOTE — PROGRESS NOTE ADULT - PROBLEM SELECTOR PLAN 8
A1c=5.5 most likely normal i/s/o possible GI bleed. Patient on toujeo 20 and humalog 8 premeals at home.   -Restarting consistent Carb diet on 5/20  -Will hold off on premeal insulin with sliding scale and adjust insulin as needed. LESTER as needed.   -Will dose reduce basal insulin by 75% and start the patient on 5units.  -Pt started on D5 w/ NS for NPO status A1c=5.5 most likely normal i/s/o possible GI bleed. Patient on toujeo 20 and humalog 8 premeals at home.   -Restarting consistent Carb diet on 5/20  - Increased Lantus to 8u qhs  -Will hold off on premeal insulin with sliding scale and adjust insulin as needed. LESTER as needed.

## 2021-05-31 NOTE — PROGRESS NOTE ADULT - PROBLEM/PLAN-7
Message left for pt that a script was sent in.  
DISPLAY PLAN FREE TEXT

## 2021-05-31 NOTE — PROGRESS NOTE ADULT - PROBLEM SELECTOR PLAN 8
A1c=5.5 most likely normal i/s/o possible GI bleed. Patient on toujeo 20 and humalog 8 premeals at home.   -Restarting consistent Carb diet on 5/20  - Increased Lantus to 8u qhs  -Will hold off on premeal insulin with sliding scale and adjust insulin as needed. LESTER as needed.

## 2021-05-31 NOTE — PROGRESS NOTE ADULT - PROBLEM SELECTOR PROBLEM 7
CAD (coronary artery disease)
Acute kidney injury
CAD (coronary artery disease)
Type 2 diabetes mellitus with chronic kidney disease, with long-term current use of insulin, unspecified CKD stage
CAD (coronary artery disease)

## 2021-05-31 NOTE — PROGRESS NOTE ADULT - PROBLEM SELECTOR PROBLEM 9
Cerebellopontine angle tumor
Debility
Tumor
Cerebellopontine angle tumor

## 2021-05-31 NOTE — PROGRESS NOTE ADULT - PROBLEM SELECTOR PLAN 2
Decompensated i/s/o possible worsening ascites vs. UGIB vs. metabolic derangement (hyponatremia) leading to patient's hepatic encephalopathy and worsening status. Cirrhosis most likely i/s/o hepatitis.   -F/u acute hepatitis panel and cirrhosis labs  -MELD-Na=27, f/u daily MELD labs  -GIB stable. On clears for now.   -Ascites: Holding lasix i/s/o ARF and hypotension will need to touch base with hepatology whether to restart prior to discharge.   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 Now on PPI and ceftriaxone for SBP prophylaxis till 5/24.   -HE: C/w lactolose 10 TID and 550 mg rifaximin BID.  -Hepatology recs appreciated: started carafate 1g Q6H for 14 days, Lasix 40 mg daily, Spironolactone 50 mg daily   -repeat MBS study on (5/26) @ 1:30 PM, pt now on dysphagia 1 diet and medications converted to oral Decompensated i/s/o possible worsening ascites vs. UGIB vs. metabolic derangement (hyponatremia) leading to patient's hepatic encephalopathy and worsening status. Cirrhosis most likely i/s/o hepatitis.   -F/u acute hepatitis panel and cirrhosis labs  -MELD-Na=27, f/u daily MELD labs  -GIB stable. On clears for now.   -Ascites: Holding lasix i/s/o ARF and hypotension will need to touch base with hepatology whether to restart prior to discharge.   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 Now on PPI and ceftriaxone for SBP prophylaxis till 5/24.   -HE: went up to lactolose 30 TID and 550 mg rifaximin BID.  -Hepatology recs appreciated: started carafate 1g Q6H for 14 days, Lasix 40 mg daily, Spironolactone 50 mg daily   -repeat MBS study on (5/26) @ 1:30 PM, pt now on dysphagia 1 diet and medications converted to oral

## 2021-05-31 NOTE — PROGRESS NOTE ADULT - PROBLEM SELECTOR PLAN 3
Encephalopathy multifactorial in etiology of metabolic derangements (hyponatremia vs. HE vs ARF), patient endorsing subacute onset of weakness and lethargy unclear how far off the patient is from baseline however, patient seems extremely weak, maybe in the setting of possible symptomatic anemia as well. No evidence of hypercapnia or hypoxia on patient's vitals and labs.   -CTM  -CTH negative for acute bleed.   -Passed bedside speech and swallow eval pt on dysphagia I diet for now, will reconsult Speech and swallow as patient with evidence of coughing from clear liquid diet.   -C/w lactulose and rifaximin titrate to 3-4 BMs. Stool counts. Decrease lactulose to 20mg q8h  -Pt now on dysphagia 1 diet after 2nd MBS study

## 2021-05-31 NOTE — PROGRESS NOTE ADULT - PROBLEM SELECTOR PLAN 7
S/p LAD stent on home plavix.   -Holding plavix i/s/o bleeding.
#ANDREEA- Baseline cr ~1.3, initially improved, now worsening- suspect 2/2 intravasculr depletion/blood loss with likely ATN from hypotensive insult  - cont reyes, i/o monitoring  - cont albumin/transfuse prbc  - bladder scan/pvr to ensure not retaining urine  - hold diuretics  - check ua/ur lytes as above
S/p LAD stent on home plavix.   -Holding plavix i/s/o bleeding.
S/p LAD stent on home plavix.   -Holding plavix i/s/o bleeding.
- hx of poor appetite, cirrhosis, and glc 86 on BMP  - hold long acting for now. ISS only  - restart long acting as glc increases
S/p LAD stent on home plavix.   -Holding plavix i/s/o bleeding.

## 2021-05-31 NOTE — PROGRESS NOTE ADULT - PROBLEM SELECTOR PLAN 9
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   -Non-urgent neurosurgical evaluation  -Possible cause of [place]? on the R side.

## 2021-05-31 NOTE — PROGRESS NOTE ADULT - SUBJECTIVE AND OBJECTIVE BOX
pager [phone] Patient is a 85y old  Male who presents with a chief complaint of sob, confusion, weakness, can't walk, fever (31 May 2021 08:04)        SUBJECTIVE / OVERNIGHT EVENTS:  No acute events reported overnight. Pt seen and examined at bedside.       MEDICATIONS  (STANDING):  calcium carbonate    500 mG (Tums) Chewable 1 Tablet(s) Chew at bedtime  dextrose 40% Gel 15 Gram(s) Oral once  dextrose 5%. 1000 milliLiter(s) (50 mL/Hr) IV Continuous <Continuous>  dextrose 5%. 1000 milliLiter(s) (100 mL/Hr) IV Continuous <Continuous>  dextrose 50% Injectable 12.5 Gram(s) IV Push once  dextrose 50% Injectable 25 Gram(s) IV Push once  dextrose 50% Injectable 25 Gram(s) IV Push once  furosemide    Tablet 40 milliGRAM(s) Oral daily  glucagon  Injectable 1 milliGRAM(s) IntraMuscular once  insulin glargine Injectable (LANTUS) 8 Unit(s) SubCutaneous at bedtime  insulin lispro (ADMELOG) corrective regimen sliding scale   SubCutaneous three times a day before meals  insulin lispro (ADMELOG) corrective regimen sliding scale   SubCutaneous at bedtime  lactated ringers. 1000 milliLiter(s) (75 mL/Hr) IV Continuous <Continuous>  lactulose Syrup 30 Gram(s) Oral three times a day  levothyroxine 88 MICROGram(s) Oral daily  nystatin Ointment 1 Application(s) Topical two times a day  nystatin Powder 1 Application(s) Topical three times a day  pantoprazole    Tablet 40 milliGRAM(s) Oral every 12 hours  polyethylene glycol 3350 17 Gram(s) Oral daily  rifAXIMin 550 milliGRAM(s) Oral two times a day  senna 2 Tablet(s) Oral at bedtime  simethicone 80 milliGRAM(s) Chew three times a day  spironolactone 50 milliGRAM(s) Oral daily  sucralfate 1 Gram(s) Oral every 6 hours    MEDICATIONS  (PRN):  albuterol/ipratropium for Nebulization 3 milliLiter(s) Nebulizer every 6 hours PRN Shortness of Breath and/or Wheezing      Vital Signs Last 24 Hrs  T(C): 36.3 (31 May 2021 11:51), Max: 36.8 (30 May 2021 20:08)  T(F): 97.4 (31 May 2021 11:51), Max: 98.2 (30 May 2021 20:08)  HR: 101 (31 May 2021 11:51) (90 - 101)  BP: 117/77 (31 May 2021 11:51) (112/66 - 125/57)  BP(mean): --  RR: 17 (31 May 2021 11:51) (17 - 17)  SpO2: 100% (31 May 2021 11:51) (100% - 100%)    CAPILLARY BLOOD GLUCOSE      POCT Blood Glucose.: 214 mg/dL (31 May 2021 12:17)  POCT Blood Glucose.: 176 mg/dL (31 May 2021 08:16)  POCT Blood Glucose.: 197 mg/dL (30 May 2021 22:16)  POCT Blood Glucose.: 253 mg/dL (30 May 2021 17:52)    I&O's Summary    30 May 2021 07:01  -  31 May 2021 07:00  --------------------------------------------------------  IN: 150 mL / OUT: 650 mL / NET: -500 mL        CONSTITUTIONAL: NAD, frail, lying in bed  ENMT: Dry oral mucosa; normal dentition  NECK: Supple, no palpable masses  RESPIRATORY: Lungs clear to ascultation b/l; no rales/ronchi/wheezing; unlabored respirations  CARDIOVASCULAR: Regular rate and rhythm, normal S1 and S2, no murmurs/rubs/gallops, +AICD in left chest wall  ABDOMEN: TTP diffusely +distended, fullness but not rigid abdomen  MUSCULOSKELETAL: No joint swelling or tenderness to palpation  NEUROLOGY: Answers questions and responds to commands appropriately but exhibits mild confusion  SKIN: Large bruises over forearms from blood draws      LABS:                        8.9    4.73  )-----------( 33       ( 31 May 2021 06:53 )             28.4     Auto Eosinophil # x     / Auto Eosinophil % x     / Auto Neutrophil # x     / Auto Neutrophil % x     / BANDS % x                            8.6    4.62  )-----------( 28       ( 30 May 2021 07:33 )             26.9     Auto Eosinophil # x     / Auto Eosinophil % x     / Auto Neutrophil # x     / Auto Neutrophil % x     / BANDS % x        05-31    144  |  109<H>  |  42<H>  ----------------------------<  193<H>  4.5   |  21<L>  |  1.49<H>  05-30    147<H>  |  112<H>  |  43<H>  ----------------------------<  213<H>  4.5   |  20<L>  |  1.44<H>    Ca    9.7      31 May 2021 06:53  Mg     2.0     05-31  Phos  3.2     05-31  TPro  5.8<L>  /  Alb  3.6  /  TBili  2.2<H>  /  DBili  x   /  AST  22  /  ALT  20  /  AlkPhos  47  05-31  TPro  5.4<L>  /  Alb  3.4  /  TBili  2.0<H>  /  DBili  x   /  AST  24  /  ALT  19  /  AlkPhos  45  05-30    PT/INR - ( 31 May 2021 07:22 )   PT: 18.4 sec;   INR: 1.57 ratio         PTT - ( 31 May 2021 07:22 )  PTT:44.1 sec            RESPIRATORY  VENT:    ABG:     VBG:     RADIOLOGY & ADDITIONAL TESTS:  (Imaging Personally Reviewed)    Consultant(s) Notes Reviewed:      Care Discussed with Consultants/Other Providers:

## 2021-05-31 NOTE — PROGRESS NOTE ADULT - PROBLEM SELECTOR PROBLEM 8
DM (diabetes mellitus)
Type 2 diabetes mellitus with chronic kidney disease, with long-term current use of insulin, unspecified CKD stage
DM (diabetes mellitus)
Debility
DM (diabetes mellitus)

## 2021-05-31 NOTE — PROGRESS NOTE ADULT - PROBLEM SELECTOR PROBLEM 10
Need for prophylactic measure
Tumor
Need for prophylactic measure

## 2021-05-31 NOTE — PROGRESS NOTE ADULT - PROBLEM SELECTOR PLAN 4
Patient with CKD most likely i/s/o diabetes. Now with ANDREEA on CKD (Scr baseline from notes appears to be 1.2-1.3) now with ANDREEA with SCr downtrending. ANDREEA most likely i/s/o pre-renal azotemia (acute blood loss vs. splanchnic vasoplegia from cirrhosis vs. lethargy and decreased PO intake) vs. ATN from anemia.   -CTM SCr, downtrending  -Avoid nephrotoxic agents  -Holding patient's entresto and lasix.   -Nephrology following appreciate recommendations  -pt likely dehydrated based on labs and past NPO status, will start pt on some gentle IVFs to supplement diet

## 2021-05-31 NOTE — PROGRESS NOTE ADULT - PROBLEM SELECTOR PLAN 5
Acute on chronic hyponatremia. Appears to be hypervolemic hypernatremia i/s/o cirrhosis. Patient's diuresis on hold given ANDREEA. IAx=381 on presentation now YJi=915  -CTM on daily BMPs  -Holding steady @139, s/p D5 200cc/2hrs. Will CTM.

## 2021-05-31 NOTE — PROGRESS NOTE ADULT - ATTENDING COMMENTS
85 M with HFrEF, cirrhosis of unknown etiology (suspect 2/2 hepatitis) presented with hepatic encephalopathy, with course complicated by UGIB leading to decompensated cirrhosis, hypotension, and acute renal failure requiring MICU stay for pressor support and temporization of acute GIB.   Mentation slightly worse today compared to yesterday. AAO x 3 but confused, mildly tremulous; per nurse, was talking to the wall   Cytopathology negative for malignant cells  -Increase lactulose back to 30mg TID  -awaiting further hepatology recs  -abdomen more distended today, however denies pain; might need repeat paracentesis prior to d/c  Rest as above

## 2021-06-01 NOTE — PROGRESS NOTE ADULT - SUBJECTIVE AND OBJECTIVE BOX
CHIEF COMPLAINT:    Interval Events:    REVIEW OF SYSTEMS:  CONSTITUTIONAL: No weakness, fevers or chills  EYES/ENT: No visual changes;  No vertigo or throat pain   NECK: No pain or stiffness  RESPIRATORY: No cough, wheezing, hemoptysis; No shortness of breath  CARDIOVASCULAR: No chest pain or palpitations  GASTROINTESTINAL: No abdominal or epigastric pain. No nausea, vomiting, or hematemesis; No diarrhea or constipation. No melena or hematochezia.  GENITOURINARY: No dysuria, frequency or hematuria  NEUROLOGICAL: No numbness or weakness  SKIN: No itching, burning, rashes, or lesions   All other review of systems is negative unless indicated above.    OBJECTIVE:  ICU Vital Signs Last 24 Hrs  T(C): 36.4 (01 Jun 2021 12:24), Max: 36.5 (01 Jun 2021 00:00)  T(F): 97.5 (01 Jun 2021 12:24), Max: 97.7 (01 Jun 2021 00:00)  HR: 99 (01 Jun 2021 12:24) (89 - 108)  BP: 133/71 (01 Jun 2021 12:24) (114/65 - 146/65)  BP(mean): --  ABP: --  ABP(mean): --  RR: 20 (01 Jun 2021 12:24) (18 - 20)  SpO2: 100% (01 Jun 2021 12:24) (92% - 100%)        05-31 @ 07:01  -  06-01 @ 07:00  --------------------------------------------------------  IN: 0 mL / OUT: 200 mL / NET: -200 mL      CAPILLARY BLOOD GLUCOSE      POCT Blood Glucose.: 212 mg/dL (01 Jun 2021 08:12)      PHYSICAL EXAM:  General: WN/WD NAD  Neurology: A&Ox3, nonfocal, VALE x 4  Eyes: PERRLA/ EOMI, Gross vision intact  ENT/Neck: Neck supple, trachea midline, No JVD, Gross hearing intact  Respiratory: CTA B/L, No wheezing, rales, rhonchi  CV: RRR, +S1/S2, -S3/S4, no murmurs, rubs or gallops  Abdominal: Soft, NT, ND +BS, No HSM  MSK: 5/5 strength UE/LE bilaterally  Extremities: No edema, 2+ peripheral pulses  Skin: No Rashes, Hematoma, Ecchymosis  Incisions:   Tubes:    HOSPITAL MEDICATIONS:  MEDICATIONS  (STANDING):  calcium carbonate    500 mG (Tums) Chewable 1 Tablet(s) Chew at bedtime  dextrose 40% Gel 15 Gram(s) Oral once  dextrose 5%. 1000 milliLiter(s) (50 mL/Hr) IV Continuous <Continuous>  dextrose 5%. 1000 milliLiter(s) (100 mL/Hr) IV Continuous <Continuous>  dextrose 50% Injectable 12.5 Gram(s) IV Push once  dextrose 50% Injectable 25 Gram(s) IV Push once  dextrose 50% Injectable 25 Gram(s) IV Push once  furosemide    Tablet 40 milliGRAM(s) Oral daily  glucagon  Injectable 1 milliGRAM(s) IntraMuscular once  insulin glargine Injectable (LANTUS) 8 Unit(s) SubCutaneous at bedtime  insulin lispro (ADMELOG) corrective regimen sliding scale   SubCutaneous three times a day before meals  insulin lispro (ADMELOG) corrective regimen sliding scale   SubCutaneous at bedtime  lactated ringers. 1000 milliLiter(s) (75 mL/Hr) IV Continuous <Continuous>  lactulose Syrup 30 Gram(s) Oral three times a day  levothyroxine 88 MICROGram(s) Oral daily  nystatin Ointment 1 Application(s) Topical two times a day  nystatin Powder 1 Application(s) Topical three times a day  pantoprazole    Tablet 40 milliGRAM(s) Oral every 12 hours  polyethylene glycol 3350 17 Gram(s) Oral daily  rifAXIMin 550 milliGRAM(s) Oral two times a day  senna 2 Tablet(s) Oral at bedtime  simethicone 80 milliGRAM(s) Chew three times a day  spironolactone 50 milliGRAM(s) Oral daily  sucralfate 1 Gram(s) Oral every 6 hours    MEDICATIONS  (PRN):  albuterol/ipratropium for Nebulization 3 milliLiter(s) Nebulizer every 6 hours PRN Shortness of Breath and/or Wheezing      LABS:                        9.3    4.85  )-----------( 41       ( 01 Jun 2021 06:49 )             29.5     Hgb Trend: 9.3<--, 8.9<--, 8.6<--, 8.3<--, 9.3<--  06-01    146<H>  |  109<H>  |  42<H>  ----------------------------<  193<H>  4.6   |  23  |  1.52<H>    Ca    10.2      01 Jun 2021 06:48  Phos  3.2     05-31  Mg     2.0     05-31    TPro  6.3  /  Alb  4.0  /  TBili  2.3<H>  /  DBili  x   /  AST  23  /  ALT  22  /  AlkPhos  54  06-01    Creatinine Trend: 1.52<--, 1.49<--, 1.44<--, 1.57<--, 1.62<--, 1.29<--  PT/INR - ( 01 Jun 2021 06:49 )   PT: 17.3 sec;   INR: 1.47 ratio         PTT - ( 01 Jun 2021 06:49 )  PTT:39.4 sec          MICROBIOLOGY:       RADIOLOGY:  [ ] Reviewed by me    PULMONARY FUNCTION TESTS:    EKG: CHIEF COMPLAINT:    Interval Events:    REVIEW OF SYSTEMS:  CONSTITUTIONAL: No weakness, fevers or chills  EYES/ENT: No visual changes;  No vertigo or throat pain   NECK: No pain or stiffness  RESPIRATORY: No cough, wheezing, hemoptysis; No shortness of breath  CARDIOVASCULAR: No chest pain or palpitations  GASTROINTESTINAL: No abdominal or epigastric pain. No nausea, vomiting, or hematemesis; No diarrhea or constipation. No melena or hematochezia.  GENITOURINARY: No dysuria, frequency or hematuria  NEUROLOGICAL: No numbness or weakness  SKIN: No itching, burning, rashes, or lesions   All other review of systems is negative unless indicated above.    OBJECTIVE:  ICU Vital Signs Last 24 Hrs  T(C): 36.4 (01 Jun 2021 12:24), Max: 36.5 (01 Jun 2021 00:00)  T(F): 97.5 (01 Jun 2021 12:24), Max: 97.7 (01 Jun 2021 00:00)  HR: 99 (01 Jun 2021 12:24) (89 - 108)  BP: 133/71 (01 Jun 2021 12:24) (114/65 - 146/65)  BP(mean): --  ABP: --  ABP(mean): --  RR: 20 (01 Jun 2021 12:24) (18 - 20)  SpO2: 100% (01 Jun 2021 12:24) (92% - 100%)        05-31 @ 07:01  -  06-01 @ 07:00  --------------------------------------------------------  IN: 0 mL / OUT: 200 mL / NET: -200 mL      CAPILLARY BLOOD GLUCOSE      POCT Blood Glucose.: 212 mg/dL (01 Jun 2021 08:12)      PHYSICAL EXAM:  General: WN/WD NAD  Neurology: A&Ox3, nonfocal, VALE x 4  Eyes: PERRLA/ EOMI, Gross vision intact  ENT/Neck: Neck supple, trachea midline, No JVD, Gross hearing intact  Respiratory: CTA B/L, No wheezing, rales, rhonchi  CV: RRR, +S1/S2, -S3/S4, no murmurs, rubs or gallops  Abdominal: Soft, NT, ND +BS, No HSM  MSK: 5/5 strength UE/LE bilaterally  Extremities: No edema, 2+ peripheral pulses  Skin: No Rashes, Hematoma, Ecchymosis  Incisions:   Tubes:    HOSPITAL MEDICATIONS:  MEDICATIONS  (STANDING):  calcium carbonate    500 mG (Tums) Chewable 1 Tablet(s) Chew at bedtime  dextrose 40% Gel 15 Gram(s) Oral once  dextrose 5%. 1000 milliLiter(s) (50 mL/Hr) IV Continuous <Continuous>  dextrose 5%. 1000 milliLiter(s) (100 mL/Hr) IV Continuous <Continuous>  dextrose 50% Injectable 12.5 Gram(s) IV Push once  dextrose 50% Injectable 25 Gram(s) IV Push once  dextrose 50% Injectable 25 Gram(s) IV Push once  furosemide    Tablet 40 milliGRAM(s) Oral daily  glucagon  Injectable 1 milliGRAM(s) IntraMuscular once  insulin glargine Injectable (LANTUS) 8 Unit(s) SubCutaneous at bedtime  insulin lispro (ADMELOG) corrective regimen sliding scale   SubCutaneous three times a day before meals  insulin lispro (ADMELOG) corrective regimen sliding scale   SubCutaneous at bedtime  lactated ringers. 1000 milliLiter(s) (75 mL/Hr) IV Continuous <Continuous>  lactulose Syrup 30 Gram(s) Oral three times a day  levothyroxine 88 MICROGram(s) Oral daily  nystatin Ointment 1 Application(s) Topical two times a day  nystatin Powder 1 Application(s) Topical three times a day  pantoprazole    Tablet 40 milliGRAM(s) Oral every 12 hours  polyethylene glycol 3350 17 Gram(s) Oral daily  rifAXIMin 550 milliGRAM(s) Oral two times a day  senna 2 Tablet(s) Oral at bedtime  simethicone 80 milliGRAM(s) Chew three times a day  spironolactone 50 milliGRAM(s) Oral daily  sucralfate 1 Gram(s) Oral every 6 hours    MEDICATIONS  (PRN):  albuterol/ipratropium for Nebulization 3 milliLiter(s) Nebulizer every 6 hours PRN Shortness of Breath and/or Wheezing      LABS:                        9.3    4.85  )-----------( 41       ( 01 Jun 2021 06:49 )             29.5     Hgb Trend: 9.3<--, 8.9<--, 8.6<--, 8.3<--, 9.3<--  06-01    146<H>  |  109<H>  |  42<H>  ----------------------------<  193<H>  4.6   |  23  |  1.52<H>    Ca    10.2      01 Jun 2021 06:48  Phos  3.2     05-31  Mg     2.0     05-31    TPro  6.3  /  Alb  4.0  /  TBili  2.3<H>  /  DBili  x   /  AST  23  /  ALT  22  /  AlkPhos  54  06-01    Creatinine Trend: 1.52<--, 1.49<--, 1.44<--, 1.57<--, 1.62<--, 1.29<--  PT/INR - ( 01 Jun 2021 06:49 )   PT: 17.3 sec;   INR: 1.47 ratio         PTT - ( 01 Jun 2021 06:49 )  PTT:39.4 sec          MICROBIOLOGY:       RADIOLOGY:  [ x] Reviewed by me    PULMONARY FUNCTION TESTS:    EKG: CHIEF COMPLAINT:    Interval Events:  85M PMH CHFrEF (EF 40-45%), 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 Re-consulted to floor for recurrent right sided pleural effusion causing respiratory compromise. 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 On exam, patient is lethargic, responsive to only deep stimulation. His breathing is labored and uneven with accessory muscle use noted. Bedside ABG demonstrated normal pH with pCO2 that was 48 despite his high respiratory rate. Ammonia was checked and was unremarkable. 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          REVIEW OF SYSTEMS:  [x] Unable to obtain 2/2 lethargy and AMS    OBJECTIVE:  ICU Vital Signs Last 24 Hrs  T(C): 36.4 (01 Jun 2021 12:24), Max: 36.5 (01 Jun 2021 00:00)  T(F): 97.5 (01 Jun 2021 12:24), Max: 97.7 (01 Jun 2021 00:00)  HR: 99 (01 Jun 2021 12:24) (89 - 108)  BP: 133/71 (01 Jun 2021 12:24) (114/65 - 146/65)  BP(mean): --  ABP: --  ABP(mean): --  RR: 20 (01 Jun 2021 12:24) (18 - 20)  SpO2: 100% (01 Jun 2021 12:24) (92% - 100%)        05-31 @ 07:01  -  06-01 @ 07:00  --------------------------------------------------------  IN: 0 mL / OUT: 200 mL / NET: -200 mL      CAPILLARY BLOOD GLUCOSE      POCT Blood Glucose.: 212 mg/dL (01 Jun 2021 08:12)      PHYSICAL EXAM:  General: lethargic. Uneven breathing pattern. Toxic appearing  Neurology: non-lateralizing findings  Eyes: EOMI, Gross vision intact  ENT/Neck: Neck supple, trachea midline, No JVD, Gross hearing intact  Respiratory: Decreased BS on the right. CTA on left. No wheezing.   CV: +tachycardia, +S1/S2, -S3/S4, no murmurs, rubs or gallops  Abdominal: Soft, NT, ND +BS, No HSM  MSK: 5/5 strength UE/LE bilaterally  Extremities: +edema, 2+ peripheral pulses  Skin: No Rashes, Hematoma, Ecchymosis      HOSPITAL MEDICATIONS:  MEDICATIONS  (STANDING):  calcium carbonate    500 mG (Tums) Chewable 1 Tablet(s) Chew at bedtime  dextrose 40% Gel 15 Gram(s) Oral once  dextrose 5%. 1000 milliLiter(s) (50 mL/Hr) IV Continuous <Continuous>  dextrose 5%. 1000 milliLiter(s) (100 mL/Hr) IV Continuous <Continuous>  dextrose 50% Injectable 12.5 Gram(s) IV Push once  dextrose 50% Injectable 25 Gram(s) IV Push once  dextrose 50% Injectable 25 Gram(s) IV Push once  furosemide    Tablet 40 milliGRAM(s) Oral daily  glucagon  Injectable 1 milliGRAM(s) IntraMuscular once  insulin glargine Injectable (LANTUS) 8 Unit(s) SubCutaneous at bedtime  insulin lispro (ADMELOG) corrective regimen sliding scale   SubCutaneous three times a day before meals  insulin lispro (ADMELOG) corrective regimen sliding scale   SubCutaneous at bedtime  lactated ringers. 1000 milliLiter(s) (75 mL/Hr) IV Continuous <Continuous>  lactulose Syrup 30 Gram(s) Oral three times a day  levothyroxine 88 MICROGram(s) Oral daily  nystatin Ointment 1 Application(s) Topical two times a day  nystatin Powder 1 Application(s) Topical three times a day  pantoprazole    Tablet 40 milliGRAM(s) Oral every 12 hours  polyethylene glycol 3350 17 Gram(s) Oral daily  rifAXIMin 550 milliGRAM(s) Oral two times a day  senna 2 Tablet(s) Oral at bedtime  simethicone 80 milliGRAM(s) Chew three times a day  spironolactone 50 milliGRAM(s) Oral daily  sucralfate 1 Gram(s) Oral every 6 hours    MEDICATIONS  (PRN):  albuterol/ipratropium for Nebulization 3 milliLiter(s) Nebulizer every 6 hours PRN Shortness of Breath and/or Wheezing      LABS:                        9.3    4.85  )-----------( 41       ( 01 Jun 2021 06:49 )             29.5     Hgb Trend: 9.3<--, 8.9<--, 8.6<--, 8.3<--, 9.3<--  06-01    146<H>  |  109<H>  |  42<H>  ----------------------------<  193<H>  4.6   |  23  |  1.52<H>    Ca    10.2      01 Jun 2021 06:48  Phos  3.2     05-31  Mg     2.0     05-31    TPro  6.3  /  Alb  4.0  /  TBili  2.3<H>  /  DBili  x   /  AST  23  /  ALT  22  /  AlkPhos  54  06-01    Creatinine Trend: 1.52<--, 1.49<--, 1.44<--, 1.57<--, 1.62<--, 1.29<--  PT/INR - ( 01 Jun 2021 06:49 )   PT: 17.3 sec;   INR: 1.47 ratio         PTT - ( 01 Jun 2021 06:49 )  PTT:39.4 sec          MICROBIOLOGY:       RADIOLOGY:  [x] Reviewed by me    POCUS  - Large right sided pleural effusion w/o diaphragmatic flattening

## 2021-06-01 NOTE — CHART NOTE - NSCHARTNOTEFT_GEN_A_CORE
Called  by RN  that  pt  has  de-sat  to  88-90 . Pt  was  seen  an  evaluated  Pt  oxygen  level  is  90%  on  5  liters.  Pt   lings  has  + crackles  at  the  bases.  Pt  has  abdominal  ascites  and  lower  extremity  edema.   Lasix  is being  held  as  per  nephrology  for  worsening  creatnine.  Spoke  to  [facility]  Dr Ministerio Thakur  who  agrees  with  the  plan.  Lasix  20  IVP  x1  dose  ordered  now.    Will   continue   to  monitor.  F/u  with  am  labs.        Robert Pantoja  ANP  [id number]

## 2021-06-01 NOTE — PROGRESS NOTE ADULT - ASSESSMENT
85M PMHx CKD, cirrhosis (?viral hepatitis) - admitted for decompensated cirrhosis.  Nephrology consulted for hyponatremia and ANDREEA on CKD.      #Hyponatremia  - Resolved    # ANDREEA  Pt with non-oliguric ANDREEA on CKD likely 2/2 ATN in the setting hypotension, entresto/lasix, acute anemia and decreased EABV.  On admission sCr 2.0 (baseline sCr 1.4-1.6 in Jan 2019), peaked at 2.7mg/dl, improved to 1.2mg/dl. Now up to 1.5mg/dl. UA bland.   - C/w lasix/ spironolactone at this time as patient appears to be hypervolemic  - Suggest pulm evaluation for possible thoracentesis for R sided pleural effusion   - BP optimization/antibiotic/blood transfusion per ICU team  - monitor BMP, strict I/O, avoid nephrotoxic agents (NSAIDs, PPI, contrast), renally dose medications per GFR.    If any questions, please feel free to contact me     Edwardo Davis  Nephrology Fellow  [facility] Pager: [phone]  [facility] Pager: [phone]

## 2021-06-01 NOTE — CHART NOTE - NSCHARTNOTEFT_GEN_A_CORE
85M mandarin only speaking, c hx CAD /sp stent, CHF s/p AICD, HTN, BPH, CKD (baseline Cr ?1.3), unknown hepatitis c/b cirrhosis, DM2, ?T10-T11 discitis, [place], ?colon tumor of unknown etiology, pw worsening confusion, weakness, SOB, unable to ambulate.  History from pt's grandson Emil Beltre at bedside, and pt's daughter Cleopatra Styles over phone. Pt drowsy, and able to provide some answers to specific questions only. Pt has had about 3 weeks of worsening weakness to the point where he can't walk unassisted, and has had multiple falls, most recently 1 week ago. At baseline, pt walks with a walker. Pt also has increased abd distension, leg swelling, poor appetite, intermittent daily waxing/waning confusion. Pt's lasix was increased as outpt. Pt reports left chest pain, which is intermittent for the past 30 yrs. Has not received covid vaccine.  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 Differential includes meningioma and schwannoma.  5/16 CT Chest:  Moderate to large R pleural effusion with complete collapse of R middle and lower lobes. Small L pleural effusion with complete atelectasis.    5/18 Rapid Response: "Patient with BP 80's/40's in acute renal failure with known cirrhosis and HF now with likely upper GI bleed with findings of melena, with symptomatic anemia . Micu transfer note: "Patient found to have liver cirrhosis with large pleural effusion, ascites on CT A/P, hyponatremia,  worsening Hgb 11.8--> 7.6 --> 5.1, w/ melena and bright red blood per rectum concern for GI bleed, worsening ANDREEA with oliguria concern for hepatorenal syndrome.  Pt INTUBATED 5/18 in preparation for EGD. EGD findings: esophageal varices.  One non-bleeding, large (4 cm x 4 cm), cratered duodenal ulcer.  5/20- EXTUBATED. Orders received for bedside swallow evaluation -> evaluation deferred as pt on aerosol mask   5/21: 5/21: As per SLP discussion with MD, pt to be advanced to soft texture diet by medical team with no concern for dysphagia. Team requested for speech service to sign-off at this time.  5/22: Passed bedside speech and swallow eval pt on dysphagia I diet for now, will reconsult Speech and swallow as patient with evidence of coughing from clear liquid diet.  5/23: Pt seen for bedside swallow evaluation by SLP with recommendations for NPO, with non-oral nutrition/hydration/medications and an MBS to objectively assess swallow function.   5/24: Pt s/p MBS with recommendations for NPO, with non-oral nutrition/hydration/medications.   5/26: Pt seen for repeat MBS, with recommendations to initiate a Dysphagia 1 with honey-thick liquids with ALL PO VIA TEASPOON. Pt presented with an oropharyngeal dysphagia. There is delayed oral transit across consistencies. Reduced bolus control evidenced via premature spillage to the hypopharynx with soft solids and liquids via cup. Pharyngeal swallow trigger is inconsistently delayed. Airway protection is reduced. There is silent aspiration with retrieval to the vocal cords during the swallow with thin liquids, deep laryngeal penetration with incomplete retrieval during the swallow with nectar thick liquids, and trace laryngeal penetration with full retrieval during subsequent swallows with honey-thick liquids via cup. Pt unable to follow cues for small, single sips of liquid. Pharyngeal clearance is reduced, with minimal pharyngeal residue after the primary swallow that is eventually cleared via repeat swallows.  5/28: s/p therapeutic drain, pt reports improvement in abdominal symptoms  5/31: Mentation slightly worse today compared to yesterday. AAO x 3 but confused, mildly tremulous; per nurse, was talking to the wall   6/1: Per provider contact note: "Called  by RN  that  pt  has  de-sat  to  88-90 . Pt  was  seen  an  evaluated  Pt  oxygen  level  is  90%  on  5  liters.  Pt   lings  has  + crackles  at  the  bases.  Pt  has  abdominal  ascites  and  lower  extremity  edema.   Lasix  is being  held  as  per  nephrology  for  worsening  creatnine.  Spoke  to  [facility]  Dr Ministerio Thakur  who  agrees  with  the  plan.  Lasix  20  IVP  x1  dose  ordered  now.    Will   continue   to  monitor.  F/u  with  am  labs. "    Pt seen for dysphagia follow-up on this date to assess tolerance for dysphagia 1 diet with honey-thick liquids (all PO via teaspoon). Per discussion RN, pt did not do well with breakfast; was spitting out food, not swallowing, and required suctioning. Pt encountered in bed, asleep, on 4L NC. Rouses to tactile stimuli. Mandarin speaking,  utilized (#[id number]) though with little effectiveness, as pt is not verbally responsive on this encountered. Appears confused. Intermittently laughing. Not following commands. Pt provided with x1 teaspoon of honey-thick liquids. Swallow function characterized by: reduced stripping of the utensil, anterior loss of bolus, significantly delayed oral transit vs delayed pharyngeal swallow trigger. Minimal-moderate oral cavity residue noted, as clinician suctioned oral cavity via [place]. Subsequent wet breath sounds noted, concerning for laryngeal penetration/aspiration. Further PO trials were not provided. 5 p's addressed and pt left in NAD.    Impressions: Pt is presenting with an oropharyngeal dysphagia superimposed upon reduced mental status. Pt does not appear to be tolerating current diet, as evidenced by swallow function as described above and subjective reports from RN. As current diet is the most conservative PO diet, would recommend NPO, with non-oral nutrition/hydration/medications.     Recommendations:  1. NPO, with non-oral nutrition/hydration/medications.   2. Maintain aspiration precautions for secretions/ if enteral feeds are initiated.  3. Consider GOC conversation with patient's family to determine wishes re: means of nutrition/hydration.   4. This service will continue to follow.

## 2021-06-01 NOTE — PROGRESS NOTE ADULT - ATTENDING COMMENTS
ANDREEA/ATN, CKD III, Hypernatremia  Dyspneic, poor mental status  Breathing labored on nasal cannula    Furosemide, Spironolactone    D/w primary team to investigate cause of poor mental status. Also, consideration for paracentesis and/or more aggressive diuresis    Remainder per fellow, will follow

## 2021-06-01 NOTE — PROGRESS NOTE ADULT - ASSESSMENT
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 Re-consulted to floor for recurrent right sided pleural effusion causing respiratory compromise.    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

## 2021-06-01 NOTE — RAPID RESPONSE TEAM SUMMARY - NSSITUATIONBACKGROUNDRRT_GEN_ALL_CORE
85M with CAD, AICD, hepatic failure, DM2, ANDREEA with worsening encephalopathy. MICU team already at bedside. Pt noted to have worsening lethargy, already evaluated and accepted by MICU. Now s/p intubation on floor w/ succ/etomidate. Started on levo 0.02/prop 15 and transferred to MICU. Satting 100% w/ SBPs 120s on transfer. 
85M mandarin only speaking, c hx CAD /sp stent, CHF s/p AICD, HTN, BPH, CKD (baseline Cr ?1.3), unknown hepatitis c/b cirrhosis, DM2, ?T10-T11 discitis, [place], ?colon tumor. RRT called for hypotension. Patient with BP 80's/40's in acute renal failure with known cirrhosis and HF now with likely upper GI bleed with findings of melena, with symptomatic anemia . Trial fluids failed. Levophed started at .08 with MAP of 73 prior to transport to ICU. Patient saturating 93% on NC, afebrile with normal blood glucose. Patient receiving subcutaneous  octreotide and octreotide gtt. GI attending and MICU fellow at bedside in agreement for transfer to ICU.  20 gauge arrow placed in the AC with good blood return. Central line to be placed in the Medical icu. Patient stable for transport.

## 2021-06-01 NOTE — PROGRESS NOTE ADULT - ATTENDING COMMENTS
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  Pulm consulted for more hypxoemia/sob today.    Patient with horacio right pleural effusion on sono, but is he very lethargic. He is only responsive to deep stimuli and is with labored breathing.  ABG ok - but he is very tachypneic to maintain that ABG.  He is likely aspirating and having an aspiration pna.    Recommend MICU Consult for possible intubation given acute metabolic encephalopathy due to aspiration pna, ?cirrhosis  Will need thoracentesis of right - will need to send for cultures/cell count/TG/flow cytometry  If he has a chylothorax, may need ot start octreotide to help reduce recurrence of effusion.  start on empiric abx for aspiration pna

## 2021-06-01 NOTE — CHART NOTE - NSCHARTNOTEFT_GEN_A_CORE
Layo Wallace MD  Internal Medicine, PGY2  [phone]    MICU Accept Note    HPI / INTERVAL HISTORY:  HPI:  85M mandarin only speaking, c hx CAD /sp stent, CHF s/p AICD, HTN, BPH, CKD (baseline Cr ?1.3), unknown hepatitis c/b cirrhosis, DM2, ?T10-T11 discitis, [place], ?colon tumor of unknown etiology, pw worsening confusion, weakness, SOB, unable to ambulate.    History from pt's grandson Emil Beltre at bedside, and pt's daughter Cleopatra Styles over phone. Pt drowsy, and able to provide some answers to specific questions only. Pt has had about 3 weeks of worsening weakness to the point where he can't walk unassisted, and has had multiple falls, most recently 1 week ago. At baseline, pt walks with a walker. Pt also complaining of increasing SOB, increased mucous production, and subjective fevers, for which he has been taking cefixime empirically for the past 1 week. Pt also has increased abd distension, leg swelling, poor appetite, intermittent daily waxing/waning confusion. Pt's lasix was increased as outpt. Pt reports left chest pain, which is intermittent for the past 30 yrs. Has not received covid vaccine.    VS: Tm 98.4, P 56, BP 93/62, R 22, 95% RA  In the Ed, received azithro, ceftriaxone, , tylenol, duoneb (16 May 2021 23:03)        REVIEW OF SYSTEMS:  [ ] Unable to assess ROS because ______  CONSTITUTIONAL: No fever, chills, night sweats, or fatigue  EYES: No eye pain, visual disturbances, or discharge  ENMT:  No difficulty hearing, tinnitus, vertigo; No sinus or throat pain  NECK: No pain or stiffness  BREASTS: No pain, masses, or nipple discharge  RESPIRATORY: No cough, wheezing, or hemoptysis; No shortness of breath  CARDIOVASCULAR: No chest pain, palpitations, dizziness, or leg swelling  GASTROINTESTINAL: No abdominal or epigastric pain. No nausea, vomiting, or hematemesis; No diarrhea or constipation. No melena or hematochezia.  GENITOURINARY: No dysuria, frequency, hematuria, or incontinence  NEUROLOGICAL: No headaches, memory loss, loss of strength, numbness, or tremors  SKIN: No itching, burning, rashes, or lesions   LYMPH NODES: No enlarged glands  ENDOCRINE: No heat or cold intolerance; No hair loss  MUSCULOSKELETAL: No joint pain or swelling; No muscle, back, or extremity pain  PSYCHIATRIC: No depression, anxiety, mood swings, or difficulty sleeping  HEME/LYMPH: No easy bruising, or bleeding gums  ALLERGY AND IMMUNOLOGIC: No hives or eczema    PMH/PSH:  PAST MEDICAL & SURGICAL HISTORY:  DM (diabetes mellitus)    HTN (hypertension)    CAD (coronary artery disease)    Hepatitis    CKD (chronic kidney disease)    AICD (automatic cardioverter/defibrillator) present    Artificial cardiac pacemaker        FAMILY HISTORY:  No pertinent family history in first degree relatives        SOCIAL HISTORY:  Smoking: [  ] Never Smoked  [  ] Former Smoker (# packs x # years), quit   [  ] Current Smoker (# packs x # years)  Substance Use: [  ] None; [   ] Yes:  EtOH Use: [   ] None; [   ] Rare; [   ] Social; [   ] Frequent:   Marital Status: [  ] Single  [  ]   [  ]   [  ]   Dependents:  Occupation:  Barriers to treatment:   Advance Directives:     HOME MEDICATIONS:  Home Medications:  Albuterol (Eqv-ProAir HFA) 90 mcg/inh inhalation aerosol: 2 puff(s) inhaled every 6 hours, As Needed (18 May 2021 15:55)  cefixime 400 mg oral tablet: 1 tab(s) orally once a day (18 May 2021 15:55)  Effient 10 mg oral tablet: 1 tab(s) orally once a day (18 May 2021 15:55)  Entresto 24 mg-26 mg oral tablet: 1 tab(s) orally 2 times a day (18 May 2021 15:55)  febuxostat 40 mg oral tablet: 1 tab(s) orally once a day (18 May 2021 15:55)  furosemide 40 mg oral tablet: 1 tab(s) orally once a day (29 May 2021 15:26)  HumaLOG 100 units/mL subcutaneous solution: 8 unit(s) subcutaneous 3 times a day (18 May 2021 15:55)  lactulose 10 g/15 mL oral syrup: 15 milliliter(s) orally 3 times a day (29 May 2021 15:26)  levothyroxine 88 mcg (0.088 mg) oral tablet: 1 tab(s) orally once a day (18 May 2021 15:55)  Pepcid 20 mg oral tablet: 1 tab(s) orally 2 times a day (18 May 2021 15:55)  polyethylene glycol 3350 oral powder for reconstitution: 17 gram(s) orally once a day (29 May 2021 15:26)  predniSONE 50 mg oral tablet: 1 tab(s) orally once a day (18 May 2021 15:55)  rifAXIMin 550 mg oral tablet: 1 tab(s) orally 2 times a day (29 May 2021 15:26)  senna oral tablet: 2 tab(s) orally once a day (at bedtime) (29 May 2021 15:26)  simethicone 80 mg oral tablet, chewable: 1 tab(s) orally 3 times a day (29 May 2021 15:26)  spironolactone 25 mg oral tablet: 2 tab(s) orally once a day (29 May 2021 15:26)  sucralfate 1 g oral tablet: 1 tab(s) orally every 6 hours (29 May 2021 15:26)  Toujeo SoloStar 300 units/mL subcutaneous solution: 20 unit(s) subcutaneous once a day (18 May 2021 15:55)      ALLERGIES:  Allergies    No Known Allergies    Intolerances            OBJECTIVE:  ICU Vital Signs Last 24 Hrs  T(C): 36.6 (01 Jun 2021 16:38), Max: 36.6 (01 Jun 2021 16:38)  T(F): 97.8 (01 Jun 2021 16:38), Max: 97.8 (01 Jun 2021 16:38)  HR: 90 (01 Jun 2021 16:38) (89 - 108)  BP: 109/63 (01 Jun 2021 16:38) (109/63 - 146/65)  BP(mean): --  ABP: --  ABP(mean): --  RR: 17 (01 Jun 2021 16:38) (17 - 20)  SpO2: 95% (01 Jun 2021 16:38) (92% - 100%)      I/O Summary 24H    IN: 0 mL / OUT: 200 mL / NET: -200 mL      CAPILLARY BLOOD GLUCOSE      POCT Blood Glucose.: 212 mg/dL (01 Jun 2021 12:49)      PHYSICAL EXAM  GENERAL: NAD, lying in bed comfortably  HEAD:  Atraumatic, Normocephalic  EYES: EOMI, PERRLA, conjunctiva and sclera clear  ENT: Moist mucous membranes  NECK: Supple, No JVD  CHEST/LUNG: Clear to auscultation bilaterally; No rales, rhonchi, wheezing, or rubs. Unlabored respirations  HEART: Regular rate and rhythm; No murmurs, rubs, or gallops  ABDOMEN: Bowel sounds present; Soft, Nontender, Nondistended. No hepatomegaly  EXTREMITIES:  2+ Peripheral Pulses, brisk capillary refill. No clubbing, cyanosis, or edema  NERVOUS SYSTEM:  Alert & Oriented X3, speech clear. No deficits   MSK: FROM all 4 extremities, full and equal strength  SKIN: No rashes or lesions    LINES:       HOSPITAL MEDICATIONS:  MEDICATIONS  (STANDING):  calcium carbonate    500 mG (Tums) Chewable 1 Tablet(s) Chew at bedtime  dextrose 40% Gel 15 Gram(s) Oral once  dextrose 5%. 1000 milliLiter(s) (100 mL/Hr) IV Continuous <Continuous>  dextrose 5%. 1000 milliLiter(s) (50 mL/Hr) IV Continuous <Continuous>  dextrose 50% Injectable 25 Gram(s) IV Push once  dextrose 50% Injectable 12.5 Gram(s) IV Push once  dextrose 50% Injectable 25 Gram(s) IV Push once  furosemide    Tablet 40 milliGRAM(s) Oral daily  glucagon  Injectable 1 milliGRAM(s) IntraMuscular once  insulin glargine Injectable (LANTUS) 8 Unit(s) SubCutaneous at bedtime  insulin lispro (ADMELOG) corrective regimen sliding scale   SubCutaneous three times a day before meals  insulin lispro (ADMELOG) corrective regimen sliding scale   SubCutaneous at bedtime  lactated ringers. 1000 milliLiter(s) (75 mL/Hr) IV Continuous <Continuous>  lactulose Syrup 30 Gram(s) Oral three times a day  levothyroxine Injectable 44 MICROGram(s) IV Push at bedtime  nystatin Ointment 1 Application(s) Topical two times a day  nystatin Powder 1 Application(s) Topical three times a day  pantoprazole  Injectable 40 milliGRAM(s) IV Push every 12 hours  polyethylene glycol 3350 17 Gram(s) Oral daily  rifAXIMin 550 milliGRAM(s) Oral two times a day  senna 2 Tablet(s) Oral at bedtime  simethicone 80 milliGRAM(s) Chew three times a day  spironolactone 50 milliGRAM(s) Oral daily  sucralfate 1 Gram(s) Oral every 6 hours    MEDICATIONS  (PRN):  albuterol/ipratropium for Nebulization 3 milliLiter(s) Nebulizer every 6 hours PRN Shortness of Breath and/or Wheezing        LABS:  CBC 06-01-21 @ 06:49                        9.3    4.85  )-----------( 41                    29.5       Hgb trend: 9.3 <-- , 8.9 <-- , 8.6 <--   WBC trend: 4.85 <-- , 4.73 <-- , 4.62 <--     CMP 06-01-21 @ 06:48    146<H>  |  109<H>  |  42<H>  ----------------------------<  193<H>  4.6   |  23  |  1.52<H>    Ca    10.2      06-01-21 @ 06:48  Phos  3.2     05-31  Mg     2.0     05-31    TPro  6.3  /  Alb  4.0  /  TBili  2.3<H>  /  DBili  x   /  AST  23  /  ALT  22  /  AlkPhos  54  06-01      Serum Cr trend: 1.52 <-- , 1.49 <-- , 1.44 <--   PT/INR - ( 01 Jun 2021 06:49 )   PT: 17.3 sec;   INR: 1.47 ratio         PTT - ( 01 Jun 2021 06:49 ):39.4 sec  ABG Trend:   06-01-21 @ 15:09 pH 7.39 | pCO2 48<H> | PO2 105 | FiO2 35    VBG Trend:           MICROBIOLOGY:       RADIOLOGY & ADDITIONAL TESTS: Reviewed.    ASSESSMENT  SENAIT STYLES is a 86yo Male with PMH *** presenting with ***.    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 During hospitalization Hgb down trended from 11.8 on May 16 to 5.1 on May 18, w/ hypotension and progressive lethargic mental status, and worsening ANDREEA.  Initially placed on octreotide/albumin due to concern for hepatorenal syndrome. RRT called 5/18 for worsening lethargy and hypotension in the setting of GIB, possible sepsis.  Started on pressors and admitted to MICU.  Intubated for EGD which showed small (< 5 mm) esophageal varices, and one non-bleeding, large (4 cm x 4 cm), cratered duodenal ulcer, from 5/18--5/19, s/p 4 units pRBC, 2 units platelet, 1 unit FFP. No active bleed and off levophed since 5/18 PM. s/p thoracentesis on 5/18 -1.6L, transudative based on Light's criteria; s/p diagnostic paracentesis on 5/19, negative for SBP, continued on prophylactic ceftriaxone for 7 day (5/16-5/22). ANDREEA improved with urine output (1-2L per day), Cr downtrended to 2.16, unlikely 2/2 hepatorenal more likely 2/2 prerenal in setting of hypovolemia, octreotide/albumin discontinued. Patient extubated on 5/20 5am. Failed bedside swallow test, pending formal speech swallow, GI cleared patient for NG tube placement for meds/feeds. Transferred back to medicine service on 5/20.    While on the floor, speech and swallow consulted for possible aspiration.  He failed initial MBS but on repeat, could tolerate a dysphagia 1 diet. Patient becoming increasingly confused likely in the setting of hepatic encephalopathy with worsening abdominal distention/discomfort. Confusion improving with lactulose. 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 Per neurosurgery, mass is still quite small--no intervention. IR consulted for paracentesis, drained 400ml cloudy pink fluid on 5/28 with subjective improvement in pt's abdominal discomfort.     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 Of note, patient also with worsening lethargy, decreased mental status. House pulm consulted, recommended transfer to MICU for possible intubation given acute metabolic encephalopathy due to aspiration pna, ?cirrhosis.  Will likely require thoracentesis of right sided pleural effusion.  Intubated on floor prior to admission to MICU for hypoxemic respiratory failure.      REVIEW OF SYSTEMS:  [x] Unable to assess ROS because patient intubated and sedated  CONSTITUTIONAL: No fever, chills, night sweats, or fatigue  EYES: No eye pain, visual disturbances, or discharge  ENMT:  No difficulty hearing, tinnitus, vertigo; No sinus or throat pain  NECK: No pain or stiffness  BREASTS: No pain, masses, or nipple discharge  RESPIRATORY: No cough, wheezing, or hemoptysis; No shortness of breath  CARDIOVASCULAR: No chest pain, palpitations, dizziness, or leg swelling  GASTROINTESTINAL: No abdominal or epigastric pain. No nausea, vomiting, or hematemesis; No diarrhea or constipation. No melena or hematochezia.  GENITOURINARY: No dysuria, frequency, hematuria, or incontinence  NEUROLOGICAL: No headaches, memory loss, loss of strength, numbness, or tremors  SKIN: No itching, burning, rashes, or lesions   LYMPH NODES: No enlarged glands  ENDOCRINE: No heat or cold intolerance; No hair loss  MUSCULOSKELETAL: No joint pain or swelling; No muscle, back, or extremity pain  PSYCHIATRIC: No depression, anxiety, mood swings, or difficulty sleeping  HEME/LYMPH: No easy bruising, or bleeding gums  ALLERGY AND IMMUNOLOGIC: No hives or eczema    PMH/PSH:  PAST MEDICAL & SURGICAL HISTORY:  DM (diabetes mellitus)  HTN (hypertension)  CAD (coronary artery disease)  Hepatitis  CKD (chronic kidney disease)  AICD (automatic cardioverter/defibrillator) present  Artificial cardiac pacemaker        FAMILY HISTORY:  No pertinent family history in first degree relatives      HOME MEDICATIONS:  Home Medications:  Albuterol (Eqv-ProAir HFA) 90 mcg/inh inhalation aerosol: 2 puff(s) inhaled every 6 hours, As Needed (18 May 2021 15:55)  cefixime 400 mg oral tablet: 1 tab(s) orally once a day (18 May 2021 15:55)  Effient 10 mg oral tablet: 1 tab(s) orally once a day (18 May 2021 15:55)  Entresto 24 mg-26 mg oral tablet: 1 tab(s) orally 2 times a day (18 May 2021 15:55)  febuxostat 40 mg oral tablet: 1 tab(s) orally once a day (18 May 2021 15:55)  furosemide 40 mg oral tablet: 1 tab(s) orally once a day (29 May 2021 15:26)  HumaLOG 100 units/mL subcutaneous solution: 8 unit(s) subcutaneous 3 times a day (18 May 2021 15:55)  lactulose 10 g/15 mL oral syrup: 15 milliliter(s) orally 3 times a day (29 May 2021 15:26)  levothyroxine 88 mcg (0.088 mg) oral tablet: 1 tab(s) orally once a day (18 May 2021 15:55)  Pepcid 20 mg oral tablet: 1 tab(s) orally 2 times a day (18 May 2021 15:55)  polyethylene glycol 3350 oral powder for reconstitution: 17 gram(s) orally once a day (29 May 2021 15:26)  predniSONE 50 mg oral tablet: 1 tab(s) orally once a day (18 May 2021 15:55)  rifAXIMin 550 mg oral tablet: 1 tab(s) orally 2 times a day (29 May 2021 15:26)  senna oral tablet: 2 tab(s) orally once a day (at bedtime) (29 May 2021 15:26)  simethicone 80 mg oral tablet, chewable: 1 tab(s) orally 3 times a day (29 May 2021 15:26)  spironolactone 25 mg oral tablet: 2 tab(s) orally once a day (29 May 2021 15:26)  sucralfate 1 g oral tablet: 1 tab(s) orally every 6 hours (29 May 2021 15:26)  Toujeo SoloStar 300 units/mL subcutaneous solution: 20 unit(s) subcutaneous once a day (18 May 2021 15:55)      ALLERGIES:  Allergies    No Known Allergies  Intolerances        OBJECTIVE:  ICU Vital Signs Last 24 Hrs  T(C): 36.6 (01 Jun 2021 16:38), Max: 36.6 (01 Jun 2021 16:38)  T(F): 97.8 (01 Jun 2021 16:38), Max: 97.8 (01 Jun 2021 16:38)  HR: 90 (01 Jun 2021 16:38) (89 - 108)  BP: 109/63 (01 Jun 2021 16:38) (109/63 - 146/65)  BP(mean): --  ABP: --  ABP(mean): --  RR: 17 (01 Jun 2021 16:38) (17 - 20)  SpO2: 95% (01 Jun 2021 16:38) (92% - 100%)      I/O Summary 24H  IN: 0 mL / OUT: 200 mL / NET: -200 mL      CAPILLARY BLOOD GLUCOSE  POCT Blood Glucose.: 212 mg/dL (01 Jun 2021 12:49)    PHYSICAL EXAM  GENERAL: NAD, lying in bed comfortably  HEAD:  Atraumatic, Normocephalic  EYES: EOMI, PERRLA, conjunctiva and sclera clear  ENT: Moist mucous membranes  NECK: Supple, No JVD  CHEST/LUNG: Clear to auscultation bilaterally; No rales, rhonchi, wheezing, or rubs. Unlabored respirations  HEART: Regular rate and rhythm; No murmurs, rubs, or gallops  ABDOMEN: Bowel sounds present; Soft, Nontender, Nondistended. No hepatomegaly  EXTREMITIES:  2+ Peripheral Pulses, brisk capillary refill. No clubbing, cyanosis, or edema  NERVOUS SYSTEM:  Alert & Oriented X3, speech clear. No deficits   MSK: FROM all 4 extremities, full and equal strength  SKIN: No rashes or lesions      HOSPITAL MEDICATIONS:  MEDICATIONS  (STANDING):  calcium carbonate    500 mG (Tums) Chewable 1 Tablet(s) Chew at bedtime  dextrose 40% Gel 15 Gram(s) Oral once  dextrose 5%. 1000 milliLiter(s) (100 mL/Hr) IV Continuous <Continuous>  dextrose 5%. 1000 milliLiter(s) (50 mL/Hr) IV Continuous <Continuous>  dextrose 50% Injectable 25 Gram(s) IV Push once  dextrose 50% Injectable 12.5 Gram(s) IV Push once  dextrose 50% Injectable 25 Gram(s) IV Push once  furosemide    Tablet 40 milliGRAM(s) Oral daily  glucagon  Injectable 1 milliGRAM(s) IntraMuscular once  insulin glargine Injectable (LANTUS) 8 Unit(s) SubCutaneous at bedtime  insulin lispro (ADMELOG) corrective regimen sliding scale   SubCutaneous three times a day before meals  insulin lispro (ADMELOG) corrective regimen sliding scale   SubCutaneous at bedtime  lactated ringers. 1000 milliLiter(s) (75 mL/Hr) IV Continuous <Continuous>  lactulose Syrup 30 Gram(s) Oral three times a day  levothyroxine Injectable 44 MICROGram(s) IV Push at bedtime  nystatin Ointment 1 Application(s) Topical two times a day  nystatin Powder 1 Application(s) Topical three times a day  pantoprazole  Injectable 40 milliGRAM(s) IV Push every 12 hours  polyethylene glycol 3350 17 Gram(s) Oral daily  rifAXIMin 550 milliGRAM(s) Oral two times a day  senna 2 Tablet(s) Oral at bedtime  simethicone 80 milliGRAM(s) Chew three times a day  spironolactone 50 milliGRAM(s) Oral daily  sucralfate 1 Gram(s) Oral every 6 hours    MEDICATIONS  (PRN):  albuterol/ipratropium for Nebulization 3 milliLiter(s) Nebulizer every 6 hours PRN Shortness of Breath and/or Wheezing        LABS:  CBC 06-01-21 @ 06:49                        9.3    4.85  )-----------( 41                    29.5       Hgb trend: 9.3 <-- , 8.9 <-- , 8.6 <--   WBC trend: 4.85 <-- , 4.73 <-- , 4.62 <--     CMP 06-01-21 @ 06:48    146<H>  |  109<H>  |  42<H>  ----------------------------<  193<H>  4.6   |  23  |  1.52<H>    Ca    10.2      06-01-21 @ 06:48  Phos  3.2     05-31  Mg     2.0     05-31    TPro  6.3  /  Alb  4.0  /  TBili  2.3<H>  /  DBili  x   /  AST  23  /  ALT  22  /  AlkPhos  54  06-01      Serum Cr trend: 1.52 <-- , 1.49 <-- , 1.44 <--   PT/INR - ( 01 Jun 2021 06:49 )   PT: 17.3 sec;   INR: 1.47 ratio         PTT - ( 01 Jun 2021 06:49 ):39.4 sec  ABG Trend:   06-01-21 @ 15:09 pH 7.39 | pCO2 48<H> | PO2 105 | FiO2 35    VBG Trend:           MICROBIOLOGY:       RADIOLOGY & ADDITIONAL TESTS: Reviewed.    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 During hospitalization Hgb down trended from 11.8 on May 16 to 5.1 on May 18, w/ hypotension and progressive lethargic mental status, and worsening ANDREEA.  Initially placed on octreotide/albumin due to concern for hepatorenal syndrome. RRT called 5/18 for worsening lethargy and hypotension in the setting of GIB, possible sepsis.  Started on pressors and admitted to MICU.  Intubated for EGD which showed small (< 5 mm) esophageal varices, and one non-bleeding, large (4 cm x 4 cm), cratered duodenal ulcer, from 5/18--5/19, s/p 4 units pRBC, 2 units platelet, 1 unit FFP. No active bleed and off levophed since 5/18 PM. s/p thoracentesis on 5/18 -1.6L, transudative based on Light's criteria; s/p diagnostic paracentesis on 5/19, negative for SBP, continued on prophylactic ceftriaxone for 7 day (5/16-5/22). ANDREEA improved with urine output (1-2L per day), Cr downtrended to 2.16, unlikely 2/2 hepatorenal more likely 2/2 prerenal in setting of hypovolemia, octreotide/albumin discontinued. Patient extubated on 5/20 5am. Failed bedside swallow test, pending formal speech swallow, GI cleared patient for NG tube placement for meds/feeds. Transferred back to medicine service on 5/20.    While on the floor, speech and swallow consulted for possible aspiration.  He failed initial MBS but on repeat, could tolerate a dysphagia 1 diet. Patient becoming increasingly confused likely in the setting of hepatic encephalopathy with worsening abdominal distention/discomfort. Confusion improving with lactulose. 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 Per neurosurgery, mass is still quite small--no intervention. IR consulted for paracentesis, drained 400ml cloudy pink fluid on 5/28 with subjective improvement in pt's abdominal discomfort.     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 Of note, patient also with worsening lethargy, decreased mental status. House pulm consulted, recommended transfer to MICU for possible intubation given acute metabolic encephalopathy due to aspiration pna, ?cirrhosis.  Will likely require thoracentesis of right sided pleural effusion.  Intubated on floor prior to admission to MICU for hypoxemic respiratory failure.      REVIEW OF SYSTEMS:  [x] Unable to assess ROS because patient intubated and sedated  CONSTITUTIONAL: No fever, chills, night sweats, or fatigue  EYES: No eye pain, visual disturbances, or discharge  ENMT:  No difficulty hearing, tinnitus, vertigo; No sinus or throat pain  NECK: No pain or stiffness  BREASTS: No pain, masses, or nipple discharge  RESPIRATORY: No cough, wheezing, or hemoptysis; No shortness of breath  CARDIOVASCULAR: No chest pain, palpitations, dizziness, or leg swelling  GASTROINTESTINAL: No abdominal or epigastric pain. No nausea, vomiting, or hematemesis; No diarrhea or constipation. No melena or hematochezia.  GENITOURINARY: No dysuria, frequency, hematuria, or incontinence  NEUROLOGICAL: No headaches, memory loss, loss of strength, numbness, or tremors  SKIN: No itching, burning, rashes, or lesions   LYMPH NODES: No enlarged glands  ENDOCRINE: No heat or cold intolerance; No hair loss  MUSCULOSKELETAL: No joint pain or swelling; No muscle, back, or extremity pain  PSYCHIATRIC: No depression, anxiety, mood swings, or difficulty sleeping  HEME/LYMPH: No easy bruising, or bleeding gums  ALLERGY AND IMMUNOLOGIC: No hives or eczema    PMH/PSH:  PAST MEDICAL & SURGICAL HISTORY:  DM (diabetes mellitus)  HTN (hypertension)  CAD (coronary artery disease)  Hepatitis  CKD (chronic kidney disease)  AICD (automatic cardioverter/defibrillator) present  Artificial cardiac pacemaker        FAMILY HISTORY:  No pertinent family history in first degree relatives      HOME MEDICATIONS:  Home Medications:  Albuterol (Eqv-ProAir HFA) 90 mcg/inh inhalation aerosol: 2 puff(s) inhaled every 6 hours, As Needed (18 May 2021 15:55)  cefixime 400 mg oral tablet: 1 tab(s) orally once a day (18 May 2021 15:55)  Effient 10 mg oral tablet: 1 tab(s) orally once a day (18 May 2021 15:55)  Entresto 24 mg-26 mg oral tablet: 1 tab(s) orally 2 times a day (18 May 2021 15:55)  febuxostat 40 mg oral tablet: 1 tab(s) orally once a day (18 May 2021 15:55)  furosemide 40 mg oral tablet: 1 tab(s) orally once a day (29 May 2021 15:26)  HumaLOG 100 units/mL subcutaneous solution: 8 unit(s) subcutaneous 3 times a day (18 May 2021 15:55)  lactulose 10 g/15 mL oral syrup: 15 milliliter(s) orally 3 times a day (29 May 2021 15:26)  levothyroxine 88 mcg (0.088 mg) oral tablet: 1 tab(s) orally once a day (18 May 2021 15:55)  Pepcid 20 mg oral tablet: 1 tab(s) orally 2 times a day (18 May 2021 15:55)  polyethylene glycol 3350 oral powder for reconstitution: 17 gram(s) orally once a day (29 May 2021 15:26)  predniSONE 50 mg oral tablet: 1 tab(s) orally once a day (18 May 2021 15:55)  rifAXIMin 550 mg oral tablet: 1 tab(s) orally 2 times a day (29 May 2021 15:26)  senna oral tablet: 2 tab(s) orally once a day (at bedtime) (29 May 2021 15:26)  simethicone 80 mg oral tablet, chewable: 1 tab(s) orally 3 times a day (29 May 2021 15:26)  spironolactone 25 mg oral tablet: 2 tab(s) orally once a day (29 May 2021 15:26)  sucralfate 1 g oral tablet: 1 tab(s) orally every 6 hours (29 May 2021 15:26)  Toujeo SoloStar 300 units/mL subcutaneous solution: 20 unit(s) subcutaneous once a day (18 May 2021 15:55)      ALLERGIES:  Allergies    No Known Allergies  Intolerances        OBJECTIVE:  ICU Vital Signs Last 24 Hrs  T(C): 36.6 (01 Jun 2021 16:38), Max: 36.6 (01 Jun 2021 16:38)  T(F): 97.8 (01 Jun 2021 16:38), Max: 97.8 (01 Jun 2021 16:38)  HR: 90 (01 Jun 2021 16:38) (89 - 108)  BP: 109/63 (01 Jun 2021 16:38) (109/63 - 146/65)  BP(mean): --  ABP: --  ABP(mean): --  RR: 17 (01 Jun 2021 16:38) (17 - 20)  SpO2: 95% (01 Jun 2021 16:38) (92% - 100%)      I/O Summary 24H  IN: 0 mL / OUT: 200 mL / NET: -200 mL      CAPILLARY BLOOD GLUCOSE  POCT Blood Glucose.: 212 mg/dL (01 Jun 2021 12:49)    PHYSICAL EXAM  GENERAL: NAD, lying in bed comfortably  HEAD:  Atraumatic, Normocephalic  EYES: EOMI, PERRLA, conjunctiva and sclera clear  ENT: Moist mucous membranes  NECK: Supple, No JVD  CHEST/LUNG: Clear to auscultation bilaterally; No rales, rhonchi, wheezing, or rubs. Unlabored respirations  HEART: Regular rate and rhythm; No murmurs, rubs, or gallops  ABDOMEN: Bowel sounds present; Soft, Nontender, Nondistended. No hepatomegaly  EXTREMITIES:  2+ Peripheral Pulses, brisk capillary refill. No clubbing, cyanosis, or edema  NERVOUS SYSTEM:  Alert & Oriented X3, speech clear. No deficits   MSK: FROM all 4 extremities, full and equal strength  SKIN: No rashes or lesions      HOSPITAL MEDICATIONS:  MEDICATIONS  (STANDING):  calcium carbonate    500 mG (Tums) Chewable 1 Tablet(s) Chew at bedtime  dextrose 40% Gel 15 Gram(s) Oral once  dextrose 5%. 1000 milliLiter(s) (100 mL/Hr) IV Continuous <Continuous>  dextrose 5%. 1000 milliLiter(s) (50 mL/Hr) IV Continuous <Continuous>  dextrose 50% Injectable 25 Gram(s) IV Push once  dextrose 50% Injectable 12.5 Gram(s) IV Push once  dextrose 50% Injectable 25 Gram(s) IV Push once  furosemide    Tablet 40 milliGRAM(s) Oral daily  glucagon  Injectable 1 milliGRAM(s) IntraMuscular once  insulin glargine Injectable (LANTUS) 8 Unit(s) SubCutaneous at bedtime  insulin lispro (ADMELOG) corrective regimen sliding scale   SubCutaneous three times a day before meals  insulin lispro (ADMELOG) corrective regimen sliding scale   SubCutaneous at bedtime  lactated ringers. 1000 milliLiter(s) (75 mL/Hr) IV Continuous <Continuous>  lactulose Syrup 30 Gram(s) Oral three times a day  levothyroxine Injectable 44 MICROGram(s) IV Push at bedtime  nystatin Ointment 1 Application(s) Topical two times a day  nystatin Powder 1 Application(s) Topical three times a day  pantoprazole  Injectable 40 milliGRAM(s) IV Push every 12 hours  polyethylene glycol 3350 17 Gram(s) Oral daily  rifAXIMin 550 milliGRAM(s) Oral two times a day  senna 2 Tablet(s) Oral at bedtime  simethicone 80 milliGRAM(s) Chew three times a day  spironolactone 50 milliGRAM(s) Oral daily  sucralfate 1 Gram(s) Oral every 6 hours    MEDICATIONS  (PRN):  albuterol/ipratropium for Nebulization 3 milliLiter(s) Nebulizer every 6 hours PRN Shortness of Breath and/or Wheezing        LABS:  CBC 06-01-21 @ 06:49                        9.3    4.85  )-----------( 41                    29.5       Hgb trend: 9.3 <-- , 8.9 <-- , 8.6 <--   WBC trend: 4.85 <-- , 4.73 <-- , 4.62 <--     CMP 06-01-21 @ 06:48    146<H>  |  109<H>  |  42<H>  ----------------------------<  193<H>  4.6   |  23  |  1.52<H>    Ca    10.2      06-01-21 @ 06:48  Phos  3.2     05-31  Mg     2.0     05-31    TPro  6.3  /  Alb  4.0  /  TBili  2.3<H>  /  DBili  x   /  AST  23  /  ALT  22  /  AlkPhos  54  06-01      Serum Cr trend: 1.52 <-- , 1.49 <-- , 1.44 <--   PT/INR - ( 01 Jun 2021 06:49 )   PT: 17.3 sec;   INR: 1.47 ratio         PTT - ( 01 Jun 2021 06:49 ):39.4 sec  ABG Trend:   06-01-21 @ 15:09 pH 7.39 | pCO2 48<H> | PO2 105 | FiO2 35    VBG Trend:           MICROBIOLOGY:       RADIOLOGY & ADDITIONAL TESTS: Reviewed.    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  -Trend ammonia    Cardiovascular:  -Hx of HF (EF 40-45%)  -Vasopressor support to maintain MAP > 65    Pulmonary    GI    Renal    ID    Endo    Heme    Ppx    Lines/Tubes    GOC Layo Wallace MD  Internal Medicine, PGY2  [phone]    MICU Accept Note    HPI / INTERVAL HISTORY:  HPI:      84 yo M PMH HF (EF 40-45%, moderate LV systolic function), cirrhosis of unknown etiology (suspect 2/2 hep B), hypothyroid presented to hospital on 5/16 due to melena and increased SOB at home, found to have decompensated cirrhosis with large pleural effusion and ascites. During hospitalization Hgb down trended from 11.8 on May 16 to 5.1 on May 18, w/ hypotension and progressive lethargic mental status, and worsening ANDREEA.  Initially placed on octreotide/albumin due to concern for hepatorenal syndrome. RRT called 5/18 for worsening lethargy and hypotension in the setting of GIB, possible sepsis.  Started on pressors and admitted to MICU.  Intubated for EGD which showed small (< 5 mm) esophageal varices, and one non-bleeding, large (4 cm x 4 cm), cratered duodenal ulcer, from 5/18--5/19, s/p 4 units pRBC, 2 units platelet, 1 unit FFP. No active bleed and off levophed since 5/18 PM. s/p thoracentesis on 5/18 -1.6L, transudative based on Light's criteria; s/p diagnostic paracentesis on 5/19, negative for SBP, continued on prophylactic ceftriaxone for 7 day (5/16-5/22). ANDREEA improved with urine output (1-2L per day), Cr downtrended to 2.16, unlikely 2/2 hepatorenal more likely 2/2 prerenal in setting of hypovolemia, octreotide/albumin discontinued. Patient extubated on 5/20 5am. Failed bedside swallow test, pending formal speech swallow, GI cleared patient for NG tube placement for meds/feeds. Transferred back to medicine service on 5/20.    While on the floor, speech and swallow consulted for possible aspiration.  He failed initial MBS but on repeat, could tolerate a dysphagia 1 diet. Patient becoming increasingly confused likely in the setting of hepatic encephalopathy with worsening abdominal distention/discomfort. Confusion improving with lactulose. 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 Per neurosurgery, mass is still quite small--no intervention. IR consulted for paracentesis, drained 400ml cloudy pink fluid on 5/28 with subjective improvement in pt's abdominal discomfort.     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 Of note, patient also with worsening lethargy, decreased mental status. House pulm consulted, recommended transfer to MICU for possible intubation given acute metabolic encephalopathy due to aspiration pna, ?cirrhosis.  Will likely require thoracentesis of right sided pleural effusion.  Intubated on floor prior to admission to MICU for hypoxemic respiratory failure.      REVIEW OF SYSTEMS:  [x] Unable to assess ROS because patient intubated and sedated  CONSTITUTIONAL: No fever, chills, night sweats, or fatigue  EYES: No eye pain, visual disturbances, or discharge  ENMT:  No difficulty hearing, tinnitus, vertigo; No sinus or throat pain  NECK: No pain or stiffness  BREASTS: No pain, masses, or nipple discharge  RESPIRATORY: No cough, wheezing, or hemoptysis; No shortness of breath  CARDIOVASCULAR: No chest pain, palpitations, dizziness, or leg swelling  GASTROINTESTINAL: No abdominal or epigastric pain. No nausea, vomiting, or hematemesis; No diarrhea or constipation. No melena or hematochezia.  GENITOURINARY: No dysuria, frequency, hematuria, or incontinence  NEUROLOGICAL: No headaches, memory loss, loss of strength, numbness, or tremors  SKIN: No itching, burning, rashes, or lesions   LYMPH NODES: No enlarged glands  ENDOCRINE: No heat or cold intolerance; No hair loss  MUSCULOSKELETAL: No joint pain or swelling; No muscle, back, or extremity pain  PSYCHIATRIC: No depression, anxiety, mood swings, or difficulty sleeping  HEME/LYMPH: No easy bruising, or bleeding gums  ALLERGY AND IMMUNOLOGIC: No hives or eczema    PMH/PSH:  PAST MEDICAL & SURGICAL HISTORY:  DM (diabetes mellitus)  HTN (hypertension)  CAD (coronary artery disease)  Hepatitis  CKD (chronic kidney disease)  AICD (automatic cardioverter/defibrillator) present  Artificial cardiac pacemaker        FAMILY HISTORY:  No pertinent family history in first degree relatives      HOME MEDICATIONS:  Home Medications:  Albuterol (Eqv-ProAir HFA) 90 mcg/inh inhalation aerosol: 2 puff(s) inhaled every 6 hours, As Needed (18 May 2021 15:55)  cefixime 400 mg oral tablet: 1 tab(s) orally once a day (18 May 2021 15:55)  Effient 10 mg oral tablet: 1 tab(s) orally once a day (18 May 2021 15:55)  Entresto 24 mg-26 mg oral tablet: 1 tab(s) orally 2 times a day (18 May 2021 15:55)  febuxostat 40 mg oral tablet: 1 tab(s) orally once a day (18 May 2021 15:55)  furosemide 40 mg oral tablet: 1 tab(s) orally once a day (29 May 2021 15:26)  HumaLOG 100 units/mL subcutaneous solution: 8 unit(s) subcutaneous 3 times a day (18 May 2021 15:55)  lactulose 10 g/15 mL oral syrup: 15 milliliter(s) orally 3 times a day (29 May 2021 15:26)  levothyroxine 88 mcg (0.088 mg) oral tablet: 1 tab(s) orally once a day (18 May 2021 15:55)  Pepcid 20 mg oral tablet: 1 tab(s) orally 2 times a day (18 May 2021 15:55)  polyethylene glycol 3350 oral powder for reconstitution: 17 gram(s) orally once a day (29 May 2021 15:26)  predniSONE 50 mg oral tablet: 1 tab(s) orally once a day (18 May 2021 15:55)  rifAXIMin 550 mg oral tablet: 1 tab(s) orally 2 times a day (29 May 2021 15:26)  senna oral tablet: 2 tab(s) orally once a day (at bedtime) (29 May 2021 15:26)  simethicone 80 mg oral tablet, chewable: 1 tab(s) orally 3 times a day (29 May 2021 15:26)  spironolactone 25 mg oral tablet: 2 tab(s) orally once a day (29 May 2021 15:26)  sucralfate 1 g oral tablet: 1 tab(s) orally every 6 hours (29 May 2021 15:26)  Toujeo SoloStar 300 units/mL subcutaneous solution: 20 unit(s) subcutaneous once a day (18 May 2021 15:55)      ALLERGIES:  Allergies    No Known Allergies  Intolerances        OBJECTIVE:  ICU Vital Signs Last 24 Hrs  T(C): 36.6 (01 Jun 2021 16:38), Max: 36.6 (01 Jun 2021 16:38)  T(F): 97.8 (01 Jun 2021 16:38), Max: 97.8 (01 Jun 2021 16:38)  HR: 90 (01 Jun 2021 16:38) (89 - 108)  BP: 109/63 (01 Jun 2021 16:38) (109/63 - 146/65)  BP(mean): --  ABP: --  ABP(mean): --  RR: 17 (01 Jun 2021 16:38) (17 - 20)  SpO2: 95% (01 Jun 2021 16:38) (92% - 100%)      I/O Summary 24H  IN: 0 mL / OUT: 200 mL / NET: -200 mL      CAPILLARY BLOOD GLUCOSE  POCT Blood Glucose.: 212 mg/dL (01 Jun 2021 12:49)    PHYSICAL EXAM  GENERAL: NAD, lying in bed comfortably  HEAD:  Atraumatic, Normocephalic  EYES: EOMI, PERRLA, conjunctiva and sclera clear  ENT: Moist mucous membranes  NECK: Supple, No JVD  CHEST/LUNG: Clear to auscultation bilaterally; No rales, rhonchi, wheezing, or rubs. Unlabored respirations  HEART: Regular rate and rhythm; No murmurs, rubs, or gallops  ABDOMEN: Bowel sounds present; Soft, Nontender, Nondistended. No hepatomegaly  EXTREMITIES:  2+ Peripheral Pulses, brisk capillary refill. No clubbing, cyanosis, or edema  NERVOUS SYSTEM:  Alert & Oriented X3, speech clear. No deficits   MSK: FROM all 4 extremities, full and equal strength  SKIN: No rashes or lesions      HOSPITAL MEDICATIONS:  MEDICATIONS  (STANDING):  calcium carbonate    500 mG (Tums) Chewable 1 Tablet(s) Chew at bedtime  dextrose 40% Gel 15 Gram(s) Oral once  dextrose 5%. 1000 milliLiter(s) (100 mL/Hr) IV Continuous <Continuous>  dextrose 5%. 1000 milliLiter(s) (50 mL/Hr) IV Continuous <Continuous>  dextrose 50% Injectable 25 Gram(s) IV Push once  dextrose 50% Injectable 12.5 Gram(s) IV Push once  dextrose 50% Injectable 25 Gram(s) IV Push once  furosemide    Tablet 40 milliGRAM(s) Oral daily  glucagon  Injectable 1 milliGRAM(s) IntraMuscular once  insulin glargine Injectable (LANTUS) 8 Unit(s) SubCutaneous at bedtime  insulin lispro (ADMELOG) corrective regimen sliding scale   SubCutaneous three times a day before meals  insulin lispro (ADMELOG) corrective regimen sliding scale   SubCutaneous at bedtime  lactated ringers. 1000 milliLiter(s) (75 mL/Hr) IV Continuous <Continuous>  lactulose Syrup 30 Gram(s) Oral three times a day  levothyroxine Injectable 44 MICROGram(s) IV Push at bedtime  nystatin Ointment 1 Application(s) Topical two times a day  nystatin Powder 1 Application(s) Topical three times a day  pantoprazole  Injectable 40 milliGRAM(s) IV Push every 12 hours  polyethylene glycol 3350 17 Gram(s) Oral daily  rifAXIMin 550 milliGRAM(s) Oral two times a day  senna 2 Tablet(s) Oral at bedtime  simethicone 80 milliGRAM(s) Chew three times a day  spironolactone 50 milliGRAM(s) Oral daily  sucralfate 1 Gram(s) Oral every 6 hours    MEDICATIONS  (PRN):  albuterol/ipratropium for Nebulization 3 milliLiter(s) Nebulizer every 6 hours PRN Shortness of Breath and/or Wheezing        LABS:  CBC 06-01-21 @ 06:49                        9.3    4.85  )-----------( 41                    29.5       Hgb trend: 9.3 <-- , 8.9 <-- , 8.6 <--   WBC trend: 4.85 <-- , 4.73 <-- , 4.62 <--     CMP 06-01-21 @ 06:48    146<H>  |  109<H>  |  42<H>  ----------------------------<  193<H>  4.6   |  23  |  1.52<H>    Ca    10.2      06-01-21 @ 06:48  Phos  3.2     05-31  Mg     2.0     05-31    TPro  6.3  /  Alb  4.0  /  TBili  2.3<H>  /  DBili  x   /  AST  23  /  ALT  22  /  AlkPhos  54  06-01      Serum Cr trend: 1.52 <-- , 1.49 <-- , 1.44 <--   PT/INR - ( 01 Jun 2021 06:49 )   PT: 17.3 sec;   INR: 1.47 ratio         PTT - ( 01 Jun 2021 06:49 ):39.4 sec  ABG Trend:   06-01-21 @ 15:09 pH 7.39 | pCO2 48<H> | PO2 105 | FiO2 35    VBG Trend:           MICROBIOLOGY:       RADIOLOGY & ADDITIONAL TESTS: Reviewed.    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  -Trend ammonia    Cardiovascular:  -Hx of HF (EF 40-45%)  -Vasopressor support to maintain MAP > 65  -VS [id number]  -Consider holding Aldactone, Lasix depending on degree of hypotension    Pulmonary: Hypoxemic Respiratory Failure in the setting of hepatic encephalopathy, aspiration, large right pleural effusion  -Continue Mechanical Ventilation with lung protective ventilation  -Titrate FIo2 to maintain SPO2 > 92, ABGs prn  -Consider thoracentesis of large right pleural effusion--send for cultures/cell count/TG/flow cytometry  -Broad spectrum abx; follow up ID for further recommendations  -Duoneb prn for wheezing    GI  -Hx of GIB: EGD 5/18 with small esophageal varices, large cratered duodenal ulcer   -Continue protonix ppi BID  -Hx of Cirrhosis: trend ammonia level  -Hepatology on board; f/u further recommendations  -Continue Latulose/Rifaximin for hepatic encephalopathy  -Continue Carafate as per hepatology    Renal  -Intake and output per routine  -Trend Cr  -Consider holding diuretics for now in the setting of acute hypotension  -Avoid nephrotoxic agents  -Renally dose meds based on Gfr  -Follow up nephro recs    ID  -Pan cultureL bld cx x 2, UA, sputum cx  -If thora done: send fluid for gram stain, culture  -Broad spectrum abx for septic shock likely secondary to aspiration pna  -De-escalate abx based on senstitivities  -Follow up ID for further recommendations    Endo  -Continue synthroid  -Hyperglycemia on Lantus; ISS--if NPO, give half done Lantus. Continue ISS q6h  -Monitor blood glucose q6h    Heme  -Hgb stable (hx GIB), monitor daily  -Maintain active type and screen    Ppx  -Continue Protonix PPI BID  -PAS for DVT ppx    GOC  -Full Code Layo Wallace MD  Internal Medicine, PGY2  [phone]    MICU Accept Note    HPI / INTERVAL HISTORY:  HPI:      86 yo M PMH HF (EF 40-45%, moderate LV systolic function), cirrhosis of unknown etiology (suspect 2/2 hep B), hypothyroid presented to hospital on 5/16 due to melena and increased SOB at home, found to have decompensated cirrhosis with large pleural effusion and ascites. During hospitalization Hgb down trended from 11.8 on May 16 to 5.1 on May 18, w/ hypotension and progressive lethargic mental status, and worsening ANDREEA.  Initially placed on octreotide/albumin due to concern for hepatorenal syndrome. RRT called 5/18 for worsening lethargy and hypotension in the setting of GIB, possible sepsis.  Started on pressors and admitted to MICU.  Intubated for EGD which showed small (< 5 mm) esophageal varices, and one non-bleeding, large (4 cm x 4 cm), cratered duodenal ulcer, from 5/18--5/19, s/p 4 units pRBC, 2 units platelet, 1 unit FFP. No active bleed and off levophed since 5/18 PM. s/p thoracentesis on 5/18 -1.6L, transudative based on Light's criteria; s/p diagnostic paracentesis on 5/19, negative for SBP, continued on prophylactic ceftriaxone for 7 day (5/16-5/22). ANDREEA improved with urine output (1-2L per day), Cr downtrended to 2.16, unlikely 2/2 hepatorenal more likely 2/2 prerenal in setting of hypovolemia, octreotide/albumin discontinued. Patient extubated on 5/20 5am. Failed bedside swallow test, pending formal speech swallow, GI cleared patient for NG tube placement for meds/feeds. Transferred back to medicine service on 5/20.    While on the floor, speech and swallow consulted for possible aspiration.  He failed initial MBS but on repeat, could tolerate a dysphagia 1 diet. Patient becoming increasingly confused likely in the setting of hepatic encephalopathy with worsening abdominal distention/discomfort. Confusion improving with lactulose. 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 Per neurosurgery, mass is still quite small--no intervention. IR consulted for paracentesis, drained 400ml cloudy pink fluid on 5/28 with subjective improvement in pt's abdominal discomfort.     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 Of note, patient also with worsening lethargy, decreased mental status. House pulm consulted, recommended transfer to MICU for possible intubation given acute metabolic encephalopathy due to aspiration pna, ?cirrhosis.  Will likely require thoracentesis of right sided pleural effusion.  Intubated on floor prior to admission to MICU for hypoxemic respiratory failure.      REVIEW OF SYSTEMS:  [x] Unable to assess ROS because patient intubated and sedated  CONSTITUTIONAL: No fever, chills, night sweats, or fatigue  EYES: No eye pain, visual disturbances, or discharge  ENMT:  No difficulty hearing, tinnitus, vertigo; No sinus or throat pain  NECK: No pain or stiffness  BREASTS: No pain, masses, or nipple discharge  RESPIRATORY: No cough, wheezing, or hemoptysis; No shortness of breath  CARDIOVASCULAR: No chest pain, palpitations, dizziness, or leg swelling  GASTROINTESTINAL: No abdominal or epigastric pain. No nausea, vomiting, or hematemesis; No diarrhea or constipation. No melena or hematochezia.  GENITOURINARY: No dysuria, frequency, hematuria, or incontinence  NEUROLOGICAL: No headaches, memory loss, loss of strength, numbness, or tremors  SKIN: No itching, burning, rashes, or lesions   LYMPH NODES: No enlarged glands  ENDOCRINE: No heat or cold intolerance; No hair loss  MUSCULOSKELETAL: No joint pain or swelling; No muscle, back, or extremity pain  PSYCHIATRIC: No depression, anxiety, mood swings, or difficulty sleeping  HEME/LYMPH: No easy bruising, or bleeding gums  ALLERGY AND IMMUNOLOGIC: No hives or eczema    PMH/PSH:  PAST MEDICAL & SURGICAL HISTORY:  DM (diabetes mellitus)  HTN (hypertension)  CAD (coronary artery disease)  Hepatitis  CKD (chronic kidney disease)  AICD (automatic cardioverter/defibrillator) present  Artificial cardiac pacemaker        FAMILY HISTORY:  No pertinent family history in first degree relatives      HOME MEDICATIONS:  Home Medications:  Albuterol (Eqv-ProAir HFA) 90 mcg/inh inhalation aerosol: 2 puff(s) inhaled every 6 hours, As Needed (18 May 2021 15:55)  cefixime 400 mg oral tablet: 1 tab(s) orally once a day (18 May 2021 15:55)  Effient 10 mg oral tablet: 1 tab(s) orally once a day (18 May 2021 15:55)  Entresto 24 mg-26 mg oral tablet: 1 tab(s) orally 2 times a day (18 May 2021 15:55)  febuxostat 40 mg oral tablet: 1 tab(s) orally once a day (18 May 2021 15:55)  furosemide 40 mg oral tablet: 1 tab(s) orally once a day (29 May 2021 15:26)  HumaLOG 100 units/mL subcutaneous solution: 8 unit(s) subcutaneous 3 times a day (18 May 2021 15:55)  lactulose 10 g/15 mL oral syrup: 15 milliliter(s) orally 3 times a day (29 May 2021 15:26)  levothyroxine 88 mcg (0.088 mg) oral tablet: 1 tab(s) orally once a day (18 May 2021 15:55)  Pepcid 20 mg oral tablet: 1 tab(s) orally 2 times a day (18 May 2021 15:55)  polyethylene glycol 3350 oral powder for reconstitution: 17 gram(s) orally once a day (29 May 2021 15:26)  predniSONE 50 mg oral tablet: 1 tab(s) orally once a day (18 May 2021 15:55)  rifAXIMin 550 mg oral tablet: 1 tab(s) orally 2 times a day (29 May 2021 15:26)  senna oral tablet: 2 tab(s) orally once a day (at bedtime) (29 May 2021 15:26)  simethicone 80 mg oral tablet, chewable: 1 tab(s) orally 3 times a day (29 May 2021 15:26)  spironolactone 25 mg oral tablet: 2 tab(s) orally once a day (29 May 2021 15:26)  sucralfate 1 g oral tablet: 1 tab(s) orally every 6 hours (29 May 2021 15:26)  Toujeo SoloStar 300 units/mL subcutaneous solution: 20 unit(s) subcutaneous once a day (18 May 2021 15:55)      ALLERGIES:  Allergies    No Known Allergies  Intolerances        OBJECTIVE:  ICU Vital Signs Last 24 Hrs  T(C): 36.6 (01 Jun 2021 16:38), Max: 36.6 (01 Jun 2021 16:38)  T(F): 97.8 (01 Jun 2021 16:38), Max: 97.8 (01 Jun 2021 16:38)  HR: 90 (01 Jun 2021 16:38) (89 - 108)  BP: 109/63 (01 Jun 2021 16:38) (109/63 - 146/65)  BP(mean): --  ABP: --  ABP(mean): --  RR: 17 (01 Jun 2021 16:38) (17 - 20)  SpO2: 95% (01 Jun 2021 16:38) (92% - 100%)      I/O Summary 24H  IN: 0 mL / OUT: 200 mL / NET: -200 mL      CAPILLARY BLOOD GLUCOSE  POCT Blood Glucose.: 212 mg/dL (01 Jun 2021 12:49)    PHYSICAL EXAM  GENERAL: NAD, lying in bed comfortably  HEAD:  Atraumatic, Normocephalic  EYES: EOMI, PERRLA, conjunctiva and sclera clear  ENT: Moist mucous membranes  NECK: Supple, No JVD  CHEST/LUNG: Clear to auscultation bilaterally; No rales, rhonchi, wheezing, or rubs. Unlabored respirations  HEART: Regular rate and rhythm; No murmurs, rubs, or gallops  ABDOMEN: Bowel sounds present; Soft, Nontender, Nondistended. No hepatomegaly  EXTREMITIES:  2+ Peripheral Pulses, brisk capillary refill. No clubbing, cyanosis, or edema  NERVOUS SYSTEM:  Alert & Oriented X3, speech clear. No deficits   MSK: FROM all 4 extremities, full and equal strength  SKIN: No rashes or lesions      HOSPITAL MEDICATIONS:  MEDICATIONS  (STANDING):  calcium carbonate    500 mG (Tums) Chewable 1 Tablet(s) Chew at bedtime  dextrose 40% Gel 15 Gram(s) Oral once  dextrose 5%. 1000 milliLiter(s) (100 mL/Hr) IV Continuous <Continuous>  dextrose 5%. 1000 milliLiter(s) (50 mL/Hr) IV Continuous <Continuous>  dextrose 50% Injectable 25 Gram(s) IV Push once  dextrose 50% Injectable 12.5 Gram(s) IV Push once  dextrose 50% Injectable 25 Gram(s) IV Push once  furosemide    Tablet 40 milliGRAM(s) Oral daily  glucagon  Injectable 1 milliGRAM(s) IntraMuscular once  insulin glargine Injectable (LANTUS) 8 Unit(s) SubCutaneous at bedtime  insulin lispro (ADMELOG) corrective regimen sliding scale   SubCutaneous three times a day before meals  insulin lispro (ADMELOG) corrective regimen sliding scale   SubCutaneous at bedtime  lactated ringers. 1000 milliLiter(s) (75 mL/Hr) IV Continuous <Continuous>  lactulose Syrup 30 Gram(s) Oral three times a day  levothyroxine Injectable 44 MICROGram(s) IV Push at bedtime  nystatin Ointment 1 Application(s) Topical two times a day  nystatin Powder 1 Application(s) Topical three times a day  pantoprazole  Injectable 40 milliGRAM(s) IV Push every 12 hours  polyethylene glycol 3350 17 Gram(s) Oral daily  rifAXIMin 550 milliGRAM(s) Oral two times a day  senna 2 Tablet(s) Oral at bedtime  simethicone 80 milliGRAM(s) Chew three times a day  spironolactone 50 milliGRAM(s) Oral daily  sucralfate 1 Gram(s) Oral every 6 hours    MEDICATIONS  (PRN):  albuterol/ipratropium for Nebulization 3 milliLiter(s) Nebulizer every 6 hours PRN Shortness of Breath and/or Wheezing        LABS:  CBC 06-01-21 @ 06:49                        9.3    4.85  )-----------( 41                    29.5       Hgb trend: 9.3 <-- , 8.9 <-- , 8.6 <--   WBC trend: 4.85 <-- , 4.73 <-- , 4.62 <--     CMP 06-01-21 @ 06:48    146<H>  |  109<H>  |  42<H>  ----------------------------<  193<H>  4.6   |  23  |  1.52<H>    Ca    10.2      06-01-21 @ 06:48  Phos  3.2     05-31  Mg     2.0     05-31    TPro  6.3  /  Alb  4.0  /  TBili  2.3<H>  /  DBili  x   /  AST  23  /  ALT  22  /  AlkPhos  54  06-01      Serum Cr trend: 1.52 <-- , 1.49 <-- , 1.44 <--   PT/INR - ( 01 Jun 2021 06:49 )   PT: 17.3 sec;   INR: 1.47 ratio         PTT - ( 01 Jun 2021 06:49 ):39.4 sec  ABG Trend:   06-01-21 @ 15:09 pH 7.39 | pCO2 48<H> | PO2 105 | FiO2 35    VBG Trend:           MICROBIOLOGY:       RADIOLOGY & ADDITIONAL TESTS: Reviewed.    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  -Trend ammonia    Cardiovascular:  -Hx of HF (EF 40-45%)  -Vasopressor support to maintain MAP > 65  -VS [id number]  -Consider holding Aldactone, Lasix depending on degree of hypotension    Pulmonary: Hypoxemic Respiratory Failure in the setting of hepatic encephalopathy, aspiration, large right pleural effusion  -Continue Mechanical Ventilation with lung protective ventilation  -Titrate FIo2 to maintain SPO2 > 92, ABGs prn  -Consider thoracentesis of large right pleural effusion--send for cultures/cell count/TG/flow cytometry  -Broad spectrum abx; follow up ID for further recommendations  -Duoneb prn for wheezing    GI  -Hx of GIB: EGD 5/18 with small esophageal varices, large cratered duodenal ulcer   -Continue protonix ppi BID  -Hx of Cirrhosis: trend ammonia level  -Hepatology on board; f/u further recommendations  -Continue Latulose/Rifaximin for hepatic encephalopathy  -Continue Carafate as per hepatology  -Ceftriaxone for SBP ppx    Renal  -Intake and output per routine  -Trend Cr  -Consider holding diuretics for now in the setting of acute hypotension  -Avoid nephrotoxic agents  -Renally dose meds based on Gfr  -Follow up nephro recs    ID  -Pan cultureL bld cx x 2, UA, sputum cx  -If thora done: send fluid for gram stain, culture  -Broad spectrum abx for septic shock likely secondary to aspiration pna  -De-escalate abx based on senstitivities  -Follow up ID for further recommendations    Endo  -Continue synthroid  -Hyperglycemia on Lantus; ISS--if NPO, give half done Lantus. Continue ISS q6h  -Monitor blood glucose q6h    Heme  -Hgb stable (hx GIB), monitor daily  -Maintain active type and screen    Ppx  -Continue Protonix PPI BID  -PAS for DVT ppx    GOC  -Full Code Layo Wallace MD  Internal Medicine, PGY2  [phone]    MICU Accept Note    HPI / INTERVAL HISTORY:  HPI:      84 yo M PMH HF (EF 40-45%, moderate LV systolic function), cirrhosis of unknown etiology (suspect 2/2 hep B), hypothyroid presented to hospital on 5/16 due to melena and increased SOB at home, found to have decompensated cirrhosis with large pleural effusion and ascites. During hospitalization Hgb down trended from 11.8 on May 16 to 5.1 on May 18, w/ hypotension and progressive lethargic mental status, and worsening ANDREEA.  Initially placed on octreotide/albumin due to concern for hepatorenal syndrome. RRT called 5/18 for worsening lethargy and hypotension in the setting of GIB, possible sepsis.  Started on pressors and admitted to MICU.  Intubated for EGD which showed small (< 5 mm) esophageal varices, and one non-bleeding, large (4 cm x 4 cm), cratered duodenal ulcer, from 5/18--5/19, s/p 4 units pRBC, 2 units platelet, 1 unit FFP. No active bleed and off levophed since 5/18 PM. s/p thoracentesis on 5/18 -1.6L, transudative based on Light's criteria; s/p diagnostic paracentesis on 5/19, negative for SBP, continued on prophylactic ceftriaxone for 7 day (5/16-5/22). ANDREEA improved with urine output (1-2L per day), Cr downtrended to 2.16, unlikely 2/2 hepatorenal more likely 2/2 prerenal in setting of hypovolemia, octreotide/albumin discontinued. Patient extubated on 5/20 5am. Failed bedside swallow test, pending formal speech swallow, GI cleared patient for NG tube placement for meds/feeds. Transferred back to medicine service on 5/20.    While on the floor, speech and swallow consulted for possible aspiration.  He failed initial MBS but on repeat, could tolerate a dysphagia 1 diet. Patient becoming increasingly confused likely in the setting of hepatic encephalopathy with worsening abdominal distention/discomfort. Confusion improving with lactulose. 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 Per neurosurgery, mass is still quite small--no intervention. IR consulted for paracentesis, drained 400ml cloudy pink fluid on 5/28 with subjective improvement in pt's abdominal discomfort.     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 Of note, patient also with worsening lethargy, decreased mental status. House pulm consulted, recommended transfer to MICU for possible intubation given acute metabolic encephalopathy due to aspiration pna, ?cirrhosis.  Will likely require thoracentesis of right sided pleural effusion.  Intubated on floor prior to admission to MICU for hypoxemic respiratory failure.      REVIEW OF SYSTEMS:  [x] Unable to assess ROS because patient intubated and sedated  CONSTITUTIONAL: No fever, chills, night sweats, or fatigue  EYES: No eye pain, visual disturbances, or discharge  ENMT:  No difficulty hearing, tinnitus, vertigo; No sinus or throat pain  NECK: No pain or stiffness  BREASTS: No pain, masses, or nipple discharge  RESPIRATORY: No cough, wheezing, or hemoptysis; No shortness of breath  CARDIOVASCULAR: No chest pain, palpitations, dizziness, or leg swelling  GASTROINTESTINAL: No abdominal or epigastric pain. No nausea, vomiting, or hematemesis; No diarrhea or constipation. No melena or hematochezia.  GENITOURINARY: No dysuria, frequency, hematuria, or incontinence  NEUROLOGICAL: No headaches, memory loss, loss of strength, numbness, or tremors  SKIN: No itching, burning, rashes, or lesions   LYMPH NODES: No enlarged glands  ENDOCRINE: No heat or cold intolerance; No hair loss  MUSCULOSKELETAL: No joint pain or swelling; No muscle, back, or extremity pain  PSYCHIATRIC: No depression, anxiety, mood swings, or difficulty sleeping  HEME/LYMPH: No easy bruising, or bleeding gums  ALLERGY AND IMMUNOLOGIC: No hives or eczema    PMH/PSH:  PAST MEDICAL & SURGICAL HISTORY:  DM (diabetes mellitus)  HTN (hypertension)  CAD (coronary artery disease)  Hepatitis  CKD (chronic kidney disease)  AICD (automatic cardioverter/defibrillator) present  Artificial cardiac pacemaker        FAMILY HISTORY:  No pertinent family history in first degree relatives      HOME MEDICATIONS:  Home Medications:  Albuterol (Eqv-ProAir HFA) 90 mcg/inh inhalation aerosol: 2 puff(s) inhaled every 6 hours, As Needed (18 May 2021 15:55)  cefixime 400 mg oral tablet: 1 tab(s) orally once a day (18 May 2021 15:55)  Effient 10 mg oral tablet: 1 tab(s) orally once a day (18 May 2021 15:55)  Entresto 24 mg-26 mg oral tablet: 1 tab(s) orally 2 times a day (18 May 2021 15:55)  febuxostat 40 mg oral tablet: 1 tab(s) orally once a day (18 May 2021 15:55)  furosemide 40 mg oral tablet: 1 tab(s) orally once a day (29 May 2021 15:26)  HumaLOG 100 units/mL subcutaneous solution: 8 unit(s) subcutaneous 3 times a day (18 May 2021 15:55)  lactulose 10 g/15 mL oral syrup: 15 milliliter(s) orally 3 times a day (29 May 2021 15:26)  levothyroxine 88 mcg (0.088 mg) oral tablet: 1 tab(s) orally once a day (18 May 2021 15:55)  Pepcid 20 mg oral tablet: 1 tab(s) orally 2 times a day (18 May 2021 15:55)  polyethylene glycol 3350 oral powder for reconstitution: 17 gram(s) orally once a day (29 May 2021 15:26)  predniSONE 50 mg oral tablet: 1 tab(s) orally once a day (18 May 2021 15:55)  rifAXIMin 550 mg oral tablet: 1 tab(s) orally 2 times a day (29 May 2021 15:26)  senna oral tablet: 2 tab(s) orally once a day (at bedtime) (29 May 2021 15:26)  simethicone 80 mg oral tablet, chewable: 1 tab(s) orally 3 times a day (29 May 2021 15:26)  spironolactone 25 mg oral tablet: 2 tab(s) orally once a day (29 May 2021 15:26)  sucralfate 1 g oral tablet: 1 tab(s) orally every 6 hours (29 May 2021 15:26)  Toujeo SoloStar 300 units/mL subcutaneous solution: 20 unit(s) subcutaneous once a day (18 May 2021 15:55)      ALLERGIES:  Allergies    No Known Allergies  Intolerances        OBJECTIVE:  ICU Vital Signs Last 24 Hrs  T(C): 36.6 (01 Jun 2021 16:38), Max: 36.6 (01 Jun 2021 16:38)  T(F): 97.8 (01 Jun 2021 16:38), Max: 97.8 (01 Jun 2021 16:38)  HR: 90 (01 Jun 2021 16:38) (89 - 108)  BP: 109/63 (01 Jun 2021 16:38) (109/63 - 146/65)  BP(mean): --  ABP: --  ABP(mean): --  RR: 17 (01 Jun 2021 16:38) (17 - 20)  SpO2: 95% (01 Jun 2021 16:38) (92% - 100%)      I/O Summary 24H  IN: 0 mL / OUT: 200 mL / NET: -200 mL      CAPILLARY BLOOD GLUCOSE  POCT Blood Glucose.: 212 mg/dL (01 Jun 2021 12:49)    PHYSICAL EXAM  GENERAL: NAD, lying in bed comfortably  HEAD:  Atraumatic, Normocephalic  EYES: EOMI, PERRLA, conjunctiva and sclera clear  ENT: Moist mucous membranes  NECK: Supple, No JVD  CHEST/LUNG: Clear to auscultation bilaterally; No rales, rhonchi, wheezing, or rubs. Unlabored respirations  HEART: Regular rate and rhythm; No murmurs, rubs, or gallops  ABDOMEN: Bowel sounds present; Soft, Nontender, Nondistended. No hepatomegaly  EXTREMITIES:  2+ Peripheral Pulses, brisk capillary refill. No clubbing, cyanosis, or edema  NERVOUS SYSTEM:  Alert & Oriented X3, speech clear. No deficits   MSK: FROM all 4 extremities, full and equal strength  SKIN: No rashes or lesions      HOSPITAL MEDICATIONS:  MEDICATIONS  (STANDING):  calcium carbonate    500 mG (Tums) Chewable 1 Tablet(s) Chew at bedtime  dextrose 40% Gel 15 Gram(s) Oral once  dextrose 5%. 1000 milliLiter(s) (100 mL/Hr) IV Continuous <Continuous>  dextrose 5%. 1000 milliLiter(s) (50 mL/Hr) IV Continuous <Continuous>  dextrose 50% Injectable 25 Gram(s) IV Push once  dextrose 50% Injectable 12.5 Gram(s) IV Push once  dextrose 50% Injectable 25 Gram(s) IV Push once  furosemide    Tablet 40 milliGRAM(s) Oral daily  glucagon  Injectable 1 milliGRAM(s) IntraMuscular once  insulin glargine Injectable (LANTUS) 8 Unit(s) SubCutaneous at bedtime  insulin lispro (ADMELOG) corrective regimen sliding scale   SubCutaneous three times a day before meals  insulin lispro (ADMELOG) corrective regimen sliding scale   SubCutaneous at bedtime  lactated ringers. 1000 milliLiter(s) (75 mL/Hr) IV Continuous <Continuous>  lactulose Syrup 30 Gram(s) Oral three times a day  levothyroxine Injectable 44 MICROGram(s) IV Push at bedtime  nystatin Ointment 1 Application(s) Topical two times a day  nystatin Powder 1 Application(s) Topical three times a day  pantoprazole  Injectable 40 milliGRAM(s) IV Push every 12 hours  polyethylene glycol 3350 17 Gram(s) Oral daily  rifAXIMin 550 milliGRAM(s) Oral two times a day  senna 2 Tablet(s) Oral at bedtime  simethicone 80 milliGRAM(s) Chew three times a day  spironolactone 50 milliGRAM(s) Oral daily  sucralfate 1 Gram(s) Oral every 6 hours    MEDICATIONS  (PRN):  albuterol/ipratropium for Nebulization 3 milliLiter(s) Nebulizer every 6 hours PRN Shortness of Breath and/or Wheezing        LABS:  CBC 06-01-21 @ 06:49                        9.3    4.85  )-----------( 41                    29.5       Hgb trend: 9.3 <-- , 8.9 <-- , 8.6 <--   WBC trend: 4.85 <-- , 4.73 <-- , 4.62 <--     CMP 06-01-21 @ 06:48    146<H>  |  109<H>  |  42<H>  ----------------------------<  193<H>  4.6   |  23  |  1.52<H>    Ca    10.2      06-01-21 @ 06:48  Phos  3.2     05-31  Mg     2.0     05-31    TPro  6.3  /  Alb  4.0  /  TBili  2.3<H>  /  DBili  x   /  AST  23  /  ALT  22  /  AlkPhos  54  06-01      Serum Cr trend: 1.52 <-- , 1.49 <-- , 1.44 <--   PT/INR - ( 01 Jun 2021 06:49 )   PT: 17.3 sec;   INR: 1.47 ratio         PTT - ( 01 Jun 2021 06:49 ):39.4 sec  ABG Trend:   06-01-21 @ 15:09 pH 7.39 | pCO2 48<H> | PO2 105 | FiO2 35    VBG Trend:           MICROBIOLOGY:       RADIOLOGY & ADDITIONAL TESTS: Reviewed.    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  -Trend ammonia    Cardiovascular:  -Hx of HF (EF 40-45%)  -Vasopressor support to maintain MAP > 65  -VS [id number]  -Consider holding Aldactone, Lasix depending on degree of hypotension    Pulmonary: Hypoxemic Respiratory Failure in the setting of hepatic encephalopathy, aspiration, large right pleural effusion  -Continue Mechanical Ventilation with lung protective ventilation  -Titrate FIo2 to maintain SPO2 > 92, ABGs prn  -Consider thoracentesis of large right pleural effusion--send for cultures/cell count/TG/flow cytometry  -Broad spectrum abx; follow up ID for further recommendations  -Duoneb prn for wheezing    GI  -Hx of GIB: EGD 5/18 with small esophageal varices, large cratered duodenal ulcer   -Continue protonix ppi BID  -Hx of Cirrhosis: trend ammonia level  -s/p para 5/28 in IR (therapeutic drain with removal of 400cc cloudly pink fluid)  -Hepatology on board; f/u further recommendations  -Continue Latulose/Rifaximin for hepatic encephalopathy  -Continue Carafate as per hepatology  -Zosyn for SBP ppx    Renal  -Intake and output per routine  -Trend Cr  -Consider holding diuretics for now in the setting of acute hypotension  -Avoid nephrotoxic agents  -Renally dose meds based on Gfr  -Follow up nephro recs    ID  -Pan cultureL bld cx x 2, UA, sputum cx  -If thora done: send fluid for gram stain, culture  -Broad spectrum abx for septic shock likely secondary to aspiration pna  -De-escalate abx based on senstitivities  -Follow up ID for further recommendations    Endo  -Continue synthroid  -Hyperglycemia on Lantus; ISS--if NPO, give half done Lantus. Continue ISS q6h  -Monitor blood glucose q6h    Heme  -Hgb stable (hx GIB), monitor daily  -Maintain active type and screen    Ppx  -Continue Protonix PPI BID  -PAS for DVT ppx    GOC  -Full Code Layo Wallace MD  Internal Medicine, PGY2  [phone]    MICU Accept Note    HPI / INTERVAL HISTORY:  HPI:      84 yo M PMH HF (EF 40-45%, moderate LV systolic function), cirrhosis of unknown etiology (suspect 2/2 hep B), hypothyroid presented to hospital on 5/16 due to melena and increased SOB at home, found to have decompensated cirrhosis with large pleural effusion and ascites. During hospitalization Hgb down trended from 11.8 on May 16 to 5.1 on May 18, w/ hypotension and progressive lethargic mental status, and worsening ANDREEA.  Initially placed on octreotide/albumin due to concern for hepatorenal syndrome. RRT called 5/18 for worsening lethargy and hypotension in the setting of GIB, possible sepsis.  Started on pressors and admitted to MICU.  Intubated for EGD which showed small (< 5 mm) esophageal varices, and one non-bleeding, large (4 cm x 4 cm), cratered duodenal ulcer, from 5/18--5/19, s/p 4 units pRBC, 2 units platelet, 1 unit FFP. No active bleed and off levophed since 5/18 PM. s/p thoracentesis on 5/18 -1.6L, transudative based on Light's criteria; s/p diagnostic paracentesis on 5/19, negative for SBP, continued on prophylactic ceftriaxone for 7 day (5/16-5/22). ANDREEA improved with urine output (1-2L per day), Cr downtrended to 2.16, unlikely 2/2 hepatorenal more likely 2/2 prerenal in setting of hypovolemia, octreotide/albumin discontinued. Patient extubated on 5/20 5am. Failed bedside swallow test, pending formal speech swallow, GI cleared patient for NG tube placement for meds/feeds. Transferred back to medicine service on 5/20.    While on the floor, speech and swallow consulted for possible aspiration.  He failed initial MBS but on repeat, could tolerate a dysphagia 1 diet. Patient becoming increasingly confused likely in the setting of hepatic encephalopathy with worsening abdominal distention/discomfort. Confusion improving with lactulose. 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 Per neurosurgery, mass is still quite small--no intervention. IR consulted for paracentesis, drained 400ml cloudy pink fluid on 5/28 with subjective improvement in pt's abdominal discomfort.     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 Of note, patient also with worsening lethargy, decreased mental status. House pulm consulted, recommended transfer to MICU for possible intubation given acute metabolic encephalopathy due to aspiration pna, ?cirrhosis.  Will likely require thoracentesis of right sided pleural effusion.  Intubated on floor prior to admission to MICU for hypoxemic respiratory failure.      REVIEW OF SYSTEMS:  [x] Unable to assess ROS because patient intubated and sedated  CONSTITUTIONAL: No fever, chills, night sweats, or fatigue  EYES: No eye pain, visual disturbances, or discharge  ENMT:  No difficulty hearing, tinnitus, vertigo; No sinus or throat pain  NECK: No pain or stiffness  BREASTS: No pain, masses, or nipple discharge  RESPIRATORY: No cough, wheezing, or hemoptysis; No shortness of breath  CARDIOVASCULAR: No chest pain, palpitations, dizziness, or leg swelling  GASTROINTESTINAL: No abdominal or epigastric pain. No nausea, vomiting, or hematemesis; No diarrhea or constipation. No melena or hematochezia.  GENITOURINARY: No dysuria, frequency, hematuria, or incontinence  NEUROLOGICAL: No headaches, memory loss, loss of strength, numbness, or tremors  SKIN: No itching, burning, rashes, or lesions   LYMPH NODES: No enlarged glands  ENDOCRINE: No heat or cold intolerance; No hair loss  MUSCULOSKELETAL: No joint pain or swelling; No muscle, back, or extremity pain  PSYCHIATRIC: No depression, anxiety, mood swings, or difficulty sleeping  HEME/LYMPH: No easy bruising, or bleeding gums  ALLERGY AND IMMUNOLOGIC: No hives or eczema    PMH/PSH:  PAST MEDICAL & SURGICAL HISTORY:  DM (diabetes mellitus)  HTN (hypertension)  CAD (coronary artery disease)  Hepatitis  CKD (chronic kidney disease)  AICD (automatic cardioverter/defibrillator) present  Artificial cardiac pacemaker        FAMILY HISTORY:  No pertinent family history in first degree relatives      HOME MEDICATIONS:  Home Medications:  Albuterol (Eqv-ProAir HFA) 90 mcg/inh inhalation aerosol: 2 puff(s) inhaled every 6 hours, As Needed (18 May 2021 15:55)  cefixime 400 mg oral tablet: 1 tab(s) orally once a day (18 May 2021 15:55)  Effient 10 mg oral tablet: 1 tab(s) orally once a day (18 May 2021 15:55)  Entresto 24 mg-26 mg oral tablet: 1 tab(s) orally 2 times a day (18 May 2021 15:55)  febuxostat 40 mg oral tablet: 1 tab(s) orally once a day (18 May 2021 15:55)  furosemide 40 mg oral tablet: 1 tab(s) orally once a day (29 May 2021 15:26)  HumaLOG 100 units/mL subcutaneous solution: 8 unit(s) subcutaneous 3 times a day (18 May 2021 15:55)  lactulose 10 g/15 mL oral syrup: 15 milliliter(s) orally 3 times a day (29 May 2021 15:26)  levothyroxine 88 mcg (0.088 mg) oral tablet: 1 tab(s) orally once a day (18 May 2021 15:55)  Pepcid 20 mg oral tablet: 1 tab(s) orally 2 times a day (18 May 2021 15:55)  polyethylene glycol 3350 oral powder for reconstitution: 17 gram(s) orally once a day (29 May 2021 15:26)  predniSONE 50 mg oral tablet: 1 tab(s) orally once a day (18 May 2021 15:55)  rifAXIMin 550 mg oral tablet: 1 tab(s) orally 2 times a day (29 May 2021 15:26)  senna oral tablet: 2 tab(s) orally once a day (at bedtime) (29 May 2021 15:26)  simethicone 80 mg oral tablet, chewable: 1 tab(s) orally 3 times a day (29 May 2021 15:26)  spironolactone 25 mg oral tablet: 2 tab(s) orally once a day (29 May 2021 15:26)  sucralfate 1 g oral tablet: 1 tab(s) orally every 6 hours (29 May 2021 15:26)  Toujeo SoloStar 300 units/mL subcutaneous solution: 20 unit(s) subcutaneous once a day (18 May 2021 15:55)      ALLERGIES:  Allergies    No Known Allergies  Intolerances        OBJECTIVE:  ICU Vital Signs Last 24 Hrs  T(C): 36.6 (01 Jun 2021 16:38), Max: 36.6 (01 Jun 2021 16:38)  T(F): 97.8 (01 Jun 2021 16:38), Max: 97.8 (01 Jun 2021 16:38)  HR: 90 (01 Jun 2021 16:38) (89 - 108)  BP: 109/63 (01 Jun 2021 16:38) (109/63 - 146/65)  BP(mean): --  ABP: --  ABP(mean): --  RR: 17 (01 Jun 2021 16:38) (17 - 20)  SpO2: 95% (01 Jun 2021 16:38) (92% - 100%)      I/O Summary 24H  IN: 0 mL / OUT: 200 mL / NET: -200 mL      CAPILLARY BLOOD GLUCOSE  POCT Blood Glucose.: 212 mg/dL (01 Jun 2021 12:49)    PHYSICAL EXAM  GENERAL: NAD, lying in bed comfortably  HEAD:  Atraumatic, Normocephalic  EYES: EOMI, PERRLA, conjunctiva and sclera clear  ENT: Moist mucous membranes  NECK: Supple, No JVD  CHEST/LUNG: Clear to auscultation bilaterally; No rales, rhonchi, wheezing, or rubs. Unlabored respirations  HEART: Regular rate and rhythm; No murmurs, rubs, or gallops  ABDOMEN: Bowel sounds present; Soft, Nontender, Nondistended. No hepatomegaly  EXTREMITIES:  2+ Peripheral Pulses, brisk capillary refill. No clubbing, cyanosis, or edema  NERVOUS SYSTEM:  Alert & Oriented X3, speech clear. No deficits   MSK: FROM all 4 extremities, full and equal strength  SKIN: No rashes or lesions      HOSPITAL MEDICATIONS:  MEDICATIONS  (STANDING):  calcium carbonate    500 mG (Tums) Chewable 1 Tablet(s) Chew at bedtime  dextrose 40% Gel 15 Gram(s) Oral once  dextrose 5%. 1000 milliLiter(s) (100 mL/Hr) IV Continuous <Continuous>  dextrose 5%. 1000 milliLiter(s) (50 mL/Hr) IV Continuous <Continuous>  dextrose 50% Injectable 25 Gram(s) IV Push once  dextrose 50% Injectable 12.5 Gram(s) IV Push once  dextrose 50% Injectable 25 Gram(s) IV Push once  furosemide    Tablet 40 milliGRAM(s) Oral daily  glucagon  Injectable 1 milliGRAM(s) IntraMuscular once  insulin glargine Injectable (LANTUS) 8 Unit(s) SubCutaneous at bedtime  insulin lispro (ADMELOG) corrective regimen sliding scale   SubCutaneous three times a day before meals  insulin lispro (ADMELOG) corrective regimen sliding scale   SubCutaneous at bedtime  lactated ringers. 1000 milliLiter(s) (75 mL/Hr) IV Continuous <Continuous>  lactulose Syrup 30 Gram(s) Oral three times a day  levothyroxine Injectable 44 MICROGram(s) IV Push at bedtime  nystatin Ointment 1 Application(s) Topical two times a day  nystatin Powder 1 Application(s) Topical three times a day  pantoprazole  Injectable 40 milliGRAM(s) IV Push every 12 hours  polyethylene glycol 3350 17 Gram(s) Oral daily  rifAXIMin 550 milliGRAM(s) Oral two times a day  senna 2 Tablet(s) Oral at bedtime  simethicone 80 milliGRAM(s) Chew three times a day  spironolactone 50 milliGRAM(s) Oral daily  sucralfate 1 Gram(s) Oral every 6 hours    MEDICATIONS  (PRN):  albuterol/ipratropium for Nebulization 3 milliLiter(s) Nebulizer every 6 hours PRN Shortness of Breath and/or Wheezing        LABS:  CBC 06-01-21 @ 06:49                        9.3    4.85  )-----------( 41                    29.5       Hgb trend: 9.3 <-- , 8.9 <-- , 8.6 <--   WBC trend: 4.85 <-- , 4.73 <-- , 4.62 <--     CMP 06-01-21 @ 06:48    146<H>  |  109<H>  |  42<H>  ----------------------------<  193<H>  4.6   |  23  |  1.52<H>    Ca    10.2      06-01-21 @ 06:48  Phos  3.2     05-31  Mg     2.0     05-31    TPro  6.3  /  Alb  4.0  /  TBili  2.3<H>  /  DBili  x   /  AST  23  /  ALT  22  /  AlkPhos  54  06-01      Serum Cr trend: 1.52 <-- , 1.49 <-- , 1.44 <--   PT/INR - ( 01 Jun 2021 06:49 )   PT: 17.3 sec;   INR: 1.47 ratio         PTT - ( 01 Jun 2021 06:49 ):39.4 sec  ABG Trend:   06-01-21 @ 15:09 pH 7.39 | pCO2 48<H> | PO2 105 | FiO2 35    VBG Trend:           MICROBIOLOGY:       RADIOLOGY & ADDITIONAL TESTS: Reviewed.    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  -Trend ammonia    Cardiovascular:  -Hx of HF (EF 40-45%)  -Vasopressor support to maintain MAP > 65  -VS [id number]  -Consider holding Aldactone, Lasix depending on degree of hypotension    Pulmonary: Hypoxemic Respiratory Failure in the setting of hepatic encephalopathy, aspiration, large right pleural effusion  -Continue Mechanical Ventilation with lung protective ventilation  -Titrate FIo2 to maintain SPO2 > 92, ABGs prn  -Consider thoracentesis of large right pleural effusion--send for cultures/cell count/TG/flow cytometry  -Broad spectrum abx; follow up ID for further recommendations  -Duoneb prn for wheezing    GI  -Hx of GIB: EGD 5/18 with small esophageal varices, large cratered duodenal ulcer   -Continue protonix ppi BID  -Hx of Cirrhosis: trend ammonia level  -s/p para 5/28 in IR (therapeutic drain with removal of 400cc cloudly pink fluid)  -Hepatology on board; f/u further recommendations  -Continue Latulose/Rifaximin for hepatic encephalopathy  -Continue Carafate as per hepatology  -Zosyn for SBP ppx    Renal  -Intake and output per routine  -Trend Cr  -Consider holding diuretics for now in the setting of acute hypotension  -Avoid nephrotoxic agents  -Renally dose meds based on Gfr  -Follow up nephro recs    ID  -Pan cultureL bld cx x 2, UA, sputum cx  -If thora done: send fluid for gram stain, culture  -Broad spectrum abx for septic shock likely secondary to aspiration pna  -De-escalate abx based on senstitivities  -Follow up ID for further recommendations    Endo  -Continue synthroid  -Hyperglycemia on Lantus; ISS--if NPO, give half done Lantus. 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 INitially admitted w/ decompensated cirrhosis, ANDREEA, esophageal varices requiring MICU admission and intubation. 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 ICU consulted for worsening respiratory status in setting of AMS, likely aspiration and large right pleural effusion.   - pt is very lethargic, open eyes but does not follow commands even in his own language  - concern he is not protecting airway and may need to be intubated  - will need thoracentesis but not safe in this setting with poor mental status  - would treat for aspiration pna at this time given poor mental status and current respiratory status  - will transfer to MICU for posisble intubation and continuation of mgmt of acute issues    Critical care services provided.     I have personally and independently provided 35 minutes of critical care services.  This excludes any time spent on separate procedures or teaching.

## 2021-06-01 NOTE — CHART NOTE - NSCHARTNOTEFT_GEN_A_CORE
6/1/2021     1802    Critical Care Note--Medicine Attending    See RRT sheet for full detail, assessment and plan.  I personally provided 30 minutes of critical care time for respiratory failure in the setting of this 86 y/o M with CAD, AICD, hepatic failure, DM2 with worsening encephalopathy, now accepted by the MICU and electively intubated on the calderon and transferred to the MICU.  Patient's family in attendance as above aware and agree with plan/care as above.   Given patient's comorbidities, patient's long term prognosis is guarded.  Emotional support provided to family in attendance.  Care reviewed with house staff and care assumed by the MICU.    Bahman Leon MD  [phone] 6/1/2021     1802    Critical Care Note--Medicine Attending    See RRT sheet for full detail, assessment and plan.  I personally provided 30 minutes of critical care time for respiratory failure in the setting of this 86 y/o M--UNKNOWN to me previously, with CAD, AICD, hepatic failure, DM2 with worsening encephalopathy, now accepted by the MICU and electively intubated on the calderon and transferred to the MICU.  Patient's family in attendance as above aware and agree with plan/care as above.   Given patient's comorbidities, patient's long term prognosis is guarded.  Emotional support provided to family in attendance.  Care reviewed with house staff and care assumed by the MICU.    Bahman Leon MD  [phone]

## 2021-06-01 NOTE — PROGRESS NOTE ADULT - SUBJECTIVE AND OBJECTIVE BOX
[facility] DIVISION OF KIDNEY DISEASES AND HYPERTENSION -- FOLLOW UP NOTE  --------------------------------------------------------------------------------    24 hour events/subjective: Patient was seen and examined at bedside. Appears to have agonal breathing. Unable to obtain ROS    PAST HISTORY  --------------------------------------------------------------------------------  No significant changes to PMH, PSH, FHx, SHx, unless otherwise noted    ALLERGIES & MEDICATIONS  --------------------------------------------------------------------------------  Allergies    No Known Allergies    Intolerances    Standing Inpatient Medications  calcium carbonate    500 mG (Tums) Chewable 1 Tablet(s) Chew at bedtime  dextrose 40% Gel 15 Gram(s) Oral once  dextrose 5%. 1000 milliLiter(s) IV Continuous <Continuous>  dextrose 5%. 1000 milliLiter(s) IV Continuous <Continuous>  dextrose 50% Injectable 12.5 Gram(s) IV Push once  dextrose 50% Injectable 25 Gram(s) IV Push once  dextrose 50% Injectable 25 Gram(s) IV Push once  furosemide    Tablet 40 milliGRAM(s) Oral daily  glucagon  Injectable 1 milliGRAM(s) IntraMuscular once  insulin glargine Injectable (LANTUS) 8 Unit(s) SubCutaneous at bedtime  insulin lispro (ADMELOG) corrective regimen sliding scale   SubCutaneous three times a day before meals  insulin lispro (ADMELOG) corrective regimen sliding scale   SubCutaneous at bedtime  lactated ringers. 1000 milliLiter(s) IV Continuous <Continuous>  lactulose Syrup 30 Gram(s) Oral three times a day  levothyroxine 88 MICROGram(s) Oral daily  nystatin Ointment 1 Application(s) Topical two times a day  nystatin Powder 1 Application(s) Topical three times a day  pantoprazole    Tablet 40 milliGRAM(s) Oral every 12 hours  polyethylene glycol 3350 17 Gram(s) Oral daily  rifAXIMin 550 milliGRAM(s) Oral two times a day  senna 2 Tablet(s) Oral at bedtime  simethicone 80 milliGRAM(s) Chew three times a day  spironolactone 50 milliGRAM(s) Oral daily  sucralfate 1 Gram(s) Oral every 6 hours    PRN Inpatient Medications  albuterol/ipratropium for Nebulization 3 milliLiter(s) Nebulizer every 6 hours PRN    REVIEW OF SYSTEMS  --------------------------------------------------------------------------------  Unable to obtain ROS    VITALS/PHYSICAL EXAM  --------------------------------------------------------------------------------  T(C): 36.4 (06-01-21 @ 12:24), Max: 36.5 (06-01-21 @ 00:00)  HR: 99 (06-01-21 @ 12:24) (89 - 108)  BP: 133/71 (06-01-21 @ 12:24) (114/65 - 146/65)  RR: 20 (06-01-21 @ 12:24) (18 - 20)  SpO2: 100% (06-01-21 @ 12:24) (92% - 100%)  Wt(kg): --    05-31-21 @ 07:01  -  06-01-21 @ 07:00  --------------------------------------------------------  IN: 0 mL / OUT: 200 mL / NET: -200 mL    06-01-21 @ 07:01  -  06-01-21 @ 14:51  --------------------------------------------------------  IN: 0 mL / OUT: 0 mL / NET: 0 mL    Physical Exam:  	Gen: NAD  	HEENT: agonal breathing, on nasal cannula  	Pulm: b/l crackles, decrease breath sounds of R side   	CV: S1S2  	Abd: Soft, +BS   	Ext: No LE edema B/L  	Neuro: Awake but appears confused, not responding to questions   	Skin: Warm and dry    LABS/STUDIES  --------------------------------------------------------------------------------              9.3    4.85  >-----------<  41       [06-01-21 @ 06:49]              29.5     146  |  109  |  42  ----------------------------<  193      [06-01-21 @ 06:48]  4.6   |  23  |  1.52        Ca     10.2     [06-01-21 @ 06:48]      Mg     2.0     [05-31-21 @ 06:53]      Phos  3.2     [05-31-21 @ 06:53]    TPro  6.3  /  Alb  4.0  /  TBili  2.3  /  DBili  x   /  AST  23  /  ALT  22  /  AlkPhos  54  [06-01-21 @ 06:48]    PT/INR: PT 17.3 , INR 1.47       [06-01-21 @ 06:49]  PTT: 39.4       [06-01-21 @ 06:49]      Creatinine Trend:  SCr 1.52 [06-01 @ 06:48]  SCr 1.49 [05-31 @ 06:53]  SCr 1.44 [05-30 @ 07:30]  SCr 1.57 [05-29 @ 06:55]  SCr 1.62 [05-28 @ 07:00]    Urinalysis - [05-18-21 @ 11:13]      Color Yellow / Appearance Slightly Turbid / SG 1.016 / pH 6.0      Gluc Negative / Ketone Negative  / Bili Negative / Urobili Negative       Blood Large / Protein 30 mg/dL / Leuk Est Large / Nitrite Negative       / WBC 13 / Hyaline 3 / Gran  / Sq Epi  / Non Sq Epi 3 / Bacteria Negative    Urine Sodium 7      [05-28-21 @ 05:37]    HbA1c 7.1      [01-12-19 @ 07:22]  TSH 1.00      [05-17-21 @ 08:41]    HBsAb 32.4      [05-17-21 @ 23:13]  HBsAg Nonreact      [05-17-21 @ 23:13]  HBcAb Reactive      [05-17-21 @ 23:13]  HCV 0.17, Nonreact      [05-17-21 @ 23:13]  HIV Nonreact      [05-18-21 @ 01:05]

## 2021-06-02 NOTE — CHART NOTE - NSCHARTNOTEFT_GEN_A_CORE
Nutrition Follow Up Note   Patient seen for: nutrition follow up on  ICU     Source: medical record, communication with team.     Chart reviewed, events noted. Adm for GIB, cirrhosis, esophageal varices. 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 Remains intubated. Has ANDREEA on CKD    Diet Order: Diet, NPO:   Except Medications (06-02-21 @ 01:04)    Nutrition Events:   - currently has no EN access (several unsuccessful attempts at placing OGT per RN)  - noted pt had MBS on 5/26 noting OP dysphagia (recommendation for Dysphagia 1 HC diet),  - prior to MICU adm ST f/u done 6/1 w/ recommendation for NPO, per chart review, pt had poor po intake  - BG have been in the 200's  - Nutrition Focused Physical Assessment done, noted moderate muscle loss in temporal area    GI: abd distended  last BM 6/1 (on lactulose rx)    Anthropometric Measurements:   Height (cm): 167.6 (06-01-21 @ 18:05)  Weight (kg): 76.2 (06-01-21 @ 18:05)  BMI (kg/m2): 27.1 (06-01-21 @ 18:05)  adm wt 5/18 78.5 kg    Medications: MEDICATIONS  (STANDING):  chlorhexidine 0.12% Liquid 15 milliLiter(s) Oral Mucosa every 12 hours  chlorhexidine 4% Liquid 1 Application(s) Topical <User Schedule>  insulin lispro (ADMELOG) corrective regimen sliding scale   SubCutaneous every 6 hours  ketamine Infusion. 0.25 mG/kG/Hr (1.91 mL/Hr) IV Continuous <Continuous>  lactulose Syrup 30 Gram(s) Oral three times a day  levothyroxine Injectable 44 MICROGram(s) IV Push at bedtime  midodrine 10 milliGRAM(s) Oral every 8 hours  norepinephrine Infusion 0.03 MICROgram(s)/kG/Min (4.29 mL/Hr) IV Continuous <Continuous>  nystatin Powder 1 Application(s) Topical three times a day  pantoprazole  Injectable 40 milliGRAM(s) IV Push every 12 hours  piperacillin/tazobactam IVPB.. 3.375 Gram(s) IV Intermittent every 8 hours  polyethylene glycol 3350 17 Gram(s) Oral daily  propofol Infusion 10 MICROgram(s)/kG/Min (4.57 mL/Hr) IV Continuous <Continuous>  rifAXIMin 550 milliGRAM(s) Oral two times a day  sucralfate 1 Gram(s) Oral every 12 hours    MEDICATIONS  (PRN):  albuterol/ipratropium for Nebulization 3 milliLiter(s) Nebulizer every 6 hours PRN Shortness of Breath and/or Wheezing    Labs: 06-02 @ 00:21: Sodium 146<H>, Potassium 4.4, Calcium 9.5, Magnesium 1.9, Phosphorus 2.4<L>, BUN 43<H>, Creatinine 1.59<H>, Glucose 217<H>, Alk Phos 45, ALT/SGPT 17, AST/SGOT 20, Albumin 3.0<L>, Prealbumin --, Total Bilirubin 2.1<H>, Hemoglobin 7.5<L>, Hematocrit 23.9<L>, Ferritin --, C-Reactive Protein --, Creatine Kinase <<27>  06-01 @ 19:41: Sodium 147<H>, Potassium 4.9, Calcium 9.8, Magnesium --, Phosphorus --, BUN 42<H>, Creatinine 1.56<H>, Glucose 144<H>, Alk Phos 53, ALT/SGPT 20, AST/SGOT 24, Albumin 3.4, Prealbumin --, Total Bilirubin 2.1<H>, Hemoglobin 8.6<L>, Hematocrit 27.1<L>, Ferritin --, C-Reactive Protein --, Creatine Kinase <<27>    POCT Blood Glucose.: 225 mg/dL (06-02-21 @ 12:18)  POCT Blood Glucose.: 214 mg/dL (06-02-21 @ 06:45)    Skin: no pressure injuries documented, has sacrum wound and right buttock IAD  Edema: 2+ generalized    Estimated Needs: based on current dosing wt 76.2 kg w/ consideration for intubation  Energy: 2728-2526 kcals (23-25 kcals/kg)  Protein:   gm (1.2-1.4 gm/kg)    Previous Nutrition Diagnosis: mild malnutrition  New Diagnosis: acute severe malnutrition 2/2 insufficient protein, energy intake as evidenced by <50% nutrition needs met > 5 days, muscle loss      Recommended Interventions:   1. When EN access obtained, recommend Vital AF start 10 cc/hr, goal  80cc/hr x 18 hrs provides 1720 kcals, 108 gm protein, 1168cc free water meets 22 Kcal/Kg, 1.4 Gm protein/kg 6/1 dosing wt 76.2 kg.  Reassess EN rec if pt has chylothorax.  2. Reassess BG control for BG > 180    Monitoring and Evaluation:   Continue to monitor nutrition provision and tolerance, weights, labs, skin integrity.   RD remains available upon request and will follow up per protocol.

## 2021-06-02 NOTE — PROVIDER CONTACT NOTE (OTHER) - DATE AND TIME:
20-May-2021 23:36
29-May-2021 12:25
17-May-2021 18:55
17-May-2021 23:55
18-May-2021 02:53
02-Jun-2021 05:45

## 2021-06-02 NOTE — DIETITIAN NUTRITION RISK NOTIFICATION - TREATMENT: THE FOLLOWING DIET HAS BEEN RECOMMENDED
Diet, NPO:   Except Medications (06-02-21 @ 01:04) [Active]      
Diet, NPO:   Except Medications     Special Instructions for Nursing:  Except Medications (05-18-21 @ 09:30) [Active]

## 2021-06-02 NOTE — PROGRESS NOTE ADULT - ATTENDING COMMENTS
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 Initially admitted w/ decompensated cirrhosis, ANDREEA, esophageal varices requiring MICU admission and intubation. 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 Transferred to ICU for worsening AMS and hypoxic respiratory failure requiring intubation, in setting of large R pleural effusion and likely aspiration pna.     - sedated on propofol and ketamine; decrease ketamine to assess mental status  - intubated for worsening mental status and hypoxic respiratory failure, now on minimal vent settings; unlikely to wean until perform thoracentesis  - plan for thoracentesis today; will send cell count, cultures, cytology, flow cytometry, TG   - on low dose NE, likely due to vasoplegia, titrate to MAP >/65  - concern that he aspirated, as he was seen by speech and swallow and he has been aspirating; on zosyn to cover for pneumonia; f/u sputum cx, blood cx, MRSA swab; would hold on further vancomycin at this time   - place NGT to start feeds and give PO meds; once placed would start tube feeds and give rifaxamin and lactulose; may require paracentesis tomorrow; carafate and protonix for duodenal ulcer on recent EGD  - ANDREEA on CKD likely ATN related; UOP and electrolytes accpetable; continue to monitor  - noted to have thrombocytopenia likely related to liver disease; will need plts prior to thoracentesis  - FS elevated, continue w/ ISS coverage  - SCDs  - prognosis very guarded

## 2021-06-02 NOTE — PROVIDER CONTACT NOTE (OTHER) - REASON
LOw BP and questionable GI bleed
BP 96/53
Minimal urine output noted in Lu
pt just arrived on 4D- MEWS 5
Possible DTI
patient with RR of 28, oxygen saturation is 94% on 3liters nasal cannula

## 2021-06-02 NOTE — PROVIDER CONTACT NOTE (OTHER) - BACKGROUND
patient intubated 5/18/21, extubated 5/20/21, admitted with AMS on 5/16/21
Dx: CHF/ Ascites/ Cirrhosis. Fevers on rocephin. LAsix on hold due to elevated Cr. and soft BP.
Dx: HF. Ascites/ cirrhosis...Patient with soft BP throughout admission.
84 YO M Full Code FREDERICK
Pt admitted with decompensated Hepatic cirrhosis

## 2021-06-02 NOTE — PROGRESS NOTE ADULT - ASSESSMENT
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  -Trend ammonia    Cardiovascular:  -Hx of HF (EF 40-45%)  -Vasopressor support to maintain MAP > 65  -VS [id number]  -Consider holding Aldactone, Lasix depending on degree of hypotension    Pulmonary: Hypoxemic Respiratory Failure in the setting of hepatic encephalopathy, aspiration, large right pleural effusion  -Continue Mechanical Ventilation with lung protective ventilation  -Titrate FIo2 to maintain SPO2 > 92, ABGs prn  -Consider thoracentesis of large right pleural effusion--send for cultures/cell count/TG/flow cytometry  -Broad spectrum abx; follow up ID for further recommendations  -Duoneb prn for wheezing    GI  -Hx of GIB: EGD 5/18 with small esophageal varices, large cratered duodenal ulcer   -Continue protonix ppi BID  -Hx of Cirrhosis: trend ammonia level  -s/p para 5/28 in IR (therapeutic drain with removal of 400cc cloudly pink fluid)  -Hepatology on board; f/u further recommendations  -Continue Latulose/Rifaximin for hepatic encephalopathy  -Continue Carafate as per hepatology  -Zosyn for SBP ppx    Renal  -Intake and output per routine  -Trend Cr  -Consider holding diuretics for now in the setting of acute hypotension  -Avoid nephrotoxic agents  -Renally dose meds based on Gfr  -Follow up nephro recs    ID  -Pan cultureL bld cx x 2, UA, sputum cx  -If thora done: send fluid for gram stain, culture  -Broad spectrum abx for septic shock likely secondary to aspiration pna  -De-escalate abx based on senstitivities  -Follow up ID for further recommendations    Endo  -Continue synthroid  -Hyperglycemia on Lantus; ISS--if NPO, give half done Lantus. 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  -Trend ammonia    Cardiovascular:  -Hx of HF (EF 40-45%)  -Vasopressor support to maintain MAP > 65  -VS [id number]  -Consider holding Aldactone, Lasix depending on degree of hypotension    Pulmonary: Hypoxemic Respiratory Failure in the setting of hepatic encephalopathy, aspiration, large right pleural effusion  -Continue Mechanical Ventilation with lung protective ventilation  -Titrate FIo2 to maintain SPO2 > 92, ABGs prn  -6/2 thoracentesis of large right pleural effusion--send for cultures/cell count/TG/flow cytometry  -Broad spectrum abx; follow up ID for further recommendations  -Duoneb prn for wheezing    GI  -Hx of GIB: EGD 5/18 with small esophageal varices, large cratered duodenal ulcer   -Continue protonix ppi BID  -Hx of Cirrhosis: trend ammonia level  -s/p para 5/28 in IR (therapeutic drain with removal of 400cc cloudly pink fluid)  -Hepatology on board; f/u further recommendations  -Continue Latulose/Rifaximin for hepatic encephalopathy  -Continue Carafate as per hepatology  -Zosyn for SBP ppx    Renal  -Intake and output per routine  -Trend Cr  -Consider holding diuretics for now in the setting of acute hypotension  -Avoid nephrotoxic agents  -Renally dose meds based on Gfr  -Follow up nephro recs    ID  -Pan cultureL bld cx x 2, UA, sputum cx, MRSA swab  -ABX: zosyn 6/1 - xx for emprici coverage for possible aspiration  -thora:  send fluid for gram stain, culture  -Broad spectrum abx for septic shock likely secondary to aspiration pna  -De-escalate abx based on senstitivities  -Follow up ID for further recommendations    Endo  -Continue synthroid  -Hyperglycemia on Lantus; ISS--if NPO, give half done Lantus. Continue ISS q6h  -Monitor blood glucose q6h    Heme  -Hgb stable (hx GIB), monitor daily  -Maintain active type and screen    Ppx  -Continue Protonix PPI BID  -PAS for DVT ppx    GOC  -Full Code

## 2021-06-02 NOTE — PROGRESS NOTE ADULT - ATTENDING COMMENTS
Encephalopathy and respiratory failure, ICU admission and intubation  Lactic/metabolic acidosis with alkalemia  Hypernatremia - stable  ANDREEA - stable  CKD III    No indication for kidney replacement therapy  Would dose meds for GFR approximately 30 (listed GFR is likely overestimate); monitor UOP as this may be better indicator of current kidney function  Maintain MAP >65  D/w ICU, remainder per fellow

## 2021-06-02 NOTE — PROGRESS NOTE ADULT - SUBJECTIVE AND OBJECTIVE BOX
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 Also started on levophed. Urine output is 200 cc over 24h period. Patient was seen and examined at bedside. Unable to obtain ROS.    PAST HISTORY  --------------------------------------------------------------------------------  No significant changes to PMH, PSH, FHx, SHx, unless otherwise noted    ALLERGIES & MEDICATIONS  --------------------------------------------------------------------------------  Allergies    No Known Allergies    Intolerances    Standing Inpatient Medications  chlorhexidine 0.12% Liquid 15 milliLiter(s) Oral Mucosa every 12 hours  chlorhexidine 4% Liquid 1 Application(s) Topical <User Schedule>  dextrose 5% + lactated ringers. 1000 milliLiter(s) IV Continuous <Continuous>  insulin lispro (ADMELOG) corrective regimen sliding scale   SubCutaneous every 6 hours  ketamine Infusion. 0.25 mG/kG/Hr IV Continuous <Continuous>  lactulose Syrup 30 Gram(s) Oral three times a day  levothyroxine Injectable 44 MICROGram(s) IV Push at bedtime  midodrine 10 milliGRAM(s) Oral every 8 hours  norepinephrine Infusion 0.03 MICROgram(s)/kG/Min IV Continuous <Continuous>  nystatin Powder 1 Application(s) Topical three times a day  pantoprazole  Injectable 40 milliGRAM(s) IV Push every 12 hours  piperacillin/tazobactam IVPB.. 3.375 Gram(s) IV Intermittent every 8 hours  polyethylene glycol 3350 17 Gram(s) Oral daily  propofol Infusion 10 MICROgram(s)/kG/Min IV Continuous <Continuous>  rifAXIMin 550 milliGRAM(s) Oral two times a day    PRN Inpatient Medications  albuterol/ipratropium for Nebulization 3 milliLiter(s) Nebulizer every 6 hours PRN    REVIEW OF SYSTEMS  --------------------------------------------------------------------------------  Unable to obtain ROS    VITALS/PHYSICAL EXAM  --------------------------------------------------------------------------------  T(C): 36.2 (06-02-21 @ 07:00), Max: 36.6 (06-01-21 @ 16:38)  HR: 75 (06-02-21 @ 10:15) (0 - 99)  BP: 96/53 (06-02-21 @ 10:15) (67/42 - 164/65)  RR: 16 (06-02-21 @ 10:15) (16 - 22)  SpO2: 100% (06-02-21 @ 10:15) (95% - 100%)  Wt(kg): --  Height (cm): 167.6 (06-01-21 @ 18:05)  Weight (kg): 76.2 (06-01-21 @ 18:05)  BMI (kg/m2): 27.1 (06-01-21 @ 18:05)  BSA (m2): 1.86 (06-01-21 @ 18:05)    06-01-21 @ 07:01  -  06-02-21 @ 07:00  --------------------------------------------------------  IN: 1393.4 mL / OUT: 450 mL / NET: 943.4 mL    06-02-21 @ 07:01  -  06-02-21 @ 10:41  --------------------------------------------------------  IN: 260.7 mL / OUT: 210 mL / NET: 50.7 mL    Physical Exam:  	Gen:  sedated, intubated   	HEENT: ETT  	Pulm: decrease breath sounds on R side   	CV: S1S2+  	Abd: Soft, +BS   	Ext: pitting LE edema B/L  	Neuro: sedated  	Skin: Warm and dry    LABS/STUDIES  --------------------------------------------------------------------------------              7.5    3.63  >-----------<  35       [06-02-21 @ 00:21]              23.9     146  |  111  |  43  ----------------------------<  217      [06-02-21 @ 00:21]  4.4   |  21  |  1.59        Ca     9.5     [06-02-21 @ 00:21]      Mg     1.9     [06-02-21 @ 00:21]      Phos  2.4     [06-02-21 @ 00:21]    TPro  5.1  /  Alb  3.0  /  TBili  2.1  /  DBili  x   /  AST  20  /  ALT  17  /  AlkPhos  45  [06-02-21 @ 00:21]    PT/INR: PT 18.8 , INR 1.60       [06-01-21 @ 19:41]  PTT: 46.1       [06-01-21 @ 19:41]      Creatinine Trend:  SCr 1.59 [06-02 @ 00:21]  SCr 1.56 [06-01 @ 19:41]  SCr 1.52 [06-01 @ 06:48]  SCr 1.49 [05-31 @ 06:53]  SCr 1.44 [05-30 @ 07:30]    Urinalysis - [06-01-21 @ 18:55]      Color Light Yellow / Appearance Clear / SG 1.011 / pH 5.5      Gluc Negative / Ketone Negative  / Bili Negative / Urobili Negative       Blood Negative / Protein Negative / Leuk Est Negative / Nitrite Negative      RBC 1 / WBC 0 / Hyaline 6 / Gran  / Sq Epi  / Non Sq Epi 0 / Bacteria Negative    Urine Sodium 7      [05-28-21 @ 05:37]    HbA1c 7.1      [01-12-19 @ 07:22]  TSH 1.00      [05-17-21 @ 08:41]    HBsAb 32.4      [05-17-21 @ 23:13]  HBsAg Nonreact      [05-17-21 @ 23:13]  HBcAb Reactive      [05-17-21 @ 23:13]  HCV 0.17, Nonreact      [05-17-21 @ 23:13]  HIV Nonreact      [05-18-21 @ 01:05]

## 2021-06-02 NOTE — DIETITIAN NUTRITION RISK NOTIFICATION - ADDITIONAL COMMENTS/DIETITIAN RECOMMENDATIONS
1. Recommend as medically feasible, initiate trickle feds of Vital 1.5 and increase as tolerated until goal rate 60ml/hr x 18hrs + 2 'No Carb Prosource TF' daily. At goal to provide 1080ml formula, 1700kcals, 95g protein, 825ml free H2O (meets 25kcals/kg & 1.4g protein/kg based on IBW 67.1kg).  2. Recommend nephrovite 
Pt currently NPO, intubated. If EN access obtained, recommend Vital AF 10 cc/hr w/ goal 80 cc/hr x 18 hrs.

## 2021-06-02 NOTE — PROVIDER CONTACT NOTE (OTHER) - ASSESSMENT
Upon assessment, sacrum appears to be blanchable redness w/ two open patches of denuded skin. Prior IAD.

## 2021-06-02 NOTE — PROVIDER CONTACT NOTE (OTHER) - RECOMMENDATIONS
NP made aware
documented in sepsis binder. Provider made aware
Wound consult placed for confirmation/ rule out of DTI
Continue to monitor as per PA request. PA to discuss with day Team.
Discussed adding midodrine (holding off for now). Continue to monitor as per PA request, c/w routine albumin.

## 2021-06-02 NOTE — PROGRESS NOTE ADULT - ASSESSMENT
85M PMHx CKD, cirrhosis (?viral hepatitis) - admitted for decompensated cirrhosis.  Nephrology consulted for hyponatremia and ANDREEA on CKD.      #Hyponatremia  - Resolved    # ANDREEA  Pt with non-oliguric ANDREEA on CKD likely 2/2 ATN in the setting hypotension, entresto/lasix, acute anemia and decreased EABV.  On admission sCr 2.0 (baseline sCr 1.4-1.6 in Jan 2019), peaked at 2.7mg/dl, improved to 1.2mg/dl. Now Scr elevated/stable at 1.59 on 6/2/21.  - Lasix and aldactone held in setting of septic shock  - BP optimization/antibiotic/blood transfusion per ICU team  - monitor BMP, strict I/O, avoid nephrotoxic agents (NSAIDs, PPI, contrast), renally dose medications per GFR.    If any questions, please feel free to contact me     Edwardo Davis  Nephrology Fellow  [facility] Pager: [phone]  [facility] Pager: [phone]

## 2021-06-02 NOTE — PROVIDER CONTACT NOTE (OTHER) - SITUATION
Pt arrived yesterday from another unit w/ an existing wound on the sacrum.
Pt with large BM that may be indicative of a GI bleed.  Low BP
pt just arrived on 4DSU. MEWS= 5. based on latest VS and blood work, Sepsis screening is negative.
patient with RR of 28, oxygen saturation is 94%, on 3 liters nasal cannula. Patient with audible expiratory wheezes
BP 96/53
Minimal urine output noted in Lu (less than 30 mL/hr since start of shift).

## 2021-06-02 NOTE — PROGRESS NOTE ADULT - SUBJECTIVE AND OBJECTIVE BOX
MICU PROGRESS NOTE    INTERVAL HPI/OVERNIGHT EVENTS:    SUBJECTIVE:     OBJECTIVE:    VITAL SIGNS:  ICU Vital Signs Last 24 Hrs  T(C): 36 (2021 04:00), Max: 36.6 (2021 16:38)  T(F): 96.8 (2021 04:00), Max: 97.8 (2021 16:38)  HR: 70 (2021 07:30) (0 - 106)  BP: 116/55 (2021 07:30) (82/47 - 164/65)  BP(mean): 79 (2021 07:30) (58 - 97)  ABP: --  ABP(mean): --  RR: 16 (2021 07:30) (16 - 22)  SpO2: 100% (2021 07:30) (95% - 100%)      VENTS:  Mode: AC/ CMV (Assist Control/ Continuous Mandatory Ventilation), RR (machine): 16, TV (machine): 500, FiO2: 30, PEEP: 5, ITime: 1, MAP: 10, PIP: 25    I&O:     @ 07:01  -   @ 07:00  --------------------------------------------------------  IN: 1393.4 mL / OUT: 450 mL / NET: 943.4 mL        PHYSICAL EXAM:  GENERAL: NAD, conversant  CHEST/LUNG: Clear to auscultation bilaterally; No crackles, rhonchi, wheezing, or rubs  HEART: Regular rate and rhythm; No murmurs, rubs, or gallops  ABDOMEN: Soft, Nontender, Nondistended; Bowel sounds present  EXTREMITIES:  2+ Peripheral Pulses, No clubbing, cyanosis, or edema  SKIN: No rashes or lesions  NERVOUS SYSTEM:  Alert & Oriented, Good concentration      LINES:                                       MEDICATIONS:  MEDICATIONS  (STANDING):  chlorhexidine 0.12% Liquid 15 milliLiter(s) Oral Mucosa every 12 hours  chlorhexidine 4% Liquid 1 Application(s) Topical <User Schedule>  dextrose 5% + lactated ringers. 1000 milliLiter(s) (60 mL/Hr) IV Continuous <Continuous>  insulin lispro (ADMELOG) corrective regimen sliding scale   SubCutaneous every 6 hours  ketamine Infusion. 0.25 mG/kG/Hr (1.91 mL/Hr) IV Continuous <Continuous>  lactulose Syrup 30 Gram(s) Oral three times a day  levothyroxine Injectable 44 MICROGram(s) IV Push at bedtime  midodrine 10 milliGRAM(s) Oral every 8 hours  norepinephrine Infusion 0.03 MICROgram(s)/kG/Min (4.29 mL/Hr) IV Continuous <Continuous>  nystatin Powder 1 Application(s) Topical three times a day  pantoprazole  Injectable 40 milliGRAM(s) IV Push every 12 hours  piperacillin/tazobactam IVPB.. 3.375 Gram(s) IV Intermittent every 8 hours  polyethylene glycol 3350 17 Gram(s) Oral daily  propofol Infusion 10 MICROgram(s)/kG/Min (4.57 mL/Hr) IV Continuous <Continuous>  rifAXIMin 550 milliGRAM(s) Oral two times a day    MEDICATIONS  (PRN):  albuterol/ipratropium for Nebulization 3 milliLiter(s) Nebulizer every 6 hours PRN Shortness of Breath and/or Wheezing      ALLERGIES:  Allergies    No Known Allergies    Intolerances        LABS:                        7.5    3.63  )-----------( 35       ( 2021 00:21 )             23.9     06-02    146<H>  |  111<H>  |  43<H>  ----------------------------<  217<H>  4.4   |  21<L>  |  1.59<H>    Ca    9.5      2021 00:21  Phos  2.4     06-02  Mg     1.9     06-02    TPro  5.1<L>  /  Alb  3.0<L>  /  TBili  2.1<H>  /  DBili  x   /  AST  20  /  ALT  17  /  AlkPhos  45  06-02    PT/INR - ( 2021 19:41 )   PT: 18.8 sec;   INR: 1.60 ratio         PTT - ( 2021 19:41 )  PTT:46.1 sec  Urinalysis Basic - ( 2021 18:55 )    Color: Light Yellow / Appearance: Clear / S.011 / pH: x  Gluc: x / Ketone: Negative  / Bili: Negative / Urobili: Negative   Blood: x / Protein: Negative / Nitrite: Negative   Leuk Esterase: Negative / RBC: 1 /hpf / WBC 0 /HPF   Sq Epi: x / Non Sq Epi: 0 /hpf / Bacteria: Negative        Culture - Sputum (collected 21 @ 22:01)  Source: .Sputum Sputum  Gram Stain (21 @ 06:43):    Numerous polymorphonuclear leukocytes per low power field    Rare Squamous epithelial cells per low power field    Moderate Yeast like cells seen per oil power field      CAPILLARY BLOOD GLUCOSE      POCT Blood Glucose.: 214 mg/dL (2021 06:45)      RADIOLOGY & ADDITIONAL TESTS: Reviewed. MICU PROGRESS NOTE    INTERVAL HPI/OVERNIGHT EVENTS:  Transferred to MICU over night. Given 1g vanc.    OBJECTIVE:  intubated sedated  VITAL SIGNS:  ICU Vital Signs Last 24 Hrs  T(C): 36 (2021 04:00), Max: 36.6 (2021 16:38)  T(F): 96.8 (2021 04:00), Max: 97.8 (2021 16:38)  HR: 70 (2021 07:30) (0 - 106)  BP: 116/55 (2021 07:30) (82/47 - 164/65)  BP(mean): 79 (2021 07:30) (58 - 97)  ABP: --  ABP(mean): --  RR: 16 (2021 07:30) (16 - 22)  SpO2: 100% (2021 07:30) (95% - 100%)      VENTS:  Mode: AC/ CMV (Assist Control/ Continuous Mandatory Ventilation), RR (machine): 16, TV (machine): 500, FiO2: 30, PEEP: 5, ITime: 1, MAP: 10, PIP: 25    I&O:     @ 07:01  -  -02 @ 07:00  --------------------------------------------------------  IN: 1393.4 mL / OUT: 450 mL / NET: 943.4 mL        PHYSICAL EXAM:  GENERAL: sedated intubated  CHEST/LUNG: Clear to auscultation bilaterally;  +intubated +upper airway sounds  HEART: Regular rate and rhythm; No murmurs, rubs, or gallops  ABDOMEN: Soft, Nontender, +distended, somewhat tense  EXTREMITIES:  2+ Peripheral Pulses, No clubbing, cyanosis, or edema  SKIN: No rashes or lesions  NERVOUS SYSTEM:  [id number]  LINES:                                       MEDICATIONS:  MEDICATIONS  (STANDING):  chlorhexidine 0.12% Liquid 15 milliLiter(s) Oral Mucosa every 12 hours  chlorhexidine 4% Liquid 1 Application(s) Topical <User Schedule>  dextrose 5% + lactated ringers. 1000 milliLiter(s) (60 mL/Hr) IV Continuous <Continuous>  insulin lispro (ADMELOG) corrective regimen sliding scale   SubCutaneous every 6 hours  ketamine Infusion. 0.25 mG/kG/Hr (1.91 mL/Hr) IV Continuous <Continuous>  lactulose Syrup 30 Gram(s) Oral three times a day  levothyroxine Injectable 44 MICROGram(s) IV Push at bedtime  midodrine 10 milliGRAM(s) Oral every 8 hours  norepinephrine Infusion 0.03 MICROgram(s)/kG/Min (4.29 mL/Hr) IV Continuous <Continuous>  nystatin Powder 1 Application(s) Topical three times a day  pantoprazole  Injectable 40 milliGRAM(s) IV Push every 12 hours  piperacillin/tazobactam IVPB.. 3.375 Gram(s) IV Intermittent every 8 hours  polyethylene glycol 3350 17 Gram(s) Oral daily  propofol Infusion 10 MICROgram(s)/kG/Min (4.57 mL/Hr) IV Continuous <Continuous>  rifAXIMin 550 milliGRAM(s) Oral two times a day    MEDICATIONS  (PRN):  albuterol/ipratropium for Nebulization 3 milliLiter(s) Nebulizer every 6 hours PRN Shortness of Breath and/or Wheezing      ALLERGIES:  Allergies    No Known Allergies    Intolerances        LABS:                        7.5    3.63  )-----------( 35       ( 2021 00:21 )             23.9     06-02    146<H>  |  111<H>  |  43<H>  ----------------------------<  217<H>  4.4   |  21<L>  |  1.59<H>    Ca    9.5      2021 00:21  Phos  2.4     06-02  Mg     1.9     06-02    TPro  5.1<L>  /  Alb  3.0<L>  /  TBili  2.1<H>  /  DBili  x   /  AST  20  /  ALT  17  /  AlkPhos  45  06-02    PT/INR - ( 2021 19:41 )   PT: 18.8 sec;   INR: 1.60 ratio         PTT - ( 2021 19:41 )  PTT:46.1 sec  Urinalysis Basic - ( 2021 18:55 )    Color: Light Yellow / Appearance: Clear / S.011 / pH: x  Gluc: x / Ketone: Negative  / Bili: Negative / Urobili: Negative   Blood: x / Protein: Negative / Nitrite: Negative   Leuk Esterase: Negative / RBC: 1 /hpf / WBC 0 /HPF   Sq Epi: x / Non Sq Epi: 0 /hpf / Bacteria: Negative        Culture - Sputum (collected 21 @ 22:01)  Source: .Sputum Sputum  Gram Stain (21 @ 06:43):    Numerous polymorphonuclear leukocytes per low power field    Rare Squamous epithelial cells per low power field    Moderate Yeast like cells seen per oil power field      CAPILLARY BLOOD GLUCOSE      POCT Blood Glucose.: 214 mg/dL (2021 06:45)      RADIOLOGY & ADDITIONAL TESTS: Reviewed.

## 2021-06-03 NOTE — PROGRESS NOTE ADULT - SUBJECTIVE AND OBJECTIVE BOX
MICU PROGRESS NOTE    INTERVAL HPI/OVERNIGHT EVENTS:    SUBJECTIVE:     OBJECTIVE:    VITAL SIGNS:  ICU Vital Signs Last 24 Hrs  T(C): 37.1 (2021 20:00), Max: 37.1 (2021 20:00)  T(F): 98.8 (2021 20:00), Max: 98.8 (2021 20:00)  HR: 86 (2021 07:00) (68 - 91)  BP: 88/51 (2021 07:00) (56/35 - 197/87)  BP(mean): 65 (2021 07:00) (40 - 125)  ABP: --  ABP(mean): --  RR: 12 (2021 07:00) (12 - 19)  SpO2: 100% (2021 07:00) (100% - 100%)      VENTS:  Mode: AC/ CMV (Assist Control/ Continuous Mandatory Ventilation), RR (machine): 12, TV (machine): 500, FiO2: 30, PEEP: 5, ITime: 1, MAP: 10, PIP: 25    I&O:     @ 07:01  -  -03 @ 07:00  --------------------------------------------------------  IN: 1975.2 mL / OUT: 2835 mL / NET: -859.8 mL        PHYSICAL EXAM:  GENERAL: NAD, conversant  CHEST/LUNG: Clear to auscultation bilaterally; No crackles, rhonchi, wheezing, or rubs  HEART: Regular rate and rhythm; No murmurs, rubs, or gallops  ABDOMEN: Soft, Nontender, Nondistended; Bowel sounds present  EXTREMITIES:  2+ Peripheral Pulses, No clubbing, cyanosis, or edema  SKIN: No rashes or lesions  NERVOUS SYSTEM:  Alert & Oriented, Good concentration      LINES:                                       MEDICATIONS:  MEDICATIONS  (STANDING):  chlorhexidine 0.12% Liquid 15 milliLiter(s) Oral Mucosa every 12 hours  chlorhexidine 4% Liquid 1 Application(s) Topical <User Schedule>  insulin lispro (ADMELOG) corrective regimen sliding scale   SubCutaneous every 6 hours  ketamine Infusion. 0.25 mG/kG/Hr (1.91 mL/Hr) IV Continuous <Continuous>  lactulose Syrup 30 Gram(s) Oral three times a day  levothyroxine Injectable 44 MICROGram(s) IV Push at bedtime  midodrine 10 milliGRAM(s) Oral every 8 hours  norepinephrine Infusion 0.03 MICROgram(s)/kG/Min (4.29 mL/Hr) IV Continuous <Continuous>  nystatin Powder 1 Application(s) Topical three times a day  pantoprazole  Injectable 40 milliGRAM(s) IV Push every 12 hours  piperacillin/tazobactam IVPB.. 3.375 Gram(s) IV Intermittent every 8 hours  polyethylene glycol 3350 17 Gram(s) Oral daily  propofol Infusion 10 MICROgram(s)/kG/Min (4.57 mL/Hr) IV Continuous <Continuous>  rifAXIMin 550 milliGRAM(s) Oral two times a day  sucralfate 1 Gram(s) Oral every 6 hours    MEDICATIONS  (PRN):  albuterol/ipratropium for Nebulization 3 milliLiter(s) Nebulizer every 6 hours PRN Shortness of Breath and/or Wheezing      ALLERGIES:  Allergies    No Known Allergies    Intolerances        LABS:                        8.8    5.47  )-----------( 82       ( 2021 00:11 )             26.7     06-03    145  |  112<H>  |  40<H>  ----------------------------<  197<H>  3.8   |  22  |  1.64<H>    Ca    9.3      2021 00:11  Phos  1.9     06-03  Mg     1.8     06-03    TPro  5.1<L>  /  Alb  2.9<L>  /  TBili  2.1<H>  /  DBili  x   /  AST  24  /  ALT  17  /  AlkPhos  52  06-03    PT/INR - ( 2021 00:11 )   PT: 20.0 sec;   INR: 1.71 ratio         PTT - ( 2021 00:11 )  PTT:39.8 sec  Urinalysis Basic - ( 2021 18:55 )    Color: Light Yellow / Appearance: Clear / S.011 / pH: x  Gluc: x / Ketone: Negative  / Bili: Negative / Urobili: Negative   Blood: x / Protein: Negative / Nitrite: Negative   Leuk Esterase: Negative / RBC: 1 /hpf / WBC 0 /HPF   Sq Epi: x / Non Sq Epi: 0 /hpf / Bacteria: Negative        Culture - Body Fluid with Gram Stain (collected 21 @ 23:11)  Source: .Body Fluid Pleural Fluid  Gram Stain (21 @ 02:13):    polymorphonuclear leukocytes seen    No organisms seen    by cytocentrifuge      CAPILLARY BLOOD GLUCOSE      POCT Blood Glucose.: 225 mg/dL (2021 05:55)      RADIOLOGY & ADDITIONAL TESTS: Reviewed. MICU PROGRESS NOTE    INTERVAL HPI/OVERNIGHT EVENTS:  Yesterday, had thoracentesis.  O/n vent settings adjusted, RR decreased to 12 due to alkalosis on ABG.  BPs labile, sometimes hypertensive, sometimes hypotensive.    OBJECTIVE:  Intubated sedated.    VITAL SIGNS:  ICU Vital Signs Last 24 Hrs  T(C): 37.1 (2021 20:00), Max: 37.1 (2021 20:00)  T(F): 98.8 (2021 20:00), Max: 98.8 (2021 20:00)  HR: 86 (2021 07:00) (68 - 91)  BP: 88/51 (:00) (56/35 - 197/87)  BP(mean): 65 (:) (40 - 125)  ABP: --  ABP(mean): --  RR: 12 (2021 07:00) (12 - 19)  SpO2: 100% (:) (100% - 100%)      VENTS:  Mode: AC/ CMV (Assist Control/ Continuous Mandatory Ventilation), RR (machine): 12, TV (machine): 500, FiO2: 30, PEEP: 5, ITime: 1, MAP: 10, PIP: 25    I&O:    -02 @ 07:01  -  06-03 @ 07:00  --------------------------------------------------------  IN: 1975.2 mL / OUT: 2835 mL / NET: -859.8 mL        PHYSICAL EXAM:    GENERAL: sedated intubated  CHEST/LUNG: Clear to auscultation bilaterally;  +intubated +upper airway sounds  HEART: Regular rate and rhythm; No murmurs, rubs, or gallops  ABDOMEN: Soft, Nontender, +distended, somewhat tense  EXTREMITIES:  2+ Peripheral Pulses, No clubbing, cyanosis, or edema  SKIN: No rashes or lesions  NERVOUS SYSTEM:  aaox0    MEDICATIONS:  MEDICATIONS  (STANDING):  chlorhexidine 0.12% Liquid 15 milliLiter(s) Oral Mucosa every 12 hours  chlorhexidine 4% Liquid 1 Application(s) Topical <User Schedule>  insulin lispro (ADMELOG) corrective regimen sliding scale   SubCutaneous every 6 hours  ketamine Infusion. 0.25 mG/kG/Hr (1.91 mL/Hr) IV Continuous <Continuous>  lactulose Syrup 30 Gram(s) Oral three times a day  levothyroxine Injectable 44 MICROGram(s) IV Push at bedtime  midodrine 10 milliGRAM(s) Oral every 8 hours  norepinephrine Infusion 0.03 MICROgram(s)/kG/Min (4.29 mL/Hr) IV Continuous <Continuous>  nystatin Powder 1 Application(s) Topical three times a day  pantoprazole  Injectable 40 milliGRAM(s) IV Push every 12 hours  piperacillin/tazobactam IVPB.. 3.375 Gram(s) IV Intermittent every 8 hours  polyethylene glycol 3350 17 Gram(s) Oral daily  propofol Infusion 10 MICROgram(s)/kG/Min (4.57 mL/Hr) IV Continuous <Continuous>  rifAXIMin 550 milliGRAM(s) Oral two times a day  sucralfate 1 Gram(s) Oral every 6 hours    MEDICATIONS  (PRN):  albuterol/ipratropium for Nebulization 3 milliLiter(s) Nebulizer every 6 hours PRN Shortness of Breath and/or Wheezing      ALLERGIES:  Allergies    No Known Allergies    Intolerances        LABS:                        8.8    5.47  )-----------( 82       ( 2021 00:11 )             26.7     06-03    145  |  112<H>  |  40<H>  ----------------------------<  197<H>  3.8   |  22  |  1.64<H>    Ca    9.3      2021 00:11  Phos  1.9     06-03  Mg     1.8     06-03    TPro  5.1<L>  /  Alb  2.9<L>  /  TBili  2.1<H>  /  DBili  x   /  AST  24  /  ALT  17  /  AlkPhos  52  06-03    PT/INR - ( 2021 00:11 )   PT: 20.0 sec;   INR: 1.71 ratio         PTT - ( 2021 00:11 )  PTT:39.8 sec  Urinalysis Basic - ( 2021 18:55 )    Color: Light Yellow / Appearance: Clear / S.011 / pH: x  Gluc: x / Ketone: Negative  / Bili: Negative / Urobili: Negative   Blood: x / Protein: Negative / Nitrite: Negative   Leuk Esterase: Negative / RBC: 1 /hpf / WBC 0 /HPF   Sq Epi: x / Non Sq Epi: 0 /hpf / Bacteria: Negative        Culture - Body Fluid with Gram Stain (collected 21 @ 23:11)  Source: .Body Fluid Pleural Fluid  Gram Stain (21 @ 02:13):    polymorphonuclear leukocytes seen    No organisms seen    by cytocentrifuge      CAPILLARY BLOOD GLUCOSE      POCT Blood Glucose.: 225 mg/dL (2021 05:55)      RADIOLOGY & ADDITIONAL TESTS: Reviewed. MICU PROGRESS NOTE    INTERVAL HPI/OVERNIGHT EVENTS:  Yesterday, had thoracentesis.  O/n vent settings adjusted, RR decreased to 12 due to alkalosis on ABG.  BPs labile, sometimes hypertensive, sometimes hypotensive.  No BMs yet on lactulose.    OBJECTIVE:  Intubated sedated.    VITAL SIGNS:  ICU Vital Signs Last 24 Hrs  T(C): 37.1 (2021 20:00), Max: 37.1 (2021 20:00)  T(F): 98.8 (2021 20:00), Max: 98.8 (2021 20:00)  HR: 86 (2021 07:00) (68 - 91)  BP: 88/51 (2021 07:00) (56/35 - 197/87)  BP(mean): 65 (2021 07:00) (40 - 125)  ABP: --  ABP(mean): --  RR: 12 (2021 07:) (12 - 19)  SpO2: 100% (:) (100% - 100%)      VENTS:  Mode: AC/ CMV (Assist Control/ Continuous Mandatory Ventilation), RR (machine): 12, TV (machine): 500, FiO2: 30, PEEP: 5, ITime: 1, MAP: 10, PIP: 25    I&O:    -02 @ 07:01  -  -03 @ 07:00  --------------------------------------------------------  IN: 1975.2 mL / OUT: 2835 mL / NET: -859.8 mL        PHYSICAL EXAM:    GENERAL: sedated intubated  HEENT: ngt  CHEST/LUNG: Clear to auscultation bilaterally;  +intubated +upper airway sounds, rhonchi  HEART: Regular rate and rhythm; No murmurs, rubs, or gallops  ABDOMEN: Soft, Nontender, +distended, somewhat tense  EXTREMITIES:  2+ Peripheral Pulses, No clubbing, cyanosis, or edema  SKIN: No rashes or lesions  NERVOUS SYSTEM:  aaox0    MEDICATIONS:  MEDICATIONS  (STANDING):  chlorhexidine 0.12% Liquid 15 milliLiter(s) Oral Mucosa every 12 hours  chlorhexidine 4% Liquid 1 Application(s) Topical <User Schedule>  insulin lispro (ADMELOG) corrective regimen sliding scale   SubCutaneous every 6 hours  ketamine Infusion. 0.25 mG/kG/Hr (1.91 mL/Hr) IV Continuous <Continuous>  lactulose Syrup 30 Gram(s) Oral three times a day  levothyroxine Injectable 44 MICROGram(s) IV Push at bedtime  midodrine 10 milliGRAM(s) Oral every 8 hours  norepinephrine Infusion 0.03 MICROgram(s)/kG/Min (4.29 mL/Hr) IV Continuous <Continuous>  nystatin Powder 1 Application(s) Topical three times a day  pantoprazole  Injectable 40 milliGRAM(s) IV Push every 12 hours  piperacillin/tazobactam IVPB.. 3.375 Gram(s) IV Intermittent every 8 hours  polyethylene glycol 3350 17 Gram(s) Oral daily  propofol Infusion 10 MICROgram(s)/kG/Min (4.57 mL/Hr) IV Continuous <Continuous>  rifAXIMin 550 milliGRAM(s) Oral two times a day  sucralfate 1 Gram(s) Oral every 6 hours    MEDICATIONS  (PRN):  albuterol/ipratropium for Nebulization 3 milliLiter(s) Nebulizer every 6 hours PRN Shortness of Breath and/or Wheezing      ALLERGIES:  Allergies    No Known Allergies    Intolerances        LABS:                        8.8    5.47  )-----------( 82       ( 2021 00:11 )             26.7     06-03    145  |  112<H>  |  40<H>  ----------------------------<  197<H>  3.8   |  22  |  1.64<H>    Ca    9.3      2021 00:11  Phos  1.9     06-03  Mg     1.8     06-03    TPro  5.1<L>  /  Alb  2.9<L>  /  TBili  2.1<H>  /  DBili  x   /  AST  24  /  ALT  17  /  AlkPhos  52  06-03    PT/INR - ( 2021 00:11 )   PT: 20.0 sec;   INR: 1.71 ratio         PTT - ( 2021 00:11 )  PTT:39.8 sec  Urinalysis Basic - ( 2021 18:55 )    Color: Light Yellow / Appearance: Clear / S.011 / pH: x  Gluc: x / Ketone: Negative  / Bili: Negative / Urobili: Negative   Blood: x / Protein: Negative / Nitrite: Negative   Leuk Esterase: Negative / RBC: 1 /hpf / WBC 0 /HPF   Sq Epi: x / Non Sq Epi: 0 /hpf / Bacteria: Negative        Culture - Body Fluid with Gram Stain (collected 21 @ 23:11)  Source: .Body Fluid Pleural Fluid  Gram Stain (21 @ 02:13):    polymorphonuclear leukocytes seen    No organisms seen    by cytocentrifuge      CAPILLARY BLOOD GLUCOSE      POCT Blood Glucose.: 225 mg/dL (2021 05:55)      RADIOLOGY & ADDITIONAL TESTS: Reviewed.

## 2021-06-03 NOTE — PROGRESS NOTE ADULT - ASSESSMENT
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  -Trend ammonia    Cardiovascular:  -Hx of HF (EF 40-45%)  -Vasopressor support to maintain MAP > 65  -VS [id number]  -Consider holding Aldactone, Lasix depending on degree of hypotension    Pulmonary: Hypoxemic Respiratory Failure in the setting of hepatic encephalopathy, aspiration, large right pleural effusion  -Continue Mechanical Ventilation with lung protective ventilation  -Titrate FIo2 to maintain SPO2 > 92, ABGs prn  -6/2 thoracentesis of large right pleural effusion--send for cultures/cell count/TG/flow cytometry  -Broad spectrum abx; follow up ID for further recommendations  -Duoneb prn for wheezing    GI  -Hx of GIB: EGD 5/18 with small esophageal varices, large cratered duodenal ulcer   -Continue protonix ppi BID  -Hx of Cirrhosis: trend ammonia level  -s/p para 5/28 in IR (therapeutic drain with removal of 400cc cloudly pink fluid)  -Hepatology on board; f/u further recommendations  -Continue Latulose/Rifaximin for hepatic encephalopathy  -Continue Carafate as per hepatology  -Zosyn for SBP ppx    Renal  -Intake and output per routine  -Trend Cr  -Consider holding diuretics for now in the setting of acute hypotension  -Avoid nephrotoxic agents  -Renally dose meds based on Gfr  -Follow up nephro recs    ID  -Pan cultureL bld cx x 2, UA, sputum cx, MRSA swab  -ABX: zosyn 6/1 - xx for emprici coverage for possible aspiration  -thora:  send fluid for gram stain, culture  -Broad spectrum abx for septic shock likely secondary to aspiration pna  -De-escalate abx based on senstitivities  -Follow up ID for further recommendations    Endo  -Continue synthroid  -Hyperglycemia on Lantus; ISS--if NPO, give half done Lantus. 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  -Trend ammonia    Cardiovascular:  -Hx of HF (EF 40-45%)  -Vasopressor support to maintain MAP > 65  -VS [id number]  -Consider holding Aldactone, Lasix depending on degree of hypotension    Pulmonary: Hypoxemic Respiratory Failure in the setting of hepatic encephalopathy, aspiration, large right pleural effusion  -Continue Mechanical Ventilation with lung protective ventilation  -Titrate FIo2 to maintain SPO2 > 92, ABGs prn  -6/2 thoracentesis of large right pleural effusion--send for cultures/cell count/TG/flow cytometry  -Broad spectrum abx; follow up ID for further recommendations  -Duoneb prn for wheezing    GI  -Hx of GIB: EGD 5/18 with small esophageal varices, large cratered duodenal ulcer   -Continue protonix ppi BID  -Hx of Cirrhosis: trend ammonia level  -s/p para 5/28 in IR (therapeutic drain with removal of 400cc cloudly pink fluid)  -Hepatology on board; f/u further recommendations  -Continue Latulose/Rifaximin for hepatic encephalopathy  -Continue Carafate as per hepatology  -Zosyn for SBP ppx    Renal  -Intake and output per routine  -Trend Cr  -Consider holding diuretics for now in the setting of acute hypotension  -Avoid nephrotoxic agents  -Renally dose meds based on Gfr  -Follow up nephro recs    ID  -Pan cultureL bld cx x 2, UA, sputum cx, MRSA swab  -ABX: zosyn 6/1 - xx for emprici coverage for possible aspiration  -thora:  send fluid for gram stain, culture  -Broad spectrum abx for septic shock likely secondary to aspiration pna  -De-escalate abx based on senstitivities  -Follow up ID for further recommendations    Endo  -Continue synthroid  -ISS 16U over 6/2  -TF started 6/3  -Monitor blood glucose q6h    Heme  -Hgb stable (hx GIB), monitor daily  -Maintain active type and screen    Ppx  -Continue Protonix PPI BID  -PAS for DVT ppx    GOC  -Full Code     SENAIT JHAVERI is a 84yo Male with PMH HF (EF 40-45%), 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  -Trend ammonia    Cardiovascular:  -Hx of HF (EF 40-45%)  -Vasopressor support to maintain MAP > 65  -VS [id number]  -Consider holding Aldactone, Lasix depending on degree of hypotension    Pulmonary: Hypoxemic Respiratory Failure in the setting of hepatic encephalopathy, aspiration, large right pleural effusion  -Continue Mechanical Ventilation with lung protective ventilation  -Titrate FIo2 to maintain SPO2 > 92, ABGs prn  -6/2 thoracentesis of large right pleural effusion--send for cultures/cell count/TG/flow cytometry  -Broad spectrum abx; follow up ID for further recommendations  -Duoneb prn for wheezing    GI  -Hx of GIB: EGD 5/18 with small esophageal varices, large cratered duodenal ulcer   -Continue protonix ppi BID  -Hx of Cirrhosis: trend ammonia level  -s/p para 5/28 in IR (therapeutic drain with removal of 400cc cloudly pink fluid)  -Hepatology on board; f/u further recommendations  -Continue Latulose/Rifaximin for hepatic encephalopathy  -Continue Carafate as per hepatology  -Zosyn for SBP ppx      Feeds:  6/3 -- change TFs to Vital as per dietitian    Renal  -Intake and output per routine  -Trend Cr  -Consider holding diuretics for now in the setting of acute hypotension  -Avoid nephrotoxic agents  -Renally dose meds based on Gfr  -Follow up nephro recs    ID  -Pan cultureL bld cx x 2, UA, sputum cx, MRSA swab  -ABX: zosyn 6/1 - xx for emprici coverage for possible aspiration  -thora:  send fluid for gram stain, culture  -Broad spectrum abx for septic shock likely secondary to aspiration pna  -De-escalate abx based on senstitivities  -Follow up ID for further recommendations    Endo  -Continue synthroid  -ISS 16U over 6/2  -TF started 6/3  -Monitor blood glucose q6h    Heme  -Hgb stable (hx GIB), monitor daily  -Maintain active type and screen    Ppx  -Continue Protonix PPI BID  -PAS for DVT ppx    GOC  -Full Code     SENAIT JHAVERI is a 86yo Male with PMH HF (EF 40-45%), 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  -Trend ammonia    Cardiovascular:  -Hx of HF (EF 40-45%)  -Vasopressor support to maintain MAP > 65  -VS [id number]  -Consider holding Aldactone, Lasix depending on degree of hypotension    Pulmonary: Hypoxemic Respiratory Failure in the setting of hepatic encephalopathy, aspiration, large right pleural effusion  -Continue Mechanical Ventilation with lung protective ventilation  -Titrate FIo2 to maintain SPO2 > 92, ABGs prn  -6/2 thoracentesis of large right pleural effusion--send for cultures/cell count/TG/flow cytometry  -Broad spectrum abx; follow up ID for further recommendations  -Duoneb prn for wheezing    R pleural effusion  - s/p thora 6/2: elevated triglycerides to 120    GI  -Hx of GIB: EGD 5/18 with small esophageal varices, large cratered duodenal ulcer   -Continue protonix ppi BID  -Hx of Cirrhosis: trend ammonia level  -s/p para 5/28 in IR (therapeutic drain with removal of 400cc cloudly pink fluid)  -Hepatology on board; f/u further recommendations  -Continue Latulose/Rifaximin for hepatic encephalopathy  -Continue Carafate as per hepatology          Feeds:  6/3 -- change TFs to Vital as per dietitian    Renal  -Intake and output per routine  -Trend Cr  -Consider holding diuretics for now in the setting of acute hypotension  -Avoid nephrotoxic agents  -Renally dose meds based on Gfr  -Follow up nephro recs    ID  -Pan cultureL bld cx x 2, UA, sputum cx, MRSA swab  -ABX: zosyn 6/1 - xx for emprici coverage for possible aspiration  -thora:  send fluid for gram stain, culture  -Broad spectrum abx for septic shock likely secondary to aspiration pna  -De-escalate abx based on senstitivities  -Follow up ID for further recommendations    Endo  -Continue synthroid  -ISS 16U over 6/2  -TF started 6/3  -Monitor blood glucose q6h    Heme  -Hgb stable (hx GIB), monitor daily  -Maintain active type and screen    Ppx  -Continue Protonix PPI BID  -PAS for DVT ppx    GOC  -Full Code     SENAIT JHAVERI is a 86yo Male with PMH HF (EF 40-45%), 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  -Trend ammonia    Cardiovascular:  -Hx of HF (EF 40-45%)  -Vasopressor support to maintain MAP > 65; on levo gtt  -VS [id number]  -Holding Aldactone, Lasix     Pulmonary: Hypoxemic Respiratory Failure in the setting of hepatic encephalopathy, aspiration, large right pleural effusion  -Continue Mechanical Ventilation with lung protective ventilation  -Titrate FIo2 to maintain SPO2 > 92, ABGs prn  -Broad spectrum abx; follow up ID for further recommendations  -Duoneb prn for wheezing    R pleural effusion  - s/p thora 6/2: elevated triglycerides to 120  -protein ratio < 0.5 -- suggests transudative  -pleural fluid LDH/serum LDH ratio (however serum LDH not at same time as thora) > 0.5  -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    GI  -Hx of GIB: EGD 5/18 with small esophageal varices, large cratered duodenal ulcer   -Continue protonix ppi BID  -Hx of Cirrhosis: trend ammonia level  -s/p para 5/28 in IR (therapeutic drain with removal of 400cc cloudly pink fluid)  -Hepatology on board; f/u further recommendations  -Continue Latulose/Rifaximin for hepatic encephalopathy  -Continue Carafate as per hepatology      Feeds:  6/3 -- change TFs to Vital as per dietitian --- but will hold TFs today due to possib extubation    Renal  -Intake and output per routine  -Trend Cr  -Avoid nephrotoxic agents  -Renally dose meds based on Gfr  -Follow up nephro recs    ID  -Pan cultureL bld cx x 2, UA, sputum cx, MRSA swab  -ABX: zosyn 6/1 - xx for emprici coverage for possible aspiration  -thora:  send fluid for gram stain, culture  -Broad spectrum abx for septic shock likely secondary to aspiration pna  -De-escalate abx based on senstitivities  -Follow up ID for further recommendations    Endo  -Continue synthroid  -ISS 16U over 6/2  -TF started 6/3  -Monitor blood glucose q6h    Heme  -Hgb stable (hx GIB), monitor daily  -Maintain active type and screen    Ppx  -Continue Protonix PPI BID  -PAS for DVT ppx    GOC  -Full Code

## 2021-06-03 NOTE — PROGRESS NOTE ADULT - ASSESSMENT
85M PMHx CKD, cirrhosis (?viral hepatitis) - admitted for decompensated cirrhosis.  Nephrology consulted for hyponatremia and ANDREEA on CKD.      #Hyponatremia  - Resolved    # ANDREEA  Pt with non-oliguric ANDREEA on CKD likely 2/2 ATN in the setting hypotension, entresto/lasix, acute anemia and decreased EABV.  On admission sCr 2.0 (baseline sCr 1.4-1.6 in Jan 2019), peaked at 2.7mg/dl, improved to 1.2mg/dl. Now Scr increased to 1.64 mg/dl   - Lasix and aldactone held in setting of septic shock  - BP optimization/antibiotic/blood transfusion per ICU team  - monitor BMP, strict I/O, avoid nephrotoxic agents (NSAIDs, PPI, contrast), renally dose medications per GFR.    If any questions, please feel free to contact me     Edwardo Davis  Nephrology Fellow  [facility] Pager: [phone]  [facility] Pager: [phone]

## 2021-06-03 NOTE — PROGRESS NOTE ADULT - SUBJECTIVE AND OBJECTIVE BOX
[facility] DIVISION OF KIDNEY DISEASES AND HYPERTENSION -- FOLLOW UP NOTE  --------------------------------------------------------------------------------    24 hour events/subjective: urine output 735 cc over 24h period. Net 859 cc neg over 24 h period. Patient was seen and examined at bedside. Unable to obtain ROS.    PAST HISTORY  --------------------------------------------------------------------------------  No significant changes to PMH, PSH, FHx, SHx, unless otherwise noted    ALLERGIES & MEDICATIONS  --------------------------------------------------------------------------------  Allergies    No Known Allergies    Intolerances    Standing Inpatient Medications  chlorhexidine 0.12% Liquid 15 milliLiter(s) Oral Mucosa every 12 hours  chlorhexidine 4% Liquid 1 Application(s) Topical <User Schedule>  insulin lispro (ADMELOG) corrective regimen sliding scale   SubCutaneous every 6 hours  ketamine Infusion. 0.25 mG/kG/Hr IV Continuous <Continuous>  lactulose Syrup 30 Gram(s) Oral three times a day  levothyroxine Injectable 44 MICROGram(s) IV Push at bedtime  midodrine 10 milliGRAM(s) Oral every 8 hours  norepinephrine Infusion 0.03 MICROgram(s)/kG/Min IV Continuous <Continuous>  nystatin Powder 1 Application(s) Topical three times a day  pantoprazole  Injectable 40 milliGRAM(s) IV Push every 12 hours  piperacillin/tazobactam IVPB.. 3.375 Gram(s) IV Intermittent every 8 hours  polyethylene glycol 3350 17 Gram(s) Oral daily  propofol Infusion 10 MICROgram(s)/kG/Min IV Continuous <Continuous>  rifAXIMin 550 milliGRAM(s) Oral two times a day  sucralfate 1 Gram(s) Oral every 6 hours    PRN Inpatient Medications  albuterol/ipratropium for Nebulization 3 milliLiter(s) Nebulizer every 6 hours PRN    REVIEW OF SYSTEMS  --------------------------------------------------------------------------------  Unable to obtain ROS    VITALS/PHYSICAL EXAM  --------------------------------------------------------------------------------  T(C): 36.6 (06-03-21 @ 07:00), Max: 37.1 (06-02-21 @ 20:00)  HR: 82 (06-03-21 @ 08:30) (68 - 91)  BP: 104/58 (06-03-21 @ 08:30) (56/35 - 197/87)  RR: 12 (06-03-21 @ 08:30) (12 - 19)  SpO2: 100% (06-03-21 @ 08:30) (100% - 100%)  Wt(kg): --  Height (cm): 167.6 (06-01-21 @ 18:05)  Weight (kg): 76.2 (06-01-21 @ 18:05)  BMI (kg/m2): 27.1 (06-01-21 @ 18:05)  BSA (m2): 1.86 (06-01-21 @ 18:05)    06-02-21 @ 07:01  -  06-03-21 @ 07:00  --------------------------------------------------------  IN: 1975.2 mL / OUT: 2835 mL / NET: -859.8 mL    06-03-21 @ 07:01  -  06-03-21 @ 09:37  --------------------------------------------------------  IN: 139.5 mL / OUT: 50 mL / NET: 89.5 mL    Physical Exam:  	Gen: sedated, intubated   	HEENT: MMM  	Pulm: CTA B/L, no crackles or wheezing   	CV: S1S2  	Abd: Soft, +BS   	Ext: 1+ pitting LE edema B/L  	Neuro: Awake  	Skin: Warm and dry    LABS/STUDIES  --------------------------------------------------------------------------------              8.8    5.47  >-----------<  82       [06-03-21 @ 00:11]              26.7     145  |  112  |  40  ----------------------------<  197      [06-03-21 @ 00:11]  3.8   |  22  |  1.64        Ca     9.3     [06-03-21 @ 00:11]      Mg     1.8     [06-03-21 @ 00:11]      Phos  1.9     [06-03-21 @ 00:11]    TPro  5.1  /  Alb  2.9  /  TBili  2.1  /  DBili  x   /  AST  24  /  ALT  17  /  AlkPhos  52  [06-03-21 @ 00:11]    PT/INR: PT 20.0 , INR 1.71       [06-03-21 @ 00:11]  PTT: 39.8       [06-03-21 @ 00:11]    Creatinine Trend:  SCr 1.64 [06-03 @ 00:11]  SCr 1.59 [06-02 @ 00:21]  SCr 1.56 [06-01 @ 19:41]  SCr 1.52 [06-01 @ 06:48]  SCr 1.49 [05-31 @ 06:53]    Urinalysis - [06-01-21 @ 18:55]      Color Light Yellow / Appearance Clear / SG 1.011 / pH 5.5      Gluc Negative / Ketone Negative  / Bili Negative / Urobili Negative       Blood Negative / Protein Negative / Leuk Est Negative / Nitrite Negative      RBC 1 / WBC 0 / Hyaline 6 / Gran  / Sq Epi  / Non Sq Epi 0 / Bacteria Negative    Urine Sodium 7      [05-28-21 @ 05:37]    HbA1c 7.1      [01-12-19 @ 07:22]  TSH 1.00      [05-17-21 @ 08:41]    HBsAb 32.4      [05-17-21 @ 23:13]  HBsAg Nonreact      [05-17-21 @ 23:13]  HBcAb Reactive      [05-17-21 @ 23:13]  HCV 0.17, Nonreact      [05-17-21 @ 23:13]  HIV Nonreact      [05-18-21 @ 01:05]

## 2021-06-03 NOTE — PROGRESS NOTE ADULT - ATTENDING COMMENTS
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 Initially admitted w/ decompensated cirrhosis, ANDREEA, esophageal varices requiring MICU admission and intubation. 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 Transferred to ICU for worsening AMS and hypoxic respiratory failure requiring intubation, in setting of large R pleural effusion and likely aspiration pna. Now s/p thoracentesis.     - sedated on propofol and ketamine; decrease sedatives so can start to wean  - intubated for worsening mental status and hypoxic respiratory failure, now on minimal vent settings; SBT today   - s/p thoracentesis, removed 2.1 L orange colored fluid, transudative based on light's criteria,  ; cx, cyto and flow cytometry pending  - on low dose NE, likely due to vasoplegia, titrate to MAP >/65  - concern that he aspirated, as he was seen by speech and swallow and he has been aspirating; on zosyn to cover for pneumonia; all cx NGTD   - NGT in place, on TF; continue w/ rifaxamin and lactulose; plan for paracentesis today if can locate appropriate window; carafate and protonix for duodenal ulcer on recent EGD  - ANDREEA on CKD likely ATN related; UOP and electrolytes accpetable; continue to monitor  - noted to have thrombocytopenia likely related to liver disease  - FS elevated, continue w/ ISS coverage  - SCDs  - prognosis very guarded

## 2021-06-03 NOTE — PROGRESS NOTE ADULT - ATTENDING COMMENTS
Encephalopathy and respiratory failure, ICU admission and intubation  Lactic/metabolic acidosis with alkalemia  Hypernatremia - improving  ANDREEA/ATN - stable  CKD III    No indication for kidney replacement therapy  Would dose meds for GFR approximately max 30 (listed GFR is likely overestimate); monitor UOP as this may be better indicator of current kidney function  Maintain MAP >65  D/w ICU, remainder per fellow

## 2021-06-04 NOTE — CHART NOTE - NSCHARTNOTEFT_GEN_A_CORE
Nutrition Follow Up Note   Patient seen for: 1st malnutrition follow up on  ICU     Source: medical record, communication with team.     Chart reviewed, events noted. Adm for GIB, cirrhosis, esophageal varices. Transferred to MICU 6/1 for respiratory failure, PNA, hepatic encephalopathy. 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 Remains intubated. Severe malnutrition dx on 6/2.    Diet Order: Diet, NPO:   Tube Feeding Modality: Nasogastric  Vital AF (VITALAFRTH)  Total Volume for 24 Hours (mL): 1440  Continuous  Starting Tube Feed Rate {mL per Hour}: 10  Increase Tube Feed Rate by (mL): 10     Every 4 hours  Until Goal Tube Feed Rate (mL per Hour): 80  Tube Feed Duration (in Hours): 18  Tube Feed Start Time: 10:00 (06-03-21 @ 09:30)    CURRENT EN ORDER PROVIDES: 1720 kcals, 108 gm protein, 1168cc free water meets 22 Kcal/Kg, 1.4 Gm protein/kg 6/1 dosing wt 76.2 kg    Nutrition Events:   - tube feeds were initiated 6/2 w/ Glucerna 1.5, changed to Vital AF on 6/3  - 24 hr EN intake 6/3 280 cc, 6/4 480 cc (last rate 40 cc/hr, were held for possible extubation)    GI: no V/abd distention  had liquid BM 6/3 (on lactulose rx)    Anthropometric Measurements:   Height (cm): 167.6 (06-01-21 @ 18:05)  Weight (kg): 76.2 (06-01-21 @ 18:05)  BMI (kg/m2): 27.1 (06-01-21 @ 18:05)      Medications: MEDICATIONS  (STANDING):  artificial  tears Solution 1 Drop(s) Both EYES three times a day  chlorhexidine 0.12% Liquid 15 milliLiter(s) Oral Mucosa every 12 hours  chlorhexidine 4% Liquid 1 Application(s) Topical <User Schedule>  insulin lispro (ADMELOG) corrective regimen sliding scale   SubCutaneous every 6 hours  ketamine Infusion. 0.25 mG/kG/Hr (1.91 mL/Hr) IV Continuous <Continuous>  lactulose Syrup 30 Gram(s) Oral every 4 hours  levothyroxine Injectable 44 MICROGram(s) IV Push at bedtime  midodrine 10 milliGRAM(s) Oral every 8 hours  norepinephrine Infusion 0.03 MICROgram(s)/kG/Min (4.29 mL/Hr) IV Continuous <Continuous>  nystatin Powder 1 Application(s) Topical three times a day  pantoprazole  Injectable 40 milliGRAM(s) IV Push every 12 hours  petrolatum Ophthalmic Ointment 1 Application(s) Both EYES daily  piperacillin/tazobactam IVPB.. 3.375 Gram(s) IV Intermittent every 8 hours  polyethylene glycol 3350 17 Gram(s) Oral daily  propofol Infusion 10 MICROgram(s)/kG/Min (4.57 mL/Hr) IV Continuous <Continuous>  rifAXIMin 550 milliGRAM(s) Oral two times a day  sucralfate 1 Gram(s) Oral every 6 hours    MEDICATIONS  (PRN):  albuterol/ipratropium for Nebulization 3 milliLiter(s) Nebulizer every 6 hours PRN Shortness of Breath and/or Wheezing    Labs: 06-04 @ 01:01: Sodium 147<H>, Potassium 4.1, Calcium 9.0, Magnesium --, Phosphorus --, BUN 41<H>, Creatinine 1.83<H>, Glucose 168<H>, Alk Phos 54, ALT/SGPT 18, AST/SGOT 27, Albumin 2.7<L>, Prealbumin --, Total Bilirubin 2.2<H>, Hemoglobin 9.1<L>, Hematocrit 28.6<L>, Ferritin --, C-Reactive Protein --, Creatine Kinase <<27>        POCT Blood Glucose.: 176 mg/dL (06-04-21 @ 05:41)  POCT Blood Glucose.: 154 mg/dL (06-04-21 @ 00:51)  POCT Blood Glucose.: 185 mg/dL (06-03-21 @ 18:15)  POCT Blood Glucose.: 234 mg/dL (06-03-21 @ 12:41)    Skin: no pressure injuries documented, has sacrum wound  Edema: 1+ B/L arma    Estimated Needs: based on current dosing wt 76.2 kg w/ consideration for intubation  Energy: 9491-0906 kcals (23-25 kcals/kg)  Protein:   gm (1.2-1.4 gm/kg)    Previous Nutrition Diagnosis: severe malnutrition  Nutrition Diagnosis is: care plan ongoing, addressed w/ EN    Recommended Interventions:   1. Continue current order for Vital AF 80 cc/hr x 18 hrs      Monitoring and Evaluation:   Continue to monitor nutrition provision and tolerance, weights, labs, skin integrity.   RD remains available upon request and will follow up per protocol.

## 2021-06-04 NOTE — PROGRESS NOTE ADULT - SUBJECTIVE AND OBJECTIVE BOX
[facility] DIVISION OF KIDNEY DISEASES AND HYPERTENSION -- FOLLOW UP NOTE  --------------------------------------------------------------------------------    24 hour events/subjective: urine output 700 cc over 24h period. Net 580 cc positive. Patient was seen and examined at bedside. Reported feeling well. Currently sedated and intubated. Unable to obtain ROS.    PAST HISTORY  --------------------------------------------------------------------------------  No significant changes to PMH, PSH, FHx, SHx, unless otherwise noted    ALLERGIES & MEDICATIONS  --------------------------------------------------------------------------------  Allergies    No Known Allergies    Intolerances    Standing Inpatient Medications  artificial  tears Solution 1 Drop(s) Both EYES three times a day  chlorhexidine 0.12% Liquid 15 milliLiter(s) Oral Mucosa every 12 hours  chlorhexidine 4% Liquid 1 Application(s) Topical <User Schedule>  insulin lispro (ADMELOG) corrective regimen sliding scale   SubCutaneous every 6 hours  ketamine Infusion. 0.25 mG/kG/Hr IV Continuous <Continuous>  lactulose Syrup 30 Gram(s) Oral every 4 hours  levothyroxine Injectable 44 MICROGram(s) IV Push at bedtime  midodrine 10 milliGRAM(s) Oral every 8 hours  norepinephrine Infusion 0.03 MICROgram(s)/kG/Min IV Continuous <Continuous>  nystatin Powder 1 Application(s) Topical three times a day  pantoprazole  Injectable 40 milliGRAM(s) IV Push every 12 hours  petrolatum Ophthalmic Ointment 1 Application(s) Both EYES daily  piperacillin/tazobactam IVPB.. 3.375 Gram(s) IV Intermittent every 8 hours  polyethylene glycol 3350 17 Gram(s) Oral daily  propofol Infusion 10 MICROgram(s)/kG/Min IV Continuous <Continuous>  rifAXIMin 550 milliGRAM(s) Oral two times a day  sucralfate 1 Gram(s) Oral every 6 hours    PRN Inpatient Medications  albuterol/ipratropium for Nebulization 3 milliLiter(s) Nebulizer every 6 hours PRN    REVIEW OF SYSTEMS  --------------------------------------------------------------------------------  Unable to obtain ROS    VITALS/PHYSICAL EXAM  --------------------------------------------------------------------------------  T(C): 37.1 (06-04-21 @ 08:00), Max: 37.2 (06-04-21 @ 00:00)  HR: 75 (06-04-21 @ 09:30) (67 - 96)  BP: 105/51 (06-04-21 @ 09:30) (79/49 - 153/65)  RR: 12 (06-04-21 @ 09:30) (12 - 63)  SpO2: 100% (06-04-21 @ 09:30) (100% - 100%)  Wt(kg): --        06-03-21 @ 07:01  -  06-04-21 @ 07:00  --------------------------------------------------------  IN: 1288.3 mL / OUT: 705 mL / NET: 583.3 mL    Physical Exam:  	Gen: sedated, intubated  	HEENT: ETT  	Pulm: CTA B/L, no crackles   	CV: S1S2  	Abd: Soft, +BS   	Ext: pitting LE edema B/L  	Neuro: sedated   	Skin: Warm and dry      LABS/STUDIES  --------------------------------------------------------------------------------              9.1    5.63  >-----------<  74       [06-04-21 @ 01:01]              28.6     147  |  113  |  41  ----------------------------<  168      [06-04-21 @ 01:01]  4.1   |  21  |  1.83        Ca     9.0     [06-04-21 @ 01:01]      Mg     1.8     [06-03-21 @ 00:11]      Phos  1.9     [06-03-21 @ 00:11]    TPro  5.1  /  Alb  2.7  /  TBili  2.2  /  DBili  x   /  AST  27  /  ALT  18  /  AlkPhos  54  [06-04-21 @ 01:01]    PT/INR: PT 20.0 , INR 1.71       [06-03-21 @ 00:11]  PTT: 39.8       [06-03-21 @ 00:11]          [06-03-21 @ 01:32]    Creatinine Trend:  SCr 1.83 [06-04 @ 01:01]  SCr 1.64 [06-03 @ 00:11]  SCr 1.59 [06-02 @ 00:21]  SCr 1.56 [06-01 @ 19:41]  SCr 1.52 [06-01 @ 06:48]    Urinalysis - [06-01-21 @ 18:55]      Color Light Yellow / Appearance Clear / SG 1.011 / pH 5.5      Gluc Negative / Ketone Negative  / Bili Negative / Urobili Negative       Blood Negative / Protein Negative / Leuk Est Negative / Nitrite Negative      RBC 1 / WBC 0 / Hyaline 6 / Gran  / Sq Epi  / Non Sq Epi 0 / Bacteria Negative      HbA1c 7.1      [01-12-19 @ 07:22]  TSH 1.00      [05-17-21 @ 08:41]    HBsAb 32.4      [05-17-21 @ 23:13]  HBsAg Nonreact      [05-17-21 @ 23:13]  HBcAb Reactive      [05-17-21 @ 23:13]  HCV 0.17, Nonreact      [05-17-21 @ 23:13]  HIV Nonreact      [05-18-21 @ 01:05]

## 2021-06-04 NOTE — PROGRESS NOTE ADULT - SUBJECTIVE AND OBJECTIVE BOX
MICU PROGRESS NOTE    INTERVAL HPI/OVERNIGHT EVENTS:    SUBJECTIVE:     OBJECTIVE:    VITAL SIGNS:  ICU Vital Signs Last 24 Hrs  T(C): 37.2 (04 Jun 2021 04:00), Max: 37.2 (04 Jun 2021 00:00)  T(F): 98.9 (04 Jun 2021 04:00), Max: 98.9 (04 Jun 2021 00:00)  HR: 71 (04 Jun 2021 07:00) (67 - 96)  BP: 98/59 (04 Jun 2021 07:00) (60/39 - 153/65)  BP(mean): 72 (04 Jun 2021 07:00) (44 - 100)  ABP: --  ABP(mean): --  RR: 12 (04 Jun 2021 07:00) (12 - 63)  SpO2: 100% (04 Jun 2021 07:00) (100% - 100%)      VENTS:  Mode: AC/ CMV (Assist Control/ Continuous Mandatory Ventilation), RR (machine): 12, TV (machine): 500, FiO2: 30, PEEP: 5, ITime: 0.8, MAP: 9, PIP: 26    I&O:    06-03 @ 07:01  -  06-04 @ 07:00  --------------------------------------------------------  IN: 1288.3 mL / OUT: 705 mL / NET: 583.3 mL        PHYSICAL EXAM:  GENERAL: NAD, conversant  CHEST/LUNG: Clear to auscultation bilaterally; No crackles, rhonchi, wheezing, or rubs  HEART: Regular rate and rhythm; No murmurs, rubs, or gallops  ABDOMEN: Soft, Nontender, Nondistended; Bowel sounds present  EXTREMITIES:  2+ Peripheral Pulses, No clubbing, cyanosis, or edema  SKIN: No rashes or lesions  NERVOUS SYSTEM:  Alert & Oriented, Good concentration      LINES:                                       MEDICATIONS:  MEDICATIONS  (STANDING):  artificial  tears Solution 1 Drop(s) Both EYES three times a day  chlorhexidine 0.12% Liquid 15 milliLiter(s) Oral Mucosa every 12 hours  chlorhexidine 4% Liquid 1 Application(s) Topical <User Schedule>  insulin lispro (ADMELOG) corrective regimen sliding scale   SubCutaneous every 6 hours  ketamine Infusion. 0.25 mG/kG/Hr (1.91 mL/Hr) IV Continuous <Continuous>  lactulose Syrup 30 Gram(s) Oral every 4 hours  levothyroxine Injectable 44 MICROGram(s) IV Push at bedtime  midodrine 10 milliGRAM(s) Oral every 8 hours  norepinephrine Infusion 0.03 MICROgram(s)/kG/Min (4.29 mL/Hr) IV Continuous <Continuous>  nystatin Powder 1 Application(s) Topical three times a day  pantoprazole  Injectable 40 milliGRAM(s) IV Push every 12 hours  petrolatum Ophthalmic Ointment 1 Application(s) Both EYES daily  piperacillin/tazobactam IVPB.. 3.375 Gram(s) IV Intermittent every 8 hours  polyethylene glycol 3350 17 Gram(s) Oral daily  propofol Infusion 10 MICROgram(s)/kG/Min (4.57 mL/Hr) IV Continuous <Continuous>  rifAXIMin 550 milliGRAM(s) Oral two times a day  sucralfate 1 Gram(s) Oral every 6 hours    MEDICATIONS  (PRN):  albuterol/ipratropium for Nebulization 3 milliLiter(s) Nebulizer every 6 hours PRN Shortness of Breath and/or Wheezing      ALLERGIES:  Allergies    No Known Allergies    Intolerances        LABS:                        9.1    5.63  )-----------( 74       ( 04 Jun 2021 01:01 )             28.6     06-04    147<H>  |  113<H>  |  41<H>  ----------------------------<  168<H>  4.1   |  21<L>  |  1.83<H>    Ca    9.0      04 Jun 2021 01:01  Phos  1.9     06-03  Mg     1.8     06-03    TPro  5.1<L>  /  Alb  2.7<L>  /  TBili  2.2<H>  /  DBili  x   /  AST  27  /  ALT  18  /  AlkPhos  54  06-04    PT/INR - ( 03 Jun 2021 00:11 )   PT: 20.0 sec;   INR: 1.71 ratio         PTT - ( 03 Jun 2021 00:11 )  PTT:39.8 sec      CAPILLARY BLOOD GLUCOSE      POCT Blood Glucose.: 176 mg/dL (04 Jun 2021 05:41)      RADIOLOGY & ADDITIONAL TESTS: Reviewed. MICU PROGRESS NOTE    INTERVAL HPI/OVERNIGHT EVENTS:  O/n was not able to extubate.    SUBJECTIVE/OBJECTIVE:    Intubated, sedated.    VITAL SIGNS:  ICU Vital Signs Last 24 Hrs  T(C): 37.2 (04 Jun 2021 04:00), Max: 37.2 (04 Jun 2021 00:00)  T(F): 98.9 (04 Jun 2021 04:00), Max: 98.9 (04 Jun 2021 00:00)  HR: 71 (04 Jun 2021 07:00) (67 - 96)  BP: 98/59 (04 Jun 2021 07:00) (60/39 - 153/65)  BP(mean): 72 (04 Jun 2021 07:00) (44 - 100)  ABP: --  ABP(mean): --  RR: 12 (04 Jun 2021 07:00) (12 - 63)  SpO2: 100% (04 Jun 2021 07:00) (100% - 100%)      VENTS:  Mode: AC/ CMV (Assist Control/ Continuous Mandatory Ventilation), RR (machine): 12, TV (machine): 500, FiO2: 30, PEEP: 5, ITime: 0.8, MAP: 9, PIP: 26    I&O:    06-03 @ 07:01  -  06-04 @ 07:00  --------------------------------------------------------  IN: 1288.3 mL / OUT: 705 mL / NET: 583.3 mL        PHYSICAL EXAM:  GENERAL: NAD, conversant  CHEST/LUNG: Clear to auscultation bilaterally; No crackles, rhonchi, wheezing, or rubs  HEART: Regular rate and rhythm; No murmurs, rubs, or gallops  ABDOMEN: Soft, Nontender, Nondistended; Bowel sounds present  EXTREMITIES:  2+ Peripheral Pulses, No clubbing, cyanosis, or edema  SKIN: No rashes or lesions  NERVOUS SYSTEM:  Alert & Oriented, Good concentration      LINES:                                       MEDICATIONS:  MEDICATIONS  (STANDING):  artificial  tears Solution 1 Drop(s) Both EYES three times a day  chlorhexidine 0.12% Liquid 15 milliLiter(s) Oral Mucosa every 12 hours  chlorhexidine 4% Liquid 1 Application(s) Topical <User Schedule>  insulin lispro (ADMELOG) corrective regimen sliding scale   SubCutaneous every 6 hours  ketamine Infusion. 0.25 mG/kG/Hr (1.91 mL/Hr) IV Continuous <Continuous>  lactulose Syrup 30 Gram(s) Oral every 4 hours  levothyroxine Injectable 44 MICROGram(s) IV Push at bedtime  midodrine 10 milliGRAM(s) Oral every 8 hours  norepinephrine Infusion 0.03 MICROgram(s)/kG/Min (4.29 mL/Hr) IV Continuous <Continuous>  nystatin Powder 1 Application(s) Topical three times a day  pantoprazole  Injectable 40 milliGRAM(s) IV Push every 12 hours  petrolatum Ophthalmic Ointment 1 Application(s) Both EYES daily  piperacillin/tazobactam IVPB.. 3.375 Gram(s) IV Intermittent every 8 hours  polyethylene glycol 3350 17 Gram(s) Oral daily  propofol Infusion 10 MICROgram(s)/kG/Min (4.57 mL/Hr) IV Continuous <Continuous>  rifAXIMin 550 milliGRAM(s) Oral two times a day  sucralfate 1 Gram(s) Oral every 6 hours    MEDICATIONS  (PRN):  albuterol/ipratropium for Nebulization 3 milliLiter(s) Nebulizer every 6 hours PRN Shortness of Breath and/or Wheezing      ALLERGIES:  Allergies    No Known Allergies    Intolerances        LABS:                        9.1    5.63  )-----------( 74       ( 04 Jun 2021 01:01 )             28.6     06-04    147<H>  |  113<H>  |  41<H>  ----------------------------<  168<H>  4.1   |  21<L>  |  1.83<H>    Ca    9.0      04 Jun 2021 01:01  Phos  1.9     06-03  Mg     1.8     06-03    TPro  5.1<L>  /  Alb  2.7<L>  /  TBili  2.2<H>  /  DBili  x   /  AST  27  /  ALT  18  /  AlkPhos  54  06-04    PT/INR - ( 03 Jun 2021 00:11 )   PT: 20.0 sec;   INR: 1.71 ratio         PTT - ( 03 Jun 2021 00:11 )  PTT:39.8 sec      CAPILLARY BLOOD GLUCOSE      POCT Blood Glucose.: 176 mg/dL (04 Jun 2021 05:41)      RADIOLOGY & ADDITIONAL TESTS: Reviewed. MICU PROGRESS NOTE    INTERVAL HPI/OVERNIGHT EVENTS:  O/n was not able to extubate. Yesterday multiple BMs on lactulose ~6 over 24h    SUBJECTIVE/OBJECTIVE:    Intubated, sedated.    VITAL SIGNS:  ICU Vital Signs Last 24 Hrs  T(C): 37.2 (04 Jun 2021 04:00), Max: 37.2 (04 Jun 2021 00:00)  T(F): 98.9 (04 Jun 2021 04:00), Max: 98.9 (04 Jun 2021 00:00)  HR: 71 (04 Jun 2021 07:00) (67 - 96)  BP: 98/59 (04 Jun 2021 07:00) (60/39 - 153/65)  BP(mean): 72 (04 Jun 2021 07:00) (44 - 100)  ABP: --  ABP(mean): --  RR: 12 (04 Jun 2021 07:00) (12 - 63)  SpO2: 100% (04 Jun 2021 07:00) (100% - 100%)      VENTS:  Mode: AC/ CMV (Assist Control/ Continuous Mandatory Ventilation), RR (machine): 12, TV (machine): 500, FiO2: 30, PEEP: 5, ITime: 0.8, MAP: 9, PIP: 26    I&O:    06-03 @ 07:01  -  06-04 @ 07:00  --------------------------------------------------------  IN: 1288.3 mL / OUT: 705 mL / NET: 583.3 mL        PHYSICAL EXAM:    GENERAL: sedated intubated  HEENT: ngt  CHEST/LUNG: Clear to auscultation bilaterally;  +intubated +upper airway sounds, rhonchi  HEART: Regular rate and rhythm; No murmurs, rubs, or gallops  ABDOMEN: Soft, Nontender, +distended, somewhat tense  EXTREMITIES:  2+ Peripheral Pulses, No clubbing, cyanosis, or edema  SKIN: No rashes or lesions  NERVOUS SYSTEM:  aaox0, opens eyes to commands                                   MEDICATIONS:  MEDICATIONS  (STANDING):  artificial  tears Solution 1 Drop(s) Both EYES three times a day  chlorhexidine 0.12% Liquid 15 milliLiter(s) Oral Mucosa every 12 hours  chlorhexidine 4% Liquid 1 Application(s) Topical <User Schedule>  insulin lispro (ADMELOG) corrective regimen sliding scale   SubCutaneous every 6 hours  ketamine Infusion. 0.25 mG/kG/Hr (1.91 mL/Hr) IV Continuous <Continuous>  lactulose Syrup 30 Gram(s) Oral every 4 hours  levothyroxine Injectable 44 MICROGram(s) IV Push at bedtime  midodrine 10 milliGRAM(s) Oral every 8 hours  norepinephrine Infusion 0.03 MICROgram(s)/kG/Min (4.29 mL/Hr) IV Continuous <Continuous>  nystatin Powder 1 Application(s) Topical three times a day  pantoprazole  Injectable 40 milliGRAM(s) IV Push every 12 hours  petrolatum Ophthalmic Ointment 1 Application(s) Both EYES daily  piperacillin/tazobactam IVPB.. 3.375 Gram(s) IV Intermittent every 8 hours  polyethylene glycol 3350 17 Gram(s) Oral daily  propofol Infusion 10 MICROgram(s)/kG/Min (4.57 mL/Hr) IV Continuous <Continuous>  rifAXIMin 550 milliGRAM(s) Oral two times a day  sucralfate 1 Gram(s) Oral every 6 hours    MEDICATIONS  (PRN):  albuterol/ipratropium for Nebulization 3 milliLiter(s) Nebulizer every 6 hours PRN Shortness of Breath and/or Wheezing      ALLERGIES:  Allergies    No Known Allergies    Intolerances        LABS:                        9.1    5.63  )-----------( 74       ( 04 Jun 2021 01:01 )             28.6     06-04    147<H>  |  113<H>  |  41<H>  ----------------------------<  168<H>  4.1   |  21<L>  |  1.83<H>    Ca    9.0      04 Jun 2021 01:01  Phos  1.9     06-03  Mg     1.8     06-03    TPro  5.1<L>  /  Alb  2.7<L>  /  TBili  2.2<H>  /  DBili  x   /  AST  27  /  ALT  18  /  AlkPhos  54  06-04    PT/INR - ( 03 Jun 2021 00:11 )   PT: 20.0 sec;   INR: 1.71 ratio         PTT - ( 03 Jun 2021 00:11 )  PTT:39.8 sec      CAPILLARY BLOOD GLUCOSE      POCT Blood Glucose.: 176 mg/dL (04 Jun 2021 05:41)      RADIOLOGY & ADDITIONAL TESTS: Reviewed.

## 2021-06-04 NOTE — PROGRESS NOTE ADULT - ATTENDING COMMENTS
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 Initially admitted w/ decompensated cirrhosis, ANDREEA, esophageal varices requiring MICU admission and intubation. 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 Transferred to ICU for worsening AMS and hypoxic respiratory failure requiring intubation, in setting of large R pleural effusion and likely aspiration pna. Now s/p thoracentesis.     - off propofol and ketamine, will add precedex for comfort w/ weaning  - intubated for worsening mental status and hypoxic respiratory failure, now on minimal vent settings; SBT today   - s/p thoracentesis, removed 2.1 L orange colored fluid, transudative based on light's criteria, , cx NGTD; cyto and flow cytometry pending  - on low dose NE, likely due to vasoplegia, titrate to MAP >/65  - concern that he aspirated, as he was seen by speech and swallow and he has been aspirating; on zosyn to cover for pneumonia; all cx NGTD   - NGT in place, on TF; continue w/ rifaxamin and lactulose; not enough fluid for paracentesis so on hold for now; carafate and protonix for duodenal ulcer on recent EGD  - ANDREEA on CKD likely ATN related; start free water for hypernatremia, UOP and electrolytes acceptable; continue to monitor  - noted to have thrombocytopenia likely related to liver disease  - FS elevated, continue w/ ISS coverage  - SCDs  - palliative consulted; prognosis very guarded

## 2021-06-04 NOTE — ADVANCED PRACTICE NURSE CONSULT - ASSESSMENT
The pt remains in the MICU- intubated , on ventilator support, Mr Styles is on a Total Care SPort support surface and is being turned and positioned; staff have applied Complete CAir boots to off-load the heels. The skin on the heels is intact at this time.  The pt has been diagnosed with severe protein calorie malnutrition- he receives enteral feeds and is being followed by nutrition.  Upon assessment, the pt presents with hyperpigmentation of the skin on the b/l buttocks- would not classify this as a deep tissue injury- it is most likely secondary to chronic exposure to moisture- staff are applying Marcelo barrier cream which is an appropriate intervention for incontinence  On the left buttocks was a small superficial loss of skin that is also most likely secondary to IAD as well. The pt continues to receive lactulose and to experience frequent stools

## 2021-06-04 NOTE — CHART NOTE - NSCHARTNOTEFT_GEN_A_CORE
Spoke at length with patient's wife at bedside, and patient's daughter Cleopatra Styles by  phone. 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 Daughter expressed her goal for her father to be able look at them and speak to them once more. She hopes he can express his own wishes regarding code status. Regarding previous expression of DNR/DNI with his PCP as per collateral obtained as written in chart note 5/18 --- As per daughter, patient had a conversation with his PCP and had stated some wishes at that time regarding code status, however no official documentation. Daughter feels she would not want to make the decision for her father based on a conversation from so long ago. Daughter seemed to understand patient's poor prognosis, but stated it has been difficult coping with his rapid recent decline over the past few months.

## 2021-06-04 NOTE — PROGRESS NOTE ADULT - ASSESSMENT
85M PMHx CKD, cirrhosis (?viral hepatitis) - admitted for decompensated cirrhosis.  Nephrology consulted for hyponatremia and ANDREEA on CKD.  Was intubated and transferred to MICU for septic shock in setting of aspiration PNA?     # ANDREEA  Pt with non-oliguric ANDREEA on CKD likely 2/2 ATN in the setting hypotension, entresto/lasix, acute anemia and decreased EABV. Now with new ANDREEA in setting of hypotension. On admission sCr 2.0 (baseline sCr 1.4-1.6 in Jan 2019), peaked at 2.7mg/dl, improved to 1.2mg/dl. Now Scr increased to 1.83 mg/dl today.   - Lasix and aldactone held in setting of septic shock  - BP optimization/antibiotic/blood transfusion per ICU team  - monitor BMP, strict I/O, avoid nephrotoxic agents (NSAIDs, PPI, contrast), renally dose medications per GFR.    # Hypernatremia  Free water deficit is about 1.9L  Start on free water 400 cc q6h   Monitor NA     If any questions, please feel free to contact me     Edwardo Davis  Nephrology Fellow  [facility] Pager: [phone]  [facility] Pager: [phone]

## 2021-06-04 NOTE — ADVANCED PRACTICE NURSE CONSULT - RECOMMEDATIONS
Will recommend the followin. B/l buttocks: continue with routine pericare with Marcelo  2.Continue with turning and positioning  3. complete Cair boots  4. nutrition support as pt condition allows  Tx plan discussed with RN

## 2021-06-04 NOTE — PROGRESS NOTE ADULT - ATTENDING COMMENTS
Encephalopathy and respiratory failure, ICU admission and intubation  Hypernatremia   ANDREEA/ATN - stable  CKD III    No indication for kidney replacement therapy  Free water  Would also do D5  Maintain MAP >65  D/w ICU, remainder per fellow .

## 2021-06-05 NOTE — PROGRESS NOTE ADULT - SUBJECTIVE AND OBJECTIVE BOX
MICU PROGRESS NOTE    INTERVAL HPI/OVERNIGHT EVENTS:    SUBJECTIVE:     OBJECTIVE:    VITAL SIGNS:  ICU Vital Signs Last 24 Hrs  T(C): 36.4 (05 Jun 2021 04:00), Max: 37.1 (04 Jun 2021 08:00)  T(F): 97.5 (05 Jun 2021 04:00), Max: 98.7 (04 Jun 2021 08:00)  HR: 76 (05 Jun 2021 07:15) (70 - 91)  BP: 104/55 (05 Jun 2021 07:15) (66/42 - 190/77)  BP(mean): 72 (05 Jun 2021 07:15) (50 - 119)  ABP: --  ABP(mean): --  RR: 12 (05 Jun 2021 07:15) (12 - 17)  SpO2: 100% (05 Jun 2021 07:15) (89% - 100%)      VENTS:  Mode: AC/ CMV (Assist Control/ Continuous Mandatory Ventilation), RR (machine): 12, TV (machine): 500, FiO2: 30, PEEP: 5, ITime: 0.78, MAP: 10, PIP: 22    I&O:    06-04 @ 07:01  -  06-05 @ 07:00  --------------------------------------------------------  IN: 1583.3 mL / OUT: 510 mL / NET: 1073.3 mL    06-05 @ 07:01 - 06-05 @ 07:29  --------------------------------------------------------  IN: 5 mL / OUT: 0 mL / NET: 5 mL        PHYSICAL EXAM:  GENERAL: NAD, conversant  CHEST/LUNG: Clear to auscultation bilaterally; No crackles, rhonchi, wheezing, or rubs  HEART: Regular rate and rhythm; No murmurs, rubs, or gallops  ABDOMEN: Soft, Nontender, Nondistended; Bowel sounds present  EXTREMITIES:  2+ Peripheral Pulses, No clubbing, cyanosis, or edema  SKIN: No rashes or lesions  NERVOUS SYSTEM:  Alert & Oriented, Good concentration      LINES:                                       MEDICATIONS:  MEDICATIONS  (STANDING):  artificial  tears Solution 1 Drop(s) Both EYES three times a day  chlorhexidine 0.12% Liquid 15 milliLiter(s) Oral Mucosa every 12 hours  chlorhexidine 4% Liquid 1 Application(s) Topical <User Schedule>  dexMEDEtomidine Infusion 0.2 MICROgram(s)/kG/Hr (3.81 mL/Hr) IV Continuous <Continuous>  insulin lispro (ADMELOG) corrective regimen sliding scale   SubCutaneous every 6 hours  lactulose Syrup 30 Gram(s) Oral every 8 hours  levothyroxine Injectable 44 MICROGram(s) IV Push at bedtime  midodrine 20 milliGRAM(s) Oral every 8 hours  norepinephrine Infusion 0.03 MICROgram(s)/kG/Min (4.29 mL/Hr) IV Continuous <Continuous>  nystatin Powder 1 Application(s) Topical three times a day  pantoprazole  Injectable 40 milliGRAM(s) IV Push every 12 hours  petrolatum Ophthalmic Ointment 1 Application(s) Both EYES daily  piperacillin/tazobactam IVPB.. 3.375 Gram(s) IV Intermittent every 8 hours  polyethylene glycol 3350 17 Gram(s) Oral daily  rifAXIMin 550 milliGRAM(s) Oral two times a day  sucralfate 1 Gram(s) Oral every 6 hours    MEDICATIONS  (PRN):  albuterol/ipratropium for Nebulization 3 milliLiter(s) Nebulizer every 6 hours PRN Shortness of Breath and/or Wheezing      ALLERGIES:  Allergies    No Known Allergies    Intolerances        LABS:                        9.5    6.31  )-----------( 94       ( 05 Jun 2021 00:54 )             29.4     06-05    141  |  108  |  46<H>  ----------------------------<  233<H>  4.1   |  21<L>  |  1.88<H>    Ca    8.8      05 Jun 2021 00:54  Phos  3.3     06-05  Mg     1.9     06-05    TPro  5.1<L>  /  Alb  2.9<L>  /  TBili  2.1<H>  /  DBili  x   /  AST  22  /  ALT  18  /  AlkPhos  55  06-05          CAPILLARY BLOOD GLUCOSE      POCT Blood Glucose.: 263 mg/dL (05 Jun 2021 05:19)      RADIOLOGY & ADDITIONAL TESTS: Reviewed. MICU PROGRESS NOTE    INTERVAL HPI/OVERNIGHT EVENTS:  Yesterday attempted for SBT, but apneic. No acute events.    SUBJECTIVE:   This AM opens  eyes to name, squeezes hands, opens mouth, moves legs,  to command.    OBJECTIVE:    VITAL SIGNS:  ICU Vital Signs Last 24 Hrs  T(C): 36.4 (05 Jun 2021 04:00), Max: 37.1 (04 Jun 2021 08:00)  T(F): 97.5 (05 Jun 2021 04:00), Max: 98.7 (04 Jun 2021 08:00)  HR: 76 (05 Jun 2021 07:15) (70 - 91)  BP: 104/55 (05 Jun 2021 07:15) (66/42 - 190/77)  BP(mean): 72 (05 Jun 2021 07:15) (50 - 119)  ABP: --  ABP(mean): --  RR: 12 (05 Jun 2021 07:15) (12 - 17)  SpO2: 100% (05 Jun 2021 07:15) (89% - 100%)      VENTS:  Mode: AC/ CMV (Assist Control/ Continuous Mandatory Ventilation), RR (machine): 12, TV (machine): 500, FiO2: 30, PEEP: 5, ITime: 0.78, MAP: 10, PIP: 22    I&O:    06-04 @ 07:01  -  06-05 @ 07:00  --------------------------------------------------------  IN: 1583.3 mL / OUT: 510 mL / NET: 1073.3 mL    06-05 @ 07:01  -  06-05 @ 07:29  --------------------------------------------------------  IN: 5 mL / OUT: 0 mL / NET: 5 mL        PHYSICAL EXAM:    GENERAL: intubated  HEENT: ngt  CHEST/LUNG: Clear to auscultation bilaterally;  +intubated +upper airway sounds, rhonchi  HEART: Regular rate and rhythm; No murmurs, rubs, or gallops  ABDOMEN: Soft, Nontender, +distended, somewhat tense  EXTREMITIES:  2+ Peripheral Pulses, No clubbing, cyanosis, or edema  SKIN: No rashes or lesions  NERVOUS SYSTEM:  aaox0, opens  eyes to name/command, squeezes hands, opens mouth, moves legs,  to command.                                                                    MEDICATIONS:  MEDICATIONS  (STANDING):  artificial  tears Solution 1 Drop(s) Both EYES three times a day  chlorhexidine 0.12% Liquid 15 milliLiter(s) Oral Mucosa every 12 hours  chlorhexidine 4% Liquid 1 Application(s) Topical <User Schedule>  dexMEDEtomidine Infusion 0.2 MICROgram(s)/kG/Hr (3.81 mL/Hr) IV Continuous <Continuous>  insulin lispro (ADMELOG) corrective regimen sliding scale   SubCutaneous every 6 hours  lactulose Syrup 30 Gram(s) Oral every 8 hours  levothyroxine Injectable 44 MICROGram(s) IV Push at bedtime  midodrine 20 milliGRAM(s) Oral every 8 hours  norepinephrine Infusion 0.03 MICROgram(s)/kG/Min (4.29 mL/Hr) IV Continuous <Continuous>  nystatin Powder 1 Application(s) Topical three times a day  pantoprazole  Injectable 40 milliGRAM(s) IV Push every 12 hours  petrolatum Ophthalmic Ointment 1 Application(s) Both EYES daily  piperacillin/tazobactam IVPB.. 3.375 Gram(s) IV Intermittent every 8 hours  polyethylene glycol 3350 17 Gram(s) Oral daily  rifAXIMin 550 milliGRAM(s) Oral two times a day  sucralfate 1 Gram(s) Oral every 6 hours    MEDICATIONS  (PRN):  albuterol/ipratropium for Nebulization 3 milliLiter(s) Nebulizer every 6 hours PRN Shortness of Breath and/or Wheezing      ALLERGIES:  Allergies    No Known Allergies    Intolerances        LABS:                        9.5    6.31  )-----------( 94       ( 05 Jun 2021 00:54 )             29.4     06-05    141  |  108  |  46<H>  ----------------------------<  233<H>  4.1   |  21<L>  |  1.88<H>    Ca    8.8      05 Jun 2021 00:54  Phos  3.3     06-05  Mg     1.9     06-05    TPro  5.1<L>  /  Alb  2.9<L>  /  TBili  2.1<H>  /  DBili  x   /  AST  22  /  ALT  18  /  AlkPhos  55  06-05          CAPILLARY BLOOD GLUCOSE      POCT Blood Glucose.: 263 mg/dL (05 Jun 2021 05:19)      RADIOLOGY & ADDITIONAL TESTS: Reviewed.

## 2021-06-05 NOTE — PROGRESS NOTE ADULT - ATTENDING COMMENTS
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 Initially admitted w/ decompensated cirrhosis, ANDREEA, esophageal varices requiring MICU admission and intubation. 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 Transferred to ICU for worsening AMS and hypoxic respiratory failure requiring intubation, in setting of large R pleural effusion and likely aspiration pna. Now s/p thoracentesis.     - can use precedex for weaning  - intubated for worsening mental status and hypoxic respiratory failure, now on minimal vent settings; SBT today   - s/p thoracentesis, removed 2.1 L orange colored fluid, transudative based on light's criteria, , cx NGTD; cyto negative, flow cytometry pending  - on low dose NE, likely due to vasoplegia, titrate to MAP >/65; continue w/ midodrine  - concern that he aspirated, as he was seen by speech and swallow and he has been aspirating; on zosyn to cover for pneumonia, plan for 5-7 days; all cx NGTD   - NGT in place, on TF; continue w/ rifaxamin and lactulose; not enough fluid for paracentesis so on hold for now; carafate and protonix for duodenal ulcer on recent EGD  - ANDREEA on CKD likely ATN related; d/c free water today as Na has normalized, UOP and electrolytes acceptable; continue to monitor  - noted to have thrombocytopenia likely related to liver disease  - FS improved, continue w/ ISS coverage  - SCDs  - GOC w/ family on going as we see if he can be extubated; prognosis very guarded

## 2021-06-05 NOTE — PROGRESS NOTE ADULT - ASSESSMENT
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  -Trend ammonia  -off ketamine, change to precedex    Cardiovascular:  -Hx of HF (EF 40-45%)  -Vasopressor support to maintain MAP > 65; on levo gtt  -VS [id number]  -Holding Aldactone, Lasix     Pulmonary: Hypoxemic Respiratory Failure in the setting of hepatic encephalopathy, aspiration, large right pleural effusion  -Continue Mechanical Ventilation with lung protective ventilation  -Titrate FIo2 to maintain SPO2 > 92, ABGs prn  -Broad spectrum abx; follow up ID for further recommendations  -Duoneb prn for wheezing    R pleural effusion  - s/p thora 6/2: elevated triglycerides to 120  -protein ratio < 0.5 -- suggests transudative  -pleural fluid LDH/serum LDH ratio (however serum LDH not at same time as thora) > 0.5  -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  -cytopath: no cancer    GI  -Hx of GIB: EGD 5/18 with small esophageal varices, large cratered duodenal ulcer   -Continue protonix ppi BID  -Hx of Cirrhosis  -s/p para 5/28 in IR (therapeutic drain with removal of 400cc cloudly pink fluid)  -Hepatology on board; f/u further recommendations  -Continue Latulose/Rifaximin for hepatic encephalopathy  -Continue Carafate as per hepatology      Feeds:  Vital TFs    Renal  -Intake and output per routine  -Trend Cr  -Avoid nephrotoxic agents  -Renally dose meds based on Gfr  -Follow up nephro recs    ID  -Pan cultureL bld cx x 2, UA, sputum cx, MRSA swab  -ABX: zosyn 6/1 - xx for emprici coverage for possible aspiration  -thora:  send fluid for gram stain, culture  -Broad spectrum abx for septic shock likely secondary to aspiration pna  -De-escalate abx based on senstitivities  -Follow up ID for further recommendations    Endo  -Continue synthroid  -ISS 16U over 6/2  -TF started 6/3  -Monitor blood glucose q6h    Heme  -Hgb stable (hx GIB), monitor daily  -Maintain active type and screen    Ppx  -Continue Protonix PPI BID  -PAS for DVT ppx    GOC  -Full Code     SENAIT JHAVERI is a 84yo Male with PMH HF (EF 40-45%), 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  - Off sedation (precedex) since 6/4 20:00  -awake, following commands     Cardiovascular:  -Hx of HF (EF 40-45%)  -Vasopressor support to maintain MAP > 65; on levo gtt  -VS [id number]  -Holding Aldactone, Lasix     Pulmonary: Hypoxemic Respiratory Failure in the setting of hepatic encephalopathy, aspiration, large right pleural effusion, intubated 6/2  -6/5 CPAPing well  -[  ] plan for likely extubation 6/6 AM  -Titrate FIo2 to maintain SPO2 > 92, ABGs  -Broad spectrum abx; follow up ID for further recommendations  -Duoneb prn for wheezing    R pleural effusion  - s/p thora 6/2: elevated triglycerides to 120  -protein ratio < 0.5 -- suggests transudative  -pleural fluid LDH/serum LDH ratio (however serum LDH not at same time as thora) > 0.5  -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  -cytopath: no cancer    GI  -Hx of GIB: EGD 5/18 with small esophageal varices, large cratered duodenal ulcer   -Continue protonix ppi BID  -Hx of Cirrhosis  -s/p para 5/28 in IR (therapeutic drain with removal of 400cc cloudly pink fluid)  -Hepatology on board; f/u further recommendations  -Continue Latulose/Rifaximin for hepatic encephalopathy  -Continue Carafate as per hepatology      Feeds:  Vital TFs    Renal  -Intake and output per routine  -Trend Cr  -Avoid nephrotoxic agents  -Renally dose meds based on Gfr  -Follow up nephro recs  -monitor for any signs of urinary retention  -DC free water as pt sodium corrected quickly w/ free water    ID  -Pan cultureL bld cx x 2, UA, sputum cx, MRSA swab  -ABX: zosyn 6/1 - xx x7d for emprici coverage for possible aspiration  -Broad spectrum abx for septic shock likely secondary to aspiration pna  -De-escalate abx based on senstitivities  -Follow up ID for further recommendations    Endo  -Continue synthroid  -ISS 16U over 6/2  -TF started 6/3  -Monitor blood glucose q6h    Heme  -Hgb stable (hx GIB), monitor daily  -Maintain active type and screen    Ppx  -Continue Protonix PPI BID  -PAS for DVT ppx    GOC  -Full Code

## 2021-06-06 NOTE — PROGRESS NOTE ADULT - ASSESSMENT
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  - Off sedation (precedex) since 6/4 20:00  -awake, following commands     Cardiovascular:  -Hx of HF (EF 40-45%)  -Vasopressor support to maintain MAP > 65; on levo gtt  -VS [id number]  -Holding Aldactone, Lasix     Pulmonary: Hypoxemic Respiratory Failure in the setting of hepatic encephalopathy, aspiration, large right pleural effusion, intubated 6/2  -6/5 CPAPing well  -[  ] plan for likely extubation 6/6 AM  -Titrate FIo2 to maintain SPO2 > 92, ABGs  -Broad spectrum abx; follow up ID for further recommendations  -Duoneb prn for wheezing    R pleural effusion  - s/p thora 6/2: elevated triglycerides to 120  -protein ratio < 0.5 -- suggests transudative  -pleural fluid LDH/serum LDH ratio (however serum LDH not at same time as thora) > 0.5  -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  -cytopath: no cancer    GI  -Hx of GIB: EGD 5/18 with small esophageal varices, large cratered duodenal ulcer   -Continue protonix ppi BID  -Hx of Cirrhosis  -s/p para 5/28 in IR (therapeutic drain with removal of 400cc cloudly pink fluid)  -Hepatology on board; f/u further recommendations  -Continue Latulose/Rifaximin for hepatic encephalopathy  -Continue Carafate as per hepatology      Feeds:  Vital TFs    Renal  -Intake and output per routine  -Trend Cr  -Avoid nephrotoxic agents  -Renally dose meds based on Gfr  -Follow up nephro recs  -monitor for any signs of urinary retention  -DC free water as pt sodium corrected quickly w/ free water    ID  -Pan cultureL bld cx x 2, UA, sputum cx, MRSA swab  -ABX: zosyn 6/1 - xx x7d for emprici coverage for possible aspiration  -Broad spectrum abx for septic shock likely secondary to aspiration pna  -De-escalate abx based on senstitivities  -Follow up ID for further recommendations    Endo  -Continue synthroid  -ISS 16U over 6/2  -TF started 6/3  -Monitor blood glucose q6h    Heme  -Hgb stable (hx GIB), monitor daily  -Maintain active type and screen    Ppx  -Continue Protonix PPI BID  -PAS for DVT ppx    GOC  -Full Code     SENAIT JHAVERI is a 84yo Male with PMH HF (EF 40-45%), 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  -cytopath: no cancer    GI  -Hx of GIB: EGD 5/18 with small esophageal varices, large cratered duodenal ulcer   -Continue protonix ppi BID  -Hx of Cirrhosis  -s/p para 5/28 in IR (therapeutic drain with removal of 400cc cloudly pink fluid)  -Hepatology on board; f/u further recommendations  -Continue Latulose/Rifaximin for hepatic encephalopathy  -Continue Carafate as per hepatology      Feeds:  Vital TFs    Renal  -Intake and output per routine  -Trend Cr  -Avoid nephrotoxic agents  -Renally dose meds based on Gfr  -Follow up nephro recs  -monitor for any signs of urinary retention  -DC free water as pt sodium corrected quickly w/ free water    ID  -Pan cultureL bld cx x 2, UA, sputum cx, MRSA swab  -ABX: zosyn 6/1 - xx x7d for emprici coverage for possible aspiration  -Broad spectrum abx for septic shock likely secondary to aspiration pna  -De-escalate abx based on senstitivities  -Follow up ID for further recommendations    Endo  -Continue synthroid  -ISS 16U over 6/2  -TF started 6/3  -Monitor blood glucose q6h    Heme  -Hgb stable (hx GIB), monitor daily  -Maintain active type and screen    Ppx  -Continue Protonix PPI BID  -PAS for DVT ppx    GOC  -Full Code     SENAIT JHAVERI is a 84yo Male with PMH HF (EF 40-45%), 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 Can probably extubate afterwards   -Titrate FIo2 to maintain SPO2 > 92, ABGs  -Broad spectrum abx; zosyn. 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  -cytopath: no cancer    GI  -Hx of GIB: EGD 5/18 with small esophageal varices, large cratered duodenal ulcer   -Continue protonix ppi BID  -Hx of Cirrhosis  -s/p para 5/28 in IR (therapeutic drain with removal of 400cc cloudly pink fluid)  - POCUS on 6/6 showing recollection of ascites - will try for another paracentesis today  -Hepatology on board; f/u further recommendations  -Continue Lactulose/Rifaximin for hepatic encephalopathy  -Continue Carafate as per hepatology      Feeds:  Vital TFs    Renal  -Intake and output per routine  -Trend Cr  -Avoid nephrotoxic agents  -Renally dose meds based on Gfr  -Follow up nephro recs  -monitor for any signs of urinary retention. POCUS showed no evidence of obstruction.  -Interval increase in Cr, will diurese with 40mg IV lasix    ID  -Pan culture bld cx x 2, UA, sputum cx, MRSA swab  -ABX: zosyn 6/1 - 6/7 for 7d for empiric coverage for possible aspiration  -Broad spectrum abx for septic shock likely secondary to aspiration pna  -De-escalate abx based on sensitivities  -Follow up ID for further recommendations    Endo  -Continue synthroid  -ISS 20u over 6/5  -TF started 6/3  -Monitor blood glucose q6h  - Start NPH 5u q6h  - c/w moderate ISS    Heme  -Hgb stable (hx GIB), monitor daily  -Maintain active type and screen    Ppx  -Continue Protonix PPI BID  -PAS for DVT ppx    GOC  -Full Code

## 2021-06-06 NOTE — PROGRESS NOTE ADULT - SUBJECTIVE AND OBJECTIVE BOX
Kenn Renner MD  PGY-1 | Internal Medicine  [facility] [address]/ [facility] [phone]    INTERVAL HPI/OVERNIGHT EVENTS:  Placed back on full vent support overnight after CPAP all day  Given albumin 5% and started on midodrine    SUBJECTIVE: Patient seen and examined at bedside.     CONSTITUTIONAL: No weakness, fevers or chills  EYES/ENT: No visual changes;  No vertigo or throat pain   NECK: No pain or stiffness  RESPIRATORY: No cough, wheezing, hemoptysis; No shortness of breath  CARDIOVASCULAR: No chest pain or palpitations  GASTROINTESTINAL: No abdominal or epigastric pain. No nausea, vomiting, or hematemesis; No diarrhea or constipation. No melena or hematochezia.  GENITOURINARY: No dysuria, frequency or hematuria  NEUROLOGICAL: No numbness or weakness  SKIN: No itching, rashes    OBJECTIVE:    VITAL SIGNS:  ICU Vital Signs Last 24 Hrs  T(C): 36.4 (06 Jun 2021 04:00), Max: 36.6 (05 Jun 2021 23:00)  T(F): 97.6 (06 Jun 2021 04:00), Max: 97.9 (05 Jun 2021 23:00)  HR: 80 (06 Jun 2021 07:00) (65 - 87)  BP: 97/53 (06 Jun 2021 07:00) (79/49 - 122/59)  BP(mean): 72 (06 Jun 2021 07:00) (59 - 93)  ABP: --  ABP(mean): --  RR: 15 (06 Jun 2021 07:00) (9 - 20)  SpO2: 100% (06 Jun 2021 07:00) (77% - 100%)    Mode: AC/ CMV (Assist Control/ Continuous Mandatory Ventilation), RR (machine): 12, TV (machine): 500, FiO2: 30, PEEP: 5, ITime: 1, MAP: 14, PIP: 32    06-05 @ 07:01  -  06-06 @ 07:00  --------------------------------------------------------  IN: 1979 mL / OUT: 60 mL / NET: 1919 mL      CAPILLARY BLOOD GLUCOSE      POCT Blood Glucose.: 276 mg/dL (06 Jun 2021 05:06)      PHYSICAL EXAM:    General: NAD  HEENT: NC/AT; PERRL, clear conjunctiva  Neck: supple  Respiratory: CTA b/l  Cardiovascular: +S1/S2; RRR  Abdomen: soft, NT/ND; +BS x4  Extremities: WWP, 2+ peripheral pulses b/l; no LE edema  Skin: normal color and turgor; no rash  Neurological:    MEDICATIONS:  MEDICATIONS  (STANDING):  artificial  tears Solution 1 Drop(s) Both EYES three times a day  chlorhexidine 0.12% Liquid 15 milliLiter(s) Oral Mucosa every 12 hours  chlorhexidine 4% Liquid 1 Application(s) Topical <User Schedule>  insulin lispro (ADMELOG) corrective regimen sliding scale   SubCutaneous every 6 hours  lactulose Syrup 30 Gram(s) Oral every 8 hours  levothyroxine Injectable 44 MICROGram(s) IV Push at bedtime  midodrine 20 milliGRAM(s) Oral every 8 hours  norepinephrine Infusion 0.03 MICROgram(s)/kG/Min (4.29 mL/Hr) IV Continuous <Continuous>  nystatin Powder 1 Application(s) Topical three times a day  pantoprazole  Injectable 40 milliGRAM(s) IV Push every 12 hours  petrolatum Ophthalmic Ointment 1 Application(s) Both EYES daily  piperacillin/tazobactam IVPB.. 3.375 Gram(s) IV Intermittent every 8 hours  rifAXIMin 550 milliGRAM(s) Oral two times a day  sucralfate suspension 1 Gram(s) Oral every 6 hours    MEDICATIONS  (PRN):  albuterol/ipratropium for Nebulization 3 milliLiter(s) Nebulizer every 6 hours PRN Shortness of Breath and/or Wheezing      ALLERGIES:  Allergies    No Known Allergies    Intolerances        LABS:                        9.1    6.90  )-----------( 108      ( 06 Jun 2021 00:28 )             28.7     06-06    140  |  106  |  57<H>  ----------------------------<  230<H>  3.9   |  20<L>  |  2.60<H>    Ca    8.7      06 Jun 2021 00:28  Phos  3.3     06-05  Mg     1.9     06-05    TPro  5.3<L>  /  Alb  2.9<L>  /  TBili  1.6<H>  /  DBili  x   /  AST  23  /  ALT  18  /  AlkPhos  57  06-06    PT/INR - ( 06 Jun 2021 00:28 )   PT: 16.0 sec;   INR: 1.35 ratio         PTT - ( 06 Jun 2021 00:28 )  PTT:43.7 sec      RADIOLOGY & ADDITIONAL TESTS: Reviewed. Kenn Renner MD  PGY-1 | Internal Medicine  [facility] [address]/ [facility] [phone]    INTERVAL HPI/OVERNIGHT EVENTS:  Placed back on full vent support overnight after CPAP all day  Given albumin 5% and started on midodrine    SUBJECTIVE: Patient seen and examined at bedside.   Patient arousable and follows commands     ROS not obtained since pt intubated     OBJECTIVE:    VITAL SIGNS:  ICU Vital Signs Last 24 Hrs  T(C): 36.4 (06 Jun 2021 04:00), Max: 36.6 (05 Jun 2021 23:00)  T(F): 97.6 (06 Jun 2021 04:00), Max: 97.9 (05 Jun 2021 23:00)  HR: 80 (06 Jun 2021 07:00) (65 - 87)  BP: 97/53 (06 Jun 2021 07:00) (79/49 - 122/59)  BP(mean): 72 (06 Jun 2021 07:00) (59 - 93)  ABP: --  ABP(mean): --  RR: 15 (06 Jun 2021 07:00) (9 - 20)  SpO2: 100% (06 Jun 2021 07:00) (77% - 100%)    Mode: AC/ CMV (Assist Control/ Continuous Mandatory Ventilation), RR (machine): 12, TV (machine): 500, FiO2: 30, PEEP: 5, ITime: 1, MAP: 14, PIP: 32    06-05 @ 07:01  -  06-06 @ 07:00  --------------------------------------------------------  IN: 1979 mL / OUT: 60 mL / NET: 1919 mL      CAPILLARY BLOOD GLUCOSE      POCT Blood Glucose.: 276 mg/dL (06 Jun 2021 05:06)      PHYSICAL EXAM:    General: NAD  HEENT: NC/AT; PERRL, clear conjunctiva  Neck: supple  Respiratory: CTA b/l  Cardiovascular: +S1/S2; RRR  Abdomen: firm, distended, NT/ND; +BS x4  Extremities: WWP, 2+ peripheral pulses b/l; 2+ LE edema up to knees b/l  Skin: normal color and turgor; no rash  Neurological: awake and alert, moves all extremities independently    MEDICATIONS:  MEDICATIONS  (STANDING):  artificial  tears Solution 1 Drop(s) Both EYES three times a day  chlorhexidine 0.12% Liquid 15 milliLiter(s) Oral Mucosa every 12 hours  chlorhexidine 4% Liquid 1 Application(s) Topical <User Schedule>  insulin lispro (ADMELOG) corrective regimen sliding scale   SubCutaneous every 6 hours  lactulose Syrup 30 Gram(s) Oral every 8 hours  levothyroxine Injectable 44 MICROGram(s) IV Push at bedtime  midodrine 20 milliGRAM(s) Oral every 8 hours  norepinephrine Infusion 0.03 MICROgram(s)/kG/Min (4.29 mL/Hr) IV Continuous <Continuous>  nystatin Powder 1 Application(s) Topical three times a day  pantoprazole  Injectable 40 milliGRAM(s) IV Push every 12 hours  petrolatum Ophthalmic Ointment 1 Application(s) Both EYES daily  piperacillin/tazobactam IVPB.. 3.375 Gram(s) IV Intermittent every 8 hours  rifAXIMin 550 milliGRAM(s) Oral two times a day  sucralfate suspension 1 Gram(s) Oral every 6 hours    MEDICATIONS  (PRN):  albuterol/ipratropium for Nebulization 3 milliLiter(s) Nebulizer every 6 hours PRN Shortness of Breath and/or Wheezing      ALLERGIES:  Allergies    No Known Allergies    Intolerances        LABS:                        9.1    6.90  )-----------( 108      ( 06 Jun 2021 00:28 )             28.7     06-06    140  |  106  |  57<H>  ----------------------------<  230<H>  3.9   |  20<L>  |  2.60<H>    Ca    8.7      06 Jun 2021 00:28  Phos  3.3     06-05  Mg     1.9     06-05    TPro  5.3<L>  /  Alb  2.9<L>  /  TBili  1.6<H>  /  DBili  x   /  AST  23  /  ALT  18  /  AlkPhos  57  06-06    PT/INR - ( 06 Jun 2021 00:28 )   PT: 16.0 sec;   INR: 1.35 ratio         PTT - ( 06 Jun 2021 00:28 )  PTT:43.7 sec      RADIOLOGY & ADDITIONAL TESTS: Reviewed.

## 2021-06-06 NOTE — PROGRESS NOTE ADULT - ATTENDING COMMENTS
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 Initially admitted w/ decompensated cirrhosis, ANDREEA, esophageal varices requiring MICU admission and intubation. 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 Transferred to ICU for worsening AMS and hypoxic respiratory failure requiring intubation, in setting of large R pleural effusion and likely aspiration pna. Now s/p thoracentesis. Has slowly been waking up and weaning.     - can use precedex for weaning, mental status improving  - intubated for worsening mental status and hypoxic respiratory failure, now on minimal vent settings; doing well on PS weaning  - s/p thoracentesis, removed 2.1 L orange colored fluid, transudative based on light's criteria, , cx NGTD; cyto negative, flow cytometry pending; POCUS today showing re-accumulation of pleural fluid, will likely need repeat thoracentesis  - rewmains on low dose NE, likely due to vasoplegia, titrate to MAP >/65; continue w/ midodrine  - concern that he aspirated, as he was seen by speech and swallow and he has been aspirating; on zosyn to cover for pneumonia, plan for 5-7 days; all cx NGTD   - NGT in place, on TF; continue w/ rifaxamin and lactulose; POCUS shows more ascites, will plan for paracenteis today; carafate and protonix for duodenal ulcer on recent EGD  - ANDREEA on CKD likely ATN related, increased today for unclear reasons ? due to increasing ascites; check blader pressure; give lasix 40 mg IVx1  - noted to have thrombocytopenia likely related to liver disease  - FS increasing, start NPH, continue w/ ISS coverage  - SCDs  - GOC w/ family on going as we see if he can be extubated; prognosis very guarded.

## 2021-06-07 NOTE — PROGRESS NOTE ADULT - ATTENDING COMMENTS
1. Acute hypoxemic respiratory failure due hepatic encephalopathy and possible aspiration pneumonia. Pt tolerating PS weaning trials. Pt with 4 liter paracentesis yesterday. Pt with recurrent hydrothorax , pleural effusion  on R side.  Pt tolerating weaning with pleural effusion. Will hold off on thoracentesis. Trial of extubation.  2. Renal. ANDREEA from ATN.  Creatinine rising. baseline CKD3. Trial of albumin.  Pt had 4 liter paracentesis yesterday.  May need Bumex drip in addition.   3. Decompensated liver cirrhosis. Continue lactulose and rifaximin.  4. ID. Finish Zosyn dose for aspiration pneumonia tomorrow.  5. Hypotension. Resolved on midodrine 20mg tid.  6. Needs GOC care discussion with family  and patient.

## 2021-06-07 NOTE — PROGRESS NOTE ADULT - ASSESSMENT
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 Can probably extubate afterwards   -Titrate FIo2 to maintain SPO2 > 92, ABGs  -Broad spectrum abx; zosyn. 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  -cytopath: no cancer    GI  -Hx of GIB: EGD 5/18 with small esophageal varices, large cratered duodenal ulcer   -Continue protonix ppi BID  -Hx of Cirrhosis  -s/p para 5/28 in IR (therapeutic drain with removal of 400cc cloudly pink fluid)  - POCUS on 6/6 showing recollection of ascites - will try for another paracentesis today  -Hepatology on board; f/u further recommendations  -Continue Lactulose/Rifaximin for hepatic encephalopathy  -Continue Carafate as per hepatology      Feeds:  Vital TFs    Renal  -Intake and output per routine  -Trend Cr  -Avoid nephrotoxic agents  -Renally dose meds based on Gfr  -Follow up nephro recs  -monitor for any signs of urinary retention. POCUS showed no evidence of obstruction.  -Interval increase in Cr, will diurese with 40mg IV lasix    ID  -Pan culture bld cx x 2, UA, sputum cx, MRSA swab  -ABX: zosyn 6/1 - 6/7 for 7d for empiric coverage for possible aspiration  -Broad spectrum abx for septic shock likely secondary to aspiration pna  -De-escalate abx based on sensitivities  -Follow up ID for further recommendations    Endo  -Continue synthroid  -ISS 20u over 6/5  -TF started 6/3  -Monitor blood glucose q6h  - Start NPH 5u q6h  - c/w moderate ISS    Heme  -Hgb stable (hx GIB), monitor daily  -Maintain active type and screen    Ppx  -Continue Protonix PPI BID  -PAS for DVT ppx    GOC  -Full Code     SENAIT JHAVERI is a 84yo Male with PMH HF (EF 40-45%), 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  Now readmitted to MICU for hypoxemic respiratory failure requiring intubation in the setting of worsening hepatic encephalopathy, aspiration, and large right pleural effusion, s/p thora 6/2, plan for extubation today.      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  - Off sedation (precedex) since 6/4 20:00  -awake, following commands     Cardiovascular:  -Hx of HF (EF 40-45%), AICD  - Continue to wean levophed, currently on minimal dose  - Continue with midodrine 20mg TID to wean levo  -Holding Aldactone, Lasix     Pulmonary: Hypoxemic Respiratory Failure in the setting of hepatic encephalopathy, aspiration, large right pleural effusion, intubated 6/2  -CPAPing well this AM 6/7 -- [  ]extubate today  -Titrate FIo2 to maintain SPO2 > 92, ABGs  -Broad spectrum abx; zosyn for presumed aspiration PNA Complete on 6/8  -Duoneb prn for wheezing    R pleural effusion  - s/p thora 6/2: elevated triglycerides to 120  -protein ratio < 0.5 -- suggests transudative  -pleural fluid LDH/serum LDH ratio (however serum LDH not at same time as thora) > 0.5  -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  -cytopath: no cancer    GI  -Hx of GIB: EGD 5/18 with small esophageal varices, large cratered duodenal ulcer   -Continue protonix ppi BID  -Hx of Cirrhosis  -s/p para 5/28 in IR (therapeutic drain with removal of 400cc cloudly pink fluid)  - POCUS on 6/6 showing recollection of ascites - will try for another paracentesis today  -Hepatology on board; f/u further recommendations  -Continue Lactulose/Rifaximin for hepatic encephalopathy  -Continue Carafate as per hepatology      Feeds:  Vital TFs    Renal  -Cr worsening  -Intake and output q1  -Trend Cr  -Avoid nephrotoxic agents  -Renally dose meds based on Gfr -- zosyn redosed  -Follow up nephro recs  -monitor for any signs of urinary retention. POCUS showed no evidence of obstruction.  -6/7 albumin  [ ] monitor UOP    ID  -Pan culture bld cx x 2, UA, sputum cx, MRSA swab  -ABX: zosyn 6/1 - 6/8 for 7d for empiric coverage for possible aspiration  -Broad spectrum abx for septic shock likely secondary to aspiration pna  -De-escalate abx based on sensitivities  -Follow up ID for further recommendations    Endo  -Continue synthroid  -ISS 20u over 6/5  -TF started 6/3  -Monitor blood glucose q6h  - Start NPH 5u q6h  - c/w moderate ISS    Heme  -Hgb slightly dropped 6/7  -Maintain active type and screen    Ppx  -Continue Protonix PPI BID  -PAS for DVT ppx    GOC  -Full Code

## 2021-06-07 NOTE — PROGRESS NOTE ADULT - SUBJECTIVE AND OBJECTIVE BOX
MICU PROGRESS NOTE    INTERVAL HPI/OVERNIGHT EVENTS:  Yesterday had para with 4.2 L drainage.    SUBJECTIVE:     OBJECTIVE:    VITAL SIGNS:  ICU Vital Signs Last 24 Hrs  T(C): 37.1 (07 Jun 2021 04:00), Max: 37.2 (06 Jun 2021 16:00)  T(F): 98.8 (07 Jun 2021 04:00), Max: 99 (06 Jun 2021 16:00)  HR: 70 (07 Jun 2021 07:15) (70 - 91)  BP: 107/55 (07 Jun 2021 07:15) (81/49 - 152/66)  BP(mean): 75 (07 Jun 2021 07:15) (61 - 95)  ABP: --  ABP(mean): --  RR: 15 (07 Jun 2021 07:15) (12 - 31)  SpO2: 100% (07 Jun 2021 07:15) (91% - 100%)      VENTS:  Mode: CPAP with PS, FiO2: 30, PEEP: 5, PS: 5, MAP: 8, PIP: 11    I&O:    06-06 @ 07:01  -  06-07 @ 07:00  --------------------------------------------------------  IN: 2034 mL / OUT: 35 mL / NET: 1999 mL        PHYSICAL EXAM:  GENERAL: NAD, conversant  CHEST/LUNG: Clear to auscultation bilaterally; No crackles, rhonchi, wheezing, or rubs  HEART: Regular rate and rhythm; No murmurs, rubs, or gallops  ABDOMEN: Soft, Nontender, Nondistended; Bowel sounds present  EXTREMITIES:  2+ Peripheral Pulses, No clubbing, cyanosis, or edema  SKIN: No rashes or lesions  NERVOUS SYSTEM:  Alert & Oriented, Good concentration      LINES:                                       MEDICATIONS:  MEDICATIONS  (STANDING):  artificial  tears Solution 1 Drop(s) Both EYES three times a day  chlorhexidine 4% Liquid 1 Application(s) Topical <User Schedule>  insulin lispro (ADMELOG) corrective regimen sliding scale   SubCutaneous every 6 hours  insulin NPH human recombinant 5 Unit(s) SubCutaneous every 6 hours  lactulose Syrup 30 Gram(s) Oral every 8 hours  levothyroxine Injectable 44 MICROGram(s) IV Push at bedtime  midodrine 20 milliGRAM(s) Oral every 8 hours  norepinephrine Infusion 0.03 MICROgram(s)/kG/Min (4.29 mL/Hr) IV Continuous <Continuous>  nystatin Powder 1 Application(s) Topical three times a day  pantoprazole  Injectable 40 milliGRAM(s) IV Push every 12 hours  petrolatum Ophthalmic Ointment 1 Application(s) Both EYES daily  piperacillin/tazobactam IVPB.. 3.375 Gram(s) IV Intermittent every 8 hours  rifAXIMin 550 milliGRAM(s) Oral two times a day  sucralfate suspension 1 Gram(s) Oral every 6 hours    MEDICATIONS  (PRN):  albuterol/ipratropium for Nebulization 3 milliLiter(s) Nebulizer every 6 hours PRN Shortness of Breath and/or Wheezing      ALLERGIES:  Allergies    No Known Allergies    Intolerances        LABS:                        8.2    5.01  )-----------( 83       ( 07 Jun 2021 00:31 )             25.4     06-07    141  |  105  |  74<H>  ----------------------------<  270<H>  4.0   |  18<L>  |  3.47<H>    Ca    9.2      07 Jun 2021 00:31    TPro  5.6<L>  /  Alb  3.3  /  TBili  1.3<H>  /  DBili  x   /  AST  23  /  ALT  15  /  AlkPhos  53  06-07    PT/INR - ( 07 Jun 2021 00:31 )   PT: 17.8 sec;   INR: 1.51 ratio         PTT - ( 07 Jun 2021 00:31 )  PTT:46.9 sec      Culture - Body Fluid with Gram Stain (collected 06-06-21 @ 18:23)  Source: .Body Fluid Peritoneal Fluid  Gram Stain (06-06-21 @ 22:22):    polymorphonuclear leukocytes seen    No organisms seen    by cytocentrifuge      CAPILLARY BLOOD GLUCOSE      POCT Blood Glucose.: 228 mg/dL (07 Jun 2021 06:02)      RADIOLOGY & ADDITIONAL TESTS: Reviewed. MICU PROGRESS NOTE    INTERVAL HPI/OVERNIGHT EVENTS:  Yesterday had para with 4.2 L drainage.    SUBJECTIVE:   Endorses discomfort with ET tube.     OBJECTIVE: CPAPing well since this AM.  Follows commands squeezing hands, opening eyes.    VITAL SIGNS:  ICU Vital Signs Last 24 Hrs  T(C): 37.1 (07 Jun 2021 04:00), Max: 37.2 (06 Jun 2021 16:00)  T(F): 98.8 (07 Jun 2021 04:00), Max: 99 (06 Jun 2021 16:00)  HR: 70 (07 Jun 2021 07:15) (70 - 91)  BP: 107/55 (07 Jun 2021 07:15) (81/49 - 152/66)  BP(mean): 75 (07 Jun 2021 07:15) (61 - 95)  ABP: --  ABP(mean): --  RR: 15 (07 Jun 2021 07:15) (12 - 31)  SpO2: 100% (07 Jun 2021 07:15) (91% - 100%)      VENTS:  Mode: CPAP with PS, FiO2: 30, PEEP: 5, PS: 5, MAP: 8, PIP: 11    I&O:    06-06 @ 07:01  -  06-07 @ 07:00  --------------------------------------------------------  IN: 2034 mL / OUT: 35 mL / NET: 1999 mL        PHYSICAL EXAM:  GENERAL: intubated  HEENT: ngt  CHEST/LUNG: Clear to auscultation bilaterally;  +intubated   HEART: Regular rate and rhythm; No murmurs, rubs, or gallops  ABDOMEN: Soft, Nontender, +distended, somewhat tense  EXTREMITIES:  pitting edema of b/l UE and LE  SKIN: No rashes or lesions  NERVOUS SYSTEM:  aaox0, opens  eyes to name/command, squeezes hands, opens mouth, moves legs,  to command.                                   MEDICATIONS:  MEDICATIONS  (STANDING):  artificial  tears Solution 1 Drop(s) Both EYES three times a day  chlorhexidine 4% Liquid 1 Application(s) Topical <User Schedule>  insulin lispro (ADMELOG) corrective regimen sliding scale   SubCutaneous every 6 hours  insulin NPH human recombinant 5 Unit(s) SubCutaneous every 6 hours  lactulose Syrup 30 Gram(s) Oral every 8 hours  levothyroxine Injectable 44 MICROGram(s) IV Push at bedtime  midodrine 20 milliGRAM(s) Oral every 8 hours  norepinephrine Infusion 0.03 MICROgram(s)/kG/Min (4.29 mL/Hr) IV Continuous <Continuous>  nystatin Powder 1 Application(s) Topical three times a day  pantoprazole  Injectable 40 milliGRAM(s) IV Push every 12 hours  petrolatum Ophthalmic Ointment 1 Application(s) Both EYES daily  piperacillin/tazobactam IVPB.. 3.375 Gram(s) IV Intermittent every 8 hours  rifAXIMin 550 milliGRAM(s) Oral two times a day  sucralfate suspension 1 Gram(s) Oral every 6 hours    MEDICATIONS  (PRN):  albuterol/ipratropium for Nebulization 3 milliLiter(s) Nebulizer every 6 hours PRN Shortness of Breath and/or Wheezing      ALLERGIES:  Allergies    No Known Allergies    Intolerances        LABS:                        8.2    5.01  )-----------( 83       ( 07 Jun 2021 00:31 )             25.4     06-07    141  |  105  |  74<H>  ----------------------------<  270<H>  4.0   |  18<L>  |  3.47<H>    Ca    9.2      07 Jun 2021 00:31    TPro  5.6<L>  /  Alb  3.3  /  TBili  1.3<H>  /  DBili  x   /  AST  23  /  ALT  15  /  AlkPhos  53  06-07    PT/INR - ( 07 Jun 2021 00:31 )   PT: 17.8 sec;   INR: 1.51 ratio         PTT - ( 07 Jun 2021 00:31 )  PTT:46.9 sec      Culture - Body Fluid with Gram Stain (collected 06-06-21 @ 18:23)  Source: .Body Fluid Peritoneal Fluid  Gram Stain (06-06-21 @ 22:22):    polymorphonuclear leukocytes seen    No organisms seen    by cytocentrifuge      CAPILLARY BLOOD GLUCOSE      POCT Blood Glucose.: 228 mg/dL (07 Jun 2021 06:02)      RADIOLOGY & ADDITIONAL TESTS: Reviewed.

## 2021-06-07 NOTE — PROGRESS NOTE ADULT - ASSESSMENT
85M PMHx CKD, cirrhosis (?viral hepatitis) - admitted for decompensated cirrhosis.  Nephrology consulted for hyponatremia and ANDREEA on CKD.  Was intubated and transferred to MICU for septic shock in setting of aspiration PNA.    # ANDREEA  Pt with non-oliguric ANDREEA on CKD likely 2/2 ATN in the setting hypotension, entresto/lasix, acute anemia and decreased EABV. Now with new ANDREEA in setting of hypotension. Now in ATN. On admission sCr 2.0 (baseline sCr 1.4-1.6 in Jan 2019), peaked at 2.7mg/dl, improved to 1.2mg/dl. Now Scr increased to 3.47 mg/dl today. Currently oliguric.   - Got 1 dose of Lasix 40 mg IVP on 6/7/21 with minimal urine output  - Trial of Bumex drip if target for net even or net negative   - BP optimization/antibiotic/blood transfusion per ICU team  - monitor BMP, strict I/O, avoid nephrotoxic agents (NSAIDs, PPI, contrast), renally dose medications per GFR.    # Hypernatremia  Resolved   S/p free water and D5W   Last Na 141  Monitor Na    If any questions, please feel free to contact me     Edwardo Davis  Nephrology Fellow  [facility] Pager: [phone]  [facility] Pager: [phone]

## 2021-06-07 NOTE — PROGRESS NOTE ADULT - ATTENDING COMMENTS
Encephalopathy and respiratory failure, ICU admission and intubation  ANDREEA/ATN - worsening  CKD III    No indication for kidney replacement therapy, but may require it soon if consistent with goals of care  Receiving albumin  Maintain MAP >65  Poor prognosis  Remainder per fellow, will follow

## 2021-06-08 NOTE — PROGRESS NOTE ADULT - ASSESSMENT
85M PMHx CKD, cirrhosis (?viral hepatitis) - admitted for decompensated cirrhosis.  Nephrology consulted for hyponatremia and ANDREEA on CKD.  Was intubated and transferred to MICU for septic shock in setting of aspiration PNA.    # ANDREEA  Pt with non-oliguric ANDREEA on CKD likely 2/2 ATN in the setting hypotension, entresto/lasix, acute anemia and decreased EABV. Now with new ANDREEA in setting of hypotension, and pre-renal etiology. Now in ATN. On admission sCr 2.0 (baseline sCr 1.4-1.6 in Jan 2019), peaked at 2.7mg/dl, improved to 1.2mg/dl. Now Scr increased to 4.61 mg/dl today. Currently anuric.  - Currently on Bumex drip at 4 mg/hr  - Suggest to also flush reyes to ensure that there's no clotting as now he has hematuria   - Will discuss with ICU regarding need for renal replacement therapy, depending on GOC by family and prognosis   - BP optimization/antibiotic/blood transfusion per ICU team  - monitor BMP, strict I/O, avoid nephrotoxic agents (NSAIDs, PPI, contrast), renally dose medications per GFR.    # Hypernatremia  Resolved   S/p free water and D5W   Last Na 142  Monitor Na    If any questions, please feel free to contact me     Edwardo Davis  Nephrology Fellow  [facility] Pager: [phone]  [facility] Pager: [phone]

## 2021-06-08 NOTE — PROGRESS NOTE ADULT - ASSESSMENT
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  Now readmitted to MICU for hypoxemic respiratory failure requiring intubation in the setting of worsening hepatic encephalopathy, aspiration, and large right pleural effusion, s/p thora 6/2, plan for extubation today.      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  - Off sedation (precedex) since 6/4 20:00  -awake, following commands     Cardiovascular:  -Hx of HF (EF 40-45%), AICD  - Continue to wean levophed, currently on minimal dose  - Continue with midodrine 20mg TID to wean levo  -Holding Aldactone, Lasix     Pulmonary: Hypoxemic Respiratory Failure in the setting of hepatic encephalopathy, aspiration, large right pleural effusion, intubated 6/2  -CPAPing well this AM 6/7 -- [  ]extubate today  -Titrate FIo2 to maintain SPO2 > 92, ABGs  -Broad spectrum abx; zosyn for presumed aspiration PNA Complete on 6/8  -Duoneb prn for wheezing    R pleural effusion  - s/p thora 6/2: elevated triglycerides to 120  -protein ratio < 0.5 -- suggests transudative  -pleural fluid LDH/serum LDH ratio (however serum LDH not at same time as thora) > 0.5  -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  -cytopath: no cancer    GI  -Hx of GIB: EGD 5/18 with small esophageal varices, large cratered duodenal ulcer   -Continue protonix ppi BID  -Hx of Cirrhosis  -s/p para 5/28 in IR (therapeutic drain with removal of 400cc cloudly pink fluid)  - POCUS on 6/6 showing recollection of ascites - will try for another paracentesis today  -Hepatology on board; f/u further recommendations  -Continue Lactulose/Rifaximin for hepatic encephalopathy  -Continue Carafate as per hepatology      Feeds:  Vital TFs    Renal  -Cr worsening  -Intake and output q1  -Trend Cr  -Avoid nephrotoxic agents  -Renally dose meds based on Gfr -- zosyn redosed  -Follow up nephro recs  -monitor for any signs of urinary retention. POCUS showed no evidence of obstruction.  -6/7 albumin  [ ] monitor UOP    ID  -Pan culture bld cx x 2, UA, sputum cx, MRSA swab  -ABX: zosyn 6/1 - 6/8 for 7d for empiric coverage for possible aspiration  -Broad spectrum abx for septic shock likely secondary to aspiration pna  -De-escalate abx based on sensitivities  -Follow up ID for further recommendations    Endo  -Continue synthroid  -ISS 20u over 6/5  -TF started 6/3  -Monitor blood glucose q6h  - Start NPH 5u q6h  - c/w moderate ISS    Heme  -Hgb slightly dropped 6/7  -Maintain active type and screen    Ppx  -Continue Protonix PPI BID  -PAS for DVT ppx    GOC  -Full Code     SENAIT JHAVERI is a 86yo Male with PMH HF (EF 40-45%), 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  Now readmitted to MICU for hypoxemic respiratory failure requiring intubation in the setting of worsening hepatic encephalopathy, aspiration, and large right pleural effusion, s/p thora 6/2,s/p extubation 6/7, now with acute renal failure, suspect ATN.      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  - Off sedation  -awake, following commands     Cardiovascular:  -Hx of HF (EF 40-45%), AICD  - Continue to wean levophed, currently off levophed, but intermittently needs levo  - Continue with midodrine 20mg TID to wean levo  -Holding Aldactone, Lasix     Pulmonary: Hypoxemic Respiratory Failure in the setting of hepatic encephalopathy, aspiration, large right pleural effusion, intubated 6/2, extubated 6/7  -Titrate FIo2 to maintain SPO2 > 92, ABGs  -Broad spectrum abx; zosyn for presumed aspiration PNA Complete on 6/8  -Duoneb prn for wheezing  -Patient likely aspirating    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  - s/p thora #2 6/2: elevated triglycerides to 120  -protein ratio < 0.5 -- suggests transudative  -pleural fluid LDH/serum LDH ratio (however serum LDH not at same time as thora) > 0.5  -cytopath: no cancer    GI  -Hx of GIB: EGD 5/18 with small esophageal varices, large cratered duodenal ulcer   -Continue protonix ppi BID  -Hx of Cirrhosis  -s/p para 5/28 in IR (therapeutic drain with removal of 400cc cloudly pink fluid)  - POCUS on 6/6 showing recollection of ascites - will try for another paracentesis today  -Hepatology on board; f/u further recommendations  -Continue Lactulose/Rifaximin for hepatic encephalopathy, titrate 2 BM daily  -Continue Carafate as per hepatology      Feeds:  Vital TFs    Renal    #Acute renal failure, likely ATN i/s/o 4.9L para on 6/6  -Intake and output q1  -Renally dose meds based on Gfr -- zosyn redosed  -6/7:s/p reyes place 6/7 -pt with enlarged prostate on CT  -s/p  bumex  gtt on 6/7 -- with minimal UOP --> discontinue  -6/8 currently no indication for dialysis; c/w albumin 25% q6 for 4 doses      ID  -Pan culture bld cx x 2, UA, sputum cx, MRSA swab  -ABX: zosyn 6/1 - 6/8 for 7d for empiric coverage for possible aspiration  -Broad spectrum abx for septic shock likely secondary to aspiration pna  -De-escalate abx based on sensitivities  -Follow up ID for further recommendations    Endo  -Continue synthroid  -ISS 20u over 6/5  -TF restart [  ]  -Monitor blood glucose q6h  - Start NPH 5u q6h  - c/w moderate ISS    Heme  -Hgb slightly dropped 6/7  -Maintain active type and screen    Ppx  -Continue Protonix PPI BID  -PAS for DVT ppx    GOC  -Full Code  -Have been speaking with patient's wife and patient's daughter (Cleopatra). 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 Explained to famliy the cirrhosis is in endstage and the ascites will continue to reaccumulate as he has had thora/para 2x each. Now with renal failure. Family seems to understand.   -Palli consulted  -Will attempt to have GOC discussion with family at bedside today 6/8.

## 2021-06-08 NOTE — CHART NOTE - NSCHARTNOTEFT_GEN_A_CORE
Nutrition Follow Up Note   Patient seen for: malnutrition follow up on  ICU     Source: medical record, communication with team.     Chart reviewed, events noted. Adm for GIB in the setting of decompensated cirrhosis, esophageal varices. S/P paracentesis 6/6 460cc removed. Extubated 6/7.    Diet Order: Diet, NPO:   Tube Feeding Modality: Nasogastric  Vital AF (VITALAFRTH)  Total Volume for 24 Hours (mL): 1440  Continuous  Starting Tube Feed Rate {mL per Hour}: 10  Increase Tube Feed Rate by (mL): 10     Every 4 hours  Until Goal Tube Feed Rate (mL per Hour): 80  Tube Feed Duration (in Hours): 18  Tube Feed Start Time: 10:00 (06-03-21 @ 09:30)    CURRENT EN ORDER PROVIDES: 1720 kcals, 108 gm protein, 1168cc free water meets 22 Kcal/Kg, 1.4 Gm protein/kg 6/1 dosing wt 76.2 kg    Nutrition Events:   - tube feeds currently running at 40 cc/hr 2/2 increased secretions   - <80% nutrition needs met over past 5 days (EN was held for possible extubation on 6/5, and extubation on 6/7    GI: no vomiting/abd distention  have loose BM on lactulose rx    Anthropometric Measurements:   Height (cm): 167.6 (06-01-21 @ 18:05)  Weight (kg): 76.2 (06-01-21 @ 18:05)  BMI (kg/m2): 27.1 (06-01-21 @ 18:05)      Medications: MEDICATIONS  (STANDING):  artificial  tears Solution 1 Drop(s) Both EYES three times a day  chlorhexidine 4% Liquid 1 Application(s) Topical <User Schedule>  insulin lispro (ADMELOG) corrective regimen sliding scale   SubCutaneous every 6 hours  lactulose Syrup 20 Gram(s) Oral every 6 hours  levothyroxine Injectable 44 MICROGram(s) IV Push at bedtime  midodrine 20 milliGRAM(s) Oral every 8 hours  norepinephrine Infusion 0.03 MICROgram(s)/kG/Min (4.29 mL/Hr) IV Continuous <Continuous>  nystatin Powder 1 Application(s) Topical three times a day  pantoprazole  Injectable 40 milliGRAM(s) IV Push every 12 hours  petrolatum Ophthalmic Ointment 1 Application(s) Both EYES daily  rifAXIMin 550 milliGRAM(s) Oral two times a day  sucralfate suspension 1 Gram(s) Oral every 6 hours    MEDICATIONS  (PRN):  albuterol/ipratropium for Nebulization 3 milliLiter(s) Nebulizer every 6 hours PRN Shortness of Breath and/or Wheezing    Labs: 06-08 @ 10:41: Sodium 141, Potassium 3.9, Calcium 9.4, Magnesium --, Phosphorus --, BUN 84<H>, Creatinine 4.79<H>, Glucose 131<H>, Alk Phos 43, ALT/SGPT 15, AST/SGOT 20, Albumin 3.7, Prealbumin --, Total Bilirubin 1.4<H>, Hemoglobin --, Hematocrit --, Ferritin --, C-Reactive Protein --, Creatine Kinase <<27>  06-08 @ 00:30: Sodium --, Potassium --, Calcium --, Magnesium --, Phosphorus --, BUN --, Creatinine --, Glucose --, Alk Phos --, ALT/SGPT --, AST/SGOT --, Albumin --, Prealbumin --, Total Bilirubin --, Hemoglobin 8.3<L>, Hematocrit 24.8<L>, Ferritin --, C-Reactive Protein --, Creatine Kinase <<27>  06-08 @ 00:29: Sodium 142, Potassium 3.5, Calcium 9.8, Magnesium --, Phosphorus --, BUN 84<H>, Creatinine 4.61<H>, Glucose 150<H>, Alk Phos 48, ALT/SGPT 15, AST/SGOT 21, Albumin 3.6, Prealbumin --, Total Bilirubin 1.4<H>, Hemoglobin --, Hematocrit --, Ferritin --, C-Reactive Protein --, Creatine Kinase <<27>  06-07 @ 17:21: Sodium 141, Potassium 3.7, Calcium 9.5, Magnesium 2.2, Phosphorus 3.9, BUN 80<H>, Creatinine 4.20<H>, Glucose 188<H>, Alk Phos 51, ALT/SGPT 14, AST/SGOT 23, Albumin 3.5, Prealbumin --, Total Bilirubin 1.3<H>, Hemoglobin 8.1<L>, Hematocrit 25.1<L>, Ferritin --, C-Reactive Protein --, Creatine Kinase <<27>    POCT Blood Glucose.: 134 mg/dL (06-08-21 @ 06:41)  POCT Blood Glucose.: 144 mg/dL (06-07-21 @ 23:46)    Skin: no pressure injuries documented, + IAD  Edema: 1+ dependent, generalized    Estimated Needs: based on current dosing wt 76.2 kg   Energy: 4481-6782  kcals (25-30 kcals/kg)  Protein:   gm (1.2-1.4 gm/kg)      Previous Nutrition Diagnosis: severe malnutrition  Nutrition Diagnosis is: care plan ongoing    Recommended Interventions:   1. If EN to remain at lower rate, recommend changing to more concentrated formula Vital 1.5 goal 75cc/hr x 18 hrs to provide 2025 kcals, 91gm protein, 1031cc free water  meets 26 Kcal/Kg, 1.2 Gm protein/kg dosing wt 76.2 kg      Monitoring and Evaluation:   Continue to monitor nutrition provision and tolerance, weights, labs, skin integrity.   RD remains available upon request and will follow up per protocol. Nutrition Follow Up Note   Patient seen for: malnutrition follow up on  ICU     Source: medical record, communication with team.     Chart reviewed, events noted. Adm for GIB in the setting of decompensated cirrhosis, esophageal varices. S/P paracentesis 6/6 460cc removed. Extubated 6/7.    Diet Order: Diet, NPO:   Tube Feeding Modality: Nasogastric  Vital AF (VITALAFRTH)  Total Volume for 24 Hours (mL): 1440  Continuous  Starting Tube Feed Rate {mL per Hour}: 10  Increase Tube Feed Rate by (mL): 10     Every 4 hours  Until Goal Tube Feed Rate (mL per Hour): 80  Tube Feed Duration (in Hours): 18  Tube Feed Start Time: 10:00 (06-03-21 @ 09:30)    CURRENT EN ORDER PROVIDES: 1720 kcals, 108 gm protein, 1168cc free water meets 22 Kcal/Kg, 1.4 Gm protein/kg 6/1 dosing wt 76.2 kg    Nutrition Events:   - tube feeds currently running at 40 cc/hr 2/2 increased secretions   - <80% nutrition needs met over past 5 days (EN was held for possible extubation on 6/5, and extubation on 6/7    GI: no vomiting/abd distention  have loose BM on lactulose rx    Anthropometric Measurements:   Height (cm): 167.6 (06-01-21 @ 18:05)  Weight (kg): 76.2 (06-01-21 @ 18:05)  BMI (kg/m2): 27.1 (06-01-21 @ 18:05)  6/7 77.3 kg    Medications: MEDICATIONS  (STANDING):  artificial  tears Solution 1 Drop(s) Both EYES three times a day  chlorhexidine 4% Liquid 1 Application(s) Topical <User Schedule>  insulin lispro (ADMELOG) corrective regimen sliding scale   SubCutaneous every 6 hours  lactulose Syrup 20 Gram(s) Oral every 6 hours  levothyroxine Injectable 44 MICROGram(s) IV Push at bedtime  midodrine 20 milliGRAM(s) Oral every 8 hours  norepinephrine Infusion 0.03 MICROgram(s)/kG/Min (4.29 mL/Hr) IV Continuous <Continuous>  nystatin Powder 1 Application(s) Topical three times a day  pantoprazole  Injectable 40 milliGRAM(s) IV Push every 12 hours  petrolatum Ophthalmic Ointment 1 Application(s) Both EYES daily  rifAXIMin 550 milliGRAM(s) Oral two times a day  sucralfate suspension 1 Gram(s) Oral every 6 hours    MEDICATIONS  (PRN):  albuterol/ipratropium for Nebulization 3 milliLiter(s) Nebulizer every 6 hours PRN Shortness of Breath and/or Wheezing    Labs: 06-08 @ 10:41: Sodium 141, Potassium 3.9, Calcium 9.4, Magnesium --, Phosphorus --, BUN 84<H>, Creatinine 4.79<H>, Glucose 131<H>, Alk Phos 43, ALT/SGPT 15, AST/SGOT 20, Albumin 3.7, Prealbumin --, Total Bilirubin 1.4<H>, Hemoglobin --, Hematocrit --, Ferritin --, C-Reactive Protein --, Creatine Kinase <<27>  06-08 @ 00:30: Sodium --, Potassium --, Calcium --, Magnesium --, Phosphorus --, BUN --, Creatinine --, Glucose --, Alk Phos --, ALT/SGPT --, AST/SGOT --, Albumin --, Prealbumin --, Total Bilirubin --, Hemoglobin 8.3<L>, Hematocrit 24.8<L>, Ferritin --, C-Reactive Protein --, Creatine Kinase <<27>  06-08 @ 00:29: Sodium 142, Potassium 3.5, Calcium 9.8, Magnesium --, Phosphorus --, BUN 84<H>, Creatinine 4.61<H>, Glucose 150<H>, Alk Phos 48, ALT/SGPT 15, AST/SGOT 21, Albumin 3.6, Prealbumin --, Total Bilirubin 1.4<H>, Hemoglobin --, Hematocrit --, Ferritin --, C-Reactive Protein --, Creatine Kinase <<27>  06-07 @ 17:21: Sodium 141, Potassium 3.7, Calcium 9.5, Magnesium 2.2, Phosphorus 3.9, BUN 80<H>, Creatinine 4.20<H>, Glucose 188<H>, Alk Phos 51, ALT/SGPT 14, AST/SGOT 23, Albumin 3.5, Prealbumin --, Total Bilirubin 1.3<H>, Hemoglobin 8.1<L>, Hematocrit 25.1<L>, Ferritin --, C-Reactive Protein --, Creatine Kinase <<27>    POCT Blood Glucose.: 134 mg/dL (06-08-21 @ 06:41)  POCT Blood Glucose.: 144 mg/dL (06-07-21 @ 23:46)    Skin: no pressure injuries documented, + IAD  Edema: 1+ dependent, generalized    Estimated Needs: based on current dosing wt 76.2 kg   Energy: 5032-0885  kcals (25-30 kcals/kg)  Protein:   gm (1.2-1.4 gm/kg)      Previous Nutrition Diagnosis: severe malnutrition  Nutrition Diagnosis is: care plan ongoing    Recommended Interventions:   1.  Recommend changing to more concentrated formula, Glucerna 1.5 goal 70cc/hr x 18 hrs to provide 1890 kcals, 104 gm protein, 956cc free water  meets  25 kcals/kg, 1.3 gm protein/kg  dosing wt 76.2 kg      Monitoring and Evaluation:   Continue to monitor nutrition provision and tolerance, weights, labs, skin integrity.   RD remains available upon request and will follow up per protocol.

## 2021-06-08 NOTE — PROGRESS NOTE ADULT - ATTENDING COMMENTS
Encephalopathy and respiratory failure, ICU admission and intubation; now extubated  ANDREEA/ATN - worsening, now anuric  CKD III    No indication for kidney replacement therapy  Given anuria, would minimize all drips/fluids (and stop diuretics given lack of response)  Maintain MAP >65  Poor prognosis, address goals of care  Remainder per fellow, d/w team, will follow

## 2021-06-08 NOTE — PROGRESS NOTE ADULT - ATTENDING COMMENTS
1. Acute hypoxemic respiratory failure due hepatic encephalopathy and possible aspiration pneumonia. PPt tolerating extubation but requiring frequent suctioning.  2. Renal. ANDREEA from ATN.  Creatinine rising. baseline CKD3. Not responding to Bumex drip. Continue albumin. No need for HD at this point.  3. Decompensated liver cirrhosis. Continue lactulose and rifaximin.  4. ID. Finish Zosyn dose for aspiration pneumonia tomorrow.  5. Hypotension. Resolved on midodrine 20mg tid.  6. Needs GOC care discussion with family  and patient.

## 2021-06-08 NOTE — PROGRESS NOTE ADULT - SUBJECTIVE AND OBJECTIVE BOX
[facility] DIVISION OF KIDNEY DISEASES AND HYPERTENSION -- FOLLOW UP NOTE  --------------------------------------------------------------------------------    24 hour events/subjective: Patient was extubated 6/7/21. Started on Bumex drip 4 mg/hr overnight, with minimal urine output. Patient was seen and examined at bedside. Unable to obtain ROS.    PAST HISTORY  --------------------------------------------------------------------------------  No significant changes to PMH, PSH, FHx, SHx, unless otherwise noted    ALLERGIES & MEDICATIONS  --------------------------------------------------------------------------------  Allergies    No Known Allergies    Intolerances    Standing Inpatient Medications  artificial  tears Solution 1 Drop(s) Both EYES three times a day  buMETAnide Infusion 4 mG/Hr IV Continuous <Continuous>  chlorhexidine 4% Liquid 1 Application(s) Topical <User Schedule>  insulin lispro (ADMELOG) corrective regimen sliding scale   SubCutaneous every 6 hours  insulin NPH human recombinant 5 Unit(s) SubCutaneous every 6 hours  lactulose Syrup 20 Gram(s) Oral every 6 hours  levothyroxine Injectable 44 MICROGram(s) IV Push at bedtime  midodrine 20 milliGRAM(s) Oral every 8 hours  norepinephrine Infusion 0.03 MICROgram(s)/kG/Min IV Continuous <Continuous>  nystatin Powder 1 Application(s) Topical three times a day  pantoprazole  Injectable 40 milliGRAM(s) IV Push every 12 hours  petrolatum Ophthalmic Ointment 1 Application(s) Both EYES daily  rifAXIMin 550 milliGRAM(s) Oral two times a day  sucralfate suspension 1 Gram(s) Oral every 6 hours    PRN Inpatient Medications  albuterol/ipratropium for Nebulization 3 milliLiter(s) Nebulizer every 6 hours PRN    REVIEW OF SYSTEMS  --------------------------------------------------------------------------------  Unable to obtain ROS    VITALS/PHYSICAL EXAM  --------------------------------------------------------------------------------  T(C): 36.2 (06-08-21 @ 07:00), Max: 36.2 (06-07-21 @ 15:00)  HR: 73 (06-08-21 @ 08:30) (58 - 78)  BP: 106/56 (06-08-21 @ 08:00) (83/46 - 130/63)  RR: 26 (06-08-21 @ 08:30) (12 - 53)  SpO2: 97% (06-08-21 @ 08:30) (97% - 100%)  Wt(kg): --    06-07-21 @ 07:01  -  06-08-21 @ 07:00  --------------------------------------------------------  IN: 1130 mL / OUT: 35 mL / NET: 1095 mL    06-08-21 @ 07:01  -  06-08-21 @ 09:16  --------------------------------------------------------  IN: 120 mL / OUT: 0 mL / NET: 120 mL    Physical Exam:  	Gen: NAD  	HEENT: MMM  	Pulm: CTA B/L, no crackles   	CV: S1S2  	Abd: Soft, +BS   	Ext: No LE edema B/L  	Neuro: Awake, but appears confused   	Skin: Warm and dry              : reyes with urine mixed with blood       LABS/STUDIES  --------------------------------------------------------------------------------              8.3    3.72  >-----------<  73       [06-08-21 @ 00:30]              24.8     142  |  102  |  84  ----------------------------<  150      [06-08-21 @ 00:29]  3.5   |  18  |  4.61        Ca     9.8     [06-08-21 @ 00:29]      Mg     2.2     [06-07-21 @ 17:21]      Phos  3.9     [06-07-21 @ 17:21]    TPro  5.9  /  Alb  3.6  /  TBili  1.4  /  DBili  x   /  AST  21  /  ALT  15  /  AlkPhos  48  [06-08-21 @ 00:29]    PT/INR: PT 17.8 , INR 1.51       [06-07-21 @ 00:31]  PTT: 46.9       [06-07-21 @ 00:31]      Creatinine Trend:  SCr 4.61 [06-08 @ 00:29]  SCr 4.20 [06-07 @ 17:21]  SCr 3.47 [06-07 @ 00:31]  SCr 2.60 [06-06 @ 00:28]  SCr 1.88 [06-05 @ 00:54]    Urinalysis - [06-01-21 @ 18:55]      Color Light Yellow / Appearance Clear / SG 1.011 / pH 5.5      Gluc Negative / Ketone Negative  / Bili Negative / Urobili Negative       Blood Negative / Protein Negative / Leuk Est Negative / Nitrite Negative      RBC 1 / WBC 0 / Hyaline 6 / Gran  / Sq Epi  / Non Sq Epi 0 / Bacteria Negative      HbA1c 7.1      [01-12-19 @ 07:22]  TSH 1.00      [05-17-21 @ 08:41]    HBsAb 32.4      [05-17-21 @ 23:13]  HBsAg Nonreact      [05-17-21 @ 23:13]  HBcAb Reactive      [05-17-21 @ 23:13]  HCV 0.17, Nonreact      [05-17-21 @ 23:13]  HIV Nonreact      [05-18-21 @ 01:05]

## 2021-06-09 NOTE — CONSULT NOTE ADULT - PROBLEM SELECTOR RECOMMENDATION 4
Will continue to f/u for GOC, ACP, and symptoms.         David Velazquez MD   Geriatrics and Palliative Care (GAP) Consult Service    of Geriatric and Palliative Medicine  [facility]      Please page the following number for clinical matters between the hours of 9 am and 5 pm from Monday through Friday : [phone]    After 5pm and on weekends, please see the contact information below:    In the event of newly developing, evolving, or worsening symptoms, please contact the Palliative Medicine team via pager (if the patient is at [facility] #[phone] or if the patient is at [facility] #[phone]) The Geriatric and Palliative Medicine service has coverage 24 hours a day/ 7 days a week to provide medical recommendations regarding symptom management needs via telephone

## 2021-06-09 NOTE — PROGRESS NOTE ADULT - ATTENDING COMMENTS
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  Pt readmitted to MICU for hypoxemic respiratory failure requiring intubation in the setting of worsening hepatic encephalopathy, aspiration, and large R pleural effusion, s/p thoracentesis 6/2, s/p extubation 6/7 and most recently pt with acute renal failure 2 to ATN    Resp/ Renal/ Social: GOC meeting today to discuss code status and potential intubation/R thoracentesis and need for HD; agree with Renal, HD will not improve pt's long term prognosis  ID: Off Abx  CVS: Cont Midodrine  HEME: Pt thrombocytopenic; trend platelets  FEN: NPO  GI/ Neuro: Dc Lactulose pt with frequent diarrhea and cont Rifaximin.  Neuro: Pt is lethargic but answering questions appropriately in his native language

## 2021-06-09 NOTE — CONSULT NOTE ADULT - ASSESSMENT
84yo Male with PMH HF (EF 40-45%), 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  Now readmitted to MICU for hypoxemic respiratory failure requiring intubation in the setting of worsening hepatic encephalopathy, aspiration, and large right pleural effusion, s/p thora 6/2,s/p extubation 6/7, now with acute renal failure, suspect ATN.

## 2021-06-09 NOTE — PROGRESS NOTE ADULT - ASSESSMENT
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  Now readmitted to MICU for hypoxemic respiratory failure requiring intubation in the setting of worsening hepatic encephalopathy, aspiration, and large right pleural effusion, s/p thora 6/2,s/p extubation 6/7, now with acute renal failure, suspect ATN.      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  - Off sedation  -awake, following commands     Cardiovascular:  -Hx of HF (EF 40-45%), AICD  - Continue to wean levophed, currently off levophed, but intermittently needs levo  - Continue with midodrine 20mg TID to wean levo  -Holding Aldactone, Lasix     Pulmonary: Hypoxemic Respiratory Failure in the setting of hepatic encephalopathy, aspiration, large right pleural effusion, intubated 6/2, extubated 6/7  -Titrate FIo2 to maintain SPO2 > 92, ABGs  -Broad spectrum abx; zosyn for presumed aspiration PNA Complete on 6/8  -Duoneb prn for wheezing  -Patient likely aspirating    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  - s/p thora #2 6/2: elevated triglycerides to 120  -protein ratio < 0.5 -- suggests transudative  -pleural fluid LDH/serum LDH ratio (however serum LDH not at same time as thora) > 0.5  -cytopath: no cancer    GI  -Hx of GIB: EGD 5/18 with small esophageal varices, large cratered duodenal ulcer   -Continue protonix ppi BID  -Hx of Cirrhosis  -s/p para 5/28 in IR (therapeutic drain with removal of 400cc cloudly pink fluid)  - POCUS on 6/6 showing recollection of ascites - will try for another paracentesis today  -Hepatology on board; f/u further recommendations  -Continue Lactulose/Rifaximin for hepatic encephalopathy, titrate 2 BM daily  -Continue Carafate as per hepatology      Feeds:  Vital TFs    Renal    #Acute renal failure, likely ATN i/s/o 4.9L para on 6/6  -Intake and output q1  -Renally dose meds based on Gfr -- zosyn redosed  -6/7:s/p reyes place 6/7 -pt with enlarged prostate on CT  -s/p  bumex  gtt on 6/7 -- with minimal UOP --> discontinue  -6/8 currently no indication for dialysis; c/w albumin 25% q6 for 4 doses      ID  -Pan culture bld cx x 2, UA, sputum cx, MRSA swab  -ABX: zosyn 6/1 - 6/8 for 7d for empiric coverage for possible aspiration  -Broad spectrum abx for septic shock likely secondary to aspiration pna  -De-escalate abx based on sensitivities  -Follow up ID for further recommendations    Endo  -Continue synthroid  -ISS 20u over 6/5  -TF restart [  ]  -Monitor blood glucose q6h  - Start NPH 5u q6h  - c/w moderate ISS    Heme  -Hgb slightly dropped 6/7  -Maintain active type and screen    Ppx  -Continue Protonix PPI BID  -PAS for DVT ppx    GOC  -Full Code  -Have been speaking with patient's wife and patient's daughter (Cleopatra). 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 Explained to famliy the cirrhosis is in endstage and the ascites will continue to reaccumulate as he has had thora/para 2x each. Now with renal failure. Family seems to understand.   -Palli consulted  -Will attempt to have GOC discussion with family at bedside today 6/8.      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  Now readmitted to MICU for hypoxemic respiratory failure requiring intubation in the setting of worsening hepatic encephalopathy, aspiration, and large right pleural effusion, s/p thora 6/2,s/p extubation 6/7, now with acute renal failure, suspect ATN.      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  - Off sedation  -awake, following commands     Cardiovascular:  -Hx of HF (EF 40-45%), AICD  - Continue to wean levophed, currently off levophed, but intermittently needs levo  - Continue with midodrine 20mg TID to wean levo  -Holding Aldactone, Lasix     Pulmonary: Hypoxemic Respiratory Failure in the setting of hepatic encephalopathy, aspiration, large right pleural effusion, intubated 6/2, extubated 6/7  -Titrate FIo2 to maintain SPO2 > 92, ABGs  -Broad spectrum abx; zosyn for presumed aspiration PNA Complete on 6/8  -Duoneb prn for wheezing  -Patient likely aspirating    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  - s/p thora #2 6/2: elevated triglycerides to 120  -protein ratio < 0.5 -- suggests transudative  -pleural fluid LDH/serum LDH ratio (however serum LDH not at same time as thora) > 0.5  -cytopath: no cancer  CXR 6/8 with reaccumulation of pleural effusion      GI    End-stage cirrhosis  -Hx of GIB: EGD 5/18 with small esophageal varices, large cratered duodenal ulcer   -Continue protonix ppi BID  -Hx of Cirrhosis  -s/p para 5/28 in IR (therapeutic drain with removal of 400cc cloudly pink fluid)  - POCUS on 6/6 showing recollection of ascites - will try for another paracentesis today  -Hepatology on board; f/u further recommendations  -Continue Lactulose/Rifaximin for hepatic encephalopathy, titrate 2 BM daily  -Continue Carafate as per hepatology      Feeds:  Vital TFs  Hold d/t aspiration    Renal    #Acute renal failure, likely ATN i/s/o 4.9L para on 6/6  -Intake and output q1  -Renally dose meds based on Gfr -- zosyn redosed  -6/7:s/p reyes place 6/7 -pt with enlarged prostate on CT  -s/p  bumex  gtt on 6/7 -- with minimal UOP --> discontinue  -6/8 currently no indication for dialysis; c/w albumin 25% q6 for 4 doses      ID    Sepsis - meeting criteria with hypothermia, and leukopenia  -Pan culture bld cx x 2, UA, sputum cx, MRSA swab  -ABX:   s/p zosyn 6/1 - 6/8 for 7d for empiric coverage for aspiration pna  zosyn 6/9 - xxx for aspiration PNA  -De-escalate abx based on sensitivities  -Follow up ID for further recommendations    Endo  -Continue synthroid  -Monitor blood glucose q6h  - hold TFs, hold NPH 5u q6h  - c/w moderate ISS    Heme  -Hgb slightly dropped 6/7  -Maintain active type and screen    Ppx  -Continue Protonix PPI BID  -PAS for DVT ppx    GOC  -Full Code  -Have been speaking with patient's wife and patient's daughter (Cleopatra). 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 Explained to famliy the cirrhosis is in endstage and the ascites will continue to reaccumulate as he has had thora/para 2x each. Now with renal failure. Family seems to understand.   -Palli consulted  -family meeting on 6/9

## 2021-06-09 NOTE — CONSULT NOTE ADULT - CONVERSATION DETAILS
Mandarin  ID [id number]    The patient's family was able to give verbalized understanding about the patient's advanced illnesses (end stage liver disease, end stage CKD) and complications associated to it (hypotension requiring pressors, dysphagia, and aspiration). We discussed about the lack of options for treatment that may be able to reverse his terminal conditions and the expected progression of his diseases even if aggressive treatments are provided. We presented different options for treatment and including prolonging life as a priority (which will represent staying in ICU, using IV pressors if hypotension were to present, re-intubating, and performing CPR if medically indicated), trying to prolong his life while addressing symptoms but without scalation of care (continuing tube feeds, medical management of hepatic encephalopathy, Abx for infections. providing oral pressors, and transferrin to PCU but not Telemetry, IV pressors, or MICU level of care), and only focusing on symptoms (only providing medications for symptoms). His daughter inquired about the patient being a candidate for HD. We indicated that HD is a therapy that is provided as a bridge for improvement and that understanding the patient's multiple medical issues (manly end stage liver disease) that this Rx will not be such a bridge. Furthermore, that due to his frail state and recurrent hypotension that HD on its own may put the patient at risk of dying. However, we indicated that, at the end, it will be up to renal to define if HD is to be offered or not. His family also asked about the possibility of continuing to drain the patient's abdomen and chest and we indicated that it may be possible. In fact, Dr. Lucas was going to see if a Pleurx was a possibility. Finally, we discussed about code status and how CPR is an intense medical therapy that requires a strong human body able to support this intervention in order to successfully go through it, survive, and recover after it. However, in this case, the patient is so frail and with minimum physiologic reserves that it will not allow him to survive the procedure of resuscitation and instead putting him through interventions that will likely cause further distress and suffering. In this case and understanding the patient’s illness and poor prognosis factors (as above) it is expected that in the setting of a cardiac arrest, even if CPR is performed, the patient will imminently die. I expressed my concerns that In this scenario CPR may cause more harm than good.     His family was to process all the info that was provided and to f/u with the MICU team or with me tomorrow. At this time he continues to be full code with GOC towards prolonging life as a priority. Mandarin  ID [id number]    The patient's family was able to give verbalized understanding about the patient's advanced illnesses (end stage liver disease, end stage CKD) and complications associated to it (hypotension requiring pressors, dysphagia, and aspiration). We discussed about the lack of options for treatment that may be able to reverse his terminal conditions and the expected progression of his diseases even if aggressive treatments (intubation, HD) are provided. We presented different options for treatment and includin)Prolonging life as a priority (which will represent staying in ICU, using IV pressors if hypotension were to present, re-intubating, and performing CPR if medically indicated), 2) Trying to prolong his life while addressing symptoms but without scalation of care (continuing tube feeds, medical management of hepatic encephalopathy, Abx for infections. providing oral pressors, and transferrin to PCU but not Telemetry, IV pressors, or MICU level of care), and 3)Only focusing on symptoms (only providing medications for symptoms). His daughter inquired about the patient being a candidate for HD. We indicated that HD is a therapy provided as a bridge for improvement and that understanding the patient's multiple medical issues (manly end stage liver disease) that this Rx will not be such a bridge. Furthermore, we indicated he is not a candidate for LTC HD and that due to his frail state and recurrent hypotension that HD on its own may put the patient at risk of dying. However, we indicated that, at the end, it will be up to renal to confirm our inputs and to define if a trial of HD is to be offered or not. His family also asked about the possibility of continuing to drain the patient's abdomen and chest and we indicated that it may be possible. In fact, Dr. Lucas was going to see if a Pleurx was a possibility. Finally, we discussed about code status and how CPR is an intense medical therapy that requires a strong human body able to support this intervention in order to successfully go through it, survive, and recover after it. However we indicated, in this case, the patient is so frail and with minimum physiologic reserves that it will not allow him to survive the procedure of resuscitation and instead putting him through interventions that will likely cause further distress and suffering. In this case and understanding the patient’s illness and poor prognosis factors (as above) it is expected that in the setting of a cardiac arrest, even if CPR is performed, the patient will imminently die. I expressed my concerns that In this scenario CPR may cause more harm than good.     His family was to process all the info that was provided and to f/u with the MICU team or with me tomorrow. At this time he continues to be full code with GOC towards prolonging life as a priority.

## 2021-06-09 NOTE — PROGRESS NOTE ADULT - SUBJECTIVE AND OBJECTIVE BOX
[facility] DIVISION OF KIDNEY DISEASES AND HYPERTENSION -- FOLLOW UP NOTE  --------------------------------------------------------------------------------    24 hour events/subjective: urine output 40 cc over 24h period. Currently net 1.1L positive. Patient was seen and examined at bedside. Unable to obtain ROS          PAST HISTORY  --------------------------------------------------------------------------------  No significant changes to PMH, PSH, FHx, SHx, unless otherwise noted    ALLERGIES & MEDICATIONS  --------------------------------------------------------------------------------  Allergies    No Known Allergies    Intolerances      Standing Inpatient Medications  artificial  tears Solution 1 Drop(s) Both EYES three times a day  chlorhexidine 4% Liquid 1 Application(s) Topical <User Schedule>  insulin lispro (ADMELOG) corrective regimen sliding scale   SubCutaneous every 6 hours  levothyroxine Injectable 44 MICROGram(s) IV Push at bedtime  midodrine 20 milliGRAM(s) Oral every 8 hours  nystatin Powder 1 Application(s) Topical three times a day  pantoprazole  Injectable 40 milliGRAM(s) IV Push every 12 hours  petrolatum Ophthalmic Ointment 1 Application(s) Both EYES daily  rifAXIMin 550 milliGRAM(s) Oral two times a day  sucralfate suspension 1 Gram(s) Oral every 6 hours    PRN Inpatient Medications  albuterol/ipratropium for Nebulization 3 milliLiter(s) Nebulizer every 6 hours PRN    REVIEW OF SYSTEMS  --------------------------------------------------------------------------------  Unable to obtain ROS    VITALS/PHYSICAL EXAM  --------------------------------------------------------------------------------  T(C): 36.1 (06-09-21 @ 11:00), Max: 36.3 (06-08-21 @ 15:00)  HR: 73 (06-09-21 @ 11:00) (71 - 84)  BP: 109/58 (06-09-21 @ 11:00) (89/52 - 160/64)  RR: 30 (06-09-21 @ 11:00) (17 - 35)  SpO2: 98% (06-09-21 @ 11:00) (96% - 100%)  Wt(kg): --    06-08-21 @ 07:01  -  06-09-21 @ 07:00  --------------------------------------------------------  IN: 1155 mL / OUT: 40 mL / NET: 1115 mL    Physical Exam:  	Gen: NAD  	HEENT: MMM  	Pulm: CTA B/L, decrease breath sounds on L side  	CV: S1S2  	Abd: Soft, +BS   	Ext: No LE edema B/L  	Neuro: Awake  	Skin: Warm and dry    LABS/STUDIES  --------------------------------------------------------------------------------              8.4    3.52  >-----------<  72       [06-09-21 @ 01:18]              25.0     142  |  103  |  92  ----------------------------<  236      [06-09-21 @ 01:18]  4.0   |  18  |  5.42        Ca     9.7     [06-09-21 @ 01:18]      Mg     2.2     [06-07-21 @ 17:21]      Phos  3.9     [06-07-21 @ 17:21]    TPro  5.9  /  Alb  3.6  /  TBili  1.1  /  DBili  x   /  AST  19  /  ALT  15  /  AlkPhos  53  [06-09-21 @ 01:18]    PT/INR: PT 17.1 , INR 1.45       [06-09-21 @ 01:18]  PTT: 50.5       [06-09-21 @ 01:18]      Creatinine Trend:  SCr 5.42 [06-09 @ 01:18]  SCr 4.79 [06-08 @ 10:41]  SCr 4.61 [06-08 @ 00:29]  SCr 4.20 [06-07 @ 17:21]  SCr 3.47 [06-07 @ 00:31]    Urinalysis - [06-01-21 @ 18:55]      Color Light Yellow / Appearance Clear / SG 1.011 / pH 5.5      Gluc Negative / Ketone Negative  / Bili Negative / Urobili Negative       Blood Negative / Protein Negative / Leuk Est Negative / Nitrite Negative      RBC 1 / WBC 0 / Hyaline 6 / Gran  / Sq Epi  / Non Sq Epi 0 / Bacteria Negative      HbA1c 7.1      [01-12-19 @ 07:22]  TSH 1.00      [05-17-21 @ 08:41]    HBsAb 32.4      [05-17-21 @ 23:13]  HBsAg Nonreact      [05-17-21 @ 23:13]  HBcAb Reactive      [05-17-21 @ 23:13]  HCV 0.17, Nonreact      [05-17-21 @ 23:13]  HIV Nonreact      [05-18-21 @ 01:05]

## 2021-06-09 NOTE — CHART NOTE - NSCHARTNOTEFT_GEN_A_CORE
This morning had family meeting with patient's wife, daughter, and grandson, with Dr. Velazquez of palliative, Dr. George Lucas , ICU fellow, writer, and  Maria Teresa Tobias [id number].    This afternoon had another discussion with patient's wife, and patient's daughter Cleopatra Styles (HCP). They state that after further discussion among the family they would like to             Family would like to make patient DNR.  Family would want trial of intubation of needed. F  Family would want dialysis at this time if needed.  Family would want thoracentesis if indicated. This morning had family meeting with patient's wife, daughter, and grandson, with Dr. Velazquez of palliative, Dr. George Lucas , ICU fellow, writer, and  Maria Teresa Tobias [id number].    This afternoon had further discussion with patient's wife, and patient's daughter Cleopatra Styles (HCP). They state that after further discussion among the family they have the following wishes.    Family would like to make patient DNR.  Family would want intubation if needed.   Family would not want tracheostomy.  Family would want dialysis at this time if needed.  Family would want thoracentesis if indicated.    MOLST filled out and placed in chart.    The family will continue to discuss with the rest of extended family, especially regarding dialysis.   Will continue to have discussions with family.

## 2021-06-09 NOTE — PROGRESS NOTE ADULT - ATTENDING COMMENTS
Encephalopathy and respiratory failure, ICU admission and intubation; now extubated  ANDREEA/ATN - worsening, now anuric  CKD III    No indication for kidney replacement therapy  Given anuria, would minimize all drips/fluids (and stop diuretics given lack of response)  Maintain MAP >65  Poor prognosis, address goals of care; dialysis unlikely to confer significant clinical benefit  Remainder per fellow, d/w team, will follow

## 2021-06-09 NOTE — CONSULT NOTE ADULT - REASON FOR ADMISSION
sob, confusion, weakness, can't walk, fever

## 2021-06-09 NOTE — PROGRESS NOTE ADULT - SUBJECTIVE AND OBJECTIVE BOX
MICU PROGRESS NOTE    INTERVAL HPI/OVERNIGHT EVENTS:  Yesterday evening, CXR for increased WOB showing reaccumulation of R pleural effusion  SUBJECTIVE:   Increased WOB. Answers questions yes/no with shaking his head. Follows commands.  OBJECTIVE:    VITAL SIGNS:  ICU Vital Signs Last 24 Hrs  T(C): 35.8 (09 Jun 2021 07:00), Max: 36.3 (08 Jun 2021 15:00)  T(F): 96.4 (09 Jun 2021 07:00), Max: 97.3 (08 Jun 2021 15:00)  HR: 71 (09 Jun 2021 08:00) (71 - 84)  BP: 132/58 (09 Jun 2021 08:00) (89/52 - 160/64)  BP(mean): 85 (09 Jun 2021 08:00) (64 - 112)  ABP: --  ABP(mean): --  RR: 18 (09 Jun 2021 08:00) (17 - 35)  SpO2: 100% (09 Jun 2021 08:00) (96% - 100%)      VENTS:      I&O:    06-08 @ 07:01  -  06-09 @ 07:00  --------------------------------------------------------  IN: 1155 mL / OUT: 40 mL / NET: 1115 mL        PHYSICAL EXAM:    GENERAL: tired appearing  HEENT: +NGT  CHEST/LUNG: +rhonci, +no BS on R, sounds of aspiration  HEART: Regular rate and rhythm; No murmurs, rubs, or gallops  ABDOMEN: +distended, tense  EXTREMITIES:  pitting edema of b/l le  SKIN: No rashes or lesions  NERVOUS SYSTEM:   follows commands   LINES:               MEDICATIONS:  MEDICATIONS  (STANDING):  albumin human 25% IVPB 50 milliLiter(s) IV Intermittent every 6 hours  artificial  tears Solution 1 Drop(s) Both EYES three times a day  chlorhexidine 4% Liquid 1 Application(s) Topical <User Schedule>  insulin lispro (ADMELOG) corrective regimen sliding scale   SubCutaneous every 6 hours  insulin NPH human recombinant 5 Unit(s) SubCutaneous every 6 hours  lactulose Syrup 20 Gram(s) Oral every 6 hours  levothyroxine Injectable 44 MICROGram(s) IV Push at bedtime  midodrine 20 milliGRAM(s) Oral every 8 hours  nystatin Powder 1 Application(s) Topical three times a day  pantoprazole  Injectable 40 milliGRAM(s) IV Push every 12 hours  petrolatum Ophthalmic Ointment 1 Application(s) Both EYES daily  rifAXIMin 550 milliGRAM(s) Oral two times a day  sucralfate suspension 1 Gram(s) Oral every 6 hours    MEDICATIONS  (PRN):  albuterol/ipratropium for Nebulization 3 milliLiter(s) Nebulizer every 6 hours PRN Shortness of Breath and/or Wheezing      ALLERGIES:  Allergies    No Known Allergies    Intolerances        LABS:                        8.4    3.52  )-----------( 72       ( 09 Jun 2021 01:18 )             25.0     06-09    142  |  103  |  92<H>  ----------------------------<  236<H>  4.0   |  18<L>  |  5.42<H>    Ca    9.7      09 Jun 2021 01:18  Phos  3.9     06-07  Mg     2.2     06-07    TPro  5.9<L>  /  Alb  3.6  /  TBili  1.1  /  DBili  x   /  AST  19  /  ALT  15  /  AlkPhos  53  06-09    PT/INR - ( 09 Jun 2021 01:18 )   PT: 17.1 sec;   INR: 1.45 ratio         PTT - ( 09 Jun 2021 01:18 )  PTT:50.5 sec      CAPILLARY BLOOD GLUCOSE      POCT Blood Glucose.: 221 mg/dL (09 Jun 2021 05:55)      RADIOLOGY & ADDITIONAL TESTS: Reviewed. MICU PROGRESS NOTE    INTERVAL HPI/OVERNIGHT EVENTS:  Yesterday evening, CXR for increased WOB showing reaccumulation of R pleural effusion.  Hypothermic.    SUBJECTIVE:   Increased WOB. Answers questions yes/no with shaking his head. Follows commands.  OBJECTIVE:    VITAL SIGNS:  ICU Vital Signs Last 24 Hrs  T(C): 35.8 (09 Jun 2021 07:00), Max: 36.3 (08 Jun 2021 15:00)  T(F): 96.4 (09 Jun 2021 07:00), Max: 97.3 (08 Jun 2021 15:00)  HR: 71 (09 Jun 2021 08:00) (71 - 84)  BP: 132/58 (09 Jun 2021 08:00) (89/52 - 160/64)  BP(mean): 85 (09 Jun 2021 08:00) (64 - 112)  ABP: --  ABP(mean): --  RR: 18 (09 Jun 2021 08:00) (17 - 35)  SpO2: 100% (09 Jun 2021 08:00) (96% - 100%)      VENTS:      I&O:    06-08 @ 07:01  -  06-09 @ 07:00  --------------------------------------------------------  IN: 1155 mL / OUT: 40 mL / NET: 1115 mL        PHYSICAL EXAM:    GENERAL: tired appearing  HEENT: +NGT  CHEST/LUNG: +rhonci, +no BS on R, sounds of aspiration  HEART: Regular rate and rhythm; No murmurs, rubs, or gallops  ABDOMEN: +distended, tense  EXTREMITIES:  pitting edema of b/l le  SKIN: No rashes or lesions  NERVOUS SYSTEM:   follows commands   LINES:               MEDICATIONS:  MEDICATIONS  (STANDING):  albumin human 25% IVPB 50 milliLiter(s) IV Intermittent every 6 hours  artificial  tears Solution 1 Drop(s) Both EYES three times a day  chlorhexidine 4% Liquid 1 Application(s) Topical <User Schedule>  insulin lispro (ADMELOG) corrective regimen sliding scale   SubCutaneous every 6 hours  insulin NPH human recombinant 5 Unit(s) SubCutaneous every 6 hours  lactulose Syrup 20 Gram(s) Oral every 6 hours  levothyroxine Injectable 44 MICROGram(s) IV Push at bedtime  midodrine 20 milliGRAM(s) Oral every 8 hours  nystatin Powder 1 Application(s) Topical three times a day  pantoprazole  Injectable 40 milliGRAM(s) IV Push every 12 hours  petrolatum Ophthalmic Ointment 1 Application(s) Both EYES daily  rifAXIMin 550 milliGRAM(s) Oral two times a day  sucralfate suspension 1 Gram(s) Oral every 6 hours    MEDICATIONS  (PRN):  albuterol/ipratropium for Nebulization 3 milliLiter(s) Nebulizer every 6 hours PRN Shortness of Breath and/or Wheezing      ALLERGIES:  Allergies    No Known Allergies    Intolerances        LABS:                        8.4    3.52  )-----------( 72       ( 09 Jun 2021 01:18 )             25.0     06-09    142  |  103  |  92<H>  ----------------------------<  236<H>  4.0   |  18<L>  |  5.42<H>    Ca    9.7      09 Jun 2021 01:18  Phos  3.9     06-07  Mg     2.2     06-07    TPro  5.9<L>  /  Alb  3.6  /  TBili  1.1  /  DBili  x   /  AST  19  /  ALT  15  /  AlkPhos  53  06-09    PT/INR - ( 09 Jun 2021 01:18 )   PT: 17.1 sec;   INR: 1.45 ratio         PTT - ( 09 Jun 2021 01:18 )  PTT:50.5 sec      CAPILLARY BLOOD GLUCOSE      POCT Blood Glucose.: 221 mg/dL (09 Jun 2021 05:55)      RADIOLOGY & ADDITIONAL TESTS: Reviewed.

## 2021-06-09 NOTE — PROGRESS NOTE ADULT - ASSESSMENT
85M PMHx CKD, cirrhosis (?viral hepatitis) - admitted for decompensated cirrhosis.  Nephrology consulted for hyponatremia and ANDREEA on CKD.  Was intubated and transferred to MICU for septic shock in setting of aspiration PNA.    # ANDREEA  Pt with non-oliguric ANDREEA on CKD likely 2/2 ATN in the setting hypotension, entresto/lasix, acute anemia and decreased EABV. Now with new ANDREEA in setting of hypotension, and pre-renal etiology. Now in ATN. On admission sCr 2.0 (baseline sCr 1.4-1.6 in Jan 2019), peaked at 2.7mg/dl, improved to 1.2mg/dl. Now Scr increased to 5.42  mg/dl today. Currently anuric. Urine output did not increase with bumex drip (was d/tania yesterday)  - Will discuss with ICU regarding need for renal replacement therapy, depending on GOC by family and prognosis   - BP optimization/antibiotic/blood transfusion per ICU team  - monitor BMP, strict I/O, avoid nephrotoxic agents (NSAIDs, PPI, contrast), renally dose medications per GFR.    # Hypernatremia  Resolved   S/p free water and D5W   Last Na 142  Monitor Na    If any questions, please feel free to contact me     Edwardo Davis  Nephrology Fellow  [facility] Pager: [phone]  [facility] Pager: [phone]

## 2021-06-10 NOTE — PROGRESS NOTE ADULT - SUBJECTIVE AND OBJECTIVE BOX
[facility] DIVISION OF KIDNEY DISEASES AND HYPERTENSION -- FOLLOW UP NOTE  --------------------------------------------------------------------------------    24 hour events/subjective: urine output 45 cc over 24h period. Patient was seen and examined at bedside. Unable to obtain ROS    PAST HISTORY  --------------------------------------------------------------------------------  No significant changes to PMH, PSH, FHx, SHx, unless otherwise noted    ALLERGIES & MEDICATIONS  --------------------------------------------------------------------------------  Allergies    No Known Allergies    Intolerances    Standing Inpatient Medications  artificial  tears Solution 1 Drop(s) Both EYES three times a day  chlorhexidine 4% Liquid 1 Application(s) Topical <User Schedule>  insulin lispro (ADMELOG) corrective regimen sliding scale   SubCutaneous every 6 hours  levothyroxine Injectable 44 MICROGram(s) IV Push at bedtime  midodrine 20 milliGRAM(s) Oral every 8 hours  nystatin Powder 1 Application(s) Topical three times a day  pantoprazole  Injectable 40 milliGRAM(s) IV Push every 12 hours  petrolatum Ophthalmic Ointment 1 Application(s) Both EYES daily  piperacillin/tazobactam IVPB.. 3.375 Gram(s) IV Intermittent every 12 hours  rifAXIMin 550 milliGRAM(s) Oral two times a day  sucralfate suspension 1 Gram(s) Oral every 6 hours    PRN Inpatient Medications  albuterol/ipratropium for Nebulization 3 milliLiter(s) Nebulizer every 6 hours PRN    REVIEW OF SYSTEMS  --------------------------------------------------------------------------------  Unable to obtain ROS    VITALS/PHYSICAL EXAM  --------------------------------------------------------------------------------  T(C): 39.2 (06-10-21 @ 08:00), Max: 39.2 (06-10-21 @ 08:00)  HR: 72 (06-10-21 @ 09:00) (65 - 89)  BP: 126/77 (06-10-21 @ 09:00) (93/51 - 159/72)  RR: 19 (06-10-21 @ 09:00) (15 - 36)  SpO2: 100% (06-10-21 @ 09:00) (93% - 100%)  Wt(kg): --    06-09-21 @ 07:01  -  06-10-21 @ 07:00  --------------------------------------------------------  IN: 590 mL / OUT: 65 mL / NET: 525 mL    06-10-21 @ 07:01  -  06-10-21 @ 09:35  --------------------------------------------------------  IN: 0 mL / OUT: 10 mL / NET: -10 mL    Physical Exam:  	Gen: NAD  	HEENT: MMM  	Pulm: CTA B/L, no crackles   	CV: S1S2  	Abd: Soft, +BS   	Ext: trace LE edema B/L  	Neuro: Awake, not following any commands or answering any questions   	Skin: Warm and dry    LABS/STUDIES  --------------------------------------------------------------------------------              8.3    3.62  >-----------<  79       [06-10-21 @ 01:22]              24.8     142  |  103  |  103  ----------------------------<  155      [06-10-21 @ 01:22]  3.8   |  17  |  6.47        Ca     9.7     [06-10-21 @ 01:22]    TPro  5.8  /  Alb  3.6  /  TBili  1.1  /  DBili  x   /  AST  17  /  ALT  13  /  AlkPhos  46  [06-10-21 @ 01:22]    PT/INR: PT 17.1 , INR 1.45       [06-09-21 @ 01:18]  PTT: 50.5       [06-09-21 @ 01:18]      Creatinine Trend:  SCr 6.47 [06-10 @ 01:22]  SCr 5.42 [06-09 @ 01:18]  SCr 4.79 [06-08 @ 10:41]  SCr 4.61 [06-08 @ 00:29]  SCr 4.20 [06-07 @ 17:21]    Urinalysis - [06-09-21 @ 13:24]      Color BROWN / Appearance Slightly Turbid Urine chemistry performed on supernatant. / SG 1.015 / pH 6.0      Gluc Negative / Ketone Negative  / Bili Negative / Urobili Negative       Blood Large / Protein 100 mg/dL / Leuk Est Large / Nitrite Negative      RBC >50 / WBC 15 / Hyaline 0 / Gran  / Sq Epi  / Non Sq Epi Occasional / Bacteria Few      HbA1c 7.1      [01-12-19 @ 07:22]  TSH 1.00      [05-17-21 @ 08:41]    HBsAb 32.4      [05-17-21 @ 23:13]  HBsAg Nonreact      [05-17-21 @ 23:13]  HBcAb Reactive      [05-17-21 @ 23:13]  HCV 0.17, Nonreact      [05-17-21 @ 23:13]  HIV Nonreact      [05-18-21 @ 01:05]

## 2021-06-10 NOTE — PROGRESS NOTE ADULT - SUBJECTIVE AND OBJECTIVE BOX
MICU PROGRESS NOTE    INTERVAL HPI/OVERNIGHT EVENTS:    SUBJECTIVE:     OBJECTIVE:    VITAL SIGNS:  ICU Vital Signs Last 24 Hrs  T(C): 36.4 (10 [facility]orge 2021 04:00), Max: 36.8 (2021 15:00)  T(F): 97.5 (10 [facility] 2021 04:00), Max: 98.2 (2021 15:00)  HR: 75 (10 [facility] 2021 07:00) (65 - 89)  BP: 159/72 (10 [facility] 2021 07:00) (93/51 - 159/72)  BP(mean): 103 (10 [facility] 2021 07:00) (68 - 103)  ABP: --  ABP(mean): --  RR: 21 (10 Gorge 2021 07:00) (15 - 36)  SpO2: 97% (10 [facility] 2021 07:00) (93% - 100%)      VENTS:      I&O:    - @ 07:01  -  06-10 @ 07:00  --------------------------------------------------------  IN: 590 mL / OUT: 65 mL / NET: 525 mL        PHYSICAL EXAM:  GENERAL: NAD, conversant  CHEST/LUNG: Clear to auscultation bilaterally; No crackles, rhonchi, wheezing, or rubs  HEART: Regular rate and rhythm; No murmurs, rubs, or gallops  ABDOMEN: Soft, Nontender, Nondistended; Bowel sounds present  EXTREMITIES:  2+ Peripheral Pulses, No clubbing, cyanosis, or edema  SKIN: No rashes or lesions  NERVOUS SYSTEM:  Alert & Oriented, Good concentration      LINES:                                       MEDICATIONS:  MEDICATIONS  (STANDING):  artificial  tears Solution 1 Drop(s) Both EYES three times a day  chlorhexidine 4% Liquid 1 Application(s) Topical <User Schedule>  insulin lispro (ADMELOG) corrective regimen sliding scale   SubCutaneous every 6 hours  levothyroxine Injectable 44 MICROGram(s) IV Push at bedtime  midodrine 20 milliGRAM(s) Oral every 8 hours  nystatin Powder 1 Application(s) Topical three times a day  pantoprazole  Injectable 40 milliGRAM(s) IV Push every 12 hours  petrolatum Ophthalmic Ointment 1 Application(s) Both EYES daily  piperacillin/tazobactam IVPB.. 3.375 Gram(s) IV Intermittent every 12 hours  rifAXIMin 550 milliGRAM(s) Oral two times a day  sucralfate suspension 1 Gram(s) Oral every 6 hours    MEDICATIONS  (PRN):  albuterol/ipratropium for Nebulization 3 milliLiter(s) Nebulizer every 6 hours PRN Shortness of Breath and/or Wheezing      ALLERGIES:  Allergies    No Known Allergies    Intolerances        LABS:                        8.3    3.62  )-----------( 79       ( 10 [facility] 2021 01:22 )             24.8     06-10    142  |  103  |  103<H>  ----------------------------<  155<H>  3.8   |  17<L>  |  6.47<H>    Ca    9.7      10 [facility] 2021 01:22    TPro  5.8<L>  /  Alb  3.6  /  TBili  1.1  /  DBili  x   /  AST  17  /  ALT  13  /  AlkPhos  46  06-10    PT/INR - ( 2021 01:18 )   PT: 17.1 sec;   INR: 1.45 ratio         PTT - ( 2021 01:18 )  PTT:50.5 sec  Urinalysis Basic - ( 2021 13:24 )    Color: BROWN / Appearance: Slightly Turbid Urine chemistry performed on supernatant. / S.015 / pH: x  Gluc: x / Ketone: Negative  / Bili: Negative / Urobili: Negative   Blood: x / Protein: 100 mg/dL / Nitrite: Negative   Leuk Esterase: Large / RBC: >50 /hpf / WBC 15 /HPF   Sq Epi: x / Non Sq Epi: Occasional / Bacteria: Few        CAPILLARY BLOOD GLUCOSE      POCT Blood Glucose.: 156 mg/dL (10 [facility] 2021 06:04)      RADIOLOGY & ADDITIONAL TESTS: Reviewed. MICU PROGRESS NOTE    INTERVAL HPI/OVERNIGHT EVENTS:  Creatinine worsening. Anuric still.    SUBJECTIVE:   Awake.  OBJECTIVE:  Nods/shakes head to questions. Squeezes hands to commands.    VITAL SIGNS:  ICU Vital Signs Last 24 Hrs  T(C): 36.4 (10 [facility] 2021 04:00), Max: 36.8 (2021 15:00)  T(F): 97.5 (10 [facility] 2021 04:00), Max: 98.2 (2021 15:00)  HR: 75 (10 [facility] 2021 07:00) (65 - 89)  BP: 159/72 (10 [facility] 2021 07:00) (93/51 - 159/72)  BP(mean): 103 (10 [facility] 2021 07:00) (68 - 103)  ABP: --  ABP(mean): --  RR: 21 (10 [facility] 2021 07:00) (15 - 36)  SpO2: 97% (10 [facility] 2021 07:00) (93% - 100%)      VENTS:      I&O:     @ 07:01  -  06-10 @ 07:00  --------------------------------------------------------  IN: 590 mL / OUT: 65 mL / NET: 525 mL        PHYSICAL EXAM:  GENERAL: tired appearing  HEENT: +NGT  CHEST/LUNG: +rhonci, +no BS on R, sounds of aspiration +accessory muscle use  HEART: Regular rate and rhythm; No murmurs, rubs, or gallops  ABDOMEN: +distended, tense  EXTREMITIES:  pitting edema of b/l le  SKIN: No rashes or lesions  NERVOUS SYSTEM:   follows commands   LINES:                   MEDICATIONS:  MEDICATIONS  (STANDING):  artificial  tears Solution 1 Drop(s) Both EYES three times a day  chlorhexidine 4% Liquid 1 Application(s) Topical <User Schedule>  insulin lispro (ADMELOG) corrective regimen sliding scale   SubCutaneous every 6 hours  levothyroxine Injectable 44 MICROGram(s) IV Push at bedtime  midodrine 20 milliGRAM(s) Oral every 8 hours  nystatin Powder 1 Application(s) Topical three times a day  pantoprazole  Injectable 40 milliGRAM(s) IV Push every 12 hours  petrolatum Ophthalmic Ointment 1 Application(s) Both EYES daily  piperacillin/tazobactam IVPB.. 3.375 Gram(s) IV Intermittent every 12 hours  rifAXIMin 550 milliGRAM(s) Oral two times a day  sucralfate suspension 1 Gram(s) Oral every 6 hours    MEDICATIONS  (PRN):  albuterol/ipratropium for Nebulization 3 milliLiter(s) Nebulizer every 6 hours PRN Shortness of Breath and/or Wheezing      ALLERGIES:  Allergies    No Known Allergies    Intolerances        LABS:                        8.3    3.62  )-----------( 79       ( 10 [facility] 2021 01:22 )             24.8     06-10    142  |  103  |  103<H>  ----------------------------<  155<H>  3.8   |  17<L>  |  6.47<H>    Ca    9.7      10 [facility] 2021 01:22    TPro  5.8<L>  /  Alb  3.6  /  TBili  1.1  /  DBili  x   /  AST  17  /  ALT  13  /  AlkPhos  46  06-10    PT/INR - ( 2021 01:18 )   PT: 17.1 sec;   INR: 1.45 ratio         PTT - ( 2021 01:18 )  PTT:50.5 sec  Urinalysis Basic - ( 2021 13:24 )    Color: BROWN / Appearance: Slightly Turbid Urine chemistry performed on supernatant. / S.015 / pH: x  Gluc: x / Ketone: Negative  / Bili: Negative / Urobili: Negative   Blood: x / Protein: 100 mg/dL / Nitrite: Negative   Leuk Esterase: Large / RBC: >50 /hpf / WBC 15 /HPF   Sq Epi: x / Non Sq Epi: Occasional / Bacteria: Few        CAPILLARY BLOOD GLUCOSE      POCT Blood Glucose.: 156 mg/dL (10 [facility] 2021 06:04)      RADIOLOGY & ADDITIONAL TESTS: Reviewed.

## 2021-06-10 NOTE — CHART NOTE - NSCHARTNOTEFT_GEN_A_CORE
Discussed with patient's daughter Cleopatra and patient's wife with assistance of .  Explained to them that he was not a candidate for dialysis as per d/w renal, due to his end-stage medical conditions. Though it was hard for them to hear, they explained that they understood. This AM pt had self-removed his NGT. After d/w family, decision made not to replace NGT due to patient discomfort. Family would like to allow patient to have sips of water for comfort. Pt was restarted on levophed due to low blood pressure. Family would like to continue with levophed at this time to support blood pressure. Discussed with family option to go to PCU with fixed dose of pressor, which they expressed they will consider strongly. They will discuss more with their family tonight.    Discussed with Dr. Velazquez of palliative about these above updates. Dr. Velazquez/palliative will reach out to family tomorrow for further discussion about above.    Pressors are NOT capped currently  Do not place ngt  Pt can have sips of water if requested for comfort Discussed with patient's daughter Cleopatra and patient's wife with assistance of .  Explained to them that he was not a candidate for dialysis as per d/w renal, due to his end-stage medical conditions. Though it was hard for them to hear, they explained that they understood. This AM pt had self-removed his NGT. After d/w family, decision made not to replace NGT due to patient discomfort. Family would like to allow patient to have sips of water for comfort. Pt was restarted on levophed due to low blood pressure. Family would like to continue with levophed at this time to support blood pressure. Discussed with family option to go to PCU with fixed dose of pressor, which they expressed they will consider strongly. They will discuss more with their family tonight.    Discussed with Dr. Velazquez of palliative about these above updates. Dr. Velazquez/palliative will reach out to family tomorrow for further discussion about above.    Pressors are NOT capped.  Do not place ngt  Pt can have sips of water if requested for comfort Discussed with patient's daughter Cleopatra and patient's wife with assistance of .  Explained to them that he was not a candidate for dialysis as per d/w renal, due to his end-stage medical conditions. Though it was hard for them to hear, they explained that they understood. This AM pt had self-removed his NGT. After d/w family, decision made not to replace NGT due to patient discomfort. Family would like to allow patient to have sips of water for comfort, understanding risks of aspiration. Pt was restarted on levophed due to low blood pressure. Family would like to continue with levophed at this time to support blood pressure. Discussed with family option to go to PCU with fixed dose of pressor, which they expressed they will consider strongly. They will discuss more with their family tonight.    Discussed with Dr. Velazquez of palliative about these above updates. Dr. Velazquez/palliative will reach out to family tomorrow for further discussion about above.    Pressors are NOT capped.  Do not place ngt  Pt can have sips of water if requested for comfort

## 2021-06-10 NOTE — PROGRESS NOTE ADULT - ASSESSMENT
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  Now readmitted to MICU for hypoxemic respiratory failure requiring intubation in the setting of worsening hepatic encephalopathy, aspiration, and large right pleural effusion, s/p thora 6/2,s/p extubation 6/7, now with acute renal failure, suspect ATN.      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  - Off sedation  -awake, following commands     Cardiovascular:  -Hx of HF (EF 40-45%), AICD  - Continue to wean levophed, currently off levophed, but intermittently needs levo  - Continue with midodrine 20mg TID to wean levo  -Holding Aldactone, Lasix     Pulmonary: Hypoxemic Respiratory Failure in the setting of hepatic encephalopathy, aspiration, large right pleural effusion, intubated 6/2, extubated 6/7  -Titrate FIo2 to maintain SPO2 > 92, ABGs  -Broad spectrum abx; zosyn for presumed aspiration PNA Complete on 6/8  -Duoneb prn for wheezing  -Patient likely aspirating    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  - s/p thora #2 6/2: elevated triglycerides to 120  -protein ratio < 0.5 -- suggests transudative  -pleural fluid LDH/serum LDH ratio (however serum LDH not at same time as thora) > 0.5  -cytopath: no cancer  CXR 6/8 with reaccumulation of pleural effusion      GI    End-stage cirrhosis  -Hx of GIB: EGD 5/18 with small esophageal varices, large cratered duodenal ulcer   -Continue protonix ppi BID  -Hx of Cirrhosis  -s/p para 5/28 in IR (therapeutic drain with removal of 400cc cloudly pink fluid)  - POCUS on 6/6 showing recollection of ascites - will try for another paracentesis today  -Hepatology on board; f/u further recommendations  -Continue Lactulose/Rifaximin for hepatic encephalopathy, titrate 2 BM daily  -Continue Carafate as per hepatology      Feeds:  Vital TFs  Hold d/t aspiration    Renal    #Acute renal failure, likely ATN i/s/o 4.9L para on 6/6  -Intake and output q1  -Renally dose meds based on Gfr -- zosyn redosed  -6/7:s/p reyes place 6/7 -pt with enlarged prostate on CT  -s/p  bumex  gtt on 6/7 -- with minimal UOP --> discontinue  -6/8 currently no indication for dialysis; c/w albumin 25% q6 for 4 doses      ID    Sepsis - meeting criteria with hypothermia, and leukopenia  -Pan culture bld cx x 2, UA, sputum cx, MRSA swab  -ABX:   s/p zosyn 6/1 - 6/8 for 7d for empiric coverage for aspiration pna  zosyn 6/9 - xxx for aspiration PNA  -De-escalate abx based on sensitivities  -Follow up ID for further recommendations    Endo  -Continue synthroid  -Monitor blood glucose q6h  - hold TFs, hold NPH 5u q6h  - c/w moderate ISS    Heme  -Hgb slightly dropped 6/7  -Maintain active type and screen    Ppx  -Continue Protonix PPI BID  -PAS for DVT ppx    GOC  -Full Code  -Have been speaking with patient's wife and patient's daughter (Cleopatra). 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 Explained to famliy the cirrhosis is in endstage and the ascites will continue to reaccumulate as he has had thora/para 2x each. Now with renal failure. Family seems to understand.   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  Now readmitted to MICU for hypoxemic respiratory failure requiring intubation in the setting of worsening hepatic encephalopathy, aspiration, and large right pleural effusion, s/p thora 6/2,s/p extubation 6/7, now with acute renal failure, suspect ATN.      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  - Off sedation  -awake, following commands     Cardiovascular:  -Hx of HF (EF 40-45%), AICD  - Continue to wean levophed, currently off levophed, but intermittently needs levo  - Continue with midodrine 20mg TID to wean levo  -Holding Aldactone, Lasix     Pulmonary: Hypoxemic Respiratory Failure in the setting of hepatic encephalopathy, aspiration, large right pleural effusion, intubated 6/2, extubated 6/7  -Titrate FIo2 to maintain SPO2 > 92, ABGs  -Broad spectrum abx; zosyn for presumed aspiration PNA Complete on 6/8  -Duoneb prn for wheezing  -Patient likely aspirating    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  - s/p thora #2 6/2: elevated triglycerides to 120  -protein ratio < 0.5 -- suggests transudative  -pleural fluid LDH/serum LDH ratio (however serum LDH not at same time as thora) > 0.5  -cytopath: no cancer  CXR 6/8 with reaccumulation of pleural effusion      GI    End-stage cirrhosis  -Hx of GIB: EGD 5/18 with small esophageal varices, large cratered duodenal ulcer   -Continue protonix ppi BID  -Hx of Cirrhosis  -s/p para 5/28 in IR (therapeutic drain with removal of 400cc cloudly pink fluid)  - POCUS on 6/6 showing recollection of ascites - will try for another paracentesis today  -Hepatology on board; f/u further recommendations  -Continue Lactulose/Rifaximin for hepatic encephalopathy, titrate 2 BM daily  -Continue Carafate as per hepatology      Feeds:  Vital TFs  Hold d/t aspiration    Renal    #Acute renal failure, likely ATN i/s/o 4.9L para on 6/6  -Intake and output q1  -Renally dose meds based on Gfr -- zosyn redosed  -6/7:s/p eryes place 6/7 -pt with enlarged prostate on CT  -s/p  bumex  gtt on 6/7 -- with minimal UOP --> discontinue  -6/8 currently no indication for dialysis; c/w albumin 25% q6 for 4 doses  6/10 Cr worsening, urine now yellow (may have been red tinged earlier due to trauma)      ID    Sepsis - meeting criteria with hypothermia, and leukopenia  -Pan culture bld cx x 2, UA, sputum cx, MRSA swab  -ABX:   s/p zosyn 6/1 - 6/8 for 7d for empiric coverage for aspiration pna  zosyn 6/9 - xxx for aspiration PNA  -De-escalate abx based on sensitivities  -Follow up ID for further recommendations  -6/9 repeat Bcx, UA sent    Endo  -Continue synthroid  -Monitor blood glucose q6h  - hold TFs, hold NPH 5u q6h  - c/w moderate ISS    Heme  -Hgb slightly dropped 6/7  -Maintain active type and screen    Ppx  -Continue Protonix PPI BID  -PAS for DVT ppx    GOC    -See GOC note from 6/9:  DNR, but NOT DNI  Family wants intubation if needed  Family wants thoracentesis if needed   SENAIT JHAVERI is a 84yo Male with PMH HF (EF 40-45%), 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  Now readmitted to MICU for hypoxemic respiratory failure requiring intubation in the setting of worsening hepatic encephalopathy, aspiration, and large right pleural effusion, s/p thora 6/2,s/p extubation 6/7, now with acute renal failure, suspect ATN.      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  - Off sedation  -awake, following commands     Cardiovascular:  -Hx of HF (EF 40-45%), AICD  - Continue to wean levophed, currently off levophed, but intermittently needs levo  - Continue with midodrine 20mg TID to wean levo  -Holding Aldactone, Lasix     Pulmonary: Hypoxemic Respiratory Failure in the setting of hepatic encephalopathy, aspiration, large right pleural effusion, intubated 6/2, extubated 6/7  -Titrate FIo2 to maintain SPO2 > 92, ABGs  -Broad spectrum abx; zosyn for presumed aspiration PNA Complete on 6/8  -Duoneb prn for wheezing  -Patient likely aspirating  -tachypneic due to acidemia and pleural effusion    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  - s/p thora #2 6/2: elevated triglycerides to 120  -protein ratio < 0.5 -- suggests transudative  -pleural fluid LDH/serum LDH ratio (however serum LDH not at same time as thora) > 0.5  -cytopath: no cancer  CXR 6/8 with reaccumulation of pleural effusion  tachypneic    GI    End-stage cirrhosis  -Hx of GIB: EGD 5/18 with small esophageal varices, large cratered duodenal ulcer   -Continue protonix ppi BID  -Hx of Cirrhosis  -s/p para 5/28 in IR (therapeutic drain with removal of 400cc cloudly pink fluid)  - POCUS on 6/6 showing recollection of ascites - will try for another paracentesis today  -Hepatology on board; f/u further recommendations  -Continue Lactulose/Rifaximin for hepatic encephalopathy, titrate 2 BM daily  -Continue Carafate as per hepatology      Feeds:  Vital TFs  Hold d/t aspiration    Renal    #Acute renal failure, likely ATN i/s/o 4.9L para on 6/6  -Intake and output q1  -Renally dose meds based on Gfr -- zosyn redosed  -6/7:s/p reyes place 6/7 -pt with enlarged prostate on CT  -s/p  bumex  gtt on 6/7 -- with minimal UOP --> discontinue  -6/8 currently no indication for dialysis; c/w albumin 25% q6 for 4 doses  6/10 Cr worsening, urine now yellow (may have been red tinged earlier due to trauma)  -acidosis likley causing tachypnea      ID    Sepsis - meeting criteria with hypothermia, and leukopenia  -Pan culture bld cx x 2, UA, sputum cx, MRSA swab  -ABX:   s/p zosyn 6/1 - 6/8 for 7d for empiric coverage for aspiration pna  zosyn 6/9 - xxx for aspiration PNA  -De-escalate abx based on sensitivities  -Follow up ID for further recommendations  -6/9 repeat Bcx, UA sent    Endo  -Continue synthroid  -Monitor blood glucose q6h  - hold TFs, hold NPH 5u q6h  - c/w moderate ISS    Heme  -Hgb slightly dropped 6/7  -Maintain active type and screen    Ppx  -Continue Protonix PPI BID  -PAS for DVT ppx    GOC    -See GOC note from 6/9:  DNR, but NOT DNI  Family wants intubation if needed  Family wants thoracentesis if needed

## 2021-06-10 NOTE — PROGRESS NOTE ADULT - ATTENDING COMMENTS
ANDREEA/ATN - minimal UOP; CKD III    No indication for kidney replacement therapy  Given anuria, would minimize all drips/fluids (and stop diuretics given lack of response)  Maintain MAP >65  Poor prognosis, address goals of care; dialysis unlikely to confer significant clinical benefit  Remainder per fellow, will follow

## 2021-06-10 NOTE — PROGRESS NOTE ADULT - ATTENDING COMMENTS
1. Acute hypoxemic respiratory failure due hepatic encephalopathy and possible aspiration pneumonia. PPt tolerating extubation but requiring frequent suctioning. RR increasing to due to worsening metabolic acidosis.  2. Renal. ANDREEA from ATN.  Creatinine rising. baseline CKD3. Not responding to Bumex drip.. No need for HD at this point. HD will likely not change trajectory of patients multisystem organ failure. In addition , Pt most likely will not tolerate HD to   low BP requiring pressors .  3. Decompensated liver cirrhosis. Continue lactulose and rifaximin.  4. ID. Finished Zosyn dose for aspiration pneumonia   5. Hypotension. Still requiring low dose pressors.  6. Needs GOC care discussion with family  and patient. DNR at this point. Not DNI.

## 2021-06-10 NOTE — PROGRESS NOTE ADULT - ASSESSMENT
85M PMHx CKD, cirrhosis (?viral hepatitis) - admitted for decompensated cirrhosis.  Nephrology consulted for hyponatremia and ANDREEA on CKD.  Was intubated and transferred to MICU for septic shock in setting of aspiration PNA.    # ANDREEA  Pt with ANDREEA on CKD likely 2/2 ATN in the setting hypotension, entresto/lasix, acute anemia and decreased EABV. Now with new ANDREEA in setting of hypotension, and pre-renal etiology. Now in ATN. On admission sCr 2.0 (baseline sCr 1.4-1.6 in Jan 2019), peaked at 2.7mg/dl, improved to 1.2mg/dl. Now Scr further increased to 6.47 mg/dl today. Currently anuric. Urine output did not response with Bumex drip.  - Will discuss with ICU regarding need for renal replacement therapy, but no indication for HD for now  - BP optimization/antibiotic/blood transfusion per ICU team  - monitor BMP, strict I/O, avoid nephrotoxic agents (NSAIDs, PPI, contrast), renally dose medications per GFR.    # Hypernatremia  Resolved   S/p free water and D5W   Last Na 142  Monitor Na    If any questions, please feel free to contact me     Edwardo Davis  Nephrology Fellow  [facility] Pager: [phone]  [facility] Pager: [phone]

## 2021-06-11 NOTE — PROGRESS NOTE ADULT - ASSESSMENT
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  Now readmitted to MICU for hypoxemic respiratory failure requiring intubation in the setting of worsening hepatic encephalopathy, aspiration, and large right pleural effusion, s/p thora 6/2,s/p extubation 6/7, now with acute renal failure, suspect ATN.      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  - Off sedation  -awake, following commands     Cardiovascular:  -Hx of HF (EF 40-45%), AICD  - Continue to wean levophed, currently off levophed, but intermittently needs levo  - Continue with midodrine 20mg TID to wean levo  -Holding Aldactone, Lasix     Pulmonary: Hypoxemic Respiratory Failure in the setting of hepatic encephalopathy, aspiration, large right pleural effusion, intubated 6/2, extubated 6/7  -Titrate FIo2 to maintain SPO2 > 92, ABGs  -Broad spectrum abx; zosyn for presumed aspiration PNA Complete on 6/8  -Duoneb prn for wheezing  -Patient likely aspirating  -tachypneic due to acidemia and pleural effusion    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  - s/p thora #2 6/2: elevated triglycerides to 120  -protein ratio < 0.5 -- suggests transudative  -pleural fluid LDH/serum LDH ratio (however serum LDH not at same time as thora) > 0.5  -cytopath: no cancer  CXR 6/8 with reaccumulation of pleural effusion  tachypneic    GI    End-stage cirrhosis  -Hx of GIB: EGD 5/18 with small esophageal varices, large cratered duodenal ulcer   -Continue protonix ppi BID  -Hx of Cirrhosis  -s/p para 5/28 in IR (therapeutic drain with removal of 400cc cloudly pink fluid)  - POCUS on 6/6 showing recollection of ascites - will try for another paracentesis today  -Hepatology on board; f/u further recommendations  -Continue Lactulose/Rifaximin for hepatic encephalopathy, titrate 2 BM daily  -Continue Carafate as per hepatology      Feeds:  Vital TFs  Hold d/t aspiration    Renal    #Acute renal failure, likely ATN i/s/o 4.9L para on 6/6  -Intake and output q1  -Renally dose meds based on Gfr -- zosyn redosed  -6/7:s/p reyes place 6/7 -pt with enlarged prostate on CT  -s/p  bumex  gtt on 6/7 -- with minimal UOP --> discontinue  -6/8 currently no indication for dialysis; c/w albumin 25% q6 for 4 doses  6/10 Cr worsening, urine now yellow (may have been red tinged earlier due to trauma)  -acidosis likley causing tachypnea      ID    Sepsis - meeting criteria with hypothermia, and leukopenia  -Pan culture bld cx x 2, UA, sputum cx, MRSA swab  -ABX:   s/p zosyn 6/1 - 6/8 for 7d for empiric coverage for aspiration pna  zosyn 6/9 - xxx for aspiration PNA  -De-escalate abx based on sensitivities  -Follow up ID for further recommendations  -6/9 repeat Bcx, UA sent    Endo  -Continue synthroid  -Monitor blood glucose q6h  - hold TFs, hold NPH 5u q6h  - c/w moderate ISS    Heme  -Hgb slightly dropped 6/7  -Maintain active type and screen    Ppx  -Continue Protonix PPI BID  -PAS for DVT ppx    GOC    -See GOC note from 6/9:  DNR, but NOT DNI  Family wants intubation if needed  Family wants thoracentesis if needed   SENAIT JHAVERI is a 84yo Male with PMH HF (EF 40-45%), 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  Now readmitted to MICU for hypoxemic respiratory failure requiring intubation in the setting of worsening hepatic encephalopathy, aspiration, and large right pleural effusion, s/p thora 6/2,s/p extubation 6/7, now with acute renal failure, suspect ATN.      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  - Off sedation  -awake, following commands     Cardiovascular:  -Hx of HF (EF 40-45%), AICD  - Continue to wean levophed, currently off levophed, but intermittently needs levo  - s/p midodrine 20mg TID  -On levo, no oral access  -Holding Aldactone, Lasix     Pulmonary: Hypoxemic Respiratory Failure in the setting of hepatic encephalopathy, aspiration, large right pleural effusion, intubated 6/2, extubated 6/7  -Titrate FIo2 to maintain SPO2 > 92, ABGs  -Broad spectrum abx; zosyn for presumed aspiration PNA Complete on 6/8  -Duoneb satnding for wheezing  -Patient likely aspirating  -tachypneic due to acidemia and pleural effusion    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  - s/p thora #2 6/2: elevated triglycerides to 120  -protein ratio < 0.5 -- suggests transudative  -pleural fluid LDH/serum LDH ratio (however serum LDH not at same time as thora) > 0.5  -cytopath: no cancer  CXR 6/8 with reaccumulation of pleural effusion  tachypneic    GI    End-stage cirrhosis  -Hx of GIB: EGD 5/18 with small esophageal varices, large cratered duodenal ulcer   -Continue protonix ppi BID  -Hx of Cirrhosis  -s/p para 5/28 in IR (therapeutic drain with removal of 400cc cloudly pink fluid)  - POCUS on 6/6 showing recollection of ascites - will try for another paracentesis today  -Hepatology on board; f/u further recommendations  -s/p Lactulose/Rifaximin for hepatic encephalopathy, patient   -Continue Carafate as per hepatology      Feeds:  Vital TFs  Hold d/t aspiration    Renal    #Acute renal failure, likely ATN i/s/o 4.9L para on 6/6  -Intake and output q1  -Renally dose meds based on Gfr -- zosyn redosed  -6/7:s/p reyes place 6/7 -pt with enlarged prostate on CT  -s/p  bumex  gtt on 6/7 -- with minimal UOP --> discontinue  -6/8 currently no indication for dialysis; c/w albumin 25% q6 for 4 doses  6/10 Cr worsening, urine now yellow (may have been red tinged earlier due to trauma)  -acidosis likley causing tachypnea      ID    Sepsis - meeting criteria with hypothermia, and leukopenia  -Pan culture bld cx x 2, UA, sputum cx, MRSA swab  -ABX:   s/p zosyn 6/1 - 6/8 for 7d for empiric coverage for aspiration pna  zosyn 6/9 - xxx for aspiration PNA  -De-escalate abx based on sensitivities  -Follow up ID for further recommendations  -6/9 repeat Bcx, UA sent    Endo  -Continue synthroid  -Monitor blood glucose q6h  - hold TFs, hold NPH 5u q6h  - c/w moderate ISS    Heme  -Hgb slightly dropped 6/7  -Maintain active type and screen    Ppx  -Continue Protonix PPI BID  -PAS for DVT ppx    GOC    -See GOC note from 6/9:  DNR, but NOT DNI  Family wants intubation if needed  Family wants thoracentesis if needed   SENAIT JHAVERI is a 86yo Male with PMH HF (EF 40-45%), 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  Now readmitted to MICU for hypoxemic respiratory failure requiring intubation in the setting of worsening hepatic encephalopathy, aspiration, and large right pleural effusion, s/p thora 6/2,s/p extubation 6/7, now with acute renal failure, suspect ATN.      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  - Off sedation  -awake, following commands     Cardiovascular:  -Hx of HF (EF 40-45%), AICD  - Continue to wean levophed, currently off levophed, but intermittently needs levo  - s/p midodrine 20mg TID  -On levo, no oral access  -Holding Aldactone, Lasix     Pulmonary: Hypoxemic Respiratory Failure in the setting of hepatic encephalopathy, aspiration, large right pleural effusion, intubated 6/2, extubated 6/7  -Titrate FIo2 to maintain SPO2 > 92, ABGs  -Broad spectrum abx; zosyn for presumed aspiration PNA Complete on 6/8  -Duoneb satnding for wheezing  -Patient likely aspirating  -tachypneic due to acidemia and pleural effusion    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  - s/p thora #2 6/2: elevated triglycerides to 120  -protein ratio < 0.5 -- suggests transudative  -pleural fluid LDH/serum LDH ratio (however serum LDH not at same time as thora) > 0.5  -cytopath: no cancer  CXR 6/8 with reaccumulation of pleural effusion  tachypneic    GI    End-stage cirrhosis  -Hx of GIB: EGD 5/18 with small esophageal varices, large cratered duodenal ulcer   -Continue protonix ppi BID  -Hx of Cirrhosis  -s/p para 5/28 in IR (therapeutic drain with removal of 400cc cloudly pink fluid)  - POCUS on 6/6 showing recollection of ascites - will try for another paracentesis today  -Hepatology on board; f/u further recommendations  -s/p Lactulose/Rifaximin for hepatic encephalopathy, patient   -Continue Carafate as per hepatology      Feeds:  Vital TFs  Hold d/t aspiration    Renal    #Acute renal failure, likely ATN i/s/o 4.9L para on 6/6  -Intake and output q1  -Renally dose meds based on Gfr -- zosyn redosed  -6/7:s/p reyes place 6/7 -pt with enlarged prostate on CT  -s/p  bumex  gtt on 6/7 -- with minimal UOP --> discontinue  -6/8 currently no indication for dialysis; c/w albumin 25% q6 for 4 doses  6/10 Cr worsening, urine now yellow (may have been red tinged earlier due to trauma)  6/11 cr worsening , as per renal not offering dialysis/CRRT  -acidosis likley causing tachypnea      ID    Sepsis - meeting criteria with hypothermia, and leukopenia  -Pan culture bld cx x 2, UA, sputum cx, MRSA swab  -ABX:   s/p zosyn 6/1 - 6/8 for 7d for empiric coverage for aspiration pna   zosyn 6/9-xx  --empiric dosing , restarted for sepsis with hyothermia and luekopenia  -De-escalate abx based on sensitivities  -Follow up ID for further recommendations  -6/9 repeat Bcx, UA sent    Endo  -Continue synthroid  -Monitor blood glucose q6h  - hold TFs, hold NPH 5u q6h  - c/w moderate ISS    Heme  -Hgb slightly dropped 6/7  -Maintain active type and screen    Ppx  -Continue Protonix PPI BID  -PAS for DVT ppx    GOC    -See GOC note from 6/9, 6/10  DNR, but NOT DNI  Family wants intubation if needed  Family wants thoracentesis if needed  Continue with pressors as needed  Chips/sips of water for comfort  Further discussion with palli

## 2021-06-11 NOTE — PROGRESS NOTE ADULT - ATTENDING COMMENTS
ANDREEA/ATN - minimal UOP; CKD III    No indication for kidney replacement therapy; dialysis unlikely to confer significant clinical benefit  Minimize all drips/fluids  Maintain MAP >65  Poor prognosis, goals of care being addressed  Remainder per fellow, d/w ICU, will follow

## 2021-06-11 NOTE — GOALS OF CARE CONVERSATION - ADVANCED CARE PLANNING - CONVERSATION DETAILS
Full note to f/u   d/w the patient's daughter and wife about risks of aspiration and high likelihood for needing to be intubated if they were to continue a diet.   His daughter was to discuss code status with her family.   If DNI, will then consider transitioning to PCU; otherwise, the patient will need to stay in the MICU.     If going to PCU, the plan will be for capping pressors, continuing pleasure feeds (dysphagia diet and nectar thickened liquids), Rifaximin, Zosyn, Protonix, and HHN.     d/w primary team. When I went to see the patient, his wife was giving him water. Though the patient was alert, he was coughing and clearly aspirating. He was having mild to moderate respiratory distress that improved with suctioning. I then d/w the patient's daughter and wife about risks of aspiration and high likelihood for needing to be intubated if they were to continue to give him a diet and particularly thin liquids. His daughter stated the patient did not like thin liquids and that he was actually asking for tea. I stated that the patient's family needed to clarify goals since their current plan of care was not really aligned. To me, I did not make sense to give him a diet if he was still not DNI. His family was putting him at risk of aspiration and then needing intubation. If they were looking for prolonging his life as a priority, they then should place him NPO. On the other hand, if thinking about quality of life as a priority and giving him pleasure feeds, they then should consider Nectar thickened fluids, DNI, and PCU transferring. I re-stated that in PCU no telemetry will be provided; however, that capped pressors, continuing pleasure feeds (dysphagia diet and nectar thickened liquids), Rifaximin, Zosyn, Protonix, and HHN can be provided. Furthermore, that in PCU medications for symptomatic relief will be used and including opioids and sedatives.      His daughter was to discuss code status with her family.   If DNI, will then consider transitioning to PCU; otherwise, the patient will need to stay in the MICU.       d/w primary team. When I went to see the patient, his wife was giving him water. Though the patient was alert, he was coughing and clearly aspirating. He was having mild to moderate respiratory distress that improved with suctioning. I then d/w the patient's daughter and wife about risks of aspiration and high likelihood for needing to be intubated if they were to continue to give him a diet and particularly thin liquids. His daughter stated the patient did not like thin liquids and that he was actually asking for tea. I stated that the patient's family needed to clarify goals since their current plan of care was not really aligned. To me, I did not make sense to give him a diet if he was still not DNI. His family was putting him at risk of aspiration and then needing intubation. If they were looking for prolonging his life as a priority, they then should place him NPO. On the other hand, if thinking about quality of life as a priority and giving him pleasure feeds, they then should consider Nectar thickened fluids, DNI, and PCU transferring. I re-stated that in PCU no telemetry will be provided; however, that capped pressors, continuing pleasure feeds (dysphagia diet and nectar thickened liquids), Rifaximin, Zosyn, Protonix, and HHN can be provided. Furthermore, that in PCU medications for symptomatic relief will be used and including opioids and sedatives.      His daughter also inquired about nutrition and IV hydration. I stated the patient was in renal failure and without good output. Therefore, if giving him fluids he will become fluid overload and in distress. She understood that due to risks, IVF are probably contraindicated.     His daughter was to discuss code status with her family.   If DNI, will then consider transitioning to PCU; otherwise, the patient will need to stay in the MICU.       d/w primary team.

## 2021-06-11 NOTE — PROGRESS NOTE ADULT - SUBJECTIVE AND OBJECTIVE BOX
MICU PROGRESS NOTE    INTERVAL HPI/OVERNIGHT EVENTS:    SUBJECTIVE:     OBJECTIVE:    VITAL SIGNS:  ICU Vital Signs Last 24 Hrs  T(C): 36.1 (2021 04:00), Max: 36.5 (2021 00:00)  T(F): 97 (2021 04:00), Max: 97.7 (2021 00:00)  HR: 85 (2021 05:00) (58 - 85)  BP: 111/58 (2021 05:00) (78/40 - 142/108)  BP(mean): 77 (2021 05:00) (54 - 120)  ABP: --  ABP(mean): --  RR: 25 (2021 05:00) (14 - 34)  SpO2: 100% (2021 05:) (96% - 100%)      VENTS:      I&O:    -10 @ 07:01  -  06-11 @ 07:00  --------------------------------------------------------  IN: 226.8 mL / OUT: 60 mL / NET: 166.8 mL        PHYSICAL EXAM:  GENERAL: NAD, conversant  CHEST/LUNG: Clear to auscultation bilaterally; No crackles, rhonchi, wheezing, or rubs  HEART: Regular rate and rhythm; No murmurs, rubs, or gallops  ABDOMEN: Soft, Nontender, Nondistended; Bowel sounds present  EXTREMITIES:  2+ Peripheral Pulses, No clubbing, cyanosis, or edema  SKIN: No rashes or lesions  NERVOUS SYSTEM:  Alert & Oriented, Good concentration      LINES:                                       MEDICATIONS:  MEDICATIONS  (STANDING):  artificial  tears Solution 1 Drop(s) Both EYES three times a day  chlorhexidine 4% Liquid 1 Application(s) Topical <User Schedule>  insulin lispro (ADMELOG) corrective regimen sliding scale   SubCutaneous every 6 hours  levothyroxine Injectable 44 MICROGram(s) IV Push at bedtime  midodrine 20 milliGRAM(s) Oral every 8 hours  norepinephrine Infusion 0.05 MICROgram(s)/kG/Min (7.14 mL/Hr) IV Continuous <Continuous>  nystatin Powder 1 Application(s) Topical three times a day  pantoprazole  Injectable 40 milliGRAM(s) IV Push every 12 hours  petrolatum Ophthalmic Ointment 1 Application(s) Both EYES daily  piperacillin/tazobactam IVPB.. 3.375 Gram(s) IV Intermittent every 12 hours  rifAXIMin 550 milliGRAM(s) Oral two times a day  sucralfate suspension 1 Gram(s) Oral every 6 hours    MEDICATIONS  (PRN):  albuterol/ipratropium for Nebulization 3 milliLiter(s) Nebulizer every 6 hours PRN Shortness of Breath and/or Wheezing      ALLERGIES:  Allergies    No Known Allergies    Intolerances        LABS:                        9.5    5.28  )-----------( 90       ( 2021 00:21 )             28.7     06-11    141  |  102  |  103<H>  ----------------------------<  173<H>  4.4   |  13<L>  |  7.49<H>    Ca    9.6      2021 00:21    TPro  5.8<L>  /  Alb  3.5  /  TBili  1.5<H>  /  DBili  x   /  AST  18  /  ALT  13  /  AlkPhos  48  06-11    PT/INR - ( 2021 00:21 )   PT: 18.8 sec;   INR: 1.60 ratio         PTT - ( 2021 00:21 )  PTT:49.6 sec  Urinalysis Basic - ( 2021 13:24 )    Color: BROWN / Appearance: Slightly Turbid Urine chemistry performed on supernatant. / S.015 / pH: x  Gluc: x / Ketone: Negative  / Bili: Negative / Urobili: Negative   Blood: x / Protein: 100 mg/dL / Nitrite: Negative   Leuk Esterase: Large / RBC: >50 /hpf / WBC 15 /HPF   Sq Epi: x / Non Sq Epi: Occasional / Bacteria: Few        CAPILLARY BLOOD GLUCOSE      POCT Blood Glucose.: 143 mg/dL (2021 05:20)      RADIOLOGY & ADDITIONAL TESTS: Reviewed. MICU PROGRESS NOTE    INTERVAL HPI/OVERNIGHT EVENTS:  On levophed.    SUBJECTIVE:   This AM, follows commands to squeeze hands. Appears tired, deep breathing.   Endorses pain.    OBJECTIVE:    VITAL SIGNS:  ICU Vital Signs Last 24 Hrs  T(C): 36.1 (2021 04:00), Max: 36.5 (2021 00:00)  T(F): 97 (2021 04:00), Max: 97.7 (2021 00:00)  HR: 85 (2021 05:00) (58 - 85)  BP: 111/58 (2021 05:00) (78/40 - 142/108)  BP(mean): 77 (2021 05:00) (54 - 120)  ABP: --  ABP(mean): --  RR: 25 (2021 05:00) (14 - 34)  SpO2: 100% (2021 05:00) (96% - 100%)      VENTS:      I&O:    06-10 @ 07:01  -  -11 @ 07:00  --------------------------------------------------------  IN: 226.8 mL / OUT: 60 mL / NET: 166.8 mL        PHYSICAL EXAM:    GENERAL: tired appearing  HEENT: awake   CHEST/LUNG: +CTAB +accessory muscle use  HEART: Regular rate and rhythm; No murmurs, rubs, or gallops  ABDOMEN: +distended, tense  EXTREMITIES:  pitting edema of b/l le  SKIN: No rashes or lesions  NERVOUS SYSTEM:   follows commands   LINES:       peripherals                                            MEDICATIONS:  MEDICATIONS  (STANDING):  artificial  tears Solution 1 Drop(s) Both EYES three times a day  chlorhexidine 4% Liquid 1 Application(s) Topical <User Schedule>  insulin lispro (ADMELOG) corrective regimen sliding scale   SubCutaneous every 6 hours  levothyroxine Injectable 44 MICROGram(s) IV Push at bedtime  midodrine 20 milliGRAM(s) Oral every 8 hours  norepinephrine Infusion 0.05 MICROgram(s)/kG/Min (7.14 mL/Hr) IV Continuous <Continuous>  nystatin Powder 1 Application(s) Topical three times a day  pantoprazole  Injectable 40 milliGRAM(s) IV Push every 12 hours  petrolatum Ophthalmic Ointment 1 Application(s) Both EYES daily  piperacillin/tazobactam IVPB.. 3.375 Gram(s) IV Intermittent every 12 hours  rifAXIMin 550 milliGRAM(s) Oral two times a day  sucralfate suspension 1 Gram(s) Oral every 6 hours    MEDICATIONS  (PRN):  albuterol/ipratropium for Nebulization 3 milliLiter(s) Nebulizer every 6 hours PRN Shortness of Breath and/or Wheezing      ALLERGIES:  Allergies    No Known Allergies    Intolerances        LABS:                        9.5    5.28  )-----------( 90       ( 2021 00:21 )             28.7     06-11    141  |  102  |  103<H>  ----------------------------<  173<H>  4.4   |  13<L>  |  7.49<H>    Ca    9.6      2021 00:21    TPro  5.8<L>  /  Alb  3.5  /  TBili  1.5<H>  /  DBili  x   /  AST  18  /  ALT  13  /  AlkPhos  48  06-11    PT/INR - ( 2021 00:21 )   PT: 18.8 sec;   INR: 1.60 ratio         PTT - ( 2021 00:21 )  PTT:49.6 sec  Urinalysis Basic - ( 2021 13:24 )    Color: BROWN / Appearance: Slightly Turbid Urine chemistry performed on supernatant. / S.015 / pH: x  Gluc: x / Ketone: Negative  / Bili: Negative / Urobili: Negative   Blood: x / Protein: 100 mg/dL / Nitrite: Negative   Leuk Esterase: Large / RBC: >50 /hpf / WBC 15 /HPF   Sq Epi: x / Non Sq Epi: Occasional / Bacteria: Few        CAPILLARY BLOOD GLUCOSE      POCT Blood Glucose.: 143 mg/dL (2021 05:20)      RADIOLOGY & ADDITIONAL TESTS: Reviewed.

## 2021-06-11 NOTE — PROGRESS NOTE ADULT - ATTENDING COMMENTS
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  Pt readmitted to MICU for hypoxemic respiratory failure requiring intubation in the setting of worsening hepatic encephalopathy, aspiration, and large R pleural effusion, s/p thoracentesis 6/2, s/p extubation 6/7 and most recently pt with acute renal failure 2 to ATN    Resp/ Renal/ Social: Pt is DNR/ Agree with Renal NOT providing HD;  will not improve long term prognosis/ Disagree with family requesting intubaion/MV if necessary/ Cont discussing GOC with family/ Pt's long term prognosis is poor/ F/u as per Palliative Care  ID: Cont Zosyn (pt in septic shock to ? superimposed R PNA)  CVS: Cont Midodrine + Levophed to maintain MAP >65  HEME: Thrombocytopenia improving  FEN: Would allow sips  Endo: Cont Synthroid  GI/ Neuro: Cont Rifaximin/ Pt is lethargic but answering questions appropriately in his native language.

## 2021-06-11 NOTE — PROGRESS NOTE ADULT - ASSESSMENT
85M PMHx CKD, cirrhosis (?viral hepatitis) - admitted for decompensated cirrhosis.  Nephrology consulted for hyponatremia and ANDREEA on CKD.  Was intubated and transferred to MICU for septic shock in setting of aspiration PNA.    # ANDREEA  Pt with ANDREEA on CKD likely 2/2 ATN in the setting hypotension, entresto/lasix, acute anemia and decreased EABV. Now with new ANDREEA in setting of hypotension, and pre-renal etiology. Now in ATN. On admission sCr 2.0 (baseline sCr 1.4-1.6 in Jan 2019), peaked at 2.7mg/dl, improved to 1.2mg/dl. Now Scr further increased to 7.49 mg/dl today. Currently anuric. Urine output did not response with Bumex drip.  - At this point, patient is not a candidate for HD given poor prognosis, and dialysis would not improve his overall outcome   - BP optimization/antibiotic/blood transfusion per ICU team  - monitor BMP, strict I/O, avoid nephrotoxic agents (NSAIDs, PPI, contrast), renally dose medications per GFR.    # Hypernatremia  Resolved   S/p free water and D5W   Last Na 141  Monitor Na    If any questions, please feel free to contact me     Edwardo Davis  Nephrology Fellow  [facility] Pager: [phone]  [facility] Pager: [phone]

## 2021-06-12 NOTE — PROGRESS NOTE ADULT - ASSESSMENT
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  Now readmitted to MICU for hypoxemic respiratory failure requiring intubation in the setting of worsening hepatic encephalopathy, aspiration, and large right pleural effusion, s/p thora 6/2,s/p extubation 6/7, now with acute renal failure, suspect ATN.      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  - Off sedation  -awake, following commands     Cardiovascular:  -Hx of HF (EF 40-45%), AICD  - Continue to wean levophed, currently off levophed, but intermittently needs levo  - s/p midodrine 20mg TID  -On levo, no oral access  -Holding Aldactone, Lasix     Pulmonary: Hypoxemic Respiratory Failure in the setting of hepatic encephalopathy, aspiration, large right pleural effusion, intubated 6/2, extubated 6/7  -Titrate FIo2 to maintain SPO2 > 92, ABGs  -Broad spectrum abx; zosyn for presumed aspiration PNA Complete on 6/8  -Duoneb satnding for wheezing  -Patient likely aspirating  -tachypneic due to acidemia and pleural effusion    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  - s/p thora #2 6/2: elevated triglycerides to 120  -protein ratio < 0.5 -- suggests transudative  -pleural fluid LDH/serum LDH ratio (however serum LDH not at same time as thora) > 0.5  -cytopath: no cancer  CXR 6/8 with reaccumulation of pleural effusion  tachypneic    GI    End-stage cirrhosis  -Hx of GIB: EGD 5/18 with small esophageal varices, large cratered duodenal ulcer   -Continue protonix ppi BID  -Hx of Cirrhosis  -s/p para 5/28 in IR (therapeutic drain with removal of 400cc cloudly pink fluid)  - POCUS on 6/6 showing recollection of ascites - will try for another paracentesis today  -Hepatology on board; f/u further recommendations  -s/p Lactulose/Rifaximin for hepatic encephalopathy, patient   -Continue Carafate as per hepatology      Feeds:  Vital TFs  Hold d/t aspiration    Renal    #Acute renal failure, likely ATN i/s/o 4.9L para on 6/6  -Intake and output q1  -Renally dose meds based on Gfr -- zosyn redosed  -6/7:s/p reyes place 6/7 -pt with enlarged prostate on CT  -s/p  bumex  gtt on 6/7 -- with minimal UOP --> discontinue  -6/8 currently no indication for dialysis; c/w albumin 25% q6 for 4 doses  6/10 Cr worsening, urine now yellow (may have been red tinged earlier due to trauma)  6/11 cr worsening , as per renal not offering dialysis/CRRT  -acidosis likley causing tachypnea      ID    Sepsis - meeting criteria with hypothermia, and leukopenia  -Pan culture bld cx x 2, UA, sputum cx, MRSA swab  -ABX:   s/p zosyn 6/1 - 6/8 for 7d for empiric coverage for aspiration pna   zosyn 6/9-xx  --empiric dosing , restarted for sepsis with hyothermia and luekopenia  -De-escalate abx based on sensitivities  -Follow up ID for further recommendations  -6/9 repeat Bcx, UA sent    Endo  -Continue synthroid  -Monitor blood glucose q6h  - hold TFs, hold NPH 5u q6h  - c/w moderate ISS    Heme  -Hgb slightly dropped 6/7  -Maintain active type and screen    Ppx  -Continue Protonix PPI BID  -PAS for DVT ppx    GOC    -See GOC note from 6/9, 6/10  DNR, but NOT DNI  Family wants intubation if needed  Family wants thoracentesis if needed  Continue with pressors as needed  Chips/sips of water for comfort  Further discussion with palli   SENAIT JHAVERI is a 86yo Male with PMH HF (EF 40-45%), 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  Now readmitted to MICU for hypoxemic respiratory failure requiring intubation in the setting of worsening hepatic encephalopathy, aspiration, and large right pleural effusion, s/p thora 6/2,s/p extubation 6/7, now with acute renal failure, suspect ATN.      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  - Off sedation  -awake, following commands     Cardiovascular:  -Hx of HF (EF 40-45%), AICD  - Continue to wean levophed, currently off levophed, but intermittently needs levo  - s/p midodrine 20mg TID  -On levo, no oral access  -Holding Aldactone, Lasix     Pulmonary: Hypoxemic Respiratory Failure in the setting of hepatic encephalopathy, aspiration, large right pleural effusion, intubated 6/2, extubated 6/7  -Titrate FIo2 to maintain SPO2 > 92, ABGs  -Broad spectrum abx; zosyn for presumed aspiration PNA Complete on 6/8  -Duoneb satnding for wheezing  -Patient likely aspirating  -tachypneic due to acidemia and pleural effusion    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  - s/p thora #2 6/2: elevated triglycerides to 120  -protein ratio < 0.5 -- suggests transudative  -pleural fluid LDH/serum LDH ratio (however serum LDH not at same time as thora) > 0.5  -cytopath: no cancer  CXR 6/8 with reaccumulation of pleural effusion  tachypneic  -6/12 thora #3- 2.5L removed from R pleural effusion - studies being sent    GI    End-stage cirrhosis  -Hx of GIB: EGD 5/18 with small esophageal varices, large cratered duodenal ulcer   -Continue protonix ppi BID  -Hx of Cirrhosis  -s/p para 5/28 in IR (therapeutic drain with removal of 400cc cloudly pink fluid)  - POCUS on 6/6 showing recollection of ascites - will try for another paracentesis today  -Hepatology on board; f/u further recommendations  -s/p Lactulose/Rifaximin for hepatic encephalopathy, patient   -Continue Carafate as per hepatology  [ ] revisit with family regarding whehter to replace ngt      Feeds:  Vital TFs  Hold d/t aspiration    Renal    #Acute renal failure, likely ATN i/s/o 4.9L para on 6/6  -Intake and output q1  -Renally dose meds based on Gfr -- zosyn redosed  -6/7:s/p reyes place 6/7 -pt with enlarged prostate on CT  -s/p  bumex  gtt on 6/7 -- with minimal UOP --> discontinue  -6/8 currently no indication for dialysis; c/w albumin 25% q6 for 4 doses  6/10 Cr worsening, urine now yellow (may have been red tinged earlier due to trauma)  6/11 cr worsening , as per renal not offering dialysis/CRRT  -acidosis likley causing tachypnea      ID    Sepsis - meeting criteria with hypothermia, and leukopenia  -Pan culture bld cx x 2, UA, sputum cx, MRSA swab  -ABX:   s/p zosyn 6/1 - 6/8 for 7d for empiric coverage for aspiration pna   zosyn 6/9-xx  --empiric dosing , restarted for sepsis with hyothermia and luekopenia  -De-escalate abx based on sensitivities  -Follow up ID for further recommendations  -6/9 repeat Bcx, UA sent    Endo  -Continue synthroid  -Monitor blood glucose q6h  - hold TFs d/t concern for aspiration, hold NPH 5u q6h  - c/w moderate ISS    Heme  -Hgb slightly dropped 6/7  -Maintain active type and screen    Ppx  -Continue Protonix PPI BID  -PAS for DVT ppx    GOC    -See GOC note from 6/9, 6/10  DNR, but NOT DNI  Family wants intubation if needed  Family wants thoracentesis if needed  Continue with pressors as needed  NPO after d/w family  Further discussion with palli

## 2021-06-12 NOTE — PROGRESS NOTE ADULT - SUBJECTIVE AND OBJECTIVE BOX
MICU PROGRESS NOTE    INTERVAL HPI/OVERNIGHT EVENTS:    SUBJECTIVE:     OBJECTIVE:    VITAL SIGNS:  ICU Vital Signs Last 24 Hrs  T(C): 36.2 (12 Jun 2021 04:00), Max: 36.4 (11 Jun 2021 16:00)  T(F): 97.2 (12 Jun 2021 04:00), Max: 97.5 (11 Jun 2021 16:00)  HR: 124 (12 Jun 2021 06:45) (76 - 127)  BP: 96/60 (12 Jun 2021 06:45) (78/53 - 133/60)  BP(mean): 74 (12 Jun 2021 06:45) (57 - 89)  ABP: --  ABP(mean): --  RR: 17 (12 Jun 2021 06:45) (13 - 38)  SpO2: 100% (12 Jun 2021 06:45) (90% - 100%)      VENTS:      I&O:    06-11 @ 07:01  -  06-12 @ 07:00  --------------------------------------------------------  IN: 373.7 mL / OUT: 30 mL / NET: 343.7 mL        PHYSICAL EXAM:  GENERAL: NAD, conversant  CHEST/LUNG: Clear to auscultation bilaterally; No crackles, rhonchi, wheezing, or rubs  HEART: Regular rate and rhythm; No murmurs, rubs, or gallops  ABDOMEN: Soft, Nontender, Nondistended; Bowel sounds present  EXTREMITIES:  2+ Peripheral Pulses, No clubbing, cyanosis, or edema  SKIN: No rashes or lesions  NERVOUS SYSTEM:  Alert & Oriented, Good concentration      LINES:                                       MEDICATIONS:  MEDICATIONS  (STANDING):  albuterol/ipratropium for Nebulization 3 milliLiter(s) Nebulizer every 6 hours  artificial  tears Solution 1 Drop(s) Both EYES three times a day  chlorhexidine 4% Liquid 1 Application(s) Topical <User Schedule>  insulin lispro (ADMELOG) corrective regimen sliding scale   SubCutaneous every 6 hours  levothyroxine Injectable 44 MICROGram(s) IV Push at bedtime  norepinephrine Infusion 0.05 MICROgram(s)/kG/Min (7.14 mL/Hr) IV Continuous <Continuous>  nystatin Powder 1 Application(s) Topical three times a day  pantoprazole  Injectable 40 milliGRAM(s) IV Push every 12 hours  petrolatum Ophthalmic Ointment 1 Application(s) Both EYES daily  piperacillin/tazobactam IVPB.. 3.375 Gram(s) IV Intermittent every 12 hours  rifAXIMin 550 milliGRAM(s) Oral two times a day    MEDICATIONS  (PRN):      ALLERGIES:  Allergies    No Known Allergies    Intolerances        LABS:                        9.4    8.73  )-----------( 106      ( 12 Jun 2021 01:05 )             28.8     06-12    142  |  101  |  112<H>  ----------------------------<  193<H>  4.9   |  11<L>  |  8.60<H>    Ca    9.7      12 Jun 2021 01:05    TPro  6.0  /  Alb  3.5  /  TBili  1.5<H>  /  DBili  x   /  AST  20  /  ALT  15  /  AlkPhos  49  06-12    PT/INR - ( 12 Jun 2021 01:05 )   PT: 19.6 sec;   INR: 1.67 ratio         PTT - ( 12 Jun 2021 01:05 )  PTT:49.7 sec      CAPILLARY BLOOD GLUCOSE      POCT Blood Glucose.: 186 mg/dL (12 Jun 2021 05:33)      RADIOLOGY & ADDITIONAL TESTS: Reviewed. MICU PROGRESS NOTE    INTERVAL HPI/OVERNIGHT EVENTS:    Awake, increased wob. Follows commands. Shakes head yes/ no    SUBJECTIVE:     OBJECTIVE:    VITAL SIGNS:  ICU Vital Signs Last 24 Hrs  T(C): 36.2 (12 Jun 2021 04:00), Max: 36.4 (11 Jun 2021 16:00)  T(F): 97.2 (12 Jun 2021 04:00), Max: 97.5 (11 Jun 2021 16:00)  HR: 124 (12 Jun 2021 06:45) (76 - 127)  BP: 96/60 (12 Jun 2021 06:45) (78/53 - 133/60)  BP(mean): 74 (12 Jun 2021 06:45) (57 - 89)  ABP: --  ABP(mean): --  RR: 17 (12 Jun 2021 06:45) (13 - 38)  SpO2: 100% (12 Jun 2021 06:45) (90% - 100%)      VENTS:      I&O:    06-11 @ 07:01  -  06-12 @ 07:00  --------------------------------------------------------  IN: 373.7 mL / OUT: 30 mL / NET: 343.7 mL        PHYSICAL EXAM:  GENERAL: NAD, conversant  CHEST/LUNG: Clear to auscultation bilaterally; No crackles, rhonchi, wheezing, or rubs  HEART: Regular rate and rhythm; No murmurs, rubs, or gallops  ABDOMEN: Soft, Nontender, Nondistended; Bowel sounds present  EXTREMITIES:  2+ Peripheral Pulses, No clubbing, cyanosis, or edema  SKIN: No rashes or lesions  NERVOUS SYSTEM:  Alert & Oriented, Good concentration      LINES:                                       MEDICATIONS:  MEDICATIONS  (STANDING):  albuterol/ipratropium for Nebulization 3 milliLiter(s) Nebulizer every 6 hours  artificial  tears Solution 1 Drop(s) Both EYES three times a day  chlorhexidine 4% Liquid 1 Application(s) Topical <User Schedule>  insulin lispro (ADMELOG) corrective regimen sliding scale   SubCutaneous every 6 hours  levothyroxine Injectable 44 MICROGram(s) IV Push at bedtime  norepinephrine Infusion 0.05 MICROgram(s)/kG/Min (7.14 mL/Hr) IV Continuous <Continuous>  nystatin Powder 1 Application(s) Topical three times a day  pantoprazole  Injectable 40 milliGRAM(s) IV Push every 12 hours  petrolatum Ophthalmic Ointment 1 Application(s) Both EYES daily  piperacillin/tazobactam IVPB.. 3.375 Gram(s) IV Intermittent every 12 hours  rifAXIMin 550 milliGRAM(s) Oral two times a day    MEDICATIONS  (PRN):      ALLERGIES:  Allergies    No Known Allergies    Intolerances        LABS:                        9.4    8.73  )-----------( 106      ( 12 Jun 2021 01:05 )             28.8     06-12    142  |  101  |  112<H>  ----------------------------<  193<H>  4.9   |  11<L>  |  8.60<H>    Ca    9.7      12 Jun 2021 01:05    TPro  6.0  /  Alb  3.5  /  TBili  1.5<H>  /  DBili  x   /  AST  20  /  ALT  15  /  AlkPhos  49  06-12    PT/INR - ( 12 Jun 2021 01:05 )   PT: 19.6 sec;   INR: 1.67 ratio         PTT - ( 12 Jun 2021 01:05 )  PTT:49.7 sec      CAPILLARY BLOOD GLUCOSE      POCT Blood Glucose.: 186 mg/dL (12 Jun 2021 05:33)      RADIOLOGY & ADDITIONAL TESTS: Reviewed. MICU PROGRESS NOTE    INTERVAL HPI/OVERNIGHT EVENTS:    Awake, increased wob. Follows commands. Shakes head yes/ no    SUBJECTIVE:     OBJECTIVE:    VITAL SIGNS:  ICU Vital Signs Last 24 Hrs  T(C): 36.2 (12 Jun 2021 04:00), Max: 36.4 (11 Jun 2021 16:00)  T(F): 97.2 (12 Jun 2021 04:00), Max: 97.5 (11 Jun 2021 16:00)  HR: 124 (12 Jun 2021 06:45) (76 - 127)  BP: 96/60 (12 Jun 2021 06:45) (78/53 - 133/60)  BP(mean): 74 (12 Jun 2021 06:45) (57 - 89)  ABP: --  ABP(mean): --  RR: 17 (12 Jun 2021 06:45) (13 - 38)  SpO2: 100% (12 Jun 2021 06:45) (90% - 100%)      VENTS:      I&O:    06-11 @ 07:01  -  06-12 @ 07:00  --------------------------------------------------------  IN: 373.7 mL / OUT: 30 mL / NET: 343.7 mL        PHYSICAL EXAM:    GENERAL: tired appearing  HEENT: awake   CHEST/LUNG: +decreased BS on R +accessory muscle use  HEART: Regular rate and rhythm; No murmurs, rubs, or gallops  ABDOMEN: +distended,soft  EXTREMITIES:  pitting edema of b/l le  SKIN: No rashes or lesions  NERVOUS SYSTEM:   follows commands   LINES:       peripherals                     MEDICATIONS:  MEDICATIONS  (STANDING):  albuterol/ipratropium for Nebulization 3 milliLiter(s) Nebulizer every 6 hours  artificial  tears Solution 1 Drop(s) Both EYES three times a day  chlorhexidine 4% Liquid 1 Application(s) Topical <User Schedule>  insulin lispro (ADMELOG) corrective regimen sliding scale   SubCutaneous every 6 hours  levothyroxine Injectable 44 MICROGram(s) IV Push at bedtime  norepinephrine Infusion 0.05 MICROgram(s)/kG/Min (7.14 mL/Hr) IV Continuous <Continuous>  nystatin Powder 1 Application(s) Topical three times a day  pantoprazole  Injectable 40 milliGRAM(s) IV Push every 12 hours  petrolatum Ophthalmic Ointment 1 Application(s) Both EYES daily  piperacillin/tazobactam IVPB.. 3.375 Gram(s) IV Intermittent every 12 hours  rifAXIMin 550 milliGRAM(s) Oral two times a day    MEDICATIONS  (PRN):      ALLERGIES:  Allergies    No Known Allergies    Intolerances        LABS:                        9.4    8.73  )-----------( 106      ( 12 Jun 2021 01:05 )             28.8     06-12    142  |  101  |  112<H>  ----------------------------<  193<H>  4.9   |  11<L>  |  8.60<H>    Ca    9.7      12 Jun 2021 01:05    TPro  6.0  /  Alb  3.5  /  TBili  1.5<H>  /  DBili  x   /  AST  20  /  ALT  15  /  AlkPhos  49  06-12    PT/INR - ( 12 Jun 2021 01:05 )   PT: 19.6 sec;   INR: 1.67 ratio         PTT - ( 12 Jun 2021 01:05 )  PTT:49.7 sec      CAPILLARY BLOOD GLUCOSE      POCT Blood Glucose.: 186 mg/dL (12 Jun 2021 05:33)      RADIOLOGY & ADDITIONAL TESTS: Reviewed.

## 2021-06-12 NOTE — PROGRESS NOTE ADULT - ATTENDING COMMENTS
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 Pt placed on bipap for comfort with WOB, o2 sat remains stable. Pt w/  reports no SOB. Pt remains anuric and not a candidate for HD. f/u renal reccs. Cont pressors to maintain map>65. Overall condition is very poor given decompensated cirrhosis, ANDREEA -anuria. Pt is DNR as per family. They have not yet made pt DNI even after discussions regarding his poor prognosis. Cont GOC. 84 yo AM with h/o HF, s/p AICD placement, cirrhosis (suspect 2 to Hep B) admitted with GIB and decompensated cirrhosis, hypoxemic respiratory failure requiring intubation, R pleural effusion, s/p thoracentesis x2 and ANDREEA. Pt placed on bipap for comfort with WOB, o2 sat remains stable. Pt w/  reports no SOB. Pt remains anuric and not a candidate for HD. Abd remains soft since prior ascites removal. f/u renal reccs. Cont pressors to maintain map>65. Overall condition is very poor given decompensated cirrhosis, ANDREEA -anuria. Pt is DNR as per family. They have not yet made pt DNI even after discussions regarding his poor prognosis. Cont GOC.

## 2021-06-13 NOTE — PROGRESS NOTE ADULT - ASSESSMENT
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  Now readmitted to MICU for hypoxemic respiratory failure requiring intubation in the setting of worsening hepatic encephalopathy, aspiration, and large right pleural effusion, s/p thora 6/2,s/p extubation 6/7, now with acute renal failure, suspect ATN.      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  - Off sedation  -awake, following commands     Cardiovascular:  -Hx of HF (EF 40-45%), AICD  - Continue to wean levophed, currently off levophed, but intermittently needs levo  - s/p midodrine 20mg TID  -On levo, no oral access  -Holding Aldactone, Lasix     Pulmonary: Hypoxemic Respiratory Failure in the setting of hepatic encephalopathy, aspiration, large right pleural effusion, intubated 6/2, extubated 6/7  -Titrate FIo2 to maintain SPO2 > 92, ABGs  -Broad spectrum abx; zosyn for presumed aspiration PNA Complete on 6/8  -Duoneb satnding for wheezing  -Patient likely aspirating  -tachypneic due to acidemia and pleural effusion    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  - s/p thora #2 6/2: elevated triglycerides to 120  -protein ratio < 0.5 -- suggests transudative  -pleural fluid LDH/serum LDH ratio (however serum LDH not at same time as thora) > 0.5  -cytopath: no cancer  CXR 6/8 with reaccumulation of pleural effusion  tachypneic  -6/12 thora #3- 2.5L removed from R pleural effusion - studies being sent    GI    End-stage cirrhosis  -Hx of GIB: EGD 5/18 with small esophageal varices, large cratered duodenal ulcer   -Continue protonix ppi BID  -Hx of Cirrhosis  -s/p para 5/28 in IR (therapeutic drain with removal of 400cc cloudly pink fluid)  - POCUS on 6/6 showing recollection of ascites - will try for another paracentesis today  -Hepatology on board; f/u further recommendations  -s/p Lactulose/Rifaximin for hepatic encephalopathy, patient   -Continue Carafate as per hepatology  [ ] revisit with family regarding whehter to replace ngt      Feeds:  Vital TFs  Hold d/t aspiration    Renal    #Acute renal failure, likely ATN i/s/o 4.9L para on 6/6  -Intake and output q1  -Renally dose meds based on Gfr -- zosyn redosed  -6/7:s/p reyes place 6/7 -pt with enlarged prostate on CT  -s/p  bumex  gtt on 6/7 -- with minimal UOP --> discontinue  -6/8 currently no indication for dialysis; c/w albumin 25% q6 for 4 doses  6/10 Cr worsening, urine now yellow (may have been red tinged earlier due to trauma)  6/11 cr worsening , as per renal not offering dialysis/CRRT  -acidosis likley causing tachypnea      ID    Sepsis - meeting criteria with hypothermia, and leukopenia  -Pan culture bld cx x 2, UA, sputum cx, MRSA swab  -ABX:   s/p zosyn 6/1 - 6/8 for 7d for empiric coverage for aspiration pna   zosyn 6/9-xx  --empiric dosing , restarted for sepsis with hyothermia and luekopenia  -De-escalate abx based on sensitivities  -Follow up ID for further recommendations  -6/9 repeat Bcx, UA sent    Endo  -Continue synthroid  -Monitor blood glucose q6h  - hold TFs d/t concern for aspiration, hold NPH 5u q6h  - c/w moderate ISS    Heme  -Hgb slightly dropped 6/7  -Maintain active type and screen    Ppx  -Continue Protonix PPI BID  -PAS for DVT ppx    GOC    -See GOC note from 6/9, 6/10  DNR, but NOT DNI  Family wants intubation if needed  Family wants thoracentesis if needed  Continue with pressors as needed  NPO after d/w family  Further discussion with palli   SENAIT JHAVERI is a 86yo Male with PMH HF (EF 40-45%), 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  Now readmitted to MICU for hypoxemic respiratory failure requiring intubation in the setting of worsening hepatic encephalopathy, aspiration, and large right pleural effusion, s/p thora 6/2,s/p extubation 6/7, now with acute renal failure, suspect ATN.      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  - Off sedation  -worsening confusion - unable to follow commands now    Cardiovascular:  -Hx of HF (EF 40-45%), AICD  - Continue to wean levophed  - s/p midodrine 20mg TID  -On levo, no oral access  -Holding Aldactone, Lasix     Pulmonary: Hypoxemic Respiratory Failure in the setting of hepatic encephalopathy, aspiration, large right pleural effusion, intubated 6/2, extubated 6/7  -Titrate FIo2 to maintain SPO2 > 92, ABGs  -Broad spectrum abx; zosyn for presumed aspiration PNA Complete on 6/8  -Duoneb standing for wheezing  -Patient likely aspirating  -tachypneic due to acidemia and pleural effusion    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  - s/p thora #2 6/2: elevated triglycerides to 120, protein ratio < 0.5 -- suggests transudative  -pleural fluid LDH/serum LDH ratio (however serum LDH not at same time as thora) > 0.5  -cytopath: no cancer  CXR 6/8 with reaccumulation of pleural effusion  -6/12 thora #3- 2.5L removed from R pleural effusion - Triglycerides 166    GI    End-stage cirrhosis  -Hx of GIB: EGD 5/18 with small esophageal varices, large cratered duodenal ulcer   -Continue protonix ppi BID  -Hx of Cirrhosis  -s/p para 5/28 in IR (therapeutic drain with removal of 400cc cloudly pink fluid)  - POCUS on 6/6 showing recollection of ascites - will try for another paracentesis today  -Hepatology on board; f/u further recommendations  -s/p Lactulose/Rifaximin for hepatic encephalopathy, patient   -Continue Carafate as per hepatology  [ ] revisit with family regarding whether to replace NGT (feeds previously held for aspiration risk)      Renal    #Acute renal failure, likely ATN i/s/o 4.9L para on 6/6  -Intake and output q1  -Renally dose meds based on Gfr -- zosyn redosed  -6/7:s/p reyes place 6/7 -pt with enlarged prostate on CT  -s/p  bumex  gtt on 6/7 -- with minimal UOP --> discontinue  -6/8 currently no indication for dialysis; c/w albumin 25% q6 for 4 doses  6/10 Cr worsening, urine now yellow (may have been red tinged earlier due to trauma)  6/11 cr worsening , as per renal not offering dialysis/CRRT  -acidosis likely causing tachypnea  6/13 renal still not offering dialysis      ID    Sepsis - meeting criteria with hypothermia, and leukopenia  -Pan culture bld cx x 2, UA, sputum cx, MRSA swab  -ABX:   s/p zosyn 6/1 - 6/8 for 7d for empiric coverage for aspiration pna  -zosyn 6/9-xx  --empiric dosing , restarted for sepsis with hyothermia and leukopenia  -De-escalate abx based on sensitivities  -Follow up ID for further recommendations  -6/9 repeat Bcx, UA sent    Endo  -Continue synthroid  -Monitor blood glucose q6h  - c/w moderate ISS    Heme  -Hgb slightly dropped 6/7  -Maintain active type and screen    Ppx  -Continue Protonix PPI BID  -PAS for DVT ppx    GOC    -See GOC note from 6/9, 6/10  DNR, but NOT DNI  Family wants intubation if needed  Family wants thoracentesis if needed  Continue with pressors as needed  NPO after d/w family  Further discussion with palliative

## 2021-06-13 NOTE — PROGRESS NOTE ADULT - ATTENDING COMMENTS
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 Pt no longer needing bipap and now on RA after thoracentesis yesterday- 2L removed. Pt remains anuric and not a candidate for HD as per Renal.  f/u renal reccs. Cont pressors to maintain map>65. Complete abx course of poss PNA. Overall condition is very poor given decompensated cirrhosis, ANDREEA -anuria. Pt is DNR as per family. They have not yet made pt DNI even after discussions regarding his poor prognosis. Cont GOC.

## 2021-06-13 NOTE — PROGRESS NOTE ADULT - SUBJECTIVE AND OBJECTIVE BOX
Patient is a 85y old  Male who presents with a chief complaint of sob, confusion, weakness, can't walk, fever (12 Jun 2021 07:38)    24 hour events: Pt now on NC. Pt s/p thoracentesis yesterday with 2.5L removal. pH 7.31.    REVIEW OF SYSTEMS:  Constitutional: [ ] fevers [ ] chills [ ] weight loss [ ] weight gain  HEENT: [ ] dry eyes [ ] eye irritation [ ] postnasal drip [ ] nasal congestion  CV: [ ] chest pain [ ] orthopnea [ ] palpitations [ ] murmur  Resp: [ ] cough [ ] shortness of breath [ ] dyspnea [ ] wheezing [ ] sputum [ ] hemoptysis  GI: [ ] nausea [ ] vomiting [ ] diarrhea [ ] constipation [ ] abd pain [ ] dysphagia   : [ ] dysuria [ ] nocturia [ ] hematuria [ ] increased urinary frequency  Musculoskeletal: [ ] back pain [ ] myalgias [ ] arthralgias [ ] fracture  Skin: [ ] rash [ ] itch  Neurological: [ ] headache [ ] dizziness [ ] syncope [ ] weakness [ ] numbness  Psychiatric: [ ] anxiety [ ] depression  Endocrine: [ ] diabetes [ ] thyroid problem  Hematologic/Lymphatic: [ ] anemia [ ] bleeding problem  Allergic/Immunologic: [ ] itchy eyes [ ] nasal discharge [ ] hives [ ] angioedema  [ ] All other systems negative  [ ] Unable to assess ROS because ________    OBJECTIVE:  ICU Vital Signs Last 24 Hrs  T(C): 36.1 (13 Jun 2021 00:00), Max: 36.3 (12 Jun 2021 12:00)  T(F): 97 (13 Jun 2021 00:00), Max: 97.3 (12 Jun 2021 12:00)  HR: 87 (13 Jun 2021 05:22) (87 - 124)  BP: 110/57 (13 Jun 2021 03:15) (89/49 - 126/56)  BP(mean): 77 (13 Jun 2021 03:15) (67 - 83)  ABP: --  ABP(mean): --  RR: 12 (13 Jun 2021 03:15) (0 - 37)  SpO2: 99% (13 Jun 2021 05:22) (99% - 100%)        06-12 @ 07:01  -  06-13 @ 07:00  --------------------------------------------------------  IN: 457.6 mL / OUT: 2540 mL / NET: -2082.4 mL      CAPILLARY BLOOD GLUCOSE      POCT Blood Glucose.: 202 mg/dL (13 Jun 2021 05:45)      PHYSICAL EXAM:  GENERAL: tired appearing  HEENT: awake   CHEST/LUNG: +decreased BS on R +accessory muscle use  HEART: Regular rate and rhythm; No murmurs, rubs, or gallops  ABDOMEN: +distended,soft  EXTREMITIES:  pitting edema of b/l le  SKIN: No rashes or lesions  NERVOUS SYSTEM:   follows commands   LINES: peripherals       HOSPITAL MEDICATIONS:  MEDICATIONS  (STANDING):  albuterol/ipratropium for Nebulization 3 milliLiter(s) Nebulizer every 6 hours  artificial  tears Solution 1 Drop(s) Both EYES three times a day  chlorhexidine 4% Liquid 1 Application(s) Topical <User Schedule>  insulin lispro (ADMELOG) corrective regimen sliding scale   SubCutaneous every 6 hours  levothyroxine Injectable 44 MICROGram(s) IV Push at bedtime  norepinephrine Infusion 0.05 MICROgram(s)/kG/Min (7.14 mL/Hr) IV Continuous <Continuous>  nystatin Powder 1 Application(s) Topical three times a day  pantoprazole  Injectable 40 milliGRAM(s) IV Push every 12 hours  petrolatum Ophthalmic Ointment 1 Application(s) Both EYES daily  piperacillin/tazobactam IVPB.. 3.375 Gram(s) IV Intermittent every 12 hours  rifAXIMin 550 milliGRAM(s) Oral two times a day    MEDICATIONS  (PRN):      LABS:                        8.4    7.29  )-----------( 70       ( 13 Jun 2021 00:43 )             25.5     Hgb Trend: 8.4<--, 9.4<--, 9.5<--, 8.3<--, 8.4<--  06-13    145  |  104  |  129<H>  ----------------------------<  215<H>  5.0   |  13<L>  |  9.61<H>    Ca    10.0      13 Jun 2021 00:43    TPro  5.4<L>  /  Alb  3.1<L>  /  TBili  1.3<H>  /  DBili  x   /  AST  20  /  ALT  15  /  AlkPhos  44  06-13    Creatinine Trend: 9.61<--, 8.60<--, 7.49<--, 6.47<--, 5.42<--, 4.79<--  PT/INR - ( 13 Jun 2021 00:43 )   PT: 18.3 sec;   INR: 1.56 ratio         PTT - ( 13 Jun 2021 00:43 )  PTT:33.5 sec    Arterial Blood Gas:  06-13 @ 00:43  7.31/29/90/14/97/-10.5  ABG lactate: --  Arterial Blood Gas:  06-12 @ 07:26  7.20/38/58/14/83/-12.7  ABG lactate: --  Arterial Blood Gas:  06-12 @ 01:18  7.28/27/181/12/99/-13.3  ABG lactate: --      EKG:    MICROBIOLOGY:     RADIOLOGY:  [ ] Reviewed and interpreted by me     Patient is a 85y old  Male who presents with a chief complaint of sob, confusion, weakness, can't walk, fever (12 Jun 2021 07:38)    24 hour events: Pt now on NC. Pt s/p thoracentesis yesterday with 2.5L removal. pH 7.31. This morning, spoke to pt using mandarin . Pt not answering or following commands.    REVIEW OF SYSTEMS:  [ ] Unable to assess ROS because pt w/ AMS    OBJECTIVE:  ICU Vital Signs Last 24 Hrs  T(C): 36.1 (13 Jun 2021 00:00), Max: 36.3 (12 Jun 2021 12:00)  T(F): 97 (13 Jun 2021 00:00), Max: 97.3 (12 Jun 2021 12:00)  HR: 87 (13 Jun 2021 05:22) (87 - 124)  BP: 110/57 (13 Jun 2021 03:15) (89/49 - 126/56)  BP(mean): 77 (13 Jun 2021 03:15) (67 - 83)  ABP: --  ABP(mean): --  RR: 12 (13 Jun 2021 03:15) (0 - 37)  SpO2: 99% (13 Jun 2021 05:22) (99% - 100%)        06-12 @ 07:01  -  06-13 @ 07:00  --------------------------------------------------------  IN: 457.6 mL / OUT: 2540 mL / NET: -2082.4 mL      CAPILLARY BLOOD GLUCOSE      POCT Blood Glucose.: 202 mg/dL (13 Jun 2021 05:45)      PHYSICAL EXAM:  GENERAL: tired appearing  HEENT: awake   CHEST/LUNG: +decreased BS on R +accessory muscle use  HEART: Regular rate and rhythm; No murmurs, rubs, or gallops  ABDOMEN: +distended,soft  EXTREMITIES:  pitting edema of b/l le  SKIN: No rashes or lesions  NERVOUS SYSTEM: tracking, not verbal, unable to follow commands or answer questions  LINES: peripherals       HOSPITAL MEDICATIONS:  MEDICATIONS  (STANDING):  albuterol/ipratropium for Nebulization 3 milliLiter(s) Nebulizer every 6 hours  artificial  tears Solution 1 Drop(s) Both EYES three times a day  chlorhexidine 4% Liquid 1 Application(s) Topical <User Schedule>  insulin lispro (ADMELOG) corrective regimen sliding scale   SubCutaneous every 6 hours  levothyroxine Injectable 44 MICROGram(s) IV Push at bedtime  norepinephrine Infusion 0.05 MICROgram(s)/kG/Min (7.14 mL/Hr) IV Continuous <Continuous>  nystatin Powder 1 Application(s) Topical three times a day  pantoprazole  Injectable 40 milliGRAM(s) IV Push every 12 hours  petrolatum Ophthalmic Ointment 1 Application(s) Both EYES daily  piperacillin/tazobactam IVPB.. 3.375 Gram(s) IV Intermittent every 12 hours  rifAXIMin 550 milliGRAM(s) Oral two times a day    MEDICATIONS  (PRN):      LABS:                        8.4    7.29  )-----------( 70       ( 13 Jun 2021 00:43 )             25.5     Hgb Trend: 8.4<--, 9.4<--, 9.5<--, 8.3<--, 8.4<--  06-13    145  |  104  |  129<H>  ----------------------------<  215<H>  5.0   |  13<L>  |  9.61<H>    Ca    10.0      13 Jun 2021 00:43    TPro  5.4<L>  /  Alb  3.1<L>  /  TBili  1.3<H>  /  DBili  x   /  AST  20  /  ALT  15  /  AlkPhos  44  06-13    Creatinine Trend: 9.61<--, 8.60<--, 7.49<--, 6.47<--, 5.42<--, 4.79<--  PT/INR - ( 13 Jun 2021 00:43 )   PT: 18.3 sec;   INR: 1.56 ratio         PTT - ( 13 Jun 2021 00:43 )  PTT:33.5 sec    Arterial Blood Gas:  06-13 @ 00:43  7.31/29/90/14/97/-10.5  ABG lactate: --  Arterial Blood Gas:  06-12 @ 07:26  7.20/38/58/14/83/-12.7  ABG lactate: --  Arterial Blood Gas:  06-12 @ 01:18  7.28/27/181/12/99/-13.3  ABG lactate: --     Patient is a 85y old  Male who presents with a chief complaint of sob, confusion, weakness, can't walk, fever (12 Jun 2021 07:38)    24 hour events: Pt now off NC, on RA. Pt s/p thoracentesis yesterday with 2.5L removal. pH 7.31. This morning, spoke to pt using mandarin . Pt not answering or following commands, however, pt very hard of hearing    REVIEW OF SYSTEMS:  [ ] Unable to assess ROS because pt w/ AMS    OBJECTIVE:  ICU Vital Signs Last 24 Hrs  T(C): 36.1 (13 Jun 2021 00:00), Max: 36.3 (12 Jun 2021 12:00)  T(F): 97 (13 Jun 2021 00:00), Max: 97.3 (12 Jun 2021 12:00)  HR: 87 (13 Jun 2021 05:22) (87 - 124)  BP: 110/57 (13 Jun 2021 03:15) (89/49 - 126/56)  BP(mean): 77 (13 Jun 2021 03:15) (67 - 83)  ABP: --  ABP(mean): --  RR: 12 (13 Jun 2021 03:15) (0 - 37)  SpO2: 99% (13 Jun 2021 05:22) (99% - 100%)        06-12 @ 07:01  -  06-13 @ 07:00  --------------------------------------------------------  IN: 457.6 mL / OUT: 2540 mL / NET: -2082.4 mL      CAPILLARY BLOOD GLUCOSE      POCT Blood Glucose.: 202 mg/dL (13 Jun 2021 05:45)      PHYSICAL EXAM:  GENERAL: tired appearing  HEENT: awake, hard of hearing  CHEST/LUNG: +decreased BS on R +accessory muscle use  HEART: Regular rate and rhythm; No murmurs, rubs, or gallops  ABDOMEN: +distended,soft  EXTREMITIES:  pitting edema of b/l le  SKIN: No rashes or lesions  NERVOUS SYSTEM: tracking, not verbal, unable to follow commands or answer questions  LINES: peripherals       HOSPITAL MEDICATIONS:  MEDICATIONS  (STANDING):  albuterol/ipratropium for Nebulization 3 milliLiter(s) Nebulizer every 6 hours  artificial  tears Solution 1 Drop(s) Both EYES three times a day  chlorhexidine 4% Liquid 1 Application(s) Topical <User Schedule>  insulin lispro (ADMELOG) corrective regimen sliding scale   SubCutaneous every 6 hours  levothyroxine Injectable 44 MICROGram(s) IV Push at bedtime  norepinephrine Infusion 0.05 MICROgram(s)/kG/Min (7.14 mL/Hr) IV Continuous <Continuous>  nystatin Powder 1 Application(s) Topical three times a day  pantoprazole  Injectable 40 milliGRAM(s) IV Push every 12 hours  petrolatum Ophthalmic Ointment 1 Application(s) Both EYES daily  piperacillin/tazobactam IVPB.. 3.375 Gram(s) IV Intermittent every 12 hours  rifAXIMin 550 milliGRAM(s) Oral two times a day    MEDICATIONS  (PRN):      LABS:                        8.4    7.29  )-----------( 70       ( 13 Jun 2021 00:43 )             25.5     Hgb Trend: 8.4<--, 9.4<--, 9.5<--, 8.3<--, 8.4<--  06-13    145  |  104  |  129<H>  ----------------------------<  215<H>  5.0   |  13<L>  |  9.61<H>    Ca    10.0      13 Jun 2021 00:43    TPro  5.4<L>  /  Alb  3.1<L>  /  TBili  1.3<H>  /  DBili  x   /  AST  20  /  ALT  15  /  AlkPhos  44  06-13    Creatinine Trend: 9.61<--, 8.60<--, 7.49<--, 6.47<--, 5.42<--, 4.79<--  PT/INR - ( 13 Jun 2021 00:43 )   PT: 18.3 sec;   INR: 1.56 ratio         PTT - ( 13 Jun 2021 00:43 )  PTT:33.5 sec    Arterial Blood Gas:  06-13 @ 00:43  7.31/29/90/14/97/-10.5  ABG lactate: --  Arterial Blood Gas:  06-12 @ 07:26  7.20/38/58/14/83/-12.7  ABG lactate: --  Arterial Blood Gas:  06-12 @ 01:18  7.28/27/181/12/99/-13.3  ABG lactate: --

## 2021-06-14 NOTE — PROGRESS NOTE ADULT - SUBJECTIVE AND OBJECTIVE BOX
Juan York MD  PGY-1, Internal Medicine  Pager #: [phone], [phone]    Patient is a 85y old  Male who presents with a chief complaint of sob, confusion, weakness, can't walk, fever (13 Jun 2021 07:15)    OVERNIGHT/INTERVAL EVENTS:    SUBJECTIVE: Patient seen and examined bedside.     Denies fevers/chills, headaches/dizziness, nausea/vomiting, chest pain, SOB, abdominal pain.     MEDICATIONS  (STANDING):  albuterol/ipratropium for Nebulization 3 milliLiter(s) Nebulizer every 6 hours  artificial  tears Solution 1 Drop(s) Both EYES three times a day  chlorhexidine 4% Liquid 1 Application(s) Topical <User Schedule>  insulin lispro (ADMELOG) corrective regimen sliding scale   SubCutaneous every 6 hours  levothyroxine Injectable 44 MICROGram(s) IV Push at bedtime  norepinephrine Infusion 0.05 MICROgram(s)/kG/Min (7.14 mL/Hr) IV Continuous <Continuous>  nystatin Powder 1 Application(s) Topical three times a day  pantoprazole  Injectable 40 milliGRAM(s) IV Push every 12 hours  petrolatum Ophthalmic Ointment 1 Application(s) Both EYES daily  piperacillin/tazobactam IVPB.. 3.375 Gram(s) IV Intermittent every 12 hours  rifAXIMin 550 milliGRAM(s) Oral two times a day    MEDICATIONS  (PRN):      CAPILLARY BLOOD GLUCOSE      POCT Blood Glucose.: 138 mg/dL (14 Jun 2021 05:01)  POCT Blood Glucose.: 156 mg/dL (14 Jun 2021 00:42)  POCT Blood Glucose.: 160 mg/dL (13 Jun 2021 17:27)  POCT Blood Glucose.: 162 mg/dL (13 Jun 2021 11:38)    I&O's Summary    13 Jun 2021 07:01  -  14 Jun 2021 07:00  --------------------------------------------------------  IN: 271.6 mL / OUT: 120 mL / NET: 151.6 mL        PHYSICAL EXAM:  Vital Signs Last 24 Hrs  T(C): 36 (14 Jun 2021 04:00), Max: 36.4 (13 Jun 2021 16:00)  T(F): 96.8 (14 Jun 2021 04:00), Max: 97.5 (13 Jun 2021 16:00)  HR: 97 (14 Jun 2021 07:00) (89 - 103)  BP: 101/53 (14 Jun 2021 07:00) (79/41 - 165/57)  BP(mean): 74 (14 Jun 2021 07:00) (54 - 88)  RR: 15 (14 Jun 2021 06:00) (11 - 35)  SpO2: 95% (14 Jun 2021 07:00) (92% - 100%)  CONSTITUTIONAL: NAD, lying in bed comfortably  EYES: EOMI, PERRLA; conjunctiva and sclera clear  ENMT: Moist oral mucosa; normal dentition  NECK: Supple, no palpable masses, no JVD  RESPIRATORY: Lungs clear to ascultation b/l; no rales/ronchi/wheezing; unlabored respirations  CARDIOVASCULAR: Regular rate and rhythm, normal S1 and S2, no murmurs/rubs/gallops  ABDOMEN: Soft, normal bowel sounds, nondistended, nontender  MUSCULOSKELETAL: No joint swelling or tenderness to palpation  PSYCH: Affect appropriate  NEUROLOGY: AAOx3, CNs grossly intact  SKIN: No rashes; no palpable lesions    LABS:                        8.4    5.54  )-----------( 56       ( 14 Jun 2021 01:26 )             25.2     06-14    145  |  104  |  128<H>  ----------------------------<  154<H>  4.8   |  12<L>  |  10.33<H>    Ca    9.6      14 Jun 2021 01:26    TPro  5.4<L>  /  Alb  3.2<L>  /  TBili  1.5<H>  /  DBili  x   /  AST  22  /  ALT  16  /  AlkPhos  47  06-14    PT/INR - ( 13 Jun 2021 00:43 )   PT: 18.3 sec;   INR: 1.56 ratio         PTT - ( 13 Jun 2021 00:43 )  PTT:33.5 sec          Culture - Body Fluid with Gram Stain (collected 13 Jun 2021 01:57)  Source: .Body Fluid Pleural Fluid  Gram Stain (13 Jun 2021 04:55):    polymorphonuclear leukocytes seen    No organisms seen    by cytocentrifuge  Preliminary Report (13 Jun 2021 19:29):    No growth        INTERVAL RADIOLOGY/IMAGING STUDIES:     Juan York MD  PGY-1, Internal Medicine  [facility]  Pager #: [phone]    INTERVAL HPI/OVERNIGHT EVENTS: No overnight events.     SUBJECTIVE: Patient seen and examined at bedside. Patient complains of thirst and dehydration in the morning. Pt able to squeeze fingers on command but not able to answer questions besides mentioning his thirst.     ROS unable to be assessed 2/2 to pt's AM.     REVIEW OF SYSTEMS:  CONSTITUTIONAL: No fevers, chills, fatigue, weakness  EYES: No loss of vision, double vision, blurry vision  EARS: No ringing in ears  NOSE: No nasal congestion, runny nose  MOUTH/THROAT: No sore throat, painful swallowing, lumps on neck  CARDIOVASCULAR: No chest pain, palpitations  RESPIRATORY: No cough, shortness of breath  GASTROINTESTINAL: No abdominal pain, nausea, vomiting, diarrhea, constipation  GENITOURINARY: No dysuria, frequency or hematuria  SKIN: No rashes, swelling  MSK: No muscle aches, joint aches  NEUROLOGICAL: No headache, numbness, tingling    OBJECTIVE:    VITAL SIGNS:  ICU Vital Signs Last 24 Hrs  T(C): 36.5 (14 Jun 2021 08:00), Max: 36.5 (14 Jun 2021 08:00)  T(F): 97.7 (14 Jun 2021 08:00), Max: 97.7 (14 Jun 2021 08:00)  HR: 97 (14 Jun 2021 09:15) (89 - 103)  BP: 119/59 (14 Jun 2021 09:15) (79/41 - 165/57)  BP(mean): 81 (14 Jun 2021 09:15) (54 - 92)  ABP: --  ABP(mean): --  RR: 30 (14 Jun 2021 09:15) (11 - 35)  SpO2: 99% (14 Jun 2021 09:15) (92% - 100%)        06-13 @ 07:01  -  06-14 @ 07:00  --------------------------------------------------------  IN: 271.6 mL / OUT: 120 mL / NET: 151.6 mL    06-14 @ 07:01  -  06-14 @ 11:45  --------------------------------------------------------  IN: 11.4 mL / OUT: 0 mL / NET: 11.4 mL      CAPILLARY BLOOD GLUCOSE      POCT Blood Glucose.: 145 mg/dL (14 Jun 2021 11:38)      PHYSICAL EXAM:  GENERAL: NAD, tired appearing  HEENT: NC/AT, PERRL, EOMI, no conjunctival pallor or scleral icterus, moist mucous membranes, hard of hearing  NECK: supple  CV: +S1/S2, RRR  RESPIRATORY: CTA b/l, +accessory muscle use  ABDOMEN: soft, NTND, +distended  MSK: Able to move all 4 extremities  NEURO: unable to follow commands or answer questions but able to squeeze fingers  SKIN: WWP, 2+ peripheral pulses, pitting edema of b/l LE    MEDICATIONS:  MEDICATIONS  (STANDING):  albuterol/ipratropium for Nebulization 3 milliLiter(s) Nebulizer every 6 hours  artificial  tears Solution 1 Drop(s) Both EYES three times a day  chlorhexidine 4% Liquid 1 Application(s) Topical <User Schedule>  insulin lispro (ADMELOG) corrective regimen sliding scale   SubCutaneous every 6 hours  levothyroxine Injectable 44 MICROGram(s) IV Push at bedtime  midodrine 10 milliGRAM(s) Oral every 8 hours  norepinephrine Infusion 0.05 MICROgram(s)/kG/Min (7.14 mL/Hr) IV Continuous <Continuous>  nystatin Powder 1 Application(s) Topical three times a day  pantoprazole  Injectable 40 milliGRAM(s) IV Push every 12 hours  petrolatum Ophthalmic Ointment 1 Application(s) Both EYES daily  rifAXIMin 550 milliGRAM(s) Oral two times a day    MEDICATIONS  (PRN):      ALLERGIES:  Allergies    No Known Allergies    Intolerances        LABS:                        8.4    5.54  )-----------( 56       ( 14 Jun 2021 01:26 )             25.2     06-14    145  |  104  |  128<H>  ----------------------------<  154<H>  4.8   |  12<L>  |  10.33<H>    Ca    9.6      14 Jun 2021 01:26    TPro  5.4<L>  /  Alb  3.2<L>  /  TBili  1.5<H>  /  DBili  x   /  AST  22  /  ALT  16  /  AlkPhos  47  06-14    PT/INR - ( 13 Jun 2021 00:43 )   PT: 18.3 sec;   INR: 1.56 ratio         PTT - ( 13 Jun 2021 00:43 )  PTT:33.5 sec      RADIOLOGY & ADDITIONAL TESTS: Reviewed.     Juan York MD  PGY-1, Internal Medicine  [facility]  Pager #: [phone]    INTERVAL HPI/OVERNIGHT EVENTS: No overnight events.     SUBJECTIVE: Patient seen and examined at bedside. Patient complains of thirst and dehydration in the morning. Pt able to squeeze fingers on command but not able to answer questions besides mentioning his thirst.     ROS unable to be assessed 2/2 to pt's AM.     OBJECTIVE:    VITAL SIGNS:  ICU Vital Signs Last 24 Hrs  T(C): 36.5 (14 Jun 2021 08:00), Max: 36.5 (14 Jun 2021 08:00)  T(F): 97.7 (14 Jun 2021 08:00), Max: 97.7 (14 Jun 2021 08:00)  HR: 97 (14 Jun 2021 09:15) (89 - 103)  BP: 119/59 (14 Jun 2021 09:15) (79/41 - 165/57)  BP(mean): 81 (14 Jun 2021 09:15) (54 - 92)  ABP: --  ABP(mean): --  RR: 30 (14 Jun 2021 09:15) (11 - 35)  SpO2: 99% (14 Jun 2021 09:15) (92% - 100%)        06-13 @ 07:01 - 06-14 @ 07:00  --------------------------------------------------------  IN: 271.6 mL / OUT: 120 mL / NET: 151.6 mL    06-14 @ 07:01 - 06-14 @ 11:45  --------------------------------------------------------  IN: 11.4 mL / OUT: 0 mL / NET: 11.4 mL      CAPILLARY BLOOD GLUCOSE      POCT Blood Glucose.: 145 mg/dL (14 Jun 2021 11:38)      PHYSICAL EXAM:  GENERAL: NAD, tired appearing  HEENT: NC/AT, PERRL, EOMI, no conjunctival pallor or scleral icterus, moist mucous membranes, hard of hearing  NECK: supple  CV: +S1/S2, RRR  RESPIRATORY: CTA b/l, +accessory muscle use  ABDOMEN: soft, NTND, +distended  MSK: Able to move all 4 extremities  NEURO: unable to follow commands or answer questions but able to squeeze fingers  SKIN: WWP, 2+ peripheral pulses, pitting edema of b/l LE    MEDICATIONS:  MEDICATIONS  (STANDING):  albuterol/ipratropium for Nebulization 3 milliLiter(s) Nebulizer every 6 hours  artificial  tears Solution 1 Drop(s) Both EYES three times a day  chlorhexidine 4% Liquid 1 Application(s) Topical <User Schedule>  insulin lispro (ADMELOG) corrective regimen sliding scale   SubCutaneous every 6 hours  levothyroxine Injectable 44 MICROGram(s) IV Push at bedtime  midodrine 10 milliGRAM(s) Oral every 8 hours  norepinephrine Infusion 0.05 MICROgram(s)/kG/Min (7.14 mL/Hr) IV Continuous <Continuous>  nystatin Powder 1 Application(s) Topical three times a day  pantoprazole  Injectable 40 milliGRAM(s) IV Push every 12 hours  petrolatum Ophthalmic Ointment 1 Application(s) Both EYES daily  rifAXIMin 550 milliGRAM(s) Oral two times a day    MEDICATIONS  (PRN):      ALLERGIES:  Allergies    No Known Allergies    Intolerances        LABS:                        8.4    5.54  )-----------( 56       ( 14 Jun 2021 01:26 )             25.2     06-14    145  |  104  |  128<H>  ----------------------------<  154<H>  4.8   |  12<L>  |  10.33<H>    Ca    9.6      14 Jun 2021 01:26    TPro  5.4<L>  /  Alb  3.2<L>  /  TBili  1.5<H>  /  DBili  x   /  AST  22  /  ALT  16  /  AlkPhos  47  06-14    PT/INR - ( 13 Jun 2021 00:43 )   PT: 18.3 sec;   INR: 1.56 ratio         PTT - ( 13 Jun 2021 00:43 )  PTT:33.5 sec      RADIOLOGY & ADDITIONAL TESTS: Reviewed.

## 2021-06-14 NOTE — PROGRESS NOTE ADULT - ASSESSMENT
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  Now readmitted to MICU for hypoxemic respiratory failure requiring intubation in the setting of worsening hepatic encephalopathy, aspiration, and large right pleural effusion, s/p thora 6/2,s/p extubation 6/7, now with acute renal failure, suspect ATN.      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  - Off sedation  -worsening confusion - unable to follow commands now    Cardiovascular:  -Hx of HF (EF 40-45%), AICD  - Continue to wean levophed  - s/p midodrine 20mg TID  -On levo, no oral access  -Holding Aldactone, Lasix     Pulmonary: Hypoxemic Respiratory Failure in the setting of hepatic encephalopathy, aspiration, large right pleural effusion, intubated 6/2, extubated 6/7  -Titrate FIo2 to maintain SPO2 > 92, ABGs  -Broad spectrum abx; zosyn for presumed aspiration PNA Complete on 6/8  -Duoneb standing for wheezing  -Patient likely aspirating  -tachypneic due to acidemia and pleural effusion    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  - s/p thora #2 6/2: elevated triglycerides to 120, protein ratio < 0.5 -- suggests transudative  -pleural fluid LDH/serum LDH ratio (however serum LDH not at same time as thora) > 0.5  -cytopath: no cancer  CXR 6/8 with reaccumulation of pleural effusion  -6/12 thora #3- 2.5L removed from R pleural effusion - Triglycerides 166    GI    End-stage cirrhosis  -Hx of GIB: EGD 5/18 with small esophageal varices, large cratered duodenal ulcer   -Continue protonix ppi BID  -Hx of Cirrhosis  -s/p para 5/28 in IR (therapeutic drain with removal of 400cc cloudly pink fluid)  - POCUS on 6/6 showing recollection of ascites - will try for another paracentesis today  -Hepatology on board; f/u further recommendations  -s/p Lactulose/Rifaximin for hepatic encephalopathy, patient   -Continue Carafate as per hepatology  [ ] revisit with family regarding whether to replace NGT (feeds previously held for aspiration risk)      Renal    #Acute renal failure, likely ATN i/s/o 4.9L para on 6/6  -Intake and output q1  -Renally dose meds based on Gfr -- zosyn redosed  -6/7:s/p reyes place 6/7 -pt with enlarged prostate on CT  -s/p  bumex  gtt on 6/7 -- with minimal UOP --> discontinue  -6/8 currently no indication for dialysis; c/w albumin 25% q6 for 4 doses  6/10 Cr worsening, urine now yellow (may have been red tinged earlier due to trauma)  6/11 cr worsening , as per renal not offering dialysis/CRRT  -acidosis likely causing tachypnea  6/13 renal still not offering dialysis      ID    Sepsis - meeting criteria with hypothermia, and leukopenia  -Pan culture bld cx x 2, UA, sputum cx, MRSA swab  -ABX:   s/p zosyn 6/1 - 6/8 for 7d for empiric coverage for aspiration pna  -zosyn 6/9-xx  --empiric dosing , restarted for sepsis with hyothermia and leukopenia  -De-escalate abx based on sensitivities  -Follow up ID for further recommendations  -6/9 repeat Bcx, UA sent    Endo  -Continue synthroid  -Monitor blood glucose q6h  - c/w moderate ISS    Heme  -Hgb slightly dropped 6/7  -Maintain active type and screen    Ppx  -Continue Protonix PPI BID  -PAS for DVT ppx    GOC    -See GOC note from 6/9, 6/10  DNR, but NOT DNI  Family wants intubation if needed  Family wants thoracentesis if needed  Continue with pressors as needed  NPO after d/w family  Further discussion with palliative   SENAIT JHAVERI is a 84yo Male with PMH HF (EF 40-45%), 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  Now readmitted to MICU for hypoxemic respiratory failure requiring intubation in the setting of worsening hepatic encephalopathy, aspiration, and large right pleural effusion, s/p thora 6/2,s/p extubation 6/7, now with acute renal failure, suspect ATN.      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  -Off sedation  -worsening confusion - unable to follow commands now    Cardiovascular:  -Hx of HF (EF 40-45%), AICD  - Continue to wean levophed  - s/p midodrine 20mg TID  -On levo, no oral access  -Holding Aldactone, Lasix     Pulmonary: Hypoxemic Respiratory Failure in the setting of hepatic encephalopathy, aspiration, large right pleural effusion, intubated 6/2, extubated 6/7  -Titrate FIo2 to maintain SPO2 > 92, ABGs  -Broad spectrum abx; zosyn for presumed aspiration PNA Complete on 6/8  -Duoneb standing for wheezing  -Patient likely aspirating  -tachypneic due to acidemia and pleural effusion    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  - s/p thora #2 6/2: elevated triglycerides to 120, protein ratio < 0.5 -- suggests transudative  -pleural fluid LDH/serum LDH ratio (however serum LDH not at same time as thora) > 0.5  -cytopath: no cancer  CXR 6/8 with reaccumulation of pleural effusion  -6/12 thora #3- 2.5L removed from R pleural effusion - Triglycerides 166    GI    End-stage cirrhosis  -Hx of GIB: EGD 5/18 with small esophageal varices, large cratered duodenal ulcer   -Continue protonix ppi BID  -Hx of Cirrhosis  -s/p para 5/28 in IR (therapeutic drain with removal of 400cc cloudly pink fluid)  - POCUS on 6/6 showing recollection of ascites - will try for another paracentesis today  -Hepatology on board; f/u further recommendations  -s/p Lactulose/Rifaximin for hepatic encephalopathy, patient   -Continue Carafate as per hepatology  [ ] revisit with family regarding whether to replace NGT (feeds previously held for aspiration risk)      Renal    #Acute renal failure, likely ATN i/s/o 4.9L para on 6/6  -Intake and output q1  -Renally dose meds based on Gfr -- zosyn redosed  -6/7:s/p reyes place 6/7 -pt with enlarged prostate on CT  -s/p  bumex  gtt on 6/7 -- with minimal UOP --> discontinue  -6/8 currently no indication for dialysis; c/w albumin 25% q6 for 4 doses  6/10 Cr worsening, urine now yellow (may have been red tinged earlier due to trauma)  6/11 cr worsening , as per renal not offering dialysis/CRRT  -acidosis likely causing tachypnea  6/13 renal still not offering dialysis      ID    Sepsis - meeting criteria with hypothermia, and leukopenia  -Pan culture bld cx x 2, UA, sputum cx, MRSA swab  -ABX:   s/p zosyn 6/1 - 6/8 for 7d for empiric coverage for aspiration pna  -zosyn 6/9-xx  --empiric dosing , restarted for sepsis with hyothermia and leukopenia  -De-escalate abx based on sensitivities  -Follow up ID for further recommendations  -6/9 repeat Bcx, UA sent    Endo  -Continue synthroid  -Monitor blood glucose q6h  - c/w moderate ISS    Heme  -Hgb slightly dropped 6/7  -Maintain active type and screen    Ppx  -Continue Protonix PPI BID  -PAS for DVT ppx    GOC    -See GOC note from 6/9, 6/10  DNR, but NOT DNI  Family wants intubation if needed  Family wants thoracentesis if needed  Continue with pressors as needed  NPO after d/w family  Further discussion with palliative   SENAIT JHAVERI is a 86yo Male with PMH HF (EF 40-45%), 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  Now readmitted to MICU for hypoxemic respiratory failure requiring intubation in the setting of worsening hepatic encephalopathy, aspiration, and large right pleural effusion, s/p thora 6/2,s/p extubation 6/7, now with acute renal failure, suspect ATN.      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  -Off sedation  -pt still w/ confusion, able to squeeze fingers but unable to answer questions    Cardiovascular:  -Hx of HF (EF 40-45%), AICD  -Continue to wean levophed  -s/p midodrine 20mg TID  -On levo, no oral access  -Holding Aldactone, Lasix     Pulmonary: Hypoxemic Respiratory Failure in the setting of hepatic encephalopathy, aspiration, large right pleural effusion, intubated 6/2, extubated 6/7  -Titrate FIo2 to maintain SPO2 > 92, ABGs  -Broad spectrum abx; zosyn for presumed aspiration PNA Complete on 6/8  -Duoneb standing for wheezing  -Patient likely aspirating  -tachypneic due to acidemia and pleural effusion    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  - s/p thora #2 6/2: elevated triglycerides to 120, protein ratio < 0.5 -- suggests transudative  -pleural fluid LDH/serum LDH ratio (however serum LDH not at same time as thora) > 0.5  -cytopath: no cancer  CXR 6/8 with reaccumulation of pleural effusion  -6/12 thora #3- 2.5L removed from R pleural effusion - Triglycerides 166    GI    End-stage cirrhosis  -Hx of GIB: EGD 5/18 with small esophageal varices, large cratered duodenal ulcer   -Continue protonix ppi BID  -Hx of Cirrhosis  -s/p para 5/28 in IR (therapeutic drain with removal of 400cc cloudly pink fluid)  - POCUS on 6/6 showing recollection of ascites - will try for another paracentesis today  -Hepatology on board; f/u further recommendations  -s/p Lactulose/Rifaximin for hepatic encephalopathy, patient   -Continue Carafate as per hepatology  [ ] revisit with family regarding whether to replace NGT (feeds previously held for aspiration risk)      Renal    #Acute renal failure, likely ATN i/s/o 4.9L para on 6/6  -Intake and output q1  -Renally dose meds based on Gfr -- zosyn redosed  -6/7:s/p reyes place 6/7 -pt with enlarged prostate on CT  -s/p  bumex  gtt on 6/7 -- with minimal UOP --> discontinue  -6/8 currently no indication for dialysis; c/w albumin 25% q6 for 4 doses  -6/10 Cr worsening, urine now yellow (may have been red tinged earlier due to trauma)  -6/11 cr worsening , as per renal not offering dialysis/CRRT  -acidosis likely causing tachypnea  -6/13 renal still not offering dialysis      ID    Sepsis - meeting criteria with hypothermia, and leukopenia  -Pan culture bld cx x 2, UA, sputum cx, MRSA swab  -ABX:   s/p zosyn 6/1 - 6/8 for 7d for empiric coverage for aspiration pna  -zosyn 6/9-xx  --empiric dosing , restarted for sepsis with hypothermia and leukopenia  -De-escalate abx based on sensitivities  -Follow up ID for further recommendations  -6/9 repeat Bcx, UA sent  -zosyn discontinued after 6-day course    Endo  -Continue synthroid  -Monitor blood glucose q6h  - c/w moderate ISS    Heme  -Hgb slightly dropped 6/7  -Maintain active type and screen    Ppx  -Continue Protonix PPI BID  -PAS for DVT ppx    GOC    -See GOC note from 6/9, 6/10  DNR, but NOT DNI  Family wants intubation if needed  Family wants thoracentesis if needed  Continue with pressors as needed  NPO after d/w family  Further discussion with palliative

## 2021-06-14 NOTE — PROGRESS NOTE ADULT - PROBLEM SELECTOR PLAN 5
short and long term prognosis remain poor despite patient being more alert  multiple GOC conversations held with family and reviewed; now patient with worsening renal failure and not a candidate for renal replacement therapy.  I attempted to follow up with family today but no one at bedside and no answer via telephone call. voicemail left void of PHI with call back number.   If family interested in symptom focused care and are considering DNI status, patient would be appropriate for palliative care unit.  LILY reviewed in chart- DNR/trial of intubation. copy of HCP also placed in chart, from hospitalization at [facility], naming daughter Cleopatra as primary.

## 2021-06-14 NOTE — PROGRESS NOTE ADULT - PROBLEM SELECTOR PROBLEM 6
Chronic congestive heart failure, unspecified heart failure type
Palliative care encounter
Chronic congestive heart failure, unspecified heart failure type
Acute metabolic encephalopathy
Acute kidney injury
Chronic congestive heart failure, unspecified heart failure type

## 2021-06-14 NOTE — PROGRESS NOTE ADULT - PROBLEM SELECTOR PROBLEM 3
Acute metabolic encephalopathy
ANDREEA (acute kidney injury)
Acute metabolic encephalopathy
Acute metabolic encephalopathy
Acute systolic congestive heart failure
Acute metabolic encephalopathy
Hyponatremia
Acute metabolic encephalopathy

## 2021-06-14 NOTE — PROGRESS NOTE ADULT - PROBLEM SELECTOR PLAN 4
appears comfortable on exam today, PAINAD 0  PAINAD 0-2 over last 24 hours.  If patient complains of severe pain, would consider dilaudid oral solution 1mg q6h prn or dilaudid IV 0.2mg q6h prn for mild/moderate, would consider IV tylenol (caution in setting of liver impairment, would rec less than 3gm per day)  if opiates are ordered, please also ensure bowel regimen, either senna HS if tolerating PO or dulcolax suppository prn for constipation.

## 2021-06-14 NOTE — PROGRESS NOTE ADULT - ATTENDING COMMENTS
Pt known to me from prior episode of resolved ARF  1.  ARF--multifactorial, advanced.  Renal replacement rx would unlikely significantly impact on overall bleak prognosis including advanced liver disease    discussed with MICU team

## 2021-06-14 NOTE — PROGRESS NOTE ADULT - PROBLEM SELECTOR PROBLEM 4
Acute on chronic kidney failure
Pain
Acute on chronic kidney failure
Acute on chronic kidney failure
Hyponatremia
Acute on chronic kidney failure
Fever, unspecified fever cause
Acute on chronic kidney failure

## 2021-06-14 NOTE — PROGRESS NOTE ADULT - PROBLEM SELECTOR PLAN 3
suspect due to ATN  nephrology following- do not feel patient Is a candidate for dialysis  I attempted to contact family today to discuss this and further goals of care but no answer, no family at bedside.

## 2021-06-14 NOTE — PROGRESS NOTE ADULT - SUBJECTIVE AND OBJECTIVE BOX
SUBJECTIVE AND OBJECTIVE:  INTERVAL HPI/OVERNIGHT EVENTS:    DNR on chart: Yes      Allergies    No Known Allergies    Intolerances    MEDICATIONS  (STANDING):  albuterol/ipratropium for Nebulization 3 milliLiter(s) Nebulizer every 6 hours  artificial  tears Solution 1 Drop(s) Both EYES three times a day  chlorhexidine 4% Liquid 1 Application(s) Topical <User Schedule>  insulin lispro (ADMELOG) corrective regimen sliding scale   SubCutaneous every 6 hours  levothyroxine Injectable 44 MICROGram(s) IV Push at bedtime  midodrine 10 milliGRAM(s) Oral every 8 hours  norepinephrine Infusion 0.05 MICROgram(s)/kG/Min (7.14 mL/Hr) IV Continuous <Continuous>  nystatin Powder 1 Application(s) Topical three times a day  pantoprazole  Injectable 40 milliGRAM(s) IV Push every 12 hours  petrolatum Ophthalmic Ointment 1 Application(s) Both EYES daily  rifAXIMin 550 milliGRAM(s) Oral two times a day    MEDICATIONS  (PRN):      ITEMS UNCHECKED ARE NOT PRESENT    PRESENT SYMPTOMS: [ ]Unable to obtain due to poor mentation   Source if other than patient:  [ ]Family   [ ]Team     Pain:  [ ]yes [ ]no  QOL impact -   Location -                    Aggravating factors -  Quality -  Radiation -  Timing-  Severity (0-10 scale):  Minimal acceptable level (0-10 scale):     Dyspnea:                           [ ]Mild [ ]Moderate [ ]Severe  Anxiety:                             [ ]Mild [ ]Moderate [ ]Severe  Fatigue:                             [ ]Mild [ ]Moderate [ ]Severe  Nausea:                             [ ]Mild [ ]Moderate [ ]Severe  Loss of appetite:              [ ]Mild [ ]Moderate [ ]Severe  Constipation:                    [ ]Mild [ ]Moderate [ ]Severe    CPOT:    https://www.[facility].org/getattachment/[email or web address]/Critical-Care-Pain-Observation-Tool-(CPOT)    PAIN AD Score:	  http://geriatrictoolkit.[place].[facility]/cog/painad.pdf (Ctrl + left click to view)    Other Symptoms:  [ ]All other review of systems negative     Palliative Performance Status Version 2:         %      http://npcrc.org/files/news/palliative_performance_scale_ppsv2.pdf  PHYSICAL EXAM:  Vital Signs Last 24 Hrs  T(C): 36.5 (14 Jun 2021 08:00), Max: 36.5 (14 Jun 2021 08:00)  T(F): 97.7 (14 Jun 2021 08:00), Max: 97.7 (14 Jun 2021 08:00)  HR: 93 (14 Jun 2021 11:46) (89 - 103)  BP: 119/59 (14 Jun 2021 09:15) (79/41 - 165/57)  BP(mean): 81 (14 Jun 2021 09:15) (54 - 92)  RR: 30 (14 Jun 2021 09:15) (11 - 35)  SpO2: 98% (14 Jun 2021 11:46) (92% - 100%) I&O's Summary    13 Jun 2021 07:01  -  14 Jun 2021 07:00  --------------------------------------------------------  IN: 271.6 mL / OUT: 120 mL / NET: 151.6 mL    14 Jun 2021 07:01  -  14 Jun 2021 12:42  --------------------------------------------------------  IN: 11.4 mL / OUT: 0 mL / NET: 11.4 mL       GENERAL:  [ ]Alert  [ ]Oriented x   [ ]Lethargic  [ ]Cachexia  [ ]Unarousable  [ ]Verbal  [ ]Non-Verbal  Behavioral:   [ ]Anxiety  [ ]Delirium [ ]Agitation [ ]Other  HEENT:  [ ]Normal   [ ]Dry mouth   [ ]ET Tube/Trach  [ ]Oral lesions  PULMONARY:   [ ]Clear [ ]Tachypnea  [ ]Audible excessive secretions   [ ]Rhonchi        [ ]Right [ ]Left [ ]Bilateral  [ ]Crackles        [ ]Right [ ]Left [ ]Bilateral  [ ]Wheezing     [ ]Right [ ]Left [ ]Bilateral  [ ]Diminished BS [ ] Right [ ]Left [ ]Bilateral  CARDIOVASCULAR:    [ ]Regular [ ]Irregular [ ]Tachy  [ ]Gonzalo [ ]Murmur [ ]Other  GASTROINTESTINAL:  [ ]Soft  [ ]Distended   [ ]+BS  [ ]Non tender [ ]Tender  [ ]PEG [ ]OGT/ NGT   Last BM:      GENITOURINARY:  [ ]Normal [ ]Incontinent   [ ]Oliguria/Anuria   [ ]Lu  MUSCULOSKELETAL:   [ ]Normal   [ ]Weakness  [ ]Bed/Wheelchair bound [ ]Edema  NEUROLOGIC:   [ ]No focal deficits  [ ] Cognitive impairment  [ ] Dysphagia [ ]Dysarthria [ ] Paresis [ ]Other   SKIN:   [ ]Normal  [ ]Rash   [ ]Pressure ulcer(s) [ ]y [ ]n present on admission    CRITICAL CARE:  [ ]Shock Present  [ ]Septic [ ]Cardiogenic [ ]Neurologic [ ]Hypovolemic  [ ]Vasopressors [ ]Inotropes  [ ]Respiratory failure present [ ]Mechanical Ventilation [ ]Non-invasive ventilatory support [ ]High-Flow   [ ]Acute  [ ]Chronic [ ]Hypoxic  [ ]Hypercarbic [ ]Other  [ ]Other organ failure     LABS:                        8.4    5.54  )-----------( 56       ( 14 Jun 2021 01:26 )             25.2   06-14    145  |  104  |  128<H>  ----------------------------<  154<H>  4.8   |  12<L>  |  10.33<H>    Ca    9.6      14 Jun 2021 01:26    TPro  5.4<L>  /  Alb  3.2<L>  /  TBili  1.5<H>  /  DBili  x   /  AST  22  /  ALT  16  /  AlkPhos  47  06-14  PT/INR - ( 13 Jun 2021 00:43 )   PT: 18.3 sec;   INR: 1.56 ratio         PTT - ( 13 Jun 2021 00:43 )  PTT:33.5 sec      RADIOLOGY & ADDITIONAL STUDIES:    Protein Calorie Malnutrition Present: [ ]mild [ ]moderate [ ]severe [ ]underweight [ ]morbid obesity  https://www.andeal.org/vault/2440/web/files/ONC/Table_Clinical%20Characteristics%20to%20Document%20Malnutrition-White%20JV%20et%20al%202012.pdf    Height (cm): 167.6 (06-01-21 @ 18:05)  Weight (kg): 76.2 (06-01-21 @ 18:05)  BMI (kg/m2): 27.1 (06-01-21 @ 18:05)    [ ]PPSV2 < or = 30%  [ ]significant weight loss [ ]poor nutritional intake [ ]anasarca   Albumin, Serum: 3.2 g/dL (06-14-21 @ 01:26)   [ ]Artificial Nutrition    REFERRALS:   [ ]Chaplaincy  [ ]Hospice  [ ]Child Life  [ ]Social Work  [ ]Case management [ ]Holistic Therapy     Goals of Care Document:  YAMILEX Velazquez (06-11-21 @ 22:20)  Goals of Care Conversation:   Participants:  · Participants  Family; Staff  · Spouse  [place]  · Child(mady)  July  · Provider  Myself and [place]    Advance Directives:  · Does patient have Advance Directive  No  · Caregiver:  no    Conversation Discussion:  · Conversation  Diagnosis; MOLST Discussed; Treatment Options  · Conversation Details  When I went to see the patient, his wife was giving him water. Though the patient was alert, he was coughing and clearly aspirating. He was having mild to moderate respiratory distress that improved with suctioning. I then d/w the patient's daughter and wife about risks of aspiration and high likelihood for needing to be intubated if they were to continue to give him a diet and particularly thin liquids. His daughter stated the patient did not like thin liquids and that he was actually asking for tea. I stated that the patient's family needed to clarify goals since their current plan of care was not really aligned. To me, I did not make sense to give him a diet if he was still not DNI. His family was putting him at risk of aspiration and then needing intubation. If they were looking for prolonging his life as a priority, they then should place him NPO. On the other hand, if thinking about quality of life as a priority and giving him pleasure feeds, they then should consider Nectar thickened fluids, DNI, and PCU transferring. I re-stated that in PCU no telemetry will be provided; however, that capped pressors, continuing pleasure feeds (dysphagia diet and nectar thickened liquids), Rifaximin, Zosyn, Protonix, and HHN can be provided. Furthermore, that in PCU medications for symptomatic relief will be used and including opioids and sedatives.      His daughter also inquired about nutrition and IV hydration. I stated the patient was in renal failure and without good output. Therefore, if giving him fluids he will become fluid overload and in distress. She understood that due to risks, IVF are probably contraindicated.     His daughter was to discuss code status with her family.   If DNI, will then consider transitioning to PCU; otherwise, the patient will need to stay in the MICU.       d/w primary team.    Location of Discussion:   Duration of Advanced Care Planning Meeting:  · Time spent (in minutes)  20    Location of Discussion:  · Location of discussion  Face to face      Electronic Signatures:  David Velazquez)  (Signed 12-Jun-2021 10:00)  	Authored: Goals of Care Conversation, Location of Discussion      Last Updated: 12-Jun-2021 10:00 by David Velazquez)       SUBJECTIVE AND OBJECTIVE: patient seen and examined, he was in chair at bedside, Language Line ID #[id number] Mandarin utilized. Attempted to engage patient in conversation, but per , patient speech unintelligible and unable to participate. This provider also reached out to patients primary HCP (HCP form located in alpha tab from admission at [facility], naming daughter Cleopatra as primary). No answer at number for Cleopatra per chart, voicemail left void of PHI with contact information.    INTERVAL HPI/OVERNIGHT EVENTS: renal function declining, nephrology feels risks of dialysis outweigh benefit and patient not good candidate for renal replacement therapies.     DNR on chart: Yes      Allergies    No Known Allergies    Intolerances    MEDICATIONS  (STANDING):  albuterol/ipratropium for Nebulization 3 milliLiter(s) Nebulizer every 6 hours  artificial  tears Solution 1 Drop(s) Both EYES three times a day  chlorhexidine 4% Liquid 1 Application(s) Topical <User Schedule>  insulin lispro (ADMELOG) corrective regimen sliding scale   SubCutaneous every 6 hours  levothyroxine Injectable 44 MICROGram(s) IV Push at bedtime  midodrine 10 milliGRAM(s) Oral every 8 hours  norepinephrine Infusion 0.05 MICROgram(s)/kG/Min (7.14 mL/Hr) IV Continuous <Continuous>  nystatin Powder 1 Application(s) Topical three times a day  pantoprazole  Injectable 40 milliGRAM(s) IV Push every 12 hours  petrolatum Ophthalmic Ointment 1 Application(s) Both EYES daily  rifAXIMin 550 milliGRAM(s) Oral two times a day    MEDICATIONS  (PRN):      ITEMS UNCHECKED ARE NOT PRESENT    PRESENT SYMPTOMS: [x ]Unable to obtain due to poor mentation   Source if other than patient:  [ ]Family   [ ]Team     Pain:  [ ]yes [ ]no  QOL impact -   Location -                    Aggravating factors -  Quality -  Radiation -  Timing-  Severity (0-10 scale):  Minimal acceptable level (0-10 scale):     Dyspnea:                           [ ]Mild [ ]Moderate [ ]Severe  Anxiety:                             [ ]Mild [ ]Moderate [ ]Severe  Fatigue:                             [ ]Mild [ ]Moderate [ ]Severe  Nausea:                             [ ]Mild [ ]Moderate [ ]Severe  Loss of appetite:              [ ]Mild [ ]Moderate [ ]Severe  Constipation:                    [ ]Mild [ ]Moderate [ ]Severe    CPOT:    https://www.[facility].org/getattachment/[email or web address]/Critical-Care-Pain-Observation-Tool-(CPOT)    PAIN AD Score:	0-2 over last 24 hrs per flowsheet, PAINAD 0 at time of this providers evaluation  http://geriatrictoolkit.[facility]/cog/painad.pdf (Ctrl + left click to view)    Other Symptoms:  [ ]All other review of systems negative     Palliative Performance Status Version 2:      20   %      http://npcrc.org/files/news/palliative_performance_scale_ppsv2.pdf  PHYSICAL EXAM:  Vital Signs Last 24 Hrs  T(C): 36.5 (14 Jun 2021 08:00), Max: 36.5 (14 Jun 2021 08:00)  T(F): 97.7 (14 Jun 2021 08:00), Max: 97.7 (14 Jun 2021 08:00)  HR: 93 (14 Jun 2021 11:46) (89 - 103)  BP: 119/59 (14 Jun 2021 09:15) (79/41 - 165/57)  BP(mean): 81 (14 Jun 2021 09:15) (54 - 92)  RR: 30 (14 Jun 2021 09:15) (11 - 35)  SpO2: 98% (14 Jun 2021 11:46) (92% - 100%) I&O's Summary    13 Jun 2021 07:01  -  14 Jun 2021 07:00  --------------------------------------------------------  IN: 271.6 mL / OUT: 120 mL / NET: 151.6 mL    14 Jun 2021 07:01  -  14 Jun 2021 12:42  --------------------------------------------------------  IN: 11.4 mL / OUT: 0 mL / NET: 11.4 mL       GENERAL: mumbling unintelligibly  [ x]Alert  [ x]Oriented x1   [ ]Lethargic  [ ]Cachexia  [ ]Unarousable  [ ]Verbal  [ ]Non-Verbal  Behavioral:   [ ]Anxiety  [ x]Delirium [ ]Agitation [ ]Other  HEENT:  [ ]Normal   [ x]Dry mouth   [ ]ET Tube/Trach  [ ]Oral lesions  PULMONARY:   [x ]Clear [ ]Tachypnea  [ ]Audible excessive secretions   [ ]Rhonchi        [ ]Right [ ]Left [ ]Bilateral  [ ]Crackles        [ ]Right [ ]Left [ ]Bilateral  [ ]Wheezing     [ ]Right [ ]Left [ ]Bilateral  [ ]Diminished BS [ ] Right [ ]Left [ ]Bilateral  CARDIOVASCULAR:    [x]Regular [ ]Irregular [ ]Tachy  [ ]Gonzalo [ ]Murmur [ ]Other  GASTROINTESTINAL:  [x ]Soft  [ ]Distended   [ ]+BS  [ x]Non tender [ ]Tender  [ ]PEG [ ]OGT/ NGT   Last BM: 6/11     GENITOURINARY:  [ ]Normal [xIncontinent   [ ]Oliguria/Anuria   [ ]Lu  MUSCULOSKELETAL:   [ ]Normal   [ ]Weakness  [x ]Bed/Wheelchair bound [ ]Edema  NEUROLOGIC:   [ ]No focal deficits  [ ] Cognitive impairment  [x ] Dysphagia [ x]Dysarthria [ ] Paresis [ ]Other   SKIN: ecchymoses  [ ]Normal  [ ]Rash   [ ]Pressure ulcer(s) [ ]y [ ]n present on admission    CRITICAL CARE:  [ ]Shock Present  [ ]Septic [ ]Cardiogenic [ ]Neurologic [ ]Hypovolemic  [ ]Vasopressors [ ]Inotropes  [ ]Respiratory failure present [ ]Mechanical Ventilation [ ]Non-invasive ventilatory support [ ]High-Flow   [ ]Acute  [ ]Chronic [ ]Hypoxic  [ ]Hypercarbic [ ]Other  [ ]Other organ failure     LABS:                        8.4    5.54  )-----------( 56       ( 14 Jun 2021 01:26 )             25.2   06-14    145  |  104  |  128<H>  ----------------------------<  154<H>  4.8   |  12<L>  |  10.33<H>    Ca    9.6      14 Jun 2021 01:26    TPro  5.4<L>  /  Alb  3.2<L>  /  TBili  1.5<H>  /  DBili  x   /  AST  22  /  ALT  16  /  AlkPhos  47  06-14  PT/INR - ( 13 Jun 2021 00:43 )   PT: 18.3 sec;   INR: 1.56 ratio         PTT - ( 13 Jun 2021 00:43 )  PTT:33.5 sec      RADIOLOGY & ADDITIONAL STUDIES:< from: Xray Chest 1 View- PORTABLE-Urgent (Xray Chest 1 View- PORTABLE-Urgent .) (06.12.21 @ 17:21) >    EXAM:  XR CHEST PORTABLE URGENT 1V                            PROCEDURE DATE:  06/12/2021            INTERPRETATION:  A single chest x-ray was obtained on June 12, 2021.    INDICATION: Status post right thoracocentesis.    IMPRESSION:    The heart is normal in size. Status post right thoracocentesis. Markedly decreased in the previously described right pleural effusion. Small left pleural effusion is present. Left lower lobe pneumonia and/or atelectasis cannot be ruled out as well. No pneumothorax.                TONIO TRAN MD; Attending Radiologist  This document has been electronically signed. Jun 12 2021  9:36PM    < end of copied text >      Protein Calorie Malnutrition Present: [ ]mild [x]moderate [ ]severe [ ]underweight [ ]morbid obesity  https://www.andeal.org/vault/2440/web/files/ONC/Table_Clinical%20Characteristics%20to%20Document%20Malnutrition-White%20JV%20et%20al%202012.pdf    Height (cm): 167.6 (06-01-21 @ 18:05)  Weight (kg): 76.2 (06-01-21 @ 18:05)  BMI (kg/m2): 27.1 (06-01-21 @ 18:05)    [ x]PPSV2 < or = 30%  [ ]significant weight loss [x ]poor nutritional intake [ ]anasarca   Albumin, Serum: 3.2 g/dL (06-14-21 @ 01:26)   [ ]Artificial Nutrition    REFERRALS:   [ x]Chaplaincy  [ ]Hospice  [ ]Child Life  [ x]Social Work  [ ]Case management [ ]Holistic Therapy     Goals of Care Document:  YAMILEX Velazquez (06-11-21 @ 22:20)  Goals of Care Conversation:   Participants:  · Participants  Family; Staff  · Spouse  [place]  · Child(mady)  July  · Provider  Myself and [place]    Advance Directives:  · Does patient have Advance Directive  No  · Caregiver:  no    Conversation Discussion:  · Conversation  Diagnosis; MOLST Discussed; Treatment Options  · Conversation Details  When I went to see the patient, his wife was giving him water. Though the patient was alert, he was coughing and clearly aspirating. He was having mild to moderate respiratory distress that improved with suctioning. I then d/w the patient's daughter and wife about risks of aspiration and high likelihood for needing to be intubated if they were to continue to give him a diet and particularly thin liquids. His daughter stated the patient did not like thin liquids and that he was actually asking for tea. I stated that the patient's family needed to clarify goals since their current plan of care was not really aligned. To me, I did not make sense to give him a diet if he was still not DNI. His family was putting him at risk of aspiration and then needing intubation. If they were looking for prolonging his life as a priority, they then should place him NPO. On the other hand, if thinking about quality of life as a priority and giving him pleasure feeds, they then should consider Nectar thickened fluids, DNI, and PCU transferring. I re-stated that in PCU no telemetry will be provided; however, that capped pressors, continuing pleasure feeds (dysphagia diet and nectar thickened liquids), Rifaximin, Zosyn, Protonix, and HHN can be provided. Furthermore, that in PCU medications for symptomatic relief will be used and including opioids and sedatives.      His daughter also inquired about nutrition and IV hydration. I stated the patient was in renal failure and without good output. Therefore, if giving him fluids he will become fluid overload and in distress. She understood that due to risks, IVF are probably contraindicated.     His daughter was to discuss code status with her family.   If DNI, will then consider transitioning to PCU; otherwise, the patient will need to stay in the MICU.       d/w primary team.    Location of Discussion:   Duration of Advanced Care Planning Meeting:  · Time spent (in minutes)  20    Location of Discussion:  · Location of discussion  Face to face      Electronic Signatures:  David Velazquez)  (Signed 12-Jun-2021 10:00)  	Authored: Goals of Care Conversation, Location of Discussion      Last Updated: 12-Jun-2021 10:00 by David Velazquez)

## 2021-06-14 NOTE — PROGRESS NOTE ADULT - ASSESSMENT
85M PMHx CKD, cirrhosis (?viral hepatitis) - admitted for decompensated cirrhosis.  Nephrology consulted for hyponatremia and ANDREEA on CKD.  Was intubated and transferred to MICU for septic shock in setting of aspiration PNA.    # ANDREEA  Pt with ANDREEA on CKD likely 2/2 ATN in the setting hypotension, entresto/lasix, acute anemia and decreased EABV. Now with new ANDREEA in setting of hypotension, and pre-renal etiology. Now in ATN. On admission sCr 2.0 (baseline sCr 1.4-1.6 in Jan 2019), peaked at 2.7mg/dl, improved to 1.2mg/dl. Now Scr further increased to 10.33 mg/dl today. Currently oliguric. Urine output did not response with Bumex drip.  - At this point, patient is not a candidate for HD given poor prognosis, and dialysis would not improve his overall outcome   - BP optimization/antibiotic/blood transfusion per ICU team  - monitor BMP, strict I/O, avoid nephrotoxic agents (NSAIDs, PPI, contrast), renally dose medications per GFR.    # Hypernatremia  Resolved   S/p free water and D5W   Last Na 145  Monitor Na    If any questions, please feel free to contact me     Edwardo Davis  Nephrology Fellow  [facility] Pager: [phone]  [facility] Pager: [phone]

## 2021-06-14 NOTE — PROGRESS NOTE ADULT - PROBLEM SELECTOR PROBLEM 5
Hyponatremia
Fever, unspecified fever cause
Acute metabolic encephalopathy
Hyponatremia
Advanced care planning/counseling discussion
Hyponatremia

## 2021-06-14 NOTE — PROGRESS NOTE ADULT - ATTENDING COMMENTS
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  Pt readmitted to MICU for hypoxemic respiratory failure requiring intubation in the setting of worsening hepatic encephalopathy, aspiration, and large R pleural effusion, s/p thoracentesis 6/2, s/p extubation 6/7 and most recently pt with acute renal failure 2 to ATN    Resp/ Renal/ Social: Pt is DNR/ Agree with Renal NOT providing HD;  will not improve long term prognosis/ Disagree with family requesting intubaion/MV if necessary; Palliative Care f/u noted: DNI re-discussed/ s/p R thoracentesis 6/12. Pt's long term prognosis is poor  ID: May dc Abx  CVS: Restart Midodrine to wean Levophed off maintaining MAP >65  HEME: Thrombocytopenia worsening; trend platelets  FEN: Would push for comfort feeds  Endo: Cont Synthroid  GI/ Neuro: Cont Rifaximin/ Pt is lethargic but responsive when spoken to in his native language.

## 2021-06-14 NOTE — PROGRESS NOTE ADULT - PROBLEM SELECTOR PROBLEM 2
Acute on chronic kidney failure
Decompensated hepatic cirrhosis
Acute systolic congestive heart failure
Decompensated hepatic cirrhosis
Acute on chronic kidney failure
Decompensated hepatic cirrhosis

## 2021-06-14 NOTE — PROGRESS NOTE ADULT - PROBLEM SELECTOR PLAN 6
will continue to follow for ongoing goals of care, awaiting family decision regarding code status as well as focus of care. will reattempt to reach out to family on 6/15.     in the event of new or worsening symptoms, or change in clinical status requiring our assistance, our service is available 24/7 at [phone].

## 2021-06-14 NOTE — PROGRESS NOTE ADULT - PROBLEM SELECTOR PROBLEM 1
Anemia due to acute blood loss
Anemia due to acute blood loss
Hyponatremia
Hyponatremia
Anemia due to acute blood loss
Anemia due to acute blood loss
Hyponatremia
Anemia due to acute blood loss
Decompensated hepatic cirrhosis
Anemia due to acute blood loss
Functional quadriplegia
Anemia due to acute blood loss

## 2021-06-14 NOTE — PROGRESS NOTE ADULT - ASSESSMENT
86yo Male with PMH HF (EF 40-45%), 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  Now readmitted to MICU for hypoxemic respiratory failure requiring intubation in the setting of worsening hepatic encephalopathy, aspiration, and large right pleural effusion, s/p thora 6/2,s/p extubation 6/7, now with acute renal failure, suspect ATN.

## 2021-06-15 NOTE — GOALS OF CARE CONVERSATION - ADVANCED CARE PLANNING - CONVERSATION DETAILS
Outreach made to daughter, HCP Cleopatra.  I discussed with her current, and ongoing clinical status, with worsening renal failure, not a candidate for HD, and despite more alert, still with functional quadriplegia and overall poor prognosis.    I explained that despite the aggressive efforts in MICU, patient still remains with an incurable illness 2/2 liver failure, and even in the event his respiratory status changes, that intubation would not be able to reverse his underlying incurable illness. I explained that medically, an appropriate alternative to consider would be symptom focused care. I revisited the option of the palliative care unit to ensure quality of life and symptom management are paramount, but that life expectancy would likely be limited.    Daughter states that her goals are for him to be comfortable but also for him to live as long as possible, even if that means going back on the ventilator. I explained that being comfortable and living long is a goal for most people, but unfortunately may not be a realistic goal for patient with an end stage disease process that we are unable to cure.     I offered significant emotional support, as daughter notes hearing these topics on a daily basis has been emotionally challenging. I explained that our goal is to ensure she has all the available information as well us our honest opinion based on experience and available medical literature so that she can make educated decisions for her loved one. Daughter receptive to call and assured ongoing availability with follow up by palliative care and ICU team.

## 2021-06-15 NOTE — PROGRESS NOTE ADULT - SUBJECTIVE AND OBJECTIVE BOX
[facility] DIVISION OF KIDNEY DISEASES AND HYPERTENSION -- FOLLOW UP NOTE  --------------------------------------------------------------------------------    24 hour events/subjective: Patient was seen and examined at bedside. Unable to obtain ROS.    PAST HISTORY  --------------------------------------------------------------------------------  No significant changes to PMH, PSH, FHx, SHx, unless otherwise noted    ALLERGIES & MEDICATIONS  --------------------------------------------------------------------------------  Allergies    No Known Allergies    Intolerances    Standing Inpatient Medications  albuterol/ipratropium for Nebulization 3 milliLiter(s) Nebulizer every 6 hours  artificial  tears Solution 1 Drop(s) Both EYES three times a day  chlorhexidine 4% Liquid 1 Application(s) Topical <User Schedule>  insulin lispro (ADMELOG) corrective regimen sliding scale   SubCutaneous every 6 hours  levothyroxine Injectable 44 MICROGram(s) IV Push at bedtime  midodrine 10 milliGRAM(s) Oral every 8 hours  norepinephrine Infusion 0.05 MICROgram(s)/kG/Min IV Continuous <Continuous>  nystatin Powder 1 Application(s) Topical three times a day  pantoprazole  Injectable 40 milliGRAM(s) IV Push daily  petrolatum Ophthalmic Ointment 1 Application(s) Both EYES daily  rifAXIMin 550 milliGRAM(s) Oral two times a day    PRN Inpatient Medications    REVIEW OF SYSTEMS  --------------------------------------------------------------------------------  Unable to obtain ROS    VITALS/PHYSICAL EXAM  --------------------------------------------------------------------------------  T(C): 35.2 (06-15-21 @ 08:00), Max: 36.6 (06-15-21 @ 04:00)  HR: 101 (06-15-21 @ 09:00) (90 - 103)  BP: 106/53 (06-15-21 @ 09:00) (74/37 - 118/60)  RR: 22 (06-15-21 @ 09:00) (13 - 42)  SpO2: 98% (06-15-21 @ 09:00) (82% - 100%)  Wt(kg): --    06-14-21 @ 07:01  -  06-15-21 @ 07:00  --------------------------------------------------------  IN: 110.3 mL / OUT: 110 mL / NET: 0.3 mL    06-15-21 @ 07:01  -  06-15-21 @ 10:03  --------------------------------------------------------  IN: 12.1 mL / OUT: 0 mL / NET: 12.1 mL    Physical Exam:  	Gen: NAD  	HEENT: MMM  	Pulm: CTA B/L, coarse breath sounds, gurgling   	CV: S1S2  	Abd: Soft, +BS   	Ext: No LE edema B/L  	Neuro: Awake  	Skin: Warm and dry      LABS/STUDIES  --------------------------------------------------------------------------------              8.4    6.08  >-----------<  21       [06-15-21 @ 00:26]              25.2     144  |  102  |  146  ----------------------------<  152      [06-15-21 @ 00:26]  5.3   |  <10  |  11.23        Ca     9.8     [06-15-21 @ 00:26]    TPro  5.9  /  Alb  3.3  /  TBili  1.7  /  DBili  x   /  AST  30  /  ALT  19  /  AlkPhos  49  [06-15-21 @ 00:26]          Creatinine Trend:  SCr 11.23 [06-15 @ 00:26]  SCr 10.33 [06-14 @ 01:26]  SCr 9.61 [06-13 @ 00:43]  SCr 8.60 [06-12 @ 01:05]  SCr 7.49 [06-11 @ 00:21]    Urinalysis - [06-09-21 @ 13:24]      Color BROWN / Appearance Slightly Turbid Urine chemistry performed on supernatant. / SG 1.015 / pH 6.0      Gluc Negative / Ketone Negative  / Bili Negative / Urobili Negative       Blood Large / Protein 100 mg/dL / Leuk Est Large / Nitrite Negative      RBC >50 / WBC 15 / Hyaline 0 / Gran  / Sq Epi  / Non Sq Epi Occasional / Bacteria Few      HbA1c 7.1      [01-12-19 @ 07:22]  TSH 1.00      [05-17-21 @ 08:41]    HBsAb 32.4      [05-17-21 @ 23:13]  HBsAg Nonreact      [05-17-21 @ 23:13]  HBcAb Reactive      [05-17-21 @ 23:13]  HCV 0.17, Nonreact      [05-17-21 @ 23:13]  HIV Nonreact      [05-18-21 @ 01:05]

## 2021-06-15 NOTE — PROGRESS NOTE ADULT - ASSESSMENT
85M PMHx CKD, cirrhosis (?viral hepatitis) - admitted for decompensated cirrhosis.  Nephrology consulted for hyponatremia and ANDREEA on CKD.  Was intubated and transferred to MICU for septic shock in setting of aspiration PNA.    # ANDREEA  Pt with ANDREEA on CKD likely 2/2 ATN in the setting hypotension, entresto/lasix, acute anemia and decreased EABV. Now with new ANDREEA in setting of hypotension, and pre-renal etiology. Now in ATN. On admission sCr 2.0 (baseline sCr 1.4-1.6 in Jan 2019), peaked at 2.7mg/dl, improved to 1.2mg/dl. Now Scr further increased to 11.23 mg/dl today. Currently oliguric. Urine output did not response with Bumex drip.  - At this point, patient is not a candidate for HD given poor prognosis, and dialysis would not improve his overall outcome   - BP optimization/antibiotic/blood transfusion per ICU team  - monitor BMP, strict I/O, avoid nephrotoxic agents (NSAIDs, PPI, contrast), renally dose medications per GFR.    #HAGMA  - Could be in setting of renal failure  - Get VBG and lactate level  - Last serum bicarb of < 10  - Might need to be started on bicarb drip, as cannot take oral bicarb as currently NPO, however, currently oliguric and this may worsen his respiratory status     # Hypernatremia  Resolved   S/p free water and D5W   Last Na 144  Monitor Na    If any questions, please feel free to contact me     Edwardo Davis  Nephrology Fellow  [facility] Pager: [phone]  [facility] Pager: [phone]

## 2021-06-15 NOTE — PROGRESS NOTE ADULT - SUBJECTIVE AND OBJECTIVE BOX
Juan York MD  PGY-1, Internal Medicine  MICU  Pager #: [phone]    INTERVAL HPI/OVERNIGHT EVENTS:    SUBJECTIVE: Patient seen and examined at bedside.     OBJECTIVE:    VITAL SIGNS:  ICU Vital Signs Last 24 Hrs  T(C): 36.6 (15 Gorge 2021 04:00), Max: 36.6 (15 Gorge 2021 04:00)  T(F): 97.8 (15 Gorge 2021 04:00), Max: 97.8 (15 Gorge 2021 04:00)  HR: 101 (15 Gorge 2021 06:45) (90 - 103)  BP: 104/51 (15 Gorge 2021 06:45) (74/37 - 127/72)  BP(mean): 73 (15 Gorge 2021 06:45) (49 - 92)  ABP: --  ABP(mean): --  RR: 22 (15 Gorge 2021 06:45) (13 - 42)  SpO2: 97% (15 Gorge 2021 06:45) (82% - 100%)        06-14 @ 07:01  -  06-15 @ 07:00  --------------------------------------------------------  IN: 110.3 mL / OUT: 110 mL / NET: 0.3 mL      CAPILLARY BLOOD GLUCOSE      POCT Blood Glucose.: 151 mg/dL (15 Gorge 2021 05:37)      PHYSICAL EXAM:  GENERAL: NAD  HEENT: NC/AT, PERRL, EOMI, no conjunctival pallor or scleral icterus, moist mucous membranes  NECK: supple  CV: +S1/S2, RRR  RESPIRATORY: CTA b/l, no w/r/r  ABDOMEN: soft, NTND  MSK: Able to move all 4 extremities  NEURO: AAOx4 (person, place, time, event). Able to follow commands.  SKIN: WWP, 2+ peripheral pulses, no LE edema    MEDICATIONS:  MEDICATIONS  (STANDING):  albuterol/ipratropium for Nebulization 3 milliLiter(s) Nebulizer every 6 hours  artificial  tears Solution 1 Drop(s) Both EYES three times a day  chlorhexidine 4% Liquid 1 Application(s) Topical <User Schedule>  insulin lispro (ADMELOG) corrective regimen sliding scale   SubCutaneous every 6 hours  levothyroxine Injectable 44 MICROGram(s) IV Push at bedtime  midodrine 10 milliGRAM(s) Oral every 8 hours  norepinephrine Infusion 0.05 MICROgram(s)/kG/Min (7.14 mL/Hr) IV Continuous <Continuous>  nystatin Powder 1 Application(s) Topical three times a day  pantoprazole  Injectable 40 milliGRAM(s) IV Push daily  petrolatum Ophthalmic Ointment 1 Application(s) Both EYES daily  rifAXIMin 550 milliGRAM(s) Oral two times a day    MEDICATIONS  (PRN):      ALLERGIES:  Allergies    No Known Allergies    Intolerances        LABS:                        8.4    6.08  )-----------( 21       ( 15 Gorge 2021 00:26 )             25.2     06-15    144  |  102  |  146<H>  ----------------------------<  152<H>  5.3   |  <10<LL>  |  11.23<H>    Ca    9.8      15 Gorge 2021 00:26    TPro  5.9<L>  /  Alb  3.3  /  TBili  1.7<H>  /  DBili  x   /  AST  30  /  ALT  19  /  AlkPhos  49  06-15          RADIOLOGY & ADDITIONAL TESTS: Reviewed. Juan York MD  PGY-1, Internal Medicine  MICU  Pager #: [phone]    INTERVAL HPI/OVERNIGHT EVENTS: No overnight events.     SUBJECTIVE: Patient seen and examined at bedside. Pt denies having complaints this morning but does have baseline confusion.     OBJECTIVE:    VITAL SIGNS:  ICU Vital Signs Last 24 Hrs  T(C): 36.6 (15 Gorge 2021 04:00), Max: 36.6 (15 Gorge 2021 04:00)  T(F): 97.8 (15 Gorge 2021 04:00), Max: 97.8 (15 Gorge 2021 04:00)  HR: 101 (15 Gorge 2021 06:45) (90 - 103)  BP: 104/51 (15 Gorge 2021 06:45) (74/37 - 127/72)  BP(mean): 73 (15 Gorge 2021 06:45) (49 - 92)  ABP: --  ABP(mean): --  RR: 22 (15 Gorge 2021 06:45) (13 - 42)  SpO2: 97% (15 Gorge 2021 06:45) (82% - 100%)        06-14 @ 07:01  -  06-15 @ 07:00  --------------------------------------------------------  IN: 110.3 mL / OUT: 110 mL / NET: 0.3 mL      CAPILLARY BLOOD GLUCOSE      POCT Blood Glucose.: 151 mg/dL (15 Gorge 2021 05:37)      PHYSICAL EXAM:  GENERAL: NAD  HEENT: NC/AT, PERRL, EOMI, no conjunctival pallor or scleral icterus, moist mucous membranes  NECK: supple  CV: +S1/S2, RRR  RESPIRATORY: CTA b/l, no w/r/r  ABDOMEN: soft, NTND  MSK: Able to move all 4 extremities  NEURO: AAOx4 (person, place, time, event). Able to follow commands.  SKIN: WWP, 2+ peripheral pulses, no LE edema    MEDICATIONS:  MEDICATIONS  (STANDING):  albuterol/ipratropium for Nebulization 3 milliLiter(s) Nebulizer every 6 hours  artificial  tears Solution 1 Drop(s) Both EYES three times a day  chlorhexidine 4% Liquid 1 Application(s) Topical <User Schedule>  insulin lispro (ADMELOG) corrective regimen sliding scale   SubCutaneous every 6 hours  levothyroxine Injectable 44 MICROGram(s) IV Push at bedtime  midodrine 10 milliGRAM(s) Oral every 8 hours  norepinephrine Infusion 0.05 MICROgram(s)/kG/Min (7.14 mL/Hr) IV Continuous <Continuous>  nystatin Powder 1 Application(s) Topical three times a day  pantoprazole  Injectable 40 milliGRAM(s) IV Push daily  petrolatum Ophthalmic Ointment 1 Application(s) Both EYES daily  rifAXIMin 550 milliGRAM(s) Oral two times a day    MEDICATIONS  (PRN):      ALLERGIES:  Allergies    No Known Allergies    Intolerances        LABS:                        8.4    6.08  )-----------( 21       ( 15 Gorge 2021 00:26 )             25.2     06-15    144  |  102  |  146<H>  ----------------------------<  152<H>  5.3   |  <10<LL>  |  11.23<H>    Ca    9.8      15 Gorge 2021 00:26    TPro  5.9<L>  /  Alb  3.3  /  TBili  1.7<H>  /  DBili  x   /  AST  30  /  ALT  19  /  AlkPhos  49  06-15          RADIOLOGY & ADDITIONAL TESTS: Reviewed. Juan York MD  PGY-1, Internal Medicine  MICU  Pager #: [phone]    INTERVAL HPI/OVERNIGHT EVENTS: No overnight events.     SUBJECTIVE: Patient seen and examined at bedside. Pt denies having complaints this morning but does have baseline confusion.     ROS unable to be assessed 2/2 to pt's mental status.     OBJECTIVE:    VITAL SIGNS:  ICU Vital Signs Last 24 Hrs  T(C): 36.6 (15 Gorge 2021 04:00), Max: 36.6 (15 Gorge 2021 04:00)  T(F): 97.8 (15 Gorge 2021 04:00), Max: 97.8 (15 Gorge 2021 04:00)  HR: 101 (15 Gorge 2021 06:45) (90 - 103)  BP: 104/51 (15 Gorge 2021 06:45) (74/37 - 127/72)  BP(mean): 73 (15 Gorge 2021 06:45) (49 - 92)  ABP: --  ABP(mean): --  RR: 22 (15 Gorge 2021 06:45) (13 - 42)  SpO2: 97% (15 Gorge 2021 06:45) (82% - 100%)        06-14 @ 07:01  -  06-15 @ 07:00  --------------------------------------------------------  IN: 110.3 mL / OUT: 110 mL / NET: 0.3 mL      CAPILLARY BLOOD GLUCOSE      POCT Blood Glucose.: 151 mg/dL (15 Gorge 2021 05:37)      PHYSICAL EXAM:  GENERAL: NAD, tired appearing  HEENT: NC/AT, PERRL, EOMI, no conjunctival pallor or scleral icterus, moist mucous membranes, hard of hearing  NECK: supple  CV: +S1/S2, RRR  RESPIRATORY: CTA b/l, +accessory muscle use  ABDOMEN: soft, NTND, +distended  MSK: Able to move all 4 extremities  NEURO: unable to follow commands or answer questions but able to squeeze fingers  SKIN: WWP, 2+ peripheral pulses, pitting edema of b/l LE    MEDICATIONS:  MEDICATIONS  (STANDING):  albuterol/ipratropium for Nebulization 3 milliLiter(s) Nebulizer every 6 hours  artificial  tears Solution 1 Drop(s) Both EYES three times a day  chlorhexidine 4% Liquid 1 Application(s) Topical <User Schedule>  insulin lispro (ADMELOG) corrective regimen sliding scale   SubCutaneous every 6 hours  levothyroxine Injectable 44 MICROGram(s) IV Push at bedtime  midodrine 10 milliGRAM(s) Oral every 8 hours  norepinephrine Infusion 0.05 MICROgram(s)/kG/Min (7.14 mL/Hr) IV Continuous <Continuous>  nystatin Powder 1 Application(s) Topical three times a day  pantoprazole  Injectable 40 milliGRAM(s) IV Push daily  petrolatum Ophthalmic Ointment 1 Application(s) Both EYES daily  rifAXIMin 550 milliGRAM(s) Oral two times a day    MEDICATIONS  (PRN):      ALLERGIES:  Allergies    No Known Allergies    Intolerances        LABS:                        8.4    6.08  )-----------( 21       ( 15 Gorge 2021 00:26 )             25.2     06-15    144  |  102  |  146<H>  ----------------------------<  152<H>  5.3   |  <10<LL>  |  11.23<H>    Ca    9.8      15 Gorge 2021 00:26    TPro  5.9<L>  /  Alb  3.3  /  TBili  1.7<H>  /  DBili  x   /  AST  30  /  ALT  19  /  AlkPhos  49  06-15          RADIOLOGY & ADDITIONAL TESTS: Reviewed.

## 2021-06-15 NOTE — CHART NOTE - NSCHARTNOTEFT_GEN_A_CORE
Nutrition Follow Up Note   Patient seen for: malnutrition follow up on  ICU     Source: medical record, communication with team.     Chart reviewed, events noted. Adm for GIB in the setting of decompensated cirrhosis, esophageal varices. MICU adm for septic shock in setting of aspiration PNA.  Extubated 6/7. ANDREEA on CKD, pt not a candidate for HD per Nephrology note.     Diet Order: Diet, NPO:   Except Medications (06-15-21 @ 07:21)    Nutrition Events:  - pt has been NPO since 6/9 (NGT removed 6/10), feeds had been held for aspiration risk  - Per Palliative note, pt's wife had been giving him water, risk of aspiration and reintubation had been discussed w/family  -  awaiting family clarification on GOC regarding DNI and ? NGT placement    GI: no N/V  had small loose BM today     Anthropometric Measurements:   Height (cm): 167.6 (06-01-21 @ 18:05)  Weight (kg): 76.2 (06-01-21 @ 18:05)  BMI (kg/m2): 27.1 (06-01-21 @ 18:05)  6/10 78.5 kg     Medications: MEDICATIONS  (STANDING):  albuterol/ipratropium for Nebulization 3 milliLiter(s) Nebulizer every 6 hours  artificial  tears Solution 1 Drop(s) Both EYES three times a day  chlorhexidine 4% Liquid 1 Application(s) Topical <User Schedule>  insulin lispro (ADMELOG) corrective regimen sliding scale   SubCutaneous every 6 hours  levothyroxine Injectable 44 MICROGram(s) IV Push at bedtime  midodrine 10 milliGRAM(s) Oral every 8 hours  norepinephrine Infusion 0.05 MICROgram(s)/kG/Min (7.14 mL/Hr) IV Continuous <Continuous>  nystatin Powder 1 Application(s) Topical three times a day  pantoprazole  Injectable 40 milliGRAM(s) IV Push daily  petrolatum Ophthalmic Ointment 1 Application(s) Both EYES daily  rifAXIMin 550 milliGRAM(s) Oral two times a day    MEDICATIONS  (PRN):    Labs: 06-15 @ 00:26: Sodium 144, Potassium 5.3, Calcium 9.8, Magnesium --, Phosphorus --, <H>, Creatinine 11.23<H>, Glucose 152<H>, Alk Phos 49, ALT/SGPT 19, AST/SGOT 30, Albumin 3.3, Prealbumin --, Total Bilirubin 1.7<H>, Hemoglobin 8.4<L>, Hematocrit 25.2<L>, Ferritin --, C-Reactive Protein --, Creatine Kinase <<27>    POCT Blood Glucose.: 151 mg/dL (06-15-21 @ 05:37)  POCT Blood Glucose.: 137 mg/dL (06-15-21 @ 01:14)  POCT Blood Glucose.: 163 mg/dL (06-14-21 @ 17:27)  POCT Blood Glucose.: 145 mg/dL (06-14-21 @ 11:38)    Skin: no pressure injuries documented, + sacral wound  Edema: 2+ B/L arms, legs, ankles, feet    Estimated Needs: based on 6/7 dosing wt 76.2 kg  Energy: 5948-0703  kcals (25-30 kcals/kg)  Protein:   gm (1.2-1.4 gm/kg)    Previous Nutrition Diagnosis: severe malnutrition   Nutrition Diagnosis is: care plan ongoing, pt has been NPO x 6 days and when pt was on EN rate was lowered 2/2 aspiration risk    Recommended Interventions:   1. If decision is to start EN, recommend fluid restricted formula Nepro at 10 cc/hr (potential refeeding risk), w/ recommendation for increasing by 10 cc/hr w/ goal 45 cc/hr x 24 hrs      Monitoring and Evaluation:   Continue to monitor nutrition provision and tolerance, weights, labs, skin integrity.   RD remains available upon request and will follow up per protocol.

## 2021-06-15 NOTE — PROGRESS NOTE ADULT - ATTENDING COMMENTS
Remains poorly responsive, no feeding tube, NG tube  1.  ARF--advanced comorbidities unlikely to have sustained response to renal replacement rx    discussed with MICU team, attending

## 2021-06-15 NOTE — PROGRESS NOTE ADULT - ATTENDING COMMENTS
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  Pt readmitted to MICU for hypoxemic respiratory failure requiring intubation in the setting of worsening hepatic encephalopathy, aspiration, and large R pleural effusion, s/p thoracentesis 6/2, s/p extubation 6/7 and most recently pt with acute renal failure 2 to ATN. No sign event overnight    Resp/ Renal/ Social: Pt is DNR/ Agree with Renal NOT providing HD;  will not improve long term prognosis/ Disagree with family requesting intubaion/MV if necessary; Palliative Care f/u noted: DNI re-discussed/ s/p R thoracentesis 6/12. Pt's long term prognosis is poor  ID: Off Abx  CVS: Levophed to maintain MAP >65/ Was unable to swallow Midodrine  HEME: Thrombocytopenia worsening; trend platelets  FEN: Would push for comfort feeds  Endo: Cont Synthroid  GI/ Neuro: Cont Rifaximin

## 2021-06-15 NOTE — PROGRESS NOTE ADULT - ASSESSMENT
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  Now readmitted to MICU for hypoxemic respiratory failure requiring intubation in the setting of worsening hepatic encephalopathy, aspiration, and large right pleural effusion, s/p thora 6/2,s/p extubation 6/7, now with acute renal failure, suspect ATN.      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  -Off sedation  -pt still w/ confusion, able to squeeze fingers but unable to answer questions    Cardiovascular:  -Hx of HF (EF 40-45%), AICD  -Continue to wean levophed  -s/p midodrine 20mg TID  -On levo, no oral access  -Holding Aldactone, Lasix     Pulmonary: Hypoxemic Respiratory Failure in the setting of hepatic encephalopathy, aspiration, large right pleural effusion, intubated 6/2, extubated 6/7  -Titrate FIo2 to maintain SPO2 > 92, ABGs  -Broad spectrum abx; zosyn for presumed aspiration PNA Complete on 6/8  -Duoneb standing for wheezing  -Patient likely aspirating  -tachypneic due to acidemia and pleural effusion    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  - s/p thora #2 6/2: elevated triglycerides to 120, protein ratio < 0.5 -- suggests transudative  -pleural fluid LDH/serum LDH ratio (however serum LDH not at same time as thora) > 0.5  -cytopath: no cancer  CXR 6/8 with reaccumulation of pleural effusion  -6/12 thora #3- 2.5L removed from R pleural effusion - Triglycerides 166    GI    End-stage cirrhosis  -Hx of GIB: EGD 5/18 with small esophageal varices, large cratered duodenal ulcer   -Continue protonix ppi BID  -Hx of Cirrhosis  -s/p para 5/28 in IR (therapeutic drain with removal of 400cc cloudly pink fluid)  - POCUS on 6/6 showing recollection of ascites - will try for another paracentesis today  -Hepatology on board; f/u further recommendations  -s/p Lactulose/Rifaximin for hepatic encephalopathy, patient   -Continue Carafate as per hepatology  [ ] revisit with family regarding whether to replace NGT (feeds previously held for aspiration risk)      Renal    #Acute renal failure, likely ATN i/s/o 4.9L para on 6/6  -Intake and output q1  -Renally dose meds based on Gfr -- zosyn redosed  -6/7:s/p reyes place 6/7 -pt with enlarged prostate on CT  -s/p  bumex  gtt on 6/7 -- with minimal UOP --> discontinue  -6/8 currently no indication for dialysis; c/w albumin 25% q6 for 4 doses  -6/10 Cr worsening, urine now yellow (may have been red tinged earlier due to trauma)  -6/11 cr worsening , as per renal not offering dialysis/CRRT  -acidosis likely causing tachypnea  -6/13 renal still not offering dialysis      ID    Sepsis - meeting criteria with hypothermia, and leukopenia  -Pan culture bld cx x 2, UA, sputum cx, MRSA swab  -ABX:   s/p zosyn 6/1 - 6/8 for 7d for empiric coverage for aspiration pna  -zosyn 6/9-xx  --empiric dosing , restarted for sepsis with hypothermia and leukopenia  -De-escalate abx based on sensitivities  -Follow up ID for further recommendations  -6/9 repeat Bcx, UA sent  -zosyn discontinued after 6-day course    Endo  -Continue synthroid  -Monitor blood glucose q6h  - c/w moderate ISS    Heme  -Hgb slightly dropped 6/7  -Maintain active type and screen    Ppx  -Continue Protonix PPI BID  -PAS for DVT ppx    GOC    -See GOC note from 6/9, 6/10  DNR, but NOT DNI  Family wants intubation if needed  Family wants thoracentesis if needed  Continue with pressors as needed  NPO after d/w family  Further discussion with palliative   SENAIT JHAVERI is a 84yo Male with PMH HF (EF 40-45%), 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  Now readmitted to MICU for hypoxemic respiratory failure requiring intubation in the setting of worsening hepatic encephalopathy, aspiration, and large right pleural effusion, s/p thora 6/2,s/p extubation 6/7, now with acute renal failure, suspect ATN.      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  -Off sedation  -pt still w/ confusion, able to squeeze fingers but unable to answer questions    Cardiovascular:  -Hx of HF (EF 40-45%), AICD  -Continue to wean levophed  -s/p midodrine 20mg TID  -On levo, no oral access  -Holding Aldactone, Lasix   -midodrine for BP support but patient unable to eat/swallow pills currently    Pulmonary: Hypoxemic Respiratory Failure in the setting of hepatic encephalopathy, aspiration, large right pleural effusion, intubated 6/2, extubated 6/7  -Titrate FIo2 to maintain SPO2 > 92, ABGs  -Broad spectrum abx; zosyn for presumed aspiration PNA Complete on 6/8  -Duoneb standing for wheezing  -Patient likely aspirating  -tachypneic due to acidemia and pleural effusion    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  - s/p thora #2 6/2: elevated triglycerides to 120, protein ratio < 0.5 -- suggests transudative  -pleural fluid LDH/serum LDH ratio (however serum LDH not at same time as thora) > 0.5  -cytopath: no cancer  CXR 6/8 with reaccumulation of pleural effusion  -6/12 thora #3- 2.5L removed from R pleural effusion - Triglycerides 166    GI    End-stage cirrhosis  -Hx of GIB: EGD 5/18 with small esophageal varices, large cratered duodenal ulcer   -Continue protonix ppi BID  -Hx of Cirrhosis  -s/p para 5/28 in IR (therapeutic drain with removal of 400cc cloudly pink fluid)  - POCUS on 6/6 showing recollection of ascites - will try for another paracentesis today  -Hepatology on board; f/u further recommendations  -s/p Lactulose/Rifaximin for hepatic encephalopathy, patient   -Continue Carafate as per hepatology      Renal    #Acute renal failure, likely ATN i/s/o 4.9L para on 6/6  -Intake and output q1  -Renally dose meds based on Gfr -- zosyn redosed  -6/7:s/p reyes place 6/7 -pt with enlarged prostate on CT  -s/p  bumex  gtt on 6/7 -- with minimal UOP --> discontinue  -6/8 currently no indication for dialysis; c/w albumin 25% q6 for 4 doses  -6/10 Cr worsening, urine now yellow (may have been red tinged earlier due to trauma)  -6/11 cr worsening , as per renal not offering dialysis/CRRT  -acidosis likely causing tachypnea  -6/13 renal still not offering dialysis      ID    Sepsis - meeting criteria with hypothermia, and leukopenia  -Pan culture bld cx x 2, UA, sputum cx, MRSA swab  -ABX:   s/p zosyn 6/1 - 6/8 for 7d for empiric coverage for aspiration pna  -zosyn 6/9-xx  --empiric dosing , restarted for sepsis with hypothermia and leukopenia  -De-escalate abx based on sensitivities  -Follow up ID for further recommendations  -6/9 repeat Bcx, UA sent  -zosyn discontinued after 6-day course    Endo  -Continue synthroid  -Monitor blood glucose q6h  - c/w moderate ISS    Heme  -Hgb slightly dropped 6/7  -Maintain active type and screen    Ppx  -Continue Protonix PPI BID  -PAS for DVT ppx    GOC    -See GOC note from 6/9, 6/10  DNR, but NOT DNI  Family wants intubation if needed  Family wants thoracentesis if needed  Continue with pressors as needed  NPO after d/w family  Further discussion with palliative  - 6/15, have talked w/ daughter further about patient's poor prognosis and about intubation status. Pt's family expressed that they feel like he is improving and looks better. Explained to them that although his reyes may appear to have more urine than yesterday, his overall prognosis is still very poor in the setting of decompensated cirrhosis. Family still desires DNI at this point.

## 2021-06-16 NOTE — PROCEDURE NOTE - NSPROCDETAILS_GEN_ALL_CORE
location identified, sterile technique used, catheter introduced, fluid drained/sterile dressing applied
location identified, draped/prepped, sterile technique used, needle inserted/introduced/Seldinger technique/catheter inserted over needle/connection to syringe
location identified, sterile technique used, catheter introduced, fluid drained/Seldinger technique/sterile dressing applied
patient pre-oxygenated, tube inserted, placement confirmed
location identified, sterile technique used, catheter introduced, fluid drained/sterile dressing applied
Seldinger technique/secured in place/sterile dressing applied/thoracostomy tube placed percutaneously
location identified, draped/prepped, sterile technique used, needle inserted/introduced/Seldinger technique/catheter inserted over needle/connection to syringe/ultrasound assessment of effusion (localization)
location identified, draped/prepped, sterile technique used, needle inserted/introduced/Seldinger technique/catheter inserted over needle/connection to syringe/ultrasound assessment of effusion (localization)

## 2021-06-16 NOTE — PROCEDURE NOTE - NSSITEPREP_SKIN_A_CORE
chlorhexidine
chlorhexidine/Adherence to aseptic technique: hand hygiene prior to donning barriers (gown, gloves), don cap and mask, sterile drape over patient
chlorhexidine/povidone iodine (if allergic to chlorhexidine)

## 2021-06-16 NOTE — PROCEDURE NOTE - NSINDICATIONS_GEN_A_CORE
pleural effusion
pleural effusion
ascites
airway protection/critical patient/mental status change/respiratory failure
pleural effusion
pleural effusion
ascites/suspected spontaneous bacterial peritonitis
ascites

## 2021-06-16 NOTE — PROGRESS NOTE ADULT - SUBJECTIVE AND OBJECTIVE BOX
Juan York MD  PGY-1, Internal Medicine  MICU  Pager #: [phone]; [phone]    INTERVAL HPI/OVERNIGHT EVENTS:    SUBJECTIVE: Patient seen and examined at bedside.     OBJECTIVE:    VITAL SIGNS:  ICU Vital Signs Last 24 Hrs  T(C): 35.5 (16 Jun 2021 04:00), Max: 36.6 (15 Gorge 2021 20:00)  T(F): 95.9 (16 Jun 2021 04:00), Max: 97.8 (15 [place] 2021 20:00)  HR: 94 (16 Jun 2021 06:45) (87 - 107)  BP: 83/48 (16 Jun 2021 06:45) (68/39 - 163/66)  BP(mean): 62 (16 Jun 2021 06:45) (46 - 101)  ABP: --  ABP(mean): --  RR: 28 (16 Jun 2021 06:45) (12 - 41)  SpO2: 97% (16 Jun 2021 06:45) (89% - 100%)        06-15 @ 07:01  -  06-16 @ 07:00  --------------------------------------------------------  IN: 369 mL / OUT: 3080 mL / NET: -2711 mL      CAPILLARY BLOOD GLUCOSE      POCT Blood Glucose.: 103 mg/dL (16 Jun 2021 06:13)      PHYSICAL EXAM:  GENERAL: NAD  HEENT: NC/AT, PERRL, EOMI, no conjunctival pallor or scleral icterus, moist mucous membranes  NECK: supple  CV: +S1/S2, RRR  RESPIRATORY: CTA b/l, no w/r/r  ABDOMEN: soft, NTND  MSK: Able to move all 4 extremities  NEURO: AAOx4 (person, place, time, event). Able to follow commands.  SKIN: WWP, 2+ peripheral pulses, no LE edema    MEDICATIONS:  MEDICATIONS  (STANDING):  ALBUTerol    0.083%. 2.5 milliGRAM(s) Nebulizer once  ALBUTerol    0.083%. 2.5 milliGRAM(s) Nebulizer once  ALBUTerol    0.083%. 2.5 milliGRAM(s) Nebulizer once  albuterol/ipratropium for Nebulization 3 milliLiter(s) Nebulizer every 6 hours  artificial  tears Solution 1 Drop(s) Both EYES three times a day  chlorhexidine 4% Liquid 1 Application(s) Topical <User Schedule>  insulin lispro (ADMELOG) corrective regimen sliding scale   SubCutaneous every 6 hours  levothyroxine Injectable 44 MICROGram(s) IV Push at bedtime  midodrine 10 milliGRAM(s) Oral every 8 hours  norepinephrine Infusion 0.05 MICROgram(s)/kG/Min (7.14 mL/Hr) IV Continuous <Continuous>  nystatin Powder 1 Application(s) Topical three times a day  pantoprazole  Injectable 40 milliGRAM(s) IV Push daily  petrolatum Ophthalmic Ointment 1 Application(s) Both EYES daily  rifAXIMin 550 milliGRAM(s) Oral two times a day    MEDICATIONS  (PRN):      ALLERGIES:  Allergies    No Known Allergies    Intolerances        LABS:                        9.0    10.44 )-----------( 62       ( 16 Jun 2021 07:00 )             27.3     06-16    146<H>  |  102  |  151<H>  ----------------------------<  155<H>  5.6<H>   |  <10<LL>  |  11.98<H>    Ca    9.7      16 Jun 2021 00:24  Phos  12.3     06-16  Mg     3.0     06-16    TPro  6.1  /  Alb  3.5  /  TBili  2.0<H>  /  DBili  x   /  AST  29  /  ALT  19  /  AlkPhos  53  06-16          RADIOLOGY & ADDITIONAL TESTS: Reviewed. Juan York MD  PGY-1, Internal Medicine  MICU  Pager #: [phone]; [phone]    INTERVAL HPI/OVERNIGHT EVENTS: Patient had a chest tube placed overnight for palliation of respiratory distress.     SUBJECTIVE: Patient seen and examined at bedside. Pt appears more confused this morning than prior days, unable to really follow my commands nor answer questions. Does not appear to be in respiratory distress this AM but full ROS unable to be obtained 2/2 to mental status.     OBJECTIVE:    VITAL SIGNS:  ICU Vital Signs Last 24 Hrs  T(C): 35.5 (16 Jun 2021 04:00), Max: 36.6 (15 [place] 2021 20:00)  T(F): 95.9 (16 Jun 2021 04:00), Max: 97.8 (15 [place] 2021 20:00)  HR: 94 (16 Jun 2021 06:45) (87 - 107)  BP: 83/48 (16 Jun 2021 06:45) (68/39 - 163/66)  BP(mean): 62 (16 Jun 2021 06:45) (46 - 101)  RR: 28 (16 Jun 2021 06:45) (12 - 41)  SpO2: 97% (16 Jun 2021 06:45) (89% - 100%)        06-15 @ 07:01  -  06-16 @ 07:00  --------------------------------------------------------  IN: 369 mL / OUT: 3080 mL / NET: -2711 mL      CAPILLARY BLOOD GLUCOSE      POCT Blood Glucose.: 103 mg/dL (16 Jun 2021 06:13)      PHYSICAL EXAM:  GENERAL: NAD, tired appearing  HEENT: NC/AT, PERRL, EOMI, no conjunctival pallor or scleral icterus, moist mucous membranes, hard of hearing  NECK: supple  CV: +S1/S2, RRR  RESPIRATORY: CTA b/l, +accessory muscle use, right chest tube draining serosanguinous fluids  ABDOMEN: soft, NTND, +distended  MSK: Able to move all 4 extremities  NEURO: unable to follow commands or answer questions  SKIN: WWP, 2+ peripheral pulses    MEDICATIONS:  MEDICATIONS  (STANDING):  ALBUTerol    0.083%. 2.5 milliGRAM(s) Nebulizer once  ALBUTerol    0.083%. 2.5 milliGRAM(s) Nebulizer once  ALBUTerol    0.083%. 2.5 milliGRAM(s) Nebulizer once  albuterol/ipratropium for Nebulization 3 milliLiter(s) Nebulizer every 6 hours  artificial  tears Solution 1 Drop(s) Both EYES three times a day  chlorhexidine 4% Liquid 1 Application(s) Topical <User Schedule>  insulin lispro (ADMELOG) corrective regimen sliding scale   SubCutaneous every 6 hours  levothyroxine Injectable 44 MICROGram(s) IV Push at bedtime  midodrine 10 milliGRAM(s) Oral every 8 hours  norepinephrine Infusion 0.05 MICROgram(s)/kG/Min (7.14 mL/Hr) IV Continuous <Continuous>  nystatin Powder 1 Application(s) Topical three times a day  pantoprazole  Injectable 40 milliGRAM(s) IV Push daily  petrolatum Ophthalmic Ointment 1 Application(s) Both EYES daily  rifAXIMin 550 milliGRAM(s) Oral two times a day    MEDICATIONS  (PRN):      ALLERGIES:  Allergies    No Known Allergies    Intolerances        LABS:                        9.0    10.44 )-----------( 62       ( 16 Jun 2021 07:00 )             27.3     06-16    146<H>  |  102  |  151<H>  ----------------------------<  155<H>  5.6<H>   |  <10<LL>  |  11.98<H>    Ca    9.7      16 Jun 2021 00:24  Phos  12.3     06-16  Mg     3.0     06-16    TPro  6.1  /  Alb  3.5  /  TBili  2.0<H>  /  DBili  x   /  AST  29  /  ALT  19  /  AlkPhos  53  06-16          RADIOLOGY & ADDITIONAL TESTS: Reviewed.

## 2021-06-16 NOTE — PROGRESS NOTE ADULT - REASON FOR ADMISSION
sob, confusion, weakness, can't walk, fever

## 2021-06-16 NOTE — CHART NOTE - NSCHARTNOTEFT_GEN_A_CORE
Patient is dying of renal failure in the setting of baseline multiorgan failure, and developed respiratory distress, in an attempt to palliate him we tried to reach family for consent for chest tube for large R pleff.  However only voice mail was available.  Given his acidosis Opiates were avoided as I did not want to suppress respiratory drive but still felt the need to relive his distress.  Chest tube was placed as in separate note.   Over all prognosis is very poor with likely imenant death, but he did show some improvement in respiratory distress after 2.2L drained.

## 2021-06-16 NOTE — PROGRESS NOTE ADULT - NSICDXPILOT_GEN_ALL_CORE
[place]

## 2021-06-16 NOTE — PROCEDURE NOTE - PROCEDURE DATE TIME, MLM
28-May-2021 17:24
12-Jun-2021 17:19
16-Jun-2021 05:40
02-Jun-2021 17:16
18-May-2021 16:03
06-Jun-2021 02:00
18-May-2021 16:01
19-May-2021 10:16

## 2021-06-16 NOTE — PROCEDURE NOTE - NSFINDINGS_GEN_A_CORE
serous fluid
serosanguineous fluid
cloudy fluid/serosanguineous fluid
cloudy fluid
cloudy fluid/serosanguineous fluid
cloudy pink/cloudy fluid
cloudy fluid

## 2021-06-16 NOTE — PROGRESS NOTE ADULT - NUTRITIONAL ASSESSMENT
This patient has been assessed with a concern for Malnutrition and has been determined to have a diagnosis/diagnoses of Severe protein-calorie malnutrition.    This patient is being managed with:   Diet NPO with Tube Feed-  Tube Feeding Modality: Orogastric  Glucerna 1.5 Kash (GLUCERNA1.5RTH)  Continuous  Starting Tube Feed Rate {mL per Hour}: 10  Increase Tube Feed Rate by (mL): 10     Every 4 hours  Until Goal Tube Feed Rate (mL per Hour): 40  Tube Feed Duration (in Hours): 24  Tube Feed Start Time: 18:00  Entered: Jun 2 2021  5:49PM    
This patient has been assessed with a concern for Malnutrition and has been determined to have a diagnosis/diagnoses of Severe protein-calorie malnutrition.    This patient is being managed with:   Diet NPO-  Entered: Jun 11 2021  6:47PM    
This patient has been assessed with a concern for Malnutrition and has been determined to have a diagnosis/diagnoses of Severe protein-calorie malnutrition.    This patient is being managed with:   Diet NPO-  Entered: Jun 11 2021  6:47PM    
This patient has been assessed with a concern for Malnutrition and has been determined to have a diagnosis/diagnoses of Severe protein-calorie malnutrition.    This patient is being managed with:   Diet NPO-  Except Medications  Entered: Jun 9 2021  9:57AM    
This patient has been assessed with a concern for Malnutrition and has been determined to have a diagnosis/diagnoses of Mild protein-calorie malnutrition.    This patient is being managed with:   Diet Dysphagia 1 Pureed-Honey Consistency Fluid-  Entered: May 26 2021  2:41PM    
This patient has been assessed with a concern for Malnutrition and has been determined to have a diagnosis/diagnoses of Severe protein-calorie malnutrition.    This patient is being managed with:   Diet NPO-  Entered: Jun 11 2021  6:47PM    
This patient has been assessed with a concern for Malnutrition and has been determined to have a diagnosis/diagnoses of Severe protein-calorie malnutrition.    This patient is being managed with:   Diet NPO-  Except Medications  Entered: Gorge 15 2021  7:21AM    
This patient has been assessed with a concern for Malnutrition and has been determined to have a diagnosis/diagnoses of Severe protein-calorie malnutrition.    This patient is being managed with:   Diet NPO-  Entered: Jun 11 2021  6:47PM    
This patient has been assessed with a concern for Malnutrition and has been determined to have a diagnosis/diagnoses of Severe protein-calorie malnutrition.    This patient is being managed with:   Diet NPO-  Tube Feeding Modality: Nasogastric  Vital AF (VITALAFRTH)  Total Volume for 24 Hours (mL): 1440  Continuous  Starting Tube Feed Rate {mL per Hour}: 10  Increase Tube Feed Rate by (mL): 10     Every 4 hours  Until Goal Tube Feed Rate (mL per Hour): 80  Tube Feed Duration (in Hours): 18  Tube Feed Start Time: 10:00  Entered: Gorge  3 2021  9:29AM    
This patient has been assessed with a concern for Malnutrition and has been determined to have a diagnosis/diagnoses of Mild protein-calorie malnutrition.    This patient is being managed with:   Diet NPO-  Entered: May 20 2021 12:39AM    
This patient has been assessed with a concern for Malnutrition and has been determined to have a diagnosis/diagnoses of Mild protein-calorie malnutrition.    This patient is being managed with:   Diet NPO-  Except Medications  Entered: Jun 2 2021  1:04AM    
This patient has been assessed with a concern for Malnutrition and has been determined to have a diagnosis/diagnoses of Severe protein-calorie malnutrition.    This patient is being managed with:   Diet NPO-  Except Medications  Entered: Jun 9 2021  9:57AM    
This patient has been assessed with a concern for Malnutrition and has been determined to have a diagnosis/diagnoses of Severe protein-calorie malnutrition.    This patient is being managed with:   Diet NPO-  Tube Feeding Modality: Nasogastric  Vital AF (VITALAFRTH)  Total Volume for 24 Hours (mL): 1440  Continuous  Starting Tube Feed Rate {mL per Hour}: 10  Increase Tube Feed Rate by (mL): 10     Every 4 hours  Until Goal Tube Feed Rate (mL per Hour): 80  Tube Feed Duration (in Hours): 18  Tube Feed Start Time: 10:00  Entered: Gorge  3 2021  9:29AM    
This patient has been assessed with a concern for Malnutrition and has been determined to have a diagnosis/diagnoses of Severe protein-calorie malnutrition.    This patient is being managed with:   Diet NPO-  Tube Feeding Modality: Nasogastric  Vital AF (VITALAFRTH)  Total Volume for 24 Hours (mL): 1440  Continuous  Starting Tube Feed Rate {mL per Hour}: 10  Increase Tube Feed Rate by (mL): 10     Every 4 hours  Until Goal Tube Feed Rate (mL per Hour): 80  Tube Feed Duration (in Hours): 18  Tube Feed Start Time: 10:00  Entered: Gorge  3 2021  9:29AM    
This patient has been assessed with a concern for Malnutrition and has been determined to have a diagnosis/diagnoses of Mild protein-calorie malnutrition.    This patient is being managed with:   Diet NPO-  Entered: May 20 2021 12:39AM    
This patient has been assessed with a concern for Malnutrition and has been determined to have a diagnosis/diagnoses of Severe protein-calorie malnutrition.    This patient is being managed with:   Diet NPO-  Tube Feeding Modality: Nasogastric  Vital AF (VITALAFRTH)  Total Volume for 24 Hours (mL): 1440  Continuous  Starting Tube Feed Rate {mL per Hour}: 10  Increase Tube Feed Rate by (mL): 10     Every 4 hours  Until Goal Tube Feed Rate (mL per Hour): 80  Tube Feed Duration (in Hours): 18  Tube Feed Start Time: 10:00  Entered: Gorge  3 2021  9:29AM    
This patient has been assessed with a concern for Malnutrition and has been determined to have a diagnosis/diagnoses of Mild protein-calorie malnutrition.    This patient is being managed with:   Diet Dysphagia 1 Pureed-Honey Consistency Fluid-  Entered: May 26 2021  2:41PM    
This patient has been assessed with a concern for Malnutrition and has been determined to have a diagnosis/diagnoses of Mild protein-calorie malnutrition.    This patient is being managed with:   Diet Dysphagia 1 Pureed-Nectar Consistency Fluid-  Consistent Carbohydrate {No Snacks} (CSTCHO)  DASH/TLC {Sodium & Cholesterol Restricted} (DASH)  Low Fat (LOWFAT)  Entered: May 22 2021  3:22PM    
This patient has been assessed with a concern for Malnutrition and has been determined to have a diagnosis/diagnoses of Mild protein-calorie malnutrition.    This patient is being managed with:   Diet Dysphagia 1 Pureed-Honey Consistency Fluid-  Entered: May 26 2021  2:41PM    
This patient has been assessed with a concern for Malnutrition and has been determined to have a diagnosis/diagnoses of Mild protein-calorie malnutrition.    This patient is being managed with:   Diet NPO-  Except Medications  Entered: May 23 2021 11:31AM    
This patient has been assessed with a concern for Malnutrition and has been determined to have a diagnosis/diagnoses of Mild protein-calorie malnutrition.    This patient is being managed with:   Diet Clear Liquid-  Consistent Carbohydrate {Evening Snack} (CSTCHOSN)  DASH/TLC {Sodium & Cholesterol Restricted} (DASH)  Low Fat (LOWFAT)  Entered: May 21 2021 10:04AM    
This patient has been assessed with a concern for Malnutrition and has been determined to have a diagnosis/diagnoses of Mild protein-calorie malnutrition.    This patient is being managed with:   Diet NPO-  Except Medications  Entered: May 23 2021 11:31AM    
This patient has been assessed with a concern for Malnutrition and has been determined to have a diagnosis/diagnoses of Mild protein-calorie malnutrition.    This patient is being managed with:   Diet Dysphagia 1 Pureed-Honey Consistency Fluid-  Entered: May 26 2021  2:41PM    
This patient has been assessed with a concern for Malnutrition and has been determined to have a diagnosis/diagnoses of Mild protein-calorie malnutrition.    This patient is being managed with:   Diet NPO-  Entered: May 25 2021  9:44PM    
This patient has been assessed with a concern for Malnutrition and has been determined to have a diagnosis/diagnoses of Mild protein-calorie malnutrition.    This patient is being managed with:   Diet Clear Liquid-  Consistent Carbohydrate {Evening Snack} (CSTCHOSN)  DASH/TLC {Sodium & Cholesterol Restricted} (DASH)  Low Fat (LOWFAT)  1000mL Fluid Restriction ([id number])  Entered: May 20 2021  9:52PM    
This patient has been assessed with a concern for Malnutrition and has been determined to have a diagnosis/diagnoses of Mild protein-calorie malnutrition.    This patient is being managed with:   Diet Dysphagia 1 Pureed-Honey Consistency Fluid-  Entered: May 26 2021  2:41PM

## 2021-06-16 NOTE — PROGRESS NOTE ADULT - ASSESSMENT
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  Now readmitted to MICU for hypoxemic respiratory failure requiring intubation in the setting of worsening hepatic encephalopathy, aspiration, and large right pleural effusion, s/p thora 6/2,s/p extubation 6/7, now with acute renal failure, suspect ATN.      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  -Off sedation  -pt still w/ confusion, able to squeeze fingers but unable to answer questions    Cardiovascular:  -Hx of HF (EF 40-45%), AICD  -Continue to wean levophed  -s/p midodrine 20mg TID  -On levo, no oral access  -Holding Aldactone, Lasix   -midodrine for BP support but patient unable to eat/swallow pills currently    Pulmonary: Hypoxemic Respiratory Failure in the setting of hepatic encephalopathy, aspiration, large right pleural effusion, intubated 6/2, extubated 6/7  -Titrate FIo2 to maintain SPO2 > 92, ABGs  -Broad spectrum abx; zosyn for presumed aspiration PNA Complete on 6/8  -Duoneb standing for wheezing  -Patient likely aspirating  -tachypneic due to acidemia and pleural effusion    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  - s/p thora #2 6/2: elevated triglycerides to 120, protein ratio < 0.5 -- suggests transudative  -pleural fluid LDH/serum LDH ratio (however serum LDH not at same time as thora) > 0.5  -cytopath: no cancer  CXR 6/8 with reaccumulation of pleural effusion  -6/12 thora #3- 2.5L removed from R pleural effusion - Triglycerides 166    GI    End-stage cirrhosis  -Hx of GIB: EGD 5/18 with small esophageal varices, large cratered duodenal ulcer   -Continue protonix ppi BID  -Hx of Cirrhosis  -s/p para 5/28 in IR (therapeutic drain with removal of 400cc cloudly pink fluid)  - POCUS on 6/6 showing recollection of ascites - will try for another paracentesis today  -Hepatology on board; f/u further recommendations  -s/p Lactulose/Rifaximin for hepatic encephalopathy, patient   -Continue Carafate as per hepatology      Renal    #Acute renal failure, likely ATN i/s/o 4.9L para on 6/6  -Intake and output q1  -Renally dose meds based on Gfr -- zosyn redosed  -6/7:s/p reyes place 6/7 -pt with enlarged prostate on CT  -s/p  bumex  gtt on 6/7 -- with minimal UOP --> discontinue  -6/8 currently no indication for dialysis; c/w albumin 25% q6 for 4 doses  -6/10 Cr worsening, urine now yellow (may have been red tinged earlier due to trauma)  -6/11 cr worsening , as per renal not offering dialysis/CRRT  -acidosis likely causing tachypnea  -6/13 renal still not offering dialysis      ID    Sepsis - meeting criteria with hypothermia, and leukopenia  -Pan culture bld cx x 2, UA, sputum cx, MRSA swab  -ABX:   s/p zosyn 6/1 - 6/8 for 7d for empiric coverage for aspiration pna  -zosyn 6/9-xx  --empiric dosing , restarted for sepsis with hypothermia and leukopenia  -De-escalate abx based on sensitivities  -Follow up ID for further recommendations  -6/9 repeat Bcx, UA sent  -zosyn discontinued after 6-day course    Endo  -Continue synthroid  -Monitor blood glucose q6h  - c/w moderate ISS    Heme  -Hgb slightly dropped 6/7  -Maintain active type and screen    Ppx  -Continue Protonix PPI BID  -PAS for DVT ppx    GOC    -See GOC note from 6/9, 6/10  DNR, but NOT DNI  Family wants intubation if needed  Family wants thoracentesis if needed  Continue with pressors as needed  NPO after d/w family  Further discussion with palliative  - 6/15, have talked w/ daughter further about patient's poor prognosis and about intubation status. Pt's family expressed that they feel like he is improving and looks better. Explained to them that although his reyes may appear to have more urine than yesterday, his overall prognosis is still very poor in the setting of decompensated cirrhosis. Family still desires DNI at this point.    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  Now readmitted to MICU for hypoxemic respiratory failure requiring intubation in the setting of worsening hepatic encephalopathy, aspiration, and large right pleural effusion, s/p thora 6/2,s/p extubation 6/7, now with acute renal failure, suspect ATN.      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  -Off sedation  -pt with increasing confusion today    Cardiovascular:  -Hx of HF (EF 40-45%), AICD  -Continue to wean levophed  -s/p midodrine 20mg TID  -On levo, no oral access  -Holding Aldactone, Lasix   -midodrine for BP support but patient unable to eat/swallow pills currently    Pulmonary: Hypoxemic Respiratory Failure in the setting of hepatic encephalopathy, aspiration, large right pleural effusion, intubated 6/2, extubated 6/7  -Titrate FIo2 to maintain SPO2 > 92, ABGs  -Broad spectrum abx; zosyn for presumed aspiration PNA Complete on 6/8  -Duoneb standing for wheezing  -Patient likely aspirating  -tachypneic due to acidemia and pleural effusion    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  - s/p thora #2 6/2: elevated triglycerides to 120, protein ratio < 0.5 -- suggests transudative  -pleural fluid LDH/serum LDH ratio (however serum LDH not at same time as thora) > 0.5  -cytopath: no cancer  CXR 6/8 with reaccumulation of pleural effusion  -6/12 thora #3- 2.5L removed from R pleural effusion - Triglycerides 166  -s/p chest tube placement 6/15 draining serosanguinous fluids    GI    End-stage cirrhosis  -Hx of GIB: EGD 5/18 with small esophageal varices, large cratered duodenal ulcer   -Continue protonix ppi BID  -Hx of Cirrhosis  -s/p para 5/28 in IR (therapeutic drain with removal of 400cc cloudly pink fluid)  - POCUS on 6/6 showing recollection of ascites - will try for another paracentesis today  -Hepatology on board; f/u further recommendations  -s/p Lactulose/Rifaximin for hepatic encephalopathy, patient   -Continue Carafate as per hepatology      Renal    #Acute renal failure, likely ATN i/s/o 4.9L para on 6/6  -Intake and output q1  -Renally dose meds based on Gfr -- zosyn redosed  -6/7:s/p reyes place 6/7 -pt with enlarged prostate on CT  -s/p  bumex  gtt on 6/7 -- with minimal UOP --> discontinue  -6/8 currently no indication for dialysis; c/w albumin 25% q6 for 4 doses  -6/10 Cr worsening, urine now yellow (may have been red tinged earlier due to trauma)  -6/11 cr worsening , as per renal not offering dialysis/CRRT  -acidosis likely causing tachypnea      ID    Sepsis - meeting criteria with hypothermia, and leukopenia  -Pan culture bld cx x 2, UA, sputum cx, MRSA swab  -ABX:   s/p zosyn 6/1 - 6/8 for 7d for empiric coverage for aspiration pna  -zosyn 6/9-xx  --empiric dosing , restarted for sepsis with hypothermia and leukopenia  -De-escalate abx based on sensitivities  -Follow up ID for further recommendations  -6/9 repeat Bcx, UA sent  -zosyn discontinued after 6-day course    Endo  -Continue synthroid  -Monitor blood glucose q6h  - c/w moderate ISS    Heme  -Hgb slightly dropped 6/7  -Maintain active type and screen    Ppx  -Continue Protonix PPI BID  -PAS for DVT ppx    GOC    -See GOC note from 6/9, 6/10  DNR, but NOT DNI  Family wants intubation if needed  Family wants thoracentesis if needed  Continue with pressors as needed  NPO after d/w family  Further discussion with palliative  - 6/15, have talked w/ daughter further about patient's poor prognosis and about intubation status. Pt's family expressed that they feel like he is improving and looks better. Explained to them that although his reyes may appear to have more urine than yesterday, his overall prognosis is still very poor in the setting of decompensated cirrhosis. Family still desires DNI at this point.

## 2021-06-16 NOTE — PROCEDURE NOTE - NSINFORMCONSENT_GEN_A_CORE

## 2021-06-16 NOTE — PROGRESS NOTE ADULT - CRITICAL CARE SERVICES PROVIDED
Critical care services provided

## 2021-06-16 NOTE — PROGRESS NOTE ADULT - ATTENDING SUPERVISION STATEMENT
Fellow
Resident
Fellow
Resident

## 2021-06-16 NOTE — PROCEDURE NOTE - NSPROCNAME_GEN_A_CORE
Thoracentesis
Thoracentesis
Paracentesis
Thoracentesis
Paracentesis
Chest Tube
Paracentesis
Tracheal Intubation

## 2021-06-16 NOTE — PROGRESS NOTE ADULT - ATTENDING COMMENTS
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  Pt readmitted to MICU for hypoxemic respiratory failure requiring intubation in the setting of worsening hepatic encephalopathy, aspiration, and large R pleural effusion, s/p thoracentesis 6/2, s/p extubation 6/7 and most recently pt with acute renal failure 2 to ATN. s/p R thoracentesis 6/12.. Early this am pt s/p R pigtail cath placement with 2.2 L drained    Resp/ Renal/ Social: Pt is DNR/ Agree with Renal NOT providing HD;  will not improve long term prognosis/ Disagree with family requesting intubaion/MV if necessary; Palliative Care f/u noted: DNI re-discussed/ Pt's long term prognosis is poor  ID: Off Abx  CVS: Levophed to maintain MAP >65  HEME: Thrombocytopenia improved today; trend platelets  FEN: Would push for comfort feeds/ Pt hyperkalemic  Endo: Cont Synthroid  GI/ Neuro: Cont Rifaximin.

## 2021-06-17 NOTE — CHART NOTE - NSCHARTNOTEFT_GEN_A_CORE
Called to bedside to evaluate the patient for asystole seen on telemetry.  On physical exam, patient did not respond to verbal or noxious stimuli. No spontaneous respirations.  Absent heart and breath sounds.  Absent radial and carotid pulses. Pupils are fixed and dilated, no corneal reflex. Telemetry strip shows asystole. Patient pronounced dead at 7:05am. Attending Dr. Strauss notified.

## 2021-06-17 NOTE — PROVIDER CONTACT NOTE (CRITICAL VALUE NOTIFICATION) - ACTION/TREATMENT ORDERED:
No treatment ordered at this time, provider Mandi Dimas MD aware
Noted. Will repeat CBC
Noted. Will review labs and advise.

## 2021-06-17 NOTE — CHART NOTE - NSCHARTNOTEFT_GEN_A_CORE
Pt with increasing levophed requirements throughout the night and appearing increasingly uncomfortable. Likely in shock state 2/2 multiorgan failure refractory to pressors. Albumin 5% 250cc bolus w/o  improvement in BP and pt remained anuric. Peripheral levo was maxed at 1mcg/min/kg around 3am. Family was notified and came in for end of life visit. Pt was made comfort care with capped pressors. Pt with increasing levophed requirements throughout the night and appearing increasingly uncomfortable. Likely in shock state 2/2 multiorgan failure refractory to pressors. Albumin 5% 250cc bolus w/o  improvement in BP and pt remained anuric. Peripheral levo was maxed at 1mcg/min/kg around 3am and MAP continued to downtrend. Family was notified and came in for end of life visit. Pt was made comfort care with capped pressors.

## 2021-06-22 ENCOUNTER — APPOINTMENT (OUTPATIENT)
Dept: HEPATOLOGY | Facility: CLINIC | Age: 86
End: 2021-06-22

## 2021-06-26 LAB
CULTURE RESULTS: SIGNIFICANT CHANGE UP
SPECIMEN SOURCE: SIGNIFICANT CHANGE UP

## 2021-07-03 LAB
CULTURE RESULTS: SIGNIFICANT CHANGE UP
SPECIMEN SOURCE: SIGNIFICANT CHANGE UP

## 2021-07-07 LAB
CULTURE RESULTS: SIGNIFICANT CHANGE UP
SPECIMEN SOURCE: SIGNIFICANT CHANGE UP

## 2021-11-09 PROBLEM — Z00.00 ENCOUNTER FOR PREVENTIVE HEALTH EXAMINATION: Status: ACTIVE | Noted: 2021-11-09

## 2021-11-29 NOTE — ED PROVIDER NOTE - NSTIMEPROVIDERCAREINITIATE_GEN_ER
[address] [place] [address] Pulmonary Function Lab - Six Minute Walk      Test Performed on:   Room Air_X___   Oxygen at _____ lpm via N/C- continuous Oxygen at _____ lpm via N/C- OCD  Assist Device Used During Test:  None_X___ Cane________ Walker_________    Modified Valerie's Scale  0 Nothing at all 5 Strong    0.5 Extremely Weak 6 Stronger (Hard)    1 Very weak 7 Very Strong   2 Weak (light) 8 Very Very Strong   3 Moderate 9 Extremely Strong   4 Somewhat Strong 10 Maximum All out      Time SpO2 Heart Rate Respiratory Rate Dyspnea-  Modified Valeries Scale Fatigue- Modified Valeries Scale Other Symptoms   Baseline                     99% 83 16 0 0      1 minute                     96% 108 18 0 0    2 minutes                     96% 116 18 0 0      3 minutes                     97% 114 20 0 0    4 minutes                     96% 114 20 0 0    5 minutes                     96% 116 22 1 0    6 minutes                     96% 116 22 1 0    Recovery x 1 minute                     96% 96 20 0 0    Recovery x 2 Minute                     99% 94 20 0 0     Number of Laps____14___ X 100 feet + _________ additional feet = Total Distance __1400___feet   Stopped or paused before 6 minutes? No___X__ Yes ________  Total expected 6 MW distance is __1843__feet. Patient achieved __76__% of expected distance.    Pre Blood Pressure:88/65  Post Blood Pressure:138/56  Other symptoms at the end of exercise: 11-Jan-2019 13:40

## 2022-01-23 NOTE — PROGRESS NOTE ADULT - PROBLEM SELECTOR PLAN 8
Presents to ED due to right sided pelvic pain and vaginal bleeding. Began 2 days pta. . Approx 7 weeks pregnant   A1c=5.5 most likely normal i/s/o possible GI bleed. Patient on toujeo 20 and humalog 8 premeals at home.   -Restarting consistent Carb diet on 5/20  -Will hold off on premeal insulin with sliding scale and adjust insulin as needed. LESTER as needed.   -Will dose reduce basal insulin by 75% and start the patient on 5units.  -Pt started on D5 w/ NS for NPO status

## 2022-02-17 NOTE — ED ADULT TRIAGE NOTE - HEART RATE (BEATS/MIN)
Patient presents to the ER via EMS after having a fall at home. Patient states she fell out of her chair by sliding down. Patient denies loosing consciousness or hitting her head. Patient denies any pain from the fall but is complaining of right sided abdominal pain that has been going on for 3 weeks. Patient is a dialysis patient and missed all 3 sessions last week because she felt too weak. Patient complains of shortness of breath but denies chest pain.    69

## 2022-07-07 NOTE — DISCHARGE NOTE PROVIDER - CARE PROVIDER_API CALL
[facility]  COMPLETE / ANNUAL EVALUATION    [facility] at [facility]    Subjective    Chaperone: accepts  Mom  Keznie is accompanied by Mother     History and detail provided by: Patient and Parent    Patient ID: Robi is a 41 year old female. with Down syndrome     HISTORY    Residence: Family home with parents     lives at home with parents  2 brothers, both -- Edison (lives in [place], [place]) Lyndon ([place])   has a niece, Lawson (Edison's daughter) (born 2013)  Occupation: Works part time - [employer] in [place] (2 days per week, 3 hours per day) and [employer] (2 days per week, 6 hours per day)    Chief Complaint   Patient presents with   • Physical     COVID-19 infection:  No    COVID-19 vaccine: Had vaccine  Moderna x 3    COVID-19 vaccine side effects  -none    Anyone at home have COVID-19  No      Emergency plan: Sibling Lyndon    Safety concerns  occasionally flirtatious      Activities of Daily Living:  Independent with washing and/or bathing: Yes  Independent with dressing: Yes  Dresses appropriately: Yes  Independent with eating: Yes  Independent with toileting: Yes   Does own laundry  Manages her own medication- fills her pill boxes, takes her medications    Language & Communication:   Communication: Communicative    Ambulation  Ambulatory    Health Promotion Activities   Sleep   Sleeps 8-10 hours per night    Sleeps through the night  Awakens but falls back to sleep -   Occasional snoring, not restless; no concern about sleep apnea     Exercise   Walking the dogs- 2 dogs (Mellissa, Honey)  Dancing- infrequently     Recreational/Social Activities   Organized special rec programs (were on hold due to COVID- she has been avoiding mostly still except drama) and Regular family activities       Nutrition   limits sugar due to it causing abd pain      Memory impairment No    Psychological see below    Eating and swallowing  No concerns  Appetite stable  have to  watch portions or vomits if eats too much   Weight stable    Neurological  No neuro concerns      HPI       Robi has a history of depression/mood disorder.  Doing well  Taking duloxetine and gabapentin    Menses irregular      Persistent back pain,  MRI- disc  Dr. Cline pain specialist, [facility]  Did injection in March 2020  Radiofrequency procedure in early 2020 or late 2019  Doing better    Recent right shoulder injury when dog pulled on the leash- does have chronic shoulder problems and now acutely worse-- Dr. 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 Alvarado, Heme-Onc- he has released her       hx of wrist discomfort  --has carpal tunnel--splints didn't help  has had cortisone injection by her Orthopedics  Has some discomfort     Some neck discomfort- recently went to PT     No incontinence of urine or stool. No difficulty swallowing. She has no difficulty walking. She has no constipation, frequent diarrhea or change in her bowel movements. She has no history of thyroid disease.     learning new things, no memory problems, no skill loss     IBS-- under pretty good control with meds and following diet--sugars and pasta (too much) are particular triggers     no celiac disease, no lactose intolerance, diabetes, nut allergy       Review of Systems  Per HPI   Allergy & Immunology: not seasonal allergies.   Eyes: currently wearing eyeglasses, but not currently wearing contact lenses.   ENT: brushing the teeth daily and seeing a dentist , but no difficulty hearing, no problem with earwax buildup, no frequent ear infections, no eardrum perforations, no ear tubes, no sneezing, no frequent runny nose, no sinus problems and not flossing the teeth regularly.   CV: Normal. Spontaneous resolution of heart murmur in infancy  Resp: Normal.   Breast: Normal.   GI:. Per HPI.   :. Per HPI.   Endo:. Per HPI.   Heme/Lymph: Normal.   Neuro:. Per HPI.   Psych:. Per HPI.    Skin: dry skin and using creams or lotions.   Ortho: a neck X-ray was performed , but no problems were found on the X-Ray.      Patient Active Problem List   Diagnosis   • Abdominal pain   • Acid reflux   • Allergic rhinitis   • Carpal tunnel syndrome   • Changes in skin texture   • Down syndrome   • Acquired bilateral ankle pronation   • Ganglion of left wrist   • Hip strain   • Hyperkeratosis   • Iliotibial band syndrome   • Irritable bowel syndrome without diarrhea   • Chronic lower back pain   • Metrorrhagia   • Recurrent major depressive disorder, in partial remission (CMS/HCC)   • Neutropenia (CMS/HCC)   • Other long term (current) drug therapy   • Pain in hip   • Pain, pelvic, female   • Patellofemoral syndrome   • Skin lesion   • Tendinitis of both wrists   • Vitamin D deficiency   • History of lymphoproliferative disorder   • Fusion of spine, thoracolumbar region   • History of lumbar surgery   • Lumbar foraminal stenosis   • Lumbar radiculitis   • Spondylosis of cervical region without myelopathy or radiculopathy   • Osteoarthritis of lumbar spine   • Acute bilateral thoracic back pain   • Lumbar spondylosis   • Myofascial pain   • Abnormal findings on diagnostic imaging of breast   • Neck pain on left side       ALLERGIES:   Allergen Reactions   • Nickel Dermatitis     REDNESS WITH NICKEL    • Penicillins Other (See Comments)       Current Outpatient Medications   Medication Sig Dispense Refill   • omeprazole (PrilOSEC) 20 MG capsule Take 1 capsule by mouth 2 times daily. 60 capsule 1   • DULoxetine (CYMBALTA) 60 MG capsule TAKE ONE CAPSULE BY MOUTH IN THE MORNING 30 capsule 3   • gabapentin (NEURONTIN) 400 MG capsule TAKE ONE CAPSULE BY MOUTH TWICE DAILY 60 capsule 5   • glycopyrrolate (ROBINUL) 1 MG tablet TAKE ONE TABLET BY MOUTH TWICE DAILY 60 tablet 11   • ibuprofen (MOTRIN) 400 MG tablet Take 400 mg by mouth every 6 hours as needed for Pain.     • fluticasone (FLONASE) 50 MCG/ACT nasal spray Spray  1 spray in each nostril as needed (nasal congestion).     • Multiple Vitamins-Minerals (ONE-A-DAY WOMENS PO) Take by mouth daily.     • Probiotic Product (PROBIOTIC-10 PO) Take by mouth 2 times daily.      • cholecalciferol (VITAMIN D3) 1000 UNITS tablet TAKE 1 TABLET DAILY       No current facility-administered medications for this visit.       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 Harrington, gastritis  neg bx for celiac and h pylori;  also 4/2005 for abd pain   • Shldr arthroscop,diagnostic  05/2005    Dr. Smith   • Spine fusion,anter,8+ sgmts  12/1994    L2 to T5, [place], scoliosis   • Tonsillectomy  1984       Family History   Problem Relation Age of Onset   • Hyperlipidemia Mother    • Hyperthyroid Mother    • Rheumatoid Arthritis Mother    • Hyperlipidemia Father    • Cancer Maternal Aunt         breast       Social History     Tobacco Use   • Smoking status: Never Smoker   • Smokeless tobacco: Never Used   Substance Use Topics   • Alcohol use: No       Review of Systems    Objective     BP 90/62 (BP Location: LUE - Left upper extremity, Patient Position: Sitting, Cuff Size: Regular)   Pulse 90   Temp 98.4 °F (36.9 °C) (Temporal)   Ht 4' 11.5\" (1.511 m)   Wt 52.6 kg (116 lb)   BMI 23.04 kg/m²   BSA 1.47 m²     Physical Exam    Constitutional: alert, Down syndrome, in no acute distress, current vital signs reviewed and intellectual disability.   Head and Face: characteristic features of Down syndrome.   Eyes: no discharge, normal  conjunctiva, no eyelid swelling, no ptosis and the sclerae were normal . glasses. pupils equal, round and reactive to light and accommodation . right esotropia.   ENT: normal appearing outer ear, normal appearing nose. examination of the tympanic membrane showed normal landmarks, normal appearing external canal normal lips. oral mucosa pink and moist, no oral lesions, normal appearing tongue tonsils not enlarged.   Neck: normal appearing neck, supple neck and no mass was seen. thyroid not enlarged, no adenopathy and no thyroid nodules.   Chest: normal breast appearance.no masses; no dimpling, no discharge, no assymetry  normal chest appearance.   Pulmonary: no respiratory distress, normal respiratory rate and effort and no accessory muscle use. breath sounds clear to auscultation bilaterally.   Cardiovascular: normal rate, no murmurs were heard, regular rhythm, normal S1 and normal S2. normal PMI. right radial 2+ right dorsalis pedis 2+ left radial 2+ left dorsalis pedis 2+ edema was not present in the lower extremities.   BACK- scar  Abdomen: soft, nontender, nondistended, normal bowel sounds and no abdominal mass. no hepatomegaly and no splenomegaly. no umbilical hernia was discovered and no inguinal hernia was discovered.   Musculoskeletal Adult: no musculoskeletal erythema was seen and no joint swelling seen .  Neurologic: normal DTRs. normal gait.   Psychiatric: oriented to person, oriented to place and oriented to time. alert and awake and mood/affect were appropriate, no difficulty with verbal communication. short term memory intact.   Skin, Hair, Nails: no rash. no skin lesions. no foot ulcers. mildly dry skin. no clubbing or cyanosis of the fingernails.       Assessment     Problem List Items Addressed This Visit     Acid reflux    Relevant Orders    CBC WITH DIFFERENTIAL    Changes in skin texture    Relevant Orders    FREE T4    THYROID STIMULATING HORMONE    Down syndrome - Primary    History of  lymphoproliferative disorder    Irritable bowel syndrome without diarrhea    Neutropenia (CMS/HCC)    Relevant Orders    CBC WITH DIFFERENTIAL    Recurrent major depressive disorder, in partial remission (CMS/HCC)    Vitamin D deficiency    Relevant Orders    COMPREHENSIVE METABOLIC PANEL    VITAMIN D -25 HYDROXY          Plan      DOWN SYNDROME    For more information on health and wellness for individuals with Down syndrome, please see our links below.    Resource Library: [email or web address]  Our online Resource Library has over 300 resources for people with Down syndrome, families & caregivers, and health care professionals. Resources include: health education videos featuring individuals with Down syndrome; visual supports; summaries of health conditions; recordings of presentations; links to recommended journal articles; and more!     E-mail Newsletter: [email or web address]  We send e-mails with updates from the Center, health and wellness tips, and information about upcoming events, classes, and programs.     Facebook: [email or web address]  We post information about upcoming events, links to health articles, updates about Down syndrome research and education, and details about opportunities in the community.      Health screening Recommendations    Mental Health (annually)  Screened  Concerns as noted above  doing well    Alzheimer's disease (annually starting at age 40)  Screened  Negative    Type 2 DM  (BS or Hgb A1C 1-3; 1-2 if obese; starting age 30) Test ordered    Sleep apnea (history and physical) Screened  Negative    Celiac (history and physical) Screened  Negative    Thyroid (TSH reflex annually) Test ordered    Lipids (every 5 years starting age 40) Test not ordered         in 2021--0.15% 10 year cardiac risk    Stroke risk -(if congenital heart disease, refer to Cardiology) Screened  Negative    Weight:  Normal weight    Cervical subluxation screening (annual history  and physical exam) Screened  Negative    Hepatitis C screening (adults aged 18 to 79; test one time)  Screened    Lab done in 2020 - negative        Additional Recommendations      Hx of Breast lump-Hx of breast mass- negative biopsy in 2020- following up with Dr. Aravind Robbins; follow up exam in 2021 negative for breast mass.  Repeat mammogram later this month    DEPRESSION/MOOD DISORDER  On duloxetine and Neurontin. Doing well  Continue present medication       PERSISTENT BACK PAIN improved post injection  Discussed    Shoulder pain  To see Ortho     PROBABLE CARPAL TUNNEL SYNDROME  has been evaluated by Orthopedics     IRRITABLE BOWEL  seems to be doing better at this time  continue glycoprrolate     ESOPHAGEAL REFLUX  She is doing well with her omeprazole and I recommend she continue this.      NEUTROPENIA  This is not an uncommon finding in adults with Down syndrome and does not appear to be causing Robi any problems. Repeat CBC     DOWN SYNDROME:  I would recommend an annual evaluation including a history, physical and thyroid function testing. She has had no loss of skills.      IMMUNIZATIONS:  It is important that adults with Down syndrome keep up to date with their immunizations because immune dysfunction is thought to be more common in people with Down syndrome.   The last diphtheria/tetanus/pertussis shot was in 2011; diphtheria/tetanus in 2021. - Td in 2031  She has had the hepatitis B vaccine series.    has previously had chickenpox.   I would recommend a flu shot annually in the fall  Pneumovax-given 2002 and 2014; repeat age 50  prevnar 2019  COVID 2021 x 3; discussed booster likely in the fall     VISION/HEARING:  I would recommend a vision exam and a hearing test at least every two years.     NECK X-RAYS:  She has previously had neck x-rays, which did not show cervical subluxation- 2010.      OTHER TESTS:  She had a pap smear in 2013;-however, not sexually active and low likelihood of abnormal  findings- discussed- decided against    XERODERMA, that is dry skin.   Dry skin is very common in adults with Down syndrome. Although it is generally not a serious problem, it can be quite bothersome. Many people tend to be more affected in the wintertime when the air is more dry and the cold winds can cause increasing dryness to the skin. We recommend the following treatments for dry skin:  - [place] use of a moisturizing cream vs. a lotion. Creams tend to be a heavier formula and contain more oil than do lotions. Use the cream 2-3 times per day or more if necessary. It is best to apply the cream right after drying from a shower or bath when the skin is still moist. Examples of some creams would be Eucerin, Lubriderm, etc.   - An alternative to the above-mentioned creams (if these do not seem to be effective) is to switch to a product that contains therapeutic ingredients that combat dry skin, such as alpha hydroxy acids. This ingredient helps remove the loose layers of dry skin that flakes on the surface of the body. An example of this would be Eucerin Plus cream or Jergens lotion with alpha and beta hydroxy.   - Use of a mild soap such as Lever 2000, Dove or Dial with moisturizers.  - Decrease the frequency, length of time of shower and temperature of the shower, especially in the wintertime (use lukewarm water instead of hot or cold).   - In the winter, avoid exposure of the skin to the cold, dry, winter air by covering as much of the skin as possible when outside.   - In the winter, if the residence does not have a humidifier, consider using one in the house.   - In the summer, use of sunscreens (30 SPF or greater) and light, protective clothing to protect the skin from the burning and drying effects of the sun.        Return in about 4 months (around 11/7/2022) for Follow-up- every 4 months follow up (30 min) and CPE in one year.      Total time required for this encounter: 29179 [id number]  including  pre-charting/evaluation of medical records, review of results, face-to-face contact with patient, medical decision making, discussion with patient and/or caregiver, and documentation.        Antonia Colón)  Internal Medicine  [address] [place] [address]  Phone: [phone]  Fax: [phone]  Established Patient  Follow Up Time: 1 week    Obey Schofield)  [place] [id number] [place] [address]  Phone: [phone]  Fax: [phone]  Established Patient  Follow Up Time: 1 week    Nithin Huizar)  [address] [place] [address]  Phone: [phone]  Fax: [phone]  Established Patient  Follow Up Time: 2 weeks

## 2022-10-07 NOTE — CHART NOTE - NSCHARTNOTESELECT_GEN_ALL_CORE
GOC/Event Note Burow's Advancement Flap Text: The defect edges were debeveled with a #15 scalpel blade.  Given the location of the defect and the proximity to free margins a Burow's advancement flap was deemed most appropriate.  Using a sterile surgical marker, the appropriate advancement flap was drawn incorporating the defect and placing the expected incisions within the relaxed skin tension lines where possible.    The area thus outlined was incised deep to adipose tissue with a #15 scalpel blade.  The skin margins were undermined to an appropriate distance in all directions utilizing iris scissors.

## 2022-10-20 NOTE — DISCHARGE NOTE ADULT - NS AS DC FU INST LIST INST
I just called [facility] and they said that they reached out to [facility] and was told there had to be a dx of type 2 dm. Dr Pankaj Mccormick assistant is reaching out to our drug rep for the medication to verify. The pharmacist also stated that she will contact the company to find out and get back with us. I told her to please call us back today. no

## 2022-12-08 NOTE — PROGRESS NOTE ADULT - PROBLEM/PLAN-5
DISPLAY PLAN FREE TEXT
unknown

## 2023-02-19 NOTE — PROVIDER CONTACT NOTE (OTHER) - BACKGROUND
DISCHARGE SUMMARY and INSTRUCTIONS:    You are being discharged undelivered because you and your baby are in stable condition.    STATUS NOTE:  Date:  2/19/2023  Time:  1630  Destination: Home    ACTIVITY:  As tolerated    DIET:  Continue to eat a healthy pregnancy diet  Be sure to drink 8-10 glasses of water a day  Don't go more than 8-10 hours without eating or drinking    CONTACT YOUR DOCTOR OR MIDWIFE IF YOU HAVE ANY OF THESE WARNING SIGNS OF PREGNANCY:  Sudden and/or constant pain  Vaginal bleeding  Sudden gush or leaking fluid from the vagina  Fainting  Headache that will not go away with your normal comfort measures  Any changes in your vision, such as blurry vision, double vision or spots/stars before your eyes  Severe nausea and vomiting  Little or no urine, pain and burning with urination, or blood in your urine  Chills or fever  Increase or change in vaginal discharge  Your baby moves less than usual (See Fetal Movement Counting below.)      FETAL MOVEMENT COUNTING:  One way you can know your baby is doing well during pregnancy is to pay attention to his or her movements.  We recommend counting your baby's movements once each day beginning in the third trimester, or about the 26th week of pregnancy.      How to count baby's movements:  Choose a time that is convenient for you when the baby is active, such as after a meal.  Try to do it at the same time each day.  Sit comfortably or lie on your side.  Note the time on the clock when you're ready to begin counting.  Count each movement until the baby has moved 10 times.   Count all movements that are either seen or felt, including shifting, rolling, or kicking.  Do not count baby's hiccoughs.  If you have counted 10 movements in less than 2 hours, resume your normal activities and count again tomorrow.    If it takes longer than 1 hour to count 4 movements, try these suggestions:  Eat something if you haven't eaten within 2 to 3 hours before you count.  Try  to lie down in a quiet, undisturbed location, on either your right or left side.  Place your hands on your belly and focus on your baby's movements.  Continue your count from where you left off before, until you feel 10 movements.  If it took longer than 2 hours to count 10 movements, call your midwife or doctor right away for recommendations.    Remember:  By counting and staying aware of your baby's movements, you will be helping your caregivers provide the best possible care for you and your baby.      A copy of this form was provided to and reviewed with Mihai Reeder by Angelica Hutchins RN, 2/19/2023.  Patient verbalized understanding and has no further questions at this time.     Pt admitted with Aicd Lead Malfunction, Hyponatremia, Fever Generalized weakness.

## 2023-05-16 NOTE — PRE-OP CHECKLIST - SITE MARKED BY ANESTHESIOLOGIST
n/a Cimzia Counseling:  I discussed with the patient the risks of Cimzia including but not limited to immunosuppression, allergic reactions and infections.  The patient understands that monitoring is required including a PPD at baseline and must alert us or the primary physician if symptoms of infection or other concerning signs are noted.

## 2023-09-01 NOTE — SWALLOW VFSS/MBS ASSESSMENT ADULT - ADDITIONAL RECOMMENDATIONS
Removed sutures left wrist, right elbow. Steri strips present left clavicle  Steri strips should fall off in the shower at some point, if they do not fall off in 10 days, remove them  Can shower tomorrow over incision. NO Soaking or submerging incision in water until fully healed & all scabs are gone    WB:  Partial weight bearing on right upper and left upper extremity limited to comfort range of motion, no pushing/pulling. Heaviest item a cup of coffee bilateral upper extremity. Work on gentle range of motion through right elbow, left wrist, left shoulder    Sling: On bilateral upper extremity as needed for comfort. Therapy: Consider OT referral at 6 week soumya     DVT: OK to discontinue  Pain control : Over the counter analgesia as needed.  Decrease robaxin to 1 pill, 2 pills at night    Continue with ice to the injured extremity 2-3 times per day for swelling  If able continue with elevation and compression    Follow up in 4 weeks with XR of the right elbow, left wrist, left clavicle to be done in office
Maintain good oral hygiene.  Pt will benefit from skilled ST services at next level of care.
Maintain good oral hygiene!

## 2023-10-12 NOTE — H&P ADULT - PROBLEM/PLAN-4
DISPLAY PLAN FREE TEXT Muscle Hinge Flap Text: Due to the deep nature of the defect, and to ensure survival of the overlying flap or graft repair for cutaneous coverage, the wound was first addressed with a muscle hinge flap.  Three sides of the muscle were incised and flipped/carried over like a page of the book into the defect, and secured with Vicryl stitches.

## 2023-11-13 NOTE — ED ADULT NURSE NOTE - PMH
CAD (coronary artery disease)    CKD (chronic kidney disease)    DM (diabetes mellitus)    Hepatitis    HTN (hypertension)     What Type Of Note Output Would You Prefer (Optional)?: Standard Output How Severe Are Your Spot(S)?: mild Hpi Title: Evaluation of Skin Lesions

## 2023-12-21 NOTE — BRIEF OPERATIVE NOTE - PRE-OP DX
Pt arrives via BLS from [facility] with weakness and reports abnormal Na lab. Pt has influenza A.    Peritonitis  12/13/2018    Active  Alexis Concepcion

## 2024-04-15 NOTE — PATIENT PROFILE ADULT - NSPROPTRIGHTCAREGIVER_GEN_A_NUR
Jayla Hernadez 
"Matthew Sequeira (Ean)enter was seen and treated in our emergency department on 5/10/2023.  He may return to school on 05/15/2023.      If you have any questions or concerns, please don't hesitate to call.       RN"
yes

## 2024-06-10 NOTE — PHYSICAL THERAPY INITIAL EVALUATION ADULT - PRECAUTIONS/LIMITATIONS, REHAB EVAL
Injury 1: L TH DP comminuted,nondisplaced fracture  - Date: 09/26/19  - Days Since: 1719     Armando Mann is a 50 year old male that presents with   Chief Complaint   Patient presents with    Injury     RTH MC FX and Laceration   .    REFERRED BY:  Joseluis Andrade    Pacemaker: No  Latex Allergy: no  Coumadin: No  Plavix: No  Other anticoagulants: No  Cardiac stents: No  Diet medications: Jardiance last taken 6/10/24    HAND DOMINANCE:  Right  Profession:     RECONSTRUCTIVE HISTORY    SUN EXPOSURE   Current no   Past no   Sunburns no   Tanning salons current no   Tanning salons past no   Radiation treatments No     SKIN CANCER    Personal history of skin cancer: none      HPI:       Injury 1: L TH DP comminuted,nondisplaced fracture  - Date: 09/26/19  - Days Since: 1719      50-year-old male right-hand-dominant with a right thumb fracture    Fell at work    ER, x-rayed, sutured and splinted  On Augmentin    No pain or numbness            Review of Systems:   Constitutional: No change in appetite, chill/rigors, or fatigue  GI: No jaundice  Endocrine: No generalized weakness  Neurological: No aphasia, loss of consciousness, or seizures    Musculoskeletal:      Date of injury 6/7/24     Location right      thumb      Mechanism Fell at work     Treatment Seen in ER on 6/7/24, x-ray performed, sutured, splinted and prescribed augmentin and Norco       Pain  no     Numbness No    Arrived with RTH spica splint and ace wrap CDI,removed.  Sutures at MCP CDI,edges well approximated without s/s of infection.      PMH:     MEDICAL  Past Medical History:    Diabetes (HCC)    Essential hypertension    Type 2 diabetes mellitus (HCC)        SURGICAL  History reviewed. No pertinent surgical history.     ALLERIGIES  No Known Allergies     MEDICATIONS  Current Outpatient Medications   Medication Sig Dispense Refill    amoxicillin clavulanate 875-125 MG Oral Tab Take 1 tablet by mouth 2 (two) times daily for 7 days. 14  tablet 0    HYDROcodone-acetaminophen 5-325 MG Oral Tab Take 1 tablet by mouth every 4 (four) hours as needed for Pain. 10 tablet 0    lisinopril 10 MG Oral Tab Take 1 tablet (10 mg total) by mouth daily. 90 tablet 3    escitalopram 20 MG Oral Tab Take 1 tablet (20 mg total) by mouth daily. 90 tablet 2    clonazePAM 0.5 MG Oral Tab Take 1 tablet (0.5 mg total) by mouth nightly as needed for Anxiety. 20 tablet 0    ezetimibe (ZETIA) 10 MG Oral Tab Take 1 tablet (10 mg total) by mouth daily. 90 tablet 3    Empagliflozin (JARDIANCE) 25 MG Oral Tab Take 1 tablet by mouth every morning. 90 tablet 0    rosuvastatin 20 MG Oral Tab Take 1 tablet (20 mg total) by mouth nightly. 90 tablet 3    metFORMIN HCl 1000 MG Oral Tab Take 1 tablet (1,000 mg total) by mouth 2 (two) times daily with meals. 180 tablet 3    tamsulosin 0.4 MG Oral Cap Take 1 capsule (0.4 mg total) by mouth daily. Take 1/2 hour following the same meal each day 30 capsule 11    ibuprofen 600 MG Oral Tab Take 1 tablet (600 mg total) by mouth every 8 (eight) hours as needed for Pain or Fever. (Patient not taking: Reported on 6/10/2024) 30 tablet 0        SOCIAL HISTORY  Social History     Socioeconomic History    Marital status: Single   Tobacco Use    Smoking status: Never    Smokeless tobacco: Never   Vaping Use    Vaping status: Never Used   Substance and Sexual Activity    Alcohol use: No    Drug use: No   Other Topics Concern    Right Handed Yes        FAMILY HISTORY  History reviewed. No pertinent family history.       PHYSICAL EXAM:     CONSTITUTIONAL: Overall appearance - Normal  HEENT: Normocephalic  EYES: Conjunctiva - Right: Normal, Left: Normal; EOMI  EARS: Inspection - Right: Normal, Left: Normal  NECK/THYROID: Inspection - Normal, Palpation - Normal, Thyroid gland - Normal, No adenopathy  RESPIRATORY: Inspection - Normal, Effort - Normal  CARDIOVASCULAR: Regular rhythm, No murmurs  ABDOMEN: Inspection - Normal, No abdominal tenderness  NEURO:  Memory intact  PSYCH: Oriented to person, place, time, and situation, Appropriate mood and affect      Hand Physical Exam:     Right 2 cm sutured noninfected laceration dorsal MP  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  Questions were answered and the patient wishes to proceed with treatment.     SPLINT - This fracture can be treated with a splint.  We discussed splint care and instructions, as well as restrictions.  If there is displacement of the fracture, surgery may be required.    Thumb spica splint    We discussed restrictions.    Even with satisfactory healing, the hand/digit may not regain normal ROM or normal function.      9 days, 6/19, RN suture removal  3 weeks OT  5 weeks MD        6/10/2024  Sravan Seals MD      +++++++++++++++++++++++++++++++++++++++++      MEDICAL DECISION MAKING    PROBLEMS      MODERATE    (number / complexity)          Acute complicated injury    DATA         MODERATE    (amount / complexity)          X-rays independently reviewed    MANAGEMENT RISK  LOW    (complications/ morbidity)       Splint/OT                  MDM LEVEL    MODERATE         fall precautions

## 2024-07-27 NOTE — CONSULT NOTE ADULT - CONSULT REASON
ANDREEA, Hyponatremia
CAD, PPM
Fever
abd pain  sepsis eval  ANDREEA  hyponatremia  dehydration  pleural eff left  atelectasis   ascites
Home

## 2024-09-16 NOTE — PATIENT PROFILE ADULT - ABILITY TO HEAR (WITH HEARING AID OR HEARING APPLIANCE IF NORMALLY USED):
DISCHARGE
Mildly to Moderately Impaired: difficulty hearing in some environments or speaker may need to increase volume or speak distinctly

## 2024-11-05 NOTE — ED ADULT NURSE NOTE - PAIN RATING/NUMBER SCALE (0-10): ACTIVITY
Patient said thank you, thank you, thank you! New interstim working better; anything else with her bladder biopsy, looked good, sending urine for culture; large heme, large leuk; she isn't having bladder pain; 
0

## 2024-12-24 NOTE — DISCHARGE NOTE FOR THE EXPIRED PATIENT - HOSPITAL COURSE
84 yo  M with h/o sCHF (EF 40-45%), s/p AICD placement, cirrhosis (suspect 2 to Hep B) admitted with GIB in the setting of decompensated cirrhosis; pt found to have esophageal varices s/p MICU stay for intubation/pressor support for endoscopy.  Pt was readmitted to MICU for hypoxemic respiratory failure requiring intubation in the setting of worsening hepatic encephalopathy, aspiration, and large R pleural effusion, s/p thoracentesis , s/p extubation  and most recently pt with acute renal failure 2 to ATN. Renal failure continued to worsen and pt became anuric as no dialysis was offered due to his poor overall prognosis. s/p R thoracentesis . Early  pt was noted to be in respiratory distress and a R pigtail cath was placed with 2.2 L drained. Despite these interventions, pt's condition continued to deteriorate and was in shock state due to multi-organ failure. Pt became increasingly hypotensive despite maximum peripheral levophed. Family was notified and came for end of life visit. Made comfort care only. Pt  on 21 7:05am. normal performance

## 2025-03-26 NOTE — ED PROVIDER NOTE - NOSE [+], MLM
Assessment & Plan  1. Gout: Stable. Uric acid 6.1 in 11/2024. No recent flare-ups. On allopurinol 200 mg daily. Took colchicine 3 days ago, first time in months.  - Continue allopurinol 200 mg daily  - Prescription sent to [facility] pharmacy for 90-day supply    2. Insomnia: Chronic.  - Continue magnesium glycinate 400 mg, melatonin, and Belsomra  - Prescription for Belsomra sent to [facility] pharmacy for 90-day supply  - Advised to consider mouth guard for sleep apnea and teeth grinding    3. Hypertension: Stable. BP readings mid-130s/70s-80s. Today's reading 136/77.  - Continue dietary modifications: reduced salt intake, increased vegetables, fish, poultry  - Consider adding polyphenols and antioxidants  - Continue home BP monitoring    4. Gastroesophageal reflux disease: Intermittent.  - Continue Protonix one week on and one week off for reflux symptoms    Follow-up  - Prescriptions sent to [facility] pharmacy for allopurinol and Belsomra    It was a pleasure seeing Mr. Marv Baker today.  Return in about 6 months (around 9/26/2025) for chronic disease management.    History of Present Illness  The patient presents for chronic disease management, including hypertension, insomnia, and gout.    Hypertension  - Managed with lifestyle modifications, including reduced sodium intake, consumption of unsalted butter, increased intake of vegetables, fish, pork, chicken, limited beef, and occasional coffee.  - Home blood pressure readings are typically in the mid-130s/70s-80s mmHg.  - The patient has not tried beetroot extract or SuperBeet gummies.  - Weight remains stable at 170 lbs, with increased physical activity noted during the summer months.  - The patient is considering initiating low-dose losartan in the future.    Gout  - Management has been effective with no recent flare-ups.  - Uric acid levels have not been re-evaluated.  - Currently on allopurinol 200 mg, increased from 150 mg due to 
Chief Complaint   Patient presents with    Hypertension    Gout       Subjective: Patient presents today for 6 month follow-up, regarding hypertension and gout.  No other concerns.    No results found for this visit on 03/26/25.     \"Have you been to the ER, urgent care clinic since your last visit?  Hospitalized since your last visit?\"    NO    “Have you seen or consulted any other health care providers outside of [facility] since your last visit?”    NO          Click Here for Release of Records Request  
EPISTAXIS
